# Patient Record
Sex: FEMALE | Race: WHITE | NOT HISPANIC OR LATINO
[De-identification: names, ages, dates, MRNs, and addresses within clinical notes are randomized per-mention and may not be internally consistent; named-entity substitution may affect disease eponyms.]

---

## 2018-07-19 ENCOUNTER — TRANSCRIPTION ENCOUNTER (OUTPATIENT)
Age: 58
End: 2018-07-19

## 2018-07-20 ENCOUNTER — OUTPATIENT (OUTPATIENT)
Dept: OUTPATIENT SERVICES | Facility: HOSPITAL | Age: 58
LOS: 1 days | Discharge: HOME | End: 2018-07-20
Payer: COMMERCIAL

## 2018-07-20 ENCOUNTER — RESULT REVIEW (OUTPATIENT)
Age: 58
End: 2018-07-20

## 2018-07-20 DIAGNOSIS — Z98.89 OTHER SPECIFIED POSTPROCEDURAL STATES: Chronic | ICD-10-CM

## 2018-07-20 DIAGNOSIS — Z80.0 FAMILY HISTORY OF MALIGNANT NEOPLASM OF DIGESTIVE ORGANS: ICD-10-CM

## 2018-07-20 DIAGNOSIS — E66.9 OBESITY, UNSPECIFIED: ICD-10-CM

## 2018-07-20 PROCEDURE — 88313 SPECIAL STAINS GROUP 2: CPT | Mod: 26

## 2018-07-20 PROCEDURE — 88312 SPECIAL STAINS GROUP 1: CPT

## 2018-07-20 PROCEDURE — 88313 SPECIAL STAINS GROUP 2: CPT

## 2018-07-20 PROCEDURE — 88305 TISSUE EXAM BY PATHOLOGIST: CPT | Mod: 26

## 2018-07-20 PROCEDURE — 88312 SPECIAL STAINS GROUP 1: CPT | Mod: 26

## 2018-07-20 PROCEDURE — 88305 TISSUE EXAM BY PATHOLOGIST: CPT

## 2018-07-20 PROCEDURE — 43239 EGD BIOPSY SINGLE/MULTIPLE: CPT

## 2018-07-20 PROCEDURE — 45380 COLONOSCOPY AND BIOPSY: CPT

## 2018-07-23 LAB — SURGICAL PATHOLOGY FINAL REPORT - CH: SIGNIFICANT CHANGE UP

## 2019-08-28 ENCOUNTER — TRANSCRIPTION ENCOUNTER (OUTPATIENT)
Age: 59
End: 2019-08-28

## 2020-01-22 ENCOUNTER — NON-APPOINTMENT (OUTPATIENT)
Age: 60
End: 2020-01-22

## 2020-01-22 ENCOUNTER — APPOINTMENT (OUTPATIENT)
Dept: CARDIOLOGY | Facility: CLINIC | Age: 60
End: 2020-01-22
Payer: COMMERCIAL

## 2020-01-22 VITALS
SYSTOLIC BLOOD PRESSURE: 127 MMHG | WEIGHT: 293 LBS | HEART RATE: 88 BPM | HEIGHT: 66 IN | OXYGEN SATURATION: 97 % | DIASTOLIC BLOOD PRESSURE: 81 MMHG | BODY MASS INDEX: 47.09 KG/M2

## 2020-01-22 DIAGNOSIS — R25.1 TREMOR, UNSPECIFIED: ICD-10-CM

## 2020-01-22 LAB
BASOPHILS # BLD AUTO: 0.04 K/UL
BASOPHILS NFR BLD AUTO: 0.5 %
EOSINOPHIL # BLD AUTO: 0.12 K/UL
EOSINOPHIL NFR BLD AUTO: 1.6 %
ESTIMATED AVERAGE GLUCOSE: 117 MG/DL
HBA1C MFR BLD HPLC: 5.7 %
HCT VFR BLD CALC: 39.4 %
HGB BLD-MCNC: 12.4 G/DL
IMM GRANULOCYTES NFR BLD AUTO: 0.3 %
LYMPHOCYTES # BLD AUTO: 1.65 K/UL
LYMPHOCYTES NFR BLD AUTO: 21.8 %
MAN DIFF?: NORMAL
MCHC RBC-ENTMCNC: 29.7 PG
MCHC RBC-ENTMCNC: 31.5 GM/DL
MCV RBC AUTO: 94.3 FL
MONOCYTES # BLD AUTO: 0.45 K/UL
MONOCYTES NFR BLD AUTO: 5.9 %
NEUTROPHILS # BLD AUTO: 5.29 K/UL
NEUTROPHILS NFR BLD AUTO: 69.9 %
NT-PROBNP SERPL-MCNC: 51 PG/ML
PLATELET # BLD AUTO: 288 K/UL
RBC # BLD: 4.18 M/UL
RBC # FLD: 13.3 %
WBC # FLD AUTO: 7.57 K/UL

## 2020-01-22 PROCEDURE — 93000 ELECTROCARDIOGRAM COMPLETE: CPT

## 2020-01-22 PROCEDURE — 99244 OFF/OP CNSLTJ NEW/EST MOD 40: CPT

## 2020-01-22 NOTE — DISCUSSION/SUMMARY
[With Me] : with me [FreeTextEntry1] : Ewelina is a 60 year old female here for evaluation.\par Her blood pressure is at goal, and she will continue her current regimen of verapamil and Avapro. I have given her a prescription for a low dose propranolol, to see if it helps with her tremor.\par \par She has a history of a 4.4 cm thoracic aortic aneurysm, and we'll repeat her echocardiogram in our office. I will also send a full set of blood work today, and we may need to proceed with a CT with contrast, to reevaluate her aneurysm this year. Her EKG does she a SR with evidence of LVH and LAD, with poor r wave progression.\par \par She will get me her old records, including stress test and cardiac catheterization. I stressed the importance of getting back to diet, and exercise, with an attempt to lose weight.\par I will call her with the results the above tests, and arrange followup.

## 2020-01-22 NOTE — HISTORY OF PRESENT ILLNESS
[FreeTextEntry1] : Ewelina is a 60 year old female here for evaluation.\par \par Cardiac-wise, she has a history of obesity, obstructive sleep apnea, and hypertension. She reports an abnormal stress test about 7 or 8 years ago, for which she had an angiogram, and it showed no significant plaque. She also has a history of aortic aneurysm. Her last CT was in 12/2018. This demonstrated an aneurysmal dilation of the ascending thoracic aorta, with a maximal transverse diameter of 4.4 cm.\par \par She has no chest pain, though does report occasional shortness of breath. She also denies palpitations, though reports a history of skipped beats. She has no lower extremity edema, orthopnea, PND, dizziness, lightheadedness, or near-syncope.\par \par She denies toxic habits. She has a family history of coronary artery disease. She also has an essential tremor, which seems to improve with Xanax. She also has a history of frequent kidney stones.\par She is planning to have liposuction later in the year.

## 2020-01-22 NOTE — REASON FOR VISIT
[Initial Evaluation] : an initial evaluation of [Hypertension] : hypertension [Abnormal ECG] : an abnormal ECG

## 2020-01-22 NOTE — PHYSICAL EXAM
[General Appearance - Well Developed] : well developed [Well Groomed] : well groomed [Normal Appearance] : normal appearance [No Deformities] : no deformities [General Appearance - Well Nourished] : well nourished [General Appearance - In No Acute Distress] : no acute distress [Normal Conjunctiva] : the conjunctiva exhibited no abnormalities [Eyelids - No Xanthelasma] : the eyelids demonstrated no xanthelasmas [Normal Oral Mucosa] : normal oral mucosa [No Oral Cyanosis] : no oral cyanosis [No Oral Pallor] : no oral pallor [Normal Jugular Venous V Waves Present] : normal jugular venous V waves present [Normal Jugular Venous A Waves Present] : normal jugular venous A waves present [No Jugular Venous Ames A Waves] : no jugular venous ames A waves [Normal Rate] : normal [Normal S1] : normal S1 [Normal S2] : normal S2 [No Murmur] : no murmurs heard [2+] : left 2+ [No Abnormalities] : the abdominal aorta was not enlarged and no bruit was heard [No Pitting Edema] : no pitting edema present [Respiration, Rhythm And Depth] : normal respiratory rhythm and effort [Exaggerated Use Of Accessory Muscles For Inspiration] : no accessory muscle use [Auscultation Breath Sounds / Voice Sounds] : lungs were clear to auscultation bilaterally [Abdomen Soft] : soft [Abdomen Tenderness] : non-tender [Abdomen Mass (___ Cm)] : no abdominal mass palpated [Abnormal Walk] : normal gait [Gait - Sufficient For Exercise Testing] : the gait was sufficient for exercise testing [Nail Clubbing] : no clubbing of the fingernails [Cyanosis, Localized] : no localized cyanosis [Petechial Hemorrhages (___cm)] : no petechial hemorrhages [Skin Color & Pigmentation] : normal skin color and pigmentation [No Venous Stasis] : no venous stasis [] : no rash [Skin Lesions] : no skin lesions [No Skin Ulcers] : no skin ulcer [No Xanthoma] : no  xanthoma was observed [Affect] : the affect was normal [Mood] : the mood was normal [Oriented To Time, Place, And Person] : oriented to person, place, and time [No Anxiety] : not feeling anxious [FreeTextEntry1] : obese [S3] : no S3 [S4] : no S4 [Right Carotid Bruit] : no bruit heard over the right carotid [Left Carotid Bruit] : no bruit heard over the left carotid [Right Femoral Bruit] : no bruit heard over the right femoral artery [Left Femoral Bruit] : no bruit heard over the left femoral artery

## 2020-01-23 LAB
25(OH)D3 SERPL-MCNC: 60.1 NG/ML
ALBUMIN SERPL ELPH-MCNC: 4.3 G/DL
ALP BLD-CCNC: 130 U/L
ALT SERPL-CCNC: 27 U/L
ANION GAP SERPL CALC-SCNC: 13 MMOL/L
AST SERPL-CCNC: 22 U/L
BILIRUB SERPL-MCNC: 0.2 MG/DL
BUN SERPL-MCNC: 20 MG/DL
CALCIUM SERPL-MCNC: 9.7 MG/DL
CHLORIDE SERPL-SCNC: 103 MMOL/L
CHOLEST SERPL-MCNC: 188 MG/DL
CHOLEST/HDLC SERPL: 3.3 RATIO
CO2 SERPL-SCNC: 28 MMOL/L
CREAT SERPL-MCNC: 0.9 MG/DL
GLUCOSE SERPL-MCNC: 125 MG/DL
HDLC SERPL-MCNC: 56 MG/DL
LDLC SERPL CALC-MCNC: 108 MG/DL
POTASSIUM SERPL-SCNC: 4.7 MMOL/L
PROT SERPL-MCNC: 6.7 G/DL
SODIUM SERPL-SCNC: 143 MMOL/L
TRIGL SERPL-MCNC: 116 MG/DL
TSH SERPL-ACNC: 2.66 UIU/ML

## 2020-01-28 ENCOUNTER — APPOINTMENT (OUTPATIENT)
Dept: SURGERY | Facility: CLINIC | Age: 60
End: 2020-01-28
Payer: COMMERCIAL

## 2020-01-28 VITALS
HEART RATE: 94 BPM | TEMPERATURE: 98.1 F | OXYGEN SATURATION: 100 % | WEIGHT: 293 LBS | BODY MASS INDEX: 47.09 KG/M2 | HEIGHT: 66 IN | SYSTOLIC BLOOD PRESSURE: 157 MMHG | DIASTOLIC BLOOD PRESSURE: 107 MMHG | RESPIRATION RATE: 14 BRPM

## 2020-01-28 DIAGNOSIS — E65 LOCALIZED ADIPOSITY: ICD-10-CM

## 2020-01-28 DIAGNOSIS — M79.3 PANNICULITIS, UNSPECIFIED: ICD-10-CM

## 2020-01-28 DIAGNOSIS — E66.01 MORBID (SEVERE) OBESITY DUE TO EXCESS CALORIES: ICD-10-CM

## 2020-01-28 DIAGNOSIS — L98.491 NON-PRESSURE CHRONIC ULCER OF SKIN OF OTHER SITES LIMITED TO BREAKDOWN OF SKIN: ICD-10-CM

## 2020-01-28 PROCEDURE — 99203 OFFICE O/P NEW LOW 30 MIN: CPT

## 2020-01-29 PROBLEM — M79.3 PANNICULITIS: Status: ACTIVE | Noted: 2020-01-29

## 2020-01-29 PROBLEM — E65 ABDOMINAL PANNUS: Status: ACTIVE | Noted: 2020-01-29

## 2020-01-29 PROBLEM — L98.491 SKIN ULCER, LIMITED TO BREAKDOWN OF SKIN: Status: ACTIVE | Noted: 2020-01-29

## 2020-01-29 NOTE — PHYSICAL EXAM
[Normal Breath Sounds] : Normal breath sounds [de-identified] : Morbidly obese elderly woman in no acute distress. appears older than stated age [Normal Heart Sounds] : normal heart sounds [de-identified] : Supple, no masses, lymphadenopathy [de-identified] : NONA PAIGE, CLAUDIA [de-identified] : Obese, soft, nontender, nondistended, positive bowel sounds in all four quadrants.  No hernia or masses.\par Pendulous abdominal pannus [de-identified] : FROM, strength equal bilaterally abdomen without difficulty. [de-identified] : Pendulous abdominal pannus to mid thigh.  Multiple ulcerated lesions.  No overt infection.

## 2020-01-29 NOTE — HISTORY OF PRESENT ILLNESS
[de-identified] : Known to me from previous Bariatric Surgery Practice.  Had Lap band removed secondary to Gastric band erosion.\rosette Continued to struggle with weight loss.\rosette Developed a very large pendulous abdominal pannus with ulcerations and has been in consultation with Plastic Surgeon for Panniculectomy, lipo, etc.\rosette Was asked to evaluate to both assist in her procedure as well as assess for any occult herniations.

## 2020-01-29 NOTE — ASSESSMENT
[FreeTextEntry1] : Agree with Panniculectomy.  Will coordinate with Dr De La Torre's office for Panniculectomy.\par I do not appreciate any hernia or masses but have never the less discussed with patient repair of hernia in the event occult herniation is identified upon mobilization of the pannus from the abdominal wall and exposure of the underlying musculature.\par \par Local wound care instructions provided for ulcerated lesions.\par \par Routine PST to be arranged in accordance with OR scheduling.\par \par F/U post operatively.

## 2020-01-31 ENCOUNTER — APPOINTMENT (OUTPATIENT)
Dept: CARDIOLOGY | Facility: CLINIC | Age: 60
End: 2020-01-31
Payer: COMMERCIAL

## 2020-01-31 PROCEDURE — 93306 TTE W/DOPPLER COMPLETE: CPT

## 2020-03-27 ENCOUNTER — APPOINTMENT (OUTPATIENT)
Dept: SURGERY | Facility: HOSPITAL | Age: 60
End: 2020-03-27

## 2020-06-16 ENCOUNTER — INPATIENT (INPATIENT)
Facility: HOSPITAL | Age: 60
LOS: 0 days | Discharge: HOME | DRG: 287 | End: 2020-06-16
Attending: INTERNAL MEDICINE | Admitting: INTERNAL MEDICINE
Payer: COMMERCIAL

## 2020-06-16 ENCOUNTER — TRANSCRIPTION ENCOUNTER (OUTPATIENT)
Age: 60
End: 2020-06-16

## 2020-06-16 ENCOUNTER — EMERGENCY (EMERGENCY)
Facility: HOSPITAL | Age: 60
LOS: 1 days | Discharge: ANOTHER HOSPITAL | End: 2020-06-16
Attending: EMERGENCY MEDICINE | Admitting: EMERGENCY MEDICINE
Payer: COMMERCIAL

## 2020-06-16 VITALS
WEIGHT: 293 LBS | TEMPERATURE: 99 F | DIASTOLIC BLOOD PRESSURE: 97 MMHG | RESPIRATION RATE: 16 BRPM | HEIGHT: 66 IN | OXYGEN SATURATION: 98 % | HEART RATE: 88 BPM | SYSTOLIC BLOOD PRESSURE: 150 MMHG

## 2020-06-16 VITALS — SYSTOLIC BLOOD PRESSURE: 154 MMHG | RESPIRATION RATE: 18 BRPM | HEART RATE: 76 BPM | DIASTOLIC BLOOD PRESSURE: 72 MMHG

## 2020-06-16 VITALS
HEART RATE: 90 BPM | WEIGHT: 293 LBS | DIASTOLIC BLOOD PRESSURE: 116 MMHG | HEIGHT: 66 IN | RESPIRATION RATE: 20 BRPM | TEMPERATURE: 99 F | SYSTOLIC BLOOD PRESSURE: 164 MMHG | OXYGEN SATURATION: 96 %

## 2020-06-16 DIAGNOSIS — I21.4 NON-ST ELEVATION (NSTEMI) MYOCARDIAL INFARCTION: ICD-10-CM

## 2020-06-16 DIAGNOSIS — Z98.89 OTHER SPECIFIED POSTPROCEDURAL STATES: Chronic | ICD-10-CM

## 2020-06-16 LAB
ALBUMIN SERPL ELPH-MCNC: 3.5 G/DL — SIGNIFICANT CHANGE UP (ref 3.3–5)
ALP SERPL-CCNC: 127 U/L — HIGH (ref 40–120)
ALT FLD-CCNC: 32 U/L — SIGNIFICANT CHANGE UP (ref 12–78)
ANION GAP SERPL CALC-SCNC: 8 MMOL/L — SIGNIFICANT CHANGE UP (ref 5–17)
APTT BLD: 30.7 SEC — SIGNIFICANT CHANGE UP (ref 28.5–37)
AST SERPL-CCNC: 19 U/L — SIGNIFICANT CHANGE UP (ref 15–37)
BASOPHILS # BLD AUTO: 0.05 K/UL — SIGNIFICANT CHANGE UP (ref 0–0.2)
BASOPHILS NFR BLD AUTO: 0.7 % — SIGNIFICANT CHANGE UP (ref 0–2)
BILIRUB SERPL-MCNC: 0.6 MG/DL — SIGNIFICANT CHANGE UP (ref 0.2–1.2)
BUN SERPL-MCNC: 19 MG/DL — SIGNIFICANT CHANGE UP (ref 7–23)
CALCIUM SERPL-MCNC: 9.1 MG/DL — SIGNIFICANT CHANGE UP (ref 8.5–10.1)
CHLORIDE SERPL-SCNC: 106 MMOL/L — SIGNIFICANT CHANGE UP (ref 96–108)
CK MB BLD-MCNC: <1 % — SIGNIFICANT CHANGE UP (ref 0–3.5)
CK MB CFR SERPL CALC: <1 NG/ML — SIGNIFICANT CHANGE UP (ref 0–3.6)
CK SERPL-CCNC: 96 U/L — SIGNIFICANT CHANGE UP (ref 26–192)
CO2 SERPL-SCNC: 26 MMOL/L — SIGNIFICANT CHANGE UP (ref 22–31)
CREAT SERPL-MCNC: 0.86 MG/DL — SIGNIFICANT CHANGE UP (ref 0.5–1.3)
D DIMER BLD IA.RAPID-MCNC: 208 NG/ML DDU — SIGNIFICANT CHANGE UP
EOSINOPHIL # BLD AUTO: 0.11 K/UL — SIGNIFICANT CHANGE UP (ref 0–0.5)
EOSINOPHIL NFR BLD AUTO: 1.5 % — SIGNIFICANT CHANGE UP (ref 0–6)
GLUCOSE SERPL-MCNC: 141 MG/DL — HIGH (ref 70–99)
HCT VFR BLD CALC: 36.8 % — SIGNIFICANT CHANGE UP (ref 34.5–45)
HGB BLD-MCNC: 12.1 G/DL — SIGNIFICANT CHANGE UP (ref 11.5–15.5)
IMM GRANULOCYTES NFR BLD AUTO: 0.3 % — SIGNIFICANT CHANGE UP (ref 0–1.5)
INR BLD: 1.09 RATIO — SIGNIFICANT CHANGE UP (ref 0.88–1.16)
LYMPHOCYTES # BLD AUTO: 1.8 K/UL — SIGNIFICANT CHANGE UP (ref 1–3.3)
LYMPHOCYTES # BLD AUTO: 24.8 % — SIGNIFICANT CHANGE UP (ref 13–44)
MCHC RBC-ENTMCNC: 30 PG — SIGNIFICANT CHANGE UP (ref 27–34)
MCHC RBC-ENTMCNC: 32.9 GM/DL — SIGNIFICANT CHANGE UP (ref 32–36)
MCV RBC AUTO: 91.3 FL — SIGNIFICANT CHANGE UP (ref 80–100)
MONOCYTES # BLD AUTO: 0.49 K/UL — SIGNIFICANT CHANGE UP (ref 0–0.9)
MONOCYTES NFR BLD AUTO: 6.7 % — SIGNIFICANT CHANGE UP (ref 2–14)
NEUTROPHILS # BLD AUTO: 4.8 K/UL — SIGNIFICANT CHANGE UP (ref 1.8–7.4)
NEUTROPHILS NFR BLD AUTO: 66 % — SIGNIFICANT CHANGE UP (ref 43–77)
NRBC # BLD: 0 /100 WBCS — SIGNIFICANT CHANGE UP (ref 0–0)
NT-PROBNP SERPL-SCNC: 89 PG/ML — SIGNIFICANT CHANGE UP (ref 0–125)
PLATELET # BLD AUTO: 277 K/UL — SIGNIFICANT CHANGE UP (ref 150–400)
POTASSIUM SERPL-MCNC: 3.9 MMOL/L — SIGNIFICANT CHANGE UP (ref 3.5–5.3)
POTASSIUM SERPL-SCNC: 3.9 MMOL/L — SIGNIFICANT CHANGE UP (ref 3.5–5.3)
PROT SERPL-MCNC: 7.1 G/DL — SIGNIFICANT CHANGE UP (ref 6–8.3)
PROTHROM AB SERPL-ACNC: 12.2 SEC — SIGNIFICANT CHANGE UP (ref 10–12.9)
RBC # BLD: 4.03 M/UL — SIGNIFICANT CHANGE UP (ref 3.8–5.2)
RBC # FLD: 13.2 % — SIGNIFICANT CHANGE UP (ref 10.3–14.5)
SARS-COV-2 RNA SPEC QL NAA+PROBE: SIGNIFICANT CHANGE UP
SODIUM SERPL-SCNC: 140 MMOL/L — SIGNIFICANT CHANGE UP (ref 135–145)
TROPONIN I SERPL-MCNC: <.015 NG/ML — SIGNIFICANT CHANGE UP (ref 0.01–0.04)
WBC # BLD: 7.27 K/UL — SIGNIFICANT CHANGE UP (ref 3.8–10.5)
WBC # FLD AUTO: 7.27 K/UL — SIGNIFICANT CHANGE UP (ref 3.8–10.5)

## 2020-06-16 PROCEDURE — 93005 ELECTROCARDIOGRAM TRACING: CPT

## 2020-06-16 PROCEDURE — 99285 EMERGENCY DEPT VISIT HI MDM: CPT

## 2020-06-16 PROCEDURE — 80053 COMPREHEN METABOLIC PANEL: CPT

## 2020-06-16 PROCEDURE — 93454 CORONARY ARTERY ANGIO S&I: CPT | Mod: 26

## 2020-06-16 PROCEDURE — C1769: CPT

## 2020-06-16 PROCEDURE — 93010 ELECTROCARDIOGRAM REPORT: CPT

## 2020-06-16 PROCEDURE — 99152 MOD SED SAME PHYS/QHP 5/>YRS: CPT

## 2020-06-16 PROCEDURE — 85379 FIBRIN DEGRADATION QUANT: CPT

## 2020-06-16 PROCEDURE — C1894: CPT

## 2020-06-16 PROCEDURE — 85730 THROMBOPLASTIN TIME PARTIAL: CPT

## 2020-06-16 PROCEDURE — 83880 ASSAY OF NATRIURETIC PEPTIDE: CPT

## 2020-06-16 PROCEDURE — 82553 CREATINE MB FRACTION: CPT

## 2020-06-16 PROCEDURE — 71275 CT ANGIOGRAPHY CHEST: CPT

## 2020-06-16 PROCEDURE — 82550 ASSAY OF CK (CPK): CPT

## 2020-06-16 PROCEDURE — 84484 ASSAY OF TROPONIN QUANT: CPT

## 2020-06-16 PROCEDURE — 87635 SARS-COV-2 COVID-19 AMP PRB: CPT

## 2020-06-16 PROCEDURE — 99285 EMERGENCY DEPT VISIT HI MDM: CPT | Mod: 25

## 2020-06-16 PROCEDURE — 85610 PROTHROMBIN TIME: CPT

## 2020-06-16 PROCEDURE — 36415 COLL VENOUS BLD VENIPUNCTURE: CPT

## 2020-06-16 PROCEDURE — 71045 X-RAY EXAM CHEST 1 VIEW: CPT

## 2020-06-16 PROCEDURE — 85027 COMPLETE CBC AUTOMATED: CPT

## 2020-06-16 PROCEDURE — 71045 X-RAY EXAM CHEST 1 VIEW: CPT | Mod: 26

## 2020-06-16 PROCEDURE — 71275 CT ANGIOGRAPHY CHEST: CPT | Mod: 26

## 2020-06-16 PROCEDURE — C1887: CPT

## 2020-06-16 PROCEDURE — 93454 CORONARY ARTERY ANGIO S&I: CPT

## 2020-06-16 NOTE — ED PROVIDER NOTE - OBJECTIVE STATEMENT
61 yo F PMHx HTN, bipolar disorder, stable aortic aneurysm presents to ED c/o shortness of breath x yesterday afternoon. Pt states symptoms began suddenly after walking up a flight of stairs- states she feels winded, can't catch her breath- constant. Reports intermittent pleuritic chest pain radiating to back. Denies fever/chills, cough, known sick/covid contacts, leg pain/swelling.  PCP- Kevin  Cardio- [place]

## 2020-06-16 NOTE — DISCHARGE NOTE PROVIDER - NSDCCPCAREPLAN_GEN_ALL_CORE_FT
PRINCIPAL DISCHARGE DIAGNOSIS  Diagnosis: Feeling of chest tightness  Assessment and Plan of Treatment: You had chest discomfort and dyspnea on exertion for past 2 days.  You underwent cardiac catheterization for ischemic evaluation and you have no blockages on your coronary arteries that require intervention.  Please follow up with your cardiologist in 1-2 weeks.

## 2020-06-16 NOTE — H&P CARDIOLOGY - PSH
History of tonsillectomy    History of laparoscopic adjustable gastric banding    S/P D&C    Other ureteric obstruction    Calculus of gallbladder without mention of cholecystitis or obstruction    Other postprocedural status    Calculus of kidney

## 2020-06-16 NOTE — ED ADULT TRIAGE NOTE - CHIEF COMPLAINT QUOTE
Patient presents with complaints of shortness of breath since yesterday. States she feels pain with deep breath. Denies fever. Denies exposure to covid.

## 2020-06-16 NOTE — ED ADULT NURSE NOTE - PMH
Anemia    Anxiety    Arthritis    Asthma    Benign Essential Tremor    Bipolar 1 disorder    Colitis  H/O  Degenerative disc disease, lumbar    Depression    ETOH Abuse  H/O  Hypertension    Kidney stone    Morbid Obesity    Neuropathy    Renal Calculi  chronic    Sleep Apnea    Sleep Apnea    Spinal stenosis

## 2020-06-16 NOTE — ED ADULT NURSE NOTE - OBJECTIVE STATEMENT
pt to er states she felt sob yesterday after coming up the stairs upon arrival is alert oriented speech clear resp even unlabored while on stretcher no edema skin intact denies fever iv started 20 angio left ac labs drawn pt for ct chest

## 2020-06-16 NOTE — ED PROVIDER NOTE - CLINICAL SUMMARY MEDICAL DECISION MAKING FREE TEXT BOX
61 yo F hx htn, stable aortic aneurysm p/w SOB, intermittent pleuritic cp x yesterday afternoon--> EKG unremarkable, will check labs, BNP, trop, CTA r/o dissection

## 2020-06-16 NOTE — CONSULT NOTE ADULT - SUBJECTIVE AND OBJECTIVE BOX
[facility]         Almas Faustin, Mayra, Cara, Saurabh Johansen Savella        [phone] (office)    CHIEF COMPLAINT: Patient is a 60y old  Female who presents with a chief complaint of     HPI: 59 yo F PMHx Bipolar Disorder, HTN, Thoracic AA 4.4 (stable), JESUS on CPAP, Hx of gastric lap band and removal, Essential Tremor, Obesity presenting for dyspnea x>>>    Patient of Dr. Giordano. EKG in Cardiology office showed NSR, LAD, LVH. Angiogram 8 years ago without significant plaques      PAST MEDICAL & SURGICAL HISTORY:  Arthritis  Degenerative disc disease, lumbar  Neuropathy  Spinal stenosis  Bipolar 1 disorder  Kidney stone  Sleep Apnea  Asthma  Hypertension  Morbid Obesity  ETOH Abuse: H/O  Benign Essential Tremor  Anxiety  Depression  Renal Calculi: chronic    Colitis: H/O  Anemia  Sleep Apnea  S/P cardiac catheterization  S/P dilation and curettage  History of tonsillectomy  History of laparoscopic adjustable gastric banding  S/P D&C  Ureteral stent  Biliary stent placement & ercp  ureteroscopy with stone removal: 1-      SOCIAL HISTORY: No active tobacco, alcohol or illicit drug use    FAMILY HISTORY:  Hypertension (Sibling)  Hyperlipidemia (Sibling)  Family history of renal failure: both parents were on dialysis  Family history of bacterial pneumonia  Family history of arthritis      Outpatient medications:  Allopurinol 300 mg daily  Avapro 300 mg daily  Verapamil 240 mg daily  Aspirin 81 mg daily   Enoxaparin 40 subcut twice daily  Potassium Cittrate?   Trazodone 100 daily   Xanax - dose?   propranolol      MEDICATIONS  (STANDING):    MEDICATIONS  (PRN):      Allergies    penicillins (Other)  trees dogs cats cockaroaches (Rhinitis; Rhinorrhea)    Intolerances        REVIEW OF SYSTEMS: Is negative for eye, ENT, GI, , allergic, dermatologic, musculoskeletal and neurologic, except as described above.    VITAL SIGNS:   Vital Signs Last 24 Hrs  T(C): 37.2 (16 Jun 2020 08:21), Max: 37.2 (16 Jun 2020 08:21)  T(F): 98.9 (16 Jun 2020 08:21), Max: 98.9 (16 Jun 2020 08:21)  HR: 90 (16 Jun 2020 08:21) (90 - 90)  BP: 164/116 (16 Jun 2020 08:21) (164/116 - 164/116)  BP(mean): --  RR: 20 (16 Jun 2020 08:21) (20 - 20)  SpO2: 96% (16 Jun 2020 08:21) (96% - 96%)    I&O's Summary      PHYSICAL EXAM:    Constitutional: NAD, awake and alert, well-developed  Eyes:  EOMI, no oral cyanosis, conjunctivae clear, anicteric.  Pulmonary: Non-labored, breath sounds are clear bilaterally, no wheezing, rales or rhonchi  Cardiovascular:  regular S1 and S2. No murmur.  No rubs, gallops or clicks  Gastrointestinal: Bowel Sounds present, soft, nontender.   Lymph: No peripheral edema.   Neurological: Alert, strength and sensitivity are grossly intact  Skin: No obvious lesions/rashes.   Psych:  Mood & affect appropriate .    LABS: All Labs Reviewed:                        12.1   7.27  )-----------( 277      ( 16 Jun 2020 08:53 )             36.8     16 Jun 2020 08:53    140    |  106    |  19     ----------------------------<  141    3.9     |  26     |  0.86     Ca    9.1        16 Jun 2020 08:53    TPro  7.1    /  Alb  3.5    /  TBili  0.6    /  DBili  x      /  AST  19     /  ALT  32     /  AlkPhos  127    16 Jun 2020 08:53    PT/INR - ( 16 Jun 2020 08:53 )   PT: 12.2 sec;   INR: 1.09 ratio         PTT - ( 16 Jun 2020 08:53 )  PTT:30.7 sec  CARDIAC MARKERS ( 16 Jun 2020 08:53 )  <.015 ng/mL / x     / x     / x     / x          Blood Culture:   06-16 @ 08:53  Pro Bnp 89        RADIOLOGY:    EKG:    Impression/Plan: [facility]         Almas Faustin, Cara Nunez, Saurabh Johansen Savella        [phone] (office)    CHIEF COMPLAINT: Patient is a 60y old  Female who presents with a chief complaint of     HPI: 61 yo F PMHx Bipolar Disorder, HTN, Thoracic AA 4.4 (stable), JESUS on CPAP, Hx of gastric lap band and removal, Essential Tremor, Obesity, hx cholecystectomy, hx nephrolithiasis,  now presenting for dyspnea  for 2 days. Patient states her shortness of breath is new to her. She feels short of breath climbing the stairs in her house and walking between rooms. The shortness of breath continued at rest and feels like a tightness in her chest.  She also reports a presyncopal episode yesterday where she felt dizzy and had to catch herself before falling. She sat down and her dizziness improved .. She also reports 2 weeks of lower right sided back pain when she takes a deep breath only. She reports she knows 4 people who have  that she learned about this past couple of days. She denies any recent changes to her medications. Denies chest pain, palpitations, dizziness currently, nausea/vomiting, changes in diet.     Patient of Dr. Giordano. EKG in Cardiology office showed NSR, LAD, LVH. Angiogram 8 years ago without significant plaques.       PAST MEDICAL & SURGICAL HISTORY:  Arthritis  Degenerative disc disease, lumbar  Neuropathy  Spinal stenosis  Bipolar 1 disorder  Kidney stone  Sleep Apnea  Asthma  Hypertension  Morbid Obesity  ETOH Abuse: H/O  Benign Essential Tremor  Anxiety  Depression  Renal Calculi: chronic    Colitis: H/O  Anemia  Sleep Apnea  S/P cardiac catheterization  S/P dilation and curettage  History of tonsillectomy  History of laparoscopic adjustable gastric banding  S/P D&C  Ureteral stent  Biliary stent placement & ercp  ureteroscopy with stone removal: 2016      SOCIAL HISTORY: No active tobacco, alcohol or illicit drug use    FAMILY HISTORY:  Hypertension (Sibling)  Hyperlipidemia (Sibling)  Family history of renal failure: both parents were on dialysis  Family history of bacterial pneumonia  Family history of arthritis      Outpatient medications:  Allopurinol 300 mg daily  Avapro 300 mg daily  Verapamil 240 mg daily  Aspirin 81 mg daily   Potassium Citrate   Trazodone 100 daily   Cymbalta   Lamictal 200  Xanax 1 mg   propranolol 10 mg       MEDICATIONS  (STANDING):    MEDICATIONS  (PRN):      Allergies    penicillins (Other)  trees dogs cats cockaroaches (Rhinitis; Rhinorrhea)    Intolerances        REVIEW OF SYSTEMS: Is negative for eye, ENT, GI, , allergic, dermatologic, musculoskeletal and neurologic, except as described above.    VITAL SIGNS:   Vital Signs Last 24 Hrs  T(C): 37.2 (2020 08:21), Max: 37.2 (2020 08:21)  T(F): 98.9 (2020 08:21), Max: 98.9 (2020 08:21)  HR: 90 (2020 08:21) (90 - 90)  BP: 164/116 (2020 08:21) (164/116 - 164/116)  BP(mean): --  RR: 20 (2020 08:21) (20 - 20)  SpO2: 96% (2020 08:21) (96% - 96%)    I&O's Summary      PHYSICAL EXAM:    Constitutional: NAD, awake and alert, well-developed, obese  Eyes:  EOMI, no oral cyanosis, conjunctivae clear, anicteric.  Pulmonary: Non-labored, breath sounds are clear bilaterally, no wheezing, rales or rhonchi  Cardiovascular:  regular S1 and S2. No murmur.  No rubs, gallops or clicks  Gastrointestinal: Bowel Sounds present, soft, nontender.   Lymph: No peripheral edema.   Neurological: Alert, oriented x 3   Skin: No obvious lesions/rashes.   Psych:  Mood & affect appropriate .    LABS: All Labs Reviewed:                        12.1   7.27  )-----------( 277      ( 2020 08:53 )             36.8     2020 08:53    140    |  106    |  19     ----------------------------<  141    3.9     |  26     |  0.86     Ca    9.1        2020 08:53    TPro  7.1    /  Alb  3.5    /  TBili  0.6    /  DBili  x      /  AST  19     /  ALT  32     /  AlkPhos  127    2020 08:53    PT/INR - ( 2020 08:53 )   PT: 12.2 sec;   INR: 1.09 ratio         PTT - ( 2020 08:53 )  PTT:30.7 sec  CARDIAC MARKERS ( :53 )  <.015 ng/mL / x     / x     / x     / x          Blood Culture:    @ 08:53  Pro Bnp 89        RADIOLOGY:   INTERPRETATION:  CLINICAL INFORMATION: Shortness of breath and chest pain.    COMPARISON: CT scan chest 2016.    PROCEDURE:   CT Angiography of the Chest.  90 ml of Omnipaque 350 was injected intravenously. 10 ml were discarded.  Sagittal and coronal reformats were performed as well as 3D (MIP) reconstructions.    FINDINGS:    LUNGS AND AIRWAYS: PLEURA:   4 mm calcified granuloma right lung apex.  The central airways remain patent.  No lobar lung consolidation, pleural effusion or pneumothorax is noted.    MEDIASTINUM AND MILADIS: No enlarged lymphadenopathy.  VESSELS: There is atherosclerotic calcification of the thoracic aorta, without evidence of aortic dissection.  There is aneurysmal dilatation of the aortic arch measuring up to 4.3 cm.  Coronary artery calcification is present.  HEART: Heart size is normal. No pericardial effusion.  CHEST WALL AND LOWER NECK: Small low-density nodule inferior right lobe of the thyroid gland.    VISUALIZED UPPER ABDOMEN:   Hepatic steatosis.  Prior cholecystectomy.  Partially imaged are intrarenal calcifications at the upper pole the right kidney.    BONES: Multilevel degenerative changes of the thoracic spine.    IMPRESSION:     Aneurysmal dilatation of the ascending aorta as discussed; no aortic dissection noted.    Other findings as discussed above.    ADELA PRADHAN M.D., ATTENDING RADIOLOGIST  This document has been electronically signed. 2020 10:18AM      EKG: NSR [facility]         Almas Faustin, Mayra, Cara, Eleno, Pasquale Wiley        [phone] (office)    CHIEF COMPLAINT: Patient is a 60y old  Female who presents with a chief complaint of     HPI: 59 yo F PMHx Bipolar Disorder, Anxiety disorder, HTN, Thoracic AA 4.4 (stable), JESUS on CPAP, Hx of gastric lap band and removal, Essential Tremor, Obesity, hx cholecystectomy, hx nephrolithiasis,  now presenting for dyspnea  for 2 days. Patient states her shortness of breath is new to her, she has never had anything like it before.  She feels short of breath climbing the stairs in her house and taking a few steps walking between rooms. The shortness of breath continued at rest and feels like a tightness in her chest. States the shortness of breath is associated with increased sweating as well. She states this dyspnea feels different that past panic/ anxiety attacks. She also reports a presyncopal episode yesterday where she felt dizzy and had to catch herself before falling. She sat down and her dizziness improved . She also reports 2 weeks of lower right sided back pain only when she takes a deep breath. She reports she knows 4 people who have  that she learned about this past couple of days. She denies any recent changes to her medications. Denies chest pain, palpitations, dizziness currently, nausea/vomiting, changes in diet.  Patient is anxious that her friend was recently diagnosed with a PE and thinks she may have one.     Patient of Dr. Giordano. EKG in Cardiology office showed NSR, LAD, LVH. Angiogram in  ago without significant plaques, performed after abnormal stress test.  Previous cardiology group Dr. Lazo.      PAST MEDICAL & SURGICAL HISTORY:  Arthritis  Degenerative disc disease, lumbar  Neuropathy  Spinal stenosis  Bipolar 1 disorder  Kidney stone  Sleep Apnea  Asthma  Hypertension  Morbid Obesity  ETOH Abuse: H/O  Benign Essential Tremor  Anxiety  Depression  Renal Calculi: chronic    Colitis: H/O  Anemia  Sleep Apnea  S/P cardiac catheterization  S/P dilation and curettage  History of tonsillectomy  History of laparoscopic adjustable gastric banding  S/P D&C  Ureteral stent  Biliary stent placement & ercp  ureteroscopy with stone removal: 2016      SOCIAL HISTORY: Former smoker, though No active tobacco, alcohol or illicit drug use in 34 years. History of distant cocaine use and heavy drinking.     FAMILY HISTORY:  Hypertension (Sibling)  Hyperlipidemia (Sibling)  Family history of renal failure: both parents were on dialysis  Family history of bacterial pneumonia  Family history of arthritis   Colon cancer in grandfather      Outpatient medications:  Allopurinol 300 mg daily  Avapro 300 mg daily  Verapamil 240 mg daily  Aspirin 81 mg daily   Potassium Citrate   Trazodone 100 daily   Cymbalta   Lamictal 200  Xanax 1 mg   propranolol 10 mg       MEDICATIONS  (STANDING):    MEDICATIONS  (PRN):      Allergies    penicillins (Other)  trees dogs cats cockaroaches (Rhinitis; Rhinorrhea)    Intolerances        REVIEW OF SYSTEMS: Is negative for eye, ENT, GI, , allergic, dermatologic, musculoskeletal and neurologic, except as described above.    VITAL SIGNS:   Vital Signs Last 24 Hrs  T(C): 37.2 (2020 08:21), Max: 37.2 (2020 08:21)  T(F): 98.9 (2020 08:21), Max: 98.9 (2020 08:21)  HR: 90 (2020 08:21) (90 - 90)  BP: 164/116 (2020 08:21) (164/116 - 164/116)  BP(mean): --  RR: 20 (2020 08:21) (20 - 20)  SpO2: 96% (2020 08:21) (96% - 96%)    I&O's Summary      PHYSICAL EXAM:    Constitutional: NAD, awake and alert, well-developed, obese  Eyes:  EOMI, no oral cyanosis, conjunctivae clear, anicteric.  Pulmonary: Non-labored, breath sounds are clear bilaterally, no wheezing, rales or rhonchi  Cardiovascular:  regular S1 and S2. No murmur.  No rubs, gallops or clicks  Gastrointestinal: Bowel Sounds present, soft, nontender.   Lymph: No peripheral edema.   Neurological: Alert, oriented x 3   Skin: No obvious lesions/rashes.   Psych:  Mood & affect appropriate .    LABS: All Labs Reviewed:                        12.1   7.27  )-----------( 277      ( 2020 08:53 )             36.8     2020 08:53    140    |  106    |  19     ----------------------------<  141    3.9     |  26     |  0.86     Ca    9.1        2020 08:53    TPro  7.1    /  Alb  3.5    /  TBili  0.6    /  DBili  x      /  AST  19     /  ALT  32     /  AlkPhos  127    2020 08:53    PT/INR - ( 2020 08:53 )   PT: 12.2 sec;   INR: 1.09 ratio         PTT - ( 2020 08:53 )  PTT:30.7 sec  CARDIAC MARKERS ( 2020 08:53 )  <.015 ng/mL / x     / x     / x     / x          Blood Culture:   -16 @ 08:53  Pro Bnp 89        RADIOLOGY:   INTERPRETATION:  CLINICAL INFORMATION: Shortness of breath and chest pain.    COMPARISON: CT scan chest 2016.    PROCEDURE:   CT Angiography of the Chest.  90 ml of Omnipaque 350 was injected intravenously. 10 ml were discarded.  Sagittal and coronal reformats were performed as well as 3D (MIP) reconstructions.    FINDINGS:    LUNGS AND AIRWAYS: PLEURA:   4 mm calcified granuloma right lung apex.  The central airways remain patent.  No lobar lung consolidation, pleural effusion or pneumothorax is noted.    MEDIASTINUM AND MILADIS: No enlarged lymphadenopathy.  VESSELS: There is atherosclerotic calcification of the thoracic aorta, without evidence of aortic dissection.  There is aneurysmal dilatation of the aortic arch measuring up to 4.3 cm.  Coronary artery calcification is present.  HEART: Heart size is normal. No pericardial effusion.  CHEST WALL AND LOWER NECK: Small low-density nodule inferior right lobe of the thyroid gland.    VISUALIZED UPPER ABDOMEN:   Hepatic steatosis.  Prior cholecystectomy.  Partially imaged are intrarenal calcifications at the upper pole the right kidney.    BONES: Multilevel degenerative changes of the thoracic spine.    IMPRESSION:     Aneurysmal dilatation of the ascending aorta as discussed; no aortic dissection noted.    Other findings as discussed above.    ADELA PRADHAN M.D., ATTENDING RADIOLOGIST  This document has been electronically signed. 2020 10:18AM      EKG: NSR

## 2020-06-16 NOTE — DISCHARGE NOTE PROVIDER - HOSPITAL COURSE
61 yo  female with PMHx Bipolar Disorder, Anxiety disorder, HTN, Thoracic AA 4.4 (stable), JESUS on CPAP, Hx of gastric lap band and removal, Essential Tremor, Obesity, hx cholecystectomy, hx nephrolithiasis, now presenting to [facility] for dyspnea with exertion for 2 days. Patient states her shortness of breath is new to her, she has never had anything like it before.  She feels short of breath climbing the stairs in her house and taking a few steps walking between rooms. The shortness of breath continued at rest and feels like a tightness in her chest. States the shortness of breath is associated with increased sweating as well. She states this dyspnea feels different that past panic/ anxiety attacks. She also reports a presyncopal episode yesterday where she felt dizzy and had to catch herself before falling. She sat down and her dizziness improved. She also reports 2 weeks of lower right sided back pain only when she takes a deep breath, left neck pain for past two days. She reports she knows 4 people who have  that she learned about this past couple of days. She denies any recent changes to her medications. Denies chest pain, palpitations, dizziness currently, nausea/vomiting, changes in diet.  Patient is anxious that her friend was recently diagnosed with a PE and thinks she may have one.  Trop negative x2, COVID PCR negative, PE ruled out, CXR negative, transferred for [facility] with Dr Bello, denies any discomfort at this time, denies any implantable cardiac monitoring device.           Patient of Dr. Giordano. EKG in Cardiology office showed NSR, LAD, LVH. Angiogram in  ago without significant plaques, performed after abnormal stress test.  Previous cardiology group Dr. Lazo.        S/p diagnostic cath via RRA.

## 2020-06-16 NOTE — DISCHARGE NOTE PROVIDER - NSDCMRMEDTOKEN_GEN_ALL_CORE_FT
allopurinol 300 mg oral tablet: 1 tab(s) orally once a day  Arthrotec 75 mg-200 mcg oral tablet: 1 tab(s) orally 2 times a day, As Needed  Cymbalta 30 mg oral delayed release capsule: 1 cap(s) orally 3 times a day  D3 1000 oral tablet: 1 tab(s) orally once a day  folic acid 1 mg oral tablet: 1 tab(s) orally once a day  irbesartan 150 mg oral tablet: 1 tab(s) orally once a day  Lamictal 200 mg oral tablet: 1 tab(s) orally once a day  Linzess 145 mcg oral capsule: 1 cap(s) orally once a day  LORazepam 1 mg oral tablet: 0.5 tab(s) orally every 6 hours, As Needed  Melatonin: 10  orally once a day (at bedtime)  Multi B:   once a day  potassium citrate:  orally   propranolol 10 mg oral tablet: 1 tab(s) orally once a day  trazodone 100 mg oral tablet: 2 tab(s) orally once a day (at bedtime)  tylenol /codiene tylenol #3: 1 tab(s)  , As Needed  verapamil 240 mg/24 hours oral capsule, extended release: 1 cap(s) orally once a day  Vitamin B12:  orally

## 2020-06-16 NOTE — H&P CARDIOLOGY - HISTORY OF PRESENT ILLNESS
53 year old female pt with PMHx of HTN, morbid obesity, depression, anxiety, kidney stone, DM who presents for cardia cath. Pt reports that she had left arm pain, went to [facility] ED on 1/14, had Chest CT scan(elevated to D-Dimer, negative for PE, found mild aortic aneurysm), discharged to home same day. Pt had abnormal stress test in PCP's office which is abnormal, now here for cath. Pt denies chest pain or SOB at this time. 59 yo  female with PMHx Bipolar Disorder, Anxiety disorder, HTN, Thoracic AA 4.4 (stable), JESUS on CPAP, Hx of gastric lap band and removal, Essential Tremor, Obesity, hx cholecystectomy, hx nephrolithiasis, now presenting to [facility] for dyspnea with exertion for 2 days. Patient states her shortness of breath is new to her, she has never had anything like it before.  She feels short of breath climbing the stairs in her house and taking a few steps walking between rooms. The shortness of breath continued at rest and feels like a tightness in her chest. States the shortness of breath is associated with increased sweating as well. She states this dyspnea feels different that past panic/ anxiety attacks. She also reports a presyncopal episode yesterday where she felt dizzy and had to catch herself before falling. She sat down and her dizziness improved. She also reports 2 weeks of lower right sided back pain only when she takes a deep breath, left neck pain for past two days. She reports she knows 4 people who have  that she learned about this past couple of days. She denies any recent changes to her medications. Denies chest pain, palpitations, dizziness currently, nausea/vomiting, changes in diet.  Patient is anxious that her friend was recently diagnosed with a PE and thinks she may have one.  PE negative, transferred for [facility] with Dr Bello, denies any discomfort at this brittanie, denies implantable cardiac device.       Patient of Dr. Giordano. EKG in Cardiology office showed NSR, LAD, LVH. Angiogram in  ago without significant plaques, performed after abnormal stress test.  Previous cardiology group Dr. Lazo. 59 yo  female with PMHx Bipolar Disorder, Anxiety disorder, HTN, Thoracic AA 4.4 (stable), JESUS on CPAP, Hx of gastric lap band and removal, Essential Tremor, Obesity, hx cholecystectomy, hx nephrolithiasis, now presenting to [facility] for dyspnea with exertion for 2 days. Patient states her shortness of breath is new to her, she has never had anything like it before.  She feels short of breath climbing the stairs in her house and taking a few steps walking between rooms. The shortness of breath continued at rest and feels like a tightness in her chest. States the shortness of breath is associated with increased sweating as well. She states this dyspnea feels different that past panic/ anxiety attacks. She also reports a presyncopal episode yesterday where she felt dizzy and had to catch herself before falling. She sat down and her dizziness improved. She also reports 2 weeks of lower right sided back pain only when she takes a deep breath, left neck pain for past two days. She reports she knows 4 people who have  that she learned about this past couple of days. She denies any recent changes to her medications. Denies chest pain, palpitations, dizziness currently, nausea/vomiting, changes in diet.  Patient is anxious that her friend was recently diagnosed with a PE and thinks she may have one.  Trop negative x2, COVID PCR negative, PE ruled out, CXR negative, transferred for [facility] with Dr Bello, denies any discomfort at this time, denies any implantable cardiac monitoring device.       Patient of Dr. Giordano. EKG in Cardiology office showed NSR, LAD, LVH. Angiogram in  ago without significant plaques, performed after abnormal stress test.  Previous cardiology group Dr. aLzo.

## 2020-06-16 NOTE — CONSULT NOTE ADULT - ATTENDING COMMENTS
The patient was personally seen and examined, in addition to being examined and evaluated by housestaff.  All elements of the note were edited where appropriate.    obese, thoracic aneurysm, coronary calcium but without obstructive cad by cath 2012  dyspnea with minimal exertion  not in hf  not hypoxic  low ddimer  could have cad. though not convinced, there is no noninvasive test accurate enough to exclude it, given dyspnea with minimal activity  will transfer for cath

## 2020-06-16 NOTE — DISCHARGE NOTE NURSING/CASE MANAGEMENT/SOCIAL WORK - PATIENT PORTAL LINK FT
You can access the FollowMyHealth Patient Portal offered by [facility] by registering at the following website: [email or web address]. By joining [facility]’s FollowMyHealth portal, you will also be able to view your health information using other applications (apps) compatible with our system.

## 2020-06-16 NOTE — ED PROVIDER NOTE - ATTENDING CONTRIBUTION TO CARE
59yo F with HTN, sleep apnea, lap band placed and removed, thoracic aortic anuerysm, c/o PARDO with climbing stairs x 1 day, assoc with mild mid right back pain. no fever, chills, n/v/d, abd pain, cp, dizziness, chills, bladder/bowel changes, headache, numbness, tingling, weakness.  pmd= james alvarez  card=dr mccormick  pt st had CT chest about 6 months ago, aneurysm remained 4cm which she stated is "good".  Gen: Awake, Alert, WD, WN, NAD, obese  Head:  NC/AT  Eyes:  PERRL, EOMI, Conjunctiva pink, lids normal, no scleral icterus  ENT: OP clear, moist mucus membranes  Neck: supple, nontender, no meningismus, no JVD, trachea midline  Cardiac/CV:  S1 S2, RRR, no M/G/R  Respiratory/Pulm:  CTAB, good air movement, normal resp effort, no wheezes/stridor/retractions/rales/rhonchi  Chest: non tender  Gastrointestinal/Abdomen:  Soft, nontender, nondistended, +BS, no rebound/guarding, +post surgical scars  Back:  no CVAT, no MLT  Ext:  warm, well perfused, moving all extremities spontaneously, no peripheral edema, dp pulses intact  Skin: intact, no rash  Neuro:  AAOx3, sensation intact, motor 5/5 x 4 extremities,  speech clear   labs, cxr, ekg, d-dimer, Eb, troponin, cardiology consult, CTA Chest. r/o ACS, PE, aneursym.

## 2020-06-16 NOTE — CONSULT NOTE ADULT - ASSESSMENT
61 yo F PMHx Bipolar Disorder, HTN, Thoracic AA 4.4 (stable), JESUS on CPAP, Hx of gastric lap band and removal, Essential Tremor, Obesity, hx cholecystectomy, hx nephrolithiasis,  now presenting for dyspnea  for 2 days. 59 yo F PMHx Bipolar Disorder, HTN, Thoracic AA 4.4 (stable), JESUS on CPAP, Hx of gastric lap band and removal, Essential Tremor, Obesity, hx cholecystectomy, hx nephrolithiasis,  now presenting for dyspnea  for 2 days.     Dyspnea, unclear  - elevated blood pressure currently, history of HTN controlled with  Irbesatran and Verapamil and hx anxiety  - no evidence of acute ischemia, Cardiac enzymes wnl x 1 set, EKG nsr LVH  - no evidence of volume overload on clinical exam, CT negative for pleural fluid  - no evidence of pulmonary embolism on CT angio chest  - Concern for CAD, will transfer to [facility] for Angiogram today

## 2020-06-16 NOTE — ED ADULT NURSE NOTE - PSH
Biliary stent placement & ercp    History of laparoscopic adjustable gastric banding    History of tonsillectomy    S/P cardiac catheterization    S/P D&C    S/P dilation and curettage    Ureteral stent    ureteroscopy with stone removal  1-

## 2020-06-16 NOTE — DISCHARGE NOTE PROVIDER - NSDCCPTREATMENT_GEN_ALL_CORE_FT
PRINCIPAL PROCEDURE  Procedure: Left heart cardiac cath  Findings and Treatment: s/p diagnostic left heart cath via right radial artery.  Continue all your current medications.  No heavy lifting for 2 weeks, no strenuous activity (pushing/ pulling) no driving for x 2 days, you may shower 24 hours following procedure but no bathing or swimming for x1 week, no strenuous activities for x 1 week & follow up with your cardiologist Dr Giordano in 1-2 week.

## 2020-06-16 NOTE — ED PROVIDER NOTE - PROGRESS NOTE DETAILS
pt NAD, on phone, provided copy of available results dr prashanth lau will transfer pt for cardiac cath.

## 2020-06-16 NOTE — H&P CARDIOLOGY - FAMILY HISTORY
Family history of arthritis     Family history of bacterial pneumonia     Family history of renal failure, both parents were on dialysis     Sibling  Still living? No  Hyperlipidemia, Age at diagnosis: Age Unknown  Hypertension, Age at diagnosis: Age Unknown

## 2020-06-16 NOTE — ED PROVIDER NOTE - CARE PLAN
Principal Discharge DX:	Shortness of breath Principal Discharge DX:	Shortness of breath  Secondary Diagnosis:	PARDO (dyspnea on exertion)

## 2020-06-16 NOTE — DISCHARGE NOTE PROVIDER - CARE PROVIDER_API CALL
Stephon Giordano  CARDIOVASCULAR DISEASE  [address] [place] [address]  Phone: [phone]  Fax: [phone]  Follow Up Time: 2 weeks

## 2020-06-18 ENCOUNTER — APPOINTMENT (OUTPATIENT)
Dept: CARDIOLOGY | Facility: CLINIC | Age: 60
End: 2020-06-18
Payer: COMMERCIAL

## 2020-06-18 VITALS
HEIGHT: 66 IN | SYSTOLIC BLOOD PRESSURE: 121 MMHG | HEART RATE: 96 BPM | DIASTOLIC BLOOD PRESSURE: 80 MMHG | WEIGHT: 293 LBS | BODY MASS INDEX: 47.09 KG/M2 | OXYGEN SATURATION: 96 %

## 2020-06-18 PROCEDURE — 93000 ELECTROCARDIOGRAM COMPLETE: CPT

## 2020-06-18 PROCEDURE — 99214 OFFICE O/P EST MOD 30 MIN: CPT

## 2020-06-18 NOTE — PHYSICAL EXAM
[General Appearance - Well Developed] : well developed [Normal Appearance] : normal appearance [Well Groomed] : well groomed [General Appearance - Well Nourished] : well nourished [FreeTextEntry1] : obese [General Appearance - In No Acute Distress] : no acute distress [No Deformities] : no deformities [Normal Conjunctiva] : the conjunctiva exhibited no abnormalities [Eyelids - No Xanthelasma] : the eyelids demonstrated no xanthelasmas [Normal Oral Mucosa] : normal oral mucosa [No Oral Pallor] : no oral pallor [No Oral Cyanosis] : no oral cyanosis [Normal Jugular Venous V Waves Present] : normal jugular venous V waves present [Normal Jugular Venous A Waves Present] : normal jugular venous A waves present [No Jugular Venous Ames A Waves] : no jugular venous ames A waves [Auscultation Breath Sounds / Voice Sounds] : lungs were clear to auscultation bilaterally [Respiration, Rhythm And Depth] : normal respiratory rhythm and effort [Exaggerated Use Of Accessory Muscles For Inspiration] : no accessory muscle use [Abdomen Soft] : soft [Abdomen Tenderness] : non-tender [Abnormal Walk] : normal gait [Gait - Sufficient For Exercise Testing] : the gait was sufficient for exercise testing [Abdomen Mass (___ Cm)] : no abdominal mass palpated [Nail Clubbing] : no clubbing of the fingernails [Cyanosis, Localized] : no localized cyanosis [Petechial Hemorrhages (___cm)] : no petechial hemorrhages [Skin Color & Pigmentation] : normal skin color and pigmentation [No Venous Stasis] : no venous stasis [] : no rash [No Skin Ulcers] : no skin ulcer [Skin Lesions] : no skin lesions [No Xanthoma] : no  xanthoma was observed [Oriented To Time, Place, And Person] : oriented to person, place, and time [Mood] : the mood was normal [Affect] : the affect was normal [No Anxiety] : not feeling anxious [Normal Rate] : normal [Normal S2] : normal S2 [Normal S1] : normal S1 [S4] : no S4 [S3] : no S3 [No Murmur] : no murmurs heard [Right Carotid Bruit] : no bruit heard over the right carotid [Left Carotid Bruit] : no bruit heard over the left carotid [Right Femoral Bruit] : no bruit heard over the right femoral artery [Left Femoral Bruit] : no bruit heard over the left femoral artery [2+] : left 2+ [No Abnormalities] : the abdominal aorta was not enlarged and no bruit was heard [No Pitting Edema] : no pitting edema present

## 2020-06-18 NOTE — REASON FOR VISIT
[Follow-Up - Clinic] : a clinic follow-up of [Hypertension] : hypertension [Abnormal ECG] : an abnormal ECG

## 2020-06-18 NOTE — DISCUSSION/SUMMARY
[With Me] : with me [FreeTextEntry1] : Ewelina is a 60 year old female here for evaluation.\par Her blood pressure is at goal, and she will continue her current regimen of verapamil and Avapro, along with propranolol for her tremor.\par \par Her shortness of breath is presumably multifactorial, in the setting of obesity. Her cardiac catheterization demonstrated no significant obstructive CAD. There has been no evidence of congestive heart failure, and a CT was negative for a pulmonary embolism. We will repeat her echocardiogram to confirm any changes in cardiac structure. If all is normal, she will go back and see her pulmonologist.\par \par I stressed the importance of getting back to diet, and exercise, with an attempt to lose weight.\par I will call her with the results the above tests, and arrange followup.

## 2020-06-18 NOTE — HISTORY OF PRESENT ILLNESS
[FreeTextEntry1] : Ewelina is a 60 year old female here for evaluation.\par \par Cardiac-wise, she has a history of obesity, obstructive sleep apnea, and hypertension. She reports an abnormal stress test about 7 or 8 years ago, for which she had an angiogram, and it showed no significant plaque. She also has a history of aortic aneurysm. Her last CT was in 12/2018. This demonstrated an aneurysmal dilation of the ascending thoracic aorta, with a maximal transverse diameter of 4.4 cm.\par \par She denies toxic habits. She has a family history of coronary artery disease. She also has an essential tremor, which seems to improve with Xanax. She also has a history of frequent kidney stones.\par She is planning to have liposuction later in the year.\par \par She had been doing well since last visit. This week, she presented to the ER with acute shortness of breath. A CT was negative for PE, and confirmed an aneurysm of 4.3 cm. Her BNP was minimal. Her troponins were negative, though given her risk, she was transferred for cardiac catheterization, which showed nonobstructive CAD.\par \par Since discharge, she has not had any significant shortness of breath and has been feeling better.\par

## 2020-07-02 ENCOUNTER — RX RENEWAL (OUTPATIENT)
Age: 60
End: 2020-07-02

## 2020-07-02 ENCOUNTER — APPOINTMENT (OUTPATIENT)
Dept: CARDIOLOGY | Facility: CLINIC | Age: 60
End: 2020-07-02
Payer: COMMERCIAL

## 2020-07-02 PROCEDURE — 93306 TTE W/DOPPLER COMPLETE: CPT

## 2020-07-08 ENCOUNTER — TRANSCRIPTION ENCOUNTER (OUTPATIENT)
Age: 60
End: 2020-07-08

## 2020-09-09 ENCOUNTER — NON-APPOINTMENT (OUTPATIENT)
Age: 60
End: 2020-09-09

## 2020-09-09 ENCOUNTER — APPOINTMENT (OUTPATIENT)
Dept: CARDIOLOGY | Facility: CLINIC | Age: 60
End: 2020-09-09
Payer: COMMERCIAL

## 2020-09-09 VITALS
DIASTOLIC BLOOD PRESSURE: 83 MMHG | BODY MASS INDEX: 47.09 KG/M2 | HEART RATE: 97 BPM | SYSTOLIC BLOOD PRESSURE: 118 MMHG | OXYGEN SATURATION: 95 % | WEIGHT: 293 LBS | HEIGHT: 66 IN

## 2020-09-09 DIAGNOSIS — R94.31 ABNORMAL ELECTROCARDIOGRAM [ECG] [EKG]: ICD-10-CM

## 2020-09-09 PROCEDURE — 93000 ELECTROCARDIOGRAM COMPLETE: CPT

## 2020-09-09 PROCEDURE — 99214 OFFICE O/P EST MOD 30 MIN: CPT

## 2020-09-09 RX ORDER — DULOXETINE HYDROCHLORIDE 30 MG/1
CAPSULE, DELAYED RELEASE ORAL
Refills: 0 | Status: ACTIVE | COMMUNITY

## 2020-09-09 NOTE — DISCUSSION/SUMMARY
[With Me] : with me [FreeTextEntry1] : Ewelina is a 60 year old female here for evaluation.\par Her blood pressure is at goal, and she will continue her current regimen of verapamil and Avapro, along with propranolol for her tremor.\par \par Her shortness of breath is presumably multifactorial, in the setting of obesity, and has improved with diuretics. Her cardiac catheterization demonstrated no significant obstructive CAD. There has been no evidence of congestive heart failure, and a CT was negative for a pulmonary embolism. Her echocardiogram demonstrated low normal LV function.\par \par I stressed the importance of getting back to diet, and exercise, with an attempt to lose weight.\par \par At current time, there is no evidence of acute ischemia, volume overload, critical valvular disease or significant arrhythmias. There is no cardiac contraindication to proceeding with her scheduled abdominal surgery. Routine cardiac monitoring is recommended.

## 2020-09-09 NOTE — PHYSICAL EXAM
[General Appearance - Well Developed] : well developed [Normal Appearance] : normal appearance [No Deformities] : no deformities [General Appearance - Well Nourished] : well nourished [Well Groomed] : well groomed [FreeTextEntry1] : obese [Normal Conjunctiva] : the conjunctiva exhibited no abnormalities [General Appearance - In No Acute Distress] : no acute distress [Normal Oral Mucosa] : normal oral mucosa [Eyelids - No Xanthelasma] : the eyelids demonstrated no xanthelasmas [No Oral Cyanosis] : no oral cyanosis [No Oral Pallor] : no oral pallor [Normal Jugular Venous A Waves Present] : normal jugular venous A waves present [No Jugular Venous Ames A Waves] : no jugular venous ames A waves [Normal Jugular Venous V Waves Present] : normal jugular venous V waves present [Auscultation Breath Sounds / Voice Sounds] : lungs were clear to auscultation bilaterally [Respiration, Rhythm And Depth] : normal respiratory rhythm and effort [Exaggerated Use Of Accessory Muscles For Inspiration] : no accessory muscle use [Abdomen Tenderness] : non-tender [Abdomen Soft] : soft [Abdomen Mass (___ Cm)] : no abdominal mass palpated [Abnormal Walk] : normal gait [Gait - Sufficient For Exercise Testing] : the gait was sufficient for exercise testing [Cyanosis, Localized] : no localized cyanosis [Nail Clubbing] : no clubbing of the fingernails [Petechial Hemorrhages (___cm)] : no petechial hemorrhages [Skin Color & Pigmentation] : normal skin color and pigmentation [No Skin Ulcers] : no skin ulcer [] : no rash [Skin Lesions] : no skin lesions [No Venous Stasis] : no venous stasis [Affect] : the affect was normal [No Xanthoma] : no  xanthoma was observed [Oriented To Time, Place, And Person] : oriented to person, place, and time [Mood] : the mood was normal [No Anxiety] : not feeling anxious [Normal Rate] : normal [Normal S1] : normal S1 [Normal S2] : normal S2 [S3] : no S3 [S4] : no S4 [No Murmur] : no murmurs heard [Right Carotid Bruit] : no bruit heard over the right carotid [Left Carotid Bruit] : no bruit heard over the left carotid [Right Femoral Bruit] : no bruit heard over the right femoral artery [Left Femoral Bruit] : no bruit heard over the left femoral artery [2+] : left 2+ [No Abnormalities] : the abdominal aorta was not enlarged and no bruit was heard [No Pitting Edema] : no pitting edema present

## 2020-09-09 NOTE — HISTORY OF PRESENT ILLNESS
[FreeTextEntry1] : Ewelina is a 60 year old female here for evaluation.\par \par Cardiac-wise, she has a history of obesity, obstructive sleep apnea, and hypertension. She reports an abnormal stress test about 7 or 8 years ago, for which she had an angiogram, and it showed no significant plaque. She also has a history of aortic aneurysm. Her last CT was in 12/2018. This demonstrated an aneurysmal dilation of the ascending thoracic aorta, with a maximal transverse diameter of 4.4 cm.\par \par She denies toxic habits. She has a family history of coronary artery disease. She also has an essential tremor, which seems to improve with Xanax. She also has a history of frequent kidney stones.\par She is planning to have liposuction later in the year.\par \par She had been doing well since last visit.  Her breathing has improved with diuretics. Prior to last visit, she presented to the ER with acute shortness of breath. A CT was negative for PE, and confirmed an aneurysm of 4.3 cm. Her BNP was minimal. Her troponins were negative, though given her risk, she was transferred for cardiac catheterization, which showed nonobstructive CAD.\par \par Echocardiogram demonstrated mild aortic root dilatation, grossly low normal LV function, mild diastolic dysfunction and mild RVH.\par She is requesting cardiac clearance prior to abdominal surgery.\par

## 2020-09-14 ENCOUNTER — OUTPATIENT (OUTPATIENT)
Dept: OUTPATIENT SERVICES | Facility: HOSPITAL | Age: 60
LOS: 1 days | End: 2020-09-14
Payer: COMMERCIAL

## 2020-09-14 VITALS
HEART RATE: 86 BPM | DIASTOLIC BLOOD PRESSURE: 76 MMHG | OXYGEN SATURATION: 98 % | TEMPERATURE: 98 F | HEIGHT: 66 IN | SYSTOLIC BLOOD PRESSURE: 134 MMHG | RESPIRATION RATE: 16 BRPM | WEIGHT: 293 LBS

## 2020-09-14 DIAGNOSIS — Z01.818 ENCOUNTER FOR OTHER PREPROCEDURAL EXAMINATION: ICD-10-CM

## 2020-09-14 DIAGNOSIS — L98.8 OTHER SPECIFIED DISORDERS OF THE SKIN AND SUBCUTANEOUS TISSUE: ICD-10-CM

## 2020-09-14 DIAGNOSIS — Z98.89 OTHER SPECIFIED POSTPROCEDURAL STATES: Chronic | ICD-10-CM

## 2020-09-14 LAB
ANION GAP SERPL CALC-SCNC: 7 MMOL/L — SIGNIFICANT CHANGE UP (ref 5–17)
BUN SERPL-MCNC: 20 MG/DL — SIGNIFICANT CHANGE UP (ref 7–23)
CALCIUM SERPL-MCNC: 9.3 MG/DL — SIGNIFICANT CHANGE UP (ref 8.5–10.1)
CHLORIDE SERPL-SCNC: 104 MMOL/L — SIGNIFICANT CHANGE UP (ref 96–108)
CO2 SERPL-SCNC: 29 MMOL/L — SIGNIFICANT CHANGE UP (ref 22–31)
CREAT SERPL-MCNC: 0.94 MG/DL — SIGNIFICANT CHANGE UP (ref 0.5–1.3)
GLUCOSE SERPL-MCNC: 177 MG/DL — HIGH (ref 70–99)
HCT VFR BLD CALC: 39.9 % — SIGNIFICANT CHANGE UP (ref 34.5–45)
HGB BLD-MCNC: 13.2 G/DL — SIGNIFICANT CHANGE UP (ref 11.5–15.5)
MCHC RBC-ENTMCNC: 30.1 PG — SIGNIFICANT CHANGE UP (ref 27–34)
MCHC RBC-ENTMCNC: 33.1 GM/DL — SIGNIFICANT CHANGE UP (ref 32–36)
MCV RBC AUTO: 90.9 FL — SIGNIFICANT CHANGE UP (ref 80–100)
NRBC # BLD: 0 /100 WBCS — SIGNIFICANT CHANGE UP (ref 0–0)
PLATELET # BLD AUTO: 324 K/UL — SIGNIFICANT CHANGE UP (ref 150–400)
POTASSIUM SERPL-MCNC: 3.9 MMOL/L — SIGNIFICANT CHANGE UP (ref 3.5–5.3)
POTASSIUM SERPL-SCNC: 3.9 MMOL/L — SIGNIFICANT CHANGE UP (ref 3.5–5.3)
RBC # BLD: 4.39 M/UL — SIGNIFICANT CHANGE UP (ref 3.8–5.2)
RBC # FLD: 13.2 % — SIGNIFICANT CHANGE UP (ref 10.3–14.5)
SODIUM SERPL-SCNC: 140 MMOL/L — SIGNIFICANT CHANGE UP (ref 135–145)
WBC # BLD: 7.91 K/UL — SIGNIFICANT CHANGE UP (ref 3.8–10.5)
WBC # FLD AUTO: 7.91 K/UL — SIGNIFICANT CHANGE UP (ref 3.8–10.5)

## 2020-09-14 PROCEDURE — 85027 COMPLETE CBC AUTOMATED: CPT

## 2020-09-14 PROCEDURE — G0463: CPT

## 2020-09-14 PROCEDURE — 86901 BLOOD TYPING SEROLOGIC RH(D): CPT

## 2020-09-14 PROCEDURE — 80048 BASIC METABOLIC PNL TOTAL CA: CPT

## 2020-09-14 PROCEDURE — 86900 BLOOD TYPING SEROLOGIC ABO: CPT

## 2020-09-14 PROCEDURE — 86850 RBC ANTIBODY SCREEN: CPT

## 2020-09-14 PROCEDURE — 36415 COLL VENOUS BLD VENIPUNCTURE: CPT

## 2020-09-14 NOTE — H&P PST ADULT - HISTORY OF PRESENT ILLNESS
61 y/o obese female , PMH of chronic anemia, essential tremors, HTN, arthritis, anxiety and depression, with lap band few yrs ago was removed, Now scheduled for panniculectomy. Pre op testing today. 61 y/o obese female , PMH of chronic anemia, essential tremors, HTN, (AAA old h/o, (see CT) arthritis, anxiety and depression, with lap band few yrs ago was removed, Now scheduled for panniculectomy. Pre op testing today.

## 2020-09-14 NOTE — H&P PST ADULT - CARDIOVASCULAR COMMENTS
June with SOB pulmonologist sent her to have cardiac cath, had in [facility] see report attached, also cat scan chest  showed AAA  Pt says old h/o

## 2020-09-14 NOTE — H&P PST ADULT - NSCAGESTDRUGANNOY_GEN_A_CORE_SD
[facility] - INPATIENT  Face to Face Encounter    Patients Name: Valerie Hernandez    YOB: 1946    Ordering Physician: Radha Jaime MD    Primary Diagnosis: Hypoxia / Fall    Date of Face to Face:   10/2/2017                                  Face to Face Encounter findings are related to primary reason for home care:   yes. 1. I certify that the patient needs intermittent care as follows: skilled nursing care:  skilled observation/assessment, patient education  physical therapy: strengthening, stretching/ROM, transfer training and gait/stair training  occupational therapy:  ADL safety (ie. cooking, bathing, dressing), ROM and pt/caregiver education    2. I certify that this patient is homebound, that is: 1) patient requires the use of a walker and wheelchair device, special transportation, or assistance of another to leave the home; or 2) patient's condition makes leaving the home medically contraindicated; and 3) patient has a normal inability to leave the home and leaving the home requires considerable and taxing effort. Patient may leave the home for infrequent and short duration for medical reasons, and occasional absences for non-medical reasons. Homebound status is due to the following functional limitations: Patient with strength deficits limiting the performance of all ADL's without caregiver assistance or the use of an assistive device. Patient with poor safety awareness and is at risk for falls without assistance of another person and the use of an assistive device. Patient with poor ambulation endurance limiting their safe ability to ascend/descend the required number of steps to leave the home. 3. I certify that this patient is under my care and that I, or a nurse practitioner or [facility], or clinical nurse specialist, or certified nurse midwife, working with me, had a Face-to-Face Encounter that meets the physician Face-to-Face Encounter requirements.   The following are the clinical findings from the [address] [place] [address] encounter that support the need for skilled services and is a summary of the encounter:     See attached progess note      Dashawn Harris RN  10/2/2017      THE FOLLOWING TO BE COMPLETED BY THE COMMUNITY PHYSICIAN:    I concur with the findings described above from the F2F encounter that this patient is homebound and in need of a skilled service.     Certifying Physician: _____________________________________      Printed Certifying Physician Name: _____________________________________    Date: _________________ no

## 2020-09-14 NOTE — H&P PST ADULT - NSICDXFAMILYHX_GEN_ALL_CORE_FT
FAMILY HISTORY:  Family history of arthritis  Family history of bacterial pneumonia  Family history of renal failure, both parents were on dialysis    Sibling  Still living? No  Hyperlipidemia, Age at diagnosis: Age Unknown  Hypertension, Age at diagnosis: Age Unknown

## 2020-09-14 NOTE — H&P PST ADULT - NSICDXPASTMEDICALHX_GEN_ALL_CORE_FT
PAST MEDICAL HISTORY:  Anemia     Anxiety     Arthritis     Asthma     Benign Essential Tremor     Bipolar 1 disorder     Colitis H/O    Degenerative disc disease, lumbar     Depression     ETOH Abuse H/O    History of aortic aneurysm descending aorta see CT    Hypertension     Kidney stone     Morbid Obesity     Neuropathy     Renal Calculi chronic      Sleep Apnea CPAP with oxygen since June 2020    Spinal stenosis

## 2020-09-14 NOTE — H&P PST ADULT - NSICDXPASTSURGICALHX_GEN_ALL_CORE_FT
PAST SURGICAL HISTORY:  Biliary stent placement & ercp     History of laparoscopic adjustable gastric banding band removed few yrs ago    History of tonsillectomy     S/P cardiac catheterization     S/P D&C     S/P dilation and curettage     ureteroscopy with stone removal 1-

## 2020-09-14 NOTE — H&P PST ADULT - TEACHING/LEARNING LEARNING PREFERENCES
I performed a history and physical exam of the patient and discussed their management with the resident. I reviewed the resident's note and agree with the documented findings and plan of care.     Patient is a 36 yo F w/hx of HTN, anxiety, chronic urticaria, ? undiagnosed rheumatologic d/o here for palpitations, sob and urticaria x 1 week. Patient reports recent change in her medications by her psychiatrist who changed her from xanax to valium and told her to discontinue benadryl and take hydroxyzine. Since the change she reports an increase in her symptoms. Patient is also on clonidine for her high blood pressure as well as zoloft. No travel. + chest tightness.  No leg swelling or calf tenderness.   VS noted  Gen. no acute distress, Non toxic   HEENT: EOMI, mmm, pharynx w/o erythema or exudate  Lungs: CTAB/L no C/ W /R   CVS: tachycardic  Abd; Soft non tender, non distended   Ext: no edema, no calf tenderness  Skin: uritcarial rash on upper chest  Neuro AAOx3 non focal clear speech  a/p: urticarial rash - tachycardia - will try benadryl, steroids, ivf, valium as pt appears anxious and reassess    update: pt continues to be tachycardic despite treatment- will get CTA to r/o PE  - Ronnie FERNANDEZ
written material/individual instruction

## 2020-09-14 NOTE — H&P PST ADULT - NSICDXPROBLEM_GEN_ALL_CORE_FT
PROBLEM DIAGNOSES  Problem: Other specified disorders of the skin and subcutaneous tissue  Assessment and Plan: panniculectomy, Medical and cardiac  eval advised.  Pre op instructions:  Hold OTC supplements. Medications reviewed for the week and morning of surgery.  NPO after 11pm to the morning of surgery.  Shower  the morning of surgery with antibacterial soap as instructed.  Covid testing within 3 days prior to surgery. Information given.  Pt verbalized understanding.

## 2020-09-14 NOTE — H&P PST ADULT - ASSESSMENT
59 y/o female, for panniculectomy 61 y/o female,  for panniculectomy  Medical and cardiac eval advised.  Pre op instructions:  Hold OTC supplements. Medications reviewed for the week and morning of surgery.  NPO after 11pm to the morning of surgery.  Shower  the morning of surgery with antibacterial soap as instructed.  Covid testing advised  Patient verbalized understanding.   59 y/o female,  for panniculectomy  Medical and cardiac eval advised.  Pre op instructions:  Hold OTC supplements. Medications reviewed for the week and morning of surgery.  NPO after 11pm to the morning of surgery.  Shower 2 days before and   the morning of surgery with antibacterial soap as instructed.  Covid testing advised  Patient verbalized understanding.

## 2020-09-21 PROBLEM — Z86.79 PERSONAL HISTORY OF OTHER DISEASES OF THE CIRCULATORY SYSTEM: Chronic | Status: ACTIVE | Noted: 2020-09-14

## 2020-09-23 ENCOUNTER — OUTPATIENT (OUTPATIENT)
Dept: OUTPATIENT SERVICES | Facility: HOSPITAL | Age: 60
LOS: 1 days | End: 2020-09-23
Payer: COMMERCIAL

## 2020-09-23 DIAGNOSIS — Z98.89 OTHER SPECIFIED POSTPROCEDURAL STATES: Chronic | ICD-10-CM

## 2020-09-23 DIAGNOSIS — Z11.59 ENCOUNTER FOR SCREENING FOR OTHER VIRAL DISEASES: ICD-10-CM

## 2020-09-23 LAB — SARS-COV-2 RNA SPEC QL NAA+PROBE: SIGNIFICANT CHANGE UP

## 2020-09-23 PROCEDURE — U0003: CPT

## 2020-09-24 ENCOUNTER — TRANSCRIPTION ENCOUNTER (OUTPATIENT)
Age: 60
End: 2020-09-24

## 2020-09-24 NOTE — ASU PATIENT PROFILE, ADULT - PSH
Biliary stent placement & ercp    History of laparoscopic adjustable gastric banding  band removed few yrs ago  History of tonsillectomy    S/P cardiac catheterization    S/P D&C    S/P dilation and curettage    ureteroscopy with stone removal  1-

## 2020-09-24 NOTE — ASU PATIENT PROFILE, ADULT - MEDICATION ADMINISTRATION INFO, PROFILE
no concerns
Normal shape and contour; nares, nostrils and choana patent; no nasal flaring; mucosa pink and moist.

## 2020-09-24 NOTE — ASU PATIENT PROFILE, ADULT - PMH
Anemia    Anxiety    Arthritis    Asthma    Benign Essential Tremor    Bipolar 1 disorder    Colitis  H/O  Degenerative disc disease, lumbar    Depression    ETOH Abuse  H/O  History of aortic aneurysm  descending aorta see CT  Hypertension    Kidney stone    Morbid Obesity    Neuropathy    Renal Calculi  chronic    Sleep Apnea  CPAP with oxygen since June 2020  Spinal stenosis

## 2020-09-25 ENCOUNTER — INPATIENT (INPATIENT)
Facility: HOSPITAL | Age: 60
LOS: 14 days | Discharge: HOME | DRG: 622 | End: 2020-10-10
Attending: HOSPITALIST | Admitting: INTERNAL MEDICINE
Payer: COMMERCIAL

## 2020-09-25 ENCOUNTER — RESULT REVIEW (OUTPATIENT)
Age: 60
End: 2020-09-25

## 2020-09-25 VITALS
OXYGEN SATURATION: 98 % | TEMPERATURE: 98 F | WEIGHT: 293 LBS | HEART RATE: 88 BPM | SYSTOLIC BLOOD PRESSURE: 150 MMHG | DIASTOLIC BLOOD PRESSURE: 97 MMHG | RESPIRATION RATE: 16 BRPM | HEIGHT: 66 IN

## 2020-09-25 DIAGNOSIS — L98.8 OTHER SPECIFIED DISORDERS OF THE SKIN AND SUBCUTANEOUS TISSUE: ICD-10-CM

## 2020-09-25 DIAGNOSIS — Z98.89 OTHER SPECIFIED POSTPROCEDURAL STATES: Chronic | ICD-10-CM

## 2020-09-25 LAB
ALBUMIN SERPL ELPH-MCNC: 3.1 G/DL — LOW (ref 3.3–5)
ALP SERPL-CCNC: 122 U/L — HIGH (ref 40–120)
ALT FLD-CCNC: 42 U/L — SIGNIFICANT CHANGE UP (ref 12–78)
ANION GAP SERPL CALC-SCNC: 22 MMOL/L — HIGH (ref 5–17)
ANION GAP SERPL CALC-SCNC: 7 MMOL/L — SIGNIFICANT CHANGE UP (ref 5–17)
AST SERPL-CCNC: 29 U/L — SIGNIFICANT CHANGE UP (ref 15–37)
BILIRUB SERPL-MCNC: 0.5 MG/DL — SIGNIFICANT CHANGE UP (ref 0.2–1.2)
BUN SERPL-MCNC: 17 MG/DL — SIGNIFICANT CHANGE UP (ref 7–23)
BUN SERPL-MCNC: 20 MG/DL — SIGNIFICANT CHANGE UP (ref 7–23)
CALCIUM SERPL-MCNC: 7.8 MG/DL — LOW (ref 8.5–10.1)
CALCIUM SERPL-MCNC: 9.3 MG/DL — SIGNIFICANT CHANGE UP (ref 8.5–10.1)
CHLORIDE SERPL-SCNC: 106 MMOL/L — SIGNIFICANT CHANGE UP (ref 96–108)
CHLORIDE SERPL-SCNC: 108 MMOL/L — SIGNIFICANT CHANGE UP (ref 96–108)
CK MB BLD-MCNC: <1.5 % — SIGNIFICANT CHANGE UP (ref 0–3.5)
CK MB CFR SERPL CALC: <1 NG/ML — SIGNIFICANT CHANGE UP (ref 0–3.6)
CK SERPL-CCNC: 65 U/L — SIGNIFICANT CHANGE UP (ref 26–192)
CO2 SERPL-SCNC: 10 MMOL/L — CRITICAL LOW (ref 22–31)
CO2 SERPL-SCNC: 28 MMOL/L — SIGNIFICANT CHANGE UP (ref 22–31)
CREAT SERPL-MCNC: 1 MG/DL — SIGNIFICANT CHANGE UP (ref 0.5–1.3)
CREAT SERPL-MCNC: 1.6 MG/DL — HIGH (ref 0.5–1.3)
D DIMER BLD IA.RAPID-MCNC: 321 NG/ML DDU — HIGH
GLUCOSE SERPL-MCNC: 180 MG/DL — HIGH (ref 70–99)
GLUCOSE SERPL-MCNC: 481 MG/DL — CRITICAL HIGH (ref 70–99)
HCT VFR BLD CALC: 24.8 % — LOW (ref 34.5–45)
HCT VFR BLD CALC: 34.8 % — SIGNIFICANT CHANGE UP (ref 34.5–45)
HGB BLD-MCNC: 11.9 G/DL — SIGNIFICANT CHANGE UP (ref 11.5–15.5)
HGB BLD-MCNC: 8.4 G/DL — LOW (ref 11.5–15.5)
LACTATE SERPL-SCNC: 14.3 MMOL/L — CRITICAL HIGH (ref 0.7–2)
MCHC RBC-ENTMCNC: 31.3 PG — SIGNIFICANT CHANGE UP (ref 27–34)
MCHC RBC-ENTMCNC: 31.5 PG — SIGNIFICANT CHANGE UP (ref 27–34)
MCHC RBC-ENTMCNC: 33.9 GM/DL — SIGNIFICANT CHANGE UP (ref 32–36)
MCHC RBC-ENTMCNC: 34.2 GM/DL — SIGNIFICANT CHANGE UP (ref 32–36)
MCV RBC AUTO: 92.1 FL — SIGNIFICANT CHANGE UP (ref 80–100)
MCV RBC AUTO: 92.5 FL — SIGNIFICANT CHANGE UP (ref 80–100)
NRBC # BLD: 0 /100 WBCS — SIGNIFICANT CHANGE UP (ref 0–0)
NRBC # BLD: 0 /100 WBCS — SIGNIFICANT CHANGE UP (ref 0–0)
PLATELET # BLD AUTO: 241 K/UL — SIGNIFICANT CHANGE UP (ref 150–400)
PLATELET # BLD AUTO: 347 K/UL — SIGNIFICANT CHANGE UP (ref 150–400)
POTASSIUM SERPL-MCNC: 4.5 MMOL/L — SIGNIFICANT CHANGE UP (ref 3.5–5.3)
POTASSIUM SERPL-MCNC: 5.1 MMOL/L — SIGNIFICANT CHANGE UP (ref 3.5–5.3)
POTASSIUM SERPL-SCNC: 4.5 MMOL/L — SIGNIFICANT CHANGE UP (ref 3.5–5.3)
POTASSIUM SERPL-SCNC: 5.1 MMOL/L — SIGNIFICANT CHANGE UP (ref 3.5–5.3)
PROT SERPL-MCNC: 6.7 G/DL — SIGNIFICANT CHANGE UP (ref 6–8.3)
RBC # BLD: 2.68 M/UL — LOW (ref 3.8–5.2)
RBC # BLD: 3.78 M/UL — LOW (ref 3.8–5.2)
RBC # FLD: 13.4 % — SIGNIFICANT CHANGE UP (ref 10.3–14.5)
RBC # FLD: 13.5 % — SIGNIFICANT CHANGE UP (ref 10.3–14.5)
SODIUM SERPL-SCNC: 138 MMOL/L — SIGNIFICANT CHANGE UP (ref 135–145)
SODIUM SERPL-SCNC: 143 MMOL/L — SIGNIFICANT CHANGE UP (ref 135–145)
TROPONIN I SERPL-MCNC: 0.03 NG/ML — SIGNIFICANT CHANGE UP (ref 0.01–0.04)
WBC # BLD: 13.3 K/UL — HIGH (ref 3.8–10.5)
WBC # BLD: 15.53 K/UL — HIGH (ref 3.8–10.5)
WBC # FLD AUTO: 13.3 K/UL — HIGH (ref 3.8–10.5)
WBC # FLD AUTO: 15.53 K/UL — HIGH (ref 3.8–10.5)

## 2020-09-25 PROCEDURE — 99223 1ST HOSP IP/OBS HIGH 75: CPT

## 2020-09-25 PROCEDURE — 88302 TISSUE EXAM BY PATHOLOGIST: CPT | Mod: 26,59

## 2020-09-25 PROCEDURE — 88300 SURGICAL PATH GROSS: CPT | Mod: 26,59

## 2020-09-25 RX ORDER — SODIUM CHLORIDE 9 MG/ML
1000 INJECTION, SOLUTION INTRAVENOUS
Refills: 0 | Status: DISCONTINUED | OUTPATIENT
Start: 2020-09-25 | End: 2020-09-25

## 2020-09-25 RX ORDER — ENOXAPARIN SODIUM 100 MG/ML
40 INJECTION SUBCUTANEOUS DAILY
Refills: 0 | Status: DISCONTINUED | OUTPATIENT
Start: 2020-09-25 | End: 2020-09-25

## 2020-09-25 RX ORDER — DEXTROSE 50 % IN WATER 50 %
25 SYRINGE (ML) INTRAVENOUS ONCE
Refills: 0 | Status: DISCONTINUED | OUTPATIENT
Start: 2020-09-25 | End: 2020-09-27

## 2020-09-25 RX ORDER — OXYCODONE AND ACETAMINOPHEN 5; 325 MG/1; MG/1
2 TABLET ORAL EVERY 6 HOURS
Refills: 0 | Status: DISCONTINUED | OUTPATIENT
Start: 2020-09-25 | End: 2020-09-25

## 2020-09-25 RX ORDER — DEXTROSE 50 % IN WATER 50 %
12.5 SYRINGE (ML) INTRAVENOUS ONCE
Refills: 0 | Status: DISCONTINUED | OUTPATIENT
Start: 2020-09-25 | End: 2020-09-27

## 2020-09-25 RX ORDER — HYDROMORPHONE HYDROCHLORIDE 2 MG/ML
0.5 INJECTION INTRAMUSCULAR; INTRAVENOUS; SUBCUTANEOUS
Refills: 0 | Status: DISCONTINUED | OUTPATIENT
Start: 2020-09-25 | End: 2020-09-25

## 2020-09-25 RX ORDER — HYDROMORPHONE HYDROCHLORIDE 2 MG/ML
1 INJECTION INTRAMUSCULAR; INTRAVENOUS; SUBCUTANEOUS
Refills: 0 | Status: DISCONTINUED | OUTPATIENT
Start: 2020-09-25 | End: 2020-09-25

## 2020-09-25 RX ORDER — INSULIN LISPRO 100/ML
VIAL (ML) SUBCUTANEOUS
Refills: 0 | Status: DISCONTINUED | OUTPATIENT
Start: 2020-09-25 | End: 2020-09-27

## 2020-09-25 RX ORDER — ALPRAZOLAM 0.25 MG
1 TABLET ORAL ONCE
Refills: 0 | Status: DISCONTINUED | OUTPATIENT
Start: 2020-09-25 | End: 2020-09-25

## 2020-09-25 RX ORDER — CHLORHEXIDINE GLUCONATE 213 G/1000ML
1 SOLUTION TOPICAL
Refills: 0 | Status: DISCONTINUED | OUTPATIENT
Start: 2020-09-25 | End: 2020-09-29

## 2020-09-25 RX ORDER — TRAZODONE HCL 50 MG
200 TABLET ORAL AT BEDTIME
Refills: 0 | Status: DISCONTINUED | OUTPATIENT
Start: 2020-09-25 | End: 2020-10-10

## 2020-09-25 RX ORDER — SODIUM CHLORIDE 9 MG/ML
1000 INJECTION, SOLUTION INTRAVENOUS
Refills: 0 | Status: DISCONTINUED | OUTPATIENT
Start: 2020-09-25 | End: 2020-09-27

## 2020-09-25 RX ORDER — LOSARTAN POTASSIUM 100 MG/1
50 TABLET, FILM COATED ORAL DAILY
Refills: 0 | Status: DISCONTINUED | OUTPATIENT
Start: 2020-09-25 | End: 2020-09-25

## 2020-09-25 RX ORDER — HYDROMORPHONE HYDROCHLORIDE 2 MG/ML
0.5 INJECTION INTRAMUSCULAR; INTRAVENOUS; SUBCUTANEOUS EVERY 4 HOURS
Refills: 0 | Status: DISCONTINUED | OUTPATIENT
Start: 2020-09-25 | End: 2020-09-28

## 2020-09-25 RX ORDER — GLUCAGON INJECTION, SOLUTION 0.5 MG/.1ML
1 INJECTION, SOLUTION SUBCUTANEOUS ONCE
Refills: 0 | Status: DISCONTINUED | OUTPATIENT
Start: 2020-09-25 | End: 2020-10-10

## 2020-09-25 RX ORDER — DULOXETINE HYDROCHLORIDE 30 MG/1
30 CAPSULE, DELAYED RELEASE ORAL
Refills: 0 | Status: DISCONTINUED | OUTPATIENT
Start: 2020-09-25 | End: 2020-10-10

## 2020-09-25 RX ORDER — DEXTROSE 50 % IN WATER 50 %
15 SYRINGE (ML) INTRAVENOUS ONCE
Refills: 0 | Status: DISCONTINUED | OUTPATIENT
Start: 2020-09-25 | End: 2020-09-27

## 2020-09-25 RX ORDER — DOCUSATE SODIUM 100 MG
100 CAPSULE ORAL THREE TIMES A DAY
Refills: 0 | Status: DISCONTINUED | OUTPATIENT
Start: 2020-09-25 | End: 2020-10-10

## 2020-09-25 RX ORDER — OXYCODONE AND ACETAMINOPHEN 5; 325 MG/1; MG/1
1 TABLET ORAL EVERY 4 HOURS
Refills: 0 | Status: DISCONTINUED | OUTPATIENT
Start: 2020-09-25 | End: 2020-10-02

## 2020-09-25 RX ORDER — LAMOTRIGINE 25 MG/1
200 TABLET, ORALLY DISINTEGRATING ORAL DAILY
Refills: 0 | Status: DISCONTINUED | OUTPATIENT
Start: 2020-09-25 | End: 2020-10-10

## 2020-09-25 RX ORDER — ALPRAZOLAM 0.25 MG
0.5 TABLET ORAL ONCE
Refills: 0 | Status: DISCONTINUED | OUTPATIENT
Start: 2020-09-25 | End: 2020-09-25

## 2020-09-25 RX ORDER — ALLOPURINOL 300 MG
300 TABLET ORAL DAILY
Refills: 0 | Status: DISCONTINUED | OUTPATIENT
Start: 2020-09-25 | End: 2020-10-10

## 2020-09-25 RX ORDER — ONDANSETRON 8 MG/1
4 TABLET, FILM COATED ORAL ONCE
Refills: 0 | Status: DISCONTINUED | OUTPATIENT
Start: 2020-09-25 | End: 2020-09-25

## 2020-09-25 RX ORDER — INSULIN LISPRO 100/ML
12 VIAL (ML) SUBCUTANEOUS ONCE
Refills: 0 | Status: COMPLETED | OUTPATIENT
Start: 2020-09-25 | End: 2020-09-25

## 2020-09-25 RX ORDER — VERAPAMIL HCL 240 MG
240 CAPSULE, EXTENDED RELEASE PELLETS 24 HR ORAL DAILY
Refills: 0 | Status: DISCONTINUED | OUTPATIENT
Start: 2020-09-25 | End: 2020-09-26

## 2020-09-25 RX ORDER — ACETAMINOPHEN 500 MG
650 TABLET ORAL EVERY 6 HOURS
Refills: 0 | Status: DISCONTINUED | OUTPATIENT
Start: 2020-09-25 | End: 2020-10-10

## 2020-09-25 RX ORDER — SODIUM CHLORIDE 9 MG/ML
1000 INJECTION, SOLUTION INTRAVENOUS ONCE
Refills: 0 | Status: COMPLETED | OUTPATIENT
Start: 2020-09-25 | End: 2020-09-25

## 2020-09-25 RX ORDER — BUPIVACAINE 13.3 MG/ML
20 INJECTION, SUSPENSION, LIPOSOMAL INFILTRATION ONCE
Refills: 0 | Status: COMPLETED | OUTPATIENT
Start: 2020-09-25 | End: 2020-09-25

## 2020-09-25 RX ADMIN — SODIUM CHLORIDE 1000 MILLILITER(S): 9 INJECTION, SOLUTION INTRAVENOUS at 22:38

## 2020-09-25 RX ADMIN — Medication 100 MILLIGRAM(S): at 15:58

## 2020-09-25 RX ADMIN — SODIUM CHLORIDE 1000 MILLILITER(S): 9 INJECTION, SOLUTION INTRAVENOUS at 21:15

## 2020-09-25 RX ADMIN — SODIUM CHLORIDE 60 MILLILITER(S): 9 INJECTION, SOLUTION INTRAVENOUS at 06:51

## 2020-09-25 NOTE — CONSULT NOTE ADULT - ASSESSMENT
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 Hemorrhagic shock- s/p elective abdominal panniculectomy, HgB drop from 12 --> 8.4  2. Acute blood loss anemia  3. Lactic acidosis- in setting of hemorrhagic shock  4. ELAINE- ischemic ATN in setting of shock  5. Hyperglycemia- no hx of DM, acetone neg  6. New onset Afib- appears to have broke 5 mins post ICU arrival to NSR     Plan:  - Admit to MICU  - Ordered for 2u PRBC stat  - Serial CBC q6h, transfuse for HgB <7.0  - Trend lactate until cleared, repeat CBC/lactate/chemistries post PRBC  - Surgery Attending Dr Toussaint at bedside during RRT, agrees w/ PRBC transfusion, plastic surgeon made aware  - Monitor EDWIN drain output  - MAP 68 currently, SBP in 90s, receiving 1L LR wide open currently, PRN pressors to maintain SBP 65-70, low threshold to initiate while awaiting PRBC  - Hold anti-nodals/HTN meds  - EKG afib during RRT, currently in NSR in 90s, cardiac enzymes negative  - Nielsen in place, monitor UOP w/ strict I/Os. Trend Cr, avoid nephrotoxins, adhere to renal dose adjustments  - PRN pain control, cautious with borderline BP  - Stat 12u humalog, start on mod sliding scale, f/s ACHS, check a1c in AM, acetone negative   - d/c lovenox, SCDs only, high risk for PE but given breathing has improved at present (pt also with high anxiety/frequent anxiety attacks that manifest similarly and is asking for her PRN benzos), saturating well, HR NSR 90s as well as Cr bump, will hold off on CTA PE especially in setting of ABLA/hemorrhagic shock.   - CPAP HS, set @ 10 per patient at home    Dispo: Surgery Attending Dr Toussaint at bedside during RRT, agrees w/ PRBC transfusion, plastic surgeon made aware. Full code. Critically ill. 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 Hemorrhagic shock- s/p elective abdominal panniculectomy, HgB drop from 12 --> 8.4  2. Acute blood loss anemia  3. Lactic acidosis- in setting of hemorrhagic shock  4. ELAINE- ischemic ATN in setting of shock  5. Hyperglycemia- no hx of DM, acetone neg  6. New onset Afib- appears to have broke 5 mins post ICU arrival to NSR     Plan:  - Admit to MICU  - Ordered for 2u PRBC stat  - Serial CBC q6h, transfuse for HgB <7.0  - Trend lactate until cleared, repeat CBC/lactate/chemistries post PRBC  - Surgery Attending Dr Toussaint at bedside during RRT, agrees w/ PRBC transfusion, plastic surgeon made aware  - Monitor EDWIN drain output  - MAP 68 currently, SBP in 90s, receiving 1L LR wide open currently, PRN pressors to maintain SBP 65-70, low threshold to initiate while awaiting PRBC  - Hold anti-nodals/HTN meds  - EKG afib during RRT, currently in NSR in 90s, cardiac enzymes negative  - Nielsen in place, monitor UOP w/ strict I/Os. Trend Cr, avoid nephrotoxins, adhere to renal dose adjustments  - PRN pain control, cautious with borderline BP  - Stat 12u humalog, start on mod sliding scale, f/s ACHS, check a1c in AM, acetone negative   - d/c lovenox, SCDs only, high risk for PE but given breathing has improved at present (pt also with high anxiety/frequent anxiety attacks that manifest similarly and is asking for her PRN benzos), saturating well, HR NSR 90s as well as Cr bump, will hold off on CTA PE especially in setting of ABLA/hemorrhagic shock.   - CPAP HS, set @ 10 per patient at home, incentive spirometry    Dispo: Surgery Attending Dr Toussaint at bedside during RRT, agrees w/ PRBC transfusion, plastic surgeon made aware. Full code. Critically ill.

## 2020-09-25 NOTE — CHART NOTE - NSCHARTNOTEFT_GEN_A_CORE
consult dictated    Impression:    Obstructive sleep apnea syndrome  S/P Abdominal Panniculectomy  Morbid Obesity  Hx Hypothyroidism    Plan:    Nocturnal CPAP with supplemental oxygen  DVT prophylaxis with Lovenox  Pain control

## 2020-09-25 NOTE — CHART NOTE - NSCHARTNOTEFT_GEN_A_CORE
Called by RN, pt c/o tremors, sweats and SOB. Pt seen and examined at bedside. Pt states that she feels warm, having sweats and not sure if she has a fever. She also feels SOB Pt denies fever, chills, body aches, headache, dizziness, lightheadednness, CP, SOB, palpitations, abdominal pain, n/v/d. Pt otherwise feeling well with no other complaints.    T(F): 98.3 (09-25-20 @ 17:00), Max: 98.6 (09-25-20 @ 12:54)  HR: 72 (09-25-20 @ 17:00) (64 - 72)  BP: 117/76 (09-25-20 @ 17:00) (101/57 - 130/73)  RR: 17 (09-25-20 @ 17:00) (14 - 17)  SpO2: 98% (09-25-20 @ 17:00) (96% - 100%)    Physical Exam:  Gen: NAD  HEENT: PERRLA, moist mucous membranes  Cardio: +S1, +S2, RRR  Lungs: CTA B/L, No w/r/r, no increased WOB   Abd: soft, NT/ND, +BS x 4 quadrants, no rebound/guarding   Ext: No pedal edema, no calf tenderness, pulses intact  Neuro: AAOx3, answers questions appropriately    A/P:     1.)   -  -  -Will continue to monitor, RN to call if any changes Called by RN, pt c/o tremors, sweats and SOB. Pt seen and examined at bedside. Pt states that she feels warm, having sweats and not sure if she has a fever. She also feels SOB, feeling anxious and is having tremors. Pt does have a history of essential tremors and states that she takes Xanax 0.5 mg which helps. Pt was nauseous earlier but that has resolved. Pt denies headache, dizziness, lightheadedness, blurry vision, CP. Pt otherwise feeling well with no other complaints.    T(F): 98.3 (09-25-20 @ 17:00), Max: 98.6 (09-25-20 @ 12:54)  HR: 72 (09-25-20 @ 17:00) (64 - 72)  BP: 117/76 (09-25-20 @ 17:00) (101/57 - 130/73)  RR: 17 (09-25-20 @ 17:00) (14 - 17)  SpO2: 98% (09-25-20 @ 17:00) (96% - 100%)    Physical Exam:  Gen: laying in bed in mild distress, sweating   HEENT: PERRLA  Cardio: +S1, +S2, tachycardic  Lungs: CTA B/L, No w/r/r, no increased WOB   Abd: obese, soft, +BS x 4 quadrants, surgical dressing over incision site   Ext: No pedal edema, no calf tenderness, pulses intact, SCDs in place   Neuro: AAOx3, answers questions appropriately    A/P: 61 yo F with PMH JESUS on CPAP, HTN, Obesity, Anemia, AAA, Arthritis, Depression, Anxiety admitted for elective abdominal panniculectomy.    Of note, pt sees Cardio Dr. Giordano outpatient who prescribes her Lasix for her SOB, thinks that SOB is multifactorial and related to her obesity and not cardiac related, last echo was wnl        1.) Tremors, sweats, SOB, and tremors s/p abdominal panniculectomy   - STAT Xanax 0.5 mg PO x1   - Pt is not currently fluid overloaded on exam, will hold of on Lasix   - Vitals retaken--  - Will continue to monitor, RN to call if any changes Called by RN, pt c/o tremors, sweats and SOB. Pt seen and examined at bedside. Pt states that she feels warm, having sweats and not sure if she has a fever. She also feels SOB, feeling anxious and is having tremors. Pt does have a history of essential tremors and states that she takes Xanax 0.5 mg which helps. Pt was nauseous earlier but that has resolved. Pt denies headache, dizziness, lightheadedness, blurry vision, CP. Pt otherwise feeling well with no other complaints.    Vital Signs:  T(C): 36.9 (25 Sep 2020 20:31), Max: 37 (25 Sep 2020 12:54)  T(F): 98.4 (25 Sep 2020 20:31), Max: 98.6 (25 Sep 2020 12:54)  HR: 135 (25 Sep 2020 20:31) (64 - 135)  BP: 76/53 (25 Sep 2020 20:31) (76/53 - 150/97)  BP(mean): --  RR: 20 (25 Sep 2020 20:31) (14 - 20)  SpO2: 98% (25 Sep 2020 20:31) (96% - 100%)    Physical Exam:  Gen: laying in bed in mild distress, sweating   HEENT: PERRLA  Cardio: +S1, +S2, tachycardic  Lungs: CTA B/L, No w/r/r, no increased WOB   Abd: obese, soft, +BS x 4 quadrants, surgical dressing over incision site   Ext: No pedal edema, no calf tenderness, pulses intact, SCDs in place   Neuro: AAOx3, answers questions appropriately    A/P: 61 yo F with PMH JESUS on CPAP, HTN, Obesity, Anemia, AAA, Arthritis, Depression, Anxiety admitted for elective abdominal panniculectomy.    Of note, pt sees Cardio Dr. Giordano outpatient who prescribes her Lasix for her SOB, thinks that SOB is multifactorial and related to her obesity and not cardiac related, last echo was wnl        1.) Tremors, sweats, SOB, and tremors s/p abdominal panniculectomy   - STAT Xanax 0.5 mg PO x1   - Pt is not currently fluid overloaded on exam, will hold of on Lasix   - Vitals retaken--  - Will continue to monitor, RN to call if any changes Called by RN, pt c/o tremors, sweats and SOB. Pt seen and examined at bedside. Pt states that she feels warm, having sweats and not sure if she has a fever. She also feels SOB, feeling anxious and is having tremors. Pt does have a history of essential tremors and states that she takes Xanax 0.5 mg which helps. Pt was nauseous earlier but that has resolved. Pt denies headache, dizziness, lightheadedness, blurry vision, CP. Pt otherwise feeling well with no other complaints.    Vital Signs:  T(C): 36.9 (25 Sep 2020 20:31), Max: 37 (25 Sep 2020 12:54)  T(F): 98.4 (25 Sep 2020 20:31), Max: 98.6 (25 Sep 2020 12:54)  HR: 135 (25 Sep 2020 20:31) (64 - 135)  BP: 76/53 (25 Sep 2020 20:31) (76/53 - 150/97)  BP(mean): --  RR: 20 (25 Sep 2020 20:31) (14 - 20)  SpO2: 98% (25 Sep 2020 20:31) (96% - 100%)    Physical Exam:  Gen: laying in bed in mild distress, sweating   HEENT: PERRLA  Cardio: +S1, +S2, tachycardic  Lungs: CTA B/L, No w/r/r, no increased WOB   Abd: obese, soft, +BS x 4 quadrants, surgical dressing over incision site   Ext: No pedal edema, no calf tenderness, pulses intact, SCDs in place   Neuro: AAOx3, answers questions appropriately    A/P: 59 yo F with PMH JESUS on CPAP, HTN, Obesity, Anemia, AAA, Arthritis, Depression, Anxiety admitted for elective abdominal panniculectomy.    Of note, pt sees Cardio Dr. Giordano outpatient who prescribes her Lasix for her SOB, thinks that SOB is multifactorial and related to her obesity and not cardiac related, last echo was wnl, no h/o CHF   CTA Chest on 6/2020 showed no PE   Angiogram from 2013 did not show any significant plaques  Cardiac catheterization on 6/2020 demonstrated no significant obstructive CAD'      1.) Tremors, sweats, SOB, and tremors s/p abdominal panniculectomy   - STAT Xanax 0.5 mg PO x1   - Pt is not currently fluid overloaded on exam, will hold of on Lasix   - Vitals retaken--  - Will continue to monitor, RN to call if any changes Called by RN, pt c/o tremors, sweats and SOB. Pt seen and examined at bedside. Pt states that she feels warm, having sweats and not sure if she has a fever. She also feels SOB, feeling anxious and is having tremors. Pt does have a history of essential tremors and states that she takes Xanax 0.5 mg which helps. Pt was nauseous earlier but that has resolved. Pt denies headache, dizziness, lightheadedness, blurry vision, CP. Pt otherwise feeling well with no other complaints.    Vital Signs:  T(C): 36.9 (25 Sep 2020 20:31), Max: 37 (25 Sep 2020 12:54)  T(F): 98.4 (25 Sep 2020 20:31), Max: 98.6 (25 Sep 2020 12:54)  HR: 135 (25 Sep 2020 20:31) (64 - 135)  BP: 76/53 (25 Sep 2020 20:31) (76/53 - 150/97)  BP(mean): --  RR: 20 (25 Sep 2020 20:31) (14 - 20)  SpO2: 98% (25 Sep 2020 20:31) (96% - 100%)    Physical Exam:  Gen: laying in bed in mild distress  HEENT: PERRLA  Cardio: +S1, +S2, tachycardic  Lungs: CTA B/L, No w/r/r, no increased WOB   Abd: obese, soft, +BS x 4 quadrants, surgical dressing over incision site   Ext: No pedal edema, no calf tenderness, pulses intact, SCDs in place   Neuro: AAOx3, answers questions appropriately, BL hand tremors     A/P: 59 yo F with PMH JESUS on CPAP, HTN, Obesity, Anemia, AAA, Arthritis, Depression, Anxiety admitted for elective abdominal panniculectomy.    Of note, pt sees Cardio Dr. Giordano outpatient who prescribes her Lasix for her SOB, thinks that SOB is multifactorial and related to her obesity and not cardiac related, last echo was wnl, no h/o CHF   CTA Chest on 6/2020 showed no PE   Angiogram from 2013 did not show any significant plaques  Cardiac catheterization on 6/2020 demonstrated no significant obstructive CAD      1.) Tremors, sweats, SOB, and tremors s/p abdominal panniculectomy   - STAT Xanax 0.5 mg PO x1   - Pt is not currently fluid overloaded on exam  - Will continue to monitor, RN to call if any changes Called by RN, pt c/o tremors, sweats and SOB. Pt seen and examined at bedside. Pt states that she feels warm, having sweats and not sure if she has a fever. She also feels SOB, feeling anxious and is having tremors. Pt does have a history of essential tremors and states that she takes Xanax 0.5 mg which helps. Pt was nauseous earlier but that has resolved. Pt denies headache, dizziness, lightheadedness, blurry vision, CP. Pt otherwise feeling well with no other complaints.    Vital Signs:  T(C): 36.9 (25 Sep 2020 20:31), Max: 37 (25 Sep 2020 12:54)  T(F): 98.4 (25 Sep 2020 20:31), Max: 98.6 (25 Sep 2020 12:54)  HR: 135 (25 Sep 2020 20:31) (64 - 135)  BP: 76/53 (25 Sep 2020 20:31) (76/53 - 150/97)  RR: 20 (25 Sep 2020 20:31) (14 - 20)  SpO2: 98% (25 Sep 2020 20:31) (96% - 100%)    Physical Exam:  Gen: laying in bed in mild distress  HEENT: PERRLA  Cardio: +S1, +S2, tachycardic  Lungs: CTA B/L, No w/r/r, no increased WOB   Abd: obese, soft, +BS x 4 quadrants, surgical dressing over incision site   Ext: No pedal edema, no calf tenderness, pulses intact, SCDs in place   Neuro: AAOx3, answers questions appropriately, BL hand tremors     A/P: 61 yo F with PMH JESUS on CPAP, HTN, Obesity, Anemia, AAA, Arthritis, Depression, Anxiety admitted for elective abdominal panniculectomy.    Of note, pt sees Cardio Dr. Giordano outpatient who prescribes her Lasix for her SOB, thinks that SOB is multifactorial and related to her obesity and not cardiac related, last echo was wnl, no h/o CHF   CTA Chest on 6/2020 showed no PE   Angiogram from 2013 did not show any significant plaques  Cardiac catheterization on 6/2020 demonstrated no significant obstructive CAD    1.) Tremors, sweats, SOB, and tremors s/p abdominal panniculectomy   - STAT Xanax 0.5 mg PO x1  - Pt is not currently fluid overloaded on exam  - Will continue to monitor, RN to call if any changes    ---------------------------------  Addendum  9/25/2020 21:41    Patient seen and examined as patient was hypotensive. Patient seen and examined at bedside. Patient very anxious, diaphoretic and complaining of nausea. Patient states that she had cardiac cath done in June and looked at EMR, cardiac cath done and was negative for blockages. Echo done outpatient showed mild diastolic dysfunction otherwise normal.    - symptoms likely in setting of dehydration post-op but will r/o cardiac cause and anemia  - STAT EKG showed, limited as patient with tremors however no acute ST changes seen  - will order for cardiac enzymes  - patient currently diaphoretic and anxious, asking for xanax for tremors however patient with hypotension currently, likely dehydrated post-op also noted to have low urine output  - BP 70s/60s, will give 1L LR bolus and reassess. Can give more fluid if needed after  - tachycardia likely in setting of post op adrenaline and anxiety  - will order for d-dimer to r/o PE  - will order for CBC to evaluate for anemia   - will order for lactate  - surgical PA made aware of events  - follow up all results, RN to call for any acute changes Called by RN, pt c/o tremors, sweats and SOB. Pt seen and examined at bedside. Pt states that she feels warm, having sweats and not sure if she has a fever. She also feels SOB, feeling anxious and is having tremors. Pt does have a history of essential tremors and states that she takes Xanax 0.5 mg which helps. Pt was nauseous earlier but that has resolved. Pt denies headache, dizziness, lightheadedness, blurry vision, CP. Pt otherwise feeling well with no other complaints.    Vital Signs:  T(C): 36.9 (25 Sep 2020 20:31), Max: 37 (25 Sep 2020 12:54)  T(F): 98.4 (25 Sep 2020 20:31), Max: 98.6 (25 Sep 2020 12:54)  HR: 135 (25 Sep 2020 20:31) (64 - 135)  BP: 76/53 (25 Sep 2020 20:31) (76/53 - 150/97)  RR: 20 (25 Sep 2020 20:31) (14 - 20)  SpO2: 98% (25 Sep 2020 20:31) (96% - 100%)    Physical Exam:  Gen: laying in bed in mild distress  HEENT: PERRLA  Cardio: +S1, +S2, tachycardic  Lungs: CTA B/L, No w/r/r, no increased WOB   Abd: obese, soft, +BS x 4 quadrants, surgical dressing over incision site   Ext: No pedal edema, no calf tenderness, pulses intact, SCDs in place   Neuro: AAOx3, answers questions appropriately, BL hand tremors     A/P: 59 yo F with PMH JESUS on CPAP, HTN, Obesity, Anemia, AAA, Arthritis, Depression, Anxiety admitted for elective abdominal panniculectomy.    Of note, pt sees Cardio Dr. Giordano outpatient who prescribes her Lasix for her SOB, thinks that SOB is multifactorial and related to her obesity and not cardiac related, last echo was wnl, no h/o CHF   CTA Chest on 6/2020 showed no PE   Angiogram from 2013 did not show any significant plaques  Cardiac catheterization on 6/2020 demonstrated no significant obstructive CAD    1.) Tremors, sweats, SOB, and tremors s/p abdominal panniculectomy   - STAT Xanax 0.5 mg PO x1-vitals were retaken and pt was hypotensive--order was cancelled and pt was reevaluated   - Pt is not currently fluid overloaded on exam, no need for Lasix at this time   - Will continue to monitor, RN to call if any changes    ---------------------------------  Addendum  9/25/2020 21:41    Patient seen and examined as patient was hypotensive. Patient seen and examined at bedside. Patient very anxious, diaphoretic and complaining of nausea. Patient states that she had cardiac cath done in June and looked at EMR, cardiac cath done and was negative for blockages. Echo done outpatient showed mild diastolic dysfunction otherwise normal.    - symptoms likely in setting of dehydration post-op but will r/o cardiac cause and anemia  - STAT EKG showed, limited as patient with tremors however no acute ST changes seen  - will order for cardiac enzymes  - patient currently diaphoretic and anxious, asking for xanax for tremors however patient with hypotension currently, likely dehydrated post-op also noted to have low urine output  - BP 70s/60s, will give 1L LR bolus and reassess. Can give more fluid if needed after  - tachycardia likely in setting of post op adrenaline and anxiety  - will order for d-dimer to r/o PE  - will order for CBC to evaluate for anemia   - will order for lactate  - surgical PA made aware of events  - follow up all results, RN to call for any acute changes

## 2020-09-25 NOTE — CHART NOTE - NSCHARTNOTEFT_GEN_A_CORE
Resident Rapid Response Note    Patient is a 60y old  Female who presents with a chief complaint of Panniculectomy (25 Sep 2020 17:16)      Rapid response was called at on this 60y Female patient for hypotension.     Patient was seen and examined at the bedside by the rapid response team and primary team. Dr. Toussaint, Dr. Wisdom, and ICU PA at bedside.    Rapid Response Vital Signs:  BP:  HR:  RR:  SpO2: % on   Temp:  FS:    Vital Signs Last 24 Hrs  T(C): 36.8 (25 Sep 2020 21:03), Max: 37 (25 Sep 2020 12:54)  T(F): 98.3 (25 Sep 2020 21:03), Max: 98.6 (25 Sep 2020 12:54)  HR: 130 (25 Sep 2020 21:03) (64 - 135)  BP: 68/- (25 Sep 2020 21:03) (68/- - 150/97)  BP(mean): --  RR: 20 (25 Sep 2020 21:03) (14 - 20)  SpO2: 100% (25 Sep 2020 21:03) (96% - 100%)    Physical Exam:  General: Well developed, well nourished, in moderate  HEENT: NCAT, PERRLA, EOMI bl, moist mucous membranes   Neck: Supple, nontender, no mass  Neurology: A&Ox3, nonfocal, CN II-XII grossly intact, sensation intact, no gait abnormalities   Respiratory: CTA B/L, No wheezing, rales, or rhonchi  CV: RRR, S1/S2 present, no murmurs, rubs, or gallops  Abdominal: Soft, nontender, non-distended, normoactive bowel sounds  Extremities: No C/C/E, peripheral pulses present  MSK: Normal ROM, no joint erythema or warmth, no joint swelling   Skin: warm, dry, normal color, no obvious rash or abnormal lesions    LABS:                        8.4    15.53 )-----------( 347      ( 25 Sep 2020 21:25 )             24.8     25 Sep 2020 21:25    138    |  106    |  20     ----------------------------<  481    5.1     |  10     |  1.60     Ca    7.8        25 Sep 2020 21:25    TPro  6.7    /  Alb  3.1    /  TBili  0.5    /  DBili  x      /  AST  29     /  ALT  42     /  AlkPhos  122    25 Sep 2020 17:38        CAPILLARY BLOOD GLUCOSE      POCT Blood Glucose.: 452 mg/dL (25 Sep 2020 22:15)  POCT Blood Glucose.: 130 mg/dL (25 Sep 2020 13:03)        RADIOLOGY & ADDITIONAL TESTS:      Assessment/Plan: 61 yo F PMHx JESUS on CPAP, HTN, Obesity, Anemia, AAA, Arthritis, Depression, Anxiety.   who presented to [facility] for elective abdominal panniculectomy.     -Will continue to follow, RN to call if any changes. Resident Rapid Response Note    Patient is a 60y old  Female who presents with a chief complaint of Panniculectomy (25 Sep 2020 17:16)    Rapid response was called at on this 60y Female patient for tachycardia 130s    Patient was seen and examined at the bedside by the rapid response team and primary team. Patient evaluated prior to rapid response and CBC, BMP, lactate, d-dimer, STAT EKG ordered. Rapid response called as patient developed new onset abdominal pain and persistently tachycardic to 130s. Patient endorses shortness of breath, chest tightness, abdominal pain radiating to back. Denies chills, nausea, vomiting, dizziness, lightheadedness. Dr. Toussaint, Dr. Wisdom, and ICU PA at bedside.    Rapid Response Vital Signs:  BP: 132/105  HR: 135  RR: 22  SpO2: 98% on 3L  Temp: 97.7  FS: 452    Vital Signs Last 24 Hrs  T(C): 36.8 (25 Sep 2020 21:03), Max: 37 (25 Sep 2020 12:54)  T(F): 98.3 (25 Sep 2020 21:03), Max: 98.6 (25 Sep 2020 12:54)  HR: 130 (25 Sep 2020 21:03) (64 - 135)  BP: 68/- (25 Sep 2020 21:03) (68/- - 150/97)  BP(mean): --  RR: 20 (25 Sep 2020 21:03) (14 - 20)  SpO2: 100% (25 Sep 2020 21:03) (96% - 100%)    Physical Exam:  Gen: laying in bed in mild distress, pale, on 3 L NC  HEENT: PERRLA  Cardio: +S1, +S2, tachycardic  Lungs: CTA B/L, No w/r/r, no increased WOB   Abd: obese, soft, +BS x 4 quadrants, surgical dressing over incision site   Ext: Cool, clammy extremities, no pedal edema, no calf tenderness, pulses intact, SCDs in place   Neuro: AAOx3, answers questions appropriately, BL hand tremors     LABS:                        8.4    15.53 )-----------( 347      ( 25 Sep 2020 21:25 )             24.8     25 Sep 2020 21:25    138    |  106    |  20     ----------------------------<  481    5.1     |  10     |  1.60     Ca    7.8        25 Sep 2020 21:25    TPro  6.7    /  Alb  3.1    /  TBili  0.5    /  DBili  x      /  AST  29     /  ALT  42     /  AlkPhos  122    25 Sep 2020 17:38        CAPILLARY BLOOD GLUCOSE      POCT Blood Glucose.: 452 mg/dL (25 Sep 2020 22:15)  POCT Blood Glucose.: 130 mg/dL (25 Sep 2020 13:03)        RADIOLOGY & ADDITIONAL TESTS:    Assessment/Plan:  61 yo F with PMH JESUS on CPAP, HTN, Obesity, Anemia, AAA, Arthritis, Depression, Anxiety admitted for elective abdominal panniculectomy POD#0    Hemorrhagic shock likely 2/2 acute blood loss anemia complicated by hyperglycemia likely 2/2 DKA   - Patient currently pale, diaphoretic and anxious  - BP 70s/60s likely 2/2 hemorrhagic shock. Given IV LR 1 L bolus x1  - H/H 11.9/34.8 --> 8.4/24.8 likely 2/2 acute blood loss s/p abdominal panniculectomy POD#0. Ordered 2 units pRBCs, STAT type and screen and blood bank notified   - Lactate 14.3 likely elevated in the setting of hypovolemia vs. DKA  - Cr 1.6 likely 2/2 hypovolemia resulting from DKA   - POCT 452, Glucose 481 likely elevated in the setting of DKA  - Anion Gap 22, CO2 10  - Troponin, CKMB, CK negative x1. No evidence of acute ischemia   - Dr. Toussaint, notified and discussed case with Dr. De La Torre  - Patient transferred to ICU  - Will continue to follow, RN to call if any changes. Resident Rapid Response Note    Patient is a 60y old  Female who presents with a chief complaint of Panniculectomy (25 Sep 2020 17:16)    Rapid response was called at on this 60y Female patient for tachycardia 130s    Patient was seen and examined at the bedside by the rapid response team and primary team. Patient evaluated prior to rapid response and CBC, BMP, lactate, d-dimer, STAT EKG ordered. Rapid response called as patient developed new onset abdominal pain and persistently tachycardic to 130s. Patient endorses shortness of breath, chest tightness, abdominal pain radiating to back. Denies chills, nausea, vomiting, dizziness, lightheadedness. Dr. Toussaint, Dr. Wisdom, and ICU PA at bedside.    Rapid Response Vital Signs:  BP: 132/105  HR: 135  RR: 22  SpO2: 98% on 3L  Temp: 97.7  FS: 452    Physical Exam:  Gen: laying in bed in mild distress, pale, on 3 L NC, pale, diaphoretic  HEENT: PERRLA  Cardio: +S1, +S2, tachycardic  Lungs: CTA B/L, No w/r/r, no increased WOB   Abd: obese, soft, +BS x 4 quadrants, surgical dressing over incision site   Ext: Cool, clammy extremities, no pedal edema, no calf tenderness, pulses intact, SCDs in place   Neuro: AAOx3, answers questions appropriately, BL hand tremors     LABS:                        8.4    15.53 )-----------( 347      ( 25 Sep 2020 21:25 )             24.8     25 Sep 2020 21:25    138    |  106    |  20     ----------------------------<  481    5.1     |  10     |  1.60     Ca    7.8        25 Sep 2020 21:25    TPro  6.7    /  Alb  3.1    /  TBili  0.5    /  DBili  x      /  AST  29     /  ALT  42     /  AlkPhos  122    25 Sep 2020 17:38        CAPILLARY BLOOD GLUCOSE      POCT Blood Glucose.: 452 mg/dL (25 Sep 2020 22:15)  POCT Blood Glucose.: 130 mg/dL (25 Sep 2020 13:03)      Assessment/Plan:  61 yo F with PMH JESUS on CPAP, HTN, Obesity, Anemia, AAA, Arthritis, Depression, Anxiety admitted for elective abdominal panniculectomy POD#0    Hemorrhagic shock likely 2/2 acute blood loss anemia complicated by hyperglycemia likely 2/2 DKA   - Patient currently pale, diaphoretic and anxious  - BP 70s/60s likely 2/2 hemorrhagic shock. Given IV LR 1 L bolus x1, will order for 2nd bolus  - H/H 11.9/34.8 --> 8.4/24.8 likely 2/2 acute blood loss s/p abdominal panniculectomy POD#0. Ordered 2 units pRBCs, blood bank notified   - POCT 452, Glucose 481 likely elevated in the setting of ?DKA, patient with no history of DM  - Anion Gap 22, CO2 10  - Lactate 14.3 likely elevated in the setting of hypovolemia vs. DKA  - f/u acetone level  - Cr 1.6 likely prerenal azotemia 2/2 ABLA  - Troponin, CKMB, CK negative x1. No evidence of acute ischemia     - Dr. Toussaint, notified and discussed case with Dr. De La Torre  - Patient transferred to ICU  - Will continue to follow, RN to call if any changes.

## 2020-09-25 NOTE — PROGRESS NOTE ADULT - SUBJECTIVE AND OBJECTIVE BOX
Post Operative Note  Patient: EMANUEL MONTEZ 60y (1960) Female   MRN: [id number]  Location: [address]  Visit: 09-25-20 Inpatient  Date: 09-25-20 @ 17:17    Procedure: S/P panniculectomy with liposuction, ventral hernia repair.    Subjective:   Patient seen and examined at bedside.  Pt feels well.  Currently has saucedo in place.  Just received on the floor.  Pt is tolerating clears, and will be ambulating.  Blood drawn at 5pm.  Awaiting results.   Patient denies dizziness, chest pain, sob, nausea, vomiting, abdominal pain, or urinary complaints.    Objective:  Vitals: T(F): 98.3 (09-25-20 @ 17:00), Max: 98.6 (09-25-20 @ 12:54)  HR: 72 (09-25-20 @ 17:00)  BP: 117/76 (09-25-20 @ 17:00) (101/57 - 150/97)  RR: 17 (09-25-20 @ 17:00)  SpO2: 98% (09-25-20 @ 17:00)  Vent Settings:     In:   09-25-20 @ 07:01  -  09-25-20 @ 17:17  --------------------------------------------------------  IN: 200 mL      IV Fluids: lactated ringers. 1000 milliLiter(s) (100 mL/Hr) IV Continuous <Continuous>      Out:   09-25-20 @ 07:01  -  09-25-20 @ 17:17  --------------------------------------------------------  OUT: 150 mL      EBL:     Voided Urine:   09-25-20 @ 07:01  -  09-25-20 @ 17:17  --------------------------------------------------------  OUT: 150 mL      Saucedo Catheter: yes no   Drains:   EDWIN:    ,   Chest Tube:      NG Tube:       PHYSICAL EXAM  GENERAL:  Well-nourished, well-developed Female lying comfortably in bed in NAD  CARDIO:  Regular rate and rhythm.  No murmur, gallop or rub appreciated.  RESPIRATORY:  Clear to auscultation bilaterally.  No wheezing, rales or rhonchi appreciated.  ABDOMEN:  ABD binder in place.  Soft, NT.  EDWIN drain x3 in place.    EXTREMITIES: No calf tenderness  NEURO:  A&O x 3  Surgical dressings clean, dry, intact.    Medications: [Standing]  acetaminophen   Tablet .. 650 milliGRAM(s) Oral every 6 hours PRN  allopurinol 300 milliGRAM(s) Oral daily  DULoxetine 30 milliGRAM(s) Oral <User Schedule>  enoxaparin Injectable 40 milliGRAM(s) SubCutaneous daily  HYDROmorphone  Injectable 0.5 milliGRAM(s) IV Push every 4 hours PRN  lactated ringers. 1000 milliLiter(s) IV Continuous <Continuous>  lamoTRIgine 200 milliGRAM(s) Oral daily  losartan 50 milliGRAM(s) Oral daily  oxycodone    5 mG/acetaminophen 325 mG 1 Tablet(s) Oral every 4 hours PRN  oxycodone    5 mG/acetaminophen 325 mG 2 Tablet(s) Oral every 6 hours PRN  propranolol 10 milliGRAM(s) Oral daily  traZODone 200 milliGRAM(s) Oral at bedtime  verapamil  milliGRAM(s) Oral daily    Medications: [PRN]  acetaminophen   Tablet .. 650 milliGRAM(s) Oral every 6 hours PRN  allopurinol 300 milliGRAM(s) Oral daily  DULoxetine 30 milliGRAM(s) Oral <User Schedule>  enoxaparin Injectable 40 milliGRAM(s) SubCutaneous daily  HYDROmorphone  Injectable 0.5 milliGRAM(s) IV Push every 4 hours PRN  lactated ringers. 1000 milliLiter(s) IV Continuous <Continuous>  lamoTRIgine 200 milliGRAM(s) Oral daily  losartan 50 milliGRAM(s) Oral daily  oxycodone    5 mG/acetaminophen 325 mG 1 Tablet(s) Oral every 4 hours PRN  oxycodone    5 mG/acetaminophen 325 mG 2 Tablet(s) Oral every 6 hours PRN  propranolol 10 milliGRAM(s) Oral daily  traZODone 200 milliGRAM(s) Oral at bedtime  verapamil  milliGRAM(s) Oral daily    Labs:  Pending              Imaging:  No post-op imaging studies    Assessment:  60yFemale patient S/P *** for ***    Plan:  - Continue diet  - Continue abx- last dose given of Clindamycin  - Pain control  - Incentive spirometry  - Encourage ambulation  - SCDs  - DVT prophylaxis with lovenox to be started tonight  - Follow up AM labs  - Will continue to monitor

## 2020-09-25 NOTE — BRIEF OPERATIVE NOTE - NSICDXBRIEFPROCEDURE_GEN_ALL_CORE_FT
PROCEDURES:  Ventral hernia repair 25-Sep-2020 13:54:10  Salma Guajardo  Abdominal panniculectomy with liposuction 25-Sep-2020 13:51:08  Salma Guajardo

## 2020-09-25 NOTE — CONSULT NOTE ADULT - SUBJECTIVE AND OBJECTIVE BOX
Patient is a 60y old  Female who presents with a chief complaint of Panniculectomy (25 Sep 2020 17:16)      BRIEF HOSPITAL COURSE: ***    S/p RRT this evening    PAST MEDICAL & SURGICAL HISTORY:  History of aortic aneurysm  descending aorta see CT    Arthritis    Degenerative disc disease, lumbar    Neuropathy    Spinal stenosis    Bipolar 1 disorder    Kidney stone    Sleep Apnea  CPAP with oxygen since June 2020    Asthma    Hypertension    Morbid Obesity    ETOH Abuse  H/O    Benign Essential Tremor    Anxiety    Depression    Renal Calculi  chronic      Colitis  H/O    Anemia    S/P cardiac catheterization    S/P dilation and curettage    History of tonsillectomy    History of laparoscopic adjustable gastric banding  band removed few yrs ago    S/P D&amp;C    Biliary stent placement &amp; ercp    ureteroscopy with stone removal  1-      Allergies    penicillins (Other)  trees dogs cats cockaroaches (Rhinitis; Rhinorrhea)    Intolerances      FAMILY HISTORY:  Hypertension (Sibling)    Hyperlipidemia (Sibling)    Family history of renal failure  both parents were on dialysis    Family history of bacterial pneumonia    Family history of arthritis      SOCIAL HISTORY: Denies tobacco, alcohol or illicit drug use    Review of Systems:  CONSTITUTIONAL: No fever, chills, or fatigue  EYES: No eye pain, visual disturbances, or discharge  ENMT:  No difficulty hearing, tinnitus, vertigo; No sinus or throat pain  NECK: No pain or stiffness  RESPIRATORY: No cough, wheezing, chills or hemoptysis; mild SOB  CARDIOVASCULAR: No chest pain, palpitations, dizziness, or leg swelling  GASTROINTESTINAL: 4/10 abd pain. No nausea, vomiting, or hematemesis; No diarrhea or constipation. No melena or hematochezia.  GENITOURINARY: No dysuria, frequency, hematuria, or incontinence  NEUROLOGICAL: No headaches, memory loss, loss of strength, numbness, or tremors  SKIN: No itching, burning, rashes, or lesions   MUSCULOSKELETAL: No joint pain or swelling; No muscle, back, or extremity pain  PSYCHIATRIC: No depression, anxiety, mood swings, or difficulty sleeping    Medications:    propranolol 10 milliGRAM(s) Oral daily  verapamil  milliGRAM(s) Oral daily      acetaminophen   Tablet .. 650 milliGRAM(s) Oral every 6 hours PRN  DULoxetine 30 milliGRAM(s) Oral <User Schedule>  HYDROmorphone  Injectable 0.5 milliGRAM(s) IV Push every 4 hours PRN  lamoTRIgine 200 milliGRAM(s) Oral daily  oxycodone    5 mG/acetaminophen 325 mG 1 Tablet(s) Oral every 4 hours PRN  oxycodone    5 mG/acetaminophen 325 mG 2 Tablet(s) Oral every 6 hours PRN  traZODone 200 milliGRAM(s) Oral at bedtime        docusate sodium 100 milliGRAM(s) Oral three times a day      allopurinol 300 milliGRAM(s) Oral daily  dextrose 40% Gel 15 Gram(s) Oral once PRN  dextrose 50% Injectable 12.5 Gram(s) IV Push once  dextrose 50% Injectable 25 Gram(s) IV Push once  dextrose 50% Injectable 25 Gram(s) IV Push once  glucagon  Injectable 1 milliGRAM(s) IntraMuscular once PRN  insulin lispro (HumaLOG) corrective regimen sliding scale   SubCutaneous three times a day before meals  insulin lispro Injectable (HumaLOG) 12 Unit(s) SubCutaneous once    dextrose 5%. 1000 milliLiter(s) IV Continuous <Continuous>      chlorhexidine 2% Cloths 1 Application(s) Topical <User Schedule>            ICU Vital Signs Last 24 Hrs  T(C): 36.7 (25 Sep 2020 22:54), Max: 37 (25 Sep 2020 12:54)  T(F): 98.1 (25 Sep 2020 22:54), Max: 98.6 (25 Sep 2020 12:54)  HR: 130 (25 Sep 2020 21:03) (64 - 135)  BP: 68/- (25 Sep 2020 21:03) (68/- - 150/97)  BP(mean): --  ABP: --  ABP(mean): --  RR: 20 (25 Sep 2020 21:03) (14 - 20)  SpO2: 100% (25 Sep 2020 21:03) (96% - 100%)    Vital Signs Last 24 Hrs  T(C): 36.7 (25 Sep 2020 22:54), Max: 37 (25 Sep 2020 12:54)  T(F): 98.1 (25 Sep 2020 22:54), Max: 98.6 (25 Sep 2020 12:54)  HR: 130 (25 Sep 2020 21:03) (64 - 135)  BP: 68/- (25 Sep 2020 21:03) (68/- - 150/97)  BP(mean): --  RR: 20 (25 Sep 2020 21:03) (14 - 20)  SpO2: 100% (25 Sep 2020 21:03) (96% - 100%)        I&O's Detail    25 Sep 2020 07:01  -  25 Sep 2020 23:05  --------------------------------------------------------  IN:    Lactated Ringers: 600 mL    Oral Fluid: 240 mL  Total IN: 840 mL    OUT:    Bulb (mL): 40 mL    Bulb (mL): 60 mL    Bulb (mL): 80 mL    Indwelling Catheter - Urethral (mL): 150 mL  Total OUT: 330 mL    Total NET: 510 mL            LABS:                        8.4    15.53 )-----------( 347      ( 25 Sep 2020 21:25 )             24.8     09-25    138  |  106  |  20  ----------------------------<  481<HH>  5.1   |  10<LL>  |  1.60<H>    Ca    7.8<L>      25 Sep 2020 21:25    TPro  6.7  /  Alb  3.1<L>  /  TBili  0.5  /  DBili  x   /  AST  29  /  ALT  42  /  AlkPhos  122<H>  09-25      CARDIAC MARKERS ( 25 Sep 2020 21:25 )  .033 ng/mL / x     / 65 U/L / x     / <1.0 ng/mL      CAPILLARY BLOOD GLUCOSE      POCT Blood Glucose.: 452 mg/dL (25 Sep 2020 22:15)        CULTURES: N      Physical Examination:    VITALS AT TIME OF EXAM:  HR: 99 NSR  BP: 99/68  RR: 18  SPO2: 98% 3L NC    General: Obese, pale, diaphoretic, in NAD      HEENT: Pupils equal, reactive to light.  Symmetric. No scleral icterus or injection, pale conjunctiva    PULM: Clear to auscultation bilaterally, no wheezes, rales or rhonchi, no significant sputum production, mild dyspnea    NECK: obese, no lymphadenopathy, trachea midline    CVS: NSR 90s, no murmurs, +s1/s2    ABD: abd compression wrap in place, 3x EDWIN drains ~50ccs of dark red blood in each drain    EXT: No edema, nontender    SKIN: Warm, diaphoretic, pale     NEURO: Alert, oriented, interactive, nonfocal    DEVICES: N  LINES: N  SHAVER: Y    POCUS: N    RADIOLOGY: No recent radiology     CRITICAL CARE TIME SPENT: 42 mins assessing presenting problems of acute illness that poses high probability of life threatening deterioration or end organ damage/dysfunction.  Medical decision making including initiating plan of care, reviewing data, reviewing radiology, direct patient bedside evaluation and interpretation of vital signs, any necessary ventilator management, discussion with multidisciplinary team, discussing goals of care with patient/family, all non inclusive of procedures    Patient is a 60y old  Female who presents with a chief complaint of Panniculectomy (25 Sep 2020 17:16)      BRIEF HOSPITAL COURSE:   Pt is a 59 y/o morbidly obese female with PMHx of HTN, BPD, Anxiety disorder, Thoracic AAA 4.4 (stable), JESUS on CPAP, prior gastric lap band and removal and essential tremor presents to [facility] for elective abdominal panniculectomy. POD #0. Per op note reportedly 100ccs EBL, ~22lbs of tissue/pannus removed.     S/p RRT this evening for hypotension. On arrival to bedside pt pale, diaphoretic, midly anxious but A&Ox3, pleasant, in NAD. Mild dyspnea on phonation, saturating low to mid 90s on 3L NC, HR afib 130s, BP unreadable on multiple BP attempts. 1x SBP in 130s otherwise unreadable. EDWIN drains x3 with ~50ccs of dark red blood, pt c/o 4/10 abd pain. Pt hypotensive prior to RRT with SBP in 70s. Ordered for 1L LR and stat labs. Labs reviewed at time of RRT, HgB drop from 11.9 to 8, new AG acidosis w/ serum co2 10, AG 22, ELAINE with Cr 1.00--> 1.60, Lactate 14.3, --> 481. Written for 2u PRBC, current LR bolus opened wide open. Will be moved to ICU.    PAST MEDICAL & SURGICAL HISTORY:  History of aortic aneurysm  descending aorta see CT    Arthritis    Degenerative disc disease, lumbar    Neuropathy    Spinal stenosis    Bipolar 1 disorder    Kidney stone    Sleep Apnea  CPAP with oxygen since June 2020    Asthma    Hypertension    Morbid Obesity    ETOH Abuse  H/O    Benign Essential Tremor    Anxiety    Depression    Renal Calculi  chronic      Colitis  H/O    Anemia    S/P cardiac catheterization    S/P dilation and curettage    History of tonsillectomy    History of laparoscopic adjustable gastric banding  band removed few yrs ago    S/P D&amp;C    Biliary stent placement &amp; ercp    ureteroscopy with stone removal  1-      Allergies    penicillins (Other)  trees dogs cats cockaroaches (Rhinitis; Rhinorrhea)    Intolerances      FAMILY HISTORY:  Hypertension (Sibling)    Hyperlipidemia (Sibling)    Family history of renal failure  both parents were on dialysis    Family history of bacterial pneumonia    Family history of arthritis      SOCIAL HISTORY: Denies tobacco, alcohol or illicit drug use    Review of Systems:  CONSTITUTIONAL: No fever, chills, or fatigue  EYES: No eye pain, visual disturbances, or discharge  ENMT:  No difficulty hearing, tinnitus, vertigo; No sinus or throat pain  NECK: No pain or stiffness  RESPIRATORY: No cough, wheezing, chills or hemoptysis; mild SOB  CARDIOVASCULAR: No chest pain, palpitations, dizziness, or leg swelling  GASTROINTESTINAL: 4/10 abd pain. No nausea, vomiting, or hematemesis; No diarrhea or constipation. No melena or hematochezia.  GENITOURINARY: No dysuria, frequency, hematuria, or incontinence  NEUROLOGICAL: No headaches, memory loss, loss of strength, numbness, or tremors  SKIN: No itching, burning, rashes, or lesions   MUSCULOSKELETAL: No joint pain or swelling; No muscle, back, or extremity pain  PSYCHIATRIC: No depression, anxiety, mood swings, or difficulty sleeping    Medications:    propranolol 10 milliGRAM(s) Oral daily  verapamil  milliGRAM(s) Oral daily      acetaminophen   Tablet .. 650 milliGRAM(s) Oral every 6 hours PRN  DULoxetine 30 milliGRAM(s) Oral <User Schedule>  HYDROmorphone  Injectable 0.5 milliGRAM(s) IV Push every 4 hours PRN  lamoTRIgine 200 milliGRAM(s) Oral daily  oxycodone    5 mG/acetaminophen 325 mG 1 Tablet(s) Oral every 4 hours PRN  oxycodone    5 mG/acetaminophen 325 mG 2 Tablet(s) Oral every 6 hours PRN  traZODone 200 milliGRAM(s) Oral at bedtime        docusate sodium 100 milliGRAM(s) Oral three times a day      allopurinol 300 milliGRAM(s) Oral daily  dextrose 40% Gel 15 Gram(s) Oral once PRN  dextrose 50% Injectable 12.5 Gram(s) IV Push once  dextrose 50% Injectable 25 Gram(s) IV Push once  dextrose 50% Injectable 25 Gram(s) IV Push once  glucagon  Injectable 1 milliGRAM(s) IntraMuscular once PRN  insulin lispro (HumaLOG) corrective regimen sliding scale   SubCutaneous three times a day before meals  insulin lispro Injectable (HumaLOG) 12 Unit(s) SubCutaneous once    dextrose 5%. 1000 milliLiter(s) IV Continuous <Continuous>      chlorhexidine 2% Cloths 1 Application(s) Topical <User Schedule>            ICU Vital Signs Last 24 Hrs  T(C): 36.7 (25 Sep 2020 22:54), Max: 37 (25 Sep 2020 12:54)  T(F): 98.1 (25 Sep 2020 22:54), Max: 98.6 (25 Sep 2020 12:54)  HR: 130 (25 Sep 2020 21:03) (64 - 135)  BP: 68/- (25 Sep 2020 21:03) (68/- - 150/97)  BP(mean): --  ABP: --  ABP(mean): --  RR: 20 (25 Sep 2020 21:03) (14 - 20)  SpO2: 100% (25 Sep 2020 21:03) (96% - 100%)    Vital Signs Last 24 Hrs  T(C): 36.7 (25 Sep 2020 22:54), Max: 37 (25 Sep 2020 12:54)  T(F): 98.1 (25 Sep 2020 22:54), Max: 98.6 (25 Sep 2020 12:54)  HR: 130 (25 Sep 2020 21:03) (64 - 135)  BP: 68/- (25 Sep 2020 21:03) (68/- - 150/97)  BP(mean): --  RR: 20 (25 Sep 2020 21:03) (14 - 20)  SpO2: 100% (25 Sep 2020 21:03) (96% - 100%)        I&O's Detail    25 Sep 2020 07:01  -  25 Sep 2020 23:05  --------------------------------------------------------  IN:    Lactated Ringers: 600 mL    Oral Fluid: 240 mL  Total IN: 840 mL    OUT:    Bulb (mL): 40 mL    Bulb (mL): 60 mL    Bulb (mL): 80 mL    Indwelling Catheter - Urethral (mL): 150 mL  Total OUT: 330 mL    Total NET: 510 mL            LABS:                        8.4    15.53 )-----------( 347      ( 25 Sep 2020 21:25 )             24.8     09-25    138  |  106  |  20  ----------------------------<  481<HH>  5.1   |  10<LL>  |  1.60<H>    Ca    7.8<L>      25 Sep 2020 21:25    TPro  6.7  /  Alb  3.1<L>  /  TBili  0.5  /  DBili  x   /  AST  29  /  ALT  42  /  AlkPhos  122<H>  09-25      CARDIAC MARKERS ( 25 Sep 2020 21:25 )  .033 ng/mL / x     / 65 U/L / x     / <1.0 ng/mL      CAPILLARY BLOOD GLUCOSE      POCT Blood Glucose.: 452 mg/dL (25 Sep 2020 22:15)        CULTURES: N      Physical Examination:    VITALS AT TIME OF EXAM:  HR: 99 NSR  BP: 99/68  RR: 18  SPO2: 98% 3L NC    General: Obese, pale, diaphoretic, in NAD      HEENT: Pupils equal, reactive to light.  Symmetric. No scleral icterus or injection, pale conjunctiva    PULM: Clear to auscultation bilaterally, no wheezes, rales or rhonchi, no significant sputum production, mild dyspnea    NECK: obese, no lymphadenopathy, trachea midline    CVS: NSR 90s, no murmurs, +s1/s2    ABD: abd compression wrap in place, 3x EDWIN drains ~50ccs of dark red blood in each drain    EXT: No edema, nontender    SKIN: Warm, diaphoretic, pale     NEURO: Alert, oriented, interactive, nonfocal    DEVICES: N  LINES: N  SHAVER: Y    POCUS: N    RADIOLOGY: No recent radiology     CRITICAL CARE TIME SPENT: 42 mins assessing presenting problems of acute illness that poses high probability of life threatening deterioration or end organ damage/dysfunction.  Medical decision making including initiating plan of care, reviewing data, reviewing radiology, direct patient bedside evaluation and interpretation of vital signs, any necessary ventilator management, discussion with multidisciplinary team, discussing goals of care with patient/family, all non inclusive of procedures

## 2020-09-25 NOTE — PROVIDER CONTACT NOTE (CRITICAL VALUE NOTIFICATION) - ASSESSMENT
hypotensive, tachycardic and diaphoretic. Has been evaluated by Dr. Ramón Sandy, Dr. Powell and the ICU [facility].
Pt stated that she was feeling worse with an increase in pain. Rapid response called at 2213.

## 2020-09-25 NOTE — BRIEF OPERATIVE NOTE - NSICDXBRIEFPOSTOP_GEN_ALL_CORE_FT
POST-OP DIAGNOSIS:  Ventral hernia 25-Sep-2020 13:54:26  Salma Guajardo  Symptomatic abdominal panniculus 25-Sep-2020 13:53:03  Salma Guajardo

## 2020-09-25 NOTE — BRIEF OPERATIVE NOTE - SPECIMENS
1. Pannus from abdomen in 2 section, skin, subcutaneous tissue, and adipose tissue 2. Liposuction contents, 3. umbilical skin.

## 2020-09-25 NOTE — H&P ADULT - HISTORY OF PRESENT ILLNESS
Ms Armas is a 61 yo F who presented to [facility] for elective abdominal panniculectomy. PMH JESUS on CPAP, HTN, Obesity, Anemia, AAA, Arthritis, Depression, Anxiety.   Pt seen and examined post-operative. She states she is doing well. She has minimal abdominal pain 2/10 in severity. Its in the area of her panniluculus, no radiation elsewhere, pain is dull. She has no other complaints. Denies headaches, nausea, vomiting, chest pain, SOB, palpitations, constipation, diarrhea, melena, hematochezia, dysuria.

## 2020-09-25 NOTE — H&P ADULT - NSHPPHYSICALEXAM_GEN_ALL_CORE
T(C): 36.8 (09-25-20 @ 17:00), Max: 37 (09-25-20 @ 12:54)  HR: 72 (09-25-20 @ 17:00) (64 - 88)  BP: 117/76 (09-25-20 @ 17:00) (101/57 - 150/97)  RR: 17 (09-25-20 @ 17:00) (14 - 17)  SpO2: 98% (09-25-20 @ 17:00) (96% - 100%)  Wt(kg): --    Physical Exam:   GENERAL: well-groomed, well-developed, NAD  HEENT: head NC/AT; EOM intact, conjunctiva & sclera clear; hearing grossly intact, moist mucous membranes  NECK: supple, no JVD  RESPIRATORY: CTA B/L, no wheezing, rales, rhonchi or rubs  CARDIOVASCULAR: S1&S2, RRR, no murmurs or gallops  ABDOMEN: soft, non-tender, non-distended, + Bowel sounds x4 quadrants, no guarding, rebound or rigidity  MUSCULOSKELETAL:  no clubbing, cyanosis or edema of all 4 extremities  LYMPH: no cervical lymphadenopathy  VASCULAR: Radial pulses 2+ bilaterally, no varicose veins   SKIN: warm and dry, color normal  NEUROLOGIC: AA&O X3, CN2-12 intact w/ no focal deficits, no sensory loss, motor Strength 5/5 in UE & LE B/L  Psych: Normal mood and affect, normal behavior

## 2020-09-25 NOTE — H&P ADULT - ASSESSMENT
Ms Armas is a 61 yo F who presented to [facility] for elective abdominal panniculectomy. PMH JESUS on CPAP, HTN, Obesity, Anemia, AAA, Arthritis, Depression, Anxiety.     Abdominal panniculectomy: POD #0.   -mgmt per surgical team.   -pain control as ordered.     JESUS on CPAP: Dr Hanson consulted.   -continuous pulse ox.   -CPAP ordered.     HTN: BP stable and at goal.   -continue propanolol and verapamil.     Depression: continue Cymbalta and lamictal.     DVT ppx: lovenox    Pre-op labs reviewed. Repeat labs ordered for today

## 2020-09-26 ENCOUNTER — TRANSCRIPTION ENCOUNTER (OUTPATIENT)
Age: 60
End: 2020-09-26

## 2020-09-26 LAB
A1C WITH ESTIMATED AVERAGE GLUCOSE RESULT: 5.6 % — SIGNIFICANT CHANGE UP (ref 4–5.6)
ALBUMIN SERPL ELPH-MCNC: 2.7 G/DL — LOW (ref 3.3–5)
ALP SERPL-CCNC: 99 U/L — SIGNIFICANT CHANGE UP (ref 40–120)
ALT FLD-CCNC: 66 U/L — SIGNIFICANT CHANGE UP (ref 12–78)
ANION GAP SERPL CALC-SCNC: 11 MMOL/L — SIGNIFICANT CHANGE UP (ref 5–17)
AST SERPL-CCNC: 64 U/L — HIGH (ref 15–37)
BASOPHILS # BLD AUTO: 0.04 K/UL — SIGNIFICANT CHANGE UP (ref 0–0.2)
BASOPHILS NFR BLD AUTO: 0.2 % — SIGNIFICANT CHANGE UP (ref 0–2)
BILIRUB SERPL-MCNC: 0.9 MG/DL — SIGNIFICANT CHANGE UP (ref 0.2–1.2)
BUN SERPL-MCNC: 27 MG/DL — HIGH (ref 7–23)
CALCIUM SERPL-MCNC: 8.3 MG/DL — LOW (ref 8.5–10.1)
CHLORIDE SERPL-SCNC: 105 MMOL/L — SIGNIFICANT CHANGE UP (ref 96–108)
CO2 SERPL-SCNC: 23 MMOL/L — SIGNIFICANT CHANGE UP (ref 22–31)
CREAT SERPL-MCNC: 1.3 MG/DL — SIGNIFICANT CHANGE UP (ref 0.5–1.3)
EOSINOPHIL # BLD AUTO: 0 K/UL — SIGNIFICANT CHANGE UP (ref 0–0.5)
EOSINOPHIL NFR BLD AUTO: 0 % — SIGNIFICANT CHANGE UP (ref 0–6)
ESTIMATED AVERAGE GLUCOSE: 114 MG/DL — SIGNIFICANT CHANGE UP (ref 68–114)
GLUCOSE SERPL-MCNC: 202 MG/DL — HIGH (ref 70–99)
HCT VFR BLD CALC: 26.3 % — LOW (ref 34.5–45)
HCT VFR BLD CALC: 27.9 % — LOW (ref 34.5–45)
HCT VFR BLD CALC: 32.4 % — LOW (ref 34.5–45)
HCV AB S/CO SERPL IA: 0.08 S/CO — SIGNIFICANT CHANGE UP (ref 0–0.99)
HCV AB SERPL-IMP: SIGNIFICANT CHANGE UP
HGB BLD-MCNC: 11.1 G/DL — LOW (ref 11.5–15.5)
HGB BLD-MCNC: 9.1 G/DL — LOW (ref 11.5–15.5)
HGB BLD-MCNC: 9.5 G/DL — LOW (ref 11.5–15.5)
IMM GRANULOCYTES NFR BLD AUTO: 0.6 % — SIGNIFICANT CHANGE UP (ref 0–1.5)
LACTATE SERPL-SCNC: 2.9 MMOL/L — HIGH (ref 0.7–2)
LACTATE SERPL-SCNC: 4.4 MMOL/L — CRITICAL HIGH (ref 0.7–2)
LYMPHOCYTES # BLD AUTO: 1.62 K/UL — SIGNIFICANT CHANGE UP (ref 1–3.3)
LYMPHOCYTES # BLD AUTO: 7.5 % — LOW (ref 13–44)
MAGNESIUM SERPL-MCNC: 1.8 MG/DL — SIGNIFICANT CHANGE UP (ref 1.6–2.6)
MCHC RBC-ENTMCNC: 30.1 PG — SIGNIFICANT CHANGE UP (ref 27–34)
MCHC RBC-ENTMCNC: 30.4 PG — SIGNIFICANT CHANGE UP (ref 27–34)
MCHC RBC-ENTMCNC: 30.5 PG — SIGNIFICANT CHANGE UP (ref 27–34)
MCHC RBC-ENTMCNC: 34.1 GM/DL — SIGNIFICANT CHANGE UP (ref 32–36)
MCHC RBC-ENTMCNC: 34.3 GM/DL — SIGNIFICANT CHANGE UP (ref 32–36)
MCHC RBC-ENTMCNC: 34.6 GM/DL — SIGNIFICANT CHANGE UP (ref 32–36)
MCV RBC AUTO: 87.1 FL — SIGNIFICANT CHANGE UP (ref 80–100)
MCV RBC AUTO: 89 FL — SIGNIFICANT CHANGE UP (ref 80–100)
MCV RBC AUTO: 89.4 FL — SIGNIFICANT CHANGE UP (ref 80–100)
MONOCYTES # BLD AUTO: 1.39 K/UL — HIGH (ref 0–0.9)
MONOCYTES NFR BLD AUTO: 6.4 % — SIGNIFICANT CHANGE UP (ref 2–14)
NEUTROPHILS # BLD AUTO: 18.4 K/UL — HIGH (ref 1.8–7.4)
NEUTROPHILS NFR BLD AUTO: 85.3 % — HIGH (ref 43–77)
NRBC # BLD: 0 /100 WBCS — SIGNIFICANT CHANGE UP (ref 0–0)
PHOSPHATE SERPL-MCNC: 3.9 MG/DL — SIGNIFICANT CHANGE UP (ref 2.5–4.5)
PLATELET # BLD AUTO: 203 K/UL — SIGNIFICANT CHANGE UP (ref 150–400)
PLATELET # BLD AUTO: 260 K/UL — SIGNIFICANT CHANGE UP (ref 150–400)
PLATELET # BLD AUTO: 295 K/UL — SIGNIFICANT CHANGE UP (ref 150–400)
POTASSIUM SERPL-MCNC: 3.9 MMOL/L — SIGNIFICANT CHANGE UP (ref 3.5–5.3)
POTASSIUM SERPL-SCNC: 3.9 MMOL/L — SIGNIFICANT CHANGE UP (ref 3.5–5.3)
PROT SERPL-MCNC: 5.6 G/DL — LOW (ref 6–8.3)
RBC # BLD: 3.02 M/UL — LOW (ref 3.8–5.2)
RBC # BLD: 3.12 M/UL — LOW (ref 3.8–5.2)
RBC # BLD: 3.64 M/UL — LOW (ref 3.8–5.2)
RBC # FLD: 14.2 % — SIGNIFICANT CHANGE UP (ref 10.3–14.5)
RBC # FLD: 14.6 % — HIGH (ref 10.3–14.5)
RBC # FLD: 14.6 % — HIGH (ref 10.3–14.5)
SARS-COV-2 IGG SERPL QL IA: NEGATIVE — SIGNIFICANT CHANGE UP
SARS-COV-2 IGM SERPL IA-ACNC: <0.1 INDEX — SIGNIFICANT CHANGE UP
SODIUM SERPL-SCNC: 139 MMOL/L — SIGNIFICANT CHANGE UP (ref 135–145)
WBC # BLD: 17.05 K/UL — HIGH (ref 3.8–10.5)
WBC # BLD: 17.45 K/UL — HIGH (ref 3.8–10.5)
WBC # BLD: 21.58 K/UL — HIGH (ref 3.8–10.5)
WBC # FLD AUTO: 17.05 K/UL — HIGH (ref 3.8–10.5)
WBC # FLD AUTO: 17.45 K/UL — HIGH (ref 3.8–10.5)
WBC # FLD AUTO: 21.58 K/UL — HIGH (ref 3.8–10.5)

## 2020-09-26 PROCEDURE — 99291 CRITICAL CARE FIRST HOUR: CPT

## 2020-09-26 PROCEDURE — 99233 SBSQ HOSP IP/OBS HIGH 50: CPT

## 2020-09-26 RX ORDER — SODIUM CHLORIDE 9 MG/ML
1000 INJECTION, SOLUTION INTRAVENOUS ONCE
Refills: 0 | Status: COMPLETED | OUTPATIENT
Start: 2020-09-26 | End: 2020-09-26

## 2020-09-26 RX ORDER — NOREPINEPHRINE BITARTRATE/D5W 8 MG/250ML
0.05 PLASTIC BAG, INJECTION (ML) INTRAVENOUS
Qty: 8 | Refills: 0 | Status: DISCONTINUED | OUTPATIENT
Start: 2020-09-26 | End: 2020-09-27

## 2020-09-26 RX ORDER — LAMOTRIGINE 25 MG/1
200 TABLET, ORALLY DISINTEGRATING ORAL ONCE
Refills: 0 | Status: COMPLETED | OUTPATIENT
Start: 2020-09-26 | End: 2020-09-26

## 2020-09-26 RX ORDER — NOREPINEPHRINE BITARTRATE/D5W 8 MG/250ML
0.05 PLASTIC BAG, INJECTION (ML) INTRAVENOUS
Qty: 8 | Refills: 0 | Status: DISCONTINUED | OUTPATIENT
Start: 2020-09-26 | End: 2020-09-26

## 2020-09-26 RX ORDER — INSULIN HUMAN 100 [IU]/ML
10 INJECTION, SOLUTION SUBCUTANEOUS ONCE
Refills: 0 | Status: COMPLETED | OUTPATIENT
Start: 2020-09-26 | End: 2020-09-26

## 2020-09-26 RX ADMIN — OXYCODONE AND ACETAMINOPHEN 1 TABLET(S): 5; 325 TABLET ORAL at 09:40

## 2020-09-26 RX ADMIN — OXYCODONE AND ACETAMINOPHEN 1 TABLET(S): 5; 325 TABLET ORAL at 05:30

## 2020-09-26 RX ADMIN — Medication 2: at 12:16

## 2020-09-26 RX ADMIN — SODIUM CHLORIDE 1000 MILLILITER(S): 9 INJECTION, SOLUTION INTRAVENOUS at 13:39

## 2020-09-26 RX ADMIN — Medication 2: at 17:47

## 2020-09-26 RX ADMIN — CHLORHEXIDINE GLUCONATE 1 APPLICATION(S): 213 SOLUTION TOPICAL at 05:27

## 2020-09-26 RX ADMIN — DULOXETINE HYDROCHLORIDE 30 MILLIGRAM(S): 30 CAPSULE, DELAYED RELEASE ORAL at 05:27

## 2020-09-26 RX ADMIN — LAMOTRIGINE 200 MILLIGRAM(S): 25 TABLET, ORALLY DISINTEGRATING ORAL at 01:13

## 2020-09-26 RX ADMIN — HYDROMORPHONE HYDROCHLORIDE 0.5 MILLIGRAM(S): 2 INJECTION INTRAMUSCULAR; INTRAVENOUS; SUBCUTANEOUS at 00:59

## 2020-09-26 RX ADMIN — HYDROMORPHONE HYDROCHLORIDE 0.5 MILLIGRAM(S): 2 INJECTION INTRAMUSCULAR; INTRAVENOUS; SUBCUTANEOUS at 02:20

## 2020-09-26 RX ADMIN — Medication 12.6 MICROGRAM(S)/KG/MIN: at 00:21

## 2020-09-26 RX ADMIN — LAMOTRIGINE 200 MILLIGRAM(S): 25 TABLET, ORALLY DISINTEGRATING ORAL at 12:16

## 2020-09-26 RX ADMIN — Medication 240 MILLIGRAM(S): at 05:27

## 2020-09-26 RX ADMIN — Medication 300 MILLIGRAM(S): at 12:16

## 2020-09-26 RX ADMIN — DULOXETINE HYDROCHLORIDE 30 MILLIGRAM(S): 30 CAPSULE, DELAYED RELEASE ORAL at 14:36

## 2020-09-26 RX ADMIN — Medication 100 MILLIGRAM(S): at 05:27

## 2020-09-26 RX ADMIN — Medication 12 UNIT(S): at 00:22

## 2020-09-26 RX ADMIN — OXYCODONE AND ACETAMINOPHEN 1 TABLET(S): 5; 325 TABLET ORAL at 04:26

## 2020-09-26 RX ADMIN — Medication 200 MILLIGRAM(S): at 21:06

## 2020-09-26 RX ADMIN — DULOXETINE HYDROCHLORIDE 30 MILLIGRAM(S): 30 CAPSULE, DELAYED RELEASE ORAL at 21:06

## 2020-09-26 RX ADMIN — Medication 12.6 MICROGRAM(S)/KG/MIN: at 13:39

## 2020-09-26 RX ADMIN — Medication 150 MILLIGRAM(S): at 00:28

## 2020-09-26 RX ADMIN — Medication 4: at 08:36

## 2020-09-26 RX ADMIN — OXYCODONE AND ACETAMINOPHEN 1 TABLET(S): 5; 325 TABLET ORAL at 08:42

## 2020-09-26 RX ADMIN — DULOXETINE HYDROCHLORIDE 30 MILLIGRAM(S): 30 CAPSULE, DELAYED RELEASE ORAL at 00:25

## 2020-09-26 RX ADMIN — INSULIN HUMAN 10 UNIT(S): 100 INJECTION, SOLUTION SUBCUTANEOUS at 01:13

## 2020-09-26 NOTE — CONSULT NOTE ADULT - ASSESSMENT
Ewelina is a 60 year old female with JESUS, obesity and HTN, with hypotension after an abdominal panniculectomy, in the setting of presumed blood loss anemia. She remains hypotensive with pressor requirement.    - She had brief atrial fibrillation in the setting of significant hypotension, though is now back in SR.  - no history of AF though has reported palpitations  - cont to watch on telemetry  - low normal LV function with mild diastolic dysfunction on recent echocardiogram  - hold verapamil and propranolol in setting of hypotension    - no sign of acute ischemia  - recent cath with non-obs cad    - no sign of volume overload  - hold diuretics in setting of hypotension  - transfuse to keep hb >8  - titrate down levophed, to maintain map>65  - cpap ovenight    - watch creatinine and electrolytes. Keep K>4, Mg>2  - high risk of decompensation on levophed.   - will follow with you

## 2020-09-26 NOTE — DISCHARGE NOTE PROVIDER - PROVIDER TOKENS
PROVIDER:[TOKEN:[[id number]]] PROVIDER:[TOKEN:[[id number]]],PROVIDER:[TOKEN:[[id number]]] PROVIDER:[TOKEN:[[id number]]],PROVIDER:[TOKEN:[[id number]]],PROVIDER:[TOKEN:[[id number]]]

## 2020-09-26 NOTE — DISCHARGE NOTE PROVIDER - CARE PROVIDER_API CALL
Stephon Giordano)  Cardiovascular Disease; Internal Medicine  [address] [place], [place] [address]  Phone: [phone]  Fax: [phone]  Follow Up Time:    Stephon Giordano)  Cardiovascular Disease; Internal Medicine  [address] [place], [place] [address]  Phone: [phone]  Fax: [phone]  Follow Up Time:     Vashti De La Torre  PLASTIC SURGERY  [address] [place] [address], [address]  [place] [address]  Phone: [phone]  Fax: [phone]  Follow Up Time:    Stephon Giordano)  Cardiovascular Disease; Internal Medicine  [address] [place], [place] [id number]  Phone: [phone]  Fax: [phone]  Follow Up Time:     Vashti De La Torre  PLASTIC SURGERY  [address] [place] [address], [address] [address]  Phone: [phone]  Fax: [phone]  Follow Up Time:     Cinthia Pelayo  MEDICAL ONCOLOGY  [address] [facility], [address] [address]  Phone: [phone]  Fax: [phone]  Follow Up Time:

## 2020-09-26 NOTE — PHYSICAL THERAPY INITIAL EVALUATION ADULT - PERTINENT HX OF CURRENT PROBLEM, REHAB EVAL
Ms Armas is a 59 yo F who presented to [facility] for elective abdominal panniculectomy. PMH JESUS on CPAP, HTN, Obesity, Anemia, AAA, Arthritis, Depression, Anxiety.

## 2020-09-26 NOTE — PROGRESS NOTE ADULT - SUBJECTIVE AND OBJECTIVE BOX
DEPARTMENT OF ANESTHESIA  POST ANESTHETIC EVALUATION    The Patient was interviewed and evaluated    Vital Signs Last 24 Hrs  T(C): 36.9 (26 Sep 2020 15:39), Max: 36.9 (25 Sep 2020 20:31)  T(F): 98.5 (26 Sep 2020 15:39), Max: 98.5 (26 Sep 2020 15:39)  HR: 83 (26 Sep 2020 19:00) (75 - 144)  BP: 103/55 (26 Sep 2020 19:00) (68/- - 180/101)  BP(mean): 73 (26 Sep 2020 19:00) (52 - 129)  RR: 24 (26 Sep 2020 19:00) (15 - 44)  SpO2: 97% (26 Sep 2020 19:00) (85% - 100%)    Evaluation:  Events of the past 24 hours noted.    Hypotensive shock - on levophed drip.   Required 3 units PRBC's.  Pt alert, comfortable.    (x ) No apparent complications or complaints regarding anesthesia care at this time  (x ) All questions were answered    Condition:  ( ) Stable      (x ) Guarded      ( ) Critical    Recommendations:  (x ) None     ( ) Other:

## 2020-09-26 NOTE — CONSULT NOTE ADULT - SUBJECTIVE AND OBJECTIVE BOX
[facility] - Almas Faustin, Cara Nunez, Eleno, Pasquale Wiley  Office Number: [phone]    Initial Consult Note    CHIEF COMPLAINT: Patient is a 60y old  Female who presents with a chief complaint of Panniculectomy (26 Sep 2020 07:09)      HPI:  Ms Armas is a 61 yo F who presented to [facility] for elective abdominal panniculectomy. PMH JESUS on CPAP, HTN, Obesity, Anemia, AAA, Arthritis, Depression, Anxiety.   Pt seen and examined post-operative. She states she is doing well. She has minimal abdominal pain 2/10 in severity. Its in the area of her panniluculus, no radiation elsewhere, pain is dull. She has no other complaints. Denies headaches, nausea, vomiting, chest pain, SOB, palpitations, constipation, diarrhea, melena, hematochezia, dysuria.     S/p RRT last night hypotension. She was diaphoretic, short of breath and hypotensive. She is now s/p 2 units prbcs, and feeling a little better  She had brief af with rvr during her hypotensive episodes, though now back in SR      PAST MEDICAL & SURGICAL HISTORY:  History of aortic aneurysm  descending aorta see CT    Arthritis    Degenerative disc disease, lumbar    Neuropathy    Spinal stenosis    Bipolar 1 disorder    Kidney stone    Sleep Apnea  CPAP with oxygen since June 2020    Asthma    Hypertension    Morbid Obesity    ETOH Abuse  H/O    Benign Essential Tremor    Anxiety    Depression    Renal Calculi  chronic      Colitis  H/O    Anemia    S/P cardiac catheterization    S/P dilation and curettage    History of tonsillectomy    History of laparoscopic adjustable gastric banding  band removed few yrs ago    S/P D&amp;C    Biliary stent placement &amp; ercp    ureteroscopy with stone removal  1-        SOCIAL HISTORY:  No tobacco, ethanol, or drug abuse.    FAMILY HISTORY:  Hypertension (Sibling)    Hyperlipidemia (Sibling)    Family history of renal failure  both parents were on dialysis    Family history of bacterial pneumonia    Family history of arthritis    MEDICATIONS  (STANDING):  allopurinol 300 milliGRAM(s) Oral daily  chlorhexidine 2% Cloths 1 Application(s) Topical <User Schedule>  dextrose 5%. 1000 milliLiter(s) (50 mL/Hr) IV Continuous <Continuous>  dextrose 50% Injectable 12.5 Gram(s) IV Push once  dextrose 50% Injectable 25 Gram(s) IV Push once  dextrose 50% Injectable 25 Gram(s) IV Push once  docusate sodium 100 milliGRAM(s) Oral three times a day  DULoxetine 30 milliGRAM(s) Oral <User Schedule>  insulin lispro (HumaLOG) corrective regimen sliding scale   SubCutaneous three times a day before meals  lamoTRIgine 200 milliGRAM(s) Oral daily  norepinephrine Infusion 0.05 MICROgram(s)/kG/Min (12.6 mL/Hr) IV Continuous <Continuous>  propranolol 10 milliGRAM(s) Oral daily  traZODone 200 milliGRAM(s) Oral at bedtime  verapamil  milliGRAM(s) Oral daily    MEDICATIONS  (PRN):  acetaminophen   Tablet .. 650 milliGRAM(s) Oral every 6 hours PRN Mild Pain (1 - 3)  dextrose 40% Gel 15 Gram(s) Oral once PRN Blood Glucose LESS THAN 70 milliGRAM(s)/deciliter  glucagon  Injectable 1 milliGRAM(s) IntraMuscular once PRN Glucose LESS THAN 70 milligrams/deciliter  HYDROmorphone  Injectable 0.5 milliGRAM(s) IV Push every 4 hours PRN Breakthrough pain not relieved by severe pain meds  oxycodone    5 mG/acetaminophen 325 mG 1 Tablet(s) Oral every 4 hours PRN Moderate Pain (4 - 6)  oxycodone    5 mG/acetaminophen 325 mG 2 Tablet(s) Oral every 6 hours PRN Severe Pain (7 - 10)      Allergies    penicillins (Other)  trees dogs cats cockaroaches (Rhinitis; Rhinorrhea)    Intolerances        REVIEW OF SYSTEMS:    CONSTITUTIONAL: reports weakness, no fevers or chills  EYES/ENT: No visual changes;  No vertigo or throat pain   NECK: No pain or stiffness  RESPIRATORY: No cough, wheezing, hemoptysis; No shortness of breath  CARDIOVASCULAR: No chest pain or palpitations  GASTROINTESTINAL: No abdominal pain. No nausea, vomiting, or hematemesis; No diarrhea or constipation. No melena or hematochezia.  GENITOURINARY: No dysuria, frequency or hematuria  NEUROLOGICAL: No numbness or weakness  SKIN: No itching or rash  All other review of systems is negative unless indicated above    VITAL SIGNS:   Vital Signs Last 24 Hrs  T(C): 36.7 (25 Sep 2020 22:54), Max: 37 (25 Sep 2020 12:54)  T(F): 98.1 (25 Sep 2020 22:54), Max: 98.6 (25 Sep 2020 12:54)  HR: 96 (26 Sep 2020 06:00) (64 - 144)  BP: 116/68 (26 Sep 2020 06:00) (68/- - 130/73)  BP(mean): 87 (26 Sep 2020 06:00) (52 - 96)  RR: 18 (26 Sep 2020 06:00) (14 - 44)  SpO2: 99% (26 Sep 2020 06:00) (96% - 100%)    I&O's Summary    25 Sep 2020 07:01  -  26 Sep 2020 07:00  --------------------------------------------------------  IN: 2142.5 mL / OUT: 1270 mL / NET: 872.5 mL        On Exam:    Constitutional: NAD, alert and oriented x 3, obese, on nasal cpap  Lungs:  Non-labored, breath sounds are clear bilaterally, No wheezing, rales or rhonchi  Cardiovascular: RRR.  S1 and S2 positive.  No murmurs, rubs, gallops or clicks  Gastrointestinal: Bowel Sounds present, soft, nontender.   Lymph: No peripheral edema. No cervical lymphadenopathy.  Neurological: Alert, no focal deficits  Skin: No rashes or ulcers   Psych:  Mood & affect appropriate.    LABS: All Labs Reviewed:                        8.4    15.53 )-----------( 347      ( 25 Sep 2020 21:25 )             24.8                         11.9   13.30 )-----------( 241      ( 25 Sep 2020 17:38 )             34.8     25 Sep 2020 21:25    138    |  106    |  20     ----------------------------<  481    5.1     |  10     |  1.60   25 Sep 2020 17:38    143    |  108    |  17     ----------------------------<  180    4.5     |  28     |  1.00     Ca    7.8        25 Sep 2020 21:25  Ca    9.3        25 Sep 2020 17:38    TPro  6.7    /  Alb  3.1    /  TBili  0.5    /  DBili  x      /  AST  29     /  ALT  42     /  AlkPhos  122    25 Sep 2020 17:38      CARDIAC MARKERS ( 25 Sep 2020 21:25 )  .033 ng/mL / x     / 65 U/L / x     / <1.0 ng/mL      Blood Culture:         RADIOLOGY:    EKG: st

## 2020-09-26 NOTE — PROGRESS NOTE ADULT - SUBJECTIVE AND OBJECTIVE BOX
Patient is a 60y old  Female who presents with a chief complaint of Panniculectomy (25 Sep 2020 23:05)    24 hour events: ***    REVIEW OF SYSTEMS  Constitutional: No fever, chills, fatigue  Neuro: No headache, numbness, weakness  Resp: No cough, wheezing, shortness of breath  CVS: No chest pain, palpitations, leg swelling  GI: No abdominal pain, nausea, vomiting, diarrhea   : No dysuria, frequency, incontinence  Skin: No itching, burning, rashes, or lesions   Msk: No joint pain or swelling  Psych: No depression, anxiety, mood swings  Heme: No bleeding    T(F): 98.1 (09-25-20 @ 22:54), Max: 98.6 (09-25-20 @ 12:54)  HR: 96 (09-26-20 @ 06:00) (64 - 144)  BP: 116/68 (09-26-20 @ 06:00) (68/- - 130/73)  RR: 18 (09-26-20 @ 06:00) (14 - 44)  SpO2: 99% (09-26-20 @ 06:00) (96% - 100%)  Wt(kg): --            I&O's Summary    09-25 @ 07:01  -  09-26 @ 07:00  --------------------------------------------------------  IN: 2142.5 mL / OUT: 1270 mL / NET: 872.5 mL      PHYSICAL EXAM  General:   CNS:   HEENT:   Resp:   CVS:   Abd:   Ext:   Skin:     MEDICATIONS    norepinephrine Infusion IV Continuous  propranolol Oral  verapamil SR Oral    allopurinol Oral  dextrose 40% Gel Oral PRN  dextrose 50% Injectable IV Push  dextrose 50% Injectable IV Push  dextrose 50% Injectable IV Push  glucagon  Injectable IntraMuscular PRN  insulin lispro (HumaLOG) corrective regimen sliding scale SubCutaneous      acetaminophen   Tablet .. Oral PRN  DULoxetine Oral  HYDROmorphone  Injectable IV Push PRN  lamoTRIgine Oral  oxycodone    5 mG/acetaminophen 325 mG Oral PRN  oxycodone    5 mG/acetaminophen 325 mG Oral PRN  traZODone Oral        docusate sodium Oral      dextrose 5%. IV Continuous      chlorhexidine 2% Cloths Topical                            8.4    15.53 )-----------( 347      ( 25 Sep 2020 21:25 )             24.8       09-25    138  |  106  |  20  ----------------------------<  481<HH>  5.1   |  10<LL>  |  1.60<H>    Ca    7.8<L>      25 Sep 2020 21:25    TPro  6.7  /  Alb  3.1<L>  /  TBili  0.5  /  DBili  x   /  AST  29  /  ALT  42  /  AlkPhos  122<H>  09-25    Lactate 14.3           09-25 @ 21:25      CARDIAC MARKERS ( 25 Sep 2020 21:25 )  .033 ng/mL / x     / 65 U/L / x     / <1.0 ng/mL                Radiology: ***  Bedside lung ultrasound: ***  Bedside ECHO: ***    CENTRAL LINE: Y/N          DATE INSERTED:              REMOVE: Y/N  SHAVER: Y/N                        DATE INSERTED:              REMOVE: Y/N  A-LINE: Y/N                       DATE INSERTED:              REMOVE: Y/N    GLOBAL ISSUE/BEST PRACTICE  Analgesia:   Sedation:   CAM-ICU:   HOB elevation: yes  Stress ulcer prophylaxis:   VTE prophylaxis:   Glycemic control:   Nutrition:     CODE STATUS: ***  [facility] discussion: Y       60F h/o morbid obesity s/p lap band, since removed, HTN, BPD, anxiety disorder, thoracic aortic aneurysm, JESUS on CPAP, essential tremor POD1 paniculectomy and liposuction c/b hemorrhagic shock, lactic acidosis, ELAINE and hyperglycemia, now improved s/p 2unit pRBC resuscitation with improvement of shock state, normalization of renal function, improving lactate, better controlled blood sugar.    Procedure: S/P panniculectomy with liposuction, ventral hernia repair.     POST OP DAY # 1.     24 hour events: Pt seen and examined at bedside. Over night pt was transferred to ICU 2/2 to haemorrhagica shock. Rapid was called. Pt is S/P panniculectomy with liposuction, ventral hernia repair who developed SOB, tachycardia and diaphoresis with drop in her BP to 76/53 also drop in her Hb from 12--->8.4. Pt was transfused 2 units of PRBC's.     REVIEW OF SYSTEMS  CONSTITUTIONAL: No fever, chills, or fatigue  EYES: No eye pain, visual disturbances, or discharge  ENMT: No difficulty hearing, tinnitus, vertigo; No sinus or throat pain  NECK: No pain or stiffness  RESPIRATORY: No cough, wheezing, chills or hemoptysis; mild SOB  CARDIOVASCULAR: No chest pain, palpitations, dizziness, or leg swelling  GASTROINTESTINAL: 4/10 abd pain. No nausea, vomiting, or hematemesis; No diarrhea or constipation. No melena or hematochezia.  NEUROLOGICAL: No headaches, memory loss, loss of strength, numbness, or tremors  SKIN: No itching, burning, rashes, or lesions   MUSCULOSKELETAL: No joint pain or swelling; No muscle, back, or extremity pain      T(F): 98.1 (09-25-20 @ 22:54), Max: 98.6 (09-25-20 @ 12:54)  HR: 96 (09-26-20 @ 06:00) (64 - 144)  BP: 116/68 (09-26-20 @ 06:00) (68/- - 130/73)  RR: 18 (09-26-20 @ 06:00) (14 - 44)  SpO2: 99% (09-26-20 @ 06:00) (96% - 100%)  Wt(kg): --            I&O's Summary    09-25 @ 07:01  -  09-26 @ 07:00  --------------------------------------------------------  IN: 2142.5 mL / OUT: 1270 mL / NET: 872.5 mL      PHYSICAL EXAM:  General: Obese female in NAD    HEENT: Pupils equal, reactive to light.  Symmetric.   PULM: Clear to auscultation bilaterally, no wheezes, rales or rhonchi,   NECK: obese, trachea midline  CVS: NSR  no murmurs, +s1/s2  ABD: abd compression wrap in place, 3x EDWIN drains   EXT: No edema, nontender  SKIN: Warm, diaphoretic, pale   NEURO: AAO X3, interactive, nonfocal    MEDICATIONS:  norepinephrine Infusion IV Continuous  propranolol Oral  verapamil SR Oral  allopurinol Oral  dextrose 40% Gel Oral PRN  dextrose 50% Injectable IV Push  dextrose 50% Injectable IV Push  dextrose 50% Injectable IV Push  glucagon  Injectable IntraMuscular PRN  insulin lispro (HumaLOG) corrective regimen sliding scale SubCutaneous  acetaminophen   Tablet .. Oral PRN  DULoxetine Oral  HYDROmorphone  Injectable IV Push PRN  lamoTRIgine Oral  oxycodone    5 mG/acetaminophen 325 mG Oral PRN  oxycodone    5 mG/acetaminophen 325 mG Oral PRN  traZODone Oral  docusate sodium Oral  dextrose 5%. IV Continuous  chlorhexidine 2% Cloths Topical                              8.4    15.53 )-----------( 347      ( 25 Sep 2020 21:25 )             24.8       09-25    138  |  106  |  20  ----------------------------<  481<HH>  5.1   |  10<LL>  |  1.60<H>    Ca    7.8<L>      25 Sep 2020 21:25    TPro  6.7  /  Alb  3.1<L>  /  TBili  0.5  /  DBili  x   /  AST  29  /  ALT  42  /  AlkPhos  122<H>  09-25    Lactate 14.3           09-25 @ 21:25      CARDIAC MARKERS ( 25 Sep 2020 21:25 )  .033 ng/mL / x     / 65 U/L / x     / <1.0 ng/mL                Radiology:   Bedside lung ultrasound: ***  Bedside ECHO: ***    CENTRAL LINE: N                     SHAVER: Y                           DATE INSERTED: 09/25/20                  GLOBAL ISSUE/BEST PRACTICE  Analgesia:   Sedation:   CAM-ICU:   HOB elevation: yes  Stress ulcer prophylaxis:   VTE prophylaxis:   Glycemic control:   Nutrition:     CODE STATUS: Full  GOC discussion: Y

## 2020-09-26 NOTE — PROGRESS NOTE ADULT - SUBJECTIVE AND OBJECTIVE BOX
patient seen this AM, in bed in [facility], patient decompensated after the panniculectomy yesterday requiring Blood transfusion and involvement of ICU team overnight, feels better this AM , no complaints at this time       T(F): 98.1 (09-25-20 @ 22:54), Max: 98.6 (09-25-20 @ 12:54)  HR: 122 (09-26-20 @ 07:41) (64 - 144)  BP: 116/68 (09-26-20 @ 06:00) (68/- - 130/73)  RR: 18 (09-26-20 @ 06:00) (14 - 44)  SpO2: 97% (09-26-20 @ 07:41) (96% - 100%)  Wt(kg): --  CAPILLARY BLOOD GLUCOSE      POCT Blood Glucose.: 389 mg/dL (26 Sep 2020 00:48)  POCT Blood Glucose.: 452 mg/dL (25 Sep 2020 22:15)  POCT Blood Glucose.: 130 mg/dL (25 Sep 2020 13:03)      PHYSICAL EXAM:  General: NAD, WDWN.   Neuro:  Alert & oriented x 3  CV: +S1+S2 regular rate and rhythm  Lung: clear to ausculation bilaterally, respirations nonlabored, good inspiratory effort  Abdomen: soft, dressing with mild sanguineus staining mostly laterally , drains intact with sanguinous drainage, no  palpable flap hematoma noted, incision intact  ,   Extremities: no pedal edema or calf tenderness noted       LABS:                        11.1   21.58 )-----------( 295      ( 26 Sep 2020 07:36 )             32.4     09-26    139  |  105  |  27<H>  ----------------------------<  202<H>  3.9   |  23  |  1.30    Ca    8.3<L>      26 Sep 2020 07:36    TPro  5.6<L>  /  Alb  2.7<L>  /  TBili  0.9  /  DBili  x   /  AST  64<H>  /  ALT  66  /  AlkPhos  99  09-26      I&O's Detail    25 Sep 2020 07:01  -  26 Sep 2020 07:00  --------------------------------------------------------  IN:    Lactated Ringers: 600 mL    Norepinephrine: 104.5 mL    Oral Fluid: 810 mL    PRBCs (Packed Red Blood Cells): 628 mL  Total IN: 2142.5 mL    OUT:    Bulb (mL): 200 mL    Bulb (mL): 270 mL    Bulb (mL): 310 mL    Indwelling Catheter - Urethral (mL): 490 mL  Total OUT: 1270 mL    Total NET: 872.5 mL          Impression: 60y Female admitted with Other disorders of skin and subcutaneous tissue      PMH History of aortic aneurysm    H/O aortic aneurysm repair    Arthritis    Degenerative disc disease, lumbar    Neuropathy    Spinal stenosis    Bipolar 1 disorder    Kidney stone    Diabetes Mellitus Type II    Sleep Apnea    Asthma    Hypertension    Obesity    Morbid Obesity    Drug Abuse    ETOH Abuse    Arrhythmia    Benign Essential Tremor    Anxiety    Depression    Renal Calculi    Colitis    Anemia    Diabetes    Sleep Apnea    Asthma        Plan:  -as per ICU,   monitor drain output and labs   case to be discussed with attending

## 2020-09-26 NOTE — DIETITIAN INITIAL EVALUATION ADULT. - OTHER INFO
Pt admit for panniculectomy with 22# tissue removed, transferred to ICU with hemorrhagic  shock requiring transfusion(of note Hba1c may not be reliable due to blood loss/transfusion yesterday). BMI was 48 prior to OR. Hx gastric band, removal, hx DM, d/c metformin PTA due to neuropathy, has seen RD/CDCES PTA for wt loss/DM; education not needed, compliance encouraged.Rec add consistent CHO to diet. States last Hba1c in Jan was 6.7. Pt will consider accuchecks at home to help improve glu control.

## 2020-09-26 NOTE — PROGRESS NOTE ADULT - ASSESSMENT
60F h/o morbid obesity s/p lap band, since removed, HTN, BPD, anxiety disorder, thoracic aortic aneurysm, JESUS on CPAP, essential tremor POD1 paniculectomy and liposuction c/b hemorrhagic shock, lactic acidosis, ELAINE and hyperglycemia, now improved s/p 2unit pRBC resuscitation with improvement of shock state, normalization of renal function, improving lactate, better controlled blood sugar.    Assessment/Plan:    Neuro:  -No active issues.  -Hx of B. essential trammers on propranolol   -Hx of anxiety and depression on Lamictal, Trazadone and Cymbalta - will continue same tretment  -On deluded and oxycodone prn for pain.     CVS:  -Hypovolumic shock 2/2 to post op hematophage.   -Off levo. Will hold home antihypertensive meds as blood pressure remains soft.   -Pt had a brief run of A.fib-since resolved pt back in NSR most likely 2/2 to hypotension and tachy. will continue to monitor.     Pulm:  -no acyive issue.  -Pt is on nocturnal home CPAP for JESUS. Will continue as needed.     GI:   -No active issue  -Tolerating oral diet. on DASH/TLC    Renal:  -ELAINE 2/2 to prerenal which is due to hypovolemia 2/2 to acute blood loss.   -Lactate id down trending.  -ELAINE resolving- will monitor I&O and electrolytes.     ID:  -Leukocytosis most likely reactive to stree of shock and surgery.  -No Abx. Will continue to monitor.    Endo:  -Hyperglycemia likely 2/2 to stress and liposuction surgery. No prior Hx of DM  -On ISS with premeal and qhs coverage as needed. A1C at 6.7, will repeat in am.     Heam:  -S/P 2 units of PRBC's transfusion 2/2 to acute blood loss.  -Hb 11. will monitor H&H q6H    Dispo:  -Pt remains critically ill. Will monitor pt in ICU

## 2020-09-26 NOTE — PROVIDER CONTACT NOTE (CRITICAL VALUE NOTIFICATION) - NAME OF MD/NP/PA/DO NOTIFIED:
Darren Osborn, CCPA
Dr. Wisdom and Dr. Toussaint who are both at pt's bedside
Dr. Wisdom and Dr. GAYATRI Sandy

## 2020-09-26 NOTE — DISCHARGE NOTE PROVIDER - NSDCCPCAREPLAN_GEN_ALL_CORE_FT
PRINCIPAL DISCHARGE DIAGNOSIS  Diagnosis: S/P panniculectomy  Assessment and Plan of Treatment: Please f/u with Dr. De La Torre in 3 to 5 days, call for appointment  Continue all meds per medication list  f/u with CArdiology Dr. Giordano for f/u, next week for Afib  F/u with PCP and all your doctors.          PRINCIPAL DISCHARGE DIAGNOSIS  Diagnosis: S/P panniculectomy  Assessment and Plan of Treatment: Please f/u with Dr. De La Torre in 3 to 5 days, call for appointment  Continue all meds per medication list  f/u with Cardiology Dr. Giordano for f/u, next week for Afib  F/u with PCP and all your doctors.   F/u with your PCP to check blood work to monitor your blood count, kidney function and liver enzymes. CBC and CMP is recommended next week. Please call your PCP for appoitment.   Keep yourself hydrated, amulate as tolerated

## 2020-09-26 NOTE — PROGRESS NOTE ADULT - SUBJECTIVE AND OBJECTIVE BOX
Patient is a 60y old  Female who presents with a chief complaint of Panniculectomy (26 Sep 2020 12:53)      INTERVAL HPI/OVERNIGHT EVENTS:    was transferred to CCU earlier today due to hypotension and blood loss. Much improved in CCU    MEDICATIONS  (STANDING):  allopurinol 300 milliGRAM(s) Oral daily  chlorhexidine 2% Cloths 1 Application(s) Topical <User Schedule>  dextrose 5%. 1000 milliLiter(s) (50 mL/Hr) IV Continuous <Continuous>  dextrose 50% Injectable 12.5 Gram(s) IV Push once  dextrose 50% Injectable 25 Gram(s) IV Push once  dextrose 50% Injectable 25 Gram(s) IV Push once  docusate sodium 100 milliGRAM(s) Oral three times a day  DULoxetine 30 milliGRAM(s) Oral <User Schedule>  insulin lispro (HumaLOG) corrective regimen sliding scale   SubCutaneous three times a day before meals  lamoTRIgine 200 milliGRAM(s) Oral daily  norepinephrine Infusion 0.05 MICROgram(s)/kG/Min (12.6 mL/Hr) IV Continuous <Continuous>  propranolol 10 milliGRAM(s) Oral daily  traZODone 200 milliGRAM(s) Oral at bedtime      MEDICATIONS  (PRN):  acetaminophen   Tablet .. 650 milliGRAM(s) Oral every 6 hours PRN Mild Pain (1 - 3)  artificial tears (preservative free) Ophthalmic Solution 1 Drop(s) Both EYES two times a day PRN Dry Eyes  dextrose 40% Gel 15 Gram(s) Oral once PRN Blood Glucose LESS THAN 70 milliGRAM(s)/deciliter  glucagon  Injectable 1 milliGRAM(s) IntraMuscular once PRN Glucose LESS THAN 70 milligrams/deciliter  HYDROmorphone  Injectable 0.5 milliGRAM(s) IV Push every 4 hours PRN Breakthrough pain not relieved by severe pain meds  oxycodone    5 mG/acetaminophen 325 mG 1 Tablet(s) Oral every 4 hours PRN Moderate Pain (4 - 6)  oxycodone    5 mG/acetaminophen 325 mG 2 Tablet(s) Oral every 6 hours PRN Severe Pain (7 - 10)      Allergies    penicillins (Other)  trees dogs cats cockaroaches (Rhinitis; Rhinorrhea)    Intolerances        PAST MEDICAL & SURGICAL HISTORY:  History of aortic aneurysm  descending aorta see CT    Arthritis    Degenerative disc disease, lumbar    Neuropathy    Spinal stenosis    Bipolar 1 disorder    Kidney stone    Sleep Apnea  CPAP with oxygen since June 2020    Asthma    Hypertension    Morbid Obesity    ETOH Abuse  H/O    Benign Essential Tremor    Anxiety    Depression    Renal Calculi  chronic      Colitis  H/O    Anemia    S/P cardiac catheterization    S/P dilation and curettage    History of tonsillectomy    History of laparoscopic adjustable gastric banding  band removed few yrs ago    S/P D&amp;C    Biliary stent placement &amp; ercp    ureteroscopy with stone removal  1-        Vital Signs Last 24 Hrs  T(C): 36.9 (26 Sep 2020 15:39), Max: 36.9 (25 Sep 2020 20:31)  T(F): 98.5 (26 Sep 2020 15:39), Max: 98.5 (26 Sep 2020 15:39)  HR: 96 (26 Sep 2020 16:30) (75 - 144)  BP: 121/74 (26 Sep 2020 16:30) (68/- - 180/101)  BP(mean): 88 (26 Sep 2020 16:30) (52 - 129)  RR: 27 (26 Sep 2020 16:30) (15 - 44)  SpO2: 100% (26 Sep 2020 16:30) (85% - 100%)    PHYSICAL EXAMINATION:    GENERAL: The patient is awake and alert in no apparent distress.     HEENT: Head is normocephalic and atraumatic. Extraocular muscles are intact. Mucous membranes are moist.    NECK: Supple.    LUNGS: diminished breath sounds bilateral    HEART: Regular rate and rhythm without murmur.    ABDOMEN: obese     EXTREMITIES: Without any cyanosis, clubbing, rash, lesions or edema.    NEUROLOGIC: Grossly intact.    SKIN: No ulceration or induration present.      LABS:                        9.5    17.45 )-----------( 260      ( 26 Sep 2020 13:54 )             27.9     09-26    139  |  105  |  27<H>  ----------------------------<  202<H>  3.9   |  23  |  1.30    Ca    8.3<L>      26 Sep 2020 07:36  Phos  3.9     09-26  Mg     1.8     09-26    TPro  5.6<L>  /  Alb  2.7<L>  /  TBili  0.9  /  DBili  x   /  AST  64<H>  /  ALT  66  /  AlkPhos  99  09-26          CARDIAC MARKERS ( 25 Sep 2020 21:25 )  .033 ng/mL / x     / 65 U/L / x     / <1.0 ng/mL      D-Dimer Assay, Quantitative: 321 ng/mL DDU (09-25-20 @ 21:38)      Lactate, Blood: 4.4 mmol/L (09-26-20 @ 07:36)  Lactate, Blood: 14.3 mmol/L (09-25-20 @ 21:25)      Assessment:    Hypotension secondary to blood loss  S/P Abdominal Panniculectomy  Morbid Obesity    Plan:    Cardiac monitoring  Blood transfusions  Incentive Spirometry  Pain Control

## 2020-09-26 NOTE — PROGRESS NOTE ADULT - SUBJECTIVE AND OBJECTIVE BOX
Patient is a 60y old  Female who presents with a chief complaint of Panniculectomy (26 Sep 2020 17:46)      INTERVAL HPI/OVERNIGHT EVENTS: Patient seen and examined at bedside in ICU. No overnight events occurred. Patient has no complaints at this time.     MEDICATIONS  (STANDING):  allopurinol 300 milliGRAM(s) Oral daily  chlorhexidine 2% Cloths 1 Application(s) Topical <User Schedule>  dextrose 5%. 1000 milliLiter(s) (50 mL/Hr) IV Continuous <Continuous>  dextrose 50% Injectable 12.5 Gram(s) IV Push once  dextrose 50% Injectable 25 Gram(s) IV Push once  dextrose 50% Injectable 25 Gram(s) IV Push once  docusate sodium 100 milliGRAM(s) Oral three times a day  DULoxetine 30 milliGRAM(s) Oral <User Schedule>  insulin lispro (HumaLOG) corrective regimen sliding scale   SubCutaneous three times a day before meals  lamoTRIgine 200 milliGRAM(s) Oral daily  norepinephrine Infusion 0.05 MICROgram(s)/kG/Min (12.6 mL/Hr) IV Continuous <Continuous>  propranolol 10 milliGRAM(s) Oral daily  traZODone 200 milliGRAM(s) Oral at bedtime    MEDICATIONS  (PRN):  acetaminophen   Tablet .. 650 milliGRAM(s) Oral every 6 hours PRN Mild Pain (1 - 3)  artificial tears (preservative free) Ophthalmic Solution 1 Drop(s) Both EYES two times a day PRN Dry Eyes  dextrose 40% Gel 15 Gram(s) Oral once PRN Blood Glucose LESS THAN 70 milliGRAM(s)/deciliter  glucagon  Injectable 1 milliGRAM(s) IntraMuscular once PRN Glucose LESS THAN 70 milligrams/deciliter  HYDROmorphone  Injectable 0.5 milliGRAM(s) IV Push every 4 hours PRN Breakthrough pain not relieved by severe pain meds  oxycodone    5 mG/acetaminophen 325 mG 1 Tablet(s) Oral every 4 hours PRN Moderate Pain (4 - 6)  oxycodone    5 mG/acetaminophen 325 mG 2 Tablet(s) Oral every 6 hours PRN Severe Pain (7 - 10)      Allergies  penicillins (Other)  trees dogs cats cockaroaches (Rhinitis; Rhinorrhea)    Intolerances    REVIEW OF SYSTEMS:  CONSTITUTIONAL: No fever or chills  HEENT:  No headache, no sore throat  RESPIRATORY: No cough, wheezing, or shortness of breath  CARDIOVASCULAR: No chest pain, palpitations  GASTROINTESTINAL: No abd pain, nausea, vomiting, or diarrhea  GENITOURINARY: No dysuria, frequency, or hematuria  NEUROLOGICAL: no focal weakness or dizziness  MUSCULOSKELETAL: no myalgias     Vital Signs Last 24 Hrs  T(C): 36.9 (26 Sep 2020 15:39), Max: 36.9 (25 Sep 2020 20:31)  T(F): 98.5 (26 Sep 2020 15:39), Max: 98.5 (26 Sep 2020 15:39)  HR: 83 (26 Sep 2020 18:04) (75 - 144)  BP: 111/56 (26 Sep 2020 18:04) (68/- - 180/101)  BP(mean): 77 (26 Sep 2020 18:04) (52 - 129)  RR: 21 (26 Sep 2020 18:04) (15 - 44)  SpO2: 99% (26 Sep 2020 18:04) (85% - 100%)    PHYSICAL EXAM:  GENERAL: NAD  HEENT:  anicteric, moist mucous membranes  CHEST/LUNG:  CTA b/l, no rales, wheezes, or rhonchi  HEART:  RRR, S1, S2  ABDOMEN:  BS+, soft, nontender, nondistended  EXTREMITIES: no edema, cyanosis, or calf tenderness  NERVOUS SYSTEM: answers questions and follows commands appropriately    LABS:                        9.5    17.45 )-----------( 260      ( 26 Sep 2020 13:54 )             27.9     CBC Full  -  ( 26 Sep 2020 13:54 )  WBC Count : 17.45 K/uL  Hemoglobin : 9.5 g/dL  Hematocrit : 27.9 %  Platelet Count - Automated : 260 K/uL  Mean Cell Volume : 89.4 fl  Mean Cell Hemoglobin : 30.4 pg  Mean Cell Hemoglobin Concentration : 34.1 gm/dL  Auto Neutrophil # : x  Auto Lymphocyte # : x  Auto Monocyte # : x  Auto Eosinophil # : x  Auto Basophil # : x  Auto Neutrophil % : x  Auto Lymphocyte % : x  Auto Monocyte % : x  Auto Eosinophil % : x  Auto Basophil % : x    26 Sep 2020 07:36    139    |  105    |  27     ----------------------------<  202    3.9     |  23     |  1.30     Ca    8.3        26 Sep 2020 07:36  Phos  3.9       26 Sep 2020 07:36  Mg     1.8       26 Sep 2020 07:36    TPro  5.6    /  Alb  2.7    /  TBili  0.9    /  DBili  x      /  AST  64     /  ALT  66     /  AlkPhos  99     26 Sep 2020 07:36    CAPILLARY BLOOD GLUCOSE    POCT Blood Glucose.: 173 mg/dL (26 Sep 2020 17:22)  POCT Blood Glucose.: 172 mg/dL (26 Sep 2020 12:09)  POCT Blood Glucose.: 202 mg/dL (26 Sep 2020 08:27)  POCT Blood Glucose.: 389 mg/dL (26 Sep 2020 00:48)  POCT Blood Glucose.: 452 mg/dL (25 Sep 2020 22:15)

## 2020-09-26 NOTE — DISCHARGE NOTE PROVIDER - NSDCMRMEDTOKEN_GEN_ALL_CORE_FT
allopurinol 300 mg oral tablet: 1 tab(s) orally once a day  Arthrotec 75 mg-200 mcg oral tablet: 1 tab(s) orally 2 times a day, As Needed  Cymbalta 30 mg oral delayed release capsule: 1 cap(s) orally 3 times a day  folic acid 1 mg oral tablet: 1 tab(s) orally once a day  irbesartan 150 mg oral tablet: 1 tab(s) orally once a day  Lamictal 200 mg oral tablet: 1 tab(s) orally once a day  Melatonin: 10  orally once a day (at bedtime)  Multi B:   once a day  potassium citrate:  orally   propranolol 10 mg oral tablet: 1 tab(s) orally once a day  trazodone 100 mg oral tablet: 2 tab(s) orally once a day (at bedtime)  tylenol /codiene tylenol #3: 1 tab(s)  , As Needed  verapamil 240 mg/24 hours oral capsule, extended release: 1 cap(s) orally once a day  Vitamin B12:  orally   Vitamin D3 5000 intl units (125 mcg) oral tablet: once daily   acetaminophen 325 mg oral tablet: 2 tab(s) orally every 6 hours, As needed, Temp greater or equal to 38C (100.4F)  allopurinol 300 mg oral tablet: 1 tab(s) orally once a day  aluminum hydroxide-magnesium hydroxide 200 mg-200 mg/5 mL oral suspension: 30 milliliter(s) orally every 4 hours, As needed, Dyspepsia  clobetasol 0.05% topical ointment: 1 application topically   cyanocobalamin 1000 mcg oral tablet: 1 tab(s) orally once a day  Cymbalta 30 mg oral delayed release capsule: 1 cap(s) orally 3 times a day  folic acid 1 mg oral tablet: 1 tab(s) orally once a day  Lamictal 200 mg oral tablet: 1 tab(s) orally once a day  loratadine 10 mg oral tablet: 1 tab(s) orally once a day, As needed, Post nasal drip  Multiple Vitamins oral tablet: 1 tab(s) orally once a day  polyethylene glycol 3350 oral powder for reconstitution: 17 gram(s) orally once a day. Hold for loose bowel movements   propranolol 10 mg oral tablet: 1 tab(s) orally once a day  senna oral tablet: 2 tab(s) orally once a day (at bedtime). Hold for loose bowel movements   trazodone 100 mg oral tablet: 2 tab(s) orally once a day (at bedtime)  Vitamin D3 5000 intl units (125 mcg) oral tablet: once daily

## 2020-09-26 NOTE — DISCHARGE NOTE PROVIDER - NSDCFUADDAPPT_GEN_ALL_CORE_FT
F/u with Dr. Cueto in 3 to 5 days. Call for appointment  F/u with all your doctors  F/u with Cardio Dr. Giordano, next week. F/u with Dr. Cueto in 3 to 5 days. Call for appointment  F/u with all your doctors  F/u with Cardio Dr. Giordano, next week.  F/u with Dr. Cinthia Pelayo hematologist

## 2020-09-26 NOTE — PROGRESS NOTE ADULT - ATTENDING COMMENTS
60F h/o morbid obesity s/p lap band, since removed, HTN, BPD, anxiety disorder, thoracic aortic aneurysm, JESUS on CPAP, essential tremor POD1 paniculectomy and liposuction c/b hemorrhagic shock, lactic acidosis, ELAINE and hyperglycemia, now improved s/p 2unit pRBC resuscitation with improvement of shock state, normalization of renal function, improving lactate, better controlled blood sugar.    Neuro: no acute issues; continue propanolol for essential tremor, pain control as needed; continue home lamictal, trazadone and cymbalta for anxiety/depression  CV: shock state resolveingbut BP remains low, hold home anti-hypertensives; brief episode of AFib overnight likely 2/2 hypotension and levophed, now in sinus rhythm - monitor tele  Pulm: no acute issues, continue home nocturnal CPAP for JESUS  GI: DASH/TLC, CC diet, no acute issues  Renal: ELAINE pre-renal 2/2 acute blood loss and hypotension, now resolved s/p resuscitation, lactate downtrending; monitor UOP and electrolytes  Endo: hyperglycemia likely stress induced, baseline A1c 6.7 per patient, will continue ISS coverage at current with FS premeal and qHS, check HbA1c in AM  ID: leukocytosis likely reactive, monitor off abx at this time  Heme: acute blood loss anemia 2/2 recent surgery, s/p 2unit pRBC with appropriate response - repeat CBC q6hr today, monitor EDWIN x3 output; hold chemical DVT ppx in setting of recent bleed, venodynes in place  Skin/Post-Op care: POD1 lipsuction and paniculectomy (22lb panus removed), op note reporting 100cc blood loss intra-op, EDWIN with ~900cc output since OR - abd binder in place, monitor EDWIN output closed (currently blood in bulb x3)      Remains critically ill. Will continue to monitor in ICU

## 2020-09-26 NOTE — PROGRESS NOTE ADULT - ASSESSMENT
59 yo female who presented to [facility] for elective abdominal panniculectomy. PMH JESUS on CPAP, HTN, Obesity, Anemia, AAA, Arthritis, Depression, Anxiety.     Abdominal panniculectomy: POD #1.   -mgmt per surgical team.   -pain control as ordered.     Hemorrhagic shock  - on pressors  - care per ICU  - acute blood loss anemia 2/2 recent surgery,   - s/p 3unit pRBC with appropriate response   - monitor EDWIN x3 output; hold chemical DVT ppx in setting of recent bleed, venodynes in place    JESUS on CPAP: Dr Hanson consulted.   -continuous pulse ox.   -CPAP ordered.     HTN:   -continue propanolol and verapamil.     Depression: continue Cymbalta and lamictal.     DVT ppx: lovenox    Pre-op labs reviewed. Repeat labs ordered for today

## 2020-09-26 NOTE — DISCHARGE NOTE PROVIDER - HOSPITAL COURSE
HPI:  Ms Armas is a 59 yo F who presented to [facility] for elective abdominal panniculectomy. PMH JESUS on CPAP, HTN, Obesity, Anemia, AAA, Arthritis, Depression, Anxiety.   Pt seen and examined post-operative. She states she is doing well. She has minimal abdominal pain 2/10 in severity. Its in the area of her panniluculus, no radiation elsewhere, pain is dull. She has no other complaints. Denies headaches, nausea, vomiting, chest pain, SOB, palpitations, constipation, diarrhea, melena, hematochezia, dysuria.      (25 Sep 2020 16:59)      ---  HOSPITAL COURSE: Patient was admitted after elective abdominal panniculectomy she had no complications during the procedure and was initially sent to the general medical floors. She had an RRT called for tachycardia to the 130s and hypotension with new onset abdominal pain. Patient endorsed shortness of breath, chest tightness, abdominal pain radiating to back.She was found to be in hemmorhhagic shock as well as hyperglycemic. She was found to have a HgB drop from 11.9 to 8, new AG acidosis w/ serum co2 10, AG 22, ELAINE with Cr 1.00--> 1.60, Lactate 14.3, --> 481. She was transferred to the ICU where she was briefly placed on pressors. Her BP normalized after blood was given and she was taken off pressors. Her ELAINE resolved after blood transfusion and her lactate and blood glucose began to downtrend. DKA was ruled out.     ---  CONSULTANTS:     ---  TIME SPENT:  I, the attending physician, was physically present for the key portions of the evaluation and management (E/M) service provided. The total amount of time spent reviewing the hospital notes, laboratory values, imaging findings, assessing/counseling the patient, discussing with consultant physicians, social work, nursing staff was -- minutes    ---  Primary care provider was made aware of plan for discharge:      [  ] NO     [  ] YES   HPI as documented in H&P:  Ms Armas is a 61 yo F who presented to [facility] for elective abdominal panniculectomy. PMH JESUS on CPAP, HTN, Obesity, Anemia, AAA, Arthritis, Depression, Anxiety.   Pt seen and examined post-operative. She states she is doing well. She has minimal abdominal pain 2/10 in severity. Its in the area of her panniluculus, no radiation elsewhere, pain is dull. She has no other complaints. Denies headaches, nausea, vomiting, chest pain, SOB, palpitations, constipation, diarrhea, melena, hematochezia, dysuria.      (25 Sep 2020 16:59)      ---  HOSPITAL COURSE: Patient was admitted after elective abdominal panniculectomy and was initially sent to the general medical floors. Several hours post-op pt had an RRT called for tachycardia to the 130s and hypotension with new onset abdominal pain. Pt was found to be in hemorrhagic shock as well as hyperglycemic. She was found to have a Hgb drop from 11.9 to 8, new AG acidosis w/ serum co2 10, AG 22, ELAINE with Cr 1.00--> 1.60, Lactate 14.3, --> 481. She was transferred to the ICU where she was briefly placed on pressors. Her BP normalized after blood transfusions were given and she was taken off pressors. Her ELAINE was likely pre-renal from acute blood loss anemia and  resolved after blood transfusion and her lactate and blood glucose normalized. DKA was ruled out and AG acidosis was likely due to lactic acidosis driven by shock. Pt received 3un of PRBC in the ICU. As pt stabilized, she was transferred to the medical floor. On the floor, pt spiked a fever to 102F. ID (Wayne) consulted on the case. CXR was clear, UA/UCx/BCx negative. CT Abd/P w/ po and IV contrast performed and found a 4cm x11cm hematoma. Fever was likely due to the hematoma, there was a question of whether hematoma was infected and pt treated empirically with meropenem. Fevers resolved after several days. Pt monitored off meropenem and remained afebrile. Pt continued to have bloody drainage from the EDWIN drains, ~300cc/day and required a 4th unit of PRBC as Hgb trended down and pt became hypotensive. Heme ([facility]) consulted and recommended transfusion and to continue supplements she had.     ---  TIME SPENT:  I, the attending physician, was physically present for the key portions of the evaluation and management (E/M) service provided. The total amount of time spent reviewing the hospital notes, laboratory values, imaging findings, assessing/counseling the patient, discussing with consultant physicians, social work, nursing staff was -- minutes    ---  Primary care provider was made aware of plan for discharge:      [  ] NO     [  ] YES   HPI as documented in H&P:  Ms Armas is a 61 yo F who presented to [facility] for elective abdominal panniculectomy. PMH JESUS on CPAP, HTN, Obesity, Anemia, AAA, Arthritis, Depression, Anxiety.   Pt seen and examined post-operative. She states she is doing well. She has minimal abdominal pain 2/10 in severity. Its in the area of her panniluculus, no radiation elsewhere, pain is dull. She has no other complaints. Denies headaches, nausea, vomiting, chest pain, SOB, palpitations, constipation, diarrhea, melena, hematochezia, dysuria.      (25 Sep 2020 16:59)      ---  HOSPITAL COURSE: Patient was admitted after elective abdominal panniculectomy and was initially sent to the general medical floors. Several hours post-op pt had an RRT called for tachycardia to the 130s and hypotension with new onset abdominal pain. Pt was found to be in hemorrhagic shock as well as hyperglycemic. She was found to have a Hgb drop from 11.9 to 8, new AG acidosis w/ serum co2 10, AG 22, ELAINE with Cr 1.00--> 1.60, Lactate 14.3, --> 481. She was transferred to the ICU where she was briefly placed on pressors. Her BP normalized after blood transfusions were given and she was taken off pressors. Her ELAINE was likely pre-renal from acute blood loss anemia and  resolved after blood transfusion and her lactate and blood glucose normalized. DKA was ruled out and AG acidosis was likely due to lactic acidosis driven by shock. Pt received 3un of PRBC in the ICU. As pt stabilized, she was transferred to the medical floor. On the floor, pt spiked a fever to 102F. ID (Wayne) consulted on the case. CXR was clear, UA/UCx/BCx negative. CT Abd/P w/ po and IV contrast performed and found a 4cm x11cm hematoma. Fever was likely due to the hematoma, there was a question of whether hematoma was infected and pt treated empirically with meropenem. Fevers resolved after several days. Pt monitored off meropenem and remained afebrile. Pt continued to have bloody drainage from the EDWIN drains, ~300cc/day and required a 4th unit of PRBC as Hgb trended down and pt became hypotensive. Heme ([facility]) consulted and recommended transfusion and to continue supplements she had. Pt also had mild transaminitis (AST/ALT to low 100s) and GI ([facility]) consulted. Acute hep panel negative. RUQ US showed fatty liver but no acute changes. Transaminitis suspect due to mild DILI from meropenem which was discontinued and/or from mild ischemic component when pt was relatively hypotensive prior to 4th unit PRBC.     ---  TIME SPENT:  I, the attending physician, was physically present for the key portions of the evaluation and management (E/M) service provided. The total amount of time spent reviewing the hospital notes, laboratory values, imaging findings, assessing/counseling the patient, discussing with consultant physicians, social work, nursing staff was -- minutes    ---  Primary care provider was made aware of plan for discharge:      [  ] NO     [  ] YES   HPI as documented in H&P:  Ms Armas is a 59 yo F who presented to [facility] for elective abdominal panniculectomy. PMH JESUS on CPAP, HTN, Obesity, Anemia, AAA, Arthritis, Depression, Anxiety.   Pt seen and examined post-operative. She states she is doing well. She has minimal abdominal pain 2/10 in severity. Its in the area of her panniluculus, no radiation elsewhere, pain is dull. She has no other complaints. Denies headaches, nausea, vomiting, chest pain, SOB, palpitations, constipation, diarrhea, melena, hematochezia, dysuria.      (25 Sep 2020 16:59)      ---  HOSPITAL COURSE: Patient was admitted after elective abdominal panniculectomy and was initially sent to the general medical floors. Several hours post-op pt had an RRT called for tachycardia to the 130s and hypotension with new onset abdominal pain. Pt was found to be in hemorrhagic shock as well as hyperglycemic. She was found to have a Hgb drop from 11.9 to 8, new AG acidosis w/ serum co2 10, AG 22, ELAINE with Cr 1.00--> 1.60, Lactate 14.3, --> 481. She was transferred to the ICU where she was briefly placed on pressors. Her BP normalized after blood transfusions were given and she was taken off pressors. Her ELAINE was likely pre-renal from acute blood loss anemia and  resolved after blood transfusion and her lactate and blood glucose normalized. DKA was ruled out and AG acidosis was likely due to lactic acidosis driven by shock. Pt received 3un of PRBC in the ICU. As pt stabilized, she was transferred to the medical floor. On the floor, pt spiked a fever to 102F. ID (Wayne) consulted on the case. CXR was clear, UA/UCx/BCx negative. CT Abd/P w/ po and IV contrast performed and found a 4cm x11cm hematoma. Fever was likely due to the hematoma, there was a question of whether hematoma was infected and pt treated empirically with meropenem. Fevers resolved after several days. Pt monitored off meropenem and remained afebrile. Pt continued to have bloody drainage from the EDWIN drains, ~300cc/day and required a 4th unit of PRBC as Hgb trended down and pt became hypotensive. Heme ([facility]) consulted and recommended transfusion and to continue supplements she had. Pt also had mild transaminitis (AST/ALT to low 100s) and GI ([facility]) consulted. Acute hep panel negative. RUQ US showed fatty liver but no acute changes. Transaminitis suspect due to mild DILI from meropenem which was discontinued and/or from mild ischemic component when pt was relatively hypotensive prior to 4th unit PRBC. Liver enzymes improved, back to normal. Has fever, work up came back negative. Likely reactive due to hematoma and bleeding  and atelectasis post operatively,  but it resolved and white count improved as well. Patient cleared by Dr. Cueto for discharge. Will see her in the office next week.     ---  TIME SPENT:  I, the attending physician, was physically present for the key portions of the evaluation and management (E/M) service provided. The total amount of time spent reviewing the hospital notes, laboratory values, imaging findings, assessing/counseling the patient, discussing with consultant physicians, social work, nursing staff was 55  minutes    ---  Primary care provider was made aware of plan for discharge:      [  ] NO     [  ] YES

## 2020-09-26 NOTE — CHART NOTE - NSCHARTNOTEFT_GEN_A_CORE
Resident Rapid Response Note    Patient is a 60y old  Female who presents with a chief complaint of Panniculectomy (25 Sep 2020 23:05)    Rapid response follow up done. Patient now in ICU, resting comfortably.     Vital Signs Last 24 Hrs  T(C): 36.7 (25 Sep 2020 22:54), Max: 37 (25 Sep 2020 12:54)  T(F): 98.1 (25 Sep 2020 22:54), Max: 98.6 (25 Sep 2020 12:54)  HR: 93 (26 Sep 2020 02:00) (64 - 135)  BP: 77/36 (26 Sep 2020 02:00) (68/- - 150/97)  BP(mean): 52 (26 Sep 2020 02:00) (52 - 83)  RR: 29 (26 Sep 2020 02:00) (14 - 44)  SpO2: 100% (26 Sep 2020 02:00) (96% - 100%)    Physical Exam:  Gen: laying in bed in comfortable with CPAP on for sleep, no longer pale  Cardio: +S1, +S2, tachycardic  Lungs: CTA B/L, No w/r/r  Abd: obese, soft, +BS x 4 quadrants, surgical dressing over incision site   Ext: No pedal edema, no calf tenderness, pulses intact, SCDs in place     LABS:                        8.4    15.53 )-----------( 347      ( 25 Sep 2020 21:25 )             24.8     25 Sep 2020 21:25    138    |  106    |  20     ----------------------------<  481    5.1     |  10     |  1.60     Ca    7.8        25 Sep 2020 21:25    TPro  6.7    /  Alb  3.1    /  TBili  0.5    /  DBili  x      /  AST  29     /  ALT  42     /  AlkPhos  122    25 Sep 2020 17:38        CAPILLARY BLOOD GLUCOSE      POCT Blood Glucose.: 389 mg/dL (26 Sep 2020 00:48)  POCT Blood Glucose.: 452 mg/dL (25 Sep 2020 22:15)  POCT Blood Glucose.: 130 mg/dL (25 Sep 2020 13:03)    RADIOLOGY & ADDITIONAL TESTS:    Assessment/Plan:  61 yo F with PMH JESUS on CPAP, HTN, Obesity, Anemia, AAA, Arthritis, Depression, Anxiety admitted for elective abdominal panniculectomy POD#0, now admitted to ICU for hemorrhagic shock    Hemorrhagic shock likely 2/2 acute blood loss anemia complicated by hyperglycemia likely 2/2 DKA   - patient s/p 2L LR bolus  - currently s/p 1U PRBC, about to receive 2nd unit  - patient continues to be hypotensive, on low dose levophed  - lactate likely elevated in setting of ABLA, continue to trend  - continue to monitor saucedo output  - d/c lovenox, started on SCDs    Hyperglycemia, ?DKA  - glucose 452, received 12U and 10U insulin, on moderate dose ISS  - f/u A1C in the morning  - anion gap 22, acetone negative    ELAINE  - likely prerenal azotemia in setting of ABLA  - received total of 2 bolus LR and will receive 2U PRBC  - f/u AM labs    - care as per ICU  -Will continue to follow, RN to call if any changes Resident Rapid Response Note    Patient is a 60y old  Female who presents with a chief complaint of Panniculectomy (25 Sep 2020 23:05)    Rapid response follow up done. Patient now in ICU, resting comfortably.     Vital Signs Last 24 Hrs  T(C): 36.7 (25 Sep 2020 22:54), Max: 37 (25 Sep 2020 12:54)  T(F): 98.1 (25 Sep 2020 22:54), Max: 98.6 (25 Sep 2020 12:54)  HR: 93 (26 Sep 2020 02:00) (64 - 135)  BP: 77/36 (26 Sep 2020 02:00) (68/- - 150/97)  BP(mean): 52 (26 Sep 2020 02:00) (52 - 83)  RR: 29 (26 Sep 2020 02:00) (14 - 44)  SpO2: 100% (26 Sep 2020 02:00) (96% - 100%)    Physical Exam:  Gen: laying in bed in comfortable with CPAP on for sleep, no longer pale  Cardio: +S1, +S2, tachycardic  Lungs: CTA B/L, No w/r/r  Abd: obese, soft, +BS x 4 quadrants, surgical dressing over incision site   Ext: No pedal edema, no calf tenderness, pulses intact, SCDs in place     LABS:                        8.4    15.53 )-----------( 347      ( 25 Sep 2020 21:25 )             24.8     25 Sep 2020 21:25    138    |  106    |  20     ----------------------------<  481    5.1     |  10     |  1.60     Ca    7.8        25 Sep 2020 21:25    TPro  6.7    /  Alb  3.1    /  TBili  0.5    /  DBili  x      /  AST  29     /  ALT  42     /  AlkPhos  122    25 Sep 2020 17:38      CAPILLARY BLOOD GLUCOSE  POCT Blood Glucose.: 389 mg/dL (26 Sep 2020 00:48)  POCT Blood Glucose.: 452 mg/dL (25 Sep 2020 22:15)  POCT Blood Glucose.: 130 mg/dL (25 Sep 2020 13:03)      Assessment/Plan:  59 yo F with PMH JESUS on CPAP, HTN, Obesity, Anemia, AAA, Arthritis, Depression, Anxiety admitted for elective abdominal panniculectomy POD#0, now admitted to ICU for hemorrhagic shock    Hemorrhagic shock likely 2/2 acute blood loss anemia complicated by hyperglycemia likely 2/2 DKA   - patient s/p 2L LR bolus  - currently s/p 1U PRBC, about to receive 2nd unit  - patient continues to be hypotensive, on low dose levophed  - lactate likely elevated in setting of ABLA, continue to trend  - continue to monitor saucedo output  - d/c lovenox, started on SCDs    Hyperglycemia, ?DKA  - glucose 452, received 12U and 10U insulin, on moderate dose ISS  - f/u A1C in the morning  - anion gap 22, acetone negative    ELAINE  - likely prerenal azotemia in setting of ABLA  - received total of 2 bolus LR and will receive 2U PRBC  - f/u AM labs    - care as per ICU  -Will continue to follow, RN to call if any changes

## 2020-09-27 LAB
A1C WITH ESTIMATED AVERAGE GLUCOSE RESULT: 5.6 % — SIGNIFICANT CHANGE UP (ref 4–5.6)
ALBUMIN SERPL ELPH-MCNC: 2.4 G/DL — LOW (ref 3.3–5)
ALP SERPL-CCNC: 77 U/L — SIGNIFICANT CHANGE UP (ref 40–120)
ALT FLD-CCNC: 107 U/L — HIGH (ref 12–78)
ANION GAP SERPL CALC-SCNC: 6 MMOL/L — SIGNIFICANT CHANGE UP (ref 5–17)
AST SERPL-CCNC: 72 U/L — HIGH (ref 15–37)
BASOPHILS # BLD AUTO: 0.03 K/UL — SIGNIFICANT CHANGE UP (ref 0–0.2)
BASOPHILS NFR BLD AUTO: 0.2 % — SIGNIFICANT CHANGE UP (ref 0–2)
BILIRUB SERPL-MCNC: 0.4 MG/DL — SIGNIFICANT CHANGE UP (ref 0.2–1.2)
BUN SERPL-MCNC: 20 MG/DL — SIGNIFICANT CHANGE UP (ref 7–23)
CALCIUM SERPL-MCNC: 8.4 MG/DL — LOW (ref 8.5–10.1)
CHLORIDE SERPL-SCNC: 106 MMOL/L — SIGNIFICANT CHANGE UP (ref 96–108)
CO2 SERPL-SCNC: 28 MMOL/L — SIGNIFICANT CHANGE UP (ref 22–31)
CREAT SERPL-MCNC: 0.66 MG/DL — SIGNIFICANT CHANGE UP (ref 0.5–1.3)
EOSINOPHIL # BLD AUTO: 0.01 K/UL — SIGNIFICANT CHANGE UP (ref 0–0.5)
EOSINOPHIL NFR BLD AUTO: 0.1 % — SIGNIFICANT CHANGE UP (ref 0–6)
ESTIMATED AVERAGE GLUCOSE: 114 MG/DL — SIGNIFICANT CHANGE UP (ref 68–114)
GLUCOSE SERPL-MCNC: 151 MG/DL — HIGH (ref 70–99)
HCT VFR BLD CALC: 23.6 % — LOW (ref 34.5–45)
HCT VFR BLD CALC: 26 % — LOW (ref 34.5–45)
HGB BLD-MCNC: 8.1 G/DL — LOW (ref 11.5–15.5)
HGB BLD-MCNC: 9.2 G/DL — LOW (ref 11.5–15.5)
IMM GRANULOCYTES NFR BLD AUTO: 0.4 % — SIGNIFICANT CHANGE UP (ref 0–1.5)
LACTATE SERPL-SCNC: 2.1 MMOL/L — HIGH (ref 0.7–2)
LYMPHOCYTES # BLD AUTO: 16.9 % — SIGNIFICANT CHANGE UP (ref 13–44)
LYMPHOCYTES # BLD AUTO: 2.76 K/UL — SIGNIFICANT CHANGE UP (ref 1–3.3)
MAGNESIUM SERPL-MCNC: 2 MG/DL — SIGNIFICANT CHANGE UP (ref 1.6–2.6)
MCHC RBC-ENTMCNC: 30.3 PG — SIGNIFICANT CHANGE UP (ref 27–34)
MCHC RBC-ENTMCNC: 30.5 PG — SIGNIFICANT CHANGE UP (ref 27–34)
MCHC RBC-ENTMCNC: 34.3 GM/DL — SIGNIFICANT CHANGE UP (ref 32–36)
MCHC RBC-ENTMCNC: 35.4 GM/DL — SIGNIFICANT CHANGE UP (ref 32–36)
MCV RBC AUTO: 86.1 FL — SIGNIFICANT CHANGE UP (ref 80–100)
MCV RBC AUTO: 88.4 FL — SIGNIFICANT CHANGE UP (ref 80–100)
MONOCYTES # BLD AUTO: 1.46 K/UL — HIGH (ref 0–0.9)
MONOCYTES NFR BLD AUTO: 8.9 % — SIGNIFICANT CHANGE UP (ref 2–14)
NEUTROPHILS # BLD AUTO: 11.99 K/UL — HIGH (ref 1.8–7.4)
NEUTROPHILS NFR BLD AUTO: 73.5 % — SIGNIFICANT CHANGE UP (ref 43–77)
NRBC # BLD: 0 /100 WBCS — SIGNIFICANT CHANGE UP (ref 0–0)
NRBC # BLD: 0 /100 WBCS — SIGNIFICANT CHANGE UP (ref 0–0)
PHOSPHATE SERPL-MCNC: 2.2 MG/DL — LOW (ref 2.5–4.5)
PLATELET # BLD AUTO: 181 K/UL — SIGNIFICANT CHANGE UP (ref 150–400)
PLATELET # BLD AUTO: 213 K/UL — SIGNIFICANT CHANGE UP (ref 150–400)
POTASSIUM SERPL-MCNC: 4.3 MMOL/L — SIGNIFICANT CHANGE UP (ref 3.5–5.3)
POTASSIUM SERPL-SCNC: 4.3 MMOL/L — SIGNIFICANT CHANGE UP (ref 3.5–5.3)
PROT SERPL-MCNC: 5.3 G/DL — LOW (ref 6–8.3)
RBC # BLD: 2.67 M/UL — LOW (ref 3.8–5.2)
RBC # BLD: 3.02 M/UL — LOW (ref 3.8–5.2)
RBC # FLD: 14.9 % — HIGH (ref 10.3–14.5)
RBC # FLD: 15 % — HIGH (ref 10.3–14.5)
SODIUM SERPL-SCNC: 140 MMOL/L — SIGNIFICANT CHANGE UP (ref 135–145)
WBC # BLD: 13.67 K/UL — HIGH (ref 3.8–10.5)
WBC # BLD: 16.32 K/UL — HIGH (ref 3.8–10.5)
WBC # FLD AUTO: 13.67 K/UL — HIGH (ref 3.8–10.5)
WBC # FLD AUTO: 16.32 K/UL — HIGH (ref 3.8–10.5)

## 2020-09-27 PROCEDURE — 99291 CRITICAL CARE FIRST HOUR: CPT

## 2020-09-27 PROCEDURE — 99232 SBSQ HOSP IP/OBS MODERATE 35: CPT

## 2020-09-27 PROCEDURE — 99233 SBSQ HOSP IP/OBS HIGH 50: CPT

## 2020-09-27 RX ORDER — SENNA PLUS 8.6 MG/1
2 TABLET ORAL AT BEDTIME
Refills: 0 | Status: DISCONTINUED | OUTPATIENT
Start: 2020-09-27 | End: 2020-10-10

## 2020-09-27 RX ORDER — SODIUM,POTASSIUM PHOSPHATES 278-250MG
1 POWDER IN PACKET (EA) ORAL ONCE
Refills: 0 | Status: COMPLETED | OUTPATIENT
Start: 2020-09-27 | End: 2020-09-27

## 2020-09-27 RX ORDER — POLYETHYLENE GLYCOL 3350 17 G/17G
17 POWDER, FOR SOLUTION ORAL DAILY
Refills: 0 | Status: DISCONTINUED | OUTPATIENT
Start: 2020-09-27 | End: 2020-10-10

## 2020-09-27 RX ADMIN — LAMOTRIGINE 200 MILLIGRAM(S): 25 TABLET, ORALLY DISINTEGRATING ORAL at 15:11

## 2020-09-27 RX ADMIN — SENNA PLUS 2 TABLET(S): 8.6 TABLET ORAL at 22:47

## 2020-09-27 RX ADMIN — Medication 12.6 MICROGRAM(S)/KG/MIN: at 04:05

## 2020-09-27 RX ADMIN — Medication 100 MILLIGRAM(S): at 22:49

## 2020-09-27 RX ADMIN — Medication 300 MILLIGRAM(S): at 15:10

## 2020-09-27 RX ADMIN — CHLORHEXIDINE GLUCONATE 1 APPLICATION(S): 213 SOLUTION TOPICAL at 05:06

## 2020-09-27 RX ADMIN — Medication 100 MILLIGRAM(S): at 05:46

## 2020-09-27 RX ADMIN — DULOXETINE HYDROCHLORIDE 30 MILLIGRAM(S): 30 CAPSULE, DELAYED RELEASE ORAL at 05:06

## 2020-09-27 RX ADMIN — DULOXETINE HYDROCHLORIDE 30 MILLIGRAM(S): 30 CAPSULE, DELAYED RELEASE ORAL at 15:14

## 2020-09-27 RX ADMIN — Medication 200 MILLIGRAM(S): at 22:47

## 2020-09-27 RX ADMIN — Medication 100 MILLIGRAM(S): at 15:14

## 2020-09-27 RX ADMIN — Medication 1 PACKET(S): at 08:52

## 2020-09-27 RX ADMIN — DULOXETINE HYDROCHLORIDE 30 MILLIGRAM(S): 30 CAPSULE, DELAYED RELEASE ORAL at 22:48

## 2020-09-27 NOTE — PROGRESS NOTE ADULT - SUBJECTIVE AND OBJECTIVE BOX
Patient is a 60y old  Female who presents with a chief complaint of Panniculectomy (27 Sep 2020 18:47)    PAST MEDICAL & SURGICAL HISTORY:  History of aortic aneurysm  descending aorta see CT    Arthritis    Degenerative disc disease, lumbar    Neuropathy    Spinal stenosis    Bipolar 1 disorder    Kidney stone    Sleep Apnea  CPAP with oxygen since June 2020    Asthma    Hypertension    Morbid Obesity    ETOH Abuse  H/O    Benign Essential Tremor    Anxiety    Depression    Renal Calculi  chronic      Colitis  H/O    Anemia    S/P cardiac catheterization    S/P dilation and curettage    History of tonsillectomy    History of laparoscopic adjustable gastric banding  band removed few yrs ago    S/P D&amp;C    Biliary stent placement &amp; ercp    ureteroscopy with stone removal  1-      EMANUEL MONTEZ   60y    Female    Review of Systems:      NO CP/SOB                 All other ROS are negative.    Allergies    penicillins (Other)  trees dogs cats cockaroaches (Rhinitis; Rhinorrhea)    Intolerances          ICU Vital Signs Last 24 Hrs  T(C): 37.1 (27 Sep 2020 12:31), Max: 37.1 (27 Sep 2020 12:31)  T(F): 98.7 (27 Sep 2020 12:31), Max: 98.7 (27 Sep 2020 12:31)  HR: 86 (27 Sep 2020 20:00) (79 - 97)  BP: 109/68 (27 Sep 2020 17:00) (97/53 - 173/78)  BP(mean): 84 (27 Sep 2020 17:00) (70 - 112)  ABP: --  ABP(mean): --  RR: 28 (27 Sep 2020 20:00) (15 - 28)  SpO2: 100% (27 Sep 2020 20:00) (90% - 100%)    Physical Examination:    General:  NAD    HEENT: no JVD    PULM: bilateral BS    CVS: s1 s2 reg    ABD: EDWIN's noted     EXT: no edema     SKIN: warm    Neuro: Alert LAWSON          LABS:                        8.1    13.67 )-----------( 181      ( 27 Sep 2020 17:13 )             23.6     09-27    140  |  106  |  20  ----------------------------<  151<H>  4.3   |  28  |  0.66    Ca    8.4<L>      27 Sep 2020 06:11  Phos  2.2     09-27  Mg     2.0     09-27    TPro  5.3<L>  /  Alb  2.4<L>  /  TBili  0.4  /  DBili  x   /  AST  72<H>  /  ALT  107<H>  /  AlkPhos  77  09-27          CAPILLARY BLOOD GLUCOSE      POCT Blood Glucose.: 118 mg/dL (27 Sep 2020 12:38)  POCT Blood Glucose.: 140 mg/dL (27 Sep 2020 08:48)        CULTURES:      Medications:  MEDICATIONS  (STANDING):  allopurinol 300 milliGRAM(s) Oral daily  chlorhexidine 2% Cloths 1 Application(s) Topical <User Schedule>  docusate sodium 100 milliGRAM(s) Oral three times a day  DULoxetine 30 milliGRAM(s) Oral <User Schedule>  lamoTRIgine 200 milliGRAM(s) Oral daily  norepinephrine Infusion 0.05 MICROgram(s)/kG/Min (12.6 mL/Hr) IV Continuous <Continuous>  polyethylene glycol 3350 17 Gram(s) Oral daily  propranolol 10 milliGRAM(s) Oral daily  senna 2 Tablet(s) Oral at bedtime  traZODone 200 milliGRAM(s) Oral at bedtime    MEDICATIONS  (PRN):  acetaminophen   Tablet .. 650 milliGRAM(s) Oral every 6 hours PRN Mild Pain (1 - 3)  artificial tears (preservative free) Ophthalmic Solution 1 Drop(s) Both EYES two times a day PRN Dry Eyes  glucagon  Injectable 1 milliGRAM(s) IntraMuscular once PRN Glucose LESS THAN 70 milligrams/deciliter  HYDROmorphone  Injectable 0.5 milliGRAM(s) IV Push every 4 hours PRN Breakthrough pain not relieved by severe pain meds  oxycodone    5 mG/acetaminophen 325 mG 1 Tablet(s) Oral every 4 hours PRN Moderate Pain (4 - 6)  oxycodone    5 mG/acetaminophen 325 mG 2 Tablet(s) Oral every 6 hours PRN Severe Pain (7 - 10)        09-26 @ 07:01 - 09-27 @ 07:00  --------------------------------------------------------  IN: 2631 mL / OUT: 2035 mL / NET: 596 mL    09-27 @ 07:01 - 09-27 @ 22:04  --------------------------------------------------------  IN: 600 mL / OUT: 1225 mL / NET: -625 mL      Assessment/Plan:      60F h/o morbid obesity s/p lap band, since removed, HTN, BPD, TAA  JESUS on CPAP,  POD 2  paniculectomy and liposuction c/b hemorrhagic shock ABLA , lactic acidosis. all improved     Nor-epi tapered off at 10 am.      s/p 3 unit of packed cells.  Hgb OK   EDWIN's with serosanguinous drainage.     Seen  by Plastic sx.      JESUS Nocturnal BIPAP

## 2020-09-27 NOTE — PROGRESS NOTE ADULT - ASSESSMENT
61 yo female who presented to [facility] for elective abdominal panniculectomy. PMH JESUS on CPAP, HTN, Obesity, Anemia, AAA, Arthritis, Depression, Anxiety.     Hemorrhagic shock  - acute blood loss anemia 2/2 recent surgery,   - on pressors (levophed) for BP maintenance  - care per ICU  - s/p 3unit pRBC 9/26  - monitor EDWIN x3 output; hold chemical DVT ppx in setting of recent bleed.    S/P Abdominal panniculectomy: POD #2.   -mgmt per surgical team.   -pain control as ordered.   -bowel regimen on board; miralax, senna    Anemia, acute on chronic  - 2/2 blood loss, s/p pRBCs  - monitor h/h    JESUS on CPAP: Dr Hanson consulted.   -continuous pulse ox.   -CPAP ordered.     HTN:   - continue propanolol  - holding verapamil (home med)    Depression:   - continue Cymbalta, trazodone and lamictal.     Obesity, morbid   - bmi 48  - ccho diet, A1c WNL at 5.6%    DVT ppx: lovenox

## 2020-09-27 NOTE — PROGRESS NOTE ADULT - ATTENDING COMMENTS
60F h/o morbid obesity s/p lap band, since removed, HTN, BPD, anxiety disorder, thoracic aortic aneurysm, JESUS on CPAP, essential tremor POD1 paniculectomy and liposuction c/b hemorrhagic shock, lactic acidosis, ELAINE and hyperglycemia, now improved s/p 2unit pRBC resuscitation with improvement of shock state, normalization of renal function, improving lactate, better controlled blood sugar.    Neuro: no acute issues; continue propanolol for essential tremor, pain control as needed; continue home lamictal, trazadone and cymbalta for anxiety/depression  CV: back on levophed overnight for low BP, now weaned off, will monitor BP today, low suspicion for acute blood loss as cause of hypotension overnight  Pulm: continue nocturnal CPAP for JESUS  GI: DASH/TLC, CC diet; senna and miralax added to bowel regimen  Renal: sCr back to baseline, electrolytes stable; maintaining adequate UOP. Lactate cleared Remove saucedo for TOV today  Endo: HbA1c 5.6, glucose now wnl - hyperglycemia likely stress response 2/2 hemorrhagic shock  ID: leukocytosis improving, likely reactive, monitor off abx  Heme: s/p 3unit pRBC for acute blood loss anemia, Hb now stable - repeat CBC q12hr today, monitor EDWIN x3 output; if Hb stable on next check can start chemical DVT ppx, venodynes in place  Skin/Post-Op care: POD2 lipsuction and paniculectomy (22lb panus removed), total EDWIN output ~755cc/24hr, more serosanguinous today - abd binder in place, monitor EDWIN output closed    PT/OOBTC  BP remains tenuous, shock state nearly resolved. Monitor in ICU today, if remains stable likely transfer to floor tomorrow

## 2020-09-27 NOTE — PROGRESS NOTE ADULT - ASSESSMENT
Ewelina is a 60 year old female with JESUS, obesity and HTN, with hypotension after an abdominal panniculectomy, in the setting of presumed blood loss anemia. She remains hypotensive with pressor requirement.    - She had brief atrial fibrillation in the setting of significant hypotension, though is now back in SR.  - no history of AF though has reported palpitations  - cont to watch on telemetry  - low normal LV function with mild diastolic dysfunction on recent echocardiogram  - hold verapamil and propranolol in setting of hypotension    - no sign of acute ischemia  - recent cath with non-obs cad    - no sign of volume overload  - hold diuretics in setting of hypotension  - transfuse to keep hb >8  - titrate down levophed, to maintain map>65  - cpap at night    - watch creatinine and electrolytes. Keep K>4, Mg>2  - high risk of decompensation on levophed.   - will follow with you

## 2020-09-27 NOTE — PROGRESS NOTE ADULT - SUBJECTIVE AND OBJECTIVE BOX
[facility] - Almas Faustin, Mayra, Cara, Eleno, Pasquale Wiley  Office Number:  [phone]    Patient resting comfortably in bed in NAD.  Laying flat with no respiratory distress.  No complaints of chest pain, dyspnea, palpitations, PND, or orthopnea.  back on low dose levophed    ROS: negative unless otherwise mentioned.    Telemetry:  st    MEDICATIONS  (STANDING):  allopurinol 300 milliGRAM(s) Oral daily  chlorhexidine 2% Cloths 1 Application(s) Topical <User Schedule>  dextrose 5%. 1000 milliLiter(s) (50 mL/Hr) IV Continuous <Continuous>  dextrose 50% Injectable 12.5 Gram(s) IV Push once  dextrose 50% Injectable 25 Gram(s) IV Push once  dextrose 50% Injectable 25 Gram(s) IV Push once  docusate sodium 100 milliGRAM(s) Oral three times a day  DULoxetine 30 milliGRAM(s) Oral <User Schedule>  insulin lispro (HumaLOG) corrective regimen sliding scale   SubCutaneous three times a day before meals  lamoTRIgine 200 milliGRAM(s) Oral daily  norepinephrine Infusion 0.05 MICROgram(s)/kG/Min (12.6 mL/Hr) IV Continuous <Continuous>  potassium phosphate / sodium phosphate Powder (PHOS-NaK) 1 Packet(s) Oral once  propranolol 10 milliGRAM(s) Oral daily  traZODone 200 milliGRAM(s) Oral at bedtime    MEDICATIONS  (PRN):  acetaminophen   Tablet .. 650 milliGRAM(s) Oral every 6 hours PRN Mild Pain (1 - 3)  artificial tears (preservative free) Ophthalmic Solution 1 Drop(s) Both EYES two times a day PRN Dry Eyes  dextrose 40% Gel 15 Gram(s) Oral once PRN Blood Glucose LESS THAN 70 milliGRAM(s)/deciliter  glucagon  Injectable 1 milliGRAM(s) IntraMuscular once PRN Glucose LESS THAN 70 milligrams/deciliter  HYDROmorphone  Injectable 0.5 milliGRAM(s) IV Push every 4 hours PRN Breakthrough pain not relieved by severe pain meds  oxycodone    5 mG/acetaminophen 325 mG 1 Tablet(s) Oral every 4 hours PRN Moderate Pain (4 - 6)  oxycodone    5 mG/acetaminophen 325 mG 2 Tablet(s) Oral every 6 hours PRN Severe Pain (7 - 10)      Allergies    penicillins (Other)  trees dogs cats cockaroaches (Rhinitis; Rhinorrhea)    Intolerances        Vital Signs Last 24 Hrs  T(C): 36.7 (27 Sep 2020 03:37), Max: 37.1 (26 Sep 2020 20:42)  T(F): 98.1 (27 Sep 2020 03:37), Max: 98.7 (26 Sep 2020 20:42)  HR: 94 (27 Sep 2020 07:00) (75 - 128)  BP: 173/78 (27 Sep 2020 07:00) (73/43 - 173/78)  BP(mean): 112 (27 Sep 2020 07:00) (53 - 112)  RR: 28 (27 Sep 2020 07:00) (15 - 31)  SpO2: 99% (27 Sep 2020 07:00) (85% - 100%)    I&O's Summary    26 Sep 2020 07:01  -  27 Sep 2020 07:00  --------------------------------------------------------  IN: 2631 mL / OUT: 2035 mL / NET: 596 mL        ON EXAM:    Constitutional: NAD, alert and oriented x 3, obese  Lungs:  Non-labored, breath sounds are clear bilaterally, No wheezing, rales or rhonchi  Cardiovascular: RRR.  S1 and S2 positive.  No murmurs, rubs, gallops or clicks  Gastrointestinal: Bowel Sounds present, soft, nontender.   Lymph: No peripheral edema. No cervical lymphadenopathy.  Neurological: Alert, no focal deficits  Skin: No rashes or ulcers   Psych:  Mood & affect appropriate.    LABS: All Labs Reviewed:                        9.2    16.32 )-----------( 213      ( 27 Sep 2020 06:11 )             26.0                         9.1    17.05 )-----------( 203      ( 26 Sep 2020 19:56 )             26.3                         9.5    17.45 )-----------( 260      ( 26 Sep 2020 13:54 )             27.9     27 Sep 2020 06:11    140    |  106    |  20     ----------------------------<  151    4.3     |  28     |  0.66   26 Sep 2020 07:36    139    |  105    |  27     ----------------------------<  202    3.9     |  23     |  1.30   25 Sep 2020 21:25    138    |  106    |  20     ----------------------------<  481    5.1     |  10     |  1.60     Ca    8.4        27 Sep 2020 06:11  Ca    8.3        26 Sep 2020 07:36  Ca    7.8        25 Sep 2020 21:25  Phos  2.2       27 Sep 2020 06:11  Phos  3.9       26 Sep 2020 07:36  Mg     2.0       27 Sep 2020 06:11  Mg     1.8       26 Sep 2020 07:36    TPro  5.3    /  Alb  2.4    /  TBili  0.4    /  DBili  x      /  AST  72     /  ALT  107    /  AlkPhos  77     27 Sep 2020 06:11  TPro  5.6    /  Alb  2.7    /  TBili  0.9    /  DBili  x      /  AST  64     /  ALT  66     /  AlkPhos  99     26 Sep 2020 07:36  TPro  6.7    /  Alb  3.1    /  TBili  0.5    /  DBili  x      /  AST  29     /  ALT  42     /  AlkPhos  122    25 Sep 2020 17:38      CARDIAC MARKERS ( 25 Sep 2020 21:25 )  .033 ng/mL / x     / 65 U/L / x     / <1.0 ng/mL      Blood Culture:

## 2020-09-27 NOTE — PROGRESS NOTE ADULT - SUBJECTIVE AND OBJECTIVE BOX
POD # 2  s/p panniculectomy with liposuction     SUBJECTIVE:  61 y/o F seen and examined at bedside. Pt offers no complaints at this time in NAD. Pt tolerating reg diet without N/V. PT denies any fevers, chills, chest pain, shortness of breath, abdominal pain, nausea, vomiting or diarrhea.    Vital Signs Last 24 Hrs  T(C): 37.1 (27 Sep 2020 12:31), Max: 37.1 (26 Sep 2020 20:42)  T(F): 98.7 (27 Sep 2020 12:31), Max: 98.7 (26 Sep 2020 20:42)  HR: 91 (27 Sep 2020 18:00) (77 - 97)  BP: 109/68 (27 Sep 2020 17:00) (97/53 - 173/78)  BP(mean): 84 (27 Sep 2020 17:00) (70 - 112)  RR: 20 (27 Sep 2020 18:00) (15 - 31)  SpO2: 99% (27 Sep 2020 18:00) (90% - 100%)    PHYSICAL EXAM:  GENERAL: No acute distress, well-developed  ABDOMEN: Obese, soft, mild diffuse ttp, nondistended, +bs  NEUROLOGY: A&O x 3, no focal deficits    I&O's Summary    26 Sep 2020 07:01  -  27 Sep 2020 07:00  --------------------------------------------------------  IN: 2631 mL / OUT: 2035 mL / NET: 596 mL    27 Sep 2020 07:01  -  27 Sep 2020 18:48  --------------------------------------------------------  IN: 600 mL / OUT: 1225 mL / NET: -625 mL      I&O's Detail    26 Sep 2020 07:01  -  27 Sep 2020 07:00  --------------------------------------------------------  IN:    Lactated Ringers Bolus: 1000 mL    Norepinephrine: 187 mL    Oral Fluid: 1160 mL    PRBCs (Packed Red Blood Cells): 284 mL  Total IN: 2631 mL    OUT:    Bulb (mL): 250 mL    Bulb (mL): 280 mL    Bulb (mL): 225 mL    Indwelling Catheter - Urethral (mL): 1280 mL    Norepinephrine: 0 mL  Total OUT: 2035 mL    Total NET: 596 mL      27 Sep 2020 07:01  -  27 Sep 2020 18:48  --------------------------------------------------------  IN:    Oral Fluid: 600 mL  Total IN: 600 mL    OUT:    Bulb (mL): 100 mL    Bulb (mL): 100 mL    Bulb (mL): 25 mL    Indwelling Catheter - Urethral (mL): 1000 mL  Total OUT: 1225 mL    Total NET: -625 mL    MEDICATIONS  (STANDING):  allopurinol 300 milliGRAM(s) Oral daily  chlorhexidine 2% Cloths 1 Application(s) Topical <User Schedule>  docusate sodium 100 milliGRAM(s) Oral three times a day  DULoxetine 30 milliGRAM(s) Oral <User Schedule>  lamoTRIgine 200 milliGRAM(s) Oral daily  norepinephrine Infusion 0.05 MICROgram(s)/kG/Min (12.6 mL/Hr) IV Continuous <Continuous>  polyethylene glycol 3350 17 Gram(s) Oral daily  propranolol 10 milliGRAM(s) Oral daily  senna 2 Tablet(s) Oral at bedtime  traZODone 200 milliGRAM(s) Oral at bedtime    MEDICATIONS  (PRN):  acetaminophen   Tablet .. 650 milliGRAM(s) Oral every 6 hours PRN Mild Pain (1 - 3)  artificial tears (preservative free) Ophthalmic Solution 1 Drop(s) Both EYES two times a day PRN Dry Eyes  glucagon  Injectable 1 milliGRAM(s) IntraMuscular once PRN Glucose LESS THAN 70 milligrams/deciliter  HYDROmorphone  Injectable 0.5 milliGRAM(s) IV Push every 4 hours PRN Breakthrough pain not relieved by severe pain meds  oxycodone    5 mG/acetaminophen 325 mG 1 Tablet(s) Oral every 4 hours PRN Moderate Pain (4 - 6)  oxycodone    5 mG/acetaminophen 325 mG 2 Tablet(s) Oral every 6 hours PRN Severe Pain (7 - 10)    LABS:                        8.1    13.67 )-----------( 181      ( 27 Sep 2020 17:13 )             23.6     09-27    140  |  106  |  20  ----------------------------<  151<H>  4.3   |  28  |  0.66    Ca    8.4<L>      27 Sep 2020 06:11  Phos  2.2     09-27  Mg     2.0     09-27    TPro  5.3<L>  /  Alb  2.4<L>  /  TBili  0.4  /  DBili  x   /  AST  72<H>  /  ALT  107<H>  /  AlkPhos  77  09-27      ASSESSMENT  61 y/o F POD # 2 s/p panniculectomy with liposuction with EDWIN drains in place, tolerating reg diet,       PLAN  - wean off pressors per icu  - cont reg diet  - monitor drain outputs  - pain control, supportive care  - serial abdominal exams  - labs in am    Surgical Team Contact Information  Spectralink: Ext: [phone] or [phone]  Pager: [phone]

## 2020-09-27 NOTE — PROGRESS NOTE ADULT - SUBJECTIVE AND OBJECTIVE BOX
Patient is a 60y old  Female who presents with a chief complaint of Panniculectomy (26 Sep 2020 19:09)    24 hour events: ***    REVIEW OF SYSTEMS  Constitutional: No fever, chills, fatigue  Neuro: No headache, numbness, weakness  Resp: No cough, wheezing, shortness of breath  CVS: No chest pain, palpitations, leg swelling  GI: No abdominal pain, nausea, vomiting, diarrhea   : No dysuria, frequency, incontinence  Skin: No itching, burning, rashes, or lesions   Msk: No joint pain or swelling  Psych: No depression, anxiety, mood swings  Heme: No bleeding    T(F): 98.1 (09-27-20 @ 03:37), Max: 98.7 (09-26-20 @ 20:42)  HR: 92 (09-27-20 @ 06:00) (75 - 130)  BP: 135/76 (09-27-20 @ 06:00) (73/43 - 148/69)  RR: 19 (09-27-20 @ 06:00) (15 - 31)  SpO2: 100% (09-27-20 @ 06:00) (85% - 100%)  Wt(kg): --            I&O's Summary    09-26 @ 07:01  -  09-27 @ 07:00  --------------------------------------------------------  IN: 2626.5 mL / OUT: 2035 mL / NET: 591.5 mL      PHYSICAL EXAM  General:   CNS:   HEENT:   Resp:   CVS:   Abd:   Ext:   Skin:     MEDICATIONS    norepinephrine Infusion IV Continuous  propranolol Oral    allopurinol Oral  dextrose 40% Gel Oral PRN  dextrose 50% Injectable IV Push  dextrose 50% Injectable IV Push  dextrose 50% Injectable IV Push  glucagon  Injectable IntraMuscular PRN  insulin lispro (HumaLOG) corrective regimen sliding scale SubCutaneous      acetaminophen   Tablet .. Oral PRN  DULoxetine Oral  HYDROmorphone  Injectable IV Push PRN  lamoTRIgine Oral  oxycodone    5 mG/acetaminophen 325 mG Oral PRN  oxycodone    5 mG/acetaminophen 325 mG Oral PRN  traZODone Oral        docusate sodium Oral      dextrose 5%. IV Continuous  potassium phosphate / sodium phosphate Powder (PHOS-NaK) Oral      artificial tears (preservative free) Ophthalmic Solution Both EYES PRN  chlorhexidine 2% Cloths Topical                            9.2    16.32 )-----------( 213      ( 27 Sep 2020 06:11 )             26.0       09-27    140  |  106  |  20  ----------------------------<  151<H>  4.3   |  28  |  0.66    Ca    8.4<L>      27 Sep 2020 06:11  Phos  2.2     09-27  Mg     2.0     09-27    TPro  5.3<L>  /  Alb  2.4<L>  /  TBili  0.4  /  DBili  x   /  AST  72<H>  /  ALT  107<H>  /  AlkPhos  77  09-27    Lactate 2.9           09-26 @ 19:56    Lactate 4.4           09-26 @ 07:36      CARDIAC MARKERS ( 25 Sep 2020 21:25 )  .033 ng/mL / x     / 65 U/L / x     / <1.0 ng/mL                Radiology: ***  Bedside lung ultrasound: ***  Bedside ECHO: ***    CENTRAL LINE: Y/N          DATE INSERTED:              REMOVE: Y/N  SHAVER: Y/N                        DATE INSERTED:              REMOVE: Y/N  A-LINE: Y/N                       DATE INSERTED:              REMOVE: Y/N    GLOBAL ISSUE/BEST PRACTICE  Analgesia:   Sedation:   CAM-ICU:   HOB elevation: yes  Stress ulcer prophylaxis:   VTE prophylaxis:   Glycemic control:   Nutrition:     CODE STATUS: ***  [facility] discussion: Y       60F h/o morbid obesity s/p lap band, since removed, HTN, BPD, anxiety disorder, thoracic aortic aneurysm, JESUS on CPAP, essential tremor POD1 paniculectomy and liposuction c/b hemorrhagic shock, lactic acidosis, ELAINE and hyperglycemia, now improved s/p 2unit pRBC resuscitation with improvement of shock state, normalization of renal function, improving lactate, better controlled blood sugar.    Procedure: S/P panniculectomy with liposuction, ventral hernia repair.     POST OP DAY # 2     24 hour events: Pt seen and examined at bedside. Over night pt started to get drop in her BP was started back on Levophed. BP stabilized. Uses BOIPAP at night for JESUS. Denies any SOB< chest pain, palpitation. No N/V/D.   Pt's Hb has been stable around 9, no further need to transfuse at this time. Pt denies any new complain.     REVIEW OF SYSTEMS  CONSTITUTIONAL: No fever, chills, or fatigue  EYES: No eye pain, visual disturbances, or discharge  ENMT: No difficulty hearing, tinnitus, vertigo; No sinus or throat pain  NECK: No pain or stiffness  RESPIRATORY: No cough, wheezing, chills or hemoptysis; mild SOB  CARDIOVASCULAR: No chest pain, palpitations, dizziness, or leg swelling  GASTROINTESTINAL: 4/10 abd pain. No nausea, vomiting, or hematemesis; No diarrhea or constipation. No melena or hematochezia.  NEUROLOGICAL: No headaches, memory loss, loss of strength, numbness, or tremors  SKIN: No itching, burning, rashes, or lesions   MUSCULOSKELETAL: No joint pain or swelling; No muscle, back, or extremity pain    T(F): 98.1 (09-27-20 @ 03:37), Max: 98.7 (09-26-20 @ 20:42)  HR: 92 (09-27-20 @ 06:00) (75 - 130)  BP: 135/76 (09-27-20 @ 06:00) (73/43 - 148/69)  RR: 19 (09-27-20 @ 06:00) (15 - 31)  SpO2: 100% (09-27-20 @ 06:00) (85% - 100%)  Wt(kg): --            I&O's Summary    09-26 @ 07:01  -  09-27 @ 07:00  --------------------------------------------------------  IN: 2626.5 mL / OUT: 2035 mL / NET: 591.5 mL      PHYSICAL EXAM  General: Obese female in NAD    HEENT: Pupils equal, reactive to light.  Symmetric.   PULM: Clear to auscultation bilaterally, no wheezes, rales or rhonchi,   NECK: obese, trachea midline  CVS: NSR  no murmurs, +s1/s2  ABD: abd compression wrap in place, 3x EDWIN drains   EXT: No edema, nontender  SKIN: Warm, diaphoretic, pale   NEURO: AAO X3, interactive, nonfocal    MEDICATIONS  norepinephrine Infusion IV Continuous  propranolol Oral  allopurinol Oral  dextrose 40% Gel Oral PRN  dextrose 50% Injectable IV Push  dextrose 50% Injectable IV Push  dextrose 50% Injectable IV Push  glucagon  Injectable IntraMuscular PRN  insulin lispro (HumaLOG) corrective regimen sliding scale SubCutaneous  acetaminophen   Tablet .. Oral PRN  DULoxetine Oral  HYDROmorphone  Injectable IV Push PRN  lamoTRIgine Oral  oxycodone    5 mG/acetaminophen 325 mG Oral PRN  oxycodone    5 mG/acetaminophen 325 mG Oral PRN  traZODone Oral  docusate sodium Oral  dextrose 5%. IV Continuous  potassium phosphate / sodium phosphate Powder (PHOS-NaK) Oral  artificial tears (preservative free) Ophthalmic Solution Both EYES PRN  chlorhexidine 2% Cloths Topical                            9.2    16.32 )-----------( 213      ( 27 Sep 2020 06:11 )             26.0       09-27    140  |  106  |  20  ----------------------------<  151<H>  4.3   |  28  |  0.66    Ca    8.4<L>      27 Sep 2020 06:11  Phos  2.2     09-27  Mg     2.0     09-27    TPro  5.3<L>  /  Alb  2.4<L>  /  TBili  0.4  /  DBili  x   /  AST  72<H>  /  ALT  107<H>  /  AlkPhos  77  09-27    Lactate 2.9           09-26 @ 19:56    Lactate 4.4           09-26 @ 07:36      CARDIAC MARKERS ( 25 Sep 2020 21:25 )  .033 ng/mL / x     / 65 U/L / x     / <1.0 ng/mL                Radiology:   Bedside lung ultrasound: ***  Bedside ECHO: ***    CENTRAL LINE: N                     CHHAYA: Y                           DATE INSERTED: 09/25/20       Date Removed: 09/27/20           GLOBAL ISSUE/BEST PRACTICE  Analgesia:   Sedation:   CAM-ICU:   HOB elevation: yes  Stress ulcer prophylaxis:   VTE prophylaxis:   Glycemic control:   Nutrition:     CODE STATUS: Full  [facility] discussion: Y

## 2020-09-27 NOTE — PROGRESS NOTE ADULT - SUBJECTIVE AND OBJECTIVE BOX
Patient is a 60y old  Female who presents with a chief complaint of Panniculectomy (27 Sep 2020 07:34)      INTERVAL HPI/OVERNIGHT EVENTS: Patient seen and examined at bedside in ICU, no new concerns from pt. On pressors for BP maint per nurse.     MEDICATIONS  (STANDING):  allopurinol 300 milliGRAM(s) Oral daily  chlorhexidine 2% Cloths 1 Application(s) Topical <User Schedule>  docusate sodium 100 milliGRAM(s) Oral three times a day  DULoxetine 30 milliGRAM(s) Oral <User Schedule>  lamoTRIgine 200 milliGRAM(s) Oral daily  norepinephrine Infusion 0.05 MICROgram(s)/kG/Min (12.6 mL/Hr) IV Continuous <Continuous>  polyethylene glycol 3350 17 Gram(s) Oral daily  propranolol 10 milliGRAM(s) Oral daily  senna 2 Tablet(s) Oral at bedtime  traZODone 200 milliGRAM(s) Oral at bedtime    MEDICATIONS  (PRN):  acetaminophen   Tablet .. 650 milliGRAM(s) Oral every 6 hours PRN Mild Pain (1 - 3)  artificial tears (preservative free) Ophthalmic Solution 1 Drop(s) Both EYES two times a day PRN Dry Eyes  glucagon  Injectable 1 milliGRAM(s) IntraMuscular once PRN Glucose LESS THAN 70 milligrams/deciliter  HYDROmorphone  Injectable 0.5 milliGRAM(s) IV Push every 4 hours PRN Breakthrough pain not relieved by severe pain meds  oxycodone    5 mG/acetaminophen 325 mG 1 Tablet(s) Oral every 4 hours PRN Moderate Pain (4 - 6)  oxycodone    5 mG/acetaminophen 325 mG 2 Tablet(s) Oral every 6 hours PRN Severe Pain (7 - 10)      Allergies  penicillins (Other)  trees dogs cats cockaroaches (Rhinitis; Rhinorrhea)    Intolerances    REVIEW OF SYSTEMS:  CONSTITUTIONAL: No fever or chills  HEENT:  No headache, no sore throat  RESPIRATORY: No cough, wheezing, or shortness of breath  CARDIOVASCULAR: No chest pain, palpitations  GASTROINTESTINAL:+abd pain, No nausea, vomiting, or diarrhea  GENITOURINARY: No dysuria, frequency, or hematuria  NEUROLOGICAL: no focal weakness or dizziness  MUSCULOSKELETAL: no myalgias     Vital Signs Last 24 Hrs  T(C): 37.1 (27 Sep 2020 12:31), Max: 37.1 (26 Sep 2020 20:42)  T(F): 98.7 (27 Sep 2020 12:31), Max: 98.7 (26 Sep 2020 20:42)  HR: 88 (27 Sep 2020 15:00) (76 - 97)  BP: 111/61 (27 Sep 2020 15:00) (97/53 - 173/78)  BP(mean): 80 (27 Sep 2020 15:00) (70 - 112)  RR: 20 (27 Sep 2020 15:00) (15 - 31)  SpO2: 94% (27 Sep 2020 15:00) (94% - 100%)    PHYSICAL EXAM:  GENERAL: no acute distress  HEENT:  anicteric, moist mucous membranes  CHEST/LUNG: good air entry anteriorly  HEART:  RRR, soft S1, S2  ABDOMEN:  BS+, soft, mild TTP diffusely, wearing abdominal wrap, +drain  EXTREMITIES: no edema, cyanosis, or calf tenderness  NERVOUS SYSTEM: answers questions and follows commands appropriately    LABS:                        9.2    16.32 )-----------( 213      ( 27 Sep 2020 06:11 )             26.0     CBC Full  -  ( 27 Sep 2020 06:11 )  WBC Count : 16.32 K/uL  Hemoglobin : 9.2 g/dL  Hematocrit : 26.0 %  Platelet Count - Automated : 213 K/uL  Mean Cell Volume : 86.1 fl  Mean Cell Hemoglobin : 30.5 pg  Mean Cell Hemoglobin Concentration : 35.4 gm/dL  Auto Neutrophil # : 11.99 K/uL  Auto Lymphocyte # : 2.76 K/uL  Auto Monocyte # : 1.46 K/uL  Auto Eosinophil # : 0.01 K/uL  Auto Basophil # : 0.03 K/uL  Auto Neutrophil % : 73.5 %  Auto Lymphocyte % : 16.9 %  Auto Monocyte % : 8.9 %  Auto Eosinophil % : 0.1 %  Auto Basophil % : 0.2 %    27 Sep 2020 06:11    140    |  106    |  20     ----------------------------<  151    4.3     |  28     |  0.66     Ca    8.4        27 Sep 2020 06:11  Phos  2.2       27 Sep 2020 06:11  Mg     2.0       27 Sep 2020 06:11    TPro  5.3    /  Alb  2.4    /  TBili  0.4    /  DBili  x      /  AST  72     /  ALT  107    /  AlkPhos  77     27 Sep 2020 06:11        CAPILLARY BLOOD GLUCOSE      POCT Blood Glucose.: 118 mg/dL (27 Sep 2020 12:38)  POCT Blood Glucose.: 140 mg/dL (27 Sep 2020 08:48)  POCT Blood Glucose.: 173 mg/dL (26 Sep 2020 17:22)

## 2020-09-27 NOTE — PROGRESS NOTE ADULT - SUBJECTIVE AND OBJECTIVE BOX
Patient is a 60y old  Female who presents with a chief complaint of Panniculectomy (27 Sep 2020 16:29)      INTERVAL HPI/OVERNIGHT EVENTS:    much improved today. denies shortness of breath      MEDICATIONS  (STANDING):  allopurinol 300 milliGRAM(s) Oral daily  chlorhexidine 2% Cloths 1 Application(s) Topical <User Schedule>  docusate sodium 100 milliGRAM(s) Oral three times a day  DULoxetine 30 milliGRAM(s) Oral <User Schedule>  lamoTRIgine 200 milliGRAM(s) Oral daily  norepinephrine Infusion 0.05 MICROgram(s)/kG/Min (12.6 mL/Hr) IV Continuous <Continuous>  polyethylene glycol 3350 17 Gram(s) Oral daily  propranolol 10 milliGRAM(s) Oral daily  senna 2 Tablet(s) Oral at bedtime  traZODone 200 milliGRAM(s) Oral at bedtime      MEDICATIONS  (PRN):  acetaminophen   Tablet .. 650 milliGRAM(s) Oral every 6 hours PRN Mild Pain (1 - 3)  artificial tears (preservative free) Ophthalmic Solution 1 Drop(s) Both EYES two times a day PRN Dry Eyes  glucagon  Injectable 1 milliGRAM(s) IntraMuscular once PRN Glucose LESS THAN 70 milligrams/deciliter  HYDROmorphone  Injectable 0.5 milliGRAM(s) IV Push every 4 hours PRN Breakthrough pain not relieved by severe pain meds  oxycodone    5 mG/acetaminophen 325 mG 1 Tablet(s) Oral every 4 hours PRN Moderate Pain (4 - 6)  oxycodone    5 mG/acetaminophen 325 mG 2 Tablet(s) Oral every 6 hours PRN Severe Pain (7 - 10)      Allergies    penicillins (Other)  trees dogs cats cockaroaches (Rhinitis; Rhinorrhea)    Intolerances        PAST MEDICAL & SURGICAL HISTORY:  History of aortic aneurysm  descending aorta see CT    Arthritis    Degenerative disc disease, lumbar    Neuropathy    Spinal stenosis    Bipolar 1 disorder    Kidney stone    Sleep Apnea  CPAP with oxygen since June 2020    Asthma    Hypertension    Morbid Obesity    ETOH Abuse  H/O    Benign Essential Tremor    Anxiety    Depression    Renal Calculi  chronic      Colitis  H/O    Anemia    S/P cardiac catheterization    S/P dilation and curettage    History of tonsillectomy    History of laparoscopic adjustable gastric banding  band removed few yrs ago    S/P D&amp;C    Biliary stent placement &amp; ercp    ureteroscopy with stone removal  1-        Vital Signs Last 24 Hrs  T(C): 37.1 (27 Sep 2020 12:31), Max: 37.1 (26 Sep 2020 20:42)  T(F): 98.7 (27 Sep 2020 12:31), Max: 98.7 (26 Sep 2020 20:42)  HR: 88 (27 Sep 2020 15:00) (76 - 97)  BP: 111/61 (27 Sep 2020 15:00) (97/53 - 173/78)  BP(mean): 80 (27 Sep 2020 15:00) (70 - 112)  RR: 20 (27 Sep 2020 15:00) (15 - 31)  SpO2: 94% (27 Sep 2020 15:00) (94% - 100%)    PHYSICAL EXAMINATION:    GENERAL: The patient is awake and alert in no apparent distress.     HEENT: Head is normocephalic and atraumatic. Extraocular muscles are intact. Mucous membranes are moist.    NECK: Supple.    LUNGS: Clear to auscultation without wheezing, rales or rhonchi; respirations unlabored    HEART: Regular rate and rhythm without murmur.    ABDOMEN: obese, no tenderness, and nondistended.      EXTREMITIES: Without any cyanosis, clubbing, rash, lesions or edema.    NEUROLOGIC: Grossly intact.    SKIN: No ulceration or induration present.      LABS:                        9.2    16.32 )-----------( 213      ( 27 Sep 2020 06:11 )             26.0     09-27    140  |  106  |  20  ----------------------------<  151<H>  4.3   |  28  |  0.66    Ca    8.4<L>      27 Sep 2020 06:11  Phos  2.2     09-27  Mg     2.0     09-27    TPro  5.3<L>  /  Alb  2.4<L>  /  TBili  0.4  /  DBili  x   /  AST  72<H>  /  ALT  107<H>  /  AlkPhos  77  09-27          CARDIAC MARKERS ( 25 Sep 2020 21:25 )  .033 ng/mL / x     / 65 U/L / x     / <1.0 ng/mL          Lactate, Blood: 2.1 mmol/L (09-27-20 @ 06:12)  Lactate, Blood: 2.9 mmol/L (09-26-20 @ 19:56)          Assessment:    S/P Hypotension due to post-op bleeding  Morbid Obesity  Obstructive Sleep Apnea Syndrome    Plan:    Stable for transfer out of critical care unit   Nocturnal CPAP  Pain control

## 2020-09-27 NOTE — PROGRESS NOTE ADULT - ASSESSMENT
60F h/o morbid obesity s/p lap band, since removed, HTN, BPD, anxiety disorder, thoracic aortic aneurysm, JESUS on CPAP, essential tremor POD1 paniculectomy and liposuction c/b hemorrhagic shock, lactic acidosis, ELAINE and hyperglycemia, now improved s/p 2unit pRBC resuscitation with improvement of shock state, normalization of renal function, improving lactate, better controlled blood sugar.    Assessment/Plan:    Neuro:  -No active issues.  -Hx of B. essential trammers on propranolol   -Hx of anxiety and depression on Lamictal, Trazadone and Cymbalta - will continue same treatment  -On deluded and oxycodone prn for pain.     CVS:  -Hypovolumic shock 2/2 to post op hematophage.   -Was started back on levo last night as the BP was getting low. SBP stable. Levo off around 10:00 am. Will continue to monitor the SBP.    -Will continue to hold home antihypertensive meds as blood pressure remains soft.   -No further runs of A.fib.     Pulm:  -no active issue.  -Pt is on nocturnal BIPAP for JESUS. Will continue as needed.     GI:   -No active issue  -Tolerating oral diet. on DASH/TLC  -No BM X 2 days. Will add Miralax and cheli. Pt informs that its normal for her to not go for 3-4 days.     Renal:  -ELAINE 2/2 to prerenal which is due to hypovolemia 2/2 to acute blood loss.   -sCr back to baseline. Today is at .6  -ELAINE resolving- will monitor electrolytes.   -will d/c saucedo for trail of void today.     ID:  -Leukocytosis most likely reactive to stress of shock and surgery.  -No Abx. Will continue to monitor.    Endo:  -Hyperglycemia likely 2/2 to stress and liposuction surgery. No prior Hx of DM  -On ISS with premeal and qhs coverage as needed. A1C at 6.7, will repeat in am.     Heam:  -S/P 3 units of PRBC's transfusion 2/2 to acute blood loss.  -Hb 9. Will continue to monitor    Dispo:  -Pt doing better. Will continue to monitor SBP as pt is now off levo.  -Pt to stay in ICU today.    60F h/o morbid obesity s/p lap band, since removed, HTN, BPD, anxiety disorder, thoracic aortic aneurysm, JESUS on CPAP, essential tremor POD1 paniculectomy and liposuction c/b hemorrhagic shock, lactic acidosis, ELAINE and hyperglycemia, now improved s/p 2unit pRBC resuscitation with improvement of shock state, normalization of renal function, improving lactate, better controlled blood sugar.    Assessment/Plan:    Neuro:  -No active issues.  -Hx of B. essential trammers on propranolol   -Hx of anxiety and depression on Lamictal, Trazadone and Cymbalta - will continue same treatment  -On deluded and oxycodone prn for pain.     CVS:  -Hypovolumic shock 2/2 to post op hematophage.   -Was started back on levo last night as the BP was getting low. SBP stable. Levo off around 10:00 am. Will continue to monitor the SBP.    -Will continue to hold home antihypertensive meds as blood pressure remains soft.   -No further runs of A.fib.     Pulm:  -no active issue.  -Pt is on nocturnal BIPAP for JESUS. Will continue as needed.     GI:   -No active issue  -Tolerating oral diet. on DASH/TLC  -No BM X 2 days. Will add Miralax and cheli. Pt informs that its normal for her to not go for 3-4 days.     Renal:  -ELAINE 2/2 to prerenal which is due to hypovolemia 2/2 to acute blood loss.   -sCr back to baseline. Today is at .6  -ELAINE resolving- will monitor electrolytes.   -will d/c saucedo for trail of void today.     ID:  -Leukocytosis most likely reactive to stress of shock and surgery.  -No Abx. Will continue to monitor.    Endo:  -Hyperglycemia likely 2/2 to stress and liposuction surgery. No prior Hx of DM  -On ISS with premeal and qhs coverage as needed. A1C at 6.7, will repeat in am.     Heam:  -S/P 3 units of PRBC's transfusion 2/2 to acute blood loss.  -Hb 9. Will continue to monitor    MSK:  -OOB to chair    Dispo:  -Pt doing better. Will continue to monitor SBP as pt is now off levo.  -Pt to stay in ICU today.

## 2020-09-28 LAB
ALBUMIN SERPL ELPH-MCNC: 2.3 G/DL — LOW (ref 3.3–5)
ALP SERPL-CCNC: 76 U/L — SIGNIFICANT CHANGE UP (ref 40–120)
ALT FLD-CCNC: 110 U/L — HIGH (ref 12–78)
ANION GAP SERPL CALC-SCNC: 5 MMOL/L — SIGNIFICANT CHANGE UP (ref 5–17)
AST SERPL-CCNC: 52 U/L — HIGH (ref 15–37)
BASOPHILS # BLD AUTO: 0.02 K/UL — SIGNIFICANT CHANGE UP (ref 0–0.2)
BASOPHILS NFR BLD AUTO: 0.2 % — SIGNIFICANT CHANGE UP (ref 0–2)
BILIRUB SERPL-MCNC: 0.4 MG/DL — SIGNIFICANT CHANGE UP (ref 0.2–1.2)
BUN SERPL-MCNC: 14 MG/DL — SIGNIFICANT CHANGE UP (ref 7–23)
CALCIUM SERPL-MCNC: 8.5 MG/DL — SIGNIFICANT CHANGE UP (ref 8.5–10.1)
CHLORIDE SERPL-SCNC: 108 MMOL/L — SIGNIFICANT CHANGE UP (ref 96–108)
CO2 SERPL-SCNC: 31 MMOL/L — SIGNIFICANT CHANGE UP (ref 22–31)
CREAT SERPL-MCNC: 0.69 MG/DL — SIGNIFICANT CHANGE UP (ref 0.5–1.3)
EOSINOPHIL # BLD AUTO: 0.05 K/UL — SIGNIFICANT CHANGE UP (ref 0–0.5)
EOSINOPHIL NFR BLD AUTO: 0.5 % — SIGNIFICANT CHANGE UP (ref 0–6)
GLUCOSE SERPL-MCNC: 107 MG/DL — HIGH (ref 70–99)
HCT VFR BLD CALC: 23.2 % — LOW (ref 34.5–45)
HCT VFR BLD CALC: 23.6 % — LOW (ref 34.5–45)
HGB BLD-MCNC: 7.8 G/DL — LOW (ref 11.5–15.5)
HGB BLD-MCNC: 7.9 G/DL — LOW (ref 11.5–15.5)
IMM GRANULOCYTES NFR BLD AUTO: 0.5 % — SIGNIFICANT CHANGE UP (ref 0–1.5)
LYMPHOCYTES # BLD AUTO: 2.68 K/UL — SIGNIFICANT CHANGE UP (ref 1–3.3)
LYMPHOCYTES # BLD AUTO: 24.1 % — SIGNIFICANT CHANGE UP (ref 13–44)
MAGNESIUM SERPL-MCNC: 2.3 MG/DL — SIGNIFICANT CHANGE UP (ref 1.6–2.6)
MCHC RBC-ENTMCNC: 30 PG — SIGNIFICANT CHANGE UP (ref 27–34)
MCHC RBC-ENTMCNC: 30.5 PG — SIGNIFICANT CHANGE UP (ref 27–34)
MCHC RBC-ENTMCNC: 33.5 GM/DL — SIGNIFICANT CHANGE UP (ref 32–36)
MCHC RBC-ENTMCNC: 33.6 GM/DL — SIGNIFICANT CHANGE UP (ref 32–36)
MCV RBC AUTO: 89.2 FL — SIGNIFICANT CHANGE UP (ref 80–100)
MCV RBC AUTO: 91.1 FL — SIGNIFICANT CHANGE UP (ref 80–100)
MONOCYTES # BLD AUTO: 0.86 K/UL — SIGNIFICANT CHANGE UP (ref 0–0.9)
MONOCYTES NFR BLD AUTO: 7.7 % — SIGNIFICANT CHANGE UP (ref 2–14)
NEUTROPHILS # BLD AUTO: 7.44 K/UL — HIGH (ref 1.8–7.4)
NEUTROPHILS NFR BLD AUTO: 67 % — SIGNIFICANT CHANGE UP (ref 43–77)
NRBC # BLD: 0 /100 WBCS — SIGNIFICANT CHANGE UP (ref 0–0)
NRBC # BLD: 0 /100 WBCS — SIGNIFICANT CHANGE UP (ref 0–0)
PHOSPHATE SERPL-MCNC: 2.3 MG/DL — LOW (ref 2.5–4.5)
PLATELET # BLD AUTO: 166 K/UL — SIGNIFICANT CHANGE UP (ref 150–400)
PLATELET # BLD AUTO: 185 K/UL — SIGNIFICANT CHANGE UP (ref 150–400)
POTASSIUM SERPL-MCNC: 4.4 MMOL/L — SIGNIFICANT CHANGE UP (ref 3.5–5.3)
POTASSIUM SERPL-SCNC: 4.4 MMOL/L — SIGNIFICANT CHANGE UP (ref 3.5–5.3)
PROT SERPL-MCNC: 5.4 G/DL — LOW (ref 6–8.3)
RBC # BLD: 2.59 M/UL — LOW (ref 3.8–5.2)
RBC # BLD: 2.6 M/UL — LOW (ref 3.8–5.2)
RBC # FLD: 14.5 % — SIGNIFICANT CHANGE UP (ref 10.3–14.5)
RBC # FLD: 15 % — HIGH (ref 10.3–14.5)
SODIUM SERPL-SCNC: 144 MMOL/L — SIGNIFICANT CHANGE UP (ref 135–145)
SURGICAL PATHOLOGY STUDY: SIGNIFICANT CHANGE UP
WBC # BLD: 11.1 K/UL — HIGH (ref 3.8–10.5)
WBC # BLD: 11.63 K/UL — HIGH (ref 3.8–10.5)
WBC # FLD AUTO: 11.1 K/UL — HIGH (ref 3.8–10.5)
WBC # FLD AUTO: 11.63 K/UL — HIGH (ref 3.8–10.5)

## 2020-09-28 PROCEDURE — 99291 CRITICAL CARE FIRST HOUR: CPT

## 2020-09-28 PROCEDURE — 99233 SBSQ HOSP IP/OBS HIGH 50: CPT

## 2020-09-28 PROCEDURE — 99233 SBSQ HOSP IP/OBS HIGH 50: CPT | Mod: GC

## 2020-09-28 RX ORDER — SODIUM,POTASSIUM PHOSPHATES 278-250MG
1 POWDER IN PACKET (EA) ORAL
Refills: 0 | Status: COMPLETED | OUTPATIENT
Start: 2020-09-28 | End: 2020-09-29

## 2020-09-28 RX ORDER — FOLIC ACID 0.8 MG
1 TABLET ORAL DAILY
Refills: 0 | Status: DISCONTINUED | OUTPATIENT
Start: 2020-09-28 | End: 2020-10-10

## 2020-09-28 RX ORDER — PREGABALIN 225 MG/1
1000 CAPSULE ORAL DAILY
Refills: 0 | Status: DISCONTINUED | OUTPATIENT
Start: 2020-09-28 | End: 2020-10-10

## 2020-09-28 RX ADMIN — OXYCODONE AND ACETAMINOPHEN 1 TABLET(S): 5; 325 TABLET ORAL at 22:56

## 2020-09-28 RX ADMIN — Medication 100 MILLIGRAM(S): at 05:55

## 2020-09-28 RX ADMIN — Medication 300 MILLIGRAM(S): at 12:02

## 2020-09-28 RX ADMIN — Medication 1 TABLET(S): at 17:15

## 2020-09-28 RX ADMIN — Medication 100 MILLIGRAM(S): at 21:14

## 2020-09-28 RX ADMIN — DULOXETINE HYDROCHLORIDE 30 MILLIGRAM(S): 30 CAPSULE, DELAYED RELEASE ORAL at 21:15

## 2020-09-28 RX ADMIN — CHLORHEXIDINE GLUCONATE 1 APPLICATION(S): 213 SOLUTION TOPICAL at 05:54

## 2020-09-28 RX ADMIN — Medication 1 MILLIGRAM(S): at 17:15

## 2020-09-28 RX ADMIN — Medication 200 MILLIGRAM(S): at 21:15

## 2020-09-28 RX ADMIN — PREGABALIN 1000 MICROGRAM(S): 225 CAPSULE ORAL at 17:15

## 2020-09-28 RX ADMIN — LAMOTRIGINE 200 MILLIGRAM(S): 25 TABLET, ORALLY DISINTEGRATING ORAL at 12:02

## 2020-09-28 RX ADMIN — DULOXETINE HYDROCHLORIDE 30 MILLIGRAM(S): 30 CAPSULE, DELAYED RELEASE ORAL at 07:52

## 2020-09-28 RX ADMIN — SENNA PLUS 2 TABLET(S): 8.6 TABLET ORAL at 21:14

## 2020-09-28 RX ADMIN — Medication 1 DROP(S): at 12:03

## 2020-09-28 RX ADMIN — DULOXETINE HYDROCHLORIDE 30 MILLIGRAM(S): 30 CAPSULE, DELAYED RELEASE ORAL at 13:19

## 2020-09-28 RX ADMIN — POLYETHYLENE GLYCOL 3350 17 GRAM(S): 17 POWDER, FOR SOLUTION ORAL at 12:02

## 2020-09-28 RX ADMIN — Medication 1 PACKET(S): at 17:15

## 2020-09-28 RX ADMIN — OXYCODONE AND ACETAMINOPHEN 1 TABLET(S): 5; 325 TABLET ORAL at 21:15

## 2020-09-28 NOTE — PROGRESS NOTE ADULT - ASSESSMENT
Ewelina is a 60 year old female with JESUS, obesity and HTN, with hypotension after an abdominal panniculectomy, in the setting of presumed blood loss anemia. She remains hypotensive with pressor requirement.    - She had brief atrial fibrillation in the setting of significant hypotension, though is now back in SR.  - no history of AF though has reported palpitations  - cont to watch on telemetry  - Likely this was secondary to underlying hypotension  - now off of pressor support.   - Can resume Propanolol. Monitor BPs closely.   - low normal LV function with mild diastolic dysfunction on recent echocardiogram    - no sign of acute ischemia  - recent cath with non-obs cad    - no sign of volume overload  - hold diuretics in setting of hypotension  - transfuse to keep hb >8     - cpap at night    - watch creatinine and electrolytes. Keep K>4, Mg>2  - will follow with you

## 2020-09-28 NOTE — PROGRESS NOTE ADULT - SUBJECTIVE AND OBJECTIVE BOX
Postoperative Day #:3    60y Female admitted with Other disorders of skin and subcutaneous tissue  S/P Panniculectomy with liposuction    .Patient seen and examined bedside resting comfortably.  Offers no complaints at this time.     States pain is well controlled.  Working with physical therapy to get out of bed and ambulating.  Is tolerating diet.  Passing flatus.  No BM but states this is her normal. Denies N/V, fevers, chills, headaches, lightheadedness, dizziness, abdominal pain/discomfort, lower leg/calf tenderness.      T(F): 98.7 (09-28-20 @ 06:30), Max: 98.7 (09-27-20 @ 12:31)  HR: 78 (09-28-20 @ 10:00) (70 - 96)  BP: 114/55 (09-28-20 @ 10:00) (94/51 - 131/64)  RR: 23 (09-28-20 @ 10:00) (15 - 36)  SpO2: 96% (09-28-20 @ 10:00) (90% - 100%)  Wt(kg): --  CAPILLARY BLOOD GLUCOSE      POCT Blood Glucose.: 118 mg/dL (27 Sep 2020 12:38)      PHYSICAL EXAM:  General: NAD  Neuro:  Alert & oriented x 3  CV: +S1+S2 regular rate and rhythm  Lung: clear to ausculation bilaterally  Abdomen: Abdominal binder in place.  EDWIN drain x3 in place with serosanguinous fluid in bulbs.  Binder removed for evaluation.  Incision looks good with mild serous staining- dried, to 4x4.  Dressings reapplied and Abdominal binder put back in place.  BS+ Soft, NT, ND.  Extremities: no pedal edema or calf tenderness noted., SCD in place.       LABS:                        7.8    11.10 )-----------( 166      ( 28 Sep 2020 05:25 )             23.2     09-28    144  |  108  |  14  ----------------------------<  107<H>  4.4   |  31  |  0.69    Ca    8.5      28 Sep 2020 05:25  Phos  2.3     09-28  Mg     2.3     09-28    TPro  5.4<L>  /  Alb  2.3<L>  /  TBili  0.4  /  DBili  x   /  AST  52<H>  /  ALT  110<H>  /  AlkPhos  76  09-28      I&O's Detail    27 Sep 2020 07:01  -  28 Sep 2020 07:00  --------------------------------------------------------  IN:    Oral Fluid: 1080 mL  Total IN: 1080 mL    OUT:    Bulb (mL): 240 mL    Bulb (mL): 155 mL    Bulb (mL): 320 mL    Indwelling Catheter - Urethral (mL): 1000 mL    Voided (mL): 650 mL  Total OUT: 2365 mL    Total NET: -1285 mL      28 Sep 2020 07:01  -  28 Sep 2020 10:42  --------------------------------------------------------  IN:    Oral Fluid: 120 mL  Total IN: 120 mL    OUT:    Voided (mL): 300 mL  Total OUT: 300 mL    Total NET: -180 mL          Impression: 60y Female admitted with Other disorders of skin and subcutaneous tissue  S/P Panniculectomy with liposuction.    Plan:  -Continue mobility- OOB, scd while in bed.   -Increase activity with PT, OOB, Ambulate  -Patient instructed on and encouraged incentive spirometry use  -continue local wound care- continue to monitor Drain output  -Management per primary team.  -Vital signs appear stable, possible downgrade out of ICU.   -will discuss with Dr De La Torre.

## 2020-09-28 NOTE — PROGRESS NOTE ADULT - ATTENDING COMMENTS
60F h/o morbid obesity s/p lap band, since removed, HTN, BPD, anxiety disorder, thoracic aortic aneurysm, JESUS on CPAP, essential tremor POD3 paniculectomy and liposuction c/b hemorrhagic shock, lactic acidosis, ELAINE, now all resolved and doing well.    --pain controlled, continue tylenol and percocet  d/c dilaudid, has not used recently  continue home psych regimen for bipolar disorder   propranolol for essential tremor  --hemodyamically stable  Hx htn, hold all antihtn meds for now  --stable from respiratory standpoint  JESUS, nightly CPAP  --normal renal function  --tolerating diet  --no infectious concerns  --incision healing well, JPs draining serosanginous fluid  wound management per plastics  --anemia stable, has chronic anemia per pt on Vit B12, multiB, iron  will restart  Hb this afternoon stable  no indication for transfusion  SCDs ppx  --stable for surgical floor  --plan discussed with pt

## 2020-09-28 NOTE — PROGRESS NOTE ADULT - ASSESSMENT
60F h/o morbid obesity s/p lap band, since removed, HTN, BPD, anxiety disorder, thoracic aortic aneurysm, JESUS on CPAP, essential tremor POD1 paniculectomy and liposuction c/b hemorrhagic shock, lactic acidosis, ELAINE and hyperglycemia, now improved s/p 2unit pRBC resuscitation with improvement of shock state, normalization of renal function, improving lactate, better controlled blood sugar.    Assessment/Plan:    Neuro:  -No active issues.  -Hx of B. essential trammers on propranolol   -Hx of anxiety and depression on Lamictal, Trazadone and Cymbalta - will continue same treatment  -On deluded and oxycodone prn for pain.     CVS:  -Hypovolumic shock 2/2 to post op hematophage.   -Was started back on levo last night as the BP was getting low. SBP stable. Levo off around 10:00 am. Will continue to monitor the SBP.    -Will continue to hold home antihypertensive meds as blood pressure remains soft.   -No further runs of A.fib.     Pulm:  -no active issue.  -Pt is on nocturnal BIPAP for JESUS. Will continue as needed.     GI:   -No active issue  -Tolerating oral diet. on DASH/TLC  -No BM X 2 days. Will add Miralax and cheli. Pt informs that its normal for her to not go for 3-4 days.     Renal:  -ELAINE 2/2 to prerenal which is due to hypovolemia 2/2 to acute blood loss.   -sCr back to baseline. Today is at .6  -ELAINE resolving- will monitor electrolytes.   -will d/c saucedo for trail of void today.     ID:  -Leukocytosis most likely reactive to stress of shock and surgery.  -No Abx. Will continue to monitor.    Endo:  -Hyperglycemia likely 2/2 to stress and liposuction surgery. No prior Hx of DM  -On ISS with premeal and qhs coverage as needed. A1C at 6.7, will repeat in am.     Heam:  -S/P 3 units of PRBC's transfusion 2/2 to acute blood loss.  -Hb 9. Will continue to monitor    MSK:  -OOB to chair    Dispo:  -Pt doing better. Will continue to monitor SBP as pt is now off levo.  -Pt to stay in ICU today.    60F h/o morbid obesity s/p lap band, since removed, HTN, BPD, anxiety disorder, thoracic aortic aneurysm, JESUS on CPAP, essential tremor POD1 paniculectomy and liposuction c/b hemorrhagic shock, lactic acidosis, ELAINE and hyperglycemia, now improved s/p 2unit pRBC resuscitation with improvement of shock state, normalization of renal function, improving lactate, better controlled blood sugar.    Assessment/Plan:    Neuro:  -No active issues.  -On propranolol 10mg PO QD for essential tremors  -On Lamictal 200mg PO QD, Trazadone 200mg PO QD and Cymbalta 30mg PO TID - will continue same treatment  -Currently on Tylenol for mild, and Percocet 1 tab q4 for mod, 2 tabs q6 for severe; Dilauded PRN discontinued     CVS:  -SBPs 111-120s, MAP 88, HR in 80s  -Was on Levophed Sat overnight was weaned off yesterday, currently stable off pressors    -Will continue to hold home antihypertensive meds as blood pressure remains soft.   -No further runs of A.fib.     Pulm:  -no active issues  -Pt is on CPAP for JESUS. Will continue as needed.     GI:   -No active issues  -Tolerating diet, currently on carb consistent diet, to be changed to DASH as glucose levels are controlled currently  -Bowel regimen added yesterday: Miralax, Senna, and Docusate, still no BM at this time --> pt states she goes only 1-2x weekly    Renal:  -ELAINE 2/2 to prerenal which is due to hypovolemia 2/2 to acute blood loss.   -Cr back to baseline as 0.69  -Electrolytes in range, slightly low Phos at 2.3, will add phos packets to diets for today  -Nielsen DCed yesterday, pt voided late last night without complications.    ID:  -Leukocytosis most likely reactive to stress of shock and surgery --> down to 11 today  -No Abx currently    Endo:  -Hyperglycemia likely 2/2 to stress and liposuction surgery. No prior Hx of DM  -On ISS with premeal and qhs coverage as needed.  -Better controlled now with -140  -A1C at 6.7     Heme:  -S/P 3 units of PRBC's transfusion 2/2 to acute blood loss.  -Hb downtrending slightly 7.8 <-- 8.1 <-- 9.2, afternoon CBC ordered, will transfuse prn <7  -Pt endorses hx of anemia and takes Vit B12 and iron at home, will restart today  -JPs draining serosanguinous fluid at 715 cc total/24 hr    MSK:  -OOB to chair    Dispo:  -Pt doing better, will transfer to floors pending availability    60F h/o morbid obesity s/p lap band, since removed, HTN, BPD, anxiety disorder, thoracic aortic aneurysm, JESUS on CPAP, essential tremor POD1 paniculectomy and liposuction c/b hemorrhagic shock, lactic acidosis, ELAINE and hyperglycemia, now improved s/p 2unit pRBC resuscitation with improvement of shock state, normalization of renal function, improving lactate, better controlled blood sugar.    Assessment/Plan:    Neuro:  -No active issues.  -On propranolol 10mg PO QD for essential tremors  -On Lamictal 200mg PO QD, Trazadone 200mg PO QD and Cymbalta 30mg PO TID - will continue same treatment  -Currently on Tylenol for mild, and Percocet 1 tab q4 for mod, 2 tabs q6 for severe; Dilauded PRN discontinued     CVS:  -SBPs 111-120s, MAP 88, HR in 80s  -Was on Levophed Sat overnight was weaned off yesterday, currently stable off pressors    -Will continue to hold home antihypertensive meds as blood pressure remains soft.   -No further runs of A.fib.     Pulm:  -no active issues  -Pt is on CPAP for JESUS. Will continue as needed.     GI:   -No active issues  -Tolerating diet, currently on carb consistent diet, to be changed to DASH as glucose levels are controlled currently  -Bowel regimen added yesterday: Miralax, Senna, and Docusate, still no BM at this time --> pt states she goes only 1-2x weekly    Renal:  -ELAINE 2/2 to prerenal which is due to hypovolemia 2/2 to acute blood loss.   -Cr back to baseline as 0.69  -Electrolytes in range, slightly low Phos at 2.3, will add phos packets to diets for today  -Nielsen DCed yesterday, pt voided late last night without complications.    ID:  -Leukocytosis most likely reactive to stress of shock and surgery --> down to 11 today  -No Abx currently    Endo:  -Hyperglycemia likely 2/2 to stress and liposuction surgery. No prior Hx of DM  -On ISS with premeal and qhs coverage as needed.  -Better controlled now with -140  -A1C at 6.7     Heme:  -S/P 3 units of PRBC's transfusion 2/2 to acute blood loss.  -Hb downtrending slightly 7.8 <-- 8.1 <-- 9.2, afternoon CBC ordered, will transfuse prn <7  -Pt endorses hx of anemia and takes Vit B12 and iron at home, will restart today  -JPs draining serosanguinous fluid at 715 cc total/24 hr  -on SCDs for VTE ppx    MSK:  -OOB to chair    Dispo:  -Pt doing better, will transfer to floors pending availability    60F h/o morbid obesity s/p lap band, since removed, HTN, BPD, anxiety disorder, thoracic aortic aneurysm, JESUS on CPAP, essential tremor POD1 paniculectomy and liposuction c/b hemorrhagic shock, lactic acidosis, ELAINE and hyperglycemia, now improved s/p 2unit pRBC resuscitation with improvement of shock state, normalization of renal function, improving lactate, better controlled blood sugar.    Assessment/Plan:    Neuro:  -No active issues.  -On propranolol 10mg PO QD for essential tremors  -On Lamictal 200mg PO QD, Trazadone 200mg PO QD and Cymbalta 30mg PO TID - will continue same treatment  -Currently on Tylenol for mild, and Percocet 1 tab q4 for mod, 2 tabs q6 for severe; Dilauded PRN discontinued     CVS:  -SBPs 111-120s, MAP 88, HR in 80s  -Was on Levophed Sat overnight was weaned off yesterday, currently stable off pressors    -Will continue to hold home antihypertensive meds as blood pressure remains soft.   -No further runs of A.fib.     Pulm:  -no active issues  -Pt is on CPAP for JESUS. Will continue as needed.     GI:   -No active issues  -Tolerating diet, currently on carb consistent diet, to be changed to DASH as glucose levels are controlled currently  -Bowel regimen added yesterday: Miralax, Senna, and Docusate, still no BM at this time but states she is passing gas--> pt states she goes only 1-2x weekly  -Continue to monitor for BM    Renal:  -ELAINE 2/2 to prerenal which is due to hypovolemia 2/2 to acute blood loss.   -Cr back to baseline as 0.69  -Lactate improved 2.1 <-- 4.4  -Electrolytes in range, slightly low Phos at 2.3, will add phos packets to diets for today  -Nielsen DCed yesterday, pt voided late last night without complications.    ID:  -Leukocytosis most likely reactive to stress of shock and surgery --> WBC down to 11 today  -No Abx currently    Endo:  -Hyperglycemia likely 2/2 to stress and liposuction surgery. No prior Hx of DM  -On ISS with premeal and qhs coverage as needed.  -Better controlled now with -140  -A1C at 6.7   -On carb consistent diet but due to improved BG will switch to DASH/TLC    Heme:  -S/P 3 units of PRBC's transfusion 2/2 to acute blood loss.  -Hb downtrending slightly 7.8 <-- 8.1 <-- 9.2, afternoon CBC ordered, will transfuse prn <7  -Pt endorses hx of anemia and takes Vit B12 and iron at home, will restart today  -JPs draining serosanguinous fluid at 715 cc total/24 hr  -on SCDs for VTE ppx    MSK:  -OOB to chair    Dispo:  -Pt doing better, will transfer to floors pending availability

## 2020-09-28 NOTE — PROGRESS NOTE ADULT - SUBJECTIVE AND OBJECTIVE BOX
Patient is a 60y old  Female who presents with a chief complaint of Panniculectomy (28 Sep 2020 10:41)    INTERVAL HPI/OVERNIGHT EVENTS: Patient seen and examined at bedside in ICU. No overnight events occurred. Patient has no new complaints at this time. Denies fevers, chills, headache, lightheadedness, chest pain, dyspnea, abdominal pain, n/v/d/c.    MEDICATIONS  (STANDING):  allopurinol 300 milliGRAM(s) Oral daily  chlorhexidine 2% Cloths 1 Application(s) Topical <User Schedule>  docusate sodium 100 milliGRAM(s) Oral three times a day  DULoxetine 30 milliGRAM(s) Oral <User Schedule>  lamoTRIgine 200 milliGRAM(s) Oral daily  polyethylene glycol 3350 17 Gram(s) Oral daily  propranolol 10 milliGRAM(s) Oral daily  senna 2 Tablet(s) Oral at bedtime  traZODone 200 milliGRAM(s) Oral at bedtime    MEDICATIONS  (PRN):  acetaminophen   Tablet .. 650 milliGRAM(s) Oral every 6 hours PRN Mild Pain (1 - 3)  artificial tears (preservative free) Ophthalmic Solution 1 Drop(s) Both EYES two times a day PRN Dry Eyes  glucagon  Injectable 1 milliGRAM(s) IntraMuscular once PRN Glucose LESS THAN 70 milligrams/deciliter  HYDROmorphone  Injectable 0.5 milliGRAM(s) IV Push every 4 hours PRN Breakthrough pain not relieved by severe pain meds  oxycodone    5 mG/acetaminophen 325 mG 1 Tablet(s) Oral every 4 hours PRN Moderate Pain (4 - 6)  oxycodone    5 mG/acetaminophen 325 mG 2 Tablet(s) Oral every 6 hours PRN Severe Pain (7 - 10)      Allergies  penicillins (Other)  trees dogs cats cockaroaches (Rhinitis; Rhinorrhea)    Intolerances        REVIEW OF SYSTEMS:  CONSTITUTIONAL: No fever or chills  HEENT:  No headache, no sore throat  RESPIRATORY: No cough, wheezing, or shortness of breath  CARDIOVASCULAR: No chest pain, palpitations  GASTROINTESTINAL: No abd pain, nausea, vomiting, or diarrhea  GENITOURINARY: No dysuria, frequency, or hematuria  NEUROLOGICAL: no focal weakness or dizziness  MUSCULOSKELETAL: no myalgias     Vital Signs Last 24 Hrs  T(C): 36.7 (28 Sep 2020 12:00), Max: 37.1 (28 Sep 2020 00:00)  T(F): 98 (28 Sep 2020 12:00), Max: 98.7 (28 Sep 2020 00:00)  HR: 88 (28 Sep 2020 12:00) (70 - 91)  BP: 116/64 (28 Sep 2020 12:00) (94/51 - 134/80)  BP(mean): 81 (28 Sep 2020 12:00) (70 - 101)  RR: 24 (28 Sep 2020 12:00) (15 - 36)  SpO2: 96% (28 Sep 2020 12:00) (90% - 100%)    PHYSICAL EXAM:  GENERAL: no acute distress  HEENT:  anicteric, moist mucous membranes  CHEST/LUNG: good air entry anteriorly  HEART:  RRR, soft S1, S2  ABDOMEN:  BS+, soft, mild TTP diffusely, wearing abdominal wrap, +EDWIN drains  EXTREMITIES: no edema, cyanosis, or calf tenderness  NERVOUS SYSTEM: answers questions and follows commands   LABS:                        7.8    11.10 )-----------( 166      ( 28 Sep 2020 05:25 )             23.2     CBC Full  -  ( 28 Sep 2020 05:25 )  WBC Count : 11.10 K/uL  Hemoglobin : 7.8 g/dL  Hematocrit : 23.2 %  Platelet Count - Automated : 166 K/uL  Mean Cell Volume : 89.2 fl  Mean Cell Hemoglobin : 30.0 pg  Mean Cell Hemoglobin Concentration : 33.6 gm/dL  Auto Neutrophil # : 7.44 K/uL  Auto Lymphocyte # : 2.68 K/uL  Auto Monocyte # : 0.86 K/uL  Auto Eosinophil # : 0.05 K/uL  Auto Basophil # : 0.02 K/uL  Auto Neutrophil % : 67.0 %  Auto Lymphocyte % : 24.1 %  Auto Monocyte % : 7.7 %  Auto Eosinophil % : 0.5 %  Auto Basophil % : 0.2 %    28 Sep 2020 05:25    144    |  108    |  14     ----------------------------<  107    4.4     |  31     |  0.69     Ca    8.5        28 Sep 2020 05:25  Phos  2.3       28 Sep 2020 05:25  Mg     2.3       28 Sep 2020 05:25    TPro  5.4    /  Alb  2.3    /  TBili  0.4    /  DBili  x      /  AST  52     /  ALT  110    /  AlkPhos  76     28 Sep 2020 05:25

## 2020-09-28 NOTE — PROGRESS NOTE ADULT - SUBJECTIVE AND OBJECTIVE BOX
Patient is a 60y old  Female who presents with a chief complaint of Panniculectomy (28 Sep 2020 13:31)      INTERVAL HPI/OVERNIGHT EVENTS:    Feeling better. Denies shortness of breath or chest pain    MEDICATIONS  (STANDING):  allopurinol 300 milliGRAM(s) Oral daily  chlorhexidine 2% Cloths 1 Application(s) Topical <User Schedule>  cyanocobalamin 1000 MICROGram(s) Oral daily  docusate sodium 100 milliGRAM(s) Oral three times a day  DULoxetine 30 milliGRAM(s) Oral <User Schedule>  folic acid 1 milliGRAM(s) Oral daily  lamoTRIgine 200 milliGRAM(s) Oral daily  multivitamin 1 Tablet(s) Oral daily  polyethylene glycol 3350 17 Gram(s) Oral daily  potassium phosphate / sodium phosphate Powder (PHOS-NaK) 1 Packet(s) Oral four times a day  propranolol 10 milliGRAM(s) Oral daily  senna 2 Tablet(s) Oral at bedtime  traZODone 200 milliGRAM(s) Oral at bedtime      MEDICATIONS  (PRN):  acetaminophen   Tablet .. 650 milliGRAM(s) Oral every 6 hours PRN Mild Pain (1 - 3)  artificial tears (preservative free) Ophthalmic Solution 1 Drop(s) Both EYES two times a day PRN Dry Eyes  glucagon  Injectable 1 milliGRAM(s) IntraMuscular once PRN Glucose LESS THAN 70 milligrams/deciliter  oxycodone    5 mG/acetaminophen 325 mG 1 Tablet(s) Oral every 4 hours PRN Moderate Pain (4 - 6)  oxycodone    5 mG/acetaminophen 325 mG 2 Tablet(s) Oral every 6 hours PRN Severe Pain (7 - 10)      Allergies    penicillins (Other)  trees dogs cats cockaroaches (Rhinitis; Rhinorrhea)    Intolerances        PAST MEDICAL & SURGICAL HISTORY:  History of aortic aneurysm  descending aorta see CT    Arthritis    Degenerative disc disease, lumbar    Neuropathy    Spinal stenosis    Bipolar 1 disorder    Kidney stone    Sleep Apnea  CPAP with oxygen since June 2020    Asthma    Hypertension    Morbid Obesity    ETOH Abuse  H/O    Benign Essential Tremor    Anxiety    Depression    Renal Calculi  chronic      Colitis  H/O    Anemia    S/P cardiac catheterization    S/P dilation and curettage    History of tonsillectomy    History of laparoscopic adjustable gastric banding  band removed few yrs ago    S/P D&amp;C    Biliary stent placement &amp; ercp    ureteroscopy with stone removal  1-        Vital Signs Last 24 Hrs  T(C): 36.9 (28 Sep 2020 15:45), Max: 37.1 (28 Sep 2020 00:00)  T(F): 98.4 (28 Sep 2020 15:45), Max: 98.7 (28 Sep 2020 00:00)  HR: 80 (28 Sep 2020 16:00) (70 - 101)  BP: 124/64 (28 Sep 2020 16:00) (94/51 - 134/80)  BP(mean): 88 (28 Sep 2020 16:00) (70 - 101)  RR: 29 (28 Sep 2020 16:00) (15 - 36)  SpO2: 92% (28 Sep 2020 16:00) (90% - 100%)    PHYSICAL EXAMINATION:    GENERAL: The patient is awake and alert in no apparent distress.     HEENT: Head is normocephalic and atraumatic. Extraocular muscles are intact. Mucous membranes are moist.    NECK: Supple.    LUNGS: Clear to auscultation without wheezing, rales or rhonchi; respirations unlabored    HEART: Regular rate and rhythm without murmur.    ABDOMEN: Soft, obese, nontender, and nondistended.      EXTREMITIES: Without any cyanosis, clubbing, rash, lesions or edema.    NEUROLOGIC: Grossly intact.    SKIN: No ulceration or induration present.      LABS:                        7.8    11.10 )-----------( 166      ( 28 Sep 2020 05:25 )             23.2     09-28    144  |  108  |  14  ----------------------------<  107<H>  4.4   |  31  |  0.69    Ca    8.5      28 Sep 2020 05:25  Phos  2.3     09-28  Mg     2.3     09-28    TPro  5.4<L>  /  Alb  2.3<L>  /  TBili  0.4  /  DBili  x   /  AST  52<H>  /  ALT  110<H>  /  AlkPhos  76  09-28          Assessment:    S/P Abdominal Panniculectomy  Post-op bleeding - S/P transfusions  Obstructive Sleep Apnea Syndrome  Morbid Obesity      Plan:    Cardiac monitoring  Pain Control  Follow H/H  For transfer as per CCU team

## 2020-09-28 NOTE — PROGRESS NOTE ADULT - SUBJECTIVE AND OBJECTIVE BOX
[facility] -- Almas Faustin, Mayra, Cara, Saurabh Johansen Savella  Office # [phone]      Follow Up:  hypotension    Subjective/Observations:   Patient seen and examined. Events noted. Resting comfortably in bed. No complaints of chest pain, dyspnea, or palpitations reported. No signs of orthopnea or PND. Now off pressors with soft bps.     REVIEW OF SYSTEMS: All other review of systems is negative unless indicated above    PAST MEDICAL & SURGICAL HISTORY:  History of aortic aneurysm  descending aorta see CT    Arthritis    Degenerative disc disease, lumbar    Neuropathy    Spinal stenosis    Bipolar 1 disorder    Kidney stone    Sleep Apnea  CPAP with oxygen since June 2020    Asthma    Hypertension    Morbid Obesity    ETOH Abuse  H/O    Benign Essential Tremor    Anxiety    Depression    Renal Calculi  chronic      Colitis  H/O    Anemia    S/P cardiac catheterization    S/P dilation and curettage    History of tonsillectomy    History of laparoscopic adjustable gastric banding  band removed few yrs ago    S/P D&amp;C    Biliary stent placement &amp; ercp    ureteroscopy with stone removal  1-        MEDICATIONS  (STANDING):  allopurinol 300 milliGRAM(s) Oral daily  chlorhexidine 2% Cloths 1 Application(s) Topical <User Schedule>  docusate sodium 100 milliGRAM(s) Oral three times a day  DULoxetine 30 milliGRAM(s) Oral <User Schedule>  lamoTRIgine 200 milliGRAM(s) Oral daily  polyethylene glycol 3350 17 Gram(s) Oral daily  propranolol 10 milliGRAM(s) Oral daily  senna 2 Tablet(s) Oral at bedtime  traZODone 200 milliGRAM(s) Oral at bedtime    MEDICATIONS  (PRN):  acetaminophen   Tablet .. 650 milliGRAM(s) Oral every 6 hours PRN Mild Pain (1 - 3)  artificial tears (preservative free) Ophthalmic Solution 1 Drop(s) Both EYES two times a day PRN Dry Eyes  glucagon  Injectable 1 milliGRAM(s) IntraMuscular once PRN Glucose LESS THAN 70 milligrams/deciliter  HYDROmorphone  Injectable 0.5 milliGRAM(s) IV Push every 4 hours PRN Breakthrough pain not relieved by severe pain meds  oxycodone    5 mG/acetaminophen 325 mG 1 Tablet(s) Oral every 4 hours PRN Moderate Pain (4 - 6)  oxycodone    5 mG/acetaminophen 325 mG 2 Tablet(s) Oral every 6 hours PRN Severe Pain (7 - 10)      Allergies    penicillins (Other)  trees dogs cats cockaroaches (Rhinitis; Rhinorrhea)    Intolerances            Vital Signs Last 24 Hrs  T(C): 36.7 (28 Sep 2020 12:00), Max: 37.1 (28 Sep 2020 00:00)  T(F): 98 (28 Sep 2020 12:00), Max: 98.7 (28 Sep 2020 00:00)  HR: 88 (28 Sep 2020 12:00) (70 - 91)  BP: 116/64 (28 Sep 2020 12:00) (94/51 - 134/80)  BP(mean): 81 (28 Sep 2020 12:00) (70 - 101)  RR: 24 (28 Sep 2020 12:00) (15 - 36)  SpO2: 96% (28 Sep 2020 12:00) (90% - 100%)    I&O's Summary    27 Sep 2020 07:01  -  28 Sep 2020 07:00  --------------------------------------------------------  IN: 1080 mL / OUT: 2365 mL / NET: -1285 mL    28 Sep 2020 07:01  -  28 Sep 2020 13:04  --------------------------------------------------------  IN: 360 mL / OUT: 600 mL / NET: -240 mL          PHYSICAL EXAM:  [facility]:   Constitutional: NAD, awake    HEENT: Moist Mucous Membranes, Anicteric  Pulmonary: Decreased breath sounds b/l. No rales, crackles or wheeze appreciated.   Cardiovascular: Regular, S1 and S2, No murmurs, rubs, gallops or clicks  Gastrointestinal: Bowel Sounds present, soft, nontender.   Lymph: No peripheral edema. No lymphadenopathy.  Skin: No visible rashes or ulcers.  Psych:  Mood & affect appropriate for situation    LABS: All Labs Reviewed:                        7.8    11.10 )-----------( 166      ( 28 Sep 2020 05:25 )             23.2                         8.1    13.67 )-----------( 181      ( 27 Sep 2020 17:13 )             23.6                         9.2    16.32 )-----------( 213      ( 27 Sep 2020 06:11 )             26.0     28 Sep 2020 05:25    144    |  108    |  14     ----------------------------<  107    4.4     |  31     |  0.69   27 Sep 2020 06:11    140    |  106    |  20     ----------------------------<  151    4.3     |  28     |  0.66   26 Sep 2020 07:36    139    |  105    |  27     ----------------------------<  202    3.9     |  23     |  1.30     Ca    8.5        28 Sep 2020 05:25  Ca    8.4        27 Sep 2020 06:11  Ca    8.3        26 Sep 2020 07:36  Phos  2.3       28 Sep 2020 05:25  Phos  2.2       27 Sep 2020 06:11  Phos  3.9       26 Sep 2020 07:36  Mg     2.3       28 Sep 2020 05:25  Mg     2.0       27 Sep 2020 06:11  Mg     1.8       26 Sep 2020 07:36    TPro  5.4    /  Alb  2.3    /  TBili  0.4    /  DBili  x      /  AST  52     /  ALT  110    /  AlkPhos  76     28 Sep 2020 05:25  TPro  5.3    /  Alb  2.4    /  TBili  0.4    /  DBili  x      /  AST  72     /  ALT  107    /  AlkPhos  77     27 Sep 2020 06:11  TPro  5.6    /  Alb  2.7    /  TBili  0.9    /  DBili  x      /  AST  64     /  ALT  66     /  AlkPhos  99     26 Sep 2020 07:36

## 2020-09-28 NOTE — PROGRESS NOTE ADULT - ASSESSMENT
59 yo female who presented to [facility] for elective abdominal panniculectomy. PMH JESUS on CPAP, HTN, Obesity, Anemia, AAA, Arthritis, Depression, Anxiety.     Hemorrhagic shock  - acute blood loss anemia post op  - now off pressors  - care per ICU  - s/p 3unit pRBC 9/26  - monitor EDWIN x3 output; hold chemical DVT ppx in setting of recent bleed.    S/P Abdominal panniculectomy: POD #3.   -mgmt per surgical team.   -pain control as ordered.   -bowel regimen on board; miralax, senna  - PT  - SCDs    Anemia, acute on chronic  - 2/2 blood loss, s/p pRBCs  - monitor h/h    Afib, new  - Cardio following  - had brief Afib in setting of illness, resume propanolol per cards    JESUS on CPAP: Dr Hanson consulted.   -continuous pulse ox.   -CPAP ordered.     HTN:   - continue propanolol  - holding verapamil (home med)    Depression:   - continue Cymbalta, trazodone and lamictal.     Obesity, morbid   - bmi 48  - ccho diet, A1c WNL at 5.6%    DVT ppx: SCDs

## 2020-09-28 NOTE — PROGRESS NOTE ADULT - SUBJECTIVE AND OBJECTIVE BOX
Patient is a 60y old  Female who presents with a chief complaint of Panniculectomy (28 Sep 2020 10:41)    Procedure: S/P panniculectomy with liposuction, ventral hernia repair.     POST OP DAY # 3  CHARTING IN PROGRESS    24 hour events: Patient seen and examined bedside. Patient endorses no acute events overnight however states she is hungry and continues to get different food from what she asked for. Nielsen catheter was removed yesterday and she urinated late last night without dysuria. She denies BM at this time even after bowel regimen started yesterday but states that this is normal for her and she can go days even at home without BM. She otherwise feels well and denies CP, SOB, abd pain, N, V, or pain around her drain sites.    REVIEW OF SYSTEMS  Constitutional: No fever, chills, fatigue  Neuro: No headache, numbness, weakness  Resp: No cough, wheezing, shortness of breath  CVS: No chest pain, palpitations, leg swelling  GI: No abdominal pain, nausea, vomiting, diarrhea   : No dysuria, frequency, incontinence  Skin: No itching, burning, rashes, or lesions   Msk: No joint pain or swelling  Psych: No depression, anxiety, mood swings  Heme: No bleeding    T(F): 98 (09-28-20 @ 12:00), Max: 98.7 (09-28-20 @ 00:00)  HR: 88 (09-28-20 @ 12:00) (70 - 91)  BP: 116/64 (09-28-20 @ 12:00) (94/51 - 134/80)  RR: 24 (09-28-20 @ 12:00) (15 - 36)  SpO2: 96% (09-28-20 @ 12:00) (90% - 100%)  Wt(kg): --            I&O's Summary    09-27 @ 07:01  -  09-28 @ 07:00  --------------------------------------------------------  IN: 1080 mL / OUT: 2365 mL / NET: -1285 mL    09-28 @ 07:01  -  09-28 @ 13:30  --------------------------------------------------------  IN: 360 mL / OUT: 600 mL / NET: -240 mL      PHYSICAL EXAM  General: Obese female in NAD    HEENT: Pupils equal, reactive to light.  Symmetric.   PULM: Clear to auscultation bilaterally, no wheezes, rales or rhonchi,   NECK: obese, trachea midline  CVS: NSR  no murmurs, +s1/s2  ABD: abd compression wrap in place, 3x EDWIN drains with serosanguinous fluid  EXT: No edema, nontender  SKIN: Warm, dry  NEURO: AAO X3, interactive, nonfocal    MEDICATIONS    propranolol Oral    allopurinol Oral  glucagon  Injectable IntraMuscular PRN      acetaminophen   Tablet .. Oral PRN  DULoxetine Oral  HYDROmorphone  Injectable IV Push PRN  lamoTRIgine Oral  oxycodone    5 mG/acetaminophen 325 mG Oral PRN  oxycodone    5 mG/acetaminophen 325 mG Oral PRN  traZODone Oral        docusate sodium Oral  polyethylene glycol 3350 Oral  senna Oral          artificial tears (preservative free) Ophthalmic Solution Both EYES PRN  chlorhexidine 2% Cloths Topical                            7.8    11.10 )-----------( 166      ( 28 Sep 2020 05:25 )             23.2       09-28    144  |  108  |  14  ----------------------------<  107<H>  4.4   |  31  |  0.69    Ca    8.5      28 Sep 2020 05:25  Phos  2.3     09-28  Mg     2.3     09-28    TPro  5.4<L>  /  Alb  2.3<L>  /  TBili  0.4  /  DBili  x   /  AST  52<H>  /  ALT  110<H>  /  AlkPhos  76  09-28        CENTRAL LINE: N  CHHAYA: ENRIQUE                        REMOVE: 9/27/2020 (had since 9/25/20)  A-LINE: N    GLOBAL ISSUE/BEST PRACTICE  Analgesia: Y  Sedation: N  HOB elevation: yes  Stress ulcer prophylaxis: N  VTE prophylaxis: SCDs  Glycemic control:   Nutrition: N (carb consistent)      CODE STATUS: Full  GOC discussion: Y       Patient is a 60y old  Female who presents with a chief complaint of Panniculectomy (28 Sep 2020 10:41)    Procedure: S/P panniculectomy with liposuction, ventral hernia repair.     POST OP DAY # 3      24 hour events: Patient seen and examined bedside. Patient endorses no acute events overnight however states she is hungry and continues to get different food from what she asked for. Nielsen catheter was removed yesterday and she urinated late last night without dysuria. She denies BM at this time even after bowel regimen started yesterday but states that this is normal for her and she can go days even at home without BM. She otherwise feels well and denies CP, SOB, abd pain, N, V, or pain around her drain sites.    REVIEW OF SYSTEMS  Constitutional: No fever, chills, fatigue  Neuro: No headache, numbness, weakness  Resp: No cough, wheezing, shortness of breath  CVS: No chest pain, palpitations, leg swelling  GI: No abdominal pain, nausea, vomiting, diarrhea   : No dysuria, frequency, incontinence  Skin: No itching, burning, rashes, or lesions   Msk: No joint pain or swelling  Psych: No depression, anxiety, mood swings  Heme: No active bleeding or bruising    T(F): 98 (09-28-20 @ 12:00), Max: 98.7 (09-28-20 @ 00:00)  HR: 88 (09-28-20 @ 12:00) (70 - 91)  BP: 116/64 (09-28-20 @ 12:00) (94/51 - 134/80)  RR: 24 (09-28-20 @ 12:00) (15 - 36)  SpO2: 96% (09-28-20 @ 12:00) (90% - 100%)  Wt(kg): --      I&O's Summary    09-27 @ 07:01  -  09-28 @ 07:00  --------------------------------------------------------  IN: 1080 mL / OUT: 2365 mL / NET: -1285 mL    09-28 @ 07:01  -  09-28 @ 13:30  --------------------------------------------------------  IN: 360 mL / OUT: 600 mL / NET: -240 mL      PHYSICAL EXAM  General: Obese female in NAD    HEENT: Pupils equal, reactive to light.  Symmetric.   PULM: Clear to auscultation bilaterally, no wheezes, rales or rhonchi,   NECK: obese, trachea midline  CVS: NSR  no murmurs, +s1/s2  ABD: abd compression wrap in place, 3x EDWIN drains with serosanguinous fluid  EXT: No edema, nontender  SKIN: Warm, dry  NEURO: AAO X3, interactive, nonfocal    MEDICATIONS    propranolol Oral    allopurinol Oral  glucagon  Injectable IntraMuscular PRN      acetaminophen   Tablet .. Oral PRN  DULoxetine Oral  HYDROmorphone  Injectable IV Push PRN  lamoTRIgine Oral  oxycodone    5 mG/acetaminophen 325 mG Oral PRN  oxycodone    5 mG/acetaminophen 325 mG Oral PRN  traZODone Oral        docusate sodium Oral  polyethylene glycol 3350 Oral  senna Oral          artificial tears (preservative free) Ophthalmic Solution Both EYES PRN  chlorhexidine 2% Cloths Topical                            7.8    11.10 )-----------( 166      ( 28 Sep 2020 05:25 )             23.2       09-28    144  |  108  |  14  ----------------------------<  107<H>  4.4   |  31  |  0.69    Ca    8.5      28 Sep 2020 05:25  Phos  2.3     09-28  Mg     2.3     09-28    TPro  5.4<L>  /  Alb  2.3<L>  /  TBili  0.4  /  DBili  x   /  AST  52<H>  /  ALT  110<H>  /  AlkPhos  76  09-28        CENTRAL LINE: N  CHHAYA: ENRIQUE                        REMOVE: 9/27/2020 (had since 9/25/20)  A-LINE: N    GLOBAL ISSUE/BEST PRACTICE  Analgesia: Y  Sedation: N  HOB elevation: yes  Stress ulcer prophylaxis: N  VTE prophylaxis: SCDs  Glycemic control:   Nutrition: N (carb consistent)      CODE STATUS: Full  GOC discussion: Y       Patient is a 60y old  Female who presents with a chief complaint of Panniculectomy (28 Sep 2020 10:41)    Procedure: S/P panniculectomy with liposuction, ventral hernia repair.     POST OP DAY # 3      24 hour events: Patient seen and examined bedside. Patient endorses no acute events overnight however states she is hungry and continues to get different food from what she asked for. Shaver catheter was removed yesterday and she urinated late last night without dysuria. She denies BM at this time even after bowel regimen started yesterday but states that this is normal for her and she can go days even at home without BM. She states she has anemia at home and takes Vit B12 and iron supplements, requests she be started on these while inpatient. She otherwise feels well and denies CP, SOB, abd pain, N, V, or pain around her drain sites.    REVIEW OF SYSTEMS  Constitutional: No fever, chills, fatigue  Neuro: No headache, numbness, weakness  Resp: No cough, wheezing, shortness of breath  CVS: No chest pain, palpitations, leg swelling  GI: No abdominal pain, nausea, vomiting, diarrhea   : No dysuria, frequency, incontinence  Skin: No itching, burning, rashes, or lesions   Msk: No joint pain or swelling  Psych: No depression, anxiety, mood swings  Heme: No active bleeding or bruising    T(F): 98 (09-28-20 @ 12:00), Max: 98.7 (09-28-20 @ 00:00)  HR: 88 (09-28-20 @ 12:00) (70 - 91)  BP: 116/64 (09-28-20 @ 12:00) (94/51 - 134/80)  RR: 24 (09-28-20 @ 12:00) (15 - 36)  SpO2: 96% (09-28-20 @ 12:00) (90% - 100%)  Wt(kg): --      I&O's Summary    09-27 @ 07:01  -  09-28 @ 07:00  --------------------------------------------------------  IN: 1080 mL / OUT: 2365 mL / NET: -1285 mL    09-28 @ 07:01  -  09-28 @ 13:30  --------------------------------------------------------  IN: 360 mL / OUT: 600 mL / NET: -240 mL      PHYSICAL EXAM  General: Obese female in NAD    HEENT: Pupils equal, reactive to light.  Symmetric.   PULM: Clear to auscultation bilaterally, no wheezes, rales or rhonchi,   NECK: obese, trachea midline  CVS: NSR  no murmurs, +s1/s2  ABD: abd compression wrap in place, 3x EDWIN drains with serosanguinous fluid  EXT: No edema, nontender  SKIN: Warm, dry  NEURO: AAO X3, interactive, nonfocal    MEDICATIONS    propranolol Oral    allopurinol Oral  glucagon  Injectable IntraMuscular PRN      acetaminophen   Tablet .. Oral PRN  DULoxetine Oral  HYDROmorphone  Injectable IV Push PRN  lamoTRIgine Oral  oxycodone    5 mG/acetaminophen 325 mG Oral PRN  oxycodone    5 mG/acetaminophen 325 mG Oral PRN  traZODone Oral        docusate sodium Oral  polyethylene glycol 3350 Oral  senna Oral          artificial tears (preservative free) Ophthalmic Solution Both EYES PRN  chlorhexidine 2% Cloths Topical                            7.8    11.10 )-----------( 166      ( 28 Sep 2020 05:25 )             23.2       09-28    144  |  108  |  14  ----------------------------<  107<H>  4.4   |  31  |  0.69    Ca    8.5      28 Sep 2020 05:25  Phos  2.3     09-28  Mg     2.3     09-28    TPro  5.4<L>  /  Alb  2.3<L>  /  TBili  0.4  /  DBili  x   /  AST  52<H>  /  ALT  110<H>  /  AlkPhos  76  09-28        CENTRAL LINE: ENRIQUE SHAVER: ENRIQUE                        REMOVE: 9/27/2020 (had since 9/25/20)  A-LINE: N    GLOBAL ISSUE/BEST PRACTICE  Analgesia: Y  Sedation: N  HOB elevation: yes  Stress ulcer prophylaxis: N  VTE prophylaxis: SCDs  Glycemic control:   Nutrition: N (carb consistent)      CODE STATUS: Full  GOC discussion: Y       Patient is a 60y old  Female who presents with a chief complaint of Panniculectomy (28 Sep 2020 10:41)    Procedure: S/P panniculectomy with liposuction, ventral hernia repair.     POST OP DAY # 3      24 hour events: Patient seen and examined bedside. Patient endorses no acute events overnight however states she is hungry and continues to get different food from what she asked for. Shaver catheter was removed yesterday and she urinated late last night without dysuria. She denies BM at this time even after bowel regimen started yesterday but states that this is normal for her and she can go days even at home without BM. She states she has anemia at home and takes Vit B12 and iron supplements, requests she be started on these while inpatient. She otherwise feels well and denies CP, SOB, abd pain, N, V, or pain around her drain sites.        T(F): 98 (09-28-20 @ 12:00), Max: 98.7 (09-28-20 @ 00:00)  HR: 88 (09-28-20 @ 12:00) (70 - 91)  BP: 116/64 (09-28-20 @ 12:00) (94/51 - 134/80)  RR: 24 (09-28-20 @ 12:00) (15 - 36)  SpO2: 96% (09-28-20 @ 12:00) (90% - 100%)  Wt(kg): --      I&O's Summary    09-27 @ 07:01  -  09-28 @ 07:00  --------------------------------------------------------  IN: 1080 mL / OUT: 2365 mL / NET: -1285 mL    09-28 @ 07:01  -  09-28 @ 13:30  --------------------------------------------------------  IN: 360 mL / OUT: 600 mL / NET: -240 mL      PHYSICAL EXAM  General: Obese female in NAD    HEENT: Pupils equal, reactive to light.  Symmetric.   PULM: Clear to auscultation bilaterally, no wheezes, rales or rhonchi,   NECK: obese, trachea midline  CVS: NSR  no murmurs, +s1/s2  ABD: abd compression wrap in place, 3x EDWIN drains with serosanguinous fluid  EXT: No edema, nontender  SKIN: Warm, dry  NEURO: AAO X3, interactive, nonfocal    MEDICATIONS    propranolol Oral    allopurinol Oral  glucagon  Injectable IntraMuscular PRN      acetaminophen   Tablet .. Oral PRN  DULoxetine Oral  HYDROmorphone  Injectable IV Push PRN  lamoTRIgine Oral  oxycodone    5 mG/acetaminophen 325 mG Oral PRN  oxycodone    5 mG/acetaminophen 325 mG Oral PRN  traZODone Oral        docusate sodium Oral  polyethylene glycol 3350 Oral  senna Oral          artificial tears (preservative free) Ophthalmic Solution Both EYES PRN  chlorhexidine 2% Cloths Topical                            7.8    11.10 )-----------( 166      ( 28 Sep 2020 05:25 )             23.2       09-28    144  |  108  |  14  ----------------------------<  107<H>  4.4   |  31  |  0.69    Ca    8.5      28 Sep 2020 05:25  Phos  2.3     09-28  Mg     2.3     09-28    TPro  5.4<L>  /  Alb  2.3<L>  /  TBili  0.4  /  DBili  x   /  AST  52<H>  /  ALT  110<H>  /  AlkPhos  76  09-28        CENTRAL LINE: N  SHAVER: N                        REMOVE: 9/27/2020 (had since 9/25/20)  A-LINE: N    GLOBAL ISSUE/BEST PRACTICE  Analgesia: Y  Sedation: N  HOB elevation: yes  Stress ulcer prophylaxis: N  VTE prophylaxis: SCDs  Glycemic control:   Nutrition: N (carb consistent)      CODE STATUS: Full

## 2020-09-29 LAB
ALBUMIN SERPL ELPH-MCNC: 2.3 G/DL — LOW (ref 3.3–5)
ALP SERPL-CCNC: 73 U/L — SIGNIFICANT CHANGE UP (ref 40–120)
ALT FLD-CCNC: 83 U/L — HIGH (ref 12–78)
ANION GAP SERPL CALC-SCNC: 6 MMOL/L — SIGNIFICANT CHANGE UP (ref 5–17)
APTT BLD: 21.9 SEC — LOW (ref 27.5–35.5)
AST SERPL-CCNC: 34 U/L — SIGNIFICANT CHANGE UP (ref 15–37)
BASOPHILS # BLD AUTO: 0.04 K/UL — SIGNIFICANT CHANGE UP (ref 0–0.2)
BASOPHILS NFR BLD AUTO: 0.5 % — SIGNIFICANT CHANGE UP (ref 0–2)
BILIRUB SERPL-MCNC: 0.3 MG/DL — SIGNIFICANT CHANGE UP (ref 0.2–1.2)
BUN SERPL-MCNC: 12 MG/DL — SIGNIFICANT CHANGE UP (ref 7–23)
CALCIUM SERPL-MCNC: 8.2 MG/DL — LOW (ref 8.5–10.1)
CHLORIDE SERPL-SCNC: 107 MMOL/L — SIGNIFICANT CHANGE UP (ref 96–108)
CO2 SERPL-SCNC: 31 MMOL/L — SIGNIFICANT CHANGE UP (ref 22–31)
CREAT SERPL-MCNC: 0.73 MG/DL — SIGNIFICANT CHANGE UP (ref 0.5–1.3)
EOSINOPHIL # BLD AUTO: 0.17 K/UL — SIGNIFICANT CHANGE UP (ref 0–0.5)
EOSINOPHIL NFR BLD AUTO: 2 % — SIGNIFICANT CHANGE UP (ref 0–6)
GLUCOSE SERPL-MCNC: 93 MG/DL — SIGNIFICANT CHANGE UP (ref 70–99)
HCT VFR BLD CALC: 22 % — LOW (ref 34.5–45)
HCT VFR BLD CALC: 22.8 % — LOW (ref 34.5–45)
HGB BLD-MCNC: 7.4 G/DL — LOW (ref 11.5–15.5)
HGB BLD-MCNC: 8 G/DL — LOW (ref 11.5–15.5)
IMM GRANULOCYTES NFR BLD AUTO: 0.7 % — SIGNIFICANT CHANGE UP (ref 0–1.5)
INR BLD: 1.11 RATIO — SIGNIFICANT CHANGE UP (ref 0.88–1.16)
LYMPHOCYTES # BLD AUTO: 2.26 K/UL — SIGNIFICANT CHANGE UP (ref 1–3.3)
LYMPHOCYTES # BLD AUTO: 26 % — SIGNIFICANT CHANGE UP (ref 13–44)
MAGNESIUM SERPL-MCNC: 2.4 MG/DL — SIGNIFICANT CHANGE UP (ref 1.6–2.6)
MCHC RBC-ENTMCNC: 30.8 PG — SIGNIFICANT CHANGE UP (ref 27–34)
MCHC RBC-ENTMCNC: 31.6 PG — SIGNIFICANT CHANGE UP (ref 27–34)
MCHC RBC-ENTMCNC: 33.6 GM/DL — SIGNIFICANT CHANGE UP (ref 32–36)
MCHC RBC-ENTMCNC: 35.1 GM/DL — SIGNIFICANT CHANGE UP (ref 32–36)
MCV RBC AUTO: 90.1 FL — SIGNIFICANT CHANGE UP (ref 80–100)
MCV RBC AUTO: 91.7 FL — SIGNIFICANT CHANGE UP (ref 80–100)
MONOCYTES # BLD AUTO: 0.66 K/UL — SIGNIFICANT CHANGE UP (ref 0–0.9)
MONOCYTES NFR BLD AUTO: 7.6 % — SIGNIFICANT CHANGE UP (ref 2–14)
NEUTROPHILS # BLD AUTO: 5.5 K/UL — SIGNIFICANT CHANGE UP (ref 1.8–7.4)
NEUTROPHILS NFR BLD AUTO: 63.2 % — SIGNIFICANT CHANGE UP (ref 43–77)
NRBC # BLD: 0 /100 WBCS — SIGNIFICANT CHANGE UP (ref 0–0)
NRBC # BLD: 0 /100 WBCS — SIGNIFICANT CHANGE UP (ref 0–0)
PHOSPHATE SERPL-MCNC: 3.4 MG/DL — SIGNIFICANT CHANGE UP (ref 2.5–4.5)
PLATELET # BLD AUTO: 185 K/UL — SIGNIFICANT CHANGE UP (ref 150–400)
PLATELET # BLD AUTO: 201 K/UL — SIGNIFICANT CHANGE UP (ref 150–400)
POTASSIUM SERPL-MCNC: 3.9 MMOL/L — SIGNIFICANT CHANGE UP (ref 3.5–5.3)
POTASSIUM SERPL-SCNC: 3.9 MMOL/L — SIGNIFICANT CHANGE UP (ref 3.5–5.3)
PROT SERPL-MCNC: 5.5 G/DL — LOW (ref 6–8.3)
PROTHROM AB SERPL-ACNC: 12.9 SEC — SIGNIFICANT CHANGE UP (ref 10.6–13.6)
RBC # BLD: 2.4 M/UL — LOW (ref 3.8–5.2)
RBC # BLD: 2.53 M/UL — LOW (ref 3.8–5.2)
RBC # FLD: 14.5 % — SIGNIFICANT CHANGE UP (ref 10.3–14.5)
RBC # FLD: 14.5 % — SIGNIFICANT CHANGE UP (ref 10.3–14.5)
SODIUM SERPL-SCNC: 144 MMOL/L — SIGNIFICANT CHANGE UP (ref 135–145)
WBC # BLD: 8.69 K/UL — SIGNIFICANT CHANGE UP (ref 3.8–10.5)
WBC # BLD: 9.73 K/UL — SIGNIFICANT CHANGE UP (ref 3.8–10.5)
WBC # FLD AUTO: 8.69 K/UL — SIGNIFICANT CHANGE UP (ref 3.8–10.5)
WBC # FLD AUTO: 9.73 K/UL — SIGNIFICANT CHANGE UP (ref 3.8–10.5)

## 2020-09-29 PROCEDURE — 99233 SBSQ HOSP IP/OBS HIGH 50: CPT

## 2020-09-29 PROCEDURE — 99233 SBSQ HOSP IP/OBS HIGH 50: CPT | Mod: GC

## 2020-09-29 RX ADMIN — SENNA PLUS 2 TABLET(S): 8.6 TABLET ORAL at 21:46

## 2020-09-29 RX ADMIN — Medication 100 MILLIGRAM(S): at 21:46

## 2020-09-29 RX ADMIN — Medication 100 MILLIGRAM(S): at 13:09

## 2020-09-29 RX ADMIN — Medication 200 MILLIGRAM(S): at 21:46

## 2020-09-29 RX ADMIN — DULOXETINE HYDROCHLORIDE 30 MILLIGRAM(S): 30 CAPSULE, DELAYED RELEASE ORAL at 13:09

## 2020-09-29 RX ADMIN — Medication 1 TABLET(S): at 11:13

## 2020-09-29 RX ADMIN — LAMOTRIGINE 200 MILLIGRAM(S): 25 TABLET, ORALLY DISINTEGRATING ORAL at 11:14

## 2020-09-29 RX ADMIN — Medication 1 PACKET(S): at 06:33

## 2020-09-29 RX ADMIN — Medication 300 MILLIGRAM(S): at 11:13

## 2020-09-29 RX ADMIN — Medication 1 MILLIGRAM(S): at 11:13

## 2020-09-29 RX ADMIN — DULOXETINE HYDROCHLORIDE 30 MILLIGRAM(S): 30 CAPSULE, DELAYED RELEASE ORAL at 06:34

## 2020-09-29 RX ADMIN — Medication 100 MILLIGRAM(S): at 06:34

## 2020-09-29 RX ADMIN — POLYETHYLENE GLYCOL 3350 17 GRAM(S): 17 POWDER, FOR SOLUTION ORAL at 11:13

## 2020-09-29 RX ADMIN — PREGABALIN 1000 MICROGRAM(S): 225 CAPSULE ORAL at 11:13

## 2020-09-29 RX ADMIN — Medication 1 DROP(S): at 11:13

## 2020-09-29 RX ADMIN — Medication 1 PACKET(S): at 11:13

## 2020-09-29 RX ADMIN — OXYCODONE AND ACETAMINOPHEN 1 TABLET(S): 5; 325 TABLET ORAL at 14:00

## 2020-09-29 RX ADMIN — OXYCODONE AND ACETAMINOPHEN 1 TABLET(S): 5; 325 TABLET ORAL at 13:09

## 2020-09-29 RX ADMIN — DULOXETINE HYDROCHLORIDE 30 MILLIGRAM(S): 30 CAPSULE, DELAYED RELEASE ORAL at 21:46

## 2020-09-29 NOTE — PROGRESS NOTE ADULT - SUBJECTIVE AND OBJECTIVE BOX
[facility] -- Almas Faustin, Cara Nunez, Saurabh Johansen, Cindy Giordano: Office # [phone]    Follow Up:  Hypotension     Subjective/Observations: Patient seen and examined. Patient awake and alert, resting comfortably in bed. No complaints of chest pain, SOB, LE edema, cough. No signs of orthopnea or PND.    REVIEW OF SYSTEMS: All review of systems is negative for eye, ENT, GI, , allergic, dermatologic, musculoskeletal and neurologic except as described above    PAST MEDICAL & SURGICAL HISTORY:  History of aortic aneurysm  descending aorta see CT    Arthritis    Degenerative disc disease, lumbar    Neuropathy    Spinal stenosis    Bipolar 1 disorder    Kidney stone    Sleep Apnea  CPAP with oxygen since June 2020    Asthma    Hypertension    Morbid Obesity    ETOH Abuse  H/O    Benign Essential Tremor    Anxiety    Depression    Renal Calculi  chronic      Colitis  H/O    Anemia    S/P cardiac catheterization    S/P dilation and curettage    History of tonsillectomy    History of laparoscopic adjustable gastric banding  band removed few yrs ago    S/P D&amp;C    Biliary stent placement &amp; ercp    ureteroscopy with stone removal  1-        MEDICATIONS  (STANDING):  allopurinol 300 milliGRAM(s) Oral daily  cyanocobalamin 1000 MICROGram(s) Oral daily  docusate sodium 100 milliGRAM(s) Oral three times a day  DULoxetine 30 milliGRAM(s) Oral <User Schedule>  folic acid 1 milliGRAM(s) Oral daily  lamoTRIgine 200 milliGRAM(s) Oral daily  multivitamin 1 Tablet(s) Oral daily  polyethylene glycol 3350 17 Gram(s) Oral daily  propranolol 10 milliGRAM(s) Oral daily  senna 2 Tablet(s) Oral at bedtime  traZODone 200 milliGRAM(s) Oral at bedtime    MEDICATIONS  (PRN):  acetaminophen   Tablet .. 650 milliGRAM(s) Oral every 6 hours PRN Mild Pain (1 - 3)  artificial tears (preservative free) Ophthalmic Solution 1 Drop(s) Both EYES two times a day PRN Dry Eyes  glucagon  Injectable 1 milliGRAM(s) IntraMuscular once PRN Glucose LESS THAN 70 milligrams/deciliter  oxycodone    5 mG/acetaminophen 325 mG 1 Tablet(s) Oral every 4 hours PRN Moderate Pain (4 - 6)  oxycodone    5 mG/acetaminophen 325 mG 2 Tablet(s) Oral every 6 hours PRN Severe Pain (7 - 10)      Allergies  penicillins (Other)  trees dogs cats cockaroaches (Rhinitis; Rhinorrhea)    Vital Signs Last 24 Hrs  T(C): 37.6 (29 Sep 2020 13:28), Max: 37.6 (29 Sep 2020 13:28)  T(F): 99.7 (29 Sep 2020 13:28), Max: 99.7 (29 Sep 2020 13:28)  HR: 85 (29 Sep 2020 13:28) (80 - 90)  BP: 122/80 (29 Sep 2020 13:28) (111/57 - 125/56)  BP(mean): 80 (28 Sep 2020 18:00) (79 - 88)  RR: 18 (29 Sep 2020 13:28) (18 - 30)  SpO2: 94% (29 Sep 2020 13:28) (92% - 99%)    I&O's Summary    28 Sep 2020 07:01  -  29 Sep 2020 07:00  --------------------------------------------------------  IN: 720 mL / OUT: 2210 mL / NET: -1490 mL    29 Sep 2020 07:01  -  29 Sep 2020 14:39  --------------------------------------------------------  IN: 400 mL / OUT: 150 mL / NET: 250 mL    TELE: Sr 80-100s   PHYSICAL EXAM:  Appearance: NAD, no distress, alert, Obese   HEENT: Moist Mucous Membranes, Anicteric  Cardiovascular: Regular rate and rhythm, Normal S1 S2, No JVD, No murmurs, No rubs, gallops or clicks  Respiratory: Non-labored, Clear to auscultation, No rales, No rhonchi, No wheezing.   Gastrointestinal:  Soft, Non-tender, + BS  Neurologic: Non-focal  Skin: Warm and dry, No visible rashes or ulcers, No ecchymosis, No cyanosis  Musculoskeletal: No clubbing, No cyanosis, No joint swelling/tenderness  Psychiatry: Mood & affect appropriate  Lymph: No peripheral edema.     LABS: All Labs Reviewed:                        7.4    8.69  )-----------( 185      ( 29 Sep 2020 06:49 )             22.0                         7.9    11.63 )-----------( 185      ( 28 Sep 2020 17:20 )             23.6                         7.8    11.10 )-----------( 166      ( 28 Sep 2020 05:25 )             23.2     29 Sep 2020 06:49    144    |  107    |  12     ----------------------------<  93     3.9     |  31     |  0.73   28 Sep 2020 05:25    144    |  108    |  14     ----------------------------<  107    4.4     |  31     |  0.69   27 Sep 2020 06:11    140    |  106    |  20     ----------------------------<  151    4.3     |  28     |  0.66     Ca    8.2        29 Sep 2020 06:49  Ca    8.5        28 Sep 2020 05:25  Ca    8.4        27 Sep 2020 06:11  Phos  3.4       29 Sep 2020 06:49  Phos  2.3       28 Sep 2020 05:25  Phos  2.2       27 Sep 2020 06:11  Mg     2.4       29 Sep 2020 06:49  Mg     2.3       28 Sep 2020 05:25  Mg     2.0       27 Sep 2020 06:11    TPro  5.5    /  Alb  2.3    /  TBili  0.3    /  DBili  x      /  AST  34     /  ALT  83     /  AlkPhos  73     29 Sep 2020 06:49  TPro  5.4    /  Alb  2.3    /  TBili  0.4    /  DBili  x      /  AST  52     /  ALT  110    /  AlkPhos  76     28 Sep 2020 05:25  TPro  5.3    /  Alb  2.4    /  TBili  0.4    /  DBili  x      /  AST  72     /  ALT  107    /  AlkPhos  77     27 Sep 2020 06:11    PT/INR - ( 29 Sep 2020 06:49 )   PT: 12.9 sec;   INR: 1.11 ratio    PTT - ( 29 Sep 2020 06:49 )  PTT:21.9 sec  Creatine Kinase, Serum: 65 U/L (09-25-20 @ 21:25)  Troponin I, Serum: .033 ng/mL (09-25-20 @ 21:25)  D-Dimer Assay, Quantitative: 321 ng/mL DDU (09-25-20 @ 21:38)  Lactate, Blood: 2.1 mmol/L (09-27-20 @ 06:12)  Lactate, Blood: 2.9 mmol/L (09-26-20 @ 19:56)    < from: Cardiac Cath Lab - Adult (06.16.20 @ 16:26) >  CORONARY VESSELS: The coronary circulation is right dominant.  LM:   --  LM: Normal.  LAD:   --  LAD: Angiography showed minor luminal irregularities with no  flow limiting lesions.  CX:   --  Circumflex: Normal.  RCA:   --  RCA: Normal.  COMPLICATIONS: There were no complications.  DIAGNOSTIC RECOMMENDATIONS: The patient should continue with the present  medications.  < end of copied text >

## 2020-09-29 NOTE — PROGRESS NOTE ADULT - ASSESSMENT
60 year old female with JESUS, obesity and HTN, with hypotension after an abdominal panniculectomy, in the setting of presumed blood loss anemia, now off pressors     A fib  - She had brief atrial fibrillation in the setting of significant hypotension, though is now back in SR.  - no history of AF though has reported palpitations  - telemetry with SR 80-90s   - Likely this was secondary to underlying hypotension  - now off of pressor support.   - Can resume Propanolol.     HTN  - BP: 122/80 (09-29-20 @ 13:28) (111/57 - 125/56)  - Continue propranolol  - Hold verapamil   - low normal LV function with mild diastolic dysfunction on recent echocardiogram  - no sign of acute ischemia  - recent cath with non-obs cad  - no sign of volume overload  - hold diuretics in setting of hypotension    Anemia   - Hemoglobin <--7.4, <--7.9, <--7.8  - Hematocrit <--22.0, <--23.6, <--23.2  - Monitor CBC    JESUS  - CPAP at night    - Monitor and replete lytes, keep K>4, Mg>2.  - All other medical needs as per primary team.  - Other cardiovascular workup will depend on clinical course.  - Will continue to follow.    Kobe Corado, MS FNP, [facility]  Nurse Practitioner- Cardiology   Spectra #[phone]

## 2020-09-29 NOTE — PROGRESS NOTE ADULT - ASSESSMENT
61 yo female who presented to [facility] for elective abdominal panniculectomy. PMH JESUS on CPAP, HTN, Obesity, Anemia, AAA, Arthritis, Depression, Anxiety.     Hemorrhagic shock  - acute blood loss anemia post op  - now off pressors  - care per ICU  - s/p 3unit pRBC 9/26  - monitor EDWIN x3 output; hold chemical DVT ppx in setting of recent bleed.    S/P Abdominal panniculectomy: POD #4.   -mgmt per surgical team.   -pain control as ordered.   -bowel regimen on board; miralax, senna  - PT  - SCDs    Anemia, acute on chronic  - 2/2 blood loss, s/p pRBCs  - monitor h/h    Afib, new  - Cardio following  - had brief Afib in setting of illness, resume propanolol per cards    JESUS on CPAP: Dr Hanson consulted.   -continuous pulse ox.   -CPAP ordered.     HTN:   - continue propanolol  - holding verapamil (home med)    Depression:   - continue Cymbalta, trazodone and lamictal.     Obesity, morbid   - bmi 48  - ccho diet, A1c WNL at 5.6%    DVT ppx: SCDs   59 yo female who presented to [facility] for elective abdominal panniculectomy. PMH JESUS on CPAP, HTN, Obesity, Anemia, AAA, Arthritis, Depression, Anxiety.     Hemorrhagic shock, resolved   - acute blood loss anemia post op, s/p 3unit pRBC 9/26  - shock state resolved, now off pressors with stable vital signs   -H/H slowly trending down. Per pt, she has been chronically anemic with a baseline of approx 8-9 and has been on B12 and folic acid supplements for years. Continue Vit B12 and folic acid supplements   - Continue to monitor H/H closely, Transfuse if Hgb <7 or if hemodynamically unstable   - monitor EDWIN x3 output; hold chemical DVT ppx in setting of recent bleed.    S/P Abdominal panniculectomy: POD #4.   -mgmt per surgical team.   -pain control as ordered.   -bowel regimen on board; miralax, senna, patient currently refusing   - PT  - SCDs    Anemia, acute on chronic  - 2/2 blood loss, s/p 3 units pRBCs  -H/H slowly trending down. Per pt, she has been chronically anemic with a baseline of approx 8-9 and has been on B12 and folic acid supplements for years. Continue Vit B12 and folic acid supplements   - Continue to monitor H/H closely, Transfuse if Hgb <7 or if hemodynamically unstable   - monitor h/h    Afib, new  - Cardio following  - had brief Afib in setting of illness, resume propanolol per cards    JESUS on CPAP: Dr Hanson consulted.   -continuous pulse ox.   -CPAP ordered.     HTN:   - continue propanolol  - holding verapamil (home med)    Depression:   - continue Cymbalta, trazodone and lamictal.     Obesity, morbid   - bmi 48  - ccho diet, A1c WNL at 5.6%    DVT ppx: SCDs

## 2020-09-29 NOTE — PROGRESS NOTE ADULT - SUBJECTIVE AND OBJECTIVE BOX
STATUS POST:  Abdominal panniculectomy with liposuction, and ventral hernia repair    POST OPERATIVE DAY #:  4    SUBJECTIVE:  Patient seen and examined at bedside.  No overnight events.  Patient with no new complaints at this time, states pain is mostly improved, however still with some left-sided abdominal tenderness.  Tolerating DASH diet.  Patient denies any dizziness, lightheadedness, fevers, chills, chest pain, shortness of breath, nausea or vomiting.    Vital Signs Last 24 Hrs  T(C): 37 (28 Sep 2020 19:47), Max: 37.1 (28 Sep 2020 06:30)  T(F): 98.6 (28 Sep 2020 19:47), Max: 98.7 (28 Sep 2020 06:30)  HR: 83 (28 Sep 2020 21:22) (78 - 101)  BP: 115/74 (28 Sep 2020 19:47) (111/57 - 134/80)  BP(mean): 80 (28 Sep 2020 18:00) (77 - 101)  RR: 22 (28 Sep 2020 19:47) (20 - 30)  SpO2: 98% (28 Sep 2020 21:22) (92% - 100%)    PHYSICAL EXAM  GENERAL: Obese female lying comfortably in bed in NAD, using CPAP machine  CARDIO:  Regular rate and rhythm.  No murmur, gallop or rub appreciated.  RESPIRATORY:  Clear to auscultation bilaterally.  No wheezing, rales or rhonchi appreciated.  ABDOMEN:  Soft, nondistended, +mild incisional TTP, especially on the left; Incision remains well-approximated with dermabond.  EDWIN drain x3 (Left, middle, Right) with approximately 10cc serosanguinous fluid in each, moderate dry   bloody staining on dressing over Right EDWIN insertion site, changed.  Abdominal binder in place.  No rebound tenderness or guarding.  EXTREMITIES: No calf tenderness  SKIN:  No jaundice, pallor, or cyanosis  NEURO:  A&O x 3    I&O's Summary    27 Sep 2020 07:01  -  28 Sep 2020 07:00  --------------------------------------------------------  IN: 1080 mL / OUT: 2365 mL / NET: -1285 mL    28 Sep 2020 07:01  -  29 Sep 2020 05:11  --------------------------------------------------------  IN: 720 mL / OUT: 2120 mL / NET: -1400 mL    I&O's Detail    27 Sep 2020 07:01  -  28 Sep 2020 07:00  --------------------------------------------------------  IN:    Oral Fluid: 1080 mL  Total IN: 1080 mL    OUT:    Bulb (mL): 320 mL    Bulb (mL): 240 mL    Bulb (mL): 155 mL    Indwelling Catheter - Urethral (mL): 1000 mL    Voided (mL): 650 mL  Total OUT: 2365 mL    Total NET: -1285 mL    28 Sep 2020 07:01  -  29 Sep 2020 05:11  --------------------------------------------------------  IN:    Oral Fluid: 720 mL  Total IN: 720 mL    OUT:    Bulb (mL): 50 mL    Bulb (mL): 170 mL    Bulb (mL): 150 mL    Voided (mL): 1750 mL  Total OUT: 2120 mL    Total NET: -1400 mL    MEDICATIONS  (STANDING):  allopurinol 300 milliGRAM(s) Oral daily  chlorhexidine 2% Cloths 1 Application(s) Topical <User Schedule>  cyanocobalamin 1000 MICROGram(s) Oral daily  docusate sodium 100 milliGRAM(s) Oral three times a day  DULoxetine 30 milliGRAM(s) Oral <User Schedule>  folic acid 1 milliGRAM(s) Oral daily  lamoTRIgine 200 milliGRAM(s) Oral daily  multivitamin 1 Tablet(s) Oral daily  polyethylene glycol 3350 17 Gram(s) Oral daily  potassium phosphate / sodium phosphate Powder (PHOS-NaK) 1 Packet(s) Oral four times a day  propranolol 10 milliGRAM(s) Oral daily  senna 2 Tablet(s) Oral at bedtime  traZODone 200 milliGRAM(s) Oral at bedtime    MEDICATIONS  (PRN):  acetaminophen   Tablet .. 650 milliGRAM(s) Oral every 6 hours PRN Mild Pain (1 - 3)  artificial tears (preservative free) Ophthalmic Solution 1 Drop(s) Both EYES two times a day PRN Dry Eyes  glucagon  Injectable 1 milliGRAM(s) IntraMuscular once PRN Glucose LESS THAN 70 milligrams/deciliter  oxycodone    5 mG/acetaminophen 325 mG 1 Tablet(s) Oral every 4 hours PRN Moderate Pain (4 - 6)  oxycodone    5 mG/acetaminophen 325 mG 2 Tablet(s) Oral every 6 hours PRN Severe Pain (7 - 10)    LABS:                        7.9    11.63 )-----------( 185      ( 28 Sep 2020 17:20 )             23.6     09-28    144  |  108  |  14  ----------------------------<  107<H>  4.4   |  31  |  0.69    Ca    8.5      28 Sep 2020 05:25  Phos  2.3     09-28  Mg     2.3     09-28    TPro  5.4<L>  /  Alb  2.3<L>  /  TBili  0.4  /  DBili  x   /  AST  52<H>  /  ALT  110<H>  /  AlkPhos  76  09-28    ASSESSMENT & PLAN  60 year old female with underlying chronic anemia POD#4 s/p abdominal panniculectomy with liposuction and ventral hernia repair c/b hemorrhagic shock, lactic acidosis, ELAINE, downgraded from ICU yesterday.  Right EDWIN 170cc/24hr  Middle EDWIN 150cc/24hr  Left EDWIN 50cc/24hr    - Continue DASH diet  - Continue to monitor EDWIN drain output  - DVT prophylaxis with SCDS  - Trend H/H  - OOB, PT  - Pain control  - Continue abdominal binder  - Case to be discussed with Dr. De La Torre STATUS POST:  Abdominal panniculectomy with liposuction, and ventral hernia repair    POST OPERATIVE DAY #:  4    SUBJECTIVE:  Patient seen and examined at bedside.  No overnight events.  Patient with no new complaints at this time, states pain is mostly improved, however still with some left-sided abdominal tenderness.  Tolerating DASH diet.  Patient denies any dizziness, lightheadedness, fevers, chills, chest pain, shortness of breath, nausea or vomiting.    Vital Signs Last 24 Hrs  T(C): 37 (28 Sep 2020 19:47), Max: 37.1 (28 Sep 2020 06:30)  T(F): 98.6 (28 Sep 2020 19:47), Max: 98.7 (28 Sep 2020 06:30)  HR: 83 (28 Sep 2020 21:22) (78 - 101)  BP: 115/74 (28 Sep 2020 19:47) (111/57 - 134/80)  BP(mean): 80 (28 Sep 2020 18:00) (77 - 101)  RR: 22 (28 Sep 2020 19:47) (20 - 30)  SpO2: 98% (28 Sep 2020 21:22) (92% - 100%)    PHYSICAL EXAM  GENERAL: Obese female lying comfortably in bed in NAD, using CPAP machine  CARDIO:  Regular rate and rhythm.  No murmur, gallop or rub appreciated.  RESPIRATORY:  Clear to auscultation bilaterally.  No wheezing, rales or rhonchi appreciated.  ABDOMEN:  Soft, nondistended, +mild incisional TTP, especially on the left; Incision remains well-approximated with dermabond.  EDWIN drain x3 (Left, middle, Right) with approximately 10cc serosanguinous fluid in each, moderate dry   bloody staining on dressing over Right EDWIN insertion site, changed.  Abdominal binder in place.  No rebound tenderness or guarding.  EXTREMITIES: No calf tenderness  SKIN:  No jaundice, pallor, or cyanosis  NEURO:  A&O x 3    I&O's Summary    27 Sep 2020 07:01  -  28 Sep 2020 07:00  --------------------------------------------------------  IN: 1080 mL / OUT: 2365 mL / NET: -1285 mL    28 Sep 2020 07:01  -  29 Sep 2020 05:11  --------------------------------------------------------  IN: 720 mL / OUT: 2120 mL / NET: -1400 mL    I&O's Detail    27 Sep 2020 07:01  -  28 Sep 2020 07:00  --------------------------------------------------------  IN:    Oral Fluid: 1080 mL  Total IN: 1080 mL    OUT:    Bulb (mL): 320 mL    Bulb (mL): 240 mL    Bulb (mL): 155 mL    Indwelling Catheter - Urethral (mL): 1000 mL    Voided (mL): 650 mL  Total OUT: 2365 mL    Total NET: -1285 mL    28 Sep 2020 07:01  -  29 Sep 2020 05:11  --------------------------------------------------------  IN:    Oral Fluid: 720 mL  Total IN: 720 mL    OUT:    Bulb (mL): 50 mL    Bulb (mL): 170 mL    Bulb (mL): 150 mL    Voided (mL): 1750 mL  Total OUT: 2120 mL    Total NET: -1400 mL    MEDICATIONS  (STANDING):  allopurinol 300 milliGRAM(s) Oral daily  chlorhexidine 2% Cloths 1 Application(s) Topical <User Schedule>  cyanocobalamin 1000 MICROGram(s) Oral daily  docusate sodium 100 milliGRAM(s) Oral three times a day  DULoxetine 30 milliGRAM(s) Oral <User Schedule>  folic acid 1 milliGRAM(s) Oral daily  lamoTRIgine 200 milliGRAM(s) Oral daily  multivitamin 1 Tablet(s) Oral daily  polyethylene glycol 3350 17 Gram(s) Oral daily  potassium phosphate / sodium phosphate Powder (PHOS-NaK) 1 Packet(s) Oral four times a day  propranolol 10 milliGRAM(s) Oral daily  senna 2 Tablet(s) Oral at bedtime  traZODone 200 milliGRAM(s) Oral at bedtime    MEDICATIONS  (PRN):  acetaminophen   Tablet .. 650 milliGRAM(s) Oral every 6 hours PRN Mild Pain (1 - 3)  artificial tears (preservative free) Ophthalmic Solution 1 Drop(s) Both EYES two times a day PRN Dry Eyes  glucagon  Injectable 1 milliGRAM(s) IntraMuscular once PRN Glucose LESS THAN 70 milligrams/deciliter  oxycodone    5 mG/acetaminophen 325 mG 1 Tablet(s) Oral every 4 hours PRN Moderate Pain (4 - 6)  oxycodone    5 mG/acetaminophen 325 mG 2 Tablet(s) Oral every 6 hours PRN Severe Pain (7 - 10)    LABS:                        7.9    11.63 )-----------( 185      ( 28 Sep 2020 17:20 )             23.6     09-28    144  |  108  |  14  ----------------------------<  107<H>  4.4   |  31  |  0.69    Ca    8.5      28 Sep 2020 05:25  Phos  2.3     09-28  Mg     2.3     09-28    TPro  5.4<L>  /  Alb  2.3<L>  /  TBili  0.4  /  DBili  x   /  AST  52<H>  /  ALT  110<H>  /  AlkPhos  76  09-28    ASSESSMENT & PLAN  60 year old female with underlying chronic anemia POD#4 s/p abdominal panniculectomy with liposuction and ventral hernia repair, postop course c/b hemorrhagic shock, lactic acidosis, ELAINE, downgraded from ICU yesterday.  Right EDWIN 170cc/24hr  Middle EDWIN 150cc/24hr  Left EDWIN 50cc/24hr    - Continue DASH diet  - Continue to monitor EDWIN drain output  - DVT prophylaxis with SCDS  - Trend H/H  - OOB, PT  - Pain control  - Continue abdominal binder  - Case to be discussed with Dr. De La Torre

## 2020-09-29 NOTE — PROGRESS NOTE ADULT - SUBJECTIVE AND OBJECTIVE BOX
CHARTING IN PROGRESS    Patient is a 60y old  Female who presents with a chief complaint of Panniculectomy (29 Sep 2020 05:11)    ----  INTERVAL HPI/OVERNIGHT EVENTS: Pt seen and evaluated at the bedside. No acute overnight events occurred. Pt admits to last bowel movement was Thursday (9/25) where she had watery diarrhea. Normally uses various stool softeners and cycles between diarrhea and constipation.   ----  PAST MEDICAL & SURGICAL HISTORY:  History of aortic aneurysm  descending aorta see CT    Arthritis    Degenerative disc disease, lumbar    Neuropathy    Spinal stenosis    Bipolar 1 disorder    Kidney stone    Sleep Apnea  CPAP with oxygen since June 2020    Asthma    Hypertension    Morbid Obesity    ETOH Abuse  H/O    Benign Essential Tremor    Anxiety    Depression    Renal Calculi  chronic      Colitis  H/O    Anemia    S/P cardiac catheterization    S/P dilation and curettage    History of tonsillectomy    History of laparoscopic adjustable gastric banding  band removed few yrs ago    S/P D&amp;C    Biliary stent placement &amp; ercp    ureteroscopy with stone removal  1-        FAMILY HISTORY:  Hypertension (Sibling)    Hyperlipidemia (Sibling)    Family history of renal failure  both parents were on dialysis    Family history of bacterial pneumonia    Family history of arthritis        Allergies    penicillins (Other)  trees dogs cats cockaroaches (Rhinitis; Rhinorrhea)    Intolerances        ----  REVIEW OF SYSTEMS:  CONSTITUTIONAL: denies fever, chills, fatigue, weakness  SKIN: denies new lesions, rash  CARDIOVASCULAR: denies chest pain, chest pressure  RESPIRATORY: denies shortness of breath  GASTROINTESTINAL: admits to constipation since Thursday (9/25). Denies nausea, vomiting, diarrhea, abdominal pain  NEUROLOGICAL: denies numbness, headache, focal weakness  MUSCULOSKELETAL: denies new joint pain, muscle aches  ----  PHYSICAL EXAM:  GENERAL: patient appears well, no acute distress, appropriately interactive  EYES: sclera clear, no exudates  LUNGS: good air entry bilaterally, clear to auscultation, no wheezing or rhonchi appreciated  HEART: S1/S2, regular rate and rhythm, no murmurs noted  GASTROINTESTINAL: wearing abdominal wrap, +EDWIN x3 with red drainage  INTEGUMENT: appropriate for ethnicity, appears well perfused, no jaundice noted  MUSCULOSKELETAL: no clubbing or cyanosis, no obvious deformity  NEUROLOGIC: awake, alert, oriented x3  PSYCHIATRIC: mood is good, affect is congruent with mood, linear and logical thought process  HEME/LYMPH:  no obvious ecchymosis     T(C): 36.7 (09-29-20 @ 05:21), Max: 37 (09-28-20 @ 19:47)  HR: 81 (09-29-20 @ 05:21) (80 - 89)  BP: 123/79 (09-29-20 @ 05:21) (111/57 - 131/65)  RR: 18 (09-29-20 @ 05:21) (18 - 30)  SpO2: 99% (09-29-20 @ 05:21) (92% - 99%)  Wt(kg): --    ----  I&O's Summary    28 Sep 2020 07:01  -  29 Sep 2020 07:00  --------------------------------------------------------  IN: 720 mL / OUT: 2210 mL / NET: -1490 mL    29 Sep 2020 07:01  -  29 Sep 2020 11:26  --------------------------------------------------------  IN: 0 mL / OUT: 40 mL / NET: -40 mL        LABS:                        7.4    8.69  )-----------( 185      ( 29 Sep 2020 06:49 )             22.0     09-29    144  |  107  |  12  ----------------------------<  93  3.9   |  31  |  0.73    Ca    8.2<L>      29 Sep 2020 06:49  Phos  3.4     09-29  Mg     2.4     09-29    TPro  5.5<L>  /  Alb  2.3<L>  /  TBili  0.3  /  DBili  x   /  AST  34  /  ALT  83<H>  /  AlkPhos  73  09-29    PT/INR - ( 29 Sep 2020 06:49 )   PT: 12.9 sec;   INR: 1.11 ratio         PTT - ( 29 Sep 2020 06:49 )  PTT:21.9 sec    CAPILLARY BLOOD GLUCOSE    ----------------------  Personally reviewed:  Vital sign trends: [ X ] yes    [  ] no     [  ] n/a  Laboratory results: [ X ] yes    [  ] no     [  ] n/a  Radiology results: [  ] yes    [  ] no     [ X ] n/a  Culture results: [  ] yes    [  ] no     [ X ] n/a  Consultant recommendations: [  ] yes    [  ] no     [  ] n/a         Patient is a 60y old  Female who presents with a chief complaint of Panniculectomy (29 Sep 2020 05:11)    ----  INTERVAL HPI/OVERNIGHT EVENTS: Pt seen and evaluated at the bedside. No acute overnight events occurred. Patient is POD#4 from abdominal panniculectomy with liposuction and ventral hernia repair. Pt admits to last bowel movement was Thursday (9/25) where she had watery diarrhea. Normally uses various stool softeners and cycles between diarrhea and constipation. She is currently refusing bowel regimen. Denies fevers, chills, chest pain, palpitations, SOB, abdominal pain, N/V.     ----  PAST MEDICAL & SURGICAL HISTORY:  History of aortic aneurysm  descending aorta see CT    Arthritis    Degenerative disc disease, lumbar    Neuropathy    Spinal stenosis    Bipolar 1 disorder    Kidney stone    Sleep Apnea  CPAP with oxygen since June 2020    Asthma    Hypertension    Morbid Obesity    ETOH Abuse  H/O    Benign Essential Tremor    Anxiety    Depression    Renal Calculi  chronic      Colitis  H/O    Anemia    S/P cardiac catheterization    S/P dilation and curettage    History of tonsillectomy    History of laparoscopic adjustable gastric banding  band removed few yrs ago    S/P D&amp;C    Biliary stent placement &amp; ercp    ureteroscopy with stone removal  1-        FAMILY HISTORY:  Hypertension (Sibling)    Hyperlipidemia (Sibling)    Family history of renal failure  both parents were on dialysis    Family history of bacterial pneumonia    Family history of arthritis        Allergies    penicillins (Other)  trees dogs cats cockaroaches (Rhinitis; Rhinorrhea)    Intolerances        ----  REVIEW OF SYSTEMS:  CONSTITUTIONAL: denies fever, chills, fatigue, weakness  SKIN: denies new lesions, rash  CARDIOVASCULAR: denies chest pain, chest pressure  RESPIRATORY: denies shortness of breath  GASTROINTESTINAL: admits to constipation since Thursday (9/25). Denies nausea, vomiting, diarrhea, abdominal pain  NEUROLOGICAL: denies numbness, headache, focal weakness  MUSCULOSKELETAL: denies new joint pain, muscle aches  ----  PHYSICAL EXAM:  GENERAL: patient appears well, no acute distress, appropriately interactive  EYES: sclera clear, no exudates  LUNGS: good air entry bilaterally, clear to auscultation, no wheezing or rhonchi appreciated  HEART: S1/S2, regular rate and rhythm, no murmurs noted  GASTROINTESTINAL: wearing abdominal wrap, +EWDIN x3 with serosanguinous drainage  INTEGUMENT: appropriate for ethnicity, appears well perfused, no jaundice noted  MUSCULOSKELETAL: no clubbing or cyanosis, no obvious deformity  NEUROLOGIC: awake, alert, oriented x3  PSYCHIATRIC: mood is good, affect is congruent with mood, linear and logical thought process  HEME/LYMPH:  no obvious ecchymosis     T(C): 36.7 (09-29-20 @ 05:21), Max: 37 (09-28-20 @ 19:47)  HR: 81 (09-29-20 @ 05:21) (80 - 89)  BP: 123/79 (09-29-20 @ 05:21) (111/57 - 131/65)  RR: 18 (09-29-20 @ 05:21) (18 - 30)  SpO2: 99% (09-29-20 @ 05:21) (92% - 99%)    ----  I&O's Summary    28 Sep 2020 07:01  -  29 Sep 2020 07:00  --------------------------------------------------------  IN: 720 mL / OUT: 2210 mL / NET: -1490 mL    29 Sep 2020 07:01  -  29 Sep 2020 11:26  --------------------------------------------------------  IN: 0 mL / OUT: 40 mL / NET: -40 mL      LABS:                        7.4    8.69  )-----------( 185      ( 29 Sep 2020 06:49 )             22.0     09-29    144  |  107  |  12  ----------------------------<  93  3.9   |  31  |  0.73    Ca    8.2<L>      29 Sep 2020 06:49  Phos  3.4     09-29  Mg     2.4     09-29    TPro  5.5<L>  /  Alb  2.3<L>  /  TBili  0.3  /  DBili  x   /  AST  34  /  ALT  83<H>  /  AlkPhos  73  09-29    PT/INR - ( 29 Sep 2020 06:49 )   PT: 12.9 sec;   INR: 1.11 ratio         PTT - ( 29 Sep 2020 06:49 )  PTT:21.9 sec    CAPILLARY BLOOD GLUCOSE    ----------------------  Personally reviewed:  Vital sign trends: [ X ] yes    [  ] no     [  ] n/a  Laboratory results: [ X ] yes    [  ] no     [  ] n/a  Radiology results: [x  ] yes    [  ] no     [  ] n/a  Culture results: [x  ] yes    [  ] no     [ ] n/a  Consultant recommendations: [ x ] yes    [  ] no     [  ] n/a

## 2020-09-29 NOTE — PROGRESS NOTE ADULT - ATTENDING COMMENTS
59 yo female who presented to [facility] for elective abdominal panniculectomy w/ PMH JESUS on CPAP, HTN, Obesity, Anemia, AAA, Arthritis, Depression, Anxiety. Pt dev hemorrhagic shock  post op on 9/25 requiring 3units pRBCs, pt was managed in ICU required pressors for blood pressure management. Now downgraded to floor 9/29. H/H this am 7.4/22, downtrending. Pt has 3 EDWIN drains in place putting out bloody fluid. Surgery will like to continue to monitor inpatient for now, Repeat cbc in AM. Pt reports chronic anemia, follows hem outpatient, on PO Iron, will only take brand called "Jaro"; advised she have family bring it in but she states she will resume it when she gets home. Follows Dr Gemma Collier (hem) outpatient.

## 2020-09-29 NOTE — PROGRESS NOTE ADULT - SUBJECTIVE AND OBJECTIVE BOX
Patient is a 60y old  Female who presents with a chief complaint of Panniculectomy (29 Sep 2020 14:39)      INTERVAL HPI/OVERNIGHT EVENTS:    transferred out of CCU last evening    MEDICATIONS  (STANDING):  allopurinol 300 milliGRAM(s) Oral daily  cyanocobalamin 1000 MICROGram(s) Oral daily  docusate sodium 100 milliGRAM(s) Oral three times a day  DULoxetine 30 milliGRAM(s) Oral <User Schedule>  folic acid 1 milliGRAM(s) Oral daily  lamoTRIgine 200 milliGRAM(s) Oral daily  multivitamin 1 Tablet(s) Oral daily  polyethylene glycol 3350 17 Gram(s) Oral daily  propranolol 10 milliGRAM(s) Oral daily  senna 2 Tablet(s) Oral at bedtime  traZODone 200 milliGRAM(s) Oral at bedtime      MEDICATIONS  (PRN):  acetaminophen   Tablet .. 650 milliGRAM(s) Oral every 6 hours PRN Mild Pain (1 - 3)  artificial tears (preservative free) Ophthalmic Solution 1 Drop(s) Both EYES two times a day PRN Dry Eyes  glucagon  Injectable 1 milliGRAM(s) IntraMuscular once PRN Glucose LESS THAN 70 milligrams/deciliter  oxycodone    5 mG/acetaminophen 325 mG 1 Tablet(s) Oral every 4 hours PRN Moderate Pain (4 - 6)  oxycodone    5 mG/acetaminophen 325 mG 2 Tablet(s) Oral every 6 hours PRN Severe Pain (7 - 10)      Allergies    penicillins (Other)  trees dogs cats cockaroaches (Rhinitis; Rhinorrhea)    Intolerances        PAST MEDICAL & SURGICAL HISTORY:  History of aortic aneurysm  descending aorta see CT    Arthritis    Degenerative disc disease, lumbar    Neuropathy    Spinal stenosis    Bipolar 1 disorder    Kidney stone    Sleep Apnea  CPAP with oxygen since June 2020    Asthma    Hypertension    Morbid Obesity    ETOH Abuse  H/O    Benign Essential Tremor    Anxiety    Depression    Renal Calculi  chronic      Colitis  H/O    Anemia    S/P cardiac catheterization    S/P dilation and curettage    History of tonsillectomy    History of laparoscopic adjustable gastric banding  band removed few yrs ago    S/P D&amp;C    Biliary stent placement &amp; ercp    ureteroscopy with stone removal  1-        Vital Signs Last 24 Hrs  T(C): 37.6 (29 Sep 2020 13:28), Max: 37.6 (29 Sep 2020 13:28)  T(F): 99.7 (29 Sep 2020 13:28), Max: 99.7 (29 Sep 2020 13:28)  HR: 87 (29 Sep 2020 20:17) (81 - 90)  BP: 122/80 (29 Sep 2020 13:28) (122/80 - 123/79)  BP(mean): --  RR: 18 (29 Sep 2020 13:28) (18 - 18)  SpO2: 99% (29 Sep 2020 20:17) (94% - 99%)    PHYSICAL EXAMINATION:    GENERAL: The patient is awake and alert in no apparent distress.     HEENT: Head is normocephalic and atraumatic. Extraocular muscles are intact. Mucous membranes are moist.    NECK: Supple.    LUNGS: Clear to auscultation without wheezing, rales or rhonchi; respirations unlabored    HEART: Regular rate and rhythm without murmur.    ABDOMEN: obese, nontender, and nondistended.      EXTREMITIES: Without any cyanosis, clubbing, rash, lesions or edema.    NEUROLOGIC: Grossly intact.    SKIN: No ulceration or induration present.      LABS:                        8.0    9.73  )-----------( 201      ( 29 Sep 2020 18:57 )             22.8     09-29    144  |  107  |  12  ----------------------------<  93  3.9   |  31  |  0.73    Ca    8.2<L>      29 Sep 2020 06:49  Phos  3.4     09-29  Mg     2.4     09-29    TPro  5.5<L>  /  Alb  2.3<L>  /  TBili  0.3  /  DBili  x   /  AST  34  /  ALT  83<H>  /  AlkPhos  73  09-29    PT/INR - ( 29 Sep 2020 06:49 )   PT: 12.9 sec;   INR: 1.11 ratio         PTT - ( 29 Sep 2020 06:49 )  PTT:21.9 sec          Assessment:    S/P Abdominal Panniculectomy  Morbid Obesity  Obstructive Sleep Apnea Syndrome  Anemia secondary to post-op bleeding    Plan:    Pain Control  Follow Hgb/Hct  Incentive Spirometry  Nocturnal CPAP

## 2020-09-30 LAB
ALBUMIN SERPL ELPH-MCNC: 2.5 G/DL — LOW (ref 3.3–5)
ALP SERPL-CCNC: 84 U/L — SIGNIFICANT CHANGE UP (ref 40–120)
ALT FLD-CCNC: 71 U/L — SIGNIFICANT CHANGE UP (ref 12–78)
ANION GAP SERPL CALC-SCNC: 6 MMOL/L — SIGNIFICANT CHANGE UP (ref 5–17)
APPEARANCE UR: ABNORMAL
AST SERPL-CCNC: 27 U/L — SIGNIFICANT CHANGE UP (ref 15–37)
BACTERIA # UR AUTO: ABNORMAL
BASOPHILS # BLD AUTO: 0 K/UL — SIGNIFICANT CHANGE UP (ref 0–0.2)
BASOPHILS NFR BLD AUTO: 0 % — SIGNIFICANT CHANGE UP (ref 0–2)
BILIRUB SERPL-MCNC: 0.4 MG/DL — SIGNIFICANT CHANGE UP (ref 0.2–1.2)
BILIRUB UR-MCNC: NEGATIVE — SIGNIFICANT CHANGE UP
BUN SERPL-MCNC: 16 MG/DL — SIGNIFICANT CHANGE UP (ref 7–23)
CALCIUM SERPL-MCNC: 8.6 MG/DL — SIGNIFICANT CHANGE UP (ref 8.5–10.1)
CHLORIDE SERPL-SCNC: 102 MMOL/L — SIGNIFICANT CHANGE UP (ref 96–108)
CO2 SERPL-SCNC: 32 MMOL/L — HIGH (ref 22–31)
COLOR SPEC: YELLOW — SIGNIFICANT CHANGE UP
CREAT SERPL-MCNC: 0.77 MG/DL — SIGNIFICANT CHANGE UP (ref 0.5–1.3)
DIFF PNL FLD: ABNORMAL
EOSINOPHIL # BLD AUTO: 0.23 K/UL — SIGNIFICANT CHANGE UP (ref 0–0.5)
EOSINOPHIL NFR BLD AUTO: 2 % — SIGNIFICANT CHANGE UP (ref 0–6)
EPI CELLS # UR: SIGNIFICANT CHANGE UP
GLUCOSE SERPL-MCNC: 118 MG/DL — HIGH (ref 70–99)
GLUCOSE UR QL: NEGATIVE — SIGNIFICANT CHANGE UP
HCT VFR BLD CALC: 25.4 % — LOW (ref 34.5–45)
HGB BLD-MCNC: 8.4 G/DL — LOW (ref 11.5–15.5)
KETONES UR-MCNC: ABNORMAL
LEUKOCYTE ESTERASE UR-ACNC: ABNORMAL
LYMPHOCYTES # BLD AUTO: 1.04 K/UL — SIGNIFICANT CHANGE UP (ref 1–3.3)
LYMPHOCYTES # BLD AUTO: 9.1 % — LOW (ref 13–44)
MCHC RBC-ENTMCNC: 30.5 PG — SIGNIFICANT CHANGE UP (ref 27–34)
MCHC RBC-ENTMCNC: 33.1 GM/DL — SIGNIFICANT CHANGE UP (ref 32–36)
MCV RBC AUTO: 92.4 FL — SIGNIFICANT CHANGE UP (ref 80–100)
MONOCYTES # BLD AUTO: 1.04 K/UL — HIGH (ref 0–0.9)
MONOCYTES NFR BLD AUTO: 9.1 % — SIGNIFICANT CHANGE UP (ref 2–14)
NEUTROPHILS # BLD AUTO: 8.98 K/UL — HIGH (ref 1.8–7.4)
NEUTROPHILS NFR BLD AUTO: 78.8 % — HIGH (ref 43–77)
NITRITE UR-MCNC: NEGATIVE — SIGNIFICANT CHANGE UP
NRBC # BLD: SIGNIFICANT CHANGE UP /100 WBCS (ref 0–0)
PH UR: 7 — SIGNIFICANT CHANGE UP (ref 5–8)
PLATELET # BLD AUTO: SIGNIFICANT CHANGE UP (ref 150–400)
POTASSIUM SERPL-MCNC: 3.8 MMOL/L — SIGNIFICANT CHANGE UP (ref 3.5–5.3)
POTASSIUM SERPL-SCNC: 3.8 MMOL/L — SIGNIFICANT CHANGE UP (ref 3.5–5.3)
PROT SERPL-MCNC: 6.3 G/DL — SIGNIFICANT CHANGE UP (ref 6–8.3)
PROT UR-MCNC: NEGATIVE — SIGNIFICANT CHANGE UP
RBC # BLD: 2.75 M/UL — LOW (ref 3.8–5.2)
RBC # FLD: 14.6 % — HIGH (ref 10.3–14.5)
RBC CASTS # UR COMP ASSIST: ABNORMAL /HPF (ref 0–4)
SODIUM SERPL-SCNC: 140 MMOL/L — SIGNIFICANT CHANGE UP (ref 135–145)
SP GR SPEC: 1 — LOW (ref 1.01–1.02)
UROBILINOGEN FLD QL: NEGATIVE — SIGNIFICANT CHANGE UP
WBC # BLD: 11.39 K/UL — HIGH (ref 3.8–10.5)
WBC # FLD AUTO: 11.39 K/UL — HIGH (ref 3.8–10.5)
WBC UR QL: SIGNIFICANT CHANGE UP

## 2020-09-30 PROCEDURE — 99222 1ST HOSP IP/OBS MODERATE 55: CPT

## 2020-09-30 PROCEDURE — 99233 SBSQ HOSP IP/OBS HIGH 50: CPT

## 2020-09-30 PROCEDURE — 71045 X-RAY EXAM CHEST 1 VIEW: CPT | Mod: 26

## 2020-09-30 PROCEDURE — 99232 SBSQ HOSP IP/OBS MODERATE 35: CPT

## 2020-09-30 PROCEDURE — 74177 CT ABD & PELVIS W/CONTRAST: CPT | Mod: 26

## 2020-09-30 RX ORDER — ALPRAZOLAM 0.25 MG
0.25 TABLET ORAL ONCE
Refills: 0 | Status: DISCONTINUED | OUTPATIENT
Start: 2020-09-30 | End: 2020-09-30

## 2020-09-30 RX ORDER — IOHEXOL 300 MG/ML
500 INJECTION, SOLUTION INTRAVENOUS
Refills: 0 | Status: COMPLETED | OUTPATIENT
Start: 2020-09-30 | End: 2020-09-30

## 2020-09-30 RX ORDER — ACETAMINOPHEN 500 MG
650 TABLET ORAL EVERY 6 HOURS
Refills: 0 | Status: DISCONTINUED | OUTPATIENT
Start: 2020-09-30 | End: 2020-10-10

## 2020-09-30 RX ADMIN — Medication 100 MILLIGRAM(S): at 05:48

## 2020-09-30 RX ADMIN — Medication 650 MILLIGRAM(S): at 08:14

## 2020-09-30 RX ADMIN — DULOXETINE HYDROCHLORIDE 30 MILLIGRAM(S): 30 CAPSULE, DELAYED RELEASE ORAL at 15:46

## 2020-09-30 RX ADMIN — Medication 100 MILLIGRAM(S): at 21:02

## 2020-09-30 RX ADMIN — LAMOTRIGINE 200 MILLIGRAM(S): 25 TABLET, ORALLY DISINTEGRATING ORAL at 11:21

## 2020-09-30 RX ADMIN — Medication 1 TABLET(S): at 11:20

## 2020-09-30 RX ADMIN — Medication 650 MILLIGRAM(S): at 10:00

## 2020-09-30 RX ADMIN — DULOXETINE HYDROCHLORIDE 30 MILLIGRAM(S): 30 CAPSULE, DELAYED RELEASE ORAL at 21:02

## 2020-09-30 RX ADMIN — Medication 0.25 MILLIGRAM(S): at 08:13

## 2020-09-30 RX ADMIN — Medication 200 MILLIGRAM(S): at 21:02

## 2020-09-30 RX ADMIN — IOHEXOL 500 MILLILITER(S): 300 INJECTION, SOLUTION INTRAVENOUS at 13:30

## 2020-09-30 RX ADMIN — PREGABALIN 1000 MICROGRAM(S): 225 CAPSULE ORAL at 11:21

## 2020-09-30 RX ADMIN — Medication 1 MILLIGRAM(S): at 11:21

## 2020-09-30 RX ADMIN — Medication 300 MILLIGRAM(S): at 11:21

## 2020-09-30 RX ADMIN — Medication 0.25 MILLIGRAM(S): at 06:45

## 2020-09-30 RX ADMIN — Medication 100 MILLIGRAM(S): at 15:46

## 2020-09-30 RX ADMIN — POLYETHYLENE GLYCOL 3350 17 GRAM(S): 17 POWDER, FOR SOLUTION ORAL at 11:20

## 2020-09-30 RX ADMIN — DULOXETINE HYDROCHLORIDE 30 MILLIGRAM(S): 30 CAPSULE, DELAYED RELEASE ORAL at 05:48

## 2020-09-30 RX ADMIN — SENNA PLUS 2 TABLET(S): 8.6 TABLET ORAL at 21:02

## 2020-09-30 RX ADMIN — IOHEXOL 500 MILLILITER(S): 300 INJECTION, SOLUTION INTRAVENOUS at 12:00

## 2020-09-30 NOTE — PROGRESS NOTE ADULT - ATTENDING COMMENTS
Note written by attending, see above.  Time spent: 40min. More than 50% of the visit was spent counseling the patient on her condition - fever, atelectasis, hematoma, anemia.

## 2020-09-30 NOTE — PROGRESS NOTE ADULT - SUBJECTIVE AND OBJECTIVE BOX
STATUS POST:  Abdominal panniculectomy with liposuction, and ventral hernia repair    POST OPERATIVE DAY #:  5    SUBJECTIVE:  Patient seen and examined at bedside.  No overnight events.  Patient with continued complaints of postoperative incisional pain upon moving.  Tolerating DASH diet.  Last night's H/H improved.  Patient denies any dizziness, lightheadedness, fevers, chills, chest pain, shortness of breath, nausea or vomiting.    Vital Signs Last 24 Hrs  T(C): 37.1 (30 Sep 2020 05:04), Max: 37.6 (29 Sep 2020 13:28)  T(F): 98.8 (30 Sep 2020 05:04), Max: 99.7 (29 Sep 2020 13:28)  HR: 88 (30 Sep 2020 05:04) (81 - 90)  BP: 120/74 (30 Sep 2020 05:04) (106/66 - 123/79)  RR: 18 (30 Sep 2020 05:04) (18 - 18)  SpO2: 100% (30 Sep 2020 05:04) (94% - 100%)    PHYSICAL EXAM  GENERAL: Obese female lying comfortably in bed in NAD, using CPAP machine  CARDIO:  Regular rate and rhythm.  No murmur, gallop or rub appreciated.  RESPIRATORY:  Clear to auscultation bilaterally.  No wheezing, rales or rhonchi appreciated.  ABDOMEN:  Soft, nondistended, +mild incisional TTP, Incision remains well-approximated with dermabond.  EDWIN drain x3 (Left, middle, Right) with approximately 10cc serosanguinous fluid in each, 4x4 gauze over left-sided drain site mildly soiled with serosanguinous fluid, changed.  Abdominal binder in place.  No rebound tenderness or guarding.  SKIN:  No jaundice, pallor, or cyanosis  NEURO:  A&O x 3    I&O's Summary    28 Sep 2020 07:01  -  29 Sep 2020 07:00  --------------------------------------------------------  IN: 720 mL / OUT: 2210 mL / NET: -1490 mL    29 Sep 2020 07:01  -  30 Sep 2020 05:20  --------------------------------------------------------  IN: 400 mL / OUT: 298 mL / NET: 102 mL    I&O's Detail    28 Sep 2020 07:01  -  29 Sep 2020 07:00  --------------------------------------------------------  IN:    Oral Fluid: 720 mL  Total IN: 720 mL    OUT:    Bulb (mL): 75 mL    Bulb (mL): 185 mL    Bulb (mL): 200 mL    Voided (mL): 1750 mL  Total OUT: 2210 mL    Total NET: -1490 mL    29 Sep 2020 07:01  -  30 Sep 2020 05:20  --------------------------------------------------------  IN:    Oral Fluid: 400 mL  Total IN: 400 mL    OUT:    Bulb (mL): 110 mL    Bulb (mL): 108 mL    Bulb (mL): 80 mL  Total OUT: 298 mL    Total NET: 102 mL    MEDICATIONS  (STANDING):  allopurinol 300 milliGRAM(s) Oral daily  cyanocobalamin 1000 MICROGram(s) Oral daily  docusate sodium 100 milliGRAM(s) Oral three times a day  DULoxetine 30 milliGRAM(s) Oral <User Schedule>  folic acid 1 milliGRAM(s) Oral daily  lamoTRIgine 200 milliGRAM(s) Oral daily  multivitamin 1 Tablet(s) Oral daily  polyethylene glycol 3350 17 Gram(s) Oral daily  propranolol 10 milliGRAM(s) Oral daily  senna 2 Tablet(s) Oral at bedtime  traZODone 200 milliGRAM(s) Oral at bedtime    MEDICATIONS  (PRN):  acetaminophen   Tablet .. 650 milliGRAM(s) Oral every 6 hours PRN Mild Pain (1 - 3)  artificial tears (preservative free) Ophthalmic Solution 1 Drop(s) Both EYES two times a day PRN Dry Eyes  glucagon  Injectable 1 milliGRAM(s) IntraMuscular once PRN Glucose LESS THAN 70 milligrams/deciliter  oxycodone    5 mG/acetaminophen 325 mG 1 Tablet(s) Oral every 4 hours PRN Moderate Pain (4 - 6)  oxycodone    5 mG/acetaminophen 325 mG 2 Tablet(s) Oral every 6 hours PRN Severe Pain (7 - 10)    LABS:                        8.0    9.73  )-----------( 201      ( 29 Sep 2020 18:57 )             22.8     09-29    144  |  107  |  12  ----------------------------<  93  3.9   |  31  |  0.73    Ca    8.2<L>      29 Sep 2020 06:49  Phos  3.4     09-29  Mg     2.4     09-29    TPro  5.5<L>  /  Alb  2.3<L>  /  TBili  0.3  /  DBili  x   /  AST  34  /  ALT  83<H>  /  AlkPhos  73  09-29    PT/INR - ( 29 Sep 2020 06:49 )   PT: 12.9 sec;   INR: 1.11 ratio  PTT - ( 29 Sep 2020 06:49 )  PTT:21.9 sec    ASSESSMENT & PLAN  60 year old female with underlying chronic anemia POD#4 s/p abdominal panniculectomy with liposuction and ventral hernia repair, postop course c/b hemorrhagic shock, lactic acidosis, ELAINE (resolved), downgraded from ICU 2 days ago.  Right EDWIN 108cc/24hr  Middle EDWIN 110cc/24hr  Left EDWIN 80cc/24hr    - Continue DASH diet  - Continue to monitor EDWIN drain output  - DVT prophylaxis with SCDs  - Trend H/H  - OOB, PT  - Pain control  - Continue abdominal binder  - D/c planning  - Day team to discuss with Dr. De La Torre

## 2020-09-30 NOTE — PROGRESS NOTE ADULT - SUBJECTIVE AND OBJECTIVE BOX
Patient is a 60y old  Female who presents with a chief complaint of elective panniculectomy.       INTERVAL HPI/OVERNIGHT EVENTS: Early this morning, pt had a fever to 102F. Pt reports feeling the subjective fever with mild chills. Reports mild abdominal pain on left side of the abdomen. Denies nausea, vomiting, diarrhea, SOB, cough, dysuria, urinary frequency/urgency, headache.     MEDICATIONS  (STANDING):  allopurinol 300 milliGRAM(s) Oral daily  cyanocobalamin 1000 MICROGram(s) Oral daily  docusate sodium 100 milliGRAM(s) Oral three times a day  DULoxetine 30 milliGRAM(s) Oral <User Schedule>  folic acid 1 milliGRAM(s) Oral daily  lamoTRIgine 200 milliGRAM(s) Oral daily  multivitamin 1 Tablet(s) Oral daily  polyethylene glycol 3350 17 Gram(s) Oral daily  propranolol 10 milliGRAM(s) Oral daily  senna 2 Tablet(s) Oral at bedtime  traZODone 200 milliGRAM(s) Oral at bedtime    MEDICATIONS  (PRN):  acetaminophen   Tablet .. 650 milliGRAM(s) Oral every 6 hours PRN Mild Pain (1 - 3)  acetaminophen   Tablet .. 650 milliGRAM(s) Oral every 6 hours PRN Temp greater or equal to 38C (100.4F)  artificial tears (preservative free) Ophthalmic Solution 1 Drop(s) Both EYES two times a day PRN Dry Eyes  glucagon  Injectable 1 milliGRAM(s) IntraMuscular once PRN Glucose LESS THAN 70 milligrams/deciliter  oxycodone    5 mG/acetaminophen 325 mG 1 Tablet(s) Oral every 4 hours PRN Moderate Pain (4 - 6)  oxycodone    5 mG/acetaminophen 325 mG 2 Tablet(s) Oral every 6 hours PRN Severe Pain (7 - 10)      Allergies    penicillins (Other)  trees dogs cats cockaroaches (Rhinitis; Rhinorrhea)    Intolerances        REVIEW OF SYSTEMS:  CONSTITUTIONAL: ++fever and chills  HEENT:  No headache, no sore throat  RESPIRATORY: No cough, wheezing, or shortness of breath  CARDIOVASCULAR: No chest pain, palpitations  GASTROINTESTINAL: +mild abd pain, no nausea, vomiting, or diarrhea  GENITOURINARY: No dysuria, frequency, or hematuria  NEUROLOGICAL: no focal weakness or dizziness  MUSCULOSKELETAL: no myalgias     Vital Signs Last 24 Hrs  T(C): 36.7 (30 Sep 2020 13:51), Max: 38.9 (30 Sep 2020 07:16)  T(F): 98 (30 Sep 2020 13:51), Max: 102 (30 Sep 2020 07:16)  HR: 92 (30 Sep 2020 13:51) (83 - 108)  BP: 99/59 (30 Sep 2020 13:51) (99/59 - 162/88)  BP(mean): --  RR: 18 (30 Sep 2020 13:51) (18 - 19)  SpO2: 96% (30 Sep 2020 13:51) (95% - 100%)    PHYSICAL EXAM:  GENERAL: NAD at rest, morbidly obese  HEENT:  anicteric, moist mucous membranes  CHEST/LUNG:  CTA b/l, no rales, wheezes, or rhonchi  HEART:  RRR, S1, S2  ABDOMEN:  BS+, soft, nontender in upper abdomen, mild tenderness near incisions, nondistended; 3 EDWIN drains with some serosanguinous drainage; incision sealed with dermabond without significant warmth or erythema. No incisional drainage.  EXTREMITIES: no cyanosis, or calf tenderness  NERVOUS SYSTEM: answers questions and follows commands appropriately  PSYCH: mildly anxious affect    LABS:                        8.4    11.39 )-----------( Clumped    ( 30 Sep 2020 06:36 )             25.4     CBC Full  -  ( 30 Sep 2020 06:36 )  WBC Count : 11.39 K/uL  Hemoglobin : 8.4 g/dL  Hematocrit : 25.4 %  Platelet Count - Automated : Clumped  Mean Cell Volume : 92.4 fl  Mean Cell Hemoglobin : 30.5 pg  Mean Cell Hemoglobin Concentration : 33.1 gm/dL  Auto Neutrophil # : 8.98 K/uL  Auto Lymphocyte # : 1.04 K/uL  Auto Monocyte # : 1.04 K/uL  Auto Eosinophil # : 0.23 K/uL  Auto Basophil # : 0.00 K/uL  Auto Neutrophil % : 78.8 %  Auto Lymphocyte % : 9.1 %  Auto Monocyte % : 9.1 %  Auto Eosinophil % : 2.0 %  Auto Basophil % : 0.0 %    30 Sep 2020 06:36    140    |  102    |  16     ----------------------------<  118    3.8     |  32     |  0.77     Ca    8.6        30 Sep 2020 06:36    TPro  6.3    /  Alb  2.5    /  TBili  0.4    /  DBili  x      /  AST  27     /  ALT  71     /  AlkPhos  84     30 Sep 2020 06:36    PT/INR - ( 29 Sep 2020 06:49 )   PT: 12.9 sec;   INR: 1.11 ratio         PTT - ( 29 Sep 2020 06:49 )  PTT:21.9 sec  Urinalysis Basic - ( 30 Sep 2020 11:19 )    Color: Yellow / Appearance: Slightly Turbid / S.005 / pH: x  Gluc: x / Ketone: Small  / Bili: Negative / Urobili: Negative   Blood: x / Protein: Negative / Nitrite: Negative   Leuk Esterase: Trace / RBC: 3-5 /HPF / WBC 3-5   Sq Epi: x / Non Sq Epi: Few / Bacteria: Few      CAPILLARY BLOOD GLUCOSE              RADIOLOGY & ADDITIONAL TESTS:    Personally reviewed.     Consultant(s) Notes Reviewed:  [x] YES  [ ] NO     Patient is a 60y old  Female who presents with a chief complaint of elective panniculectomy.     INTERVAL HPI/OVERNIGHT EVENTS: Early this morning, pt had a fever to 102F. Pt reports feeling the subjective fever with mild chills. Reports mild abdominal pain on left side of the abdomen. Denies nausea, vomiting, diarrhea, SOB, cough, dysuria, urinary frequency/urgency, headache.     MEDICATIONS  (STANDING):  allopurinol 300 milliGRAM(s) Oral daily  cyanocobalamin 1000 MICROGram(s) Oral daily  docusate sodium 100 milliGRAM(s) Oral three times a day  DULoxetine 30 milliGRAM(s) Oral <User Schedule>  folic acid 1 milliGRAM(s) Oral daily  lamoTRIgine 200 milliGRAM(s) Oral daily  multivitamin 1 Tablet(s) Oral daily  polyethylene glycol 3350 17 Gram(s) Oral daily  propranolol 10 milliGRAM(s) Oral daily  senna 2 Tablet(s) Oral at bedtime  traZODone 200 milliGRAM(s) Oral at bedtime    MEDICATIONS  (PRN):  acetaminophen   Tablet .. 650 milliGRAM(s) Oral every 6 hours PRN Mild Pain (1 - 3)  acetaminophen   Tablet .. 650 milliGRAM(s) Oral every 6 hours PRN Temp greater or equal to 38C (100.4F)  artificial tears (preservative free) Ophthalmic Solution 1 Drop(s) Both EYES two times a day PRN Dry Eyes  glucagon  Injectable 1 milliGRAM(s) IntraMuscular once PRN Glucose LESS THAN 70 milligrams/deciliter  oxycodone    5 mG/acetaminophen 325 mG 1 Tablet(s) Oral every 4 hours PRN Moderate Pain (4 - 6)  oxycodone    5 mG/acetaminophen 325 mG 2 Tablet(s) Oral every 6 hours PRN Severe Pain (7 - 10)      Allergies    penicillins (Other)  trees dogs cats cockaroaches (Rhinitis; Rhinorrhea)    Intolerances        REVIEW OF SYSTEMS:  CONSTITUTIONAL: ++fever and chills  HEENT:  No headache, no sore throat  RESPIRATORY: No cough, wheezing, or shortness of breath  CARDIOVASCULAR: No chest pain, palpitations  GASTROINTESTINAL: +mild abd pain, no nausea, vomiting, or diarrhea  GENITOURINARY: No dysuria, frequency, or hematuria  NEUROLOGICAL: no focal weakness or dizziness  MUSCULOSKELETAL: no myalgias     Vital Signs Last 24 Hrs  T(C): 36.7 (30 Sep 2020 13:51), Max: 38.9 (30 Sep 2020 07:16)  T(F): 98 (30 Sep 2020 13:51), Max: 102 (30 Sep 2020 07:16)  HR: 92 (30 Sep 2020 13:51) (83 - 108)  BP: 99/59 (30 Sep 2020 13:51) (99/59 - 162/88)  BP(mean): --  RR: 18 (30 Sep 2020 13:51) (18 - 19)  SpO2: 96% (30 Sep 2020 13:51) (95% - 100%)    PHYSICAL EXAM:  GENERAL: NAD at rest, morbidly obese  HEENT:  anicteric, moist mucous membranes  CHEST/LUNG:  CTA b/l, no rales, wheezes, or rhonchi  HEART:  RRR, S1, S2  ABDOMEN:  BS+, soft, nontender in upper abdomen, mild tenderness near incisions, nondistended; 3 EDWIN drains with some serosanguinous drainage; incision sealed with dermabond without significant warmth or erythema. No incisional drainage.  EXTREMITIES: no cyanosis, or calf tenderness  NERVOUS SYSTEM: answers questions and follows commands appropriately  PSYCH: mildly anxious affect    LABS:                        8.4    11.39 )-----------( Clumped    ( 30 Sep 2020 06:36 )             25.4     CBC Full  -  ( 30 Sep 2020 06:36 )  WBC Count : 11.39 K/uL  Hemoglobin : 8.4 g/dL  Hematocrit : 25.4 %  Platelet Count - Automated : Clumped  Mean Cell Volume : 92.4 fl  Mean Cell Hemoglobin : 30.5 pg  Mean Cell Hemoglobin Concentration : 33.1 gm/dL  Auto Neutrophil # : 8.98 K/uL  Auto Lymphocyte # : 1.04 K/uL  Auto Monocyte # : 1.04 K/uL  Auto Eosinophil # : 0.23 K/uL  Auto Basophil # : 0.00 K/uL  Auto Neutrophil % : 78.8 %  Auto Lymphocyte % : 9.1 %  Auto Monocyte % : 9.1 %  Auto Eosinophil % : 2.0 %  Auto Basophil % : 0.0 %    30 Sep 2020 06:36    140    |  102    |  16     ----------------------------<  118    3.8     |  32     |  0.77     Ca    8.6        30 Sep 2020 06:36    TPro  6.3    /  Alb  2.5    /  TBili  0.4    /  DBili  x      /  AST  27     /  ALT  71     /  AlkPhos  84     30 Sep 2020 06:36    PT/INR - ( 29 Sep 2020 06:49 )   PT: 12.9 sec;   INR: 1.11 ratio         PTT - ( 29 Sep 2020 06:49 )  PTT:21.9 sec  Urinalysis Basic - ( 30 Sep 2020 11:19 )    Color: Yellow / Appearance: Slightly Turbid / S.005 / pH: x  Gluc: x / Ketone: Small  / Bili: Negative / Urobili: Negative   Blood: x / Protein: Negative / Nitrite: Negative   Leuk Esterase: Trace / RBC: 3-5 /HPF / WBC 3-5   Sq Epi: x / Non Sq Epi: Few / Bacteria: Few      CAPILLARY BLOOD GLUCOSE              RADIOLOGY & ADDITIONAL TESTS:    Personally reviewed.     Consultant(s) Notes Reviewed:  [x] YES  [ ] NO

## 2020-09-30 NOTE — PROGRESS NOTE ADULT - SUBJECTIVE AND OBJECTIVE BOX
Patient is a 60y old  Female who presents with a chief complaint of Panniculectomy (30 Sep 2020 10:03)      INTERVAL HPI/OVERNIGHT EVENTS:    Denies shortness of breath. Using nocturnal CPAP    MEDICATIONS  (STANDING):  allopurinol 300 milliGRAM(s) Oral daily  cyanocobalamin 1000 MICROGram(s) Oral daily  docusate sodium 100 milliGRAM(s) Oral three times a day  DULoxetine 30 milliGRAM(s) Oral <User Schedule>  folic acid 1 milliGRAM(s) Oral daily  lamoTRIgine 200 milliGRAM(s) Oral daily  multivitamin 1 Tablet(s) Oral daily  polyethylene glycol 3350 17 Gram(s) Oral daily  propranolol 10 milliGRAM(s) Oral daily  senna 2 Tablet(s) Oral at bedtime  traZODone 200 milliGRAM(s) Oral at bedtime      MEDICATIONS  (PRN):  acetaminophen   Tablet .. 650 milliGRAM(s) Oral every 6 hours PRN Mild Pain (1 - 3)  acetaminophen   Tablet .. 650 milliGRAM(s) Oral every 6 hours PRN Temp greater or equal to 38C (100.4F)  artificial tears (preservative free) Ophthalmic Solution 1 Drop(s) Both EYES two times a day PRN Dry Eyes  glucagon  Injectable 1 milliGRAM(s) IntraMuscular once PRN Glucose LESS THAN 70 milligrams/deciliter  oxycodone    5 mG/acetaminophen 325 mG 1 Tablet(s) Oral every 4 hours PRN Moderate Pain (4 - 6)  oxycodone    5 mG/acetaminophen 325 mG 2 Tablet(s) Oral every 6 hours PRN Severe Pain (7 - 10)      Allergies    penicillins (Other)  trees dogs cats cockaroaches (Rhinitis; Rhinorrhea)    Intolerances        PAST MEDICAL & SURGICAL HISTORY:  History of aortic aneurysm  descending aorta see CT    Arthritis    Degenerative disc disease, lumbar    Neuropathy    Spinal stenosis    Bipolar 1 disorder    Kidney stone    Sleep Apnea  CPAP with oxygen since 2020    Asthma    Hypertension    Morbid Obesity    ETOH Abuse  H/O    Benign Essential Tremor    Anxiety    Depression    Renal Calculi  chronic      Colitis  H/O    Anemia    S/P cardiac catheterization    S/P dilation and curettage    History of tonsillectomy    History of laparoscopic adjustable gastric banding  band removed few yrs ago    S/P D&amp;C    Biliary stent placement &amp; ercp    ureteroscopy with stone removal  2016        Vital Signs Last 24 Hrs  T(C): 36.7 (30 Sep 2020 13:51), Max: 38.9 (30 Sep 2020 07:16)  T(F): 98 (30 Sep 2020 13:51), Max: 102 (30 Sep 2020 07:16)  HR: 92 (30 Sep 2020 13:51) (83 - 108)  BP: 99/59 (30 Sep 2020 13:51) (99/59 - 162/88)  BP(mean): --  RR: 18 (30 Sep 2020 13:51) (18 - 19)  SpO2: 96% (30 Sep 2020 13:51) (95% - 100%)    PHYSICAL EXAMINATION:    GENERAL: The patient is awake and alert in no apparent distress.     HEENT: Head is normocephalic and atraumatic. Extraocular muscles are intact. Mucous membranes are moist.    NECK: Supple.    LUNGS: Clear to auscultation without wheezing, rales or rhonchi; respirations unlabored    HEART: Regular rate and rhythm without murmur.    ABDOMEN: no tenderness    EXTREMITIES: Without any cyanosis, clubbing, rash, lesions or edema.    NEUROLOGIC: Grossly intact.    SKIN: No ulceration or induration present.      LABS:                        8.4    11.39 )-----------( Clumped    ( 30 Sep 2020 06:36 )             25.4     09-30    140  |  102  |  16  ----------------------------<  118<H>  3.8   |  32<H>  |  0.77    Ca    8.6      30 Sep 2020 06:36  Phos  3.4     09-29  Mg     2.4     09-29    TPro  6.3  /  Alb  2.5<L>  /  TBili  0.4  /  DBili  x   /  AST  27  /  ALT  71  /  AlkPhos  84  09-30    PT/INR - ( 29 Sep 2020 06:49 )   PT: 12.9 sec;   INR: 1.11 ratio         PTT - ( 29 Sep 2020 06:49 )  PTT:21.9 sec  Urinalysis Basic - ( 30 Sep 2020 11:19 )    Color: Yellow / Appearance: Slightly Turbid / S.005 / pH: x  Gluc: x / Ketone: Small  / Bili: Negative / Urobili: Negative   Blood: x / Protein: Negative / Nitrite: Negative   Leuk Esterase: Trace / RBC: 3-5 /HPF / WBC 3-5   Sq Epi: x / Non Sq Epi: Few / Bacteria: Few      Chest x ray today reveals subsegmental atelectasis left mid zone    Assessment:    S/P Abdominal Panniculectomy  Obesity  Obstructive Sleep Apnea Syndrome  Hx Hypertension    plan:    Pain control  Nocturnal CPAP  Follow hemoglobin/Hematocrit  Physical therapy for ambulation          Plan:

## 2020-09-30 NOTE — CONSULT NOTE ADULT - SUBJECTIVE AND OBJECTIVE BOX
[facility]  INFECTIOUS DISEASES   =======================================================    [facility]-[id number]  EMANUEL MONTEZ     CC: Fever     HPI:  61 y/o woman with PMH of obesity, HTN, JESUS on CPAP, Anemia, AAA, Arthritis, Depression and Anxiety was admitted at [facility] for elective abdominal panniculectomy and liposuction on 9/25, now has fever so ID has been called for recommendations. The days of surgery she started decompensating due to bleeding so she was admitted to ICU and had blood transfusion. Now stable transferred to medical floor 2 days ago. This morning she had fever 102.   She has no cough, chest pain, SOB, diarrhea, dysuria or any other complaint except for the abdominal pain due to surgery.      PAST MEDICAL & SURGICAL HISTORY:  History of aortic aneurysm  descending aorta see CT  Arthritis  Degenerative disc disease, lumbar  Neuropathy  Spinal stenosis  Bipolar 1 disorder  Kidney stone  Sleep Apnea  CPAP with oxygen since June 2020  Asthma  Hypertension  Morbid Obesity  ETOH Abuse  H/O  Benign Essential Tremor  Anxiety  Depression  Renal Calculi  chronic    Colitis  H/O  Anemia  S/P cardiac catheterization  S/P dilation and curettage  History of tonsillectomy  History of laparoscopic adjustable gastric banding  band removed few yrs ago  S/P D&amp;C  Biliary stent placement &amp; ercp  ureteroscopy with stone removal  1-    Social Hx: no smoking, ETOH or drugs     FAMILY HISTORY:  Hypertension (Sibling)  Hyperlipidemia (Sibling)  Family history of renal failure  both parents were on dialysis  Family history of bacterial pneumonia  Family history of arthritis    Allergies  penicillins (Other)  trees dogs cats cockaroaches (Rhinitis; Rhinorrhea)    Antibiotics:  None    REVIEW OF SYSTEMS:  CONSTITUTIONAL:  + Fever   HEENT:  No diplopia or blurred vision.  No sore throat or runny nose.  CARDIOVASCULAR:  No chest pain or SOB.  RESPIRATORY:  No cough, shortness of breath, PND or orthopnea.  GASTROINTESTINAL:  No nausea, vomiting or diarrhea. surgical site pain +   GENITOURINARY:  No dysuria, frequency or urgency. No Blood in urine  MUSCULOSKELETAL:  no joint aches, no muscle pain  SKIN:  No change in skin, hair or nails.  NEUROLOGIC:  No paresthesias, fasciculations, seizures or weakness.  PSYCHIATRIC:  No disorder of thought or mood.  ENDOCRINE:  No heat or cold intolerance, polyuria or polydipsia.  HEMATOLOGICAL:  No easy bruising or bleeding.     Physical Exam:  Vital Signs Last 24 Hrs  T(C): 38.9 (30 Sep 2020 07:16), Max: 38.9 (30 Sep 2020 07:16)  T(F): 102 (30 Sep 2020 07:16), Max: 102 (30 Sep 2020 07:16)  HR: 83 (30 Sep 2020 07:16) (83 - 108)  BP: 144/57 (30 Sep 2020 07:16) (106/66 - 162/88)  RR: 19 (30 Sep 2020 07:16) (18 - 19)  SpO2: 95% (30 Sep 2020 07:16) (94% - 100%)  GEN: NAD  HEENT: normocephalic and atraumatic. EOMI. PERRL.    NECK: Supple.  No lymphadenopathy   LUNGS: Clear to auscultation.  HEART: Regular rate and rhythm without murmur.  ABDOMEN: Soft, nontender, and nondistended.  Positive bowel sounds.    : No CVA tenderness  EXTREMITIES: Without any cyanosis, clubbing, rash, lesions or edema.  NEUROLOGIC: grossly intact.  PSYCHIATRIC: Appropriate affect .  SKIN: No ulceration or induration present.    Labs:  09-30    140  |  102  |  16  ----------------------------<  118<H>  3.8   |  32<H>  |  0.77    Ca    8.6      30 Sep 2020 06:36  Phos  3.4     09-29  Mg     2.4     09-29    TPro  6.3  /  Alb  2.5<L>  /  TBili  0.4  /  DBili  x   /  AST  27  /  ALT  71  /  AlkPhos  84  09-30                        8.4    11.39 )-----------( x        ( 30 Sep 2020 06:36 )             25.4     PT/INR - ( 29 Sep 2020 06:49 )   PT: 12.9 sec;   INR: 1.11 ratio    PTT - ( 29 Sep 2020 06:49 )  PTT:21.9 sec    LIVER FUNCTIONS - ( 30 Sep 2020 06:36 )  Alb: 2.5 g/dL / Pro: 6.3 g/dL / ALK PHOS: 84 U/L / ALT: 71 U/L / AST: 27 U/L / GGT: x           COVID-19 PCR: NotDetec (09-23-20 @ 13:30)    All imaging and other data have been reviewed.  < from: Xray Chest 1 View- PORTABLE-Urgent (Xray Chest 1 View- PORTABLE-Urgent .) (09.30.20 @ 08:13) >  EXAM:  XR CHEST PORTABLE URGENT 1V                        PROCEDURE DATE:  09/30/2020    INTERPRETATION:  Portable chest x-ray  Indication: fever of unclear source  Comparison: 6/16/2020  2 portable chest x-rays are acquired for evaluation.  Impression: No evidence for acute pulmonary infiltrate, pleural effusion, or pneumothorax.  Stable small calcified granuloma in the right upper lung zone.  New small linear atelectasis in the left lower lung zone.  Stable cardiac silhouette.  No pulmonary vascular congestion.    Assessment and Plan: 61 y/o woman with PMH of obesity, HTN, JESUS on CPAP, Anemia, AAA, Arthritis, Depression and Anxiety was admitted at [facility] for elective abdominal panniculectomy and liposuction on 9/25, now has fever so ID has been called for recommendations. Currently no source of infection, wound looks fine with no signs of infection, but has 3 drains all with bleeding, possibly hematoma underneath causing fever.     Fever, panniculectomy  - Blood culture x 2  - UA and UC  - CXR negative   - CT abdomen/pelvis to rule out any large collection   - WBC 11k, will trend it.   - Will watch off antibiotics for now unless hemodynamically unstable/persistent fever/+ culture.     Thank you for courtesy of this consult.     Will follow.    Judi Black MD  Division of Infectious Diseases   Cell [phone] between 7am and 5pm   After 5pm and weekends please call ID service at [phone].

## 2020-09-30 NOTE — CHART NOTE - NSCHARTNOTEFT_GEN_A_CORE
Called by RN for anxiety with . Patient seen and examined at beside. Patient states that she woke up anxious and shaking. She usually takes 0.5mg Xanax during similar episodes that occur once/ 3 weeks. Other Vs stable, arms shaking, tachycardic, lungs clear. Ordered Xanax 0.25mg. Patient is agreeable at this time. RN to call if anything changes.

## 2020-09-30 NOTE — PROGRESS NOTE ADULT - ASSESSMENT
61yo F w/ PMH of JESUS on CPAP, HTN, morbid obesity, anemia, AAA, Arthritis, Depression, anxiety presented to [facility] for elective abdominal panniculectomy, admitted post-op with hemorrhagic shock s/p 3un PRBC with stabilization, now course c/b fever.    #Fever:  -unclear etiology  -pt without significant localizing symptoms or physical exam findings for infection   -BCx sent (f/up), UA quite benign, not indicative of infection (pt denies UTI symptoms)  -consulted ID (Wayne) who rec monitoring off Abx, f/up cultures, and checking CT Abd/P  -CT showing 4cm x 10cm hematoma and no other sign of infection   -discussed with ID, will monitor for now off abx  -other possible source of fever is atelectasis, though would be unusual to reach 102F  -monitor closely for signs of instability and low threshold for starting antibiotics if pt has signs of being unstable   -incentive spirometry     #Hemorrhagic shock: now resolved   - acute blood loss anemia post op, s/p 3unit pRBC   - shock state resolved, now off pressors with stable vital signs   - H/H stable. Per pt, she has been chronically anemic with a baseline of approx 8-9 and has been on B12 and folic acid supplements for years. Continue Vit B12 and folic acid supplements   - Continue to monitor H/H closely, Transfuse if Hgb <7 or if hemodynamically unstable   - monitor EDWIN x3 output; hold chemical DVT ppx in setting of recent severe bleed.    #S/P Abdominal panniculectomy: POD #4.   -mgmt per surgical team.   -pain control as ordered.   -bowel regimen on board; miralax, senna, patient currently refusing   - PT  - SCDs    #Anemia, acute on chronic:  - 2/2 acute blood loss anemia intra- and post-op, s/p 3 units pRBCs early in the admission  - H/H stabilized  - Per pt, she has been chronically anemic with a baseline of approx 8-9 and has been on B12 and folic acid supplements for years. Continue Vit B12 and folic acid supplements   - Continue to monitor H/H closely, Transfuse if Hgb <7 or if hemodynamically unstable   - monitor h/h    #Afib, new onset:  - Cardio following, ([facility]), recs appreciated   - had brief Afib in setting of severe hypotension / instability early in the admission.   - c/w propanolol per cardio  - no A/C especially given pt had hemorrhagic shock few days ago    #JESUS on CPAP:   -pulm (Dr Hanson) following - recs appreciated  -continuous pulse ox.   -CPAP QHS    #HTN:   - continue propanolol  - holding verapamil (home med)    #Anxiety/Depression:   - continue Cymbalta, trazodone and lamictal.  - received Xanax this morning     #Obesity, morbid   - now s/p panniculectomy ; counseled on importance of healthy diet    # DVT ppx:   - SCDs

## 2020-09-30 NOTE — PROVIDER CONTACT NOTE (OTHER) - SITUATION
Dr. Perkins was notified, and told if he wants a exact platelet count he has to put the order in again.

## 2020-09-30 NOTE — PROGRESS NOTE ADULT - ASSESSMENT
60 year old female with JESUS, obesity and HTN, with hypotension after an abdominal panniculectomy, in the setting of presumed blood loss anemia, now off pressors     A fib  - She had brief atrial fibrillation in the setting of significant hypotension, though is now back in SR.  - no history of AF though has reported palpitations  - telemetry with SR 70-80s   - Likely this was secondary to underlying hypotension  - now off of pressor support.   - Can resume Propanolol.     HTN  - BP: 144/57 (09-30-20 @ 07:16) (106/66 - 162/88)  - Continue propranolol  - Hold verapamil   - low normal LV function with mild diastolic dysfunction on recent echocardiogram  - no sign of acute ischemia  - recent cath with non-obs cad  - no sign of volume overload  - hold diuretics in setting of hypotension    Anemia   - Hemoglobin <--8.4, <--8.0, <--7.4  - Hematocrit <--25.4, <--22.8, <--22.0  - Monitor CBC    JESUS  - CPAP at night    Fever  - Pending UA, c/s and blood c/s  - Management per primary team     - Monitor and replete lytes, keep K>4, Mg>2.  - All other medical needs as per primary team.  - Other cardiovascular workup will depend on clinical course.  - Will continue to follow.    Kobe Corado, MS FNP, [facility]  Nurse Practitioner- Cardiology   Spectra #[phone]

## 2020-09-30 NOTE — PROGRESS NOTE ADULT - SUBJECTIVE AND OBJECTIVE BOX
[facility] -- Almas Faustin Grossman, [facility], Saurabh Johansen Savella, [place]: Office # [phone]    Follow Up:  Hypotension     Subjective/Observations:  Patient seen and examined. Patient awake and alert, resting comfortably in bed. Patient febrile this morning T 102F. Plan for pan c/s. Improved anemia. No complaints of chest pain, SOB, LE edema, cough. No signs of orthopnea or PND.    REVIEW OF SYSTEMS: All review of systems is negative for eye, ENT, GI, , allergic, dermatologic, musculoskeletal and neurologic except as described above    PAST MEDICAL & SURGICAL HISTORY:  History of aortic aneurysm descending aorta see CT  Arthritis  Degenerative disc disease, lumbar  Neuropathy  Spinal stenosis  Bipolar 1 disorder  Kidney stone  Sleep Apnea CPAP with oxygen since June 2020  Asthma  Hypertension  Morbid Obesity  ETOH Abuse H/O  Benign Essential Tremor  Anxiety  Depression  Renal Calculi chronic  Colitis H/O  Anemia  S/P cardiac catheterization  S/P dilation and curettage  History of tonsillectomy  History of laparoscopic adjustable gastric banding band removed few yrs ago  S/P D&amp;C  Biliary stent placement &amp; ercp  ureteroscopy with stone removal 1-    MEDICATIONS  (STANDING):  allopurinol 300 milliGRAM(s) Oral daily  cyanocobalamin 1000 MICROGram(s) Oral daily  docusate sodium 100 milliGRAM(s) Oral three times a day  DULoxetine 30 milliGRAM(s) Oral <User Schedule>  folic acid 1 milliGRAM(s) Oral daily  lamoTRIgine 200 milliGRAM(s) Oral daily  multivitamin 1 Tablet(s) Oral daily  polyethylene glycol 3350 17 Gram(s) Oral daily  propranolol 10 milliGRAM(s) Oral daily  senna 2 Tablet(s) Oral at bedtime  traZODone 200 milliGRAM(s) Oral at bedtime    MEDICATIONS  (PRN):  acetaminophen   Tablet .. 650 milliGRAM(s) Oral every 6 hours PRN Mild Pain (1 - 3)  acetaminophen   Tablet .. 650 milliGRAM(s) Oral every 6 hours PRN Temp greater or equal to 38C (100.4F)  artificial tears (preservative free) Ophthalmic Solution 1 Drop(s) Both EYES two times a day PRN Dry Eyes  glucagon  Injectable 1 milliGRAM(s) IntraMuscular once PRN Glucose LESS THAN 70 milligrams/deciliter  oxycodone    5 mG/acetaminophen 325 mG 1 Tablet(s) Oral every 4 hours PRN Moderate Pain (4 - 6)  oxycodone    5 mG/acetaminophen 325 mG 2 Tablet(s) Oral every 6 hours PRN Severe Pain (7 - 10)    Allergies  penicillins (Other)  trees dogs cats cockaroaches (Rhinitis; Rhinorrhea)    Vital Signs Last 24 Hrs  T(C): 38.9 (30 Sep 2020 07:16), Max: 38.9 (30 Sep 2020 07:16)  T(F): 102 (30 Sep 2020 07:16), Max: 102 (30 Sep 2020 07:16)  HR: 83 (30 Sep 2020 07:16) (83 - 108)  BP: 144/57 (30 Sep 2020 07:16) (106/66 - 162/88)  BP(mean): --  RR: 19 (30 Sep 2020 07:16) (18 - 19)  SpO2: 95% (30 Sep 2020 07:16) (94% - 100%)    I&O's Summary    29 Sep 2020 07:01  -  30 Sep 2020 07:00  --------------------------------------------------------  IN: 400 mL / OUT: 378 mL / NET: 22 mL    TELE:  70-80s   PHYSICAL EXAM:  Appearance: NAD, no distress, alert, Obese   HEENT: Moist Mucous Membranes, Anicteric  Cardiovascular: Regular rate and rhythm, Normal S1 S2, No JVD, No murmurs, No rubs, gallops or clicks  Respiratory: Non-labored, Clear to auscultation, No rales, No rhonchi, No wheezing.   Gastrointestinal:  + abdominal binder. EDWIN drain, Soft, Non-tender, + BS  Neurologic: Non-focal  Skin: Warm and dry, No visible rashes or ulcers, No ecchymosis, No cyanosis  Musculoskeletal: No clubbing, No cyanosis, No joint swelling/tenderness  Psychiatry: Mood & affect appropriate  Lymph: No peripheral edema.     LABS: All Labs Reviewed:                        8.4    11.39 )-----------( x        ( 30 Sep 2020 06:36 )             25.4                         8.0    9.73  )-----------( 201      ( 29 Sep 2020 18:57 )             22.8                         7.4    8.69  )-----------( 185      ( 29 Sep 2020 06:49 )             22.0     30 Sep 2020 06:36    140    |  102    |  16     ----------------------------<  118    3.8     |  32     |  0.77   29 Sep 2020 06:49    144    |  107    |  12     ----------------------------<  93     3.9     |  31     |  0.73   28 Sep 2020 05:25    144    |  108    |  14     ----------------------------<  107    4.4     |  31     |  0.69     Ca    8.6        30 Sep 2020 06:36  Ca    8.2        29 Sep 2020 06:49  Ca    8.5        28 Sep 2020 05:25  Phos  3.4       29 Sep 2020 06:49  Phos  2.3       28 Sep 2020 05:25  Mg     2.4       29 Sep 2020 06:49  Mg     2.3       28 Sep 2020 05:25    TPro  6.3    /  Alb  2.5    /  TBili  0.4    /  DBili  x      /  AST  27     /  ALT  71     /  AlkPhos  84     30 Sep 2020 06:36  TPro  5.5    /  Alb  2.3    /  TBili  0.3    /  DBili  x      /  AST  34     /  ALT  83     /  AlkPhos  73     29 Sep 2020 06:49  TPro  5.4    /  Alb  2.3    /  TBili  0.4    /  DBili  x      /  AST  52     /  ALT  110    /  AlkPhos  76     28 Sep 2020 05:25    PT/INR - ( 29 Sep 2020 06:49 )   PT: 12.9 sec;   INR: 1.11 ratio    PTT - ( 29 Sep 2020 06:49 )  PTT:21.9 sec  Creatine Kinase, Serum: 65 U/L (09-25-20 @ 21:25)  Troponin I, Serum: .033 ng/mL (09-25-20 @ 21:25)  D-Dimer Assay, Quantitative: 321 ng/mL DDU (09-25-20 @ 21:38)      < from: Cardiac Cath Lab - Adult (06.16.20 @ 16:26) >  CORONARY VESSELS: The coronary circulation is right dominant.  LM:   --  LM: Normal.  LAD:   --  LAD: Angiography showed minor luminal irregularities with no  flow limiting lesions.  CX:   --  Circumflex: Normal.  RCA:   --  RCA: Normal.  COMPLICATIONS: There were no complications.  DIAGNOSTIC RECOMMENDATIONS: The patient should continue with the present  medications.  < end of copied text >

## 2020-10-01 LAB
ALBUMIN SERPL ELPH-MCNC: 2.2 G/DL — LOW (ref 3.3–5)
ALP SERPL-CCNC: 83 U/L — SIGNIFICANT CHANGE UP (ref 40–120)
ALT FLD-CCNC: 48 U/L — SIGNIFICANT CHANGE UP (ref 12–78)
ANION GAP SERPL CALC-SCNC: 7 MMOL/L — SIGNIFICANT CHANGE UP (ref 5–17)
AST SERPL-CCNC: 14 U/L — LOW (ref 15–37)
BASOPHILS # BLD AUTO: 0.04 K/UL — SIGNIFICANT CHANGE UP (ref 0–0.2)
BASOPHILS NFR BLD AUTO: 0.2 % — SIGNIFICANT CHANGE UP (ref 0–2)
BILIRUB SERPL-MCNC: 0.6 MG/DL — SIGNIFICANT CHANGE UP (ref 0.2–1.2)
BUN SERPL-MCNC: 12 MG/DL — SIGNIFICANT CHANGE UP (ref 7–23)
CALCIUM SERPL-MCNC: 8.5 MG/DL — SIGNIFICANT CHANGE UP (ref 8.5–10.1)
CHLORIDE SERPL-SCNC: 102 MMOL/L — SIGNIFICANT CHANGE UP (ref 96–108)
CO2 SERPL-SCNC: 30 MMOL/L — SIGNIFICANT CHANGE UP (ref 22–31)
CREAT SERPL-MCNC: 0.76 MG/DL — SIGNIFICANT CHANGE UP (ref 0.5–1.3)
CULTURE RESULTS: SIGNIFICANT CHANGE UP
EOSINOPHIL # BLD AUTO: 0.04 K/UL — SIGNIFICANT CHANGE UP (ref 0–0.5)
EOSINOPHIL NFR BLD AUTO: 0.2 % — SIGNIFICANT CHANGE UP (ref 0–6)
FERRITIN SERPL-MCNC: 190 NG/ML — HIGH (ref 15–150)
FOLATE SERPL-MCNC: >20 NG/ML — SIGNIFICANT CHANGE UP
GLUCOSE SERPL-MCNC: 137 MG/DL — HIGH (ref 70–99)
HCT VFR BLD CALC: 25.5 % — LOW (ref 34.5–45)
HGB BLD-MCNC: 8.8 G/DL — LOW (ref 11.5–15.5)
IMM GRANULOCYTES NFR BLD AUTO: 0.8 % — SIGNIFICANT CHANGE UP (ref 0–1.5)
IRON SATN MFR SERPL: 10 UG/DL — LOW (ref 30–160)
IRON SATN MFR SERPL: 5 % — LOW (ref 14–50)
LYMPHOCYTES # BLD AUTO: 1.73 K/UL — SIGNIFICANT CHANGE UP (ref 1–3.3)
LYMPHOCYTES # BLD AUTO: 10.2 % — LOW (ref 13–44)
MCHC RBC-ENTMCNC: 31.3 PG — SIGNIFICANT CHANGE UP (ref 27–34)
MCHC RBC-ENTMCNC: 34.5 GM/DL — SIGNIFICANT CHANGE UP (ref 32–36)
MCV RBC AUTO: 90.7 FL — SIGNIFICANT CHANGE UP (ref 80–100)
MONOCYTES # BLD AUTO: 1.63 K/UL — HIGH (ref 0–0.9)
MONOCYTES NFR BLD AUTO: 9.6 % — SIGNIFICANT CHANGE UP (ref 2–14)
NEUTROPHILS # BLD AUTO: 13.34 K/UL — HIGH (ref 1.8–7.4)
NEUTROPHILS NFR BLD AUTO: 79 % — HIGH (ref 43–77)
NRBC # BLD: 0 /100 WBCS — SIGNIFICANT CHANGE UP (ref 0–0)
PLATELET # BLD AUTO: 294 K/UL — SIGNIFICANT CHANGE UP (ref 150–400)
POTASSIUM SERPL-MCNC: 3.8 MMOL/L — SIGNIFICANT CHANGE UP (ref 3.5–5.3)
POTASSIUM SERPL-SCNC: 3.8 MMOL/L — SIGNIFICANT CHANGE UP (ref 3.5–5.3)
PROT SERPL-MCNC: 6.1 G/DL — SIGNIFICANT CHANGE UP (ref 6–8.3)
RBC # BLD: 2.81 M/UL — LOW (ref 3.8–5.2)
RBC # FLD: 14.6 % — HIGH (ref 10.3–14.5)
SODIUM SERPL-SCNC: 139 MMOL/L — SIGNIFICANT CHANGE UP (ref 135–145)
SPECIMEN SOURCE: SIGNIFICANT CHANGE UP
TIBC SERPL-MCNC: 203 UG/DL — LOW (ref 220–430)
UIBC SERPL-MCNC: 193 UG/DL — SIGNIFICANT CHANGE UP (ref 110–370)
VIT B12 SERPL-MCNC: 1677 PG/ML — HIGH (ref 232–1245)
WBC # BLD: 16.92 K/UL — HIGH (ref 3.8–10.5)
WBC # FLD AUTO: 16.92 K/UL — HIGH (ref 3.8–10.5)

## 2020-10-01 PROCEDURE — 99232 SBSQ HOSP IP/OBS MODERATE 35: CPT

## 2020-10-01 PROCEDURE — 99233 SBSQ HOSP IP/OBS HIGH 50: CPT

## 2020-10-01 RX ORDER — MEROPENEM 1 G/30ML
1000 INJECTION INTRAVENOUS ONCE
Refills: 0 | Status: COMPLETED | OUTPATIENT
Start: 2020-10-01 | End: 2020-10-01

## 2020-10-01 RX ORDER — MEROPENEM 1 G/30ML
1000 INJECTION INTRAVENOUS EVERY 8 HOURS
Refills: 0 | Status: DISCONTINUED | OUTPATIENT
Start: 2020-10-01 | End: 2020-10-05

## 2020-10-01 RX ORDER — SODIUM CHLORIDE 9 MG/ML
500 INJECTION INTRAMUSCULAR; INTRAVENOUS; SUBCUTANEOUS ONCE
Refills: 0 | Status: COMPLETED | OUTPATIENT
Start: 2020-10-01 | End: 2020-10-01

## 2020-10-01 RX ORDER — MEROPENEM 1 G/30ML
INJECTION INTRAVENOUS
Refills: 0 | Status: DISCONTINUED | OUTPATIENT
Start: 2020-10-01 | End: 2020-10-05

## 2020-10-01 RX ADMIN — Medication 100 MILLIGRAM(S): at 13:20

## 2020-10-01 RX ADMIN — Medication 650 MILLIGRAM(S): at 05:53

## 2020-10-01 RX ADMIN — DULOXETINE HYDROCHLORIDE 30 MILLIGRAM(S): 30 CAPSULE, DELAYED RELEASE ORAL at 13:20

## 2020-10-01 RX ADMIN — Medication 650 MILLIGRAM(S): at 06:26

## 2020-10-01 RX ADMIN — Medication 650 MILLIGRAM(S): at 12:57

## 2020-10-01 RX ADMIN — SENNA PLUS 2 TABLET(S): 8.6 TABLET ORAL at 21:16

## 2020-10-01 RX ADMIN — DULOXETINE HYDROCHLORIDE 30 MILLIGRAM(S): 30 CAPSULE, DELAYED RELEASE ORAL at 05:53

## 2020-10-01 RX ADMIN — DULOXETINE HYDROCHLORIDE 30 MILLIGRAM(S): 30 CAPSULE, DELAYED RELEASE ORAL at 21:16

## 2020-10-01 RX ADMIN — Medication 100 MILLIGRAM(S): at 21:15

## 2020-10-01 RX ADMIN — Medication 100 MILLIGRAM(S): at 05:53

## 2020-10-01 RX ADMIN — LAMOTRIGINE 200 MILLIGRAM(S): 25 TABLET, ORALLY DISINTEGRATING ORAL at 11:00

## 2020-10-01 RX ADMIN — MEROPENEM 100 MILLIGRAM(S): 1 INJECTION INTRAVENOUS at 21:16

## 2020-10-01 RX ADMIN — SODIUM CHLORIDE 1000 MILLILITER(S): 9 INJECTION INTRAMUSCULAR; INTRAVENOUS; SUBCUTANEOUS at 18:25

## 2020-10-01 RX ADMIN — Medication 300 MILLIGRAM(S): at 11:01

## 2020-10-01 RX ADMIN — POLYETHYLENE GLYCOL 3350 17 GRAM(S): 17 POWDER, FOR SOLUTION ORAL at 11:01

## 2020-10-01 RX ADMIN — Medication 200 MILLIGRAM(S): at 21:16

## 2020-10-01 RX ADMIN — Medication 1 MILLIGRAM(S): at 11:01

## 2020-10-01 RX ADMIN — Medication 650 MILLIGRAM(S): at 18:25

## 2020-10-01 RX ADMIN — Medication 1 TABLET(S): at 11:00

## 2020-10-01 RX ADMIN — Medication 650 MILLIGRAM(S): at 18:56

## 2020-10-01 RX ADMIN — SODIUM CHLORIDE 1000 MILLILITER(S): 9 INJECTION INTRAMUSCULAR; INTRAVENOUS; SUBCUTANEOUS at 13:21

## 2020-10-01 RX ADMIN — PREGABALIN 1000 MICROGRAM(S): 225 CAPSULE ORAL at 11:00

## 2020-10-01 RX ADMIN — Medication 650 MILLIGRAM(S): at 13:30

## 2020-10-01 RX ADMIN — MEROPENEM 100 MILLIGRAM(S): 1 INJECTION INTRAVENOUS at 14:09

## 2020-10-01 NOTE — PROGRESS NOTE ADULT - SUBJECTIVE AND OBJECTIVE BOX
Patient is a 60y old  Female who presents with a chief complaint of hemorrhagic shock s/p panniculectomy (01 Oct 2020 21:23)      INTERVAL HPI/OVERNIGHT EVENTS:    Had fever earlier today. Denies cough or shortness of breath. Has been using nocturnal CPAP    MEDICATIONS  (STANDING):  allopurinol 300 milliGRAM(s) Oral daily  cyanocobalamin 1000 MICROGram(s) Oral daily  docusate sodium 100 milliGRAM(s) Oral three times a day  DULoxetine 30 milliGRAM(s) Oral <User Schedule>  folic acid 1 milliGRAM(s) Oral daily  lamoTRIgine 200 milliGRAM(s) Oral daily  meropenem  IVPB      meropenem  IVPB 1000 milliGRAM(s) IV Intermittent every 8 hours  multivitamin 1 Tablet(s) Oral daily  polyethylene glycol 3350 17 Gram(s) Oral daily  propranolol 10 milliGRAM(s) Oral daily  senna 2 Tablet(s) Oral at bedtime  traZODone 200 milliGRAM(s) Oral at bedtime      MEDICATIONS  (PRN):  acetaminophen   Tablet .. 650 milliGRAM(s) Oral every 6 hours PRN Mild Pain (1 - 3)  acetaminophen   Tablet .. 650 milliGRAM(s) Oral every 6 hours PRN Temp greater or equal to 38C (100.4F)  artificial tears (preservative free) Ophthalmic Solution 1 Drop(s) Both EYES two times a day PRN Dry Eyes  glucagon  Injectable 1 milliGRAM(s) IntraMuscular once PRN Glucose LESS THAN 70 milligrams/deciliter  oxycodone    5 mG/acetaminophen 325 mG 1 Tablet(s) Oral every 4 hours PRN Moderate Pain (4 - 6)  oxycodone    5 mG/acetaminophen 325 mG 2 Tablet(s) Oral every 6 hours PRN Severe Pain (7 - 10)      Allergies    penicillins (Other)  trees dogs cats cockaroaches (Rhinitis; Rhinorrhea)    Intolerances        PAST MEDICAL & SURGICAL HISTORY:  History of aortic aneurysm  descending aorta see CT    Arthritis    Degenerative disc disease, lumbar    Neuropathy    Spinal stenosis    Bipolar 1 disorder    Kidney stone    Sleep Apnea  CPAP with oxygen since 2020    Asthma    Hypertension    Morbid Obesity    ETOH Abuse  H/O    Benign Essential Tremor    Anxiety    Depression    Renal Calculi  chronic      Colitis  H/O    Anemia    S/P cardiac catheterization    S/P dilation and curettage    History of tonsillectomy    History of laparoscopic adjustable gastric banding  band removed few yrs ago    S/P D&amp;C    Biliary stent placement &amp; ercp    ureteroscopy with stone removal  2016        Vital Signs Last 24 Hrs  T(C): 38 (01 Oct 2020 20:31), Max: 38.3 (01 Oct 2020 18:17)  T(F): 100.4 (01 Oct 2020 20:31), Max: 100.9 (01 Oct 2020 18:17)  HR: 92 (01 Oct 2020 21:29) (65 - 121)  BP: 110/64 (01 Oct 2020 20:31) (78/43 - 136/85)  BP(mean): --  RR: 20 (01 Oct 2020 20:31) (18 - 20)  SpO2: 99% (01 Oct 2020 21:29) (93% - 100%)    PHYSICAL EXAMINATION:    GENERAL: The patient is awake and alert in no apparent distress.     HEENT: Head is normocephalic and atraumatic. Extraocular muscles are intact. Mucous membranes are moist.    NECK: Supple.    LUNGS: Clear to auscultation without wheezing, rales or rhonchi; respirations unlabored    HEART: Regular rate and rhythm without murmur.    ABDOMEN: obese with positive bowel sounds     EXTREMITIES: positive pedal edema    NEUROLOGIC: Grossly intact.    SKIN: No ulceration or induration present.      LABS:                        8.8    16.92 )-----------( 294      ( 01 Oct 2020 07:21 )             25.5     10-    139  |  102  |  12  ----------------------------<  137<H>  3.8   |  30  |  0.76    Ca    8.5      01 Oct 2020 07:21    TPro  6.1  /  Alb  2.2<L>  /  TBili  0.6  /  DBili  x   /  AST  14<L>  /  ALT  48  /  AlkPhos  83  10-      Urinalysis Basic - ( 30 Sep 2020 11:19 )    Color: Yellow / Appearance: Slightly Turbid / S.005 / pH: x  Gluc: x / Ketone: Small  / Bili: Negative / Urobili: Negative   Blood: x / Protein: Negative / Nitrite: Negative   Leuk Esterase: Trace / RBC: 3-5 /HPF / WBC 3-5   Sq Epi: x / Non Sq Epi: Few / Bacteria: Few                      MICROBIOLOGY:  Culture Results:   <10,000 CFU/mL Normal Urogenital Chanel ( @ 15:18)  Culture Results:   No growth to date. ( @ 12:07)  Culture Results:   No growth to date. ( @ 12:07)          Assessment:    S/P Abdominal Panniculectomy  Obstructive Sleep Apnea Syndrome  Anemia secondary to post-op bleed  Fever - source uncertain    Plan:    Pain control  Incentive Spirometry  Antibiotics per ID (Meropenem)  Repeat Chest x ray  Nocturnal CPAP

## 2020-10-01 NOTE — PROGRESS NOTE ADULT - ASSESSMENT
60 year old female with JESUS, obesity and HTN, with hypotension after an abdominal panniculectomy, in the setting of presumed blood loss anemia, now off pressors     A fib  - She had brief atrial fibrillation in the setting of significant hypotension, though is now back in SR.  - no history of AF though has reported palpitations  - telemetry with SR 70-80s   - Likely this was secondary to underlying hypotension  - now off of pressor support.   - C/W Propanolol.     HTN  - BP: 136/85 (10-01-20 @ 05:47) (99/59 - 136/85)  - Continue propranolol  - Hold verapamil   - low normal LV function with mild diastolic dysfunction on recent echocardiogram  - no sign of acute ischemia  - recent cath with non-obs cad  - no sign of volume overload  - hold diuretics in setting of hypotension    Anemia   -Stable   -Per primary  - Monitor CBC    JESUS  - CPAP at night    Fever  -Tmax 24 hour 102  - Management per primary team     - Monitor and replete lytes, keep K>4, Mg>2.  - All other medical needs as per primary team.  - Other cardiovascular workup will depend on clinical course.  - Will continue to follow.  Duarte Rojas DNP, ANP-c  Cardiology   Spectra #[phone]  [phone]    60 year old female with JESUS, obesity and HTN, with hypotension after an abdominal panniculectomy, in the setting of presumed blood loss anemia, now off pressors     A fib  - She had brief atrial fibrillation in the setting of significant hypotension, though is now back in SR.  - no history of AF though has reported palpitations  - telemetry with SR 70-80s   - Likely this was secondary to underlying hypotension  - now off of pressor support.   - C/W Propanolol.     HTN  - BP: 136/85 (10-01-20 @ 05:47) (99/59 - 136/85)  - Continue propranolol  - Hold verapamil   - low normal LV function with mild diastolic dysfunction on recent echocardiogram  - no sign of acute ischemia  - recent cath with non-obs cad  - no sign of volume overload  - hold diuretics in setting of hypotension    Anemia   - Stable   - Per primary  - Monitor CBC    JESUS  - CPAP at night    Fever  - Tmax 24 hour 102  - Management per primary team and ID    - Monitor and replete lytes, keep K>4, Mg>2.  - All other medical needs as per primary team.  - Other cardiovascular workup will depend on clinical course.  - Will continue to follow.  Duarte Rojas DNP, ANP-c  Cardiology   Spectra #[phone]  [phone]

## 2020-10-01 NOTE — PROGRESS NOTE ADULT - SUBJECTIVE AND OBJECTIVE BOX
Patient is a 60y old  Female who presents with a chief complaint of elective panniculectomy.     INTERVAL HPI/OVERNIGHT EVENTS: Pt with low grade fever between 100.4 and 100.9F today.  Pt denies feeling a subjective fever and states she feels overall well aside from mild sensation of generalized weakness. Reports mild abdominal pain on left side of the abdomen. Denies nausea, vomiting, diarrhea, SOB, cough, dysuria, urinary frequency/urgency, headache.     MEDICATIONS  (STANDING):  allopurinol 300 milliGRAM(s) Oral daily  cyanocobalamin 1000 MICROGram(s) Oral daily  docusate sodium 100 milliGRAM(s) Oral three times a day  DULoxetine 30 milliGRAM(s) Oral <User Schedule>  folic acid 1 milliGRAM(s) Oral daily  lamoTRIgine 200 milliGRAM(s) Oral daily  meropenem  IVPB      meropenem  IVPB 1000 milliGRAM(s) IV Intermittent every 8 hours  multivitamin 1 Tablet(s) Oral daily  polyethylene glycol 3350 17 Gram(s) Oral daily  propranolol 10 milliGRAM(s) Oral daily  senna 2 Tablet(s) Oral at bedtime  traZODone 200 milliGRAM(s) Oral at bedtime    MEDICATIONS  (PRN):  acetaminophen   Tablet .. 650 milliGRAM(s) Oral every 6 hours PRN Mild Pain (1 - 3)  acetaminophen   Tablet .. 650 milliGRAM(s) Oral every 6 hours PRN Temp greater or equal to 38C (100.4F)  artificial tears (preservative free) Ophthalmic Solution 1 Drop(s) Both EYES two times a day PRN Dry Eyes  glucagon  Injectable 1 milliGRAM(s) IntraMuscular once PRN Glucose LESS THAN 70 milligrams/deciliter  oxycodone    5 mG/acetaminophen 325 mG 1 Tablet(s) Oral every 4 hours PRN Moderate Pain (4 - 6)  oxycodone    5 mG/acetaminophen 325 mG 2 Tablet(s) Oral every 6 hours PRN Severe Pain (7 - 10)      Allergies    penicillins (Other)  trees dogs cats cockaroaches (Rhinitis; Rhinorrhea)    Intolerances        REVIEW OF SYSTEMS:  CONSTITUTIONAL: did not have subjective fever (had a recorded fever); no chills  HEENT:  No headache, no sore throat  RESPIRATORY: No cough, wheezing, or shortness of breath  CARDIOVASCULAR: No chest pain, palpitations  GASTROINTESTINAL: +mild abd pain, +nausea, +vomiting, no diarrhea  GENITOURINARY: No dysuria, frequency, or hematuria  NEUROLOGICAL: no focal weakness or dizziness  MUSCULOSKELETAL: no myalgias     Vital Signs Last 24 Hrs  T(C): 38 (01 Oct 2020 20:31), Max: 38.3 (01 Oct 2020 18:17)  T(F): 100.4 (01 Oct 2020 20:31), Max: 100.9 (01 Oct 2020 18:17)  HR: 90 (01 Oct 2020 20:31) (65 - 128)  BP: 110/64 (01 Oct 2020 20:31) (78/43 - 136/85)  BP(mean): --  RR: 20 (01 Oct 2020 20:31) (18 - 20)  SpO2: 93% (01 Oct 2020 20:) (91% - 100%)    PHYSICAL EXAM:  GENERAL: NAD at rest, morbidly obese  HEENT:  anicteric, moist mucous membranes  CHEST/LUNG:  CTA b/l, no rales, wheezes, or rhonchi  HEART:  RRR, S1, S2  ABDOMEN:  BS+, soft, nontender in upper abdomen, mild tenderness near incisions, nondistended; 3 EDWIN drains with some bloody drainage; incision sealed with dermabond without significant warmth or erythema. No incisional drainage.  EXTREMITIES: no cyanosis, or calf tenderness  NERVOUS SYSTEM: answers questions and follows commands appropriately    LABS:                        8.8    16.92 )-----------( 294      ( 01 Oct 2020 07:21 )             25.5     CBC Full  -  ( 01 Oct 2020 07:21 )  WBC Count : 16.92 K/uL  Hemoglobin : 8.8 g/dL  Hematocrit : 25.5 %  Platelet Count - Automated : 294 K/uL  Mean Cell Volume : 90.7 fl  Mean Cell Hemoglobin : 31.3 pg  Mean Cell Hemoglobin Concentration : 34.5 gm/dL  Auto Neutrophil # : 13.34 K/uL  Auto Lymphocyte # : 1.73 K/uL  Auto Monocyte # : 1.63 K/uL  Auto Eosinophil # : 0.04 K/uL  Auto Basophil # : 0.04 K/uL  Auto Neutrophil % : 79.0 %  Auto Lymphocyte % : 10.2 %  Auto Monocyte % : 9.6 %  Auto Eosinophil % : 0.2 %  Auto Basophil % : 0.2 %    01 Oct 2020 07:21    139    |  102    |  12     ----------------------------<  137    3.8     |  30     |  0.76     Ca    8.5        01 Oct 2020 07:21    TPro  6.1    /  Alb  2.2    /  TBili  0.6    /  DBili  x      /  AST  14     /  ALT  48     /  AlkPhos  83     01 Oct 2020 07:21      Urinalysis Basic - ( 30 Sep 2020 11:19 )    Color: Yellow / Appearance: Slightly Turbid / S.005 / pH: x  Gluc: x / Ketone: Small  / Bili: Negative / Urobili: Negative   Blood: x / Protein: Negative / Nitrite: Negative   Leuk Esterase: Trace / RBC: 3-5 /HPF / WBC 3-5   Sq Epi: x / Non Sq Epi: Few / Bacteria: Few      CAPILLARY BLOOD GLUCOSE            Culture - Urine (collected 20 @ 15:18)  Source: .Urine Clean Catch (Midstream)  Final Report (10-01-20 @ 11:52):    <10,000 CFU/mL Normal Urogenital Chanel    Culture - Blood (collected 20 @ 12:07)  Source: .Blood Blood-Peripheral  Preliminary Report (10-01-20 @ 13:01):    No growth to date.    Culture - Blood (collected 20 @ 12:07)  Source: .Blood Blood  Preliminary Report (10-01-20 @ 13:01):    No growth to date.        RADIOLOGY & ADDITIONAL TESTS:    Personally reviewed.     Consultant(s) Notes Reviewed:  [x] YES  [ ] NO

## 2020-10-01 NOTE — PROGRESS NOTE ADULT - SUBJECTIVE AND OBJECTIVE BOX
[facility]  INFECTIOUS DISEASES   =======================================================    [facility]-[id number]  EMANUEL MONTEZ     Follow up: Fever    Has low grade fever, no new changes, less bleeding from drains.   Leukocytosis worse.     PAST MEDICAL & SURGICAL HISTORY:  History of aortic aneurysm  descending aorta see CT  Arthritis  Degenerative disc disease, lumbar  Neuropathy  Spinal stenosis  Bipolar 1 disorder  Kidney stone  Sleep Apnea  CPAP with oxygen since June 2020  Asthma  Hypertension  Morbid Obesity  ETOH Abuse  H/O  Benign Essential Tremor  Anxiety  Depression  Renal Calculi  chronic    Colitis  H/O  Anemia  S/P cardiac catheterization  S/P dilation and curettage  History of tonsillectomy  History of laparoscopic adjustable gastric banding  band removed few yrs ago  S/P D&amp;C  Biliary stent placement &amp; ercp  ureteroscopy with stone removal  1-    Social Hx: no smoking, ETOH or drugs     FAMILY HISTORY:  Hypertension (Sibling)  Hyperlipidemia (Sibling)  Family history of renal failure  both parents were on dialysis  Family history of bacterial pneumonia  Family history of arthritis    Allergies  penicillins (Other)  trees dogs cats cockaroaches (Rhinitis; Rhinorrhea)    Antibiotics:  Meropenum     REVIEW OF SYSTEMS:  CONSTITUTIONAL:  + Fever   HEENT:  No diplopia or blurred vision.  No sore throat or runny nose.  CARDIOVASCULAR:  No chest pain or SOB.  RESPIRATORY:  No cough, shortness of breath, PND or orthopnea.  GASTROINTESTINAL:  No nausea, vomiting or diarrhea. surgical site pain +   GENITOURINARY:  No dysuria, frequency or urgency. No Blood in urine  MUSCULOSKELETAL:  no joint aches, no muscle pain  SKIN:  No change in skin, hair or nails.  NEUROLOGIC:  No paresthesias, fasciculations, seizures or weakness.  PSYCHIATRIC:  No disorder of thought or mood.  ENDOCRINE:  No heat or cold intolerance, polyuria or polydipsia.  HEMATOLOGICAL:  No easy bruising or bleeding.     Physical Exam:  Vital Signs Last 24 Hrs  T(C): 38.1 (01 Oct 2020 16:19), Max: 38.1 (01 Oct 2020 15:42)  T(F): 100.5 (01 Oct 2020 16:19), Max: 100.5 (01 Oct 2020 15:42)  HR: 65 (01 Oct 2020 16:19) (65 - 128)  BP: 100/62 (01 Oct 2020 16:33) (78/43 - 136/85)  BP(mean): --  RR: 18 (01 Oct 2020 16:19) (18 - 19)  SpO2: 96% (01 Oct 2020 16:19) (91% - 100%)  GEN: NAD  HEENT: normocephalic and atraumatic. EOMI. PERRL.    NECK: Supple.  No lymphadenopathy   LUNGS: Clear to auscultation.  HEART: Regular rate and rhythm without murmur.  ABDOMEN: Soft, nontender, and nondistended.  Positive bowel sounds.    : No CVA tenderness  EXTREMITIES: Without any cyanosis, clubbing, rash, lesions or edema.  NEUROLOGIC: grossly intact.  PSYCHIATRIC: Appropriate affect .  SKIN: No ulceration or induration present.    Labs:                        8.8    16.92 )-----------( 294      ( 01 Oct 2020 07:21 )             25.5      10-01    139  |  102  |  12  ----------------------------<  137<H>  3.8   |  30  |  0.76    Ca    8.5      01 Oct 2020 07:21    TPro  6.1  /  Alb  2.2<L>  /  TBili  0.6  /  DBili  x   /  AST  14<L>  /  ALT  48  /  AlkPhos  83  10-01    Culture - Urine (collected 09-30-20 @ 15:18)  Source: .Urine Clean Catch (Midstream)  Final Report (10-01-20 @ 11:52):    <10,000 CFU/mL Normal Urogenital Chanel    Culture - Blood (collected 09-30-20 @ 12:07)  Source: .Blood Blood-Peripheral    Culture - Blood (collected 09-30-20 @ 12:07)  Source: .Blood Blood    WBC Count: 16.92 K/uL (10-01-20 @ 07:21)  WBC Count: 11.39 K/uL (09-30-20 @ 06:36)  WBC Count: 9.73 K/uL (09-29-20 @ 18:57)  WBC Count: 8.69 K/uL (09-29-20 @ 06:49)  WBC Count: 11.63 K/uL (09-28-20 @ 17:20)  WBC Count: 11.10 K/uL (09-28-20 @ 05:25)  WBC Count: 13.67 K/uL (09-27-20 @ 17:13)  WBC Count: 16.32 K/uL (09-27-20 @ 06:11)  WBC Count: 17.05 K/uL (09-26-20 @ 19:56)    Creatinine, Serum: 0.76 mg/dL (10-01-20 @ 07:21)  Creatinine, Serum: 0.77 mg/dL (09-30-20 @ 06:36)  Creatinine, Serum: 0.73 mg/dL (09-29-20 @ 06:49)  Creatinine, Serum: 0.69 mg/dL (09-28-20 @ 05:25)  Creatinine, Serum: 0.66 mg/dL (09-27-20 @ 06:11)    Ferritin, Serum: 190 ng/mL (10-01-20 @ 10:04)    COVID-19 PCR: NotDetec (09-23-20 @ 13:30)    All imaging and other data have been reviewed.  < from: Xray Chest 1 View- PORTABLE-Urgent (Xray Chest 1 View- PORTABLE-Urgent .) (09.30.20 @ 08:13) >  EXAM:  XR CHEST PORTABLE URGENT 1V                        PROCEDURE DATE:  09/30/2020    INTERPRETATION:  Portable chest x-ray  Indication: fever of unclear source  Comparison: 6/16/2020  2 portable chest x-rays are acquired for evaluation.  Impression: No evidence for acute pulmonary infiltrate, pleural effusion, or pneumothorax.  Stable small calcified granuloma in the right upper lung zone.  New small linear atelectasis in the left lower lung zone.  Stable cardiac silhouette.  No pulmonary vascular congestion.    Assessment and Plan: 61 y/o woman with PMH of obesity, HTN, JESUS on CPAP, Anemia, AAA, Arthritis, Depression and Anxiety was admitted at [facility] for elective abdominal panniculectomy and liposuction on 9/25, now has fever so ID has been called for recommendations. Currently no source of infection, wound looks fine with no signs of infection, but has 3 drains all with bleeding, possibly hematoma underneath causing fever.   10/1 with low grade fever and worsening of leukocytosis, CT of abdomen on 9/30 showed pelvic wall large hematoma that still could be reason for fever and worsening of leukocytosis but since infected hematoma is a possibility and she had a slight drop in BP, will cover empirically with Meropenum for now.   Blood and urine cultures negative , CXR negative     Fever, panniculectomy  - WBC 11k-->16k , will trend it  - Will start Meropenem 1gm q8h   - Surgery is on board still has 3 drains.     Will follow.    Judi Black MD  Division of Infectious Diseases   Cell [phone] between 7am and 5pm   After 5pm and weekends please call ID service at [phone].

## 2020-10-01 NOTE — PROGRESS NOTE ADULT - SUBJECTIVE AND OBJECTIVE BOX
SURGERY  Spectralink [id number]    Pt seen and examined. Pt denies any overnight events. Pt reports pain is well controlled. Pt reports passing flatus. Pt denies any nausea/vomiting. Pt reports ambulating without assistance. Tolerating diet. Denies any urinary or pulmonary complaints. Underwent CT abdomen/Pelvis which revealed collection likely representing hematoma in anterior abdominal wall. EDWIN's in place.    T(C): 38 (10-01-20 @ 12:38), Max: 38 (10-01-20 @ 12:38)  HR: 91 (10-01-20 @ 12:38) (91 - 128)  BP: 108/66 (10-01-20 @ 12:54) (85/57 - 136/85)  RR: 18 (10-01-20 @ 12:38) (18 - 19)  SpO2: 94% (10-01-20 @ 12:38) (91% - 100%)  Wt(kg): --  ICU Vital Signs Last 24 Hrs  T(C): 38 (01 Oct 2020 12:38), Max: 38 (01 Oct 2020 12:38)  T(F): 100.4 (01 Oct 2020 12:38), Max: 100.4 (01 Oct 2020 12:38)  HR: 91 (01 Oct 2020 12:38) (91 - 128)  BP: 108/66 (01 Oct 2020 12:54) (85/57 - 136/85)  BP(mean): --  ABP: --  ABP(mean): --  RR: 18 (01 Oct 2020 12:38) (18 - 19)  SpO2: 94% (01 Oct 2020 12:38) (91% - 100%)    CAPILLARY BLOOD GLUCOSE        I&O's Detail    30 Sep 2020 07:01  -  01 Oct 2020 07:00  --------------------------------------------------------  IN:    Oral Fluid: 400 mL  Total IN: 400 mL    OUT:    Bulb (mL): 155 mL    Bulb (mL): 115 mL    Bulb (mL): 95 mL    Voided (mL): 800 mL  Total OUT: 1165 mL    Total NET: -765 mL      01 Oct 2020 07:01  -  01 Oct 2020 13:09  --------------------------------------------------------  IN:    Oral Fluid: 300 mL  Total IN: 300 mL    OUT:    Bulb (mL): 25 mL    Bulb (mL): 30 mL    Bulb (mL): 25 mL  Total OUT: 80 mL    Total NET: 220 mL    Physical exam: Pt sitting comfortably in bed in NAD  Chest- CTA bilaterally   CV- S1 & S2, RRR  Abdomen- Soft, non-tender, non-distended. (+)BS  Abdominoplasty Incision clean, dry and intact, EDWIN's in place with dark red sero-sanguinous drainage from around EDWIN sites to Right and left lateral drains. Right posterior drain site dry. EDWIN's stripped.  Dry sanguinous drainage on dressings. 4x4, ABD dressings applied. Binder applied  Incision-    LABS:                        8.8    16.92 )-----------( 294      ( 01 Oct 2020 07:21 )             25.5     10-01    139  |  102  |  12  ----------------------------<  137<H>  3.8   |  30  |  0.76    Ca    8.5      01 Oct 2020 07:21    TPro  6.1  /  Alb  2.2<L>  /  TBili  0.6  /  DBili  x   /  AST  14<L>  /  ALT  48  /  AlkPhos  83  10-01      Urinalysis Basic - ( 30 Sep 2020 11:19 )    Color: Yellow / Appearance: Slightly Turbid / S.005 / pH: x  Gluc: x / Ketone: Small  / Bili: Negative / Urobili: Negative   Blood: x / Protein: Negative / Nitrite: Negative   Leuk Esterase: Trace / RBC: 3-5 /HPF / WBC 3-5   Sq Epi: x / Non Sq Epi: Few / Bacteria: Few        Culture - Urine (collected 30 Sep 2020 15:18)  Source: .Urine Clean Catch (Midstream)  Final Report (01 Oct 2020 11:52):    <10,000 CFU/mL Normal Urogenital Chanel    Culture - Blood (collected 30 Sep 2020 12:07)  Source: .Blood Blood-Peripheral  Preliminary Report (01 Oct 2020 13:01):    No growth to date.    Culture - Blood (collected 30 Sep 2020 12:07)  Source: .Blood Blood  Preliminary Report (01 Oct 2020 13:01):    No growth to date.    RADIOLOGY & ADDITIONAL STUDIES:    < from: CT Abdomen and Pelvis w/ Oral Cont and w/ IV Cont (20 @ 15:12) >  IMPRESSION:    Postoperative changes anterior abdominal wall.  Approximately 4 x 11 cm hematoma deep subcutaneous soft tissues lower anterior pelvic wall at the level of the surgical drainage catheters.    No localized intra-abdominal fluid collection noted.    Other findings as discussed above.    Ms Armas is a 61 yo F who presented to [facility] for elective abdominal panniculectomy. PMH JESUS on CPAP, HTN, Obesity, Anemia, AAA, Arthritis, Depression, Anxiety-     -Continue DVT Prophylaxis with SCD's  -Continue IV Antibiotics  -Continue Incentive Spiromtery  -Continue analgesia  -Encourage OOB/ambulation with assistance  -Monitor bowel function  -Follow up  -Above discussed with

## 2020-10-01 NOTE — PROGRESS NOTE ADULT - ATTENDING COMMENTS
Note written by attending, see above.  Time spent: 45min. More than 50% of the visit was spent counseling the patient on her condition - fever, atelectasis, hematoma, anemia, meropenem.

## 2020-10-01 NOTE — CHART NOTE - NSCHARTNOTEFT_GEN_A_CORE
Assessment: Pt transferred to floor care s/p panniculectomy. Faustino added to promote wound healing, but pt reports poor po intake of energy/protein; states barely ate yesterday due to fever, ate small amount of lunch today. Drinking Faustino(amino acid supplement) but does not want Glucerna supplements. PO intake encouraged(small frequent meals/snacks). Pt on MVI daily. No BM since 9/24, but on bowel regimen and feels like she may move bowels today. Hba1c was 5.5, but obtained after transfusion.    Factors impacting intake: [ ] none [ ] nausea  [ ] vomiting [ ] diarrhea [x ] constipation  [ ]chewing problems [ ] swallowing issues  [ ] other:     Diet Presciption: Diet, DASH/TLC:   Sodium & Cholesterol Restricted  Faustino(7 Gm Arginine/7 Gm Glut/1.2 Gm HMB     Qty per Day:  1     Qty per Day:  once a day (09-29-20 @ 13:39)    Intake:     Current Weight: Weight (kg): 134.7 (09-25 @ 06:41)  % Weight Change    Pertinent Medications: MEDICATIONS  (STANDING):  allopurinol 300 milliGRAM(s) Oral daily  cyanocobalamin 1000 MICROGram(s) Oral daily  docusate sodium 100 milliGRAM(s) Oral three times a day  DULoxetine 30 milliGRAM(s) Oral <User Schedule>  folic acid 1 milliGRAM(s) Oral daily  lamoTRIgine 200 milliGRAM(s) Oral daily  meropenem  IVPB      meropenem  IVPB 1000 milliGRAM(s) IV Intermittent once  meropenem  IVPB 1000 milliGRAM(s) IV Intermittent every 8 hours  multivitamin 1 Tablet(s) Oral daily  polyethylene glycol 3350 17 Gram(s) Oral daily  propranolol 10 milliGRAM(s) Oral daily  senna 2 Tablet(s) Oral at bedtime  traZODone 200 milliGRAM(s) Oral at bedtime    MEDICATIONS  (PRN):  acetaminophen   Tablet .. 650 milliGRAM(s) Oral every 6 hours PRN Mild Pain (1 - 3)  acetaminophen   Tablet .. 650 milliGRAM(s) Oral every 6 hours PRN Temp greater or equal to 38C (100.4F)  artificial tears (preservative free) Ophthalmic Solution 1 Drop(s) Both EYES two times a day PRN Dry Eyes  glucagon  Injectable 1 milliGRAM(s) IntraMuscular once PRN Glucose LESS THAN 70 milligrams/deciliter  oxycodone    5 mG/acetaminophen 325 mG 1 Tablet(s) Oral every 4 hours PRN Moderate Pain (4 - 6)  oxycodone    5 mG/acetaminophen 325 mG 2 Tablet(s) Oral every 6 hours PRN Severe Pain (7 - 10)    Pertinent Labs: 10-01 Na139 mmol/L Glu 137 mg/dL<H> K+ 3.8 mmol/L Cr  0.76 mg/dL BUN 12 mg/dL 09-29 Phos 3.4 mg/dL 10-01 Alb 2.2 g/dL<L>     CAPILLARY BLOOD GLUCOSE        Skin:     Estimated Needs:   [x ] no change since previous assessment  [ ] recalculated:     Previous Nutrition Diagnosis:   [ ] Inadequate Energy Intake [ ]Inadequate Oral Intake [ ] Excessive Energy Intake   [ ] Underweight [x ] Increased Nutrient Needs [ ] Overweight/Obesity   [ ] Altered GI Function [ ] Unintended Weight Loss [ ] Food & Nutrition Related Knowledge Deficit [ ] Malnutrition     Nutrition Diagnosis is [x ] ongoing  [ ] resolved [ ] not applicable     New Nutrition Diagnosis: [ ] not applicable       Interventions: Continue Faustino with po intake of meals/snacks encouraged  Recommend  [ ] Change Diet To:  [ ] Nutrition Supplement  [ ] Nutrition Support  [x ] Other: monitor for BM; add consistent CHO if glu >150.    Monitoring and Evaluation:   [ ] PO intake [ x ] Tolerance to diet prescription [ x ] weights [ x ] labs[ x ] follow up per protocol  [ ] other:

## 2020-10-01 NOTE — PROGRESS NOTE ADULT - SUBJECTIVE AND OBJECTIVE BOX
[facility] -- Almas Faustin, Mayra, Cara, Saurabh Johansen Savella  Office # [phone]      Follow Up:    Hypotension  Subjective/Observations:   No events overnight resting comfortably in bed.  No complaints of chest pain, dyspnea, or palpitations reported. No signs of orthopnea or PND. Denies dizziness/lightheadness     REVIEW OF SYSTEMS: All other review of systems is negative unless indicated above    PAST MEDICAL & SURGICAL HISTORY:  History of aortic aneurysm  descending aorta see CT    Arthritis    Degenerative disc disease, lumbar    Neuropathy    Spinal stenosis    Bipolar 1 disorder    Kidney stone    Sleep Apnea  CPAP with oxygen since June 2020    Asthma    Hypertension    Morbid Obesity    ETOH Abuse  H/O    Benign Essential Tremor    Anxiety    Depression    Renal Calculi  chronic      Colitis  H/O    Anemia    S/P cardiac catheterization    S/P dilation and curettage    History of tonsillectomy    History of laparoscopic adjustable gastric banding  band removed few yrs ago    S/P D&amp;C    Biliary stent placement &amp; ercp    ureteroscopy with stone removal  1-        MEDICATIONS  (STANDING):  allopurinol 300 milliGRAM(s) Oral daily  cyanocobalamin 1000 MICROGram(s) Oral daily  docusate sodium 100 milliGRAM(s) Oral three times a day  DULoxetine 30 milliGRAM(s) Oral <User Schedule>  folic acid 1 milliGRAM(s) Oral daily  lamoTRIgine 200 milliGRAM(s) Oral daily  multivitamin 1 Tablet(s) Oral daily  polyethylene glycol 3350 17 Gram(s) Oral daily  propranolol 10 milliGRAM(s) Oral daily  senna 2 Tablet(s) Oral at bedtime  traZODone 200 milliGRAM(s) Oral at bedtime    MEDICATIONS  (PRN):  acetaminophen   Tablet .. 650 milliGRAM(s) Oral every 6 hours PRN Mild Pain (1 - 3)  acetaminophen   Tablet .. 650 milliGRAM(s) Oral every 6 hours PRN Temp greater or equal to 38C (100.4F)  artificial tears (preservative free) Ophthalmic Solution 1 Drop(s) Both EYES two times a day PRN Dry Eyes  glucagon  Injectable 1 milliGRAM(s) IntraMuscular once PRN Glucose LESS THAN 70 milligrams/deciliter  oxycodone    5 mG/acetaminophen 325 mG 1 Tablet(s) Oral every 4 hours PRN Moderate Pain (4 - 6)  oxycodone    5 mG/acetaminophen 325 mG 2 Tablet(s) Oral every 6 hours PRN Severe Pain (7 - 10)      Allergies    penicillins (Other)  trees dogs cats cockaroaches (Rhinitis; Rhinorrhea)    Intolerances        Vital Signs Last 24 Hrs  T(C): 36.9 (01 Oct 2020 05:47), Max: 37.1 (30 Sep 2020 21:45)  T(F): 98.4 (01 Oct 2020 05:47), Max: 98.7 (30 Sep 2020 21:45)  HR: 94 (01 Oct 2020 05:47) (92 - 128)  BP: 136/85 (01 Oct 2020 05:47) (99/59 - 136/85)  BP(mean): --  RR: 18 (01 Oct 2020 05:47) (18 - 19)  SpO2: 100% (01 Oct 2020 05:47) (91% - 100%)    I&O's Summary    30 Sep 2020 07:01  -  01 Oct 2020 07:00  --------------------------------------------------------  IN: 400 mL / OUT: 1165 mL / NET: -765 mL    01 Oct 2020 07:01  -  01 Oct 2020 10:12  --------------------------------------------------------  IN: 0 mL / OUT: 80 mL / NET: -80 mL          PHYSICAL EXAM:  TELE: SR  Constitutional: NAD, awake and alert, Obese   HEENT: Moist Mucous Membranes, Anicteric  Pulmonary: Non-labored, breath sounds are clear bilaterally, No wheezing, crackles or rhonchi  Cardiovascular: Regular, S1 and S2 nl, No murmurs, rubs, gallops or clicks  Gastrointestinal: Bowel Sounds present, soft, nontender.   Lymph: No lymphadenopathy. No peripheral edema.  Skin: No visible rashes or ulcers.  Psych:  Mood & affect appropriate    LABS: All Labs Reviewed:                        8.8    16.92 )-----------( 294      ( 01 Oct 2020 07:21 )             25.5                         8.4    11.39 )-----------( Clumped    ( 30 Sep 2020 06:36 )             25.4                         8.0    9.73  )-----------( 201      ( 29 Sep 2020 18:57 )             22.8     01 Oct 2020 07:21    139    |  102    |  12     ----------------------------<  137    3.8     |  30     |  0.76   30 Sep 2020 06:36    140    |  102    |  16     ----------------------------<  118    3.8     |  32     |  0.77   29 Sep 2020 06:49    144    |  107    |  12     ----------------------------<  93     3.9     |  31     |  0.73     Ca    8.5        01 Oct 2020 07:21  Ca    8.6        30 Sep 2020 06:36  Ca    8.2        29 Sep 2020 06:49  Phos  3.4       29 Sep 2020 06:49  Mg     2.4       29 Sep 2020 06:49    TPro  6.1    /  Alb  2.2    /  TBili  0.6    /  DBili  x      /  AST  14     /  ALT  48     /  AlkPhos  83     01 Oct 2020 07:21  TPro  6.3    /  Alb  2.5    /  TBili  0.4    /  DBili  x      /  AST  27     /  ALT  71     /  AlkPhos  84     30 Sep 2020 06:36  TPro  5.5    /  Alb  2.3    /  TBili  0.3    /  DBili  x      /  AST  34     /  ALT  83     /  AlkPhos  73     29 Sep 2020 06:49      < from: 12 Lead ECG (06.16.20 @ 15:52) >  Ventricular Rate 85 BPM    Atrial Rate 85 BPM    P-R Interval 154 ms    QRS Duration 88 ms    Q-T Interval 372 ms    QTC Calculation(Bezet) 442 ms    P Axis 18 degrees    R Axis -43 degrees    T Axis 27 degrees    Diagnosis Line NORMAL SINUS RHYTHM  LEFT AXIS DEVIATION  MINIMAL VOLTAGE CRITERIA FOR LVH, MAY BE NORMAL VARIANT  ABNORMAL ECG    Confirmed by MD MARINA, ROGERS ([id number]) on 6/17/2020 8:14:32 PM    < end of copied text >  < from: Xray Chest 1 View- PORTABLE-Urgent (Xray Chest 1 View- PORTABLE-Urgent .) (09.30.20 @ 08:13) >  EXAM:  XR CHEST PORTABLE URGENT 1V                            PROCEDURE DATE:  09/30/2020          INTERPRETATION:  Portable chest x-ray    Indication: fever of unclear source    Comparison: 6/16/2020    2 portable chest x-rays are acquired for evaluation.    Impression: No evidence for acute pulmonary infiltrate, pleural effusion, or pneumothorax.    Stable small calcified granuloma in the right upper lung zone.    New small linear atelectasis in the left lower lung zone.    Stable cardiac silhouette.    No pulmonary vascular congestion.              JAYDE MARIE M.D., ATTENDING RADIOLOGIST  This document has been electronically signed. Sep 30 2020  9:47AM    < end of copied text >

## 2020-10-01 NOTE — PROGRESS NOTE ADULT - ASSESSMENT
59yo F w/ PMH of JESUS on CPAP, HTN, morbid obesity, anemia, AAA, Arthritis, Depression, anxiety presented to [facility] for elective abdominal panniculectomy, admitted post-op with hemorrhagic shock s/p 3un PRBC with stabilization, now course c/b fever.    #Fever:  -unclear etiology  -pt without significant localizing symptoms or physical exam findings for infection   -BCx NGTD (monitor), UA quite benign, not indicative of infection (pt denies UTI symptoms)  -today had recurrence of fever albeit lower now at 100.4-100.9F. Also, with increase in leukocytosis to ~17 and had some low BP readings on electronic monitor ; low-normal when BP obtained manually -- gave 1L NS  -consulted ID (Wayne) who now recommends starting empiric meropenem for possibility of an infected hematoma   -CT showing 4cm x 10cm hematoma and no other sign of infection   -other possible source of fever is atelectasis, though would be unusual to reach 102F and have a leukocytosis  -monitor closely for signs of instability  -incentive spirometry     #Hemorrhagic shock: now resolved   - acute blood loss anemia post op, s/p 3unit pRBC   - shock state resolved, now off pressors with stable vital signs   - H/H stable. Per pt, she has been chronically anemic with a baseline of approx 8-9 and has been on B12 and folic acid supplements for years. Continue Vit B12 and folic acid supplements   - Continue to monitor H/H closely, Transfuse if Hgb <7 or if hemodynamically unstable   - monitor EDWIN x3 output; hold chemical DVT ppx in setting of recent severe bleed.    #S/P Abdominal panniculectomy: on 09/25/20.   -mgmt per surgical team.   -pain control as ordered.   -bowel regimen on board; miralax, senna, patient currently refusing   - Phys therapy  - SCDs    #Anemia, acute on chronic:  - 2/2 acute blood loss anemia intra- and post-op, s/p 3 units pRBCs early in the admission  - H/H stabilized  - Per pt, she has been chronically anemic with a baseline of approx 8-9 and has been on B12 and folic acid supplements for years. Continue Vit B12 and folic acid supplements   - Continue to monitor H/H closely, Transfuse if Hgb <7 or if hemodynamically unstable   - monitor h/h    #Afib, new onset:  - [facility] recs appreciated   - had brief Afib in setting of severe hypotension / instability early in the admission.   - c/w propanolol per cardio  - no A/C especially given pt had hemorrhagic shock few days ago    #JESUS on CPAP:   -pulm (Dr Hanson) following - recs appreciated  -continuous pulse ox.   -CPAP QHS    #HTN:   - continue propanolol  - holding verapamil (home med)    #Anxiety/Depression:   - continue Cymbalta, trazodone and lamictal.  - received Xanax this morning     #Obesity, morbid   - now s/p panniculectomy ; counseled on importance of healthy diet    # DVT ppx:   - SCDs

## 2020-10-02 DIAGNOSIS — T14.8XXA OTHER INJURY OF UNSPECIFIED BODY REGION, INITIAL ENCOUNTER: ICD-10-CM

## 2020-10-02 LAB
ALBUMIN SERPL ELPH-MCNC: 1.9 G/DL — LOW (ref 3.3–5)
ALP SERPL-CCNC: 87 U/L — SIGNIFICANT CHANGE UP (ref 40–120)
ALT FLD-CCNC: 44 U/L — SIGNIFICANT CHANGE UP (ref 12–78)
ANION GAP SERPL CALC-SCNC: 7 MMOL/L — SIGNIFICANT CHANGE UP (ref 5–17)
AST SERPL-CCNC: 27 U/L — SIGNIFICANT CHANGE UP (ref 15–37)
BASOPHILS # BLD AUTO: 0.04 K/UL — SIGNIFICANT CHANGE UP (ref 0–0.2)
BASOPHILS NFR BLD AUTO: 0.3 % — SIGNIFICANT CHANGE UP (ref 0–2)
BILIRUB SERPL-MCNC: 0.6 MG/DL — SIGNIFICANT CHANGE UP (ref 0.2–1.2)
BUN SERPL-MCNC: 14 MG/DL — SIGNIFICANT CHANGE UP (ref 7–23)
CALCIUM SERPL-MCNC: 8.1 MG/DL — LOW (ref 8.5–10.1)
CHLORIDE SERPL-SCNC: 103 MMOL/L — SIGNIFICANT CHANGE UP (ref 96–108)
CO2 SERPL-SCNC: 29 MMOL/L — SIGNIFICANT CHANGE UP (ref 22–31)
CREAT SERPL-MCNC: 0.72 MG/DL — SIGNIFICANT CHANGE UP (ref 0.5–1.3)
EOSINOPHIL # BLD AUTO: 0.08 K/UL — SIGNIFICANT CHANGE UP (ref 0–0.5)
EOSINOPHIL NFR BLD AUTO: 0.6 % — SIGNIFICANT CHANGE UP (ref 0–6)
GLUCOSE SERPL-MCNC: 125 MG/DL — HIGH (ref 70–99)
HCT VFR BLD CALC: 22.5 % — LOW (ref 34.5–45)
HGB BLD-MCNC: 7.8 G/DL — LOW (ref 11.5–15.5)
IMM GRANULOCYTES NFR BLD AUTO: 0.8 % — SIGNIFICANT CHANGE UP (ref 0–1.5)
LYMPHOCYTES # BLD AUTO: 1.07 K/UL — SIGNIFICANT CHANGE UP (ref 1–3.3)
LYMPHOCYTES # BLD AUTO: 8.2 % — LOW (ref 13–44)
MCHC RBC-ENTMCNC: 30.6 PG — SIGNIFICANT CHANGE UP (ref 27–34)
MCHC RBC-ENTMCNC: 34.7 GM/DL — SIGNIFICANT CHANGE UP (ref 32–36)
MCV RBC AUTO: 88.2 FL — SIGNIFICANT CHANGE UP (ref 80–100)
MONOCYTES # BLD AUTO: 1.27 K/UL — HIGH (ref 0–0.9)
MONOCYTES NFR BLD AUTO: 9.8 % — SIGNIFICANT CHANGE UP (ref 2–14)
NEUTROPHILS # BLD AUTO: 10.4 K/UL — HIGH (ref 1.8–7.4)
NEUTROPHILS NFR BLD AUTO: 80.3 % — HIGH (ref 43–77)
NRBC # BLD: 0 /100 WBCS — SIGNIFICANT CHANGE UP (ref 0–0)
PLATELET # BLD AUTO: 291 K/UL — SIGNIFICANT CHANGE UP (ref 150–400)
POTASSIUM SERPL-MCNC: 3.7 MMOL/L — SIGNIFICANT CHANGE UP (ref 3.5–5.3)
POTASSIUM SERPL-SCNC: 3.7 MMOL/L — SIGNIFICANT CHANGE UP (ref 3.5–5.3)
PROCALCITONIN SERPL-MCNC: 0.31 NG/ML — HIGH (ref 0–0.04)
PROT SERPL-MCNC: 5.9 G/DL — LOW (ref 6–8.3)
RBC # BLD: 2.55 M/UL — LOW (ref 3.8–5.2)
RBC # FLD: 14.3 % — SIGNIFICANT CHANGE UP (ref 10.3–14.5)
SODIUM SERPL-SCNC: 139 MMOL/L — SIGNIFICANT CHANGE UP (ref 135–145)
WBC # BLD: 12.97 K/UL — HIGH (ref 3.8–10.5)
WBC # FLD AUTO: 12.97 K/UL — HIGH (ref 3.8–10.5)

## 2020-10-02 PROCEDURE — 99232 SBSQ HOSP IP/OBS MODERATE 35: CPT

## 2020-10-02 PROCEDURE — 99233 SBSQ HOSP IP/OBS HIGH 50: CPT

## 2020-10-02 PROCEDURE — 71045 X-RAY EXAM CHEST 1 VIEW: CPT | Mod: 26

## 2020-10-02 RX ORDER — SODIUM CHLORIDE 9 MG/ML
500 INJECTION INTRAMUSCULAR; INTRAVENOUS; SUBCUTANEOUS ONCE
Refills: 0 | Status: COMPLETED | OUTPATIENT
Start: 2020-10-02 | End: 2020-10-02

## 2020-10-02 RX ADMIN — DULOXETINE HYDROCHLORIDE 30 MILLIGRAM(S): 30 CAPSULE, DELAYED RELEASE ORAL at 13:56

## 2020-10-02 RX ADMIN — DULOXETINE HYDROCHLORIDE 30 MILLIGRAM(S): 30 CAPSULE, DELAYED RELEASE ORAL at 06:19

## 2020-10-02 RX ADMIN — Medication 650 MILLIGRAM(S): at 15:30

## 2020-10-02 RX ADMIN — Medication 1 MILLIGRAM(S): at 11:29

## 2020-10-02 RX ADMIN — Medication 650 MILLIGRAM(S): at 10:05

## 2020-10-02 RX ADMIN — MEROPENEM 100 MILLIGRAM(S): 1 INJECTION INTRAVENOUS at 06:20

## 2020-10-02 RX ADMIN — Medication 100 MILLIGRAM(S): at 21:04

## 2020-10-02 RX ADMIN — POLYETHYLENE GLYCOL 3350 17 GRAM(S): 17 POWDER, FOR SOLUTION ORAL at 11:29

## 2020-10-02 RX ADMIN — Medication 100 MILLIGRAM(S): at 13:55

## 2020-10-02 RX ADMIN — Medication 650 MILLIGRAM(S): at 14:52

## 2020-10-02 RX ADMIN — Medication 300 MILLIGRAM(S): at 11:29

## 2020-10-02 RX ADMIN — SODIUM CHLORIDE 500 MILLILITER(S): 9 INJECTION INTRAMUSCULAR; INTRAVENOUS; SUBCUTANEOUS at 16:54

## 2020-10-02 RX ADMIN — LAMOTRIGINE 200 MILLIGRAM(S): 25 TABLET, ORALLY DISINTEGRATING ORAL at 11:30

## 2020-10-02 RX ADMIN — DULOXETINE HYDROCHLORIDE 30 MILLIGRAM(S): 30 CAPSULE, DELAYED RELEASE ORAL at 21:04

## 2020-10-02 RX ADMIN — Medication 650 MILLIGRAM(S): at 11:05

## 2020-10-02 RX ADMIN — Medication 1 TABLET(S): at 11:29

## 2020-10-02 RX ADMIN — MEROPENEM 100 MILLIGRAM(S): 1 INJECTION INTRAVENOUS at 16:04

## 2020-10-02 RX ADMIN — PREGABALIN 1000 MICROGRAM(S): 225 CAPSULE ORAL at 11:29

## 2020-10-02 RX ADMIN — Medication 200 MILLIGRAM(S): at 21:04

## 2020-10-02 RX ADMIN — MEROPENEM 100 MILLIGRAM(S): 1 INJECTION INTRAVENOUS at 21:03

## 2020-10-02 RX ADMIN — SENNA PLUS 2 TABLET(S): 8.6 TABLET ORAL at 21:03

## 2020-10-02 RX ADMIN — Medication 100 MILLIGRAM(S): at 06:19

## 2020-10-02 NOTE — PROGRESS NOTE ADULT - ASSESSMENT
61yo F w/ PMH of JESUS on CPAP, HTN, morbid obesity, anemia, AAA, Arthritis, Depression, anxiety presented to [facility] for elective abdominal panniculectomy, admitted post-op with hemorrhagic shock s/p 3un PRBC with stabilization, now course c/b fever.    #Fever:  -unclear etiology  -pt without significant localizing symptoms or physical exam findings for infection   -BCx NGTD (monitor), UA quite benign, not indicative of infection (pt denies UTI symptoms)  -today had recurrence of fever at 101.4F. Also, leukocytosis improving from ~17 to ~13; had some low BP readings on electronic monitor ; low-normal when BP obtained manually -- gave additional 500cc of NS  -UCx and BCx from 9/30 were negative; resent BCx this afternoon with this recurrent fever of 101.4F  -consulted ID ([facility]), recs appreciated  -CT showing 4cm x 10cm hematoma and no other sign of infection   -c/w meropenem for possibility of an infected hematoma   -discussed with surgical team and Dr. De La Torre  -will continue with drain via EDWIN drains which still have some bloody output (one of the drains is at the site of the hematoma as seen on CT)  -other possible source of fever is atelectasis, though would be unusual to reach such high fever / persistent fevers and have a leukocytosis  -monitor closely for signs of instability  -incentive spirometry     #Hemorrhagic shock: now resolved   - acute blood loss anemia post op, s/p 3unit pRBC   - shock state resolved, now off pressors with stable vital signs   - H/H had been stable (some decrease in Hgb today to 7.8, may be in part from extra IVF received yesterday). Per pt, she has been chronically anemic with a baseline of approx 8-9 and has been on B12 and folic acid supplements for years. Continue Vit B12 and folic acid supplements   - Heme ([facility]) consulted; pt may be candidate for venofer; though ferritin is normal it is also an acute phase reactant that could be elevated in the setting of this complicated surgery and hospitalization.  - Continue to monitor H/H closely, Transfuse if Hgb <7 or if hemodynamically unstable   - monitor EDWIN x3 output; hold chemical DVT ppx in setting of recent severe bleed.    #S/P Abdominal panniculectomy: on 09/25/20.   -mgmt per surgical team.   -pain control as ordered.   -bowel regimen on board; miralax, senna, added dulcolax   - Phys therapy  - SCDs    #Anemia, acute on chronic:  - 2/2 acute blood loss anemia intra- and post-op, s/p 3 units pRBCs early in the admission  - H/H had been stable (some decrease in Hgb today to 7.8, may be in part from extra IVF received yesterday). Per pt, she has been chronically anemic with a baseline of approx 8-9 and has been on B12 and folic acid supplements for years. Continue Vit B12 and folic acid supplements   - Heme ([facility]) consulted; pt may be candidate for venofer; though ferritin is normal it is also an acute phase reactant that could be elevated in the setting of this complicated surgery and hospitalization.  - Continue to monitor H/H closely, Transfuse if Hgb <7 or if hemodynamically unstable   - monitor h/h    #Afib, new onset:  - Cardio following, ([facility]), recs appreciated   - had brief Afib in setting of severe hypotension / instability early in the admission.   - c/w propanolol per cardio  - no A/C especially given pt had hemorrhagic shock few days ago    #JESUS on CPAP:   -pulm (Dr Hanson) following - recs appreciated  -continuous pulse ox.   -CPAP QHS    #HTN:   - continue propanolol  - holding verapamil (home med)    #Anxiety/Depression:   - continue Cymbalta, trazodone and lamictal.    #Morbid Obesity:  - now s/p panniculectomy ; counseled on importance of healthy diet    #DVT ppx:   - SCDs

## 2020-10-02 NOTE — PROGRESS NOTE ADULT - SUBJECTIVE AND OBJECTIVE BOX
SURGERY        SUBJECTIVE:   no abd pain, no bleeding around JPs, no lightheadedness no dizziness , no fever/chills     OBJECTIVE:   T(C): 36.6 (10-02-20 @ 05:17), Max: 38.3 (10-01-20 @ 18:17)  HR: 94 (10-02-20 @ 07:59) (65 - 121)  BP: 122/68 (10-02-20 @ 06:00) (78/43 - 122/68)  RR: 18 (10-02-20 @ 05:17) (18 - 98)  SpO2: 97% (10-02-20 @ 07:59) (93% - 100%)  Wt(kg): --  CAPILLARY BLOOD GLUCOSE        I&O's Detail    01 Oct 2020 07:01  -  02 Oct 2020 07:00  --------------------------------------------------------  IN:    Oral Fluid: 300 mL    Sodium Chloride 0.9% Bolus: 500 mL  Total IN: 800 mL    OUT:    Bulb (mL): 36.5 mL    Bulb (mL): 95 mL    Bulb (mL): 130 mL  Total OUT: 261.5 mL    Total NET: 538.5 mL          Physical exam:  NAD AAOx3 sitting comfortably in chair   abd soft obese,incision c/d/i,  no bleeding around JPS, drains dark old blood abdominal binder in place     MEDICATIONS  (STANDING):  allopurinol 300 milliGRAM(s) Oral daily  cyanocobalamin 1000 MICROGram(s) Oral daily  docusate sodium 100 milliGRAM(s) Oral three times a day  DULoxetine 30 milliGRAM(s) Oral <User Schedule>  folic acid 1 milliGRAM(s) Oral daily  lamoTRIgine 200 milliGRAM(s) Oral daily  meropenem  IVPB      meropenem  IVPB 1000 milliGRAM(s) IV Intermittent every 8 hours  multivitamin 1 Tablet(s) Oral daily  polyethylene glycol 3350 17 Gram(s) Oral daily  propranolol 10 milliGRAM(s) Oral daily  senna 2 Tablet(s) Oral at bedtime  traZODone 200 milliGRAM(s) Oral at bedtime    MEDICATIONS  (PRN):  acetaminophen   Tablet .. 650 milliGRAM(s) Oral every 6 hours PRN Mild Pain (1 - 3)  acetaminophen   Tablet .. 650 milliGRAM(s) Oral every 6 hours PRN Temp greater or equal to 38C (100.4F)  artificial tears (preservative free) Ophthalmic Solution 1 Drop(s) Both EYES two times a day PRN Dry Eyes  glucagon  Injectable 1 milliGRAM(s) IntraMuscular once PRN Glucose LESS THAN 70 milligrams/deciliter  oxycodone    5 mG/acetaminophen 325 mG 1 Tablet(s) Oral every 4 hours PRN Moderate Pain (4 - 6)  oxycodone    5 mG/acetaminophen 325 mG 2 Tablet(s) Oral every 6 hours PRN Severe Pain (7 - 10)      LABS:                        7.8    12.97 )-----------( 291      ( 02 Oct 2020 07:08 )             22.5     10-02    139  |  103  |  14  ----------------------------<  125<H>  3.7   |  29  |  0.72    Ca    8.1<L>      02 Oct 2020 07:08  Phos  2.6     10-02  Mg     2.2     10-02    TPro  5.9<L>  /  Alb  1.9<L>  /  TBili  0.6  /  DBili  x   /  AST  27  /  ALT  44  /  AlkPhos  87  10-02      Urinalysis Basic - ( 30 Sep 2020 11:19 )    Color: Yellow / Appearance: Slightly Turbid / S.005 / pH: x  Gluc: x / Ketone: Small  / Bili: Negative / Urobili: Negative   Blood: x / Protein: Negative / Nitrite: Negative   Leuk Esterase: Trace / RBC: 3-5 /HPF / WBC 3-5   Sq Epi: x / Non Sq Epi: Few / Bacteria: Few           SURGERY        SUBJECTIVE:   no abd pain, no bleeding around JPs since yesterday, no lightheadedness no dizziness , no fever/chills     OBJECTIVE:   T(C): 36.6 (10-02-20 @ 05:17), Max: 38.3 (10-01-20 @ 18:17)  HR: 94 (10-02-20 @ 07:59) (65 - 121)  BP: 122/68 (10-02-20 @ 06:00) (78/43 - 122/68)  RR: 18 (10-02-20 @ 05:17) (18 - 98)  SpO2: 97% (10-02-20 @ 07:59) (93% - 100%)  Wt(kg): --  CAPILLARY BLOOD GLUCOSE        I&O's Detail    01 Oct 2020 07:01  -  02 Oct 2020 07:00  --------------------------------------------------------  IN:    Oral Fluid: 300 mL    Sodium Chloride 0.9% Bolus: 500 mL  Total IN: 800 mL    OUT:    Bulb (mL): 36.5 mL    Bulb (mL): 95 mL    Bulb (mL): 130 mL  Total OUT: 261.5 mL    Total NET: 538.5 mL          Physical exam:  NAD AAOx3 sitting comfortably in chair   abd soft obese, incision c/d/i,  no bleeding around JPS, drains output dark old blood, abdominal binder in place     MEDICATIONS  (STANDING):  allopurinol 300 milliGRAM(s) Oral daily  cyanocobalamin 1000 MICROGram(s) Oral daily  docusate sodium 100 milliGRAM(s) Oral three times a day  DULoxetine 30 milliGRAM(s) Oral <User Schedule>  folic acid 1 milliGRAM(s) Oral daily  lamoTRIgine 200 milliGRAM(s) Oral daily  meropenem  IVPB      meropenem  IVPB 1000 milliGRAM(s) IV Intermittent every 8 hours  multivitamin 1 Tablet(s) Oral daily  polyethylene glycol 3350 17 Gram(s) Oral daily  propranolol 10 milliGRAM(s) Oral daily  senna 2 Tablet(s) Oral at bedtime  traZODone 200 milliGRAM(s) Oral at bedtime    MEDICATIONS  (PRN):  acetaminophen   Tablet .. 650 milliGRAM(s) Oral every 6 hours PRN Mild Pain (1 - 3)  acetaminophen   Tablet .. 650 milliGRAM(s) Oral every 6 hours PRN Temp greater or equal to 38C (100.4F)  artificial tears (preservative free) Ophthalmic Solution 1 Drop(s) Both EYES two times a day PRN Dry Eyes  glucagon  Injectable 1 milliGRAM(s) IntraMuscular once PRN Glucose LESS THAN 70 milligrams/deciliter  oxycodone    5 mG/acetaminophen 325 mG 1 Tablet(s) Oral every 4 hours PRN Moderate Pain (4 - 6)  oxycodone    5 mG/acetaminophen 325 mG 2 Tablet(s) Oral every 6 hours PRN Severe Pain (7 - 10)      LABS:                        7.8    12.97 )-----------( 291      ( 02 Oct 2020 07:08 )             22.5     10-02    139  |  103  |  14  ----------------------------<  125<H>  3.7   |  29  |  0.72    Ca    8.1<L>      02 Oct 2020 07:08  Phos  2.6     10-02  Mg     2.2     10-02    TPro  5.9<L>  /  Alb  1.9<L>  /  TBili  0.6  /  DBili  x   /  AST  27  /  ALT  44  /  AlkPhos  87  10-02      Urinalysis Basic - ( 30 Sep 2020 11:19 )    Color: Yellow / Appearance: Slightly Turbid / S.005 / pH: x  Gluc: x / Ketone: Small  / Bili: Negative / Urobili: Negative   Blood: x / Protein: Negative / Nitrite: Negative   Leuk Esterase: Trace / RBC: 3-5 /HPF / WBC 3-5   Sq Epi: x / Non Sq Epi: Few / Bacteria: Few           SURGERY        SUBJECTIVE:   no abd pain, no bleeding around JPs since yesterday, feeling better today, no lightheadedness no dizziness , no fever/chills     OBJECTIVE:   T(C): 36.6 (10-02-20 @ 05:17), Max: 38.3 (10-01-20 @ 18:17)  HR: 94 (10-02-20 @ 07:59) (65 - 121)  BP: 122/68 (10-02-20 @ 06:00) (78/43 - 122/68)  RR: 18 (10-02-20 @ 05:17) (18 - 98)  SpO2: 97% (10-02-20 @ 07:59) (93% - 100%)  Wt(kg): --  CAPILLARY BLOOD GLUCOSE        I&O's Detail    01 Oct 2020 07:01  -  02 Oct 2020 07:00  --------------------------------------------------------  IN:    Oral Fluid: 300 mL    Sodium Chloride 0.9% Bolus: 500 mL  Total IN: 800 mL    OUT:    Bulb (mL): 36.5 mL    Bulb (mL): 95 mL    Bulb (mL): 130 mL  Total OUT: 261.5 mL    Total NET: 538.5 mL          Physical exam:  NAD AAOx3 sitting comfortably in chair   abd soft obese, incision c/d/i,  no bleeding around JPS, drains output dark old blood, abdominal binder in place     MEDICATIONS  (STANDING):  allopurinol 300 milliGRAM(s) Oral daily  cyanocobalamin 1000 MICROGram(s) Oral daily  docusate sodium 100 milliGRAM(s) Oral three times a day  DULoxetine 30 milliGRAM(s) Oral <User Schedule>  folic acid 1 milliGRAM(s) Oral daily  lamoTRIgine 200 milliGRAM(s) Oral daily  meropenem  IVPB      meropenem  IVPB 1000 milliGRAM(s) IV Intermittent every 8 hours  multivitamin 1 Tablet(s) Oral daily  polyethylene glycol 3350 17 Gram(s) Oral daily  propranolol 10 milliGRAM(s) Oral daily  senna 2 Tablet(s) Oral at bedtime  traZODone 200 milliGRAM(s) Oral at bedtime    MEDICATIONS  (PRN):  acetaminophen   Tablet .. 650 milliGRAM(s) Oral every 6 hours PRN Mild Pain (1 - 3)  acetaminophen   Tablet .. 650 milliGRAM(s) Oral every 6 hours PRN Temp greater or equal to 38C (100.4F)  artificial tears (preservative free) Ophthalmic Solution 1 Drop(s) Both EYES two times a day PRN Dry Eyes  glucagon  Injectable 1 milliGRAM(s) IntraMuscular once PRN Glucose LESS THAN 70 milligrams/deciliter  oxycodone    5 mG/acetaminophen 325 mG 1 Tablet(s) Oral every 4 hours PRN Moderate Pain (4 - 6)  oxycodone    5 mG/acetaminophen 325 mG 2 Tablet(s) Oral every 6 hours PRN Severe Pain (7 - 10)      LABS:                        7.8    12.97 )-----------( 291      ( 02 Oct 2020 07:08 )             22.5     10-02    139  |  103  |  14  ----------------------------<  125<H>  3.7   |  29  |  0.72    Ca    8.1<L>      02 Oct 2020 07:08  Phos  2.6     10-02  Mg     2.2     10-02    TPro  5.9<L>  /  Alb  1.9<L>  /  TBili  0.6  /  DBili  x   /  AST  27  /  ALT  44  /  AlkPhos  87  10-02      Urinalysis Basic - ( 30 Sep 2020 11:19 )    Color: Yellow / Appearance: Slightly Turbid / S.005 / pH: x  Gluc: x / Ketone: Small  / Bili: Negative / Urobili: Negative   Blood: x / Protein: Negative / Nitrite: Negative   Leuk Esterase: Trace / RBC: 3-5 /HPF / WBC 3-5   Sq Epi: x / Non Sq Epi: Few / Bacteria: Few

## 2020-10-02 NOTE — PROGRESS NOTE ADULT - ASSESSMENT
61 yo female POD 7 panniculectomy, ventral hernia repair     trend CBC   monitor drain outputs  continue abdominal binder  OOB ambulate     Sx [id number] 59 yo female POD 7 panniculectomy, ventral hernia repair     trend CBC   monitor drain outputs  continue abdominal binder  OOB ambulate   ABx as per ID - now afebrile, WBC downtrending   discussed with Dr. De La Torre     Sx [id number]

## 2020-10-02 NOTE — PROGRESS NOTE ADULT - SUBJECTIVE AND OBJECTIVE BOX
Patient is a 60y old  Female who presents with a chief complaint of elective panniculectomy.     INTERVAL HPI/OVERNIGHT EVENTS: Pt afebrile in the morning but spiked fever to 101.4F in the afternoon. Pt denies feeling a subjective fever with this measurement and states she feels alright. Pt states she feels somewhat stronger today compared to yesterday. Denies abd pain. Denies nausea, vomiting, diarrhea, SOB, cough, dysuria, urinary frequency/urgency, headache.    MEDICATIONS  (STANDING):  allopurinol 300 milliGRAM(s) Oral daily  cyanocobalamin 1000 MICROGram(s) Oral daily  docusate sodium 100 milliGRAM(s) Oral three times a day  DULoxetine 30 milliGRAM(s) Oral <User Schedule>  folic acid 1 milliGRAM(s) Oral daily  lamoTRIgine 200 milliGRAM(s) Oral daily  meropenem  IVPB      meropenem  IVPB 1000 milliGRAM(s) IV Intermittent every 8 hours  multivitamin 1 Tablet(s) Oral daily  polyethylene glycol 3350 17 Gram(s) Oral daily  propranolol 10 milliGRAM(s) Oral daily  senna 2 Tablet(s) Oral at bedtime  traZODone 200 milliGRAM(s) Oral at bedtime    MEDICATIONS  (PRN):  acetaminophen   Tablet .. 650 milliGRAM(s) Oral every 6 hours PRN Mild Pain (1 - 3)  acetaminophen   Tablet .. 650 milliGRAM(s) Oral every 6 hours PRN Temp greater or equal to 38C (100.4F)  artificial tears (preservative free) Ophthalmic Solution 1 Drop(s) Both EYES two times a day PRN Dry Eyes  glucagon  Injectable 1 milliGRAM(s) IntraMuscular once PRN Glucose LESS THAN 70 milligrams/deciliter  oxycodone    5 mG/acetaminophen 325 mG 1 Tablet(s) Oral every 4 hours PRN Moderate Pain (4 - 6)  oxycodone    5 mG/acetaminophen 325 mG 2 Tablet(s) Oral every 6 hours PRN Severe Pain (7 - 10)      Allergies    penicillins (Other)  trees dogs cats cockaroaches (Rhinitis; Rhinorrhea)    Intolerances        REVIEW OF SYSTEMS:  CONSTITUTIONAL: did not have subjective fever (had a recorded fever); no chills ; generalized weakness improving  HEENT:  No headache, no sore throat  RESPIRATORY: No cough, wheezing, or shortness of breath  CARDIOVASCULAR: No chest pain, palpitations  GASTROINTESTINAL: +constipation; denies abd pain, nausea, vomiting, or diarrhea  GENITOURINARY: No dysuria, frequency, or hematuria  NEUROLOGICAL: no focal weakness or dizziness  MUSCULOSKELETAL: no myalgias    Vital Signs Last 24 Hrs  T(C): 38 (02 Oct 2020 16:24), Max: 38.6 (02 Oct 2020 14:31)  T(F): 100.4 (02 Oct 2020 16:24), Max: 101.4 (02 Oct 2020 14:31)  HR: 85 (02 Oct 2020 14:31) (85 - 94)  BP: 89/56 (02 Oct 2020 14:31) (89/56 - 122/68)  BP(mean): --  RR: 18 (02 Oct 2020 14:31) (18 - 98)  SpO2: 93% (02 Oct 2020 14:31) (93% - 99%)    PHYSICAL EXAM:  GENERAL: NAD at rest, morbidly obese  HEENT:  anicteric, moist mucous membranes  CHEST/LUNG:  CTA b/l, no rales, wheezes, or rhonchi  HEART:  RRR, S1, S2  ABDOMEN:  BS+, soft, nontender in upper abdomen, mild tenderness near incisions, nondistended; 3 EDWIN drains with some dark bloody drainage; incision sealed with dermabond without significant warmth or erythema. No incisional drainage.  EXTREMITIES: no cyanosis, or calf tenderness  NERVOUS SYSTEM: answers questions and follows commands appropriately    LABS:                        7.8    12.97 )-----------( 291      ( 02 Oct 2020 07:08 )             22.5     CBC Full  -  ( 02 Oct 2020 07:08 )  WBC Count : 12.97 K/uL  Hemoglobin : 7.8 g/dL  Hematocrit : 22.5 %  Platelet Count - Automated : 291 K/uL  Mean Cell Volume : 88.2 fl  Mean Cell Hemoglobin : 30.6 pg  Mean Cell Hemoglobin Concentration : 34.7 gm/dL  Auto Neutrophil # : 10.40 K/uL  Auto Lymphocyte # : 1.07 K/uL  Auto Monocyte # : 1.27 K/uL  Auto Eosinophil # : 0.08 K/uL  Auto Basophil # : 0.04 K/uL  Auto Neutrophil % : 80.3 %  Auto Lymphocyte % : 8.2 %  Auto Monocyte % : 9.8 %  Auto Eosinophil % : 0.6 %  Auto Basophil % : 0.3 %    02 Oct 2020 07:08    139    |  103    |  14     ----------------------------<  125    3.7     |  29     |  0.72     Ca    8.1        02 Oct 2020 07:08  Phos  2.6       02 Oct 2020 07:08  Mg     2.2       02 Oct 2020 07:08    TPro  5.9    /  Alb  1.9    /  TBili  0.6    /  DBili  x      /  AST  27     /  ALT  44     /  AlkPhos  87     02 Oct 2020 07:08        CAPILLARY BLOOD GLUCOSE            Culture - Urine (collected 09-30-20 @ 15:18)  Source: .Urine Clean Catch (Midstream)  Final Report (10-01-20 @ 11:52):    <10,000 CFU/mL Normal Urogenital Chanel    Culture - Blood (collected 09-30-20 @ 12:07)  Source: .Blood Blood-Peripheral  Preliminary Report (10-01-20 @ 13:01):    No growth to date.    Culture - Blood (collected 09-30-20 @ 12:07)  Source: .Blood Blood  Preliminary Report (10-01-20 @ 13:01):    No growth to date.        RADIOLOGY & ADDITIONAL TESTS:    Personally reviewed.     Consultant(s) Notes Reviewed:  [x] YES  [ ] NO

## 2020-10-02 NOTE — PROGRESS NOTE ADULT - SUBJECTIVE AND OBJECTIVE BOX
[facility] -- Almas Faustin, Mayra, Cara, Eleno, Pasquale Wiley Goodger  Office # [phone]    Follow Up:    s/p abdominal panniculectomy, acute blood loss, anemia  OVernight events:  drop in HGB  to 7.7, elevated white count and fever overnight, meropenem initiated for possible infected hematoma  Subjective/Observations:   Patient seen and examined. She reports feeling palpitations this am at 7:45-8, no abnormalities on telemetry. She denies chest pain or shortness of breath.      REVIEW OF SYSTEMS: All other review of systems is negative unless indicated above  PAST MEDICAL & SURGICAL HISTORY:  History of aortic aneurysm  descending aorta see CT  Arthritis  Degenerative disc disease, lumbar  Neuropathy  Spinal stenosis  Bipolar 1 dsorder  Kidney stone  Sleep Apnea  CPAP with oxygen since June 2020  Asthma  Hypertension  Morbid Obesity  ETOH Abuse  H/O  Benign Essential Tremor  Anxiety  Depression  Renal Calculi  chronic  Colitis  H/O  Anemia  S/P cardiac catheterization  S/P dilation and curettage  History of tonsillectomy  History of laparoscopic adjustable gastric banding  band removed few yrs ago  S/P D&amp;C  Biliary stent placement &amp; ercp  ureteroscopy with stone removal  1-      MEDICATIONS  (STANDING):  allopurinol 300 milliGRAM(s) Oral daily  cyanocobalamin 1000 MICROGram(s) Oral daily  docusate sodium 100 milliGRAM(s) Oral three times a day  DULoxetine 30 milliGRAM(s) Oral <User Schedule>  folic acid 1 milliGRAM(s) Oral daily  lamoTRIgine 200 milliGRAM(s) Oral daily  meropenem  IVPB      meropenem  IVPB 1000 milliGRAM(s) IV Intermittent every 8 hours  multivitamin 1 Tablet(s) Oral daily  polyethylene glycol 3350 17 Gram(s) Oral daily  propranolol 10 milliGRAM(s) Oral daily  senna 2 Tablet(s) Oral at bedtime  traZODone 200 milliGRAM(s) Oral at bedtime    MEDICATIONS  (PRN):  acetaminophen   Tablet .. 650 milliGRAM(s) Oral every 6 hours PRN Mild Pain (1 - 3)  acetaminophen   Tablet .. 650 milliGRAM(s) Oral every 6 hours PRN Temp greater or equal to 38C (100.4F)  artificial tears (preservative free) Ophthalmic Solution 1 Drop(s) Both EYES two times a day PRN Dry Eyes  glucagon  Injectable 1 milliGRAM(s) IntraMuscular once PRN Glucose LESS THAN 70 milligrams/deciliter  oxycodone    5 mG/acetaminophen 325 mG 1 Tablet(s) Oral every 4 hours PRN Moderate Pain (4 - 6)  oxycodone    5 mG/acetaminophen 325 mG 2 Tablet(s) Oral every 6 hours PRN Severe Pain (7 - 10)    Allergies    penicillins (Other)  trees dogs cats cockaroaches (Rhinitis; Rhinorrhea)    Intolerances      Vital Signs Last 24 Hrs  T(C): 36.6 (02 Oct 2020 05:17), Max: 38.3 (01 Oct 2020 18:17)  T(F): 97.9 (02 Oct 2020 05:17), Max: 100.9 (01 Oct 2020 18:17)  HR: 94 (02 Oct 2020 07:59) (65 - 121)  BP: 122/68 (02 Oct 2020 06:00) (78/43 - 122/68)  RR: 18 (02 Oct 2020 05:17) (18 - 98)  SpO2: 97% (02 Oct 2020 07:59) (93% - 100%)  I&O's Summary    01 Oct 2020 07:01  -  02 Oct 2020 07:00  --------------------------------------------------------  IN: 800 mL / OUT: 261.5 mL / NET: 538.5 mL        PHYSICAL EXAM:  TELE: normal sinus  Constitutional: obese, NAD, awake and alert, well-developed  HEENT: Moist Mucous Membranes, Anicteric  Pulmonary: Non-labored, breath sounds are clear bilaterally, No wheezing, rales or rhonchi  Cardiovascular: Regular, S1 and S2, No murmurs, rubs, gallops or clicks  Gastrointestinal: Bowel Sounds present, soft, nontender.   Lymph: No peripheral edema. No lymphadenopathy.  Skin: No visible rashes or ulcers.  Psych:  Mood & affect appropriate  LABS: All Labs Reviewed:                        7.8    12.97 )-----------( 291      ( 02 Oct 2020 07:08 )             22.5                         8.8    16.92 )-----------( 294      ( 01 Oct 2020 07:21 )             25.5                         8.4    11.39 )-----------( Clumped    ( 30 Sep 2020 06:36 )             25.4     02 Oct 2020 07:08    139    |  103    |  14     ----------------------------<  125    3.7     |  29     |  0.72   01 Oct 2020 07:21    139    |  102    |  12     ----------------------------<  137    3.8     |  30     |  0.76   30 Sep 2020 06:36    140    |  102    |  16     ----------------------------<  118    3.8     |  32     |  0.77     Ca    8.1        02 Oct 2020 07:08  Ca    8.5        01 Oct 2020 07:21  Ca    8.6        30 Sep 2020 06:36  Phos  2.6       02 Oct 2020 07:08  Mg     2.2       02 Oct 2020 07:08    TPro  5.9    /  Alb  1.9    /  TBili  0.6    /  DBili  x      /  AST  27     /  ALT  44     /  AlkPhos  87     02 Oct 2020 07:08  TPro  6.1    /  Alb  2.2    /  TBili  0.6    /  DBili  x      /  AST  14     /  ALT  48     /  AlkPhos  83     01 Oct 2020 07:21  TPro  6.3    /  Alb  2.5    /  TBili  0.4    /  DBili  x      /  AST  27     /  ALT  71     /  AlkPhos  84     30 Sep 2020 06:36        < from: 12 Lead ECG (06.16.20 @ 15:52) >  Ventricular Rate 85 BPM    Atrial Rate 85 BPM    P-R Interval 154 ms    QRS Duration 88 ms    Q-T Interval 372 ms    QTC Calculation(Bezet) 442 ms    P Axis 18 degrees    R Axis -43 degrees    T Axis 27 degrees    Diagnosis Line NORMAL SINUS RHYTHM  LEFT AXIS DEVIATION  MINIMAL VOLTAGE CRITERIA FOR LVH, MAY BE NORMAL VARIANT  ABNORMAL ECG    Confirmed by MD MARINA, ROGERS ([id number]) on 6/17/2020 8:14:32 PM    < end of copied text >  < from: Xray Chest 1 View- PORTABLE-Urgent (Xray Chest 1 View- PORTABLE-Urgent .) (09.30.20 @ 08:13) >  EXAM:  XR CHEST PORTABLE URGENT 1V                            PROCEDURE DATE:  09/30/2020          INTERPRETATION:  Portable chest x-ray    Indication: fever of unclear source    Comparison: 6/16/2020    2 portable chest x-rays are acquired for evaluation.    Impression: No evidence for acute pulmonary infiltrate, pleural effusion, or pneumothorax.    Stable small calcified granuloma in the right upper lung zone.    New small linear atelectasis in the left lower lung zone.    Stable cardiac silhouette.    No pulmonary vascular congestion.              JAYDE MARIE M.D., ATTENDING RADIOLOGIST  This document has been electronically signed. Sep 30 2020  9:47AM    < end of copied text >

## 2020-10-02 NOTE — CONSULT NOTE ADULT - SUBJECTIVE AND OBJECTIVE BOX
Patient is a 60y old  Female who presents with a chief complaint of Panniculectomy (02 Oct 2020 19:45)      HPI:  Ms Armas is a 61 yo F who presented to [facility] for elective abdominal panniculectomy. PMH JESUS on CPAP, HTN, Obesity, Anemia, AAA, Arthritis, Depression, Anxiety.   Pt seen and examined post-operative. She states she is doing well. She has minimal abdominal pain 2/10 in severity. Its in the area of her panniluculus, no radiation elsewhere, pain is dull. She has no other complaints. Denies headaches, nausea, vomiting, chest pain, SOB, palpitations, constipation, diarrhea, melena, hematochezia, dysuria.      (25 Sep 2020 16:59)    heme asked to see sorin pandey.   s/p surgery with anemia.         PAST MEDICAL & SURGICAL HISTORY:  History of aortic aneurysm  descending aorta see CT  Arthritis  Degenerative disc disease, lumbar  Neuropathy  Spinal stenosis  Bipolar 1 disorder  Kidney stone  Sleep Apnea  CPAP with oxygen since June 2020  Asthma  Hypertension  Morbid Obesity  ETOH Abuse  H/O  Benign Essential Tremor  Anxiety Depression  Renal Calculi chronic  Colitis H/O  Anemia  S/P cardiac catheterization  S/P dilation and curettage  History of tonsillectomy  History of laparoscopic adjustable gastric banding; band removed few yrs ago  S/P D&C  Biliary stent placement &amp; ercp  ureteroscopy with stone removal 1-       HEALTH ISSUES - PROBLEM Dx:  Hematoma  Other disorders of skin and subcutaneous tissue [L98.8]  History of aortic aneurysm [Z86.79]  H/O aortic aneurysm repair [Z98.890]  Arthritis [M19.90]  Degenerative disc disease, lumbar [M51.36]  Neuropathy [G62.9]  Spinal stenosis [M48.00]  Bipolar 1 disorder [F31.9]  Kidney stone [592.0]  Diabetes Mellitus Type II [250.00]  Sleep Apnea [780.57]  Asthma [493.90]  Hypertension [401.9]  Obesity [278.00]  Morbid Obesity [278.01]  Drug Abuse [305.90]  ETOH Abuse [305.00]  Arrhythmia [427.9]  Benign Essential Tremor [333.1]  Anxiety [300.00]  Depression [311]  Renal Calculi [592.0]  Colitis [558.9]  Anemia [285.9]  Diabetes [250.00]  Sleep Apnea [780.57]  Asthma [493.90]  S/P cardiac catheterization [V45.89]  S/P dilation and curettage [V45.89]  History of tonsillectomy [V45.89]  History of laparoscopic adjustable gastric banding [V45.86]  S/P D&C [V45.89]  Ureteral stent [593.4]  ERCP &amp; removal of biliary stent [574.20]  Biliary stent placement &amp; ercp [574.20]  Cholelithiasis [574.20]  History of Tonsillectomy [V45.89]  ureteroscopy with stone removal [592.0]      FAMILY HISTORY:  Hypertension (Sibling)  Hyperlipidemia (Sibling)  Family history of renal failure  both parents were on dialysis  Family history of bacterial pneumonia  Family history of arthritis      [SOCIAL HISTORY: ]     smoking:  ex-smoker     EtOH:  denies     illicit drugs:  denies     occupation:   for [employer].      marital status:  single. Lives with brother and his wife.      Other:       [ALLERGIES/INTOLERANCES:]  Allergies     penicillins (Other)     trees dogs cats cockaroaches (Rhinitis; Rhinorrhea)  Intolerances      [MEDICATIONS]  MEDICATIONS  (STANDING):  allopurinol 300 milliGRAM(s) Oral daily  cyanocobalamin 1000 MICROGram(s) Oral daily  docusate sodium 100 milliGRAM(s) Oral three times a day  DULoxetine 30 milliGRAM(s) Oral <User Schedule>  folic acid 1 milliGRAM(s) Oral daily  lamoTRIgine 200 milliGRAM(s) Oral daily  meropenem  IVPB      meropenem  IVPB 1000 milliGRAM(s) IV Intermittent every 8 hours  multivitamin 1 Tablet(s) Oral daily  polyethylene glycol 3350 17 Gram(s) Oral daily  propranolol 10 milliGRAM(s) Oral daily  senna 2 Tablet(s) Oral at bedtime  traZODone 200 milliGRAM(s) Oral at bedtime    MEDICATIONS  (PRN):  acetaminophen   Tablet .. 650 milliGRAM(s) Oral every 6 hours PRN Mild Pain (1 - 3)  acetaminophen   Tablet .. 650 milliGRAM(s) Oral every 6 hours PRN Temp greater or equal to 38C (100.4F)  artificial tears (preservative free) Ophthalmic Solution 1 Drop(s) Both EYES two times a day PRN Dry Eyes  glucagon  Injectable 1 milliGRAM(s) IntraMuscular once PRN Glucose LESS THAN 70 milligrams/deciliter  oxycodone    5 mG/acetaminophen 325 mG 1 Tablet(s) Oral every 4 hours PRN Moderate Pain (4 - 6)  oxycodone    5 mG/acetaminophen 325 mG 2 Tablet(s) Oral every 6 hours PRN Severe Pain (7 - 10)        [REVIEW OF SYSTEMS: ]  CONSTITUTIONAL: normal, no fever, no shakes, no chills   EYES: No eye pain, no visual disturbances, no discharge  ENMT:  no discharge  NECK: No pain, no stiffness  BREASTS: No pain, no masses, no nipple discharge  RESPIRATORY: No cough, no wheezing, no chills, no hemoptysis; No shortness of breath  CARDIOVASCULAR: No chest pain, no palpitations, no dizziness, no leg swelling  GASTROINTESTINAL: No abdominal, no epigastric pain. No nausea, no vomiting, no hematemesis; No diarrhea , no constipation. No melena, no hematochezia.  GENITOURINARY: No dysuria, no frequency, no hematuria, no incontinence  NEUROLOGICAL: No headaches, no memory loss, no loss of strength, no numbness, no tremors  SKIN: No itching, no burning, no rashes, no lesions   LYMPH NODES: No enlarged glands  ENDOCRINE: No heat or cold intolerance; No hair loss  MUSCULOSKELETAL: No joint pain or swelling; No muscle, no back, no extremity pain  PSYCHIATRIC: No depression, no anxiety, no mood swings, no difficulty sleeping  HEME/LYMPH: No easy bruising, no bleeding gums      [VITALS SIGNS 24hrs]  Vital Signs Last 24 Hrs  T(C): 37.9 (02 Oct 2020 20:34), Max: 38.6 (02 Oct 2020 14:31)  T(F): 100.2 (02 Oct 2020 20:34), Max: 101.4 (02 Oct 2020 14:31)  HR: 90 (02 Oct 2020 20:34) (85 - 94)  BP: 110/73 (02 Oct 2020 20:34) (89/56 - 122/68)  BP(mean): --  RR: 18 (02 Oct 2020 20:34) (18 - 18)  SpO2: 93% (02 Oct 2020 20:34) (93% - 99%)    [PHYSICAL EXAM]  General: adult in NAD,  WN,  WD.  HEENT: clear oropharynx, anicteric sclera, pink conjunctivae.  Neck: supple, no masses.  CV: normal S1S2, no murmur, no rubs, no gallops.  Lungs: clear to auscultation, no wheezes, no rales, no rhonchi.  Abdomen: soft, non-tender, mod-distended, no hepatosplenomegaly, normal BS, no guarding. abd binder in place. 3JP drains with bloody discharge.   Ext: no clubbing, no cyanosis, trace edema.  Skin: no rashes,  no petechiae, no venous stasis changes.  Neuro: alert and oriented X3, no focal motor deficits.  LN: no SC DIANA.      [LABS: ]                        7.8    12.97 )-----------( 291      ( 02 Oct 2020 07:08 )             22.5     CBC Full  -  ( 02 Oct 2020 07:08 )  WBC Count : 12.97 K/uL  RBC Count : 2.55 M/uL  Hemoglobin : 7.8 g/dL  Hematocrit : 22.5 %  Platelet Count - Automated : 291 K/uL  Mean Cell Volume : 88.2 fl  Mean Cell Hemoglobin : 30.6 pg  Mean Cell Hemoglobin Concentration : 34.7 gm/dL  Auto Neutrophil # : 10.40 K/uL  Auto Lymphocyte # : 1.07 K/uL  Auto Monocyte # : 1.27 K/uL  Auto Eosinophil # : 0.08 K/uL  Auto Basophil # : 0.04 K/uL  Auto Neutrophil % : 80.3 %  Auto Lymphocyte % : 8.2 %  Auto Monocyte % : 9.8 %  Auto Eosinophil % : 0.6 %  Auto Basophil % : 0.3 %    10-02    139  |  103  |  14  ----------------------------<  125<H>  3.7   |  29  |  0.72    Ca    8.1<L>      02 Oct 2020 07:08  Phos  2.6     10-02  Mg     2.2     10-02    TPro  5.9<L>  /  Alb  1.9<L>  /  TBili  0.6  /  DBili  x   /  AST  27  /  ALT  44  /  AlkPhos  87  10-02      LIVER FUNCTIONS - ( 02 Oct 2020 07:08 )  Alb: 1.9 g/dL / Pro: 5.9 g/dL / ALK PHOS: 87 U/L / ALT: 44 U/L / AST: 27 U/L / GGT: x                   WBC  TREND (5 Days)  WBC Count: 12.97 K/uL (10-02 @ 07:08)  WBC Count: 16.92 K/uL (10-01 @ 07:21)    HGB  TREND (5 Days)  Hemoglobin: 7.8 g/dL (10-02 @ 07:08)  Hemoglobin: 8.8 g/dL (10-01 @ 07:21)    HCT  TREND (5 Days)  Hematocrit: 22.5 % (10-02 @ 07:08)  Hematocrit: 25.5 % (10-01 @ 07:21)    PLT  TREND (5 Days)  Platelet Count - Automated: 291 K/uL (10-02 @ 07:08)  Platelet Count - Automated: 294 K/uL (10-01 @ 07:21)      Anemia Studies  Ferritin, Serum: 190 ng/mL (10-01 @ 10:04)  Iron - Total Binding Capacity.: 203 ug/dL (10-01 @ 10:06)  Vitamin B12, Serum: 1677 pg/mL (10-01 @ 10:04)  Folate, Serum: >20.0 ng/mL (10-01 @ 10:04)       [RADIOLOGY & ADDITIONAL STUDIES:]     EXAM:  CT ABDOMEN AND PELVIS OC IC                            PROCEDURE DATE:  09/30/2020          INTERPRETATION:  CLINICAL INFORMATION: Fever. Hemorrhage. Status post recent panniculectomy with liposuction.    COMPARISON: CT scan abdomen pelvis 2/16/2016.    PROCEDURE:  CT of the Abdomen and Pelvis was performed with intravenous contrast.  Intravenous contrast: 90 ml Omnipaque 350. 10 ml discarded.  Oral contrast: positive contrast was administered.  Sagittal and coronal reformats were performed.    FINDINGS:    LOWER CHEST: Within normal limits.    Streak artifact degrades image quality.    LIVER: Within normal limits.  BILE DUCTS: Mild prominence of the common bile duct, measuring up to 7 mm.  GALLBLADDER: Prior cholecystectomy.  SPLEEN: Within normal limits.  PANCREAS: Within normal limits.  ADRENALS: Within normal limits.  KIDNEYS/URETERS:  Small indeterminate bilateral renal hypodensities.  No hydronephrosis.    BLADDER: Small amount of air within the bladder, likely related to recent instrumentation. Correlate clinically.  REPRODUCTIVE ORGANS: Intrauterine device.    BOWEL: Mildly distended small bowel and colon, without bowel obstruction, may reflect ileus.  There is transit of enteric contrast to the terminal ileum.  Thereis moderate fecal retention within the colon.  Appendix normal.  PERITONEUM: No ascites.  No localized intra-abdominal fluid collection or pneumoperitoneum noted.    VESSELS: Atherosclerotic calcification.  RETROPERITONEUM/LYMPH NODES: No enlarged lymphadenopathy.  ABDOMINAL WALL:  Small midline skin defect/wound mid abdominal wall with adjacent punctate foci of air and infiltration of the subcutaneous soft tissues likely related to recent postoperative changes.  There are bilateral surgical drainage catheters lower abdominal, pelvic wall.  There is high density hematoma in the deep subcutaneous soft tissues in the anterior pelvic abdominal wall measuring approximately 4 x 11 cm.  This is at the level of the surgical drainage catheters.    BONES: Degenerative changes spine.    IMPRESSION:    Postoperative changes anterior abdominal wall.  Approximately 4 x 11 cm hematoma deep subcutaneous soft tissues lower anterior pelvic wall at the level of the surgical drainage catheters.    No localized intra-abdominal fluid collection noted.    Other findings as discussed above.

## 2020-10-02 NOTE — PROGRESS NOTE ADULT - SUBJECTIVE AND OBJECTIVE BOX
[facility]  INFECTIOUS DISEASES   =======================================================    [facility]-[id number]  EMANUEL MONTEZ     Follow up: Fever    Last fever 100.9 last evening, BP is better today. No new complaint.   Leukocytosis better.     PAST MEDICAL & SURGICAL HISTORY:  History of aortic aneurysm  descending aorta see CT  Arthritis  Degenerative disc disease, lumbar  Neuropathy  Spinal stenosis  Bipolar 1 disorder  Kidney stone  Sleep Apnea  CPAP with oxygen since June 2020  Asthma  Hypertension  Morbid Obesity  ETOH Abuse  H/O  Benign Essential Tremor  Anxiety  Depression  Renal Calculi  chronic    Colitis  H/O  Anemia  S/P cardiac catheterization  S/P dilation and curettage  History of tonsillectomy  History of laparoscopic adjustable gastric banding  band removed few yrs ago  S/P D&amp;C  Biliary stent placement &amp; ercp  ureteroscopy with stone removal  1-    Social Hx: no smoking, ETOH or drugs     FAMILY HISTORY:  Hypertension (Sibling)  Hyperlipidemia (Sibling)  Family history of renal failure  both parents were on dialysis  Family history of bacterial pneumonia  Family history of arthritis    Allergies  penicillins (Other)  trees dogs cats cockaroaches (Rhinitis; Rhinorrhea)    Antibiotics:  Meropenum     REVIEW OF SYSTEMS:  CONSTITUTIONAL:  + Fever   HEENT:  No diplopia or blurred vision.  No sore throat or runny nose.  CARDIOVASCULAR:  No chest pain or SOB.  RESPIRATORY:  No cough, shortness of breath, PND or orthopnea.  GASTROINTESTINAL:  No nausea, vomiting or diarrhea. surgical site pain +   GENITOURINARY:  No dysuria, frequency or urgency. No Blood in urine  MUSCULOSKELETAL:  no joint aches, no muscle pain  SKIN:  No change in skin, hair or nails.  NEUROLOGIC:  No paresthesias, fasciculations, seizures or weakness.  PSYCHIATRIC:  No disorder of thought or mood.  ENDOCRINE:  No heat or cold intolerance, polyuria or polydipsia.  HEMATOLOGICAL:  No easy bruising or bleeding.     Physical Exam:  Vital Signs Last 24 Hrs  T(C): 36.6 (02 Oct 2020 05:17), Max: 38.3 (01 Oct 2020 18:17)  T(F): 97.9 (02 Oct 2020 05:17), Max: 100.9 (01 Oct 2020 18:17)  HR: 94 (02 Oct 2020 07:59) (65 - 121)  BP: 122/68 (02 Oct 2020 06:00) (78/43 - 122/68)  BP(mean): --  RR: 18 (02 Oct 2020 05:17) (18 - 98)  SpO2: 97% (02 Oct 2020 07:59) (93% - 100%)  GEN: NAD  HEENT: normocephalic and atraumatic. EOMI. PERRL.    NECK: Supple.  No lymphadenopathy   LUNGS: Clear to auscultation.  HEART: Regular rate and rhythm without murmur.  ABDOMEN: Soft, nontender, and nondistended.  Positive bowel sounds.    : No CVA tenderness  EXTREMITIES: Without any cyanosis, clubbing, rash, lesions or edema.  NEUROLOGIC: grossly intact.  PSYCHIATRIC: Appropriate affect .  SKIN: No ulceration or induration present.    Labs:                        7.8    12.97 )-----------( 291      ( 02 Oct 2020 07:08 )             22.5      10-02    139  |  103  |  14  ----------------------------<  125<H>  3.7   |  29  |  0.72    Ca    8.1<L>      02 Oct 2020 07:08  Phos  2.6     10-02  Mg     2.2     10-02    TPro  5.9<L>  /  Alb  1.9<L>  /  TBili  0.6  /  DBili  x   /  AST  27  /  ALT  44  /  AlkPhos  87  10-02      Culture - Urine (collected 09-30-20 @ 15:18)  Source: .Urine Clean Catch (Midstream)  Final Report (10-01-20 @ 11:52):    <10,000 CFU/mL Normal Urogenital Chanel    Culture - Blood (collected 09-30-20 @ 12:07)  Source: .Blood Blood-Peripheral    Culture - Blood (collected 09-30-20 @ 12:07)  Source: .Blood Blood    WBC Count: 12.97 K/uL (10-02-20 @ 07:08)  WBC Count: 16.92 K/uL (10-01-20 @ 07:21)  WBC Count: 11.39 K/uL (09-30-20 @ 06:36)  WBC Count: 9.73 K/uL (09-29-20 @ 18:57)  WBC Count: 8.69 K/uL (09-29-20 @ 06:49)  WBC Count: 11.63 K/uL (09-28-20 @ 17:20)  WBC Count: 11.10 K/uL (09-28-20 @ 05:25)  WBC Count: 13.67 K/uL (09-27-20 @ 17:13)    Creatinine, Serum: 0.72 mg/dL (10-02-20 @ 07:08)  Creatinine, Serum: 0.76 mg/dL (10-01-20 @ 07:21)  Creatinine, Serum: 0.77 mg/dL (09-30-20 @ 06:36)  Creatinine, Serum: 0.73 mg/dL (09-29-20 @ 06:49)  Creatinine, Serum: 0.69 mg/dL (09-28-20 @ 05:25)    Ferritin, Serum: 190 ng/mL (10-01-20 @ 10:04)    Procalcitonin, Serum: 0.31 ng/mL (10-02-20 @ 07:08)     COVID-19 PCR: NotDetec (09-23-20 @ 13:30)    All imaging and other data have been reviewed.  < from: Xray Chest 1 View- PORTABLE-Urgent (Xray Chest 1 View- PORTABLE-Urgent .) (09.30.20 @ 08:13) >  EXAM:  XR CHEST PORTABLE URGENT 1V                        PROCEDURE DATE:  09/30/2020    INTERPRETATION:  Portable chest x-ray  Indication: fever of unclear source  Comparison: 6/16/2020  2 portable chest x-rays are acquired for evaluation.  Impression: No evidence for acute pulmonary infiltrate, pleural effusion, or pneumothorax.  Stable small calcified granuloma in the right upper lung zone.  New small linear atelectasis in the left lower lung zone.  Stable cardiac silhouette.  No pulmonary vascular congestion.    Assessment and Plan: 59 y/o woman with PMH of obesity, HTN, JESUS on CPAP, Anemia, AAA, Arthritis, Depression and Anxiety was admitted at [facility] for elective abdominal panniculectomy and liposuction on 9/25, now has fever so ID has been called for recommendations. Currently no source of infection, wound looks fine with no signs of infection, but has 3 drains all with bleeding, possibly hematoma underneath causing fever.   10/1 with low grade fever and worsening of leukocytosis, CT of abdomen on 9/30 showed pelvic wall large hematoma that still could be reason for fever and worsening of leukocytosis but since infected hematoma is a possibility and she had a slight drop in BP, will cover empirically with Meropenum for now.   Blood and urine cultures negative , CXR negative   10/2 afebrile since last night, leukocytosis and BP are better, unclear if infection is responding to ABx or it is the same hematoma.     Fever, panniculectomy  - WBC 11k-->16k-->12k , will trend it  - Continue Meropenem 1gm q8h   - Surgery is on board for drain management, no sign of wound infection.     Will follow.    Judi Black MD  Division of Infectious Diseases   Cell [phone] between 7am and 5pm   After 5pm and weekends please call ID service at [phone].

## 2020-10-02 NOTE — PROGRESS NOTE ADULT - SUBJECTIVE AND OBJECTIVE BOX
Patient is a 60y old  Female who presents with a chief complaint of hemorrhagic shock s/p panniculectomy (02 Oct 2020 17:40)      INTERVAL HPI/OVERNIGHT EVENTS:    Had fever to 101.4 earlier today. Denies shortness of breath or cough    MEDICATIONS  (STANDING):  allopurinol 300 milliGRAM(s) Oral daily  cyanocobalamin 1000 MICROGram(s) Oral daily  docusate sodium 100 milliGRAM(s) Oral three times a day  DULoxetine 30 milliGRAM(s) Oral <User Schedule>  folic acid 1 milliGRAM(s) Oral daily  lamoTRIgine 200 milliGRAM(s) Oral daily  meropenem  IVPB      meropenem  IVPB 1000 milliGRAM(s) IV Intermittent every 8 hours  multivitamin 1 Tablet(s) Oral daily  polyethylene glycol 3350 17 Gram(s) Oral daily  propranolol 10 milliGRAM(s) Oral daily  senna 2 Tablet(s) Oral at bedtime  traZODone 200 milliGRAM(s) Oral at bedtime      MEDICATIONS  (PRN):  acetaminophen   Tablet .. 650 milliGRAM(s) Oral every 6 hours PRN Mild Pain (1 - 3)  acetaminophen   Tablet .. 650 milliGRAM(s) Oral every 6 hours PRN Temp greater or equal to 38C (100.4F)  artificial tears (preservative free) Ophthalmic Solution 1 Drop(s) Both EYES two times a day PRN Dry Eyes  glucagon  Injectable 1 milliGRAM(s) IntraMuscular once PRN Glucose LESS THAN 70 milligrams/deciliter  oxycodone    5 mG/acetaminophen 325 mG 1 Tablet(s) Oral every 4 hours PRN Moderate Pain (4 - 6)  oxycodone    5 mG/acetaminophen 325 mG 2 Tablet(s) Oral every 6 hours PRN Severe Pain (7 - 10)      Allergies    penicillins (Other)  trees dogs cats cockaroaches (Rhinitis; Rhinorrhea)    Intolerances        PAST MEDICAL & SURGICAL HISTORY:  History of aortic aneurysm  descending aorta see CT    Arthritis    Degenerative disc disease, lumbar    Neuropathy    Spinal stenosis    Bipolar 1 disorder    Kidney stone    Sleep Apnea  CPAP with oxygen since June 2020    Asthma    Hypertension    Morbid Obesity    ETOH Abuse  H/O    Benign Essential Tremor    Anxiety    Depression    Renal Calculi  chronic      Colitis  H/O    Anemia    S/P cardiac catheterization    S/P dilation and curettage    History of tonsillectomy    History of laparoscopic adjustable gastric banding  band removed few yrs ago    S/P D&amp;C    Biliary stent placement &amp; ercp    ureteroscopy with stone removal  1-        Vital Signs Last 24 Hrs  T(C): 38 (02 Oct 2020 16:24), Max: 38.6 (02 Oct 2020 14:31)  T(F): 100.4 (02 Oct 2020 16:24), Max: 101.4 (02 Oct 2020 14:31)  HR: 85 (02 Oct 2020 14:31) (85 - 94)  BP: 89/56 (02 Oct 2020 14:31) (89/56 - 122/68)  BP(mean): --  RR: 18 (02 Oct 2020 14:31) (18 - 98)  SpO2: 93% (02 Oct 2020 14:31) (93% - 99%)    PHYSICAL EXAMINATION:    GENERAL: The patient is awake and alert in no apparent distress.     HEENT: Head is normocephalic and atraumatic. Extraocular muscles are intact. Mucous membranes are moist.    NECK: Supple.    LUNGS: Clear to auscultation without wheezing, rales or rhonchi; respirations unlabored    HEART: Regular rate and rhythm without murmur.    ABDOMEN: obese, surgical dressing, nontender, and nondistended.      EXTREMITIES: Without any cyanosis, clubbing, rash, lesions or edema.    NEUROLOGIC: Grossly intact.    SKIN: No ulceration or induration present.      LABS:                        7.8    12.97 )-----------( 291      ( 02 Oct 2020 07:08 )             22.5     10-02    139  |  103  |  14  ----------------------------<  125<H>  3.7   |  29  |  0.72    Ca    8.1<L>      02 Oct 2020 07:08  Phos  2.6     10-02  Mg     2.2     10-02    TPro  5.9<L>  /  Alb  1.9<L>  /  TBili  0.6  /  DBili  x   /  AST  27  /  ALT  44  /  AlkPhos  87  10-02                    Procalcitonin, Serum: 0.31 ng/mL (10-02-20 @ 07:08)      MICROBIOLOGY:  Culture Results:   <10,000 CFU/mL Normal Urogenital Chanel (09-30 @ 15:18)  Culture Results:   No growth to date. (09-30 @ 12:07)  Culture Results:   No growth to date. (09-30 @ 12:07)      Chest x ray reveals no pulmonary infiltrates or effusions    Assessment:    S/P Abdominal Paniculectomy  Morbid Obesity  Obstructive Sleep Apnea Syndrome  Recurrent fever - no obvious source - ? secondary to hematoma  Anemia secondary to post-op bleeding  Hx Asthma  Diabetes    Plan:    Nocturnal CPAP  Continue antibiotics per ID  Pain Control

## 2020-10-02 NOTE — PROGRESS NOTE ADULT - ASSESSMENT
60 year old female with JESUS, obesity and HTN, with hypotension after an abdominal panniculectomy, in the setting of presumed blood loss anemia, now off pressors, overnight had a fever, drop in HGB and was started on IV antibiotics meropenem.    A fib  - She had brief atrial fibrillation in the setting of significant hypotension, though is now back in SR.  -no PMH of Afib  -reports palpitations this am, not picked up on tele  - telemetry with SR 70-80s   - now off of pressor support.   - C/W Propanolol.     HTN  - BP is stable  - Continue propranolol  - Hold verapamil   - low normal LV function with mild diastolic dysfunction on recent echocardiogram  - no sign of acute ischemia  - recent cath with non-obs cad  - no sign of volume overload  - hold diuretics     FEVER:  -ID is following, meropenem initiated  -surgery team aware    Anemia   -HGB 7.8 from 8.8  -continue to trend CBC as per primary team      JESUS  - CPAP at night    - Monitor and replete lytes, keep K>4, Mg>2.  - All other medical needs as per primary team.  - Other cardiovascular workup will depend on clinical course.  - Will continue to follow.  Marlyn Lopez, [facility]  Cardiology  [phone]

## 2020-10-02 NOTE — CONSULT NOTE ADULT - ASSESSMENT
[ASSESSMENT and  PLAN]  61yo F w/ PMH of JESUS on CPAP, HTN, morbid obesity, anemia, AAA, Arthritis, Depression, anxiety presented to [facility] for elective abdominal panniculectomy, admitted post-op with hemorrhagic shock s/p 3U PRBC with stabilization, now course c/b fever.    Hgb 11.9, pre-surgery with post procedure Hgb 8.4    -today had recurrence of fever at 101.4F. Also, leukocytosis improving from ~17 to ~13;  -UCx and BCx from 9/30 were negative; resent BCx this afternoon with this recurrent fever of 101.4F. Consulted ID (Wayne),     EDWIN drains with continued bloody output.     New onset Afib.     regarding anemia  Chronic anemia for many years, not ever required transfusions; chronic skin lesions, also with persistent nausea and vomiting for several months prior to initial eval  found to have mild iron deficiency and mild B12 deficiency - suspect due to malnutrition with recurrent vomiting and poor diet  hospitalized in 2/2016 at [facility] for gastric perforation due to gastric sleeve - removed per patient and repaired perforation - enterocutaneous fistula well healed now  - was considering gastric sleeve again in 1/2019 but did not have procedure done  - was planned for panniculectomy with plastic surgery in 3/2020 which was cancelled due to COVID-19  - noted acute drop in H/H in 11/2019; improved with better compliance to oral iron and Bcomplex vitamins. Hgb 13.1, Ferritin 76 on 07/23/20  Hgb 13.2 on PST on 09/14/20.     RECOMMENDATIONS  Check FOBT given decline in Hgb between 07/23/20 and hospital admission for procedure  However may simply be due to blood loss.     Would expect some inflammation with panniculectomy     Transfuse PRBC as clinically indicated.   Transfuse PRBC if Hgb <7.0 or if symptomatic.   Follow CBC    With current ferritin, and inflammation post surgery, may not have much benefit to IV iron at current time. Will reassess.     DVT Prophylaxis  SCD    post op care.    Thank you for consulting us.     I have discussed the above plan of care with patient in detail. They expressed understanding of the treatment plan . Risks, benefits and alternatives discussed in detail. I have asked if they have any questions or concerns and appropriately addressed them; all questions answered to their satisfactions and in lay terms.     Discussed with Dr Perkins.

## 2020-10-03 LAB
ALBUMIN SERPL ELPH-MCNC: 1.8 G/DL — LOW (ref 3.3–5)
ALP SERPL-CCNC: 90 U/L — SIGNIFICANT CHANGE UP (ref 40–120)
ALT FLD-CCNC: 71 U/L — SIGNIFICANT CHANGE UP (ref 12–78)
ANION GAP SERPL CALC-SCNC: 5 MMOL/L — SIGNIFICANT CHANGE UP (ref 5–17)
APTT BLD: 27.8 SEC — SIGNIFICANT CHANGE UP (ref 27.5–35.5)
AST SERPL-CCNC: 70 U/L — HIGH (ref 15–37)
BASOPHILS # BLD AUTO: 0.02 K/UL — SIGNIFICANT CHANGE UP (ref 0–0.2)
BASOPHILS NFR BLD AUTO: 0.2 % — SIGNIFICANT CHANGE UP (ref 0–2)
BILIRUB SERPL-MCNC: 0.4 MG/DL — SIGNIFICANT CHANGE UP (ref 0.2–1.2)
BUN SERPL-MCNC: 14 MG/DL — SIGNIFICANT CHANGE UP (ref 7–23)
CALCIUM SERPL-MCNC: 8.1 MG/DL — LOW (ref 8.5–10.1)
CHLORIDE SERPL-SCNC: 104 MMOL/L — SIGNIFICANT CHANGE UP (ref 96–108)
CO2 SERPL-SCNC: 30 MMOL/L — SIGNIFICANT CHANGE UP (ref 22–31)
CREAT SERPL-MCNC: 0.69 MG/DL — SIGNIFICANT CHANGE UP (ref 0.5–1.3)
EOSINOPHIL # BLD AUTO: 0.14 K/UL — SIGNIFICANT CHANGE UP (ref 0–0.5)
EOSINOPHIL NFR BLD AUTO: 1.6 % — SIGNIFICANT CHANGE UP (ref 0–6)
GLUCOSE SERPL-MCNC: 93 MG/DL — SIGNIFICANT CHANGE UP (ref 70–99)
HCT VFR BLD CALC: 22.1 % — LOW (ref 34.5–45)
HGB BLD-MCNC: 7.2 G/DL — LOW (ref 11.5–15.5)
IMM GRANULOCYTES NFR BLD AUTO: 0.7 % — SIGNIFICANT CHANGE UP (ref 0–1.5)
INR BLD: 1.42 RATIO — HIGH (ref 0.88–1.16)
LYMPHOCYTES # BLD AUTO: 1.44 K/UL — SIGNIFICANT CHANGE UP (ref 1–3.3)
LYMPHOCYTES # BLD AUTO: 16.8 % — SIGNIFICANT CHANGE UP (ref 13–44)
MAGNESIUM SERPL-MCNC: 2.4 MG/DL — SIGNIFICANT CHANGE UP (ref 1.6–2.6)
MCHC RBC-ENTMCNC: 30 PG — SIGNIFICANT CHANGE UP (ref 27–34)
MCHC RBC-ENTMCNC: 32.6 GM/DL — SIGNIFICANT CHANGE UP (ref 32–36)
MCV RBC AUTO: 92.1 FL — SIGNIFICANT CHANGE UP (ref 80–100)
MONOCYTES # BLD AUTO: 0.91 K/UL — HIGH (ref 0–0.9)
MONOCYTES NFR BLD AUTO: 10.6 % — SIGNIFICANT CHANGE UP (ref 2–14)
NEUTROPHILS # BLD AUTO: 6.02 K/UL — SIGNIFICANT CHANGE UP (ref 1.8–7.4)
NEUTROPHILS NFR BLD AUTO: 70.1 % — SIGNIFICANT CHANGE UP (ref 43–77)
NRBC # BLD: 0 /100 WBCS — SIGNIFICANT CHANGE UP (ref 0–0)
PHOSPHATE SERPL-MCNC: 2.9 MG/DL — SIGNIFICANT CHANGE UP (ref 2.5–4.5)
PLATELET # BLD AUTO: 322 K/UL — SIGNIFICANT CHANGE UP (ref 150–400)
POTASSIUM SERPL-MCNC: 3.9 MMOL/L — SIGNIFICANT CHANGE UP (ref 3.5–5.3)
POTASSIUM SERPL-SCNC: 3.9 MMOL/L — SIGNIFICANT CHANGE UP (ref 3.5–5.3)
PROT SERPL-MCNC: 5.9 G/DL — LOW (ref 6–8.3)
PROTHROM AB SERPL-ACNC: 16.3 SEC — HIGH (ref 10.6–13.6)
RBC # BLD: 2.4 M/UL — LOW (ref 3.8–5.2)
RBC # FLD: 14.3 % — SIGNIFICANT CHANGE UP (ref 10.3–14.5)
SODIUM SERPL-SCNC: 139 MMOL/L — SIGNIFICANT CHANGE UP (ref 135–145)
WBC # BLD: 8.59 K/UL — SIGNIFICANT CHANGE UP (ref 3.8–10.5)
WBC # FLD AUTO: 8.59 K/UL — SIGNIFICANT CHANGE UP (ref 3.8–10.5)

## 2020-10-03 PROCEDURE — 99232 SBSQ HOSP IP/OBS MODERATE 35: CPT

## 2020-10-03 PROCEDURE — 99233 SBSQ HOSP IP/OBS HIGH 50: CPT

## 2020-10-03 RX ORDER — MEROPENEM 1 G/30ML
1000 INJECTION INTRAVENOUS ONCE
Refills: 0 | Status: COMPLETED | OUTPATIENT
Start: 2020-10-03 | End: 2020-10-03

## 2020-10-03 RX ADMIN — PREGABALIN 1000 MICROGRAM(S): 225 CAPSULE ORAL at 12:34

## 2020-10-03 RX ADMIN — Medication 1 MILLIGRAM(S): at 12:34

## 2020-10-03 RX ADMIN — MEROPENEM 100 MILLIGRAM(S): 1 INJECTION INTRAVENOUS at 05:57

## 2020-10-03 RX ADMIN — Medication 650 MILLIGRAM(S): at 23:21

## 2020-10-03 RX ADMIN — OXYCODONE AND ACETAMINOPHEN 1 TABLET(S): 5; 325 TABLET ORAL at 00:46

## 2020-10-03 RX ADMIN — Medication 650 MILLIGRAM(S): at 16:57

## 2020-10-03 RX ADMIN — LAMOTRIGINE 200 MILLIGRAM(S): 25 TABLET, ORALLY DISINTEGRATING ORAL at 12:34

## 2020-10-03 RX ADMIN — MEROPENEM 100 MILLIGRAM(S): 1 INJECTION INTRAVENOUS at 21:42

## 2020-10-03 RX ADMIN — Medication 650 MILLIGRAM(S): at 21:58

## 2020-10-03 RX ADMIN — Medication 650 MILLIGRAM(S): at 13:46

## 2020-10-03 RX ADMIN — Medication 1 TABLET(S): at 12:34

## 2020-10-03 RX ADMIN — MEROPENEM 100 MILLIGRAM(S): 1 INJECTION INTRAVENOUS at 16:52

## 2020-10-03 RX ADMIN — Medication 650 MILLIGRAM(S): at 00:16

## 2020-10-03 RX ADMIN — SENNA PLUS 2 TABLET(S): 8.6 TABLET ORAL at 21:16

## 2020-10-03 RX ADMIN — Medication 200 MILLIGRAM(S): at 21:16

## 2020-10-03 RX ADMIN — DULOXETINE HYDROCHLORIDE 30 MILLIGRAM(S): 30 CAPSULE, DELAYED RELEASE ORAL at 09:38

## 2020-10-03 RX ADMIN — OXYCODONE AND ACETAMINOPHEN 1 TABLET(S): 5; 325 TABLET ORAL at 00:16

## 2020-10-03 RX ADMIN — DULOXETINE HYDROCHLORIDE 30 MILLIGRAM(S): 30 CAPSULE, DELAYED RELEASE ORAL at 21:16

## 2020-10-03 RX ADMIN — Medication 300 MILLIGRAM(S): at 12:34

## 2020-10-03 RX ADMIN — MEROPENEM 100 MILLIGRAM(S): 1 INJECTION INTRAVENOUS at 13:18

## 2020-10-03 RX ADMIN — Medication 100 MILLIGRAM(S): at 21:16

## 2020-10-03 RX ADMIN — Medication 1 DROP(S): at 16:52

## 2020-10-03 RX ADMIN — DULOXETINE HYDROCHLORIDE 30 MILLIGRAM(S): 30 CAPSULE, DELAYED RELEASE ORAL at 16:52

## 2020-10-03 RX ADMIN — Medication 100 MILLIGRAM(S): at 05:57

## 2020-10-03 RX ADMIN — Medication 100 MILLIGRAM(S): at 16:52

## 2020-10-03 NOTE — PROGRESS NOTE ADULT - SUBJECTIVE AND OBJECTIVE BOX
Patient is a 60y old  Female who presents with a chief complaint of hemorrhagic shock s/p panniculectomy (03 Oct 2020 17:18)      INTERVAL HPI/OVERNIGHT EVENTS:    Denies cough or shortness of breath    MEDICATIONS  (STANDING):  allopurinol 300 milliGRAM(s) Oral daily  cyanocobalamin 1000 MICROGram(s) Oral daily  docusate sodium 100 milliGRAM(s) Oral three times a day  DULoxetine 30 milliGRAM(s) Oral <User Schedule>  folic acid 1 milliGRAM(s) Oral daily  lamoTRIgine 200 milliGRAM(s) Oral daily  meropenem  IVPB      meropenem  IVPB 1000 milliGRAM(s) IV Intermittent every 8 hours  multivitamin 1 Tablet(s) Oral daily  polyethylene glycol 3350 17 Gram(s) Oral daily  propranolol 10 milliGRAM(s) Oral daily  senna 2 Tablet(s) Oral at bedtime  traZODone 200 milliGRAM(s) Oral at bedtime      MEDICATIONS  (PRN):  acetaminophen   Tablet .. 650 milliGRAM(s) Oral every 6 hours PRN Mild Pain (1 - 3)  acetaminophen   Tablet .. 650 milliGRAM(s) Oral every 6 hours PRN Temp greater or equal to 38C (100.4F)  artificial tears (preservative free) Ophthalmic Solution 1 Drop(s) Both EYES two times a day PRN Dry Eyes  glucagon  Injectable 1 milliGRAM(s) IntraMuscular once PRN Glucose LESS THAN 70 milligrams/deciliter      Allergies    penicillins (Other)  trees dogs cats cockaroaches (Rhinitis; Rhinorrhea)    Intolerances        PAST MEDICAL & SURGICAL HISTORY:  History of aortic aneurysm  descending aorta see CT    Arthritis    Degenerative disc disease, lumbar    Neuropathy    Spinal stenosis    Bipolar 1 disorder    Kidney stone    Sleep Apnea  CPAP with oxygen since June 2020    Asthma    Hypertension    Morbid Obesity    ETOH Abuse  H/O    Benign Essential Tremor    Anxiety    Depression    Renal Calculi  chronic      Colitis  H/O    Anemia    S/P cardiac catheterization    S/P dilation and curettage    History of tonsillectomy    History of laparoscopic adjustable gastric banding  band removed few yrs ago    S/P D&amp;C    Biliary stent placement &amp; ercp    ureteroscopy with stone removal  1-        Vital Signs Last 24 Hrs  T(C): 37.6 (03 Oct 2020 15:36), Max: 39 (03 Oct 2020 13:36)  T(F): 99.6 (03 Oct 2020 15:36), Max: 102.2 (03 Oct 2020 13:36)  HR: 95 (03 Oct 2020 13:36) (83 - 95)  BP: 138/73 (03 Oct 2020 13:36) (95/65 - 138/73)  BP(mean): --  RR: 18 (03 Oct 2020 13:36) (18 - 18)  SpO2: 91% (03 Oct 2020 13:36) (91% - 99%)    PHYSICAL EXAMINATION:    GENERAL: The patient is awake and alert in no apparent distress.     HEENT: Head is normocephalic and atraumatic. Extraocular muscles are intact. Mucous membranes are moist.    NECK: Supple.    LUNGS: Clear to auscultation without wheezing, rales or rhonchi; respirations unlabored    HEART: Regular rate and rhythm without murmur.    ABDOMEN: obese and distended    EXTREMITIES: Without any cyanosis, clubbing, rash, lesions or edema.    NEUROLOGIC: Grossly intact.    SKIN: No ulceration or induration present.      LABS:                        7.2    8.59  )-----------( 322      ( 03 Oct 2020 06:35 )             22.1     10-03    139  |  104  |  14  ----------------------------<  93  3.9   |  30  |  0.69    Ca    8.1<L>      03 Oct 2020 06:35  Phos  2.9     10-03  Mg     2.4     10-03    TPro  5.9<L>  /  Alb  1.8<L>  /  TBili  0.4  /  DBili  x   /  AST  70<H>  /  ALT  71  /  AlkPhos  90  10-03    PT/INR - ( 03 Oct 2020 14:13 )   PT: 16.3 sec;   INR: 1.42 ratio         PTT - ( 03 Oct 2020 14:13 )  PTT:27.8 sec                Procalcitonin, Serum: 0.31 ng/mL (10-02-20 @ 07:08)      MICROBIOLOGY:  Culture Results:   <10,000 CFU/mL Normal Urogenital Chanel (09-30 @ 15:18)  Culture Results:   No growth to date. (09-30 @ 12:07)  Culture Results:   No growth to date. (09-30 @ 12:07)        Assessment:    S/P Abdominal Paniculectomy with post-op bleeding  morbid Obesity  Obstructive sleep apnea syndrome  Hx Asthma - inactive    Plan:    Nocturnal CPAP  Pain Control  Incentive Spirometry  Continue Meropenem

## 2020-10-03 NOTE — PROGRESS NOTE ADULT - ATTENDING COMMENTS
Note written by attending, see above.  Time spent: 40min. More than 50% of the visit was spent counseling the patient on her condition - fever, atelectasis, hematoma, acute blood loss anemia, meropenem, IV access/midline.

## 2020-10-03 NOTE — PROGRESS NOTE ADULT - SUBJECTIVE AND OBJECTIVE BOX
[facility]  INFECTIOUS DISEASES   =======================================================    [id number]  EMANUEL MONTEZ     Follow up: Fever    Tmax 101.4. No new complaint.   Leukocytosis better.     PAST MEDICAL & SURGICAL HISTORY:  History of aortic aneurysm  descending aorta see CT  Arthritis  Degenerative disc disease, lumbar  Neuropathy  Spinal stenosis  Bipolar 1 disorder  Kidney stone  Sleep Apnea  CPAP with oxygen since June 2020  Asthma  Hypertension  Morbid Obesity  ETOH Abuse  H/O  Benign Essential Tremor  Anxiety  Depression  Renal Calculi  chronic    Colitis  H/O  Anemia  S/P cardiac catheterization  S/P dilation and curettage  History of tonsillectomy  History of laparoscopic adjustable gastric banding  band removed few yrs ago  S/P D&amp;C  Biliary stent placement &amp; ercp  ureteroscopy with stone removal  1-    Social Hx: no smoking, ETOH or drugs     FAMILY HISTORY:  Hypertension (Sibling)  Hyperlipidemia (Sibling)  Family history of renal failure  both parents were on dialysis  Family history of bacterial pneumonia  Family history of arthritis    Allergies  penicillins (Other)  trees dogs cats cockaroaches (Rhinitis; Rhinorrhea)    Antibiotics:  Meropenum     REVIEW OF SYSTEMS:  CONSTITUTIONAL:  + Fever   HEENT:  No diplopia or blurred vision.  No sore throat or runny nose.  CARDIOVASCULAR:  No chest pain or SOB.  RESPIRATORY:  No cough, shortness of breath, PND or orthopnea.  GASTROINTESTINAL:  No nausea, vomiting or diarrhea. surgical site pain +   GENITOURINARY:  No dysuria, frequency or urgency. No Blood in urine  MUSCULOSKELETAL:  no joint aches, no muscle pain  SKIN:  No change in skin, hair or nails.  NEUROLOGIC:  No paresthesias, fasciculations, seizures or weakness.  PSYCHIATRIC:  No disorder of thought or mood.  ENDOCRINE:  No heat or cold intolerance, polyuria or polydipsia.  HEMATOLOGICAL:  No easy bruising or bleeding.     Physical Exam:  Vital Signs Last 24 Hrs  T(C): 37.2 (03 Oct 2020 07:02), Max: 38.6 (02 Oct 2020 14:31)  T(F): 99 (03 Oct 2020 07:02), Max: 101.4 (02 Oct 2020 14:31)  HR: 83 (03 Oct 2020 07:02) (83 - 94)  BP: 95/65 (03 Oct 2020 07:02) (89/56 - 110/73)  BP(mean): --  RR: 18 (03 Oct 2020 07:02) (18 - 18)  SpO2: 93% (03 Oct 2020 07:02) (93% - 99%)  GEN: NAD  HEENT: normocephalic and atraumatic. EOMI. PERRL.    NECK: Supple.  No lymphadenopathy   LUNGS: Clear to auscultation.  HEART: Regular rate and rhythm without murmur.  ABDOMEN: Soft, nontender, and nondistended.  Positive bowel sounds.    : No CVA tenderness  EXTREMITIES: Without any cyanosis, clubbing, rash, lesions or edema.  NEUROLOGIC: grossly intact.  PSYCHIATRIC: Appropriate affect .  SKIN: No ulceration or induration present.    Labs:                        7.2    8.59  )-----------( 322      ( 03 Oct 2020 06:35 )             22.1      10-03    139  |  104  |  14  ----------------------------<  93  3.9   |  30  |  0.69    Ca    8.1<L>      03 Oct 2020 06:35  Phos  2.9     10-03  Mg     2.4     10-03    TPro  5.9<L>  /  Alb  1.8<L>  /  TBili  0.4  /  DBili  x   /  AST  70<H>  /  ALT  71  /  AlkPhos  90  10-03    Culture - Urine (collected 09-30-20 @ 15:18)  Source: .Urine Clean Catch (Midstream)  Final Report (10-01-20 @ 11:52):    <10,000 CFU/mL Normal Urogenital Chanel    Culture - Blood (collected 09-30-20 @ 12:07)  Source: .Blood Blood-Peripheral    Culture - Blood (collected 09-30-20 @ 12:07)  Source: .Blood Blood    WBC Count: 8.59 K/uL (10-03-20 @ 06:35)  WBC Count: 12.97 K/uL (10-02-20 @ 07:08)  WBC Count: 16.92 K/uL (10-01-20 @ 07:21)  WBC Count: 11.39 K/uL (09-30-20 @ 06:36)  WBC Count: 9.73 K/uL (09-29-20 @ 18:57)  WBC Count: 8.69 K/uL (09-29-20 @ 06:49)  WBC Count: 11.63 K/uL (09-28-20 @ 17:20)    Creatinine, Serum: 0.69 mg/dL (10-03-20 @ 06:35)  Creatinine, Serum: 0.72 mg/dL (10-02-20 @ 07:08)  Creatinine, Serum: 0.76 mg/dL (10-01-20 @ 07:21)  Creatinine, Serum: 0.77 mg/dL (09-30-20 @ 06:36)  Creatinine, Serum: 0.73 mg/dL (09-29-20 @ 06:49)    Ferritin, Serum: 190 ng/mL (10-01-20 @ 10:04)    Procalcitonin, Serum: 0.31 ng/mL (10-02-20 @ 07:08)    COVID-19 PCR: NotDetec (09-23-20 @ 13:30)    All imaging and other data have been reviewed.  < from: Xray Chest 1 View- PORTABLE-Urgent (Xray Chest 1 View- PORTABLE-Urgent .) (09.30.20 @ 08:13) >  EXAM:  XR CHEST PORTABLE URGENT 1V                        PROCEDURE DATE:  09/30/2020    INTERPRETATION:  Portable chest x-ray  Indication: fever of unclear source  Comparison: 6/16/2020  2 portable chest x-rays are acquired for evaluation.  Impression: No evidence for acute pulmonary infiltrate, pleural effusion, or pneumothorax.  Stable small calcified granuloma in the right upper lung zone.  New small linear atelectasis in the left lower lung zone.  Stable cardiac silhouette.  No pulmonary vascular congestion.    Assessment and Plan: 61 y/o woman with PMH of obesity, HTN, JESUS on CPAP, Anemia, AAA, Arthritis, Depression and Anxiety was admitted at [facility] for elective abdominal panniculectomy and liposuction on 9/25, now has fever so ID has been called for recommendations. Currently no source of infection, wound looks fine with no signs of infection, but has 3 drains all with bleeding, possibly hematoma underneath causing fever.   10/1 with low grade fever and worsening of leukocytosis, CT of abdomen on 9/30 showed pelvic wall large hematoma that still could be reason for fever and worsening of leukocytosis but since infected hematoma is a possibility and she had a slight drop in BP, will cover empirically with Meropenum for now.   Blood and urine cultures negative , CXR negative   10/2 afebrile since last night, leukocytosis and BP are better, unclear if infection is responding to ABx or it is the same hematoma causing all signs and symptoms.     Fever, panniculectomy  - WBC 16k-->8k , will trend it  - Continue Meropenem 1gm q8h   - Surgery is on board for drain management, no sign of wound infection.   - If remains stable will consider stopping ABx since still spiking on Meropenem.     Will follow.    Judi Black MD  Division of Infectious Diseases   Cell [phone] between 7am and 5pm   After 5pm and weekends please call ID service at [phone].        Attending Attestation:   Plan discussed with Dr. Perkins.

## 2020-10-03 NOTE — PROGRESS NOTE ADULT - ASSESSMENT
[ASSESSMENT and  PLAN]  61yo F w/ PMH of JESUS on CPAP, HTN, morbid obesity, anemia, AAA, Arthritis, Depression, anxiety presented to [facility] for elective abdominal panniculectomy, admitted post-op with hemorrhagic shock s/p 3U PRBC with stabilization, now course c/b fever.    Hgb 11.9, pre-surgery with post procedure Hgb 8.4    -today had recurrence of fever at 101.4F. Also, leukocytosis improving from ~17 to ~13;  -UCx and BCx from 9/30 were negative; resent BCx this afternoon with this recurrent fever of 101.4F. Consulted ID (Wayne),     EDWIN drains with continued bloody output.     New onset Afib.     regarding anemia  Chronic anemia for many years, not ever required transfusions; chronic skin lesions, also with persistent nausea and vomiting for several months prior to initial eval  found to have mild iron deficiency and mild B12 deficiency - suspect due to malnutrition with recurrent vomiting and poor diet  hospitalized in 2/2016 at [place] for gastric perforation due to gastric sleeve - removed per patient and repaired perforation - enterocutaneous fistula well healed now  - was considering gastric sleeve again in 1/2019 but did not have procedure done  - was planned for panniculectomy with plastic surgery in 3/2020 which was cancelled due to COVID-19  - noted acute drop in H/H in 11/2019; improved with better compliance to oral iron and Bcomplex vitamins. Hgb 13.1, Ferritin 76 on 07/23/20  Hgb 13.2 on PST on 09/14/20.     RECOMMENDATIONS  Check FOBT given decline in Hgb between 07/23/20 and hospital admission for procedure  However may simply be due to blood loss.     Check PT and PTT>     Would expect some inflammation with panniculectomy     Transfuse PRBC as clinically indicated.   Transfuse PRBC if Hgb <7.0 or if symptomatic.   Follow CBC    With current ferritin, and inflammation post surgery, may not have much benefit to IV iron at current time. Will reassess.     DVT Prophylaxis  SCD    post op care.    Thank you for consulting us.     I have discussed the above plan of care with patient in detail. They expressed understanding of the treatment plan . Risks, benefits and alternatives discussed in detail. I have asked if they have any questions or concerns and appropriately addressed them; all questions answered to their satisfactions and in lay terms.     Discussed with Dr Perkins.

## 2020-10-03 NOTE — PROGRESS NOTE ADULT - SUBJECTIVE AND OBJECTIVE BOX
[facility] -- Almas Faustin, Cara Nunez, Saurabh Johansen Savella, Goodger: Office # [phone]    Follow Up:  Hypotension     Subjective/Observations: Patient seen and examined. Patient awake, alert, resting in bed. No complaints of chest pain, dyspnea, palpitations or dizziness. No signs of orthopnea or PND. H/H low. Pending PRBC transfusion.     REVIEW OF SYSTEMS: All review of systems is negative for eye, ENT, GI, , allergic, dermatologic, musculoskeletal and neurologic except as described above    PAST MEDICAL & SURGICAL HISTORY:  History of aortic aneurysm descending aorta see CT  Arthritis  Degenerative disc disease, lumbar  Neuropathy  Spinal stenosis  Bipolar 1 disorder  Kidney stone  Sleep Apnea CPAP with oxygen since June 2020  Asthma  Hypertension  Morbid Obesity  ETOH Abuse H/O  Benign Essential Tremor  Anxiety  Depression  Renal Calculi chronic  Colitis  Anemia  S/P cardiac catheterization  S/P dilation and curettage  History of tonsillectomy  History of laparoscopic adjustable gastric banding band removed few yrs ago  S/P D&amp;C  Biliary stent placement &amp; ercp  ureteroscopy with stone removal 1-    MEDICATIONS  (STANDING):  allopurinol 300 milliGRAM(s) Oral daily  cyanocobalamin 1000 MICROGram(s) Oral daily  docusate sodium 100 milliGRAM(s) Oral three times a day  DULoxetine 30 milliGRAM(s) Oral <User Schedule>  folic acid 1 milliGRAM(s) Oral daily  lamoTRIgine 200 milliGRAM(s) Oral daily  meropenem  IVPB      meropenem  IVPB 1000 milliGRAM(s) IV Intermittent every 8 hours  multivitamin 1 Tablet(s) Oral daily  polyethylene glycol 3350 17 Gram(s) Oral daily  propranolol 10 milliGRAM(s) Oral daily  senna 2 Tablet(s) Oral at bedtime  traZODone 200 milliGRAM(s) Oral at bedtime    MEDICATIONS  (PRN):  acetaminophen   Tablet .. 650 milliGRAM(s) Oral every 6 hours PRN Mild Pain (1 - 3)  acetaminophen   Tablet .. 650 milliGRAM(s) Oral every 6 hours PRN Temp greater or equal to 38C (100.4F)  artificial tears (preservative free) Ophthalmic Solution 1 Drop(s) Both EYES two times a day PRN Dry Eyes  glucagon  Injectable 1 milliGRAM(s) IntraMuscular once PRN Glucose LESS THAN 70 milligrams/deciliter    Allergies  penicillins (Other)  trees dogs cats cockaroaches (Rhinitis; Rhinorrhea)    Vital Signs Last 24 Hrs  T(C): 37.2 (03 Oct 2020 07:02), Max: 38.6 (02 Oct 2020 14:31)  T(F): 99 (03 Oct 2020 07:02), Max: 101.4 (02 Oct 2020 14:31)  HR: 83 (03 Oct 2020 07:02) (83 - 94)  BP: 95/65 (03 Oct 2020 07:02) (89/56 - 110/73)  BP(mean): --  RR: 18 (03 Oct 2020 07:02) (18 - 18)  SpO2: 93% (03 Oct 2020 07:02) (93% - 99%)    I&O's Summary    02 Oct 2020 07:01  -  03 Oct 2020 07:00  --------------------------------------------------------  IN: 400 mL / OUT: 145 mL / NET: 255 mL    03 Oct 2020 07:01  -  03 Oct 2020 11:38  --------------------------------------------------------  IN: 120 mL / OUT: 0 mL / NET: 120 mL    TELE: SR, PAC  PHYSICAL EXAM:  Appearance: NAD, no distress, alert, Obese   HEENT: Moist Mucous Membranes, Anicteric  Cardiovascular: Regular rate and rhythm, Normal S1 S2, No JVD, No murmurs, No rubs, gallops or clicks  Respiratory: Non-labored, Clear to auscultation, No rales, No rhonchi, No wheezing.   Gastrointestinal:  Soft, Non-tender, + BS  Neurologic: Non-focal  Skin: Warm and dry, + abdominal incision with EDWIN drain, No ecchymosis, No cyanosis  Musculoskeletal: No clubbing, No cyanosis, No joint swelling/tenderness  Psychiatry: Mood & affect appropriate  Lymph: No peripheral edema.     LABS: All Labs Reviewed:                        7.2    8.59  )-----------( 322      ( 03 Oct 2020 06:35 )             22.1                         7.8    12.97 )-----------( 291      ( 02 Oct 2020 07:08 )             22.5                         8.8    16.92 )-----------( 294      ( 01 Oct 2020 07:21 )             25.5     03 Oct 2020 06:35    139    |  104    |  14     ----------------------------<  93     3.9     |  30     |  0.69   02 Oct 2020 07:08    139    |  103    |  14     ----------------------------<  125    3.7     |  29     |  0.72   01 Oct 2020 07:21    139    |  102    |  12     ----------------------------<  137    3.8     |  30     |  0.76     Ca    8.1        03 Oct 2020 06:35  Ca    8.1        02 Oct 2020 07:08  Ca    8.5        01 Oct 2020 07:21  Phos  2.9       03 Oct 2020 06:35  Phos  2.6       02 Oct 2020 07:08  Mg     2.4       03 Oct 2020 06:35  Mg     2.2       02 Oct 2020 07:08    TPro  5.9    /  Alb  1.8    /  TBili  0.4    /  DBili  x      /  AST  70     /  ALT  71     /  AlkPhos  90     03 Oct 2020 06:35  TPro  5.9    /  Alb  1.9    /  TBili  0.6    /  DBili  x      /  AST  27     /  ALT  44     /  AlkPhos  87     02 Oct 2020 07:08  TPro  6.1    /  Alb  2.2    /  TBili  0.6    /  DBili  x      /  AST  14     /  ALT  48     /  AlkPhos  83     01 Oct 2020 07:21  Creatine Kinase, Serum: 65 U/L (09-25-20 @ 21:25)  Troponin I, Serum: .033 ng/mL (09-25-20 @ 21:25)      < from: Cardiac Cath Lab - Adult (06.16.20 @ 16:26) >  CORONARY VESSELS: The coronary circulation is right dominant.  LM:   --  LM: Normal.  LAD:   --  LAD: Angiography showed minor luminal irregularities with no  flow limiting lesions.  CX:   --  Circumflex: Normal.  RCA:   --  RCA: Normal.  COMPLICATIONS: There were no complications.  DIAGNOSTIC RECOMMENDATIONS: The patient should continue with the present  medications.  < end of copied text >    < from: Xray Chest 1 View- PORTABLE-Routine (Xray Chest 1 View- PORTABLE-Routine in AM.) (10.02.20 @ 08:48) >  FINDINGS: Heartsize appears within normal limits. No hilar or superior mediastinal abnormalities are identified. There is no evidence for focal infiltrate, lobar consolidation or pulmonary edema. Stable circumscribed high density nodule right lung apex, likely parenchymal granuloma. No pleural effusion or pneumothorax is demonstrated. No mediastinal shift is noted. The visualized osseous structures appear unremarkable.    IMPRESSION: No evidence for focal infiltrate or lobar consolidation.  < end of copied text >

## 2020-10-03 NOTE — PROGRESS NOTE ADULT - ASSESSMENT
59yo F w/ PMH of JESUS on CPAP, HTN, morbid obesity, anemia, AAA, Arthritis, Depression, anxiety presented to [facility] for elective abdominal panniculectomy, admitted post-op with hemorrhagic shock s/p 3U PRBC with stabilization, now course c/b fever.    A fib  - She had brief atrial fibrillation in the setting of significant hypotension, (now off of pressor support) though is now back in SR, no PMH of Afib  - Telemetry with SR 70-80s   - Continue Propanolol.     HTN  - BP: 95/65 (10-03-20 @ 07:02) (89/56 - 110/73)  - Continue propranolol  - Hold verapamil   - low normal LV function with mild diastolic dysfunction on recent echocardiogram  - no sign of acute ischemia  - recent cath with non-obs cad  - no sign of volume overload  - hold diuretics     FEVER  - ID is following, meropenem initiated  - C/c NGTD     Anemia   - Hemoglobin <--7.2, <--7.8, <--8.8  - Hematocrit <--22.1, <--22.5, <--25.5  - PRBC transfusion today  - Continue to trend CBC as per primary team    JESUS  - CPAP at night    - Monitor and replete lytes, keep K>4, Mg>2.  - All other medical needs as per primary team.  - Other cardiovascular workup will depend on clinical course.  - Will continue to follow.    Kobe Corado, MS FNP, [facility]  Nurse Practitioner- Cardiology   Spectra #[phone]

## 2020-10-03 NOTE — PROCEDURE NOTE - ADDITIONAL PROCEDURE DETAILS
Procedure done independent of critical care time    Diagnosis:  1. SQ hematoma   2. ABLA
Procedure done independent of critical care time     Diagnosis:  1. SQ hematoma   2. ABLA

## 2020-10-03 NOTE — CHART NOTE - NSCHARTNOTEFT_GEN_A_CORE
Called by RN for fever 101.5 s/p 1U transfusion. Pt spiked fever of 102.2 earlier today and 100.9 day prior. Currently on meropenam. ID is following with recent sepsis workup performed (Bcx 10/2). No new indications for additional fever workup.

## 2020-10-03 NOTE — PROGRESS NOTE ADULT - SUBJECTIVE AND OBJECTIVE BOX
Patient is a 60y old  Female who presents with a chief complaint of elective panniculectomy.     INTERVAL HPI/OVERNIGHT EVENTS: Pt afebrile in the morning but spiked fever to 102.2F in the afternoon. Pt did have subjective fever with this measurement but no chills/rigors. Pt states she feels otherwise "fine." Pt denies abd pain. Denies nausea, vomiting, diarrhea, SOB, cough, dysuria, urinary frequency/urgency, headache.  Pt lost IV access this morning and had multiple failed attempts at placement of an IV. ICU PA was able to place an US guided IV, but then that IV failed with some extravasation of Abx in soft tissue. ICU PA then placed a midline for IV access. Pt's drains were stripped by surgical PA and bloody drain output increased. Pt's Hgb this morning was 7.2 and pt will have another unit of PRBC.     MEDICATIONS  (STANDING):  allopurinol 300 milliGRAM(s) Oral daily  cyanocobalamin 1000 MICROGram(s) Oral daily  docusate sodium 100 milliGRAM(s) Oral three times a day  DULoxetine 30 milliGRAM(s) Oral <User Schedule>  folic acid 1 milliGRAM(s) Oral daily  lamoTRIgine 200 milliGRAM(s) Oral daily  meropenem  IVPB      meropenem  IVPB 1000 milliGRAM(s) IV Intermittent every 8 hours  multivitamin 1 Tablet(s) Oral daily  polyethylene glycol 3350 17 Gram(s) Oral daily  propranolol 10 milliGRAM(s) Oral daily  senna 2 Tablet(s) Oral at bedtime  traZODone 200 milliGRAM(s) Oral at bedtime    MEDICATIONS  (PRN):  acetaminophen   Tablet .. 650 milliGRAM(s) Oral every 6 hours PRN Mild Pain (1 - 3)  acetaminophen   Tablet .. 650 milliGRAM(s) Oral every 6 hours PRN Temp greater or equal to 38C (100.4F)  artificial tears (preservative free) Ophthalmic Solution 1 Drop(s) Both EYES two times a day PRN Dry Eyes  glucagon  Injectable 1 milliGRAM(s) IntraMuscular once PRN Glucose LESS THAN 70 milligrams/deciliter      Allergies    penicillins (Other)  trees dogs cats cockaroaches (Rhinitis; Rhinorrhea)    Intolerances        REVIEW OF SYSTEMS:  CONSTITUTIONAL: +fever; no chills ; generalized weakness improving  HEENT:  No headache, no sore throat  RESPIRATORY: No cough, wheezing, or shortness of breath  CARDIOVASCULAR: No chest pain, palpitations  GASTROINTESTINAL: denies abd pain, nausea, vomiting, or diarrhea  GENITOURINARY: No dysuria, frequency, or hematuria  NEUROLOGICAL: no focal weakness or dizziness  MUSCULOSKELETAL: no myalgias    Vital Signs Last 24 Hrs  T(C): 37.6 (03 Oct 2020 15:36), Max: 39 (03 Oct 2020 13:36)  T(F): 99.6 (03 Oct 2020 15:36), Max: 102.2 (03 Oct 2020 13:36)  HR: 95 (03 Oct 2020 13:36) (83 - 95)  BP: 138/73 (03 Oct 2020 13:36) (95/65 - 138/73)  BP(mean): --  RR: 18 (03 Oct 2020 13:36) (18 - 18)  SpO2: 91% (03 Oct 2020 13:36) (91% - 99%)    PHYSICAL EXAM:  GENERAL: NAD at rest, morbidly obese  HEENT:  anicteric, moist mucous membranes  CHEST/LUNG:  CTA b/l, no rales, wheezes, or rhonchi  HEART:  RRR, S1, S2  ABDOMEN:  BS+, soft, nontender in upper abdomen, mild tenderness near incisions, nondistended; 3 EDWIN drains with dark bloody drainage; incision sealed with dermabond without significant warmth or erythema. No incisional drainage.  EXTREMITIES: no cyanosis, or calf tenderness  NERVOUS SYSTEM: answers questions and follows commands appropriately    LABS:                        7.2    8.59  )-----------( 322      ( 03 Oct 2020 06:35 )             22.1     CBC Full  -  ( 03 Oct 2020 06:35 )  WBC Count : 8.59 K/uL  Hemoglobin : 7.2 g/dL  Hematocrit : 22.1 %  Platelet Count - Automated : 322 K/uL  Mean Cell Volume : 92.1 fl  Mean Cell Hemoglobin : 30.0 pg  Mean Cell Hemoglobin Concentration : 32.6 gm/dL  Auto Neutrophil # : 6.02 K/uL  Auto Lymphocyte # : 1.44 K/uL  Auto Monocyte # : 0.91 K/uL  Auto Eosinophil # : 0.14 K/uL  Auto Basophil # : 0.02 K/uL  Auto Neutrophil % : 70.1 %  Auto Lymphocyte % : 16.8 %  Auto Monocyte % : 10.6 %  Auto Eosinophil % : 1.6 %  Auto Basophil % : 0.2 %    03 Oct 2020 06:35    139    |  104    |  14     ----------------------------<  93     3.9     |  30     |  0.69     Ca    8.1        03 Oct 2020 06:35  Phos  2.9       03 Oct 2020 06:35  Mg     2.4       03 Oct 2020 06:35    TPro  5.9    /  Alb  1.8    /  TBili  0.4    /  DBili  x      /  AST  70     /  ALT  71     /  AlkPhos  90     03 Oct 2020 06:35    PT/INR - ( 03 Oct 2020 14:13 )   PT: 16.3 sec;   INR: 1.42 ratio         PTT - ( 03 Oct 2020 14:13 )  PTT:27.8 sec    CAPILLARY BLOOD GLUCOSE            Culture - Urine (collected 09-30-20 @ 15:18)  Source: .Urine Clean Catch (Midstream)  Final Report (10-01-20 @ 11:52):    <10,000 CFU/mL Normal Urogenital Chanel    Culture - Blood (collected 09-30-20 @ 12:07)  Source: .Blood Blood-Peripheral  Preliminary Report (10-01-20 @ 13:01):    No growth to date.    Culture - Blood (collected 09-30-20 @ 12:07)  Source: .Blood Blood  Preliminary Report (10-01-20 @ 13:01):    No growth to date.        RADIOLOGY & ADDITIONAL TESTS:    Personally reviewed.     Consultant(s) Notes Reviewed:  [x] YES  [ ] NO

## 2020-10-03 NOTE — PROGRESS NOTE ADULT - SUBJECTIVE AND OBJECTIVE BOX
EMANUEL MONTEZ  MRN-[id number] 60y    PLASTIC SURGERY/ DR. MCCAIN    DENIES FEVER, CHILLS, N/V, ABDOMINAL PAIN   + FLATUS, BM, VOIDING  NOTED DRAINAGE AROUND LEFT DRAIN     T .4 NOTED     MEDICATIONS  (STANDING):  allopurinol 300 milliGRAM(s) Oral daily  cyanocobalamin 1000 MICROGram(s) Oral daily  docusate sodium 100 milliGRAM(s) Oral three times a day  DULoxetine 30 milliGRAM(s) Oral <User Schedule>  folic acid 1 milliGRAM(s) Oral daily  lamoTRIgine 200 milliGRAM(s) Oral daily  meropenem  IVPB      meropenem  IVPB 1000 milliGRAM(s) IV Intermittent every 8 hours  multivitamin 1 Tablet(s) Oral daily  polyethylene glycol 3350 17 Gram(s) Oral daily  propranolol 10 milliGRAM(s) Oral daily  senna 2 Tablet(s) Oral at bedtime  traZODone 200 milliGRAM(s) Oral at bedtime    MEDICATIONS  (PRN):  acetaminophen   Tablet .. 650 milliGRAM(s) Oral every 6 hours PRN Mild Pain (1 - 3)  acetaminophen   Tablet .. 650 milliGRAM(s) Oral every 6 hours PRN Temp greater or equal to 38C (100.4F)  artificial tears (preservative free) Ophthalmic Solution 1 Drop(s) Both EYES two times a day PRN Dry Eyes  glucagon  Injectable 1 milliGRAM(s) IntraMuscular once PRN Glucose LESS THAN 70 milligrams/deciliter      Vital Signs Last 24 Hrs  T(C): 37.2 (03 Oct 2020 07:02), Max: 38.6 (02 Oct 2020 14:31)  T(F): 99 (03 Oct 2020 07:02), Max: 101.4 (02 Oct 2020 14:31)  HR: 83 (03 Oct 2020 07:02) (83 - 94)  BP: 95/65 (03 Oct 2020 07:02) (89/56 - 110/73)  BP(mean): --  RR: 18 (03 Oct 2020 07:02) (18 - 18)  SpO2: 93% (03 Oct 2020 07:02) (93% - 99%)      10-02-20 @ 07:01  -  10-03-20 @ 07:00  --------------------------------------------------------  IN: 400 mL / OUT: 145 mL / NET: 255 mL    10-03-20 @ 07:01  -  10-03-20 @ 12:55  --------------------------------------------------------  IN: 120 mL / OUT: 0 mL / NET: 120 mL    DARK BLOOD IN THE BULBS   RIGHT EMIL DRAIN # 1  100 ML  RIGHT EMIL DRAIN # 2     45 ML  LEFT   EMIL  DRAIN # 3      0 ML    POD # 8    ABDOMEN: POST PANNICULECTOMY INCISION DRY AND INTACT, SOME SEROSANGUINOUS DRAINAGE AROUND LEFT EMIL DRAIN, UMBILICUS AND FLAPS ARE VIABLE                                                7.2    8.59  )-----------( 322      ( 03 Oct 2020 06:35 )             22.1      10-03    139  |  104  |  14  ----------------------------<  93  3.9   |  30  |  0.69    Ca    8.1<L>      03 Oct 2020 06:35  Phos  2.9     10-03  Mg     2.4     10-03    TPro  5.9<L>  /  Alb  1.8<L>  /  TBili  0.4  /  DBili  x   /  AST  70<H>  /  ALT  71  /  AlkPhos  90  10-03                          ASSESSMENT &  PLAN:      POD # 8 S/P PANNICULECTOMY  ANEMIA    JESUS ON CPAP    DRESSING CHANGED, ALL DRAINS STRIPPED AND PADDED WITH 4X4, ABDOMINAL BINDER X2 REAPPLIED   MANNITAN ALL DRAINS FOR NOW  CONTINUE MEROPENEM, ID F/U NOTED   PHYSICAL THERAPY FOR AMBULATION  HEMATOLOGY F/U NOTED, ONE UNIT PRBC TODAY   MEDICAL, CARDIOLOGY , PULMONARY F/U NOTED AND APPRECIATED

## 2020-10-03 NOTE — PROGRESS NOTE ADULT - ASSESSMENT
59yo F w/ PMH of JESUS on CPAP, HTN, morbid obesity, anemia, AAA, Arthritis, Depression, anxiety presented to [facility] for elective abdominal panniculectomy, admitted post-op with hemorrhagic shock s/p 3un PRBC with stabilization, now course c/b fever.    #Fever:  -unclear etiology  -pt without significant localizing symptoms or physical exam findings for infection   -BCx NGTD (monitor), UA quite benign, not indicative of infection (pt denies UTI symptoms)  -has had daily fever typically in early afternoon. Leukocytosis which peaked at ~17 on 10/01/20 has now resolved; had some low BP readings on electronic monitor ; low-normal when BP obtained manually -- given 1L NS on 10/1 and 500cc of NS on 10/2  -UCx and BCx from 9/30 were negative; resent BCx on 10/2 --f/up results  -consulted ID ([facility]), recs appreciated  -CT showing 4cm x 10cm hematoma and no other sign of infection   -c/w meropenem for possibility of an infected hematoma   -discussed with surgical team and Dr. De La Torre, who recommend to continue with drain via EDWIN drains, which do have bloody output (there is a drain at the site of the hematoma as seen on CT)  -other possible source of fever is atelectasis, though would be unusual to reach such high fever / persistent fevers and have a leukocytosis and is less likely scenario  -monitor closely for signs of instability  -incentive spirometry     #Hemorrhagic shock: now resolved   - acute blood loss anemia post op, had 3unit pRBC to stabilize; will give 4th unit PRBC today as Hgb has trended down to 7.2   - shock state resolved, currently off pressors with stable vital signs   - Per pt, she has been chronically anemic and has been on B12 and folic acid supplements for years. Continue Vit B12 and folic acid supplements   - Heme ([facility]) consulted; who recommends no other supplements currently aside from a unit of blood  - Continue to monitor H/H closely, Transfuse if Hgb <7 or if hemodynamically unstable   - monitor EDWIN x3 output; hold chemical DVT ppx in setting of continued bleeding.    #S/P Abdominal panniculectomy: on 09/25/20.   -mgmt per surgical team.   -pain control as ordered.   -bowel regimen on board; miralax, senna, added dulcolax   -Phys therapy  -SCDs    #Afib, new onset:  - Cardio following, ([facility]), recs appreciated   - had brief Afib in setting of severe hypotension / instability early in the admission.   - c/w propanolol per cardio  - no A/C especially given pt had hemorrhagic shock few days ago    #JESUS on CPAP:   -pulm (Dr Hanson) following - recs appreciated  -continuous pulse ox.   -CPAP QHS    #HTN:   - continue propanolol  - holding verapamil (home med)    #Anxiety/Depression:   - continue Cymbalta, trazodone and lamictal.    #Morbid Obesity:  - now s/p panniculectomy ; counseled on importance of healthy diet    #DVT ppx:   - SCDs   59yo F w/ PMH of JESUS on CPAP, HTN, morbid obesity, anemia, AAA, Arthritis, Depression, anxiety presented to [facility] for elective abdominal panniculectomy, admitted post-op with hemorrhagic shock s/p 3un PRBC with stabilization, now course c/b fever.    #Fever:  -unclear etiology  -pt without significant localizing symptoms or physical exam findings for infection   -BCx NGTD (monitor), UA quite benign, not indicative of infection (pt denies UTI symptoms)  -has had daily fever typically in early afternoon. Leukocytosis which peaked at ~17 on 10/01/20 has now resolved; had some low BP readings on electronic monitor ; low-normal when BP obtained manually -- given 1L NS on 10/1 and 500cc of NS on 10/2  -UCx and BCx from 9/30 were negative; resent BCx on 10/2 --f/up results  -consulted ID ([facility]), recs appreciated  -CT showing 4cm x 10cm hematoma and no other sign of infection   -c/w meropenem for possibility of an infected hematoma   -discussed with surgical team and Dr. De La Torre, who recommend to continue with drain via EDWIN drains, which do have bloody output (there is a drain at the site of the hematoma as seen on CT)  -other possible source of fever is atelectasis, though would be unusual to reach such high fever / persistent fevers and have a leukocytosis and is less likely scenario  -monitor closely for signs of instability  -incentive spirometry     #Hemorrhagic shock: now resolved   - acute blood loss anemia post op, had 3unit pRBC to stabilize; will give 4th unit PRBC today as Hgb has trended down to 7.2   - shock state resolved, currently off pressors with stable vital signs   - Per pt, she has been chronically anemic and has been on B12 and folic acid supplements for years. Continue Vit B12 and folic acid supplements   - Heme ([facility]) consulted; who recommends no other supplements currently aside from a unit of blood  - Continue to monitor H/H closely, Transfuse if Hgb <7 or if hemodynamically unstable   - monitor EDWIN x3 output; hold chemical DVT ppx in setting of continued bleeding.    #S/P Abdominal panniculectomy: on 09/25/20.   -mgmt per surgical team.   -pain control as ordered.   -bowel regimen on board; miralax, senna, added dulcolax   -Phys therapy  -SCDs    #Afib, new onset:  - Cardio following, ([facility]), recs appreciated   - had brief Afib in setting of severe hypotension / instability early in the admission.   - c/w propanolol per cardio  - no A/C especially given pt had hemorrhagic shock few days ago    #JESUS on CPAP:   -pulm (Dr Hanson) following - recs appreciated  -continuous pulse ox.   -CPAP QHS    #ELAINE:  - pt had ELAINE early in admission likely prerenal due to acute blood loss anemia with hemorrhagic shock  - doubt pt had ATN  - ELAINE resolved with PRBC and hemodynamic stabilization.   - monitor BMP    #HTN:   - continue propanolol  - holding verapamil (home med)    #Anxiety/Depression:   - continue Cymbalta, trazodone and lamictal.    #Morbid Obesity:  - now s/p panniculectomy ; counseled on importance of healthy diet    #DVT ppx:   - SCDs

## 2020-10-03 NOTE — PROGRESS NOTE ADULT - SUBJECTIVE AND OBJECTIVE BOX
[INTERVAL HX: ]  Patient seen and examined;  Chart reviewed and events noted;   moved and walked around this AM to bathroom. THen noted more bleeding. surgery came by to eval and readjust EDWIN drains.   to get PRBC today.       Patient is a 60y Female with a known history of :  Other disorders of skin and subcutaneous tissue [L98.8]    History of aortic aneurysm [Z86.79]    H/O aortic aneurysm repair [Z98.890]    Arthritis [M19.90]    Degenerative disc disease, lumbar [M51.36]    Neuropathy [G62.9]    Spinal stenosis [M48.00]    Bipolar 1 disorder [F31.9]    Kidney stone [592.0]    Diabetes Mellitus Type II [250.00]    Sleep Apnea [780.57]    Asthma [493.90]    Hypertension [401.9]    Obesity [278.00]    Morbid Obesity [278.01]    Drug Abuse [305.90]    ETOH Abuse [305.00]    Arrhythmia [427.9]    Benign Essential Tremor [333.1]    Anxiety [300.00]    Depression [311]    Renal Calculi [592.0]    Colitis [558.9]    Anemia [285.9]    Diabetes [250.00]    Sleep Apnea [780.57]    Asthma [493.90]    S/P cardiac catheterization [V45.89]    S/P dilation and curettage [V45.89]    History of tonsillectomy [V45.89]    History of laparoscopic adjustable gastric banding [V45.86]    S/P D&amp;C [V45.89]    Ureteral stent [593.4]    ERCP &amp; removal of biliary stent [574.20]    Biliary stent placement &amp; ercp [574.20]    Cholelithiasis [574.20]    History of Tonsillectomy [V45.89]    ureteroscopy with stone removal [592.0]      HPI:  Ms Armas is a 59 yo F who presented to [facility] for elective abdominal panniculectomy. PMH JESUS on CPAP, HTN, Obesity, Anemia, AAA, Arthritis, Depression, Anxiety.   Pt seen and examined post-operative. She states she is doing well. She has minimal abdominal pain 2/10 in severity. Its in the area of her panniluculus, no radiation elsewhere, pain is dull. She has no other complaints. Denies headaches, nausea, vomiting, chest pain, SOB, palpitations, constipation, diarrhea, melena, hematochezia, dysuria.      (25 Sep 2020 16:59)        MEDICATIONS  (STANDING):  allopurinol 300 milliGRAM(s) Oral daily  cyanocobalamin 1000 MICROGram(s) Oral daily  docusate sodium 100 milliGRAM(s) Oral three times a day  DULoxetine 30 milliGRAM(s) Oral <User Schedule>  folic acid 1 milliGRAM(s) Oral daily  lamoTRIgine 200 milliGRAM(s) Oral daily  meropenem  IVPB      meropenem  IVPB 1000 milliGRAM(s) IV Intermittent every 8 hours  multivitamin 1 Tablet(s) Oral daily  polyethylene glycol 3350 17 Gram(s) Oral daily  propranolol 10 milliGRAM(s) Oral daily  senna 2 Tablet(s) Oral at bedtime  traZODone 200 milliGRAM(s) Oral at bedtime    MEDICATIONS  (PRN):  acetaminophen   Tablet .. 650 milliGRAM(s) Oral every 6 hours PRN Mild Pain (1 - 3)  acetaminophen   Tablet .. 650 milliGRAM(s) Oral every 6 hours PRN Temp greater or equal to 38C (100.4F)  artificial tears (preservative free) Ophthalmic Solution 1 Drop(s) Both EYES two times a day PRN Dry Eyes  glucagon  Injectable 1 milliGRAM(s) IntraMuscular once PRN Glucose LESS THAN 70 milligrams/deciliter      Vital Signs Last 24 Hrs  T(C): 37.2 (03 Oct 2020 07:02), Max: 38.6 (02 Oct 2020 14:31)  T(F): 99 (03 Oct 2020 07:02), Max: 101.4 (02 Oct 2020 14:31)  HR: 83 (03 Oct 2020 07:02) (83 - 94)  BP: 95/65 (03 Oct 2020 07:02) (89/56 - 110/73)  BP(mean): --  RR: 18 (03 Oct 2020 07:02) (18 - 18)  SpO2: 93% (03 Oct 2020 07:02) (93% - 99%)      [PHYSICAL EXAM]  General: adult in NAD,  WN,  WD.  HEENT: clear oropharynx, anicteric sclera, pink conjunctivae.  Neck: supple, no masses.  CV: normal S1S2, no murmur, no rubs, no gallops.  Lungs: clear to auscultation, no wheezes, no rales, no rhonchi.  Abdomen: soft, non-tender, non-distended, no hepatosplenomegaly, normal BS, no guarding.  Ext: no clubbing, no cyanosis, no edema.  Skin: no rashes,  no petechiae, no venous stasis changes.  Neuro: alert and oriented X3  , no focal motor deficits.  LN: no SC DIANA.      [LABS:]                        7.2    8.59  )-----------( 322      ( 03 Oct 2020 06:35 )             22.1     10-03    139  |  104  |  14  ----------------------------<  93  3.9   |  30  |  0.69    Ca    8.1<L>      03 Oct 2020 06:35  Phos  2.9     10-03  Mg     2.4     10-03    TPro  5.9<L>  /  Alb  1.8<L>  /  TBili  0.4  /  DBili  x   /  AST  70<H>  /  ALT  71  /  AlkPhos  90  10-03                [RADIOLOGY STUDIES:]

## 2020-10-03 NOTE — PROCEDURE NOTE - NSPROCDETAILS_GEN_ALL_CORE
blood seen on insertion/flushes easily/secured in place/sterile technique, catheter placed/ultrasound utilization/location identified, draped/prepped, sterile technique used/dressing applied

## 2020-10-04 LAB
ALBUMIN SERPL ELPH-MCNC: 1.8 G/DL — LOW (ref 3.3–5)
ALP SERPL-CCNC: 109 U/L — SIGNIFICANT CHANGE UP (ref 40–120)
ALT FLD-CCNC: 116 U/L — HIGH (ref 12–78)
ANION GAP SERPL CALC-SCNC: 5 MMOL/L — SIGNIFICANT CHANGE UP (ref 5–17)
AST SERPL-CCNC: 102 U/L — HIGH (ref 15–37)
BASOPHILS # BLD AUTO: 0.03 K/UL — SIGNIFICANT CHANGE UP (ref 0–0.2)
BASOPHILS NFR BLD AUTO: 0.4 % — SIGNIFICANT CHANGE UP (ref 0–2)
BILIRUB SERPL-MCNC: 0.6 MG/DL — SIGNIFICANT CHANGE UP (ref 0.2–1.2)
BUN SERPL-MCNC: 10 MG/DL — SIGNIFICANT CHANGE UP (ref 7–23)
CALCIUM SERPL-MCNC: 8.1 MG/DL — LOW (ref 8.5–10.1)
CHLORIDE SERPL-SCNC: 102 MMOL/L — SIGNIFICANT CHANGE UP (ref 96–108)
CO2 SERPL-SCNC: 31 MMOL/L — SIGNIFICANT CHANGE UP (ref 22–31)
CREAT SERPL-MCNC: 0.69 MG/DL — SIGNIFICANT CHANGE UP (ref 0.5–1.3)
EOSINOPHIL # BLD AUTO: 0.14 K/UL — SIGNIFICANT CHANGE UP (ref 0–0.5)
EOSINOPHIL NFR BLD AUTO: 1.8 % — SIGNIFICANT CHANGE UP (ref 0–6)
GLUCOSE SERPL-MCNC: 121 MG/DL — HIGH (ref 70–99)
HCT VFR BLD CALC: 24.9 % — LOW (ref 34.5–45)
HGB BLD-MCNC: 8.5 G/DL — LOW (ref 11.5–15.5)
IMM GRANULOCYTES NFR BLD AUTO: 0.6 % — SIGNIFICANT CHANGE UP (ref 0–1.5)
LYMPHOCYTES # BLD AUTO: 0.82 K/UL — LOW (ref 1–3.3)
LYMPHOCYTES # BLD AUTO: 10.3 % — LOW (ref 13–44)
MCHC RBC-ENTMCNC: 29.8 PG — SIGNIFICANT CHANGE UP (ref 27–34)
MCHC RBC-ENTMCNC: 34.1 GM/DL — SIGNIFICANT CHANGE UP (ref 32–36)
MCV RBC AUTO: 87.4 FL — SIGNIFICANT CHANGE UP (ref 80–100)
MONOCYTES # BLD AUTO: 0.86 K/UL — SIGNIFICANT CHANGE UP (ref 0–0.9)
MONOCYTES NFR BLD AUTO: 10.8 % — SIGNIFICANT CHANGE UP (ref 2–14)
NEUTROPHILS # BLD AUTO: 6.04 K/UL — SIGNIFICANT CHANGE UP (ref 1.8–7.4)
NEUTROPHILS NFR BLD AUTO: 76.1 % — SIGNIFICANT CHANGE UP (ref 43–77)
NRBC # BLD: 0 /100 WBCS — SIGNIFICANT CHANGE UP (ref 0–0)
PLATELET # BLD AUTO: 313 K/UL — SIGNIFICANT CHANGE UP (ref 150–400)
POTASSIUM SERPL-MCNC: 4.5 MMOL/L — SIGNIFICANT CHANGE UP (ref 3.5–5.3)
POTASSIUM SERPL-SCNC: 4.5 MMOL/L — SIGNIFICANT CHANGE UP (ref 3.5–5.3)
PROT SERPL-MCNC: 5.8 G/DL — LOW (ref 6–8.3)
RBC # BLD: 2.85 M/UL — LOW (ref 3.8–5.2)
RBC # FLD: 14.8 % — HIGH (ref 10.3–14.5)
SODIUM SERPL-SCNC: 138 MMOL/L — SIGNIFICANT CHANGE UP (ref 135–145)
WBC # BLD: 7.94 K/UL — SIGNIFICANT CHANGE UP (ref 3.8–10.5)
WBC # FLD AUTO: 7.94 K/UL — SIGNIFICANT CHANGE UP (ref 3.8–10.5)

## 2020-10-04 PROCEDURE — 99233 SBSQ HOSP IP/OBS HIGH 50: CPT

## 2020-10-04 PROCEDURE — 99232 SBSQ HOSP IP/OBS MODERATE 35: CPT

## 2020-10-04 RX ORDER — PHYTONADIONE (VIT K1) 5 MG
5 TABLET ORAL ONCE
Refills: 0 | Status: COMPLETED | OUTPATIENT
Start: 2020-10-04 | End: 2020-10-04

## 2020-10-04 RX ADMIN — MEROPENEM 100 MILLIGRAM(S): 1 INJECTION INTRAVENOUS at 05:43

## 2020-10-04 RX ADMIN — Medication 5 MILLIGRAM(S): at 15:45

## 2020-10-04 RX ADMIN — LAMOTRIGINE 200 MILLIGRAM(S): 25 TABLET, ORALLY DISINTEGRATING ORAL at 12:08

## 2020-10-04 RX ADMIN — MEROPENEM 100 MILLIGRAM(S): 1 INJECTION INTRAVENOUS at 14:44

## 2020-10-04 RX ADMIN — DULOXETINE HYDROCHLORIDE 30 MILLIGRAM(S): 30 CAPSULE, DELAYED RELEASE ORAL at 21:20

## 2020-10-04 RX ADMIN — Medication 1 TABLET(S): at 12:08

## 2020-10-04 RX ADMIN — DULOXETINE HYDROCHLORIDE 30 MILLIGRAM(S): 30 CAPSULE, DELAYED RELEASE ORAL at 15:46

## 2020-10-04 RX ADMIN — Medication 1 MILLIGRAM(S): at 12:08

## 2020-10-04 RX ADMIN — Medication 100 MILLIGRAM(S): at 15:45

## 2020-10-04 RX ADMIN — PREGABALIN 1000 MICROGRAM(S): 225 CAPSULE ORAL at 12:08

## 2020-10-04 RX ADMIN — Medication 30 MILLILITER(S): at 19:38

## 2020-10-04 RX ADMIN — DULOXETINE HYDROCHLORIDE 30 MILLIGRAM(S): 30 CAPSULE, DELAYED RELEASE ORAL at 05:44

## 2020-10-04 RX ADMIN — MEROPENEM 100 MILLIGRAM(S): 1 INJECTION INTRAVENOUS at 21:18

## 2020-10-04 RX ADMIN — Medication 300 MILLIGRAM(S): at 12:08

## 2020-10-04 RX ADMIN — POLYETHYLENE GLYCOL 3350 17 GRAM(S): 17 POWDER, FOR SOLUTION ORAL at 12:07

## 2020-10-04 RX ADMIN — Medication 200 MILLIGRAM(S): at 21:19

## 2020-10-04 RX ADMIN — Medication 100 MILLIGRAM(S): at 05:44

## 2020-10-04 RX ADMIN — SENNA PLUS 2 TABLET(S): 8.6 TABLET ORAL at 21:19

## 2020-10-04 RX ADMIN — Medication 100 MILLIGRAM(S): at 21:19

## 2020-10-04 NOTE — PROGRESS NOTE ADULT - ASSESSMENT
61yo F w/ PMH of JESUS on CPAP, HTN, morbid obesity, anemia, AAA, Arthritis, Depression, anxiety presented to [facility] for elective abdominal panniculectomy, admitted post-op with hemorrhagic shock s/p 4un PRBC with stabilization, now course c/b fever.    #Fever:  -unclear etiology  -pt without significant localizing symptoms or physical exam findings for infection   -BCx NGTD (monitor), UA quite benign, not indicative of infection (pt denies UTI symptoms)  -has had daily fever typically in early afternoon; no fever yet today. Leukocytosis which peaked at ~17 on 10/01/20 has now resolved; had some low BP readings on electronic monitor ; low-normal when BP obtained manually -- given 1L NS on 10/1 and 500cc of NS on 10/2  -UCx and BCx from 9/30 were negative; resent BCx on 10/2 --NGTD  -consulted ID ([facility]), recs appreciated  -CT showing 4cm x 10cm hematoma and no other sign of infection   -c/w meropenem for possibility of an infected hematoma   -discussed with surgical team and Dr. De La Torre, who recommend to continue with drain via EDWIN drains, which do have bloody output (there is a drain at the site of the hematoma as seen on CT)  -other possible source of fever is atelectasis, though would be unusual to reach such high fever / persistent fevers and have a leukocytosis and is a less likely scenario  -monitor closely for signs of instability  -incentive spirometry     #Hemorrhagic shock: now resolved   - acute blood loss anemia post op, had 3 units pRBC early in admission to stabilize; gave 4th unit PRBC on 10/3 as Hgb had trended down to 7.2 ; appropriate rise in Hgb with that 4th unit  - shock state resolved, currently off pressors with stable vital signs   - Per pt, she has been chronically anemic and has been on B12 and folic acid supplements for years. Continue Vit B12 and folic acid supplements   - Heme ([facility]) consulted; who recommends no other anemia supplements currently; did give dose of vitamin K as pt still with blood draining from the EDWIN drains and INR of 1.42  - Continue to monitor H/H closely, Transfuse if Hgb <7 or if hemodynamically unstable   - monitor EDWIN x3 output; hold chemical DVT ppx in setting of continued bleeding.    #S/P Abdominal panniculectomy: on 09/25/20.   -mgmt per surgical team.   -pain control as ordered.   -bowel regimen on board; miralax, senna, dulcolax   -Phys therapy  -SCDs    #Afib, new onset:  - [facility] recs appreciated   - had brief Afib in setting of severe hypotension / instability early in the admission.   - has been in sinus rhythm since then  - c/w propanolol per cardio  - no A/C especially given pt had hemorrhagic shock several days ago    #JESUS on CPAP:   -pulm (Dr Hanson) following - recs appreciated  -continuous pulse ox.   -CPAP QHS    #ELAINE:  - pt had ELAINE early in admission likely prerenal due to acute blood loss anemia with hemorrhagic shock  - doubt pt had ATN  - ELAINE resolved with PRBC and hemodynamic stabilization.   - monitor BMP    #HTN:   - continue propanolol  - holding verapamil (home med)    #Anxiety/Depression:   - continue Cymbalta, trazodone and lamictal.    #Morbid Obesity:  - now s/p panniculectomy ; counseled on diet    #DVT ppx:   - SCDs   59yo F w/ PMH of JESUS on CPAP, HTN, morbid obesity, anemia, AAA, Arthritis, Depression, anxiety presented to [facility] for elective abdominal panniculectomy, admitted post-op with hemorrhagic shock s/p 4un PRBC with stabilization, now course c/b fever.    #Fever:  -unclear etiology  -pt without significant localizing symptoms or physical exam findings for infection   -BCx NGTD (monitor), UA quite benign, not indicative of infection (pt denies UTI symptoms)  -has had daily fever typically in early afternoon; no fever yet today. Leukocytosis which peaked at ~17 on 10/01/20 has now resolved; had some low BP readings on electronic monitor ; low-normal when BP obtained manually -- given 1L NS on 10/1 and 500cc of NS on 10/2  -UCx and BCx from 9/30 were negative; resent BCx on 10/2 --NGTD  -consulted ID ([facility]), recs appreciated  -CT showing 4cm x 10cm hematoma and no other sign of infection   -c/w meropenem for possibility of an infected hematoma   -discussed with surgical team and Dr. De La Torre, who recommend to continue with drain via EDWIN drains, which do have bloody output (there is a drain at the site of the hematoma as seen on CT)  -other possible source of fever is atelectasis, though would be unusual to reach such high fever / persistent fevers and have a leukocytosis and is a less likely scenario  -monitor closely for signs of instability  -incentive spirometry     #Hemorrhagic shock: now resolved   - acute blood loss anemia post op, had 3 units pRBC early in admission to stabilize; gave 4th unit PRBC on 10/3 as Hgb had trended down to 7.2 ; appropriate rise in Hgb with that 4th unit  - shock state resolved, currently off pressors with stable vital signs   - Per pt, she has been chronically anemic and has been on B12 and folic acid supplements for years. Continue Vit B12 and folic acid supplements   - Heme ([facility]) consulted; who recommends no other anemia supplements currently; did give dose of vitamin K as pt still with blood draining from the EDWIN drains and INR of 1.42  - Continue to monitor H/H closely, Transfuse if Hgb <7 or if hemodynamically unstable   - monitor EDWIN x3 output; hold chemical DVT ppx in setting of continued bleeding.    #Transaminitis:  - pt's AST/ALT starting to rise to low 100s (alk phos and t bili remain normal)  - pt had had ALT in the low-100s earlier in admission when was hypotensive from hemorrhage  - unclear reason for current rise-- possibly has mild elevation now due to relatively low BP couple of days ago ; ?mild DILI (meropenem has AST/ALT elevation listed as possible adverse effects)  - will recheck tomorrow and if continue to rise, will consult GI to discuss further w/up  - CT abd/P with po and IV contrast on 9/30 did not have significant pathology noted in the liver (the 7mm CBD was likely due to pt being post-cholecystectomy)    #S/P Abdominal panniculectomy: on 09/25/20.   -mgmt per surgical team.   -pain control as ordered.   -bowel regimen on board; miralax, senna, dulcolax   -Phys therapy  -SCDs    #Afib, new onset:  - [facility] recs [facility]   - had brief Afib in setting of severe hypotension / instability early in the admission.   - has been in sinus rhythm since then  - c/w propanolol per cardio  - no A/C especially given pt had hemorrhagic shock several days ago    #JESUS on CPAP:   -pulm (Dr Hanson) following - recs appreciated  -continuous pulse ox.   -CPAP QHS    #ELAINE:  - pt had ELAINE early in admission likely prerenal due to acute blood loss anemia with hemorrhagic shock  - doubt pt had ATN  - ELAINE resolved with PRBC and hemodynamic stabilization.   - monitor BMP    #HTN:   - continue propanolol  - holding verapamil (home med)    #Anxiety/Depression:   - continue Cymbalta, trazodone and lamictal.    #Morbid Obesity:  - now s/p panniculectomy ; counseled on diet    #DVT ppx:   - SCDs

## 2020-10-04 NOTE — PROGRESS NOTE ADULT - ATTENDING COMMENTS
Note written by attending, see above.  Time spent: 40min. More than 50% of the visit was spent counseling the patient on her condition - fever, atelectasis, hematoma, acute blood loss anemia, meropenem. Note written by attending, see above.  Time spent: 40min. More than 50% of the visit was spent counseling the patient on her condition - fever, atelectasis, hematoma, acute blood loss anemia, meropenem, INR/vit K.

## 2020-10-04 NOTE — PROGRESS NOTE ADULT - SUBJECTIVE AND OBJECTIVE BOX
[facility]  INFECTIOUS DISEASES   =======================================================    [id number]  EMANUEL MONTEZ     Follow up: Fever    Tmax 101.4. No new complaint.   Leukocytosis better.     PAST MEDICAL & SURGICAL HISTORY:  History of aortic aneurysm  descending aorta see CT  Arthritis  Degenerative disc disease, lumbar  Neuropathy  Spinal stenosis  Bipolar 1 disorder  Kidney stone  Sleep Apnea  CPAP with oxygen since June 2020  Asthma  Hypertension  Morbid Obesity  ETOH Abuse  H/O  Benign Essential Tremor  Anxiety  Depression  Renal Calculi  chronic    Colitis  H/O  Anemia  S/P cardiac catheterization  S/P dilation and curettage  History of tonsillectomy  History of laparoscopic adjustable gastric banding  band removed few yrs ago  S/P D&amp;C  Biliary stent placement &amp; ercp  ureteroscopy with stone removal  1-    Social Hx: no smoking, ETOH or drugs     FAMILY HISTORY:  Hypertension (Sibling)  Hyperlipidemia (Sibling)  Family history of renal failure  both parents were on dialysis  Family history of bacterial pneumonia  Family history of arthritis    Allergies  penicillins (Other)  trees dogs cats cockaroaches (Rhinitis; Rhinorrhea)    Antibiotics:  Meropenum     REVIEW OF SYSTEMS:  CONSTITUTIONAL:  + Fever   HEENT:  No diplopia or blurred vision.  No sore throat or runny nose.  CARDIOVASCULAR:  No chest pain or SOB.  RESPIRATORY:  No cough, shortness of breath, PND or orthopnea.  GASTROINTESTINAL:  No nausea, vomiting or diarrhea. surgical site pain +   GENITOURINARY:  No dysuria, frequency or urgency. No Blood in urine  MUSCULOSKELETAL:  no joint aches, no muscle pain  SKIN:  No change in skin, hair or nails.  NEUROLOGIC:  No paresthesias, fasciculations, seizures or weakness.  PSYCHIATRIC:  No disorder of thought or mood.  ENDOCRINE:  No heat or cold intolerance, polyuria or polydipsia.  HEMATOLOGICAL:  No easy bruising or bleeding.     Physical Exam:  Vital Signs Last 24 Hrs  T(C): 36.8 (04 Oct 2020 04:45), Max: 39 (03 Oct 2020 13:36)  T(F): 98.2 (04 Oct 2020 04:45), Max: 102.2 (03 Oct 2020 13:36)  HR: 90 (04 Oct 2020 04:45) (90 - 97)  BP: 136/82 (04 Oct 2020 04:45) (118/79 - 138/73)  BP(mean): --  RR: 18 (04 Oct 2020 04:45) (18 - 18)  SpO2: 97% (04 Oct 2020 04:45) (91% - 98%)  GEN: NAD  HEENT: normocephalic and atraumatic. EOMI. PERRL.    NECK: Supple.  No lymphadenopathy   LUNGS: Clear to auscultation.  HEART: Regular rate and rhythm without murmur.  ABDOMEN: Soft, nontender, and nondistended.  Positive bowel sounds.    : No CVA tenderness  EXTREMITIES: Without any cyanosis, clubbing, rash, lesions or edema.  NEUROLOGIC: grossly intact.  PSYCHIATRIC: Appropriate affect .  SKIN: No ulceration or induration present.    Labs:                     8.5    7.94  )-----------( 313      ( 04 Oct 2020 06:43 )             24.9      10-04    138  |  102  |  10  ----------------------------<  121<H>  4.5   |  31  |  0.69    Ca    8.1<L>      04 Oct 2020 06:43  Phos  2.9     10-03  Mg     2.4     10-03    TPro  5.8<L>  /  Alb  1.8<L>  /  TBili  0.6  /  DBili  x   /  AST  102<H>  /  ALT  116<H>  /  AlkPhos  109  10-04    Culture - Blood (collected 10-02-20 @ 21:16)  Source: .Blood Blood-Peripheral    Culture - Blood (collected 10-02-20 @ 21:16)  Source: .Blood Blood    Culture - Urine (collected 09-30-20 @ 15:18)  Source: .Urine Clean Catch (Midstream)  Final Report (10-01-20 @ 11:52):    <10,000 CFU/mL Normal Urogenital Chanel    Culture - Blood (collected 09-30-20 @ 12:07)  Source: .Blood Blood-Peripheral    Culture - Blood (collected 09-30-20 @ 12:07)  Source: .Blood Blood    WBC Count: 7.94 K/uL (10-04-20 @ 06:43)  WBC Count: 8.59 K/uL (10-03-20 @ 06:35)  WBC Count: 12.97 K/uL (10-02-20 @ 07:08)  WBC Count: 16.92 K/uL (10-01-20 @ 07:21)  WBC Count: 11.39 K/uL (09-30-20 @ 06:36)  WBC Count: 9.73 K/uL (09-29-20 @ 18:57)    Creatinine, Serum: 0.69 mg/dL (10-04-20 @ 06:43)  Creatinine, Serum: 0.69 mg/dL (10-03-20 @ 06:35)  Creatinine, Serum: 0.72 mg/dL (10-02-20 @ 07:08)  Creatinine, Serum: 0.76 mg/dL (10-01-20 @ 07:21)  Creatinine, Serum: 0.77 mg/dL (09-30-20 @ 06:36)    Ferritin, Serum: 190 ng/mL (10-01-20 @ 10:04)    Procalcitonin, Serum: 0.31 ng/mL (10-02-20 @ 07:08)     COVID-19 PCR: NotDetec (09-23-20 @ 13:30)    All imaging and other data have been reviewed.  < from: Xray Chest 1 View- PORTABLE-Urgent (Xray Chest 1 View- PORTABLE-Urgent .) (09.30.20 @ 08:13) >  EXAM:  XR CHEST PORTABLE URGENT 1V                        PROCEDURE DATE:  09/30/2020    INTERPRETATION:  Portable chest x-ray  Indication: fever of unclear source  Comparison: 6/16/2020  2 portable chest x-rays are acquired for evaluation.  Impression: No evidence for acute pulmonary infiltrate, pleural effusion, or pneumothorax.  Stable small calcified granuloma in the right upper lung zone.  New small linear atelectasis in the left lower lung zone.  Stable cardiac silhouette.  No pulmonary vascular congestion.    Assessment and Plan: 61 y/o woman with PMH of obesity, HTN, JESUS on CPAP, Anemia, AAA, Arthritis, Depression and Anxiety was admitted at [facility] for elective abdominal panniculectomy and liposuction on 9/25, now has fever so ID has been called for recommendations. Currently no source of infection, wound looks fine with no signs of infection, but has 3 drains all with bleeding, possibly hematoma underneath causing fever.   10/1 with low grade fever and worsening of leukocytosis, CT of abdomen on 9/30 showed pelvic wall large hematoma that still could be reason for fever and worsening of leukocytosis but since infected hematoma is a possibility and she had a slight drop in BP, will cover empirically with Meropenum for now.   Blood and urine cultures negative , CXR negative   10/2 afebrile since last night, leukocytosis and BP are better, unclear if infection is responding to ABx or it is the same hematoma causing all signs and symptoms.   10/4: left drain has been manipulated now draining blood better. Clinically stable even though had fever 102. S/P transfusion. Most likely bleeding is causing reactive fever and leukocytosis.     Fever, panniculectomy  - WBC 16k-->8k , will trend it  - Continue Meropenem 1gm q8h   - Surgery is on board for drain management, no sign of wound infection.   - If remains stable will consider stopping ABx tomorrow, since still spiking on Meropenem.    Will follow.    Judi Black MD  Division of Infectious Diseases   Cell [phone] between 7am and 5pm   After 5pm and weekends please call ID service at [phone].        Attending Attestation:   Plan discussed with Dr. Perkins.

## 2020-10-04 NOTE — PROGRESS NOTE ADULT - ASSESSMENT
59yo F w/ PMH of JESUS on CPAP, HTN, morbid obesity, anemia, AAA, Arthritis, Depression, anxiety presented to [facility] for elective abdominal panniculectomy, admitted post-op with hemorrhagic shock s/p 3U PRBC with stabilization, now course c/b fever.    A fib  - She had brief atrial fibrillation in the setting of significant hypotension, (now off of pressor support) though is now back in SR, no PMH of Afib  - Telemetry with SR 70-80s. No A fib noted. Can discontinue telemetry   - Continue Propanolol.     HTN  - BP: 97/58 (10-04-20 @ 12:41) (97/58 - 136/82)  - Continue propranolol  - Hold verapamil   - low normal LV function with mild diastolic dysfunction on recent echocardiogram  - no sign of acute ischemia  - recent cath with non-obs cad  - no sign of volume overload  - hold diuretics     FEVER  - ID is following, meropenem initiated  - C/c NGTD     Anemia   - Hemoglobin <--8.5, <--7.2, <--7.8  - Hematocrit <--24.9, <--22.1, <--22.5  - S/p PRBC transfusion   - Continue to trend CBC as per primary team    JESUS  - CPAP at night    - Monitor and replete lytes, keep K>4, Mg>2.  - All other medical needs as per primary team.  - Other cardiovascular workup will depend on clinical course.  - Will continue to follow.    Kobe Corado, MS FNP, [facility]  Nurse Practitioner- Cardiology   Spectra #[phone]

## 2020-10-04 NOTE — PROGRESS NOTE ADULT - SUBJECTIVE AND OBJECTIVE BOX
EMANUEL MONTEZ  MRN-[id number] 60y    PLASTIC SURGERY/ DR. MCCAIN    FEVER, NO RIGORS  NO CP, SOB, COUGH, URINARY SYMPTOMS   NO N/V, TOLERATING REGULAR DIET  + FLATUS, NO BM SINCE FRIDAY, VOIDING        MEDICATIONS  (STANDING):  allopurinol 300 milliGRAM(s) Oral daily  cyanocobalamin 1000 MICROGram(s) Oral daily  docusate sodium 100 milliGRAM(s) Oral three times a day  DULoxetine 30 milliGRAM(s) Oral <User Schedule>  folic acid 1 milliGRAM(s) Oral daily  lamoTRIgine 200 milliGRAM(s) Oral daily  meropenem  IVPB      meropenem  IVPB 1000 milliGRAM(s) IV Intermittent every 8 hours  multivitamin 1 Tablet(s) Oral daily  polyethylene glycol 3350 17 Gram(s) Oral daily  propranolol 10 milliGRAM(s) Oral daily  senna 2 Tablet(s) Oral at bedtime  traZODone 200 milliGRAM(s) Oral at bedtime    MEDICATIONS  (PRN):  acetaminophen   Tablet .. 650 milliGRAM(s) Oral every 6 hours PRN Mild Pain (1 - 3)  acetaminophen   Tablet .. 650 milliGRAM(s) Oral every 6 hours PRN Temp greater or equal to 38C (100.4F)  artificial tears (preservative free) Ophthalmic Solution 1 Drop(s) Both EYES two times a day PRN Dry Eyes  glucagon  Injectable 1 milliGRAM(s) IntraMuscular once PRN Glucose LESS THAN 70 milligrams/deciliter       T .2 NOTED     Vital Signs Last 24 Hrs  T(C): 36.8 (04 Oct 2020 04:45), Max: 39 (03 Oct 2020 13:36)  T(F): 98.2 (04 Oct 2020 04:45), Max: 102.2 (03 Oct 2020 13:36)  HR: 90 (04 Oct 2020 04:45) (90 - 97)  BP: 136/82 (04 Oct 2020 04:45) (118/79 - 138/73)  BP(mean): --  RR: 18 (04 Oct 2020 04:45) (18 - 18)  SpO2: 97% (04 Oct 2020 04:45) (91% - 98%)    DARK BLOOD IN THE BULBS   RIGHT EMIL DRAIN # 1  90 ML  RIGHT EMIL DRAIN # 2  110  ML  LEFT   EMIL  DRAIN # 3    80 ML    POD # 9    ABDOMEN: POST PANNICULECTOMY INCISION DRY AND INTACT. ONLY SEROSANGUINOUS DRAINAGE AROUND ALL 3 DRAINS NOTED. X2 RIGHT AND X1 LEFT EMIL DRAIN IN PLACE. NO ERYTHEMA. UMBILICUS AND FLAPS ARE VIABLE                       8.5    7.94  )-----------( 313      ( 04 Oct 2020 06:43 ) S/P ONE UNIT PRBC             24.9      10-04    138  |  102  |  10  ----------------------------<  121<H>  4.5   |  31  |  0.69    Ca    8.1<L>      04 Oct 2020 06:43  Phos  2.9     10-03  Mg     2.4     10-03    TPro  5.8<L>  /  Alb  1.8<L>  /  TBili  0.6  /  DBili  x   /  AST  102<H>  /  ALT  116<H>  /  AlkPhos  109  10-04    BLOOD C/S X2 9/30, 10/02 NO GROWTH TO DATE  URINE C/S <10,000 CFU/mL Normal Urogenital Chanel (09.30.20 @ 15:18)     ASSESSMENT &  PLAN:      POD # 9 S/P PANNICULECTOMY  FEVER   ANEMIA    JESUS ON CPAP      DRESSING CHANGED, ALL DRAINS STRIPPED AND PADDED WITH 4X4, ABDOMINAL BINDER X2 REAPPLIED   MANNITAN ALL DRAINS FOR NOW  CONTINUE MEROPENEM AS PER ID   PHYSICAL THERAPY FOR AMBULATION  HEMATOLOGY , MEDICAL, CARDIOLOGY , PULMONARY F/U NOTED AND APPRECIATED

## 2020-10-04 NOTE — PROGRESS NOTE ADULT - SUBJECTIVE AND OBJECTIVE BOX
Patient is a 60y old  Female who presents with a chief complaint of elective panniculectomy.     INTERVAL HPI/OVERNIGHT EVENTS: Pt afebrile so far today. Pt states she feels "alright." Pt denies abd pain, nausea, vomiting, diarrhea, SOB, cough, dysuria, urinary frequency/urgency, headache.      MEDICATIONS  (STANDING):  allopurinol 300 milliGRAM(s) Oral daily  cyanocobalamin 1000 MICROGram(s) Oral daily  docusate sodium 100 milliGRAM(s) Oral three times a day  DULoxetine 30 milliGRAM(s) Oral <User Schedule>  folic acid 1 milliGRAM(s) Oral daily  lamoTRIgine 200 milliGRAM(s) Oral daily  meropenem  IVPB      meropenem  IVPB 1000 milliGRAM(s) IV Intermittent every 8 hours  multivitamin 1 Tablet(s) Oral daily  polyethylene glycol 3350 17 Gram(s) Oral daily  propranolol 10 milliGRAM(s) Oral daily  senna 2 Tablet(s) Oral at bedtime  traZODone 200 milliGRAM(s) Oral at bedtime    MEDICATIONS  (PRN):  acetaminophen   Tablet .. 650 milliGRAM(s) Oral every 6 hours PRN Mild Pain (1 - 3)  acetaminophen   Tablet .. 650 milliGRAM(s) Oral every 6 hours PRN Temp greater or equal to 38C (100.4F)  artificial tears (preservative free) Ophthalmic Solution 1 Drop(s) Both EYES two times a day PRN Dry Eyes  glucagon  Injectable 1 milliGRAM(s) IntraMuscular once PRN Glucose LESS THAN 70 milligrams/deciliter      Allergies    penicillins (Other)  trees dogs cats cockaroaches (Rhinitis; Rhinorrhea)    Intolerances        REVIEW OF SYSTEMS:  CONSTITUTIONAL: generalized weakness improving; No fever or chills  HEENT:  No headache, no sore throat  RESPIRATORY: No cough, wheezing, or shortness of breath  CARDIOVASCULAR: No chest pain, palpitations  GASTROINTESTINAL: denies abd pain, nausea, vomiting, or diarrhea  GENITOURINARY: No dysuria, frequency, or hematuria  NEUROLOGICAL: no focal weakness or dizziness  MUSCULOSKELETAL: no myalgias    Vital Signs Last 24 Hrs  T(C): 36.8 (04 Oct 2020 12:41), Max: 38.6 (03 Oct 2020 21:45)  T(F): 98.2 (04 Oct 2020 12:41), Max: 101.5 (03 Oct 2020 21:45)  HR: 83 (04 Oct 2020 12:41) (83 - 97)  BP: 97/58 (04 Oct 2020 12:41) (97/58 - 136/82)  BP(mean): --  RR: 19 (04 Oct 2020 12:41) (18 - 19)  SpO2: 94% (04 Oct 2020 12:41) (94% - 98%)    PHYSICAL EXAM:  GENERAL: NAD at rest, morbidly obese  HEENT:  anicteric, moist mucous membranes  CHEST/LUNG:  CTA b/l, no rales, wheezes, or rhonchi  HEART:  RRR, S1, S2  ABDOMEN:  BS+, soft, nontender in upper abdomen, mild tenderness near incisions, nondistended; 3 EDWIN drains with dark bloody drainage; incision sealed with dermabond without significant warmth or erythema. No incisional drainage.  EXTREMITIES: no edema, cyanosis, or calf tenderness  NERVOUS SYSTEM: answers questions and follows commands appropriately    LABS:                        8.5    7.94  )-----------( 313      ( 04 Oct 2020 06:43 )             24.9     CBC Full  -  ( 04 Oct 2020 06:43 )  WBC Count : 7.94 K/uL  Hemoglobin : 8.5 g/dL  Hematocrit : 24.9 %  Platelet Count - Automated : 313 K/uL  Mean Cell Volume : 87.4 fl  Mean Cell Hemoglobin : 29.8 pg  Mean Cell Hemoglobin Concentration : 34.1 gm/dL  Auto Neutrophil # : 6.04 K/uL  Auto Lymphocyte # : 0.82 K/uL  Auto Monocyte # : 0.86 K/uL  Auto Eosinophil # : 0.14 K/uL  Auto Basophil # : 0.03 K/uL  Auto Neutrophil % : 76.1 %  Auto Lymphocyte % : 10.3 %  Auto Monocyte % : 10.8 %  Auto Eosinophil % : 1.8 %  Auto Basophil % : 0.4 %    04 Oct 2020 06:43    138    |  102    |  10     ----------------------------<  121    4.5     |  31     |  0.69     Ca    8.1        04 Oct 2020 06:43    TPro  5.8    /  Alb  1.8    /  TBili  0.6    /  DBili  x      /  AST  102    /  ALT  116    /  AlkPhos  109    04 Oct 2020 06:43    PT/INR - ( 03 Oct 2020 14:13 )   PT: 16.3 sec;   INR: 1.42 ratio         PTT - ( 03 Oct 2020 14:13 )  PTT:27.8 sec    CAPILLARY BLOOD GLUCOSE            Culture - Blood (collected 10-02-20 @ 21:16)  Source: .Blood Blood-Peripheral  Preliminary Report (10-03-20 @ 22:01):    No growth to date.    Culture - Blood (collected 10-02-20 @ 21:16)  Source: .Blood Blood  Preliminary Report (10-03-20 @ 22:01):    No growth to date.    Culture - Urine (collected 09-30-20 @ 15:18)  Source: .Urine Clean Catch (Midstream)  Final Report (10-01-20 @ 11:52):    <10,000 CFU/mL Normal Urogenital Chanel    Culture - Blood (collected 09-30-20 @ 12:07)  Source: .Blood Blood-Peripheral  Preliminary Report (10-01-20 @ 13:01):    No growth to date.    Culture - Blood (collected 09-30-20 @ 12:07)  Source: .Blood Blood  Preliminary Report (10-01-20 @ 13:01):    No growth to date.        RADIOLOGY & ADDITIONAL TESTS:    Personally reviewed.     Consultant(s) Notes Reviewed:  [x] YES  [ ] NO

## 2020-10-04 NOTE — PROGRESS NOTE ADULT - SUBJECTIVE AND OBJECTIVE BOX
Patient is a 60y old  Female who presents with a chief complaint of Panniculectomy (04 Oct 2020 14:42)      INTERVAL HPI/OVERNIGHT EVENTS:    Having intermittent fever. Denies shortness of breath    MEDICATIONS  (STANDING):  allopurinol 300 milliGRAM(s) Oral daily  cyanocobalamin 1000 MICROGram(s) Oral daily  docusate sodium 100 milliGRAM(s) Oral three times a day  DULoxetine 30 milliGRAM(s) Oral <User Schedule>  folic acid 1 milliGRAM(s) Oral daily  lamoTRIgine 200 milliGRAM(s) Oral daily  meropenem  IVPB      meropenem  IVPB 1000 milliGRAM(s) IV Intermittent every 8 hours  multivitamin 1 Tablet(s) Oral daily  polyethylene glycol 3350 17 Gram(s) Oral daily  propranolol 10 milliGRAM(s) Oral daily  senna 2 Tablet(s) Oral at bedtime  traZODone 200 milliGRAM(s) Oral at bedtime      MEDICATIONS  (PRN):  acetaminophen   Tablet .. 650 milliGRAM(s) Oral every 6 hours PRN Mild Pain (1 - 3)  acetaminophen   Tablet .. 650 milliGRAM(s) Oral every 6 hours PRN Temp greater or equal to 38C (100.4F)  artificial tears (preservative free) Ophthalmic Solution 1 Drop(s) Both EYES two times a day PRN Dry Eyes  glucagon  Injectable 1 milliGRAM(s) IntraMuscular once PRN Glucose LESS THAN 70 milligrams/deciliter      Allergies    penicillins (Other)  trees dogs cats cockaroaches (Rhinitis; Rhinorrhea)    Intolerances        PAST MEDICAL & SURGICAL HISTORY:  History of aortic aneurysm  descending aorta see CT    Arthritis    Degenerative disc disease, lumbar    Neuropathy    Spinal stenosis    Bipolar 1 disorder    Kidney stone    Sleep Apnea  CPAP with oxygen since June 2020    Asthma    Hypertension    Morbid Obesity    ETOH Abuse  H/O    Benign Essential Tremor    Anxiety    Depression    Renal Calculi  chronic      Colitis  H/O    Anemia    S/P cardiac catheterization    S/P dilation and curettage    History of tonsillectomy    History of laparoscopic adjustable gastric banding  band removed few yrs ago    S/P D&amp;C    Biliary stent placement &amp; ercp    ureteroscopy with stone removal  1-        Vital Signs Last 24 Hrs  T(C): 36.8 (04 Oct 2020 12:41), Max: 38.6 (03 Oct 2020 21:45)  T(F): 98.2 (04 Oct 2020 12:41), Max: 101.5 (03 Oct 2020 21:45)  HR: 83 (04 Oct 2020 12:41) (83 - 97)  BP: 97/58 (04 Oct 2020 12:41) (97/58 - 136/82)  BP(mean): --  RR: 19 (04 Oct 2020 12:41) (18 - 19)  SpO2: 94% (04 Oct 2020 12:41) (94% - 98%)    PHYSICAL EXAMINATION:    GENERAL: The patient is awake and alert in no apparent distress.     HEENT: Head is normocephalic and atraumatic. Extraocular muscles are intact. Mucous membranes are moist.    NECK: Supple.    LUNGS: Clear to auscultation without wheezing, rales or rhonchi; respirations unlabored    HEART: Regular rate and rhythm without murmur.    ABDOMEN: obese with positive bowel sounds    EXTREMITIES: Without any cyanosis, clubbing, rash, lesions or edema.    NEUROLOGIC: Grossly intact.    SKIN: No ulceration or induration present.      LABS:                        8.5    7.94  )-----------( 313      ( 04 Oct 2020 06:43 )             24.9     10-04    138  |  102  |  10  ----------------------------<  121<H>  4.5   |  31  |  0.69    Ca    8.1<L>      04 Oct 2020 06:43  Phos  2.9     10-03  Mg     2.4     10-03    TPro  5.8<L>  /  Alb  1.8<L>  /  TBili  0.6  /  DBili  x   /  AST  102<H>  /  ALT  116<H>  /  AlkPhos  109  10-04    PT/INR - ( 03 Oct 2020 14:13 )   PT: 16.3 sec;   INR: 1.42 ratio         PTT - ( 03 Oct 2020 14:13 )  PTT:27.8 sec                Procalcitonin, Serum: 0.31 ng/mL (10-02-20 @ 07:08)      MICROBIOLOGY:  Culture Results:   No growth to date. (10-02 @ 21:16)  Culture Results:   No growth to date. (10-02 @ 21:16)      Assessment:    S/P Abdominal Paniculectomy  Post-op blood loss  Fever - source unclear  Morbid Obesity  Obstructive Sleep Apnea Syndrome  PAF    Plan:    Antibiotics per ID  Nocturnal CPAP  Pain control  Increase ambulation   Patient is a 60y old  Female who presents with a chief complaint of Panniculectomy (04 Oct 2020 14:42)      INTERVAL HPI/OVERNIGHT EVENTS:    Having intermittent fever. Denies shortness of breath    MEDICATIONS  (STANDING):  allopurinol 300 milliGRAM(s) Oral daily  cyanocobalamin 1000 MICROGram(s) Oral daily  docusate sodium 100 milliGRAM(s) Oral three times a day  DULoxetine 30 milliGRAM(s) Oral <User Schedule>  folic acid 1 milliGRAM(s) Oral daily  lamoTRIgine 200 milliGRAM(s) Oral daily  meropenem  IVPB      meropenem  IVPB 1000 milliGRAM(s) IV Intermittent every 8 hours  multivitamin 1 Tablet(s) Oral daily  polyethylene glycol 3350 17 Gram(s) Oral daily  propranolol 10 milliGRAM(s) Oral daily  senna 2 Tablet(s) Oral at bedtime  traZODone 200 milliGRAM(s) Oral at bedtime      MEDICATIONS  (PRN):  acetaminophen   Tablet .. 650 milliGRAM(s) Oral every 6 hours PRN Mild Pain (1 - 3)  acetaminophen   Tablet .. 650 milliGRAM(s) Oral every 6 hours PRN Temp greater or equal to 38C (100.4F)  artificial tears (preservative free) Ophthalmic Solution 1 Drop(s) Both EYES two times a day PRN Dry Eyes  glucagon  Injectable 1 milliGRAM(s) IntraMuscular once PRN Glucose LESS THAN 70 milligrams/deciliter      Allergies    penicillins (Other)  trees dogs cats cockaroaches (Rhinitis; Rhinorrhea)    Intolerances        PAST MEDICAL & SURGICAL HISTORY:  History of aortic aneurysm  descending aorta see CT    Arthritis    Degenerative disc disease, lumbar    Neuropathy    Spinal stenosis    Bipolar 1 disorder    Kidney stone    Sleep Apnea  CPAP with oxygen since June 2020    Asthma    Hypertension    Morbid Obesity    ETOH Abuse  H/O    Benign Essential Tremor    Anxiety    Depression    Renal Calculi  chronic      Colitis  H/O    Anemia    S/P cardiac catheterization    S/P dilation and curettage    History of tonsillectomy    History of laparoscopic adjustable gastric banding  band removed few yrs ago    S/P D&amp;C    Biliary stent placement &amp; ercp    ureteroscopy with stone removal  1-        Vital Signs Last 24 Hrs  T(C): 36.8 (04 Oct 2020 12:41), Max: 38.6 (03 Oct 2020 21:45)  T(F): 98.2 (04 Oct 2020 12:41), Max: 101.5 (03 Oct 2020 21:45)  HR: 83 (04 Oct 2020 12:41) (83 - 97)  BP: 97/58 (04 Oct 2020 12:41) (97/58 - 136/82)  BP(mean): --  RR: 19 (04 Oct 2020 12:41) (18 - 19)  SpO2: 94% (04 Oct 2020 12:41) (94% - 98%)    PHYSICAL EXAMINATION:    GENERAL: The patient is awake and alert in no apparent distress.     HEENT: Head is normocephalic and atraumatic. Extraocular muscles are intact. Mucous membranes are moist.    NECK: Supple.    LUNGS: Clear to auscultation without wheezing, rales or rhonchi; respirations unlabored    HEART: Regular rate and rhythm without murmur.    ABDOMEN: obese with positive bowel sounds    EXTREMITIES: Without any cyanosis, clubbing, rash, lesions or edema.    NEUROLOGIC: Grossly intact.    SKIN: No ulceration or induration present.      LABS:                        8.5    7.94  )-----------( 313      ( 04 Oct 2020 06:43 )             24.9     10-04    138  |  102  |  10  ----------------------------<  121<H>  4.5   |  31  |  0.69    Ca    8.1<L>      04 Oct 2020 06:43  Phos  2.9     10-03  Mg     2.4     10-03    TPro  5.8<L>  /  Alb  1.8<L>  /  TBili  0.6  /  DBili  x   /  AST  102<H>  /  ALT  116<H>  /  AlkPhos  109  10-04    PT/INR - ( 03 Oct 2020 14:13 )   PT: 16.3 sec;   INR: 1.42 ratio         PTT - ( 03 Oct 2020 14:13 )  PTT:27.8 sec                Procalcitonin, Serum: 0.31 ng/mL (10-02-20 @ 07:08)      MICROBIOLOGY:  Culture Results:   No growth to date. (10-02 @ 21:16)  Culture Results:   No growth to date. (10-02 @ 21:16)      Assessment:    S/P Abdominal Paniculectomy  Post-op blood loss  Fever - source unclear  Morbid Obesity  Obstructive Sleep Apnea Syndrome  PAF    Plan:    Antibiotics per ID  Nocturnal CPAP  Pain control  Increase ambulation  DVT prophylaxis with SCD

## 2020-10-04 NOTE — PROGRESS NOTE ADULT - SUBJECTIVE AND OBJECTIVE BOX
[INTERVAL HX: ]  Patient seen and examined;  Chart reviewed and events noted;   still with bloody dc from drains.       Patient is a 60y Female with a known history of :  Other disorders of skin and subcutaneous tissue [L98.8]  History of aortic aneurysm [Z86.79]  H/O aortic aneurysm repair [Z98.890]  Arthritis [M19.90]  Degenerative disc disease, lumbar [M51.36]  Neuropathy [G62.9]  Spinal stenosis [M48.00]  Bipolar 1 disorder [F31.9]  Kidney stone [592.0]  Diabetes Mellitus Type II [250.00]  Sleep Apnea [780.57]  Asthma [493.90]  Hypertension [401.9]  Obesity [278.00]  Morbid Obesity [278.01]  Drug Abuse [305.90]  ETOH Abuse [305.00]  Arrhythmia [427.9]  Benign Essential Tremor [333.1]  Anxiety [300.00]  Depression [311]  Renal Calculi [592.0]  Colitis [558.9]  Anemia [285.9]  Diabetes [250.00]  Sleep Apnea [780.57]  Asthma [493.90]  S/P cardiac catheterization [V45.89]  S/P dilation and curettage [V45.89]  History of tonsillectomy [V45.89]  History of laparoscopic adjustable gastric banding [V45.86]  S/P D&amp;C [V45.89]  Ureteral stent [593.4]  ERCP &amp; removal of biliary stent [574.20]  Biliary stent placement &amp; ercp [574.20]  Cholelithiasis [574.20]  History of Tonsillectomy [V45.89]  ureteroscopy with stone removal [592.0]      HPI:  Ms Armas is a 61 yo F who presented to [facility] for elective abdominal panniculectomy. PMH JESUS on CPAP, HTN, Obesity, Anemia, AAA, Arthritis, Depression, Anxiety.   Pt seen and examined post-operative. She states she is doing well. She has minimal abdominal pain 2/10 in severity. Its in the area of her panniluculus, no radiation elsewhere, pain is dull. She has no other complaints. Denies headaches, nausea, vomiting, chest pain, SOB, palpitations, constipation, diarrhea, melena, hematochezia, dysuria.      (25 Sep 2020 16:59)        MEDICATIONS  (STANDING):  allopurinol 300 milliGRAM(s) Oral daily  cyanocobalamin 1000 MICROGram(s) Oral daily  docusate sodium 100 milliGRAM(s) Oral three times a day  DULoxetine 30 milliGRAM(s) Oral <User Schedule>  folic acid 1 milliGRAM(s) Oral daily  lamoTRIgine 200 milliGRAM(s) Oral daily  meropenem  IVPB      meropenem  IVPB 1000 milliGRAM(s) IV Intermittent every 8 hours  multivitamin 1 Tablet(s) Oral daily  polyethylene glycol 3350 17 Gram(s) Oral daily  propranolol 10 milliGRAM(s) Oral daily  senna 2 Tablet(s) Oral at bedtime  traZODone 200 milliGRAM(s) Oral at bedtime    MEDICATIONS  (PRN):  acetaminophen   Tablet .. 650 milliGRAM(s) Oral every 6 hours PRN Mild Pain (1 - 3)  acetaminophen   Tablet .. 650 milliGRAM(s) Oral every 6 hours PRN Temp greater or equal to 38C (100.4F)  artificial tears (preservative free) Ophthalmic Solution 1 Drop(s) Both EYES two times a day PRN Dry Eyes  glucagon  Injectable 1 milliGRAM(s) IntraMuscular once PRN Glucose LESS THAN 70 milligrams/deciliter      Vital Signs Last 24 Hrs  T(C): 36.8 (04 Oct 2020 12:41), Max: 38.6 (03 Oct 2020 21:45)  T(F): 98.2 (04 Oct 2020 12:41), Max: 101.5 (03 Oct 2020 21:45)  HR: 83 (04 Oct 2020 12:41) (83 - 97)  BP: 97/58 (04 Oct 2020 12:41) (97/58 - 136/82)  BP(mean): --  RR: 19 (04 Oct 2020 12:41) (18 - 19)  SpO2: 94% (04 Oct 2020 12:41) (94% - 98%)      [PHYSICAL EXAM]  General: adult in NAD,  WN,  WD.  HEENT: clear oropharynx, anicteric sclera, pink conjunctivae.  Neck: supple, no masses.  CV: normal S1S2, no murmur, no rubs, no gallops.  Lungs: clear to auscultation, no wheezes, no rales, no rhonchi.  Abdomen: soft, non-tender, mild-distended, no hepatosplenomegaly, normal BS, no guarding. EDWIN drains in place  Ext: no clubbing, no cyanosis, no edema.  Skin: no rashes,  no petechiae, no venous stasis changes.  Neuro: alert and oriented X3  , no focal motor deficits.  LN: no SC DIANA.      [LABS:]                        8.5    7.94  )-----------( 313      ( 04 Oct 2020 06:43 )             24.9     10-04    138  |  102  |  10  ----------------------------<  121<H>  4.5   |  31  |  0.69    Ca    8.1<L>      04 Oct 2020 06:43  Phos  2.9     10-03  Mg     2.4     10-03    TPro  5.8<L>  /  Alb  1.8<L>  /  TBili  0.6  /  DBili  x   /  AST  102<H>  /  ALT  116<H>  /  AlkPhos  109  10-04    PT/INR - ( 03 Oct 2020 14:13 )   PT: 16.3 sec;   INR: 1.42 ratio    PTT - ( 03 Oct 2020 14:13 )  PTT:27.8 sec              [RADIOLOGY STUDIES:]

## 2020-10-04 NOTE — PROGRESS NOTE ADULT - SUBJECTIVE AND OBJECTIVE BOX
[facility] -- Almas Faustin Grossman, [place], Saurabh Johansen, Cindy Giordano: Office # [phone]    Follow Up:  Hypotension     Subjective/Observations: Patient seen and examined. Patient awake, alert, OOB to chair. No complaints of chest pain, dyspnea, palpitations or dizziness. No signs of orthopnea or PND.     REVIEW OF SYSTEMS: All review of systems is negative for eye, ENT, GI, , allergic, dermatologic, musculoskeletal and neurologic except as described above    PAST MEDICAL & SURGICAL HISTORY:  History of aortic aneurysm descending aorta see CT  Arthritis  Degenerative disc disease, lumbar  Neuropathy  Spinal stenosis  Bipolar 1 disorder  Kidney stone  Sleep Apnea CPAP with oxygen since June 2020  Asthma  Hypertension  Morbid Obesity  ETOH Abuse H/O  Benign Essential Tremor  Anxiety  Depression  Renal Calculi chronic  Colitis H/O  Anemia  S/P cardiac catheterization  S/P dilation and curettage  History of tonsillectomy  History of laparoscopic adjustable gastric banding  band removed few yrs ago S/P D&amp;C  Biliary stent placement &amp; ercp  ureteroscopy with stone removal  1-    MEDICATIONS  (STANDING):  allopurinol 300 milliGRAM(s) Oral daily  cyanocobalamin 1000 MICROGram(s) Oral daily  docusate sodium 100 milliGRAM(s) Oral three times a day  DULoxetine 30 milliGRAM(s) Oral <User Schedule>  folic acid 1 milliGRAM(s) Oral daily  lamoTRIgine 200 milliGRAM(s) Oral daily  meropenem  IVPB      meropenem  IVPB 1000 milliGRAM(s) IV Intermittent every 8 hours  multivitamin 1 Tablet(s) Oral daily  phytonadione   Solution 5 milliGRAM(s) Oral once  polyethylene glycol 3350 17 Gram(s) Oral daily  propranolol 10 milliGRAM(s) Oral daily  senna 2 Tablet(s) Oral at bedtime  traZODone 200 milliGRAM(s) Oral at bedtime    MEDICATIONS  (PRN):  acetaminophen   Tablet .. 650 milliGRAM(s) Oral every 6 hours PRN Mild Pain (1 - 3)  acetaminophen   Tablet .. 650 milliGRAM(s) Oral every 6 hours PRN Temp greater or equal to 38C (100.4F)  artificial tears (preservative free) Ophthalmic Solution 1 Drop(s) Both EYES two times a day PRN Dry Eyes  glucagon  Injectable 1 milliGRAM(s) IntraMuscular once PRN Glucose LESS THAN 70 milligrams/deciliter    Allergies  penicillins (Other)  trees dogs cats cockaroaches (Rhinitis; Rhinorrhea)    Vital Signs Last 24 Hrs  T(C): 36.8 (04 Oct 2020 12:41), Max: 38.6 (03 Oct 2020 21:45)  T(F): 98.2 (04 Oct 2020 12:41), Max: 101.5 (03 Oct 2020 21:45)  HR: 83 (04 Oct 2020 12:41) (83 - 97)  BP: 97/58 (04 Oct 2020 12:41) (97/58 - 136/82)  BP(mean): --  RR: 19 (04 Oct 2020 12:41) (18 - 19)  SpO2: 94% (04 Oct 2020 12:41) (94% - 98%)    I&O's Summary    03 Oct 2020 07:01  -  04 Oct 2020 07:00  --------------------------------------------------------  IN: 809 mL / OUT: 680 mL / NET: 129 mL    TELE: SR 70-80s, occasionally 100s   PHYSICAL EXAM:  Appearance: NAD, no distress, alert, Obese   HEENT: Moist Mucous Membranes, Anicteric  Cardiovascular: Regular rate and rhythm, Normal S1 S2, No JVD, No murmurs, No rubs, gallops or clicks  Respiratory: Non-labored, Clear to auscultation, No rales, No rhonchi, No wheezing.   Gastrointestinal:  Soft, Non-tender, + BS  Neurologic: Non-focal  Skin: Warm and dry, + abdominal incision No visible rashes or ulcers, No ecchymosis, No cyanosis  Musculoskeletal: No clubbing, No cyanosis, No joint swelling/tenderness  Psychiatry: Mood & affect appropriate  Lymph: No peripheral edema.     LABS: All Labs Reviewed:                        8.5    7.94  )-----------( 313      ( 04 Oct 2020 06:43 )             24.9                         7.2    8.59  )-----------( 322      ( 03 Oct 2020 06:35 )             22.1                         7.8    12.97 )-----------( 291      ( 02 Oct 2020 07:08 )             22.5     04 Oct 2020 06:43    138    |  102    |  10     ----------------------------<  121    4.5     |  31     |  0.69   03 Oct 2020 06:35    139    |  104    |  14     ----------------------------<  93     3.9     |  30     |  0.69   02 Oct 2020 07:08    139    |  103    |  14     ----------------------------<  125    3.7     |  29     |  0.72     Ca    8.1        04 Oct 2020 06:43  Ca    8.1        03 Oct 2020 06:35  Ca    8.1        02 Oct 2020 07:08  Phos  2.9       03 Oct 2020 06:35  Phos  2.6       02 Oct 2020 07:08  Mg     2.4       03 Oct 2020 06:35  Mg     2.2       02 Oct 2020 07:08    TPro  5.8    /  Alb  1.8    /  TBili  0.6    /  DBili  x      /  AST  102    /  ALT  116    /  AlkPhos  109    04 Oct 2020 06:43  TPro  5.9    /  Alb  1.8    /  TBili  0.4    /  DBili  x      /  AST  70     /  ALT  71     /  AlkPhos  90     03 Oct 2020 06:35  TPro  5.9    /  Alb  1.9    /  TBili  0.6    /  DBili  x      /  AST  27     /  ALT  44     /  AlkPhos  87     02 Oct 2020 07:08    PT/INR - ( 03 Oct 2020 14:13 )   PT: 16.3 sec;   INR: 1.42 ratio    PTT - ( 03 Oct 2020 14:13 )  PTT:27.8 sec  Creatine Kinase, Serum: 65 U/L (09-25-20 @ 21:25)  Troponin I, Serum: .033 ng/mL (09-25-20 @ 21:25)      < from: Cardiac Cath Lab - Adult (06.16.20 @ 16:26) >  CORONARY VESSELS: The coronary circulation is right dominant.  LM:   --  LM: Normal.  LAD:   --  LAD: Angiography showed minor luminal irregularities with no  flow limiting lesions.  CX:   --  Circumflex: Normal.  RCA:   --  RCA: Normal.  COMPLICATIONS: There were no complications.  DIAGNOSTIC RECOMMENDATIONS: The patient should continue with the present  medications.  < end of copied text >    < from: Xray Chest 1 View- PORTABLE-Routine (Xray Chest 1 View- PORTABLE-Routine in AM.) (10.02.20 @ 08:48) >  FINDINGS: Heartsize appears within normal limits. No hilar or superior mediastinal abnormalities are identified. There is no evidence for focal infiltrate, lobar consolidation or pulmonary edema. Stable circumscribed high density nodule right lung apex, likely parenchymal granuloma. No pleural effusion or pneumothorax is demonstrated. No mediastinal shift is noted. The visualized osseous structures appear unremarkable.    IMPRESSION: No evidence for focal infiltrate or lobar consolidation.  < end of copied text >

## 2020-10-04 NOTE — PROGRESS NOTE ADULT - ASSESSMENT
[ASSESSMENT and  PLAN]  61yo F w/ PMH of JESUS on CPAP, HTN, morbid obesity, anemia, AAA, Arthritis, Depression, anxiety presented to [facility] for elective abdominal panniculectomy, admitted post-op with hemorrhagic shock s/p 3U PRBC with stabilization, now course c/b fever.    Hgb 11.9, pre-surgery with post procedure Hgb 8.4    -Hospitalization with fever at 101.4F. Also, leukocytosis improving from ~17 to ~13==>7.94;  -UCx and BCx from 9/30 were negative;   ID following.     EDWIN drains with continued bloody output.     New onset Afib.     Regarding anemia  Chronic anemia for many years, not ever required transfusions; chronic skin lesions,   iron deficiency   B12 deficiency  hospitalized in 2/2016 at [place] for gastric perforation due to gastric sleeve - removed per patient and repaired perforation - enterocutaneous fistula well healed now  - was considering gastric sleeve again in 1/2019 but did not have procedure done  - acute drop in H/H in 11/2019; improved with better compliance to oral iron and Bcomplex vitamins.   - was planned for panniculectomy with plastic surgery in 3/2020 which was cancelled due to COVID-19  Hgb 13.1, Ferritin 76 on 07/23/20  Hgb 13.2 on PST on 09/14/20.     PT increased likely due to combination decreased PO intake and abx.   Chandni def    RECOMMENDATIONS  Vit K PO once.     Would expect some inflammation with panniculectomy     Transfuse PRBC as clinically indicated.   Transfuse PRBC if Hgb <7.0 or if symptomatic.   Follow CBC    With current ferritin, and inflammation post surgery, may not have much benefit to IV iron at current time.   Will reassess intermittently     DVT Prophylaxis  SCD  Re-eval once bleeding stops  OOB as joya    post op care.    Thank you for consulting us.     I have discussed the above plan of care with patient in detail. They expressed understanding of the treatment plan . Risks, benefits and alternatives discussed in detail. I have asked if they have any questions or concerns and appropriately addressed them; all questions answered to their satisfactions and in lay terms.

## 2020-10-05 LAB
ALBUMIN SERPL ELPH-MCNC: 1.8 G/DL — LOW (ref 3.3–5)
ALP SERPL-CCNC: 120 U/L — SIGNIFICANT CHANGE UP (ref 40–120)
ALT FLD-CCNC: 124 U/L — HIGH (ref 12–78)
ANION GAP SERPL CALC-SCNC: 6 MMOL/L — SIGNIFICANT CHANGE UP (ref 5–17)
AST SERPL-CCNC: 80 U/L — HIGH (ref 15–37)
BILIRUB SERPL-MCNC: 0.6 MG/DL — SIGNIFICANT CHANGE UP (ref 0.2–1.2)
BUN SERPL-MCNC: 14 MG/DL — SIGNIFICANT CHANGE UP (ref 7–23)
CALCIUM SERPL-MCNC: 8.2 MG/DL — LOW (ref 8.5–10.1)
CHLORIDE SERPL-SCNC: 101 MMOL/L — SIGNIFICANT CHANGE UP (ref 96–108)
CO2 SERPL-SCNC: 32 MMOL/L — HIGH (ref 22–31)
CREAT SERPL-MCNC: 0.7 MG/DL — SIGNIFICANT CHANGE UP (ref 0.5–1.3)
CULTURE RESULTS: SIGNIFICANT CHANGE UP
CULTURE RESULTS: SIGNIFICANT CHANGE UP
GLUCOSE SERPL-MCNC: 143 MG/DL — HIGH (ref 70–99)
HCT VFR BLD CALC: 25.4 % — LOW (ref 34.5–45)
HGB BLD-MCNC: 8.4 G/DL — LOW (ref 11.5–15.5)
MAGNESIUM SERPL-MCNC: 2.6 MG/DL — SIGNIFICANT CHANGE UP (ref 1.6–2.6)
MCHC RBC-ENTMCNC: 29.5 PG — SIGNIFICANT CHANGE UP (ref 27–34)
MCHC RBC-ENTMCNC: 33.1 GM/DL — SIGNIFICANT CHANGE UP (ref 32–36)
MCV RBC AUTO: 89.1 FL — SIGNIFICANT CHANGE UP (ref 80–100)
NRBC # BLD: 0 /100 WBCS — SIGNIFICANT CHANGE UP (ref 0–0)
OB PNL STL: NEGATIVE — SIGNIFICANT CHANGE UP
PHOSPHATE SERPL-MCNC: 2.8 MG/DL — SIGNIFICANT CHANGE UP (ref 2.5–4.5)
PLATELET # BLD AUTO: 369 K/UL — SIGNIFICANT CHANGE UP (ref 150–400)
POTASSIUM SERPL-MCNC: 4 MMOL/L — SIGNIFICANT CHANGE UP (ref 3.5–5.3)
POTASSIUM SERPL-SCNC: 4 MMOL/L — SIGNIFICANT CHANGE UP (ref 3.5–5.3)
PROT SERPL-MCNC: 6.1 G/DL — SIGNIFICANT CHANGE UP (ref 6–8.3)
RBC # BLD: 2.85 M/UL — LOW (ref 3.8–5.2)
RBC # FLD: 15 % — HIGH (ref 10.3–14.5)
SODIUM SERPL-SCNC: 139 MMOL/L — SIGNIFICANT CHANGE UP (ref 135–145)
SPECIMEN SOURCE: SIGNIFICANT CHANGE UP
SPECIMEN SOURCE: SIGNIFICANT CHANGE UP
WBC # BLD: 8.7 K/UL — SIGNIFICANT CHANGE UP (ref 3.8–10.5)
WBC # FLD AUTO: 8.7 K/UL — SIGNIFICANT CHANGE UP (ref 3.8–10.5)

## 2020-10-05 PROCEDURE — 76705 ECHO EXAM OF ABDOMEN: CPT | Mod: 26

## 2020-10-05 PROCEDURE — 99233 SBSQ HOSP IP/OBS HIGH 50: CPT

## 2020-10-05 PROCEDURE — 99232 SBSQ HOSP IP/OBS MODERATE 35: CPT

## 2020-10-05 RX ADMIN — Medication 300 MILLIGRAM(S): at 13:42

## 2020-10-05 RX ADMIN — Medication 100 MILLIGRAM(S): at 05:40

## 2020-10-05 RX ADMIN — Medication 1 TABLET(S): at 18:38

## 2020-10-05 RX ADMIN — DULOXETINE HYDROCHLORIDE 30 MILLIGRAM(S): 30 CAPSULE, DELAYED RELEASE ORAL at 05:40

## 2020-10-05 RX ADMIN — PREGABALIN 1000 MICROGRAM(S): 225 CAPSULE ORAL at 13:47

## 2020-10-05 RX ADMIN — Medication 1 MILLIGRAM(S): at 13:41

## 2020-10-05 RX ADMIN — LAMOTRIGINE 200 MILLIGRAM(S): 25 TABLET, ORALLY DISINTEGRATING ORAL at 13:47

## 2020-10-05 RX ADMIN — DULOXETINE HYDROCHLORIDE 30 MILLIGRAM(S): 30 CAPSULE, DELAYED RELEASE ORAL at 22:26

## 2020-10-05 RX ADMIN — POLYETHYLENE GLYCOL 3350 17 GRAM(S): 17 POWDER, FOR SOLUTION ORAL at 13:41

## 2020-10-05 RX ADMIN — MEROPENEM 100 MILLIGRAM(S): 1 INJECTION INTRAVENOUS at 05:40

## 2020-10-05 RX ADMIN — Medication 100 MILLIGRAM(S): at 13:41

## 2020-10-05 RX ADMIN — Medication 10 MILLIGRAM(S): at 14:07

## 2020-10-05 RX ADMIN — DULOXETINE HYDROCHLORIDE 30 MILLIGRAM(S): 30 CAPSULE, DELAYED RELEASE ORAL at 13:47

## 2020-10-05 RX ADMIN — Medication 200 MILLIGRAM(S): at 22:26

## 2020-10-05 NOTE — PROGRESS NOTE ADULT - SUBJECTIVE AND OBJECTIVE BOX
SUBJECTIVE:  Patient seen and examined at bedside. No overnight events. Patient feeling well and would like to go home today. She is tolerating regular diet. Admits to passing flatus. Patient denies any fevers, chills, chest pain, shortness of breath, abdominal pain, nausea, vomiting or diarrhea.    Vital Signs Last 24 Hrs  T(C): 36.8 (05 Oct 2020 05:21), Max: 37.1 (04 Oct 2020 20:59)  T(F): 98.3 (05 Oct 2020 05:21), Max: 98.7 (04 Oct 2020 20:59)  HR: 85 (05 Oct 2020 05:21) (83 - 85)  BP: 106/75 (05 Oct 2020 05:21) (97/58 - 107/71)  BP(mean): --  RR: 18 (05 Oct 2020 05:21) (18 - 19)  SpO2: 95% (05 Oct 2020 05:21) (92% - 95%)    PHYSICAL EXAM:  GENERAL: NAD, resting comfortably in bed, on CPAP   HEAD:  Atraumatic, Normocephalic  ABDOMEN: Abdominal binder in place. Incision dry and intact, no erythema, warmth, drainage. EDWIN drain in place x 3 with dark blood in bulbs. Soft, non-tender, non-distended. +BS  NEUROLOGY: A&O x 3    LABS: 10/5 AM labs pending     ASSESSMENT  60y Female POD10 s/p panniculectomy with liposuction, CT showing hematoma, feeling well, wants to go home today, afebrile for past 24 hours    PLAN  - Continue care as per medical team  - pain control, supportive care, OOB  - Continue DASH diet  - continue IV abx  - Monitor EDWIN drain outputs  - F/U AM labs  - D/c planning  - Will discuss with Dr. De La Torre    Surgical Team Contact Information  Spectralink: Ext: [phone] or [phone]  Pager: [phone]

## 2020-10-05 NOTE — PROGRESS NOTE ADULT - SUBJECTIVE AND OBJECTIVE BOX
Patient is a 60y old  Female who presents with a chief complaint of elective panniculectomy.     INTERVAL HPI/OVERNIGHT EVENTS: Pt afebrile for >24hours. Pt states she feels "ok." Pt denies abd pain, nausea, vomiting, diarrhea, SOB, cough, dysuria, urinary frequency/urgency, headache, dizziness. Did report constipation but had 3 BMs after a dulcolax suppository this afternoon.     MEDICATIONS  (STANDING):  allopurinol 300 milliGRAM(s) Oral daily  cyanocobalamin 1000 MICROGram(s) Oral daily  docusate sodium 100 milliGRAM(s) Oral three times a day  DULoxetine 30 milliGRAM(s) Oral <User Schedule>  folic acid 1 milliGRAM(s) Oral daily  lamoTRIgine 200 milliGRAM(s) Oral daily  multivitamin 1 Tablet(s) Oral daily  polyethylene glycol 3350 17 Gram(s) Oral daily  propranolol 10 milliGRAM(s) Oral daily  senna 2 Tablet(s) Oral at bedtime  traZODone 200 milliGRAM(s) Oral at bedtime    MEDICATIONS  (PRN):  acetaminophen   Tablet .. 650 milliGRAM(s) Oral every 6 hours PRN Mild Pain (1 - 3)  acetaminophen   Tablet .. 650 milliGRAM(s) Oral every 6 hours PRN Temp greater or equal to 38C (100.4F)  aluminum hydroxide/magnesium hydroxide/simethicone Suspension 30 milliLiter(s) Oral every 4 hours PRN Dyspepsia  artificial tears (preservative free) Ophthalmic Solution 1 Drop(s) Both EYES two times a day PRN Dry Eyes  glucagon  Injectable 1 milliGRAM(s) IntraMuscular once PRN Glucose LESS THAN 70 milligrams/deciliter      Allergies    penicillins (Other)  trees dogs cats cockaroaches (Rhinitis; Rhinorrhea)    Intolerances        REVIEW OF SYSTEMS:  CONSTITUTIONAL: No fever or chills  HEENT:  No headache, no sore throat  RESPIRATORY: No cough, wheezing, or shortness of breath  CARDIOVASCULAR: No chest pain, palpitations  GASTROINTESTINAL: No abd pain, nausea, vomiting, or diarrhea ; +constipation resolved with suppository  GENITOURINARY: No dysuria, frequency, or hematuria  NEUROLOGICAL: no focal weakness or dizziness  MUSCULOSKELETAL: no myalgias     Vital Signs Last 24 Hrs  T(C): 37.4 (05 Oct 2020 13:55), Max: 37.4 (05 Oct 2020 13:55)  T(F): 99.3 (05 Oct 2020 13:55), Max: 99.3 (05 Oct 2020 13:55)  HR: 92 (05 Oct 2020 13:55) (85 - 98)  BP: 107/71 (05 Oct 2020 13:55) (106/75 - 107/71)  BP(mean): --  RR: 18 (05 Oct 2020 13:55) (18 - 18)  SpO2: 93% (05 Oct 2020 13:55) (92% - 96%)    PHYSICAL EXAM:  GENERAL: NAD at rest, morbidly obese  HEENT:  anicteric, moist mucous membranes  CHEST/LUNG:  CTA b/l, no rales, wheezes, or rhonchi  HEART:  RRR, S1, S2  ABDOMEN:  BS+, soft, nontender in upper abdomen, mild tenderness near incisions, nondistended; 3 EDWIN drains with dark bloody drainage; incision sealed with dermabond without significant warmth or erythema. No incisional drainage.  EXTREMITIES: no edema, cyanosis, or calf tenderness  NERVOUS SYSTEM: answers questions and follows commands appropriately    LABS:                        8.4    8.70  )-----------( 369      ( 05 Oct 2020 08:38 )             25.4     CBC Full  -  ( 05 Oct 2020 08:38 )  WBC Count : 8.70 K/uL  Hemoglobin : 8.4 g/dL  Hematocrit : 25.4 %  Platelet Count - Automated : 369 K/uL  Mean Cell Volume : 89.1 fl  Mean Cell Hemoglobin : 29.5 pg  Mean Cell Hemoglobin Concentration : 33.1 gm/dL  Auto Neutrophil # : x  Auto Lymphocyte # : x  Auto Monocyte # : x  Auto Eosinophil # : x  Auto Basophil # : x  Auto Neutrophil % : x  Auto Lymphocyte % : x  Auto Monocyte % : x  Auto Eosinophil % : x  Auto Basophil % : x    05 Oct 2020 08:38    139    |  101    |  14     ----------------------------<  143    4.0     |  32     |  0.70     Ca    8.2        05 Oct 2020 08:38  Phos  2.8       05 Oct 2020 08:38  Mg     2.6       05 Oct 2020 08:38    TPro  6.1    /  Alb  1.8    /  TBili  0.6    /  DBili  x      /  AST  80     /  ALT  124    /  AlkPhos  120    05 Oct 2020 08:38        CAPILLARY BLOOD GLUCOSE            Culture - Blood (collected 10-02-20 @ 21:16)  Source: .Blood Blood-Peripheral  Preliminary Report (10-03-20 @ 22:01):    No growth to date.    Culture - Blood (collected 10-02-20 @ 21:16)  Source: .Blood Blood  Preliminary Report (10-03-20 @ 22:01):    No growth to date.    Culture - Urine (collected 09-30-20 @ 15:18)  Source: .Urine Clean Catch (Midstream)  Final Report (10-01-20 @ 11:52):    <10,000 CFU/mL Normal Urogenital Chanel    Culture - Blood (collected 09-30-20 @ 12:07)  Source: .Blood Blood-Peripheral  Final Report (10-05-20 @ 13:00):    No Growth Final    Culture - Blood (collected 09-30-20 @ 12:07)  Source: .Blood Blood  Final Report (10-05-20 @ 13:00):    No Growth Final        RADIOLOGY & ADDITIONAL TESTS:    Personally reviewed.     Consultant(s) Notes Reviewed:  [x] YES  [ ] NO

## 2020-10-05 NOTE — PROGRESS NOTE ADULT - ASSESSMENT
59yo F w/ PMH of JESUS on CPAP, HTN, morbid obesity, anemia, AAA, Arthritis, Depression, anxiety presented to [facility] for elective abdominal panniculectomy, admitted post-op with hemorrhagic shock s/p 4un PRBC with stabilization, now course c/b fever.    #Fever:  -unclear etiology  -pt without significant localizing symptoms or physical exam findings for infection   -BCx NGTD (monitor), UA quite benign, not indicative of infection (pt denies UTI symptoms)  -has had daily fever typically in early afternoon; no fever yet today. Leukocytosis which peaked at ~17 on 10/01/20 has now resolved; had some low BP readings on electronic monitor ; low-normal when BP obtained manually -- given 1L NS on 10/1 and 500cc of NS on 10/2  -UCx and BCx from 9/30 were negative; resent BCx on 10/2 --NGTD  -consulted ID (Wayne), recs appreciated  -CT showing 4cm x 10cm hematoma and no other sign of infection   -pt has been afebrile for > 24 hours and ID discontinued meropenem -- will monitor off Abx  -discussed with surgical team and Dr. De La Torre, who recommend to continue with drain via EDWIN drains, which do have bloody output (there is a drain at the site of the hematoma as seen on CT)  -other possible source of fever is atelectasis, though would be unusual to reach such high fever / persistent fevers and have a leukocytosis and is a less likely scenario  -monitor closely for signs of instability  -incentive spirometry     #Hemorrhagic shock: now resolved   - acute blood loss anemia post op, had 3 units pRBC early in admission to stabilize; gave 4th unit PRBC on 10/3 as Hgb had trended down to 7.2 ; appropriate rise in Hgb with that 4th unit  - shock state resolved, currently off pressors with stable vital signs   - Per pt, she has been chronically anemic and has been on B12 and folic acid supplements for years. Continue Vit B12 and folic acid supplements   - Heme ([facility]) consulted; who recommends no other anemia supplements currently; did give dose of vitamin K as pt still with blood draining from the EDWIN drains and INR of 1.42  - Continue to monitor H/H closely, Transfuse if Hgb <7 or if hemodynamically unstable   - monitor EDWIN x3 output; hold chemical DVT ppx in setting of continued bleeding.  - pt's bloody EDWIN output was 280cc on 10/3 and was now 190cc on 10/4     #Transaminitis:  - pt's AST/ALT starting to rise to low 100s (alk phos and t bili remain normal)  - pt had had ALT in the low-100s earlier in admission when was hypotensive from hemorrhage  - unclear reason for current rise-- possibly has mild elevation now due to relatively low BP couple of days ago ; ?mild DILI (meropenem has AST/ALT elevation listed as possible adverse effects) -- meropenem discontinued today  - consulted GI (Nicole), recs appreciated  - performed RUQ US which demonstrated a fatty liver but no significant acute findings  - CT abd/P with po and IV contrast on 9/30 did not have significant pathology noted in the liver (the 7mm CBD was likely due to pt being post-cholecystectomy)    #S/P Abdominal panniculectomy: on 09/25/20.   -mgmt per surgical team.   -pain control as ordered.   -bowel regimen on board; miralax, senna, dulcolax   -Phys therapy  -SCDs    #Afib, new onset:  - Cardio following, ([facility]), recs appreciated   - had brief Afib in setting of severe hypotension / instability early in the admission.   - has been in sinus rhythm since then  - c/w propanolol per cardio  - no A/C especially given pt had hemorrhagic shock several days ago  - pt to have cardio follow up for possible loop recorder as outpt    #JESUS on CPAP:   -pulm (Dr Hanson) following - recs appreciated  -continuous pulse ox.   -CPAP QHS    #ELAINE:  - pt had ELAINE early in admission likely prerenal due to acute blood loss anemia with hemorrhagic shock  - doubt pt had ATN  - ELAINE resolved with PRBC and hemodynamic stabilization.   - monitor BMP    #HTN:   - continue propanolol  - holding verapamil (home med)    #Anxiety/Depression:   - continue Cymbalta, trazodone and lamictal.    #Morbid Obesity:  - now s/p panniculectomy ; counseled on diet    #DVT ppx:   - SCDs

## 2020-10-05 NOTE — PROGRESS NOTE ADULT - ASSESSMENT
[ASSESSMENT and  PLAN]  61yo F w/ PMH of JESUS on CPAP, HTN, morbid obesity, prior history of gastric sleeve which eroded and was removed, anemia, AAA, Arthritis, Depression, anxiety presented to [facility] for elective abdominal panniculectomy, admitted post-op with hemorrhagic shock s/p 3U PRBC with stabilization; course c/b fever thought to be due to hematoma    - Hg was 13 prior to admission; clearly had post-op intra-abdominal bleeding; Hg now seems to have stabilized  - received Vitamin K X1 on 10/4/20 for elevated INR  - Transfuse PRBC as clinically indicated.   - Transfuse PRBC if Hgb <7.0 or if symptomatic.   - avoid anticoagulants and continue DVT prophylaxis with SCD boots  - will re-evaluate iron studies and need for additional iron infusions as outpatient   - will follow with you  - case discussed with Dr. Perkins (hospitalist)

## 2020-10-05 NOTE — PROGRESS NOTE ADULT - SUBJECTIVE AND OBJECTIVE BOX
Patient seen and examined;  Chart reviewed and events noted;   continues to have bloody drainage from all EDWIN drains; overall feeling well; remains on B12 and folic acid;       MEDICATIONS  (STANDING):  allopurinol 300 milliGRAM(s) Oral daily  cyanocobalamin 1000 MICROGram(s) Oral daily  docusate sodium 100 milliGRAM(s) Oral three times a day  DULoxetine 30 milliGRAM(s) Oral <User Schedule>  folic acid 1 milliGRAM(s) Oral daily  lamoTRIgine 200 milliGRAM(s) Oral daily  multivitamin 1 Tablet(s) Oral daily  polyethylene glycol 3350 17 Gram(s) Oral daily  propranolol 10 milliGRAM(s) Oral daily  senna 2 Tablet(s) Oral at bedtime  traZODone 200 milliGRAM(s) Oral at bedtime    MEDICATIONS  (PRN):  acetaminophen   Tablet .. 650 milliGRAM(s) Oral every 6 hours PRN Mild Pain (1 - 3)  acetaminophen   Tablet .. 650 milliGRAM(s) Oral every 6 hours PRN Temp greater or equal to 38C (100.4F)  aluminum hydroxide/magnesium hydroxide/simethicone Suspension 30 milliLiter(s) Oral every 4 hours PRN Dyspepsia  artificial tears (preservative free) Ophthalmic Solution 1 Drop(s) Both EYES two times a day PRN Dry Eyes  glucagon  Injectable 1 milliGRAM(s) IntraMuscular once PRN Glucose LESS THAN 70 milligrams/deciliter      Vital Signs Last 24 Hrs  T(C): 37.4 (05 Oct 2020 13:55), Max: 37.4 (05 Oct 2020 13:55)  T(F): 99.3 (05 Oct 2020 13:55), Max: 99.3 (05 Oct 2020 13:55)  HR: 92 (05 Oct 2020 13:55) (85 - 98)  BP: 107/71 (05 Oct 2020 13:55) (106/75 - 107/71)  RR: 18 (05 Oct 2020 13:55) (18 - 18)  SpO2: 93% (05 Oct 2020 13:55) (92% - 96%)    PHYSICAL EXAM  General: adult obese woman in NAD  HEENT: clear oropharynx, anicteric sclera, pink conjunctivae  Neck: supple  CV: normal S1S2 with no murmur rubs or gallops  Lungs: clear to auscultation, no wheezes, no rhales  Abdomen: obese - binder in place; + EDWIN drains with blood  Ext: no clubbing cyanosis or edema  Skin: no rashes and no petichiae  Neuro: alert and oriented X3 no focal deficits      LABS:                        8.4    8.70  )-----------( 369      ( 05 Oct 2020 08:38 )             25.4     Hemoglobin: 8.4 g/dL (10-05 @ 08:38)  Hemoglobin: 8.5 g/dL (10-04 @ 06:43)  Hemoglobin: 7.2 g/dL (10-03 @ 06:35)  Hemoglobin: 7.8 g/dL (10-02 @ 07:08)  Hemoglobin: 8.8 g/dL (10-01 @ 07:21)    10-05    139  |  101  |  14  ----------------------------<  143<H>  4.0   |  32<H>  |  0.70    Ca    8.2<L>      05 Oct 2020 08:38  Phos  2.8     10-05  Mg     2.6     10-05    TPro  6.1  /  Alb  1.8<L>  /  TBili  0.6  /  DBili  x   /  AST  80<H>  /  ALT  124<H>  /  AlkPhos  120  10-05

## 2020-10-05 NOTE — PROGRESS NOTE ADULT - SUBJECTIVE AND OBJECTIVE BOX
Patient is a 60y old  Female who presents with a chief complaint of Panniculectomy (05 Oct 2020 15:10)      INTERVAL HPI/OVERNIGHT EVENTS:    fever has resolved. denies shortness of breath    MEDICATIONS  (STANDING):  allopurinol 300 milliGRAM(s) Oral daily  cyanocobalamin 1000 MICROGram(s) Oral daily  docusate sodium 100 milliGRAM(s) Oral three times a day  DULoxetine 30 milliGRAM(s) Oral <User Schedule>  folic acid 1 milliGRAM(s) Oral daily  lamoTRIgine 200 milliGRAM(s) Oral daily  multivitamin 1 Tablet(s) Oral daily  polyethylene glycol 3350 17 Gram(s) Oral daily  propranolol 10 milliGRAM(s) Oral daily  senna 2 Tablet(s) Oral at bedtime  traZODone 200 milliGRAM(s) Oral at bedtime      MEDICATIONS  (PRN):  acetaminophen   Tablet .. 650 milliGRAM(s) Oral every 6 hours PRN Mild Pain (1 - 3)  acetaminophen   Tablet .. 650 milliGRAM(s) Oral every 6 hours PRN Temp greater or equal to 38C (100.4F)  aluminum hydroxide/magnesium hydroxide/simethicone Suspension 30 milliLiter(s) Oral every 4 hours PRN Dyspepsia  artificial tears (preservative free) Ophthalmic Solution 1 Drop(s) Both EYES two times a day PRN Dry Eyes  glucagon  Injectable 1 milliGRAM(s) IntraMuscular once PRN Glucose LESS THAN 70 milligrams/deciliter      Allergies    penicillins (Other)  trees dogs cats cockaroaches (Rhinitis; Rhinorrhea)    Intolerances        PAST MEDICAL & SURGICAL HISTORY:  History of aortic aneurysm  descending aorta see CT    Arthritis    Degenerative disc disease, lumbar    Neuropathy    Spinal stenosis    Bipolar 1 disorder    Kidney stone    Sleep Apnea  CPAP with oxygen since June 2020    Asthma    Hypertension    Morbid Obesity    ETOH Abuse  H/O    Benign Essential Tremor    Anxiety    Depression    Renal Calculi  chronic      Colitis  H/O    Anemia    S/P cardiac catheterization    S/P dilation and curettage    History of tonsillectomy    History of laparoscopic adjustable gastric banding  band removed few yrs ago    S/P D&amp;C    Biliary stent placement &amp; ercp    ureteroscopy with stone removal  1-        Vital Signs Last 24 Hrs  T(C): 37.4 (05 Oct 2020 13:55), Max: 37.4 (05 Oct 2020 13:55)  T(F): 99.3 (05 Oct 2020 13:55), Max: 99.3 (05 Oct 2020 13:55)  HR: 92 (05 Oct 2020 13:55) (85 - 98)  BP: 107/71 (05 Oct 2020 13:55) (106/75 - 107/71)  BP(mean): --  RR: 18 (05 Oct 2020 13:55) (18 - 18)  SpO2: 93% (05 Oct 2020 13:55) (92% - 96%)    PHYSICAL EXAMINATION:    GENERAL: The patient is awake and alert in no apparent distress.     HEENT: Head is normocephalic and atraumatic. Extraocular muscles are intact. Mucous membranes are moist.    NECK: Supple.    LUNGS: Clear to auscultation without wheezing, rales or rhonchi; respirations unlabored    HEART: Regular rate and rhythm without murmur.    ABDOMEN: Soft, nontender, and obese     EXTREMITIES: Without any cyanosis, clubbing, rash, lesions or edema.    NEUROLOGIC: Grossly intact.    SKIN: No ulceration or induration present.      LABS:                        8.4    8.70  )-----------( 369      ( 05 Oct 2020 08:38 )             25.4     10-05    139  |  101  |  14  ----------------------------<  143<H>  4.0   |  32<H>  |  0.70    Ca    8.2<L>      05 Oct 2020 08:38  Phos  2.8     10-05  Mg     2.6     10-05    TPro  6.1  /  Alb  1.8<L>  /  TBili  0.6  /  DBili  x   /  AST  80<H>  /  ALT  124<H>  /  AlkPhos  120  10-05                        MICROBIOLOGY:  Culture Results:   No growth to date. (10-02 @ 21:16)  Culture Results:   No growth to date. (10-02 @ 21:16)      Assessment:    S/P Abdominal Paniculectomy  Morbid Obesity  Obstructive Sleep Apnea Syndrome  Hx Kidney stones    Plan:    Nocturnal CPAP  Incentive spirometry  Pain control  Discharge planning

## 2020-10-05 NOTE — CONSULT NOTE ADULT - ASSESSMENT
inc lfts  multifactorial ? drug vs fatty liver    avoid all non essential hepatotoxic drugs	  Patient presented with persistent abnormal LFT  Obtain ABD sonogram  Check viral hepatitis panel  KRIS,ASMA,AMA  Iron studies, celiac serologies  AFP, alpha 1 antitrypsin level, ceruloplasmin    Potential liver biopsy in future may be needed if persistent elevated liver function.    diet and wt control  avoid all hepatotoxic drugs also  avoid alcohol and herbel medication.  follow up liver function test.

## 2020-10-05 NOTE — PROGRESS NOTE ADULT - ATTENDING COMMENTS
Note written by attending, see above.  Time spent: 40min. More than 50% of the visit was spent counseling the patient on her condition - fever, atelectasis, hematoma, acute blood loss anemia, meropenem, INR/vit K, EDWIN output, transaminitis.

## 2020-10-05 NOTE — PROGRESS NOTE ADULT - SUBJECTIVE AND OBJECTIVE BOX
[facility]  INFECTIOUS DISEASES   =======================================================    [facility]-[id number]  EMANUEL MONTEZ     Follow up: Fever    No fever, No new complaint.   Leukocytosis better.     PAST MEDICAL & SURGICAL HISTORY:  History of aortic aneurysm  descending aorta see CT  Arthritis  Degenerative disc disease, lumbar  Neuropathy  Spinal stenosis  Bipolar 1 disorder  Kidney stone  Sleep Apnea  CPAP with oxygen since June 2020  Asthma  Hypertension  Morbid Obesity  ETOH Abuse  H/O  Benign Essential Tremor  Anxiety  Depression  Renal Calculi  chronic    Colitis  H/O  Anemia  S/P cardiac catheterization  S/P dilation and curettage  History of tonsillectomy  History of laparoscopic adjustable gastric banding  band removed few yrs ago  S/P D&amp;C  Biliary stent placement &amp; ercp  ureteroscopy with stone removal  1-    Social Hx: no smoking, ETOH or drugs     FAMILY HISTORY:  Hypertension (Sibling)  Hyperlipidemia (Sibling)  Family history of renal failure  both parents were on dialysis  Family history of bacterial pneumonia  Family history of arthritis    Allergies  penicillins (Other)  trees dogs cats cockaroaches (Rhinitis; Rhinorrhea)    Antibiotics:  Meropenum     REVIEW OF SYSTEMS:  CONSTITUTIONAL:  + Fever   HEENT:  No diplopia or blurred vision.  No sore throat or runny nose.  CARDIOVASCULAR:  No chest pain or SOB.  RESPIRATORY:  No cough, shortness of breath, PND or orthopnea.  GASTROINTESTINAL:  No nausea, vomiting or diarrhea. surgical site pain +   GENITOURINARY:  No dysuria, frequency or urgency. No Blood in urine  MUSCULOSKELETAL:  no joint aches, no muscle pain  SKIN:  No change in skin, hair or nails.  NEUROLOGIC:  No paresthesias, fasciculations, seizures or weakness.  PSYCHIATRIC:  No disorder of thought or mood.  ENDOCRINE:  No heat or cold intolerance, polyuria or polydipsia.  HEMATOLOGICAL:  No easy bruising or bleeding.     Physical Exam:  Vital Signs Last 24 Hrs  T(C): 37.4 (05 Oct 2020 13:55), Max: 37.4 (05 Oct 2020 13:55)  T(F): 99.3 (05 Oct 2020 13:55), Max: 99.3 (05 Oct 2020 13:55)  HR: 92 (05 Oct 2020 13:55) (85 - 98)  BP: 107/71 (05 Oct 2020 13:55) (106/75 - 107/71)  BP(mean): --  RR: 18 (05 Oct 2020 13:55) (18 - 18)  SpO2: 93% (05 Oct 2020 13:55) (92% - 96%)  GEN: NAD  HEENT: normocephalic and atraumatic. EOMI. PERRL.    NECK: Supple.  No lymphadenopathy   LUNGS: Clear to auscultation.  HEART: Regular rate and rhythm without murmur.  ABDOMEN: Soft, nontender, and nondistended.  Positive bowel sounds.    : No CVA tenderness  EXTREMITIES: Without any cyanosis, clubbing, rash, lesions or edema.  NEUROLOGIC: grossly intact.  PSYCHIATRIC: Appropriate affect .  SKIN: No ulceration or induration present.    Labs:          10-05    139  |  101  |  14  ----------------------------<  143<H>  4.0   |  32<H>  |  0.70    Ca    8.2<L>      05 Oct 2020 08:38  Phos  2.8     10-05  Mg     2.6     10-05    TPro  6.1  /  Alb  1.8<L>  /  TBili  0.6  /  DBili  x   /  AST  80<H>  /  ALT  124<H>  /  AlkPhos  120  10-05                       8.4    8.70  )-----------( 369      ( 05 Oct 2020 08:38 )             25.4     LIVER FUNCTIONS - ( 05 Oct 2020 08:38 )  Alb: 1.8 g/dL / Pro: 6.1 g/dL / ALK PHOS: 120 U/L / ALT: 124 U/L / AST: 80 U/L / GGT: x           RECENT CULTURES:  10-02 @ 21:16 .Blood Blood     No growth to date.    09-30 @ 15:18 .Urine Clean Catch (Midstream)     <10,000 CFU/mL Normal Urogenital Chanel    09-30 @ 12:07 .Blood Blood     No Growth Final    All imaging and other data have been reviewed.  < from: Xray Chest 1 View- PORTABLE-Urgent (Xray Chest 1 View- PORTABLE-Urgent .) (09.30.20 @ 08:13) >  EXAM:  XR CHEST PORTABLE URGENT 1V                        PROCEDURE DATE:  09/30/2020    INTERPRETATION:  Portable chest x-ray  Indication: fever of unclear source  Comparison: 6/16/2020  2 portable chest x-rays are acquired for evaluation.  Impression: No evidence for acute pulmonary infiltrate, pleural effusion, or pneumothorax.  Stable small calcified granuloma in the right upper lung zone.  New small linear atelectasis in the left lower lung zone.  Stable cardiac silhouette.  No pulmonary vascular congestion.    Assessment and Plan: 59 y/o woman with PMH of obesity, HTN, JESUS on CPAP, Anemia, AAA, Arthritis, Depression and Anxiety was admitted at [facility] for elective abdominal panniculectomy and liposuction on 9/25, now has fever so ID has been called for recommendations. Currently no source of infection, wound looks fine with no signs of infection, but has 3 drains all with bleeding, possibly hematoma underneath causing fever.   10/1 with low grade fever and worsening of leukocytosis, CT of abdomen on 9/30 showed pelvic wall large hematoma that still could be reason for fever and worsening of leukocytosis but since infected hematoma is a possibility and she had a slight drop in BP, will cover empirically with Meropenum for now.   Blood and urine cultures negative , CXR negative   10/2 afebrile since last night, leukocytosis and BP are better, unclear if infection is responding to ABx or it is the same hematoma causing all signs and symptoms.   10/4: left drain has been manipulated now draining blood better. Clinically stable even though had fever 102. S/P transfusion. Most likely bleeding is causing reactive fever and leukocytosis.     Fever, panniculectomy  - WBC 16k-->8k , will trend it  - Can stop Meropenem  - Surgery is on board for drain management, no sign of wound infection.   - Will watch off antibiotics.     Will follow.    Judi Black MD  Division of Infectious Diseases   Cell [phone] between 7am and 5pm   After 5pm and weekends please call ID service at [phone].    Plan discussed with Dr. Perkins.

## 2020-10-05 NOTE — PROGRESS NOTE ADULT - SUBJECTIVE AND OBJECTIVE BOX
[facility] -- Almas Faustin, Mayra, Cara, Saurabh Johansen Savella  Office # [phone]      Follow Up:    Hypotension  Subjective/Observations:   No events overnight resting comfortably in bed.  No complaints of chest pain, dyspnea, or palpitations reported. No signs of orthopnea or PND.     REVIEW OF SYSTEMS: All other review of systems is negative unless indicated above    PAST MEDICAL & SURGICAL HISTORY:  History of aortic aneurysm  descending aorta see CT    Arthritis    Degenerative disc disease, lumbar    Neuropathy    Spinal stenosis    Bipolar 1 disorder    Kidney stone    Sleep Apnea  CPAP with oxygen since June 2020    Asthma    Hypertension    Morbid Obesity    ETOH Abuse  H/O    Benign Essential Tremor    Anxiety    Depression    Renal Calculi  chronic      Colitis  H/O    Anemia    S/P cardiac catheterization    S/P dilation and curettage    History of tonsillectomy    History of laparoscopic adjustable gastric banding  band removed few yrs ago    S/P D&amp;C    Biliary stent placement &amp; ercp    ureteroscopy with stone removal  1-        MEDICATIONS  (STANDING):  allopurinol 300 milliGRAM(s) Oral daily  cyanocobalamin 1000 MICROGram(s) Oral daily  docusate sodium 100 milliGRAM(s) Oral three times a day  DULoxetine 30 milliGRAM(s) Oral <User Schedule>  folic acid 1 milliGRAM(s) Oral daily  lamoTRIgine 200 milliGRAM(s) Oral daily  meropenem  IVPB      meropenem  IVPB 1000 milliGRAM(s) IV Intermittent every 8 hours  multivitamin 1 Tablet(s) Oral daily  polyethylene glycol 3350 17 Gram(s) Oral daily  propranolol 10 milliGRAM(s) Oral daily  senna 2 Tablet(s) Oral at bedtime  traZODone 200 milliGRAM(s) Oral at bedtime    MEDICATIONS  (PRN):  acetaminophen   Tablet .. 650 milliGRAM(s) Oral every 6 hours PRN Mild Pain (1 - 3)  acetaminophen   Tablet .. 650 milliGRAM(s) Oral every 6 hours PRN Temp greater or equal to 38C (100.4F)  aluminum hydroxide/magnesium hydroxide/simethicone Suspension 30 milliLiter(s) Oral every 4 hours PRN Dyspepsia  artificial tears (preservative free) Ophthalmic Solution 1 Drop(s) Both EYES two times a day PRN Dry Eyes  glucagon  Injectable 1 milliGRAM(s) IntraMuscular once PRN Glucose LESS THAN 70 milligrams/deciliter      Allergies    penicillins (Other)  trees dogs cats cockaroaches (Rhinitis; Rhinorrhea)    Intolerances        Vital Signs Last 24 Hrs  T(C): 36.8 (05 Oct 2020 05:21), Max: 37.1 (04 Oct 2020 20:59)  T(F): 98.3 (05 Oct 2020 05:21), Max: 98.7 (04 Oct 2020 20:59)  HR: 85 (05 Oct 2020 05:21) (83 - 85)  BP: 106/75 (05 Oct 2020 05:21) (97/58 - 107/71)  BP(mean): --  RR: 18 (05 Oct 2020 05:21) (18 - 19)  SpO2: 95% (05 Oct 2020 05:21) (92% - 95%)    I&O's Summary    04 Oct 2020 07:01  -  05 Oct 2020 07:00  --------------------------------------------------------  IN: 360 mL / OUT: 290 mL / NET: 70 mL          PHYSICAL EXAM:  TELE:   Constitutional: NAD, awake and alert, well-developed  HEENT: Moist Mucous Membranes, Anicteric  Pulmonary: Non-labored, breath sounds are clear bilaterally, No wheezing, crackles or rhonchi  Cardiovascular: Regular, S1 and S2 nl, No murmurs, rubs, gallops or clicks  Gastrointestinal: Bowel Sounds present, soft, nontender.   Lymph: No lymphadenopathy. No peripheral edema.  Skin: No visible rashes or ulcers.  Psych:  Mood & affect appropriate    LABS: All Labs Reviewed:                        8.4    8.70  )-----------( 369      ( 05 Oct 2020 08:38 )             25.4                         8.5    7.94  )-----------( 313      ( 04 Oct 2020 06:43 )             24.9                         7.2    8.59  )-----------( 322      ( 03 Oct 2020 06:35 )             22.1     05 Oct 2020 08:38    139    |  101    |  14     ----------------------------<  143    4.0     |  32     |  0.70   04 Oct 2020 06:43    138    |  102    |  10     ----------------------------<  121    4.5     |  31     |  0.69   03 Oct 2020 06:35    139    |  104    |  14     ----------------------------<  93     3.9     |  30     |  0.69     Ca    8.2        05 Oct 2020 08:38  Ca    8.1        04 Oct 2020 06:43  Ca    8.1        03 Oct 2020 06:35  Phos  2.8       05 Oct 2020 08:38  Phos  2.9       03 Oct 2020 06:35  Mg     2.6       05 Oct 2020 08:38  Mg     2.4       03 Oct 2020 06:35    TPro  6.1    /  Alb  1.8    /  TBili  0.6    /  DBili  x      /  AST  80     /  ALT  124    /  AlkPhos  120    05 Oct 2020 08:38  TPro  5.8    /  Alb  1.8    /  TBili  0.6    /  DBili  x      /  AST  102    /  ALT  116    /  AlkPhos  109    04 Oct 2020 06:43  TPro  5.9    /  Alb  1.8    /  TBili  0.4    /  DBili  x      /  AST  70     /  ALT  71     /  AlkPhos  90     03 Oct 2020 06:35    PT/INR - ( 03 Oct 2020 14:13 )   PT: 16.3 sec;   INR: 1.42 ratio         PTT - ( 03 Oct 2020 14:13 )  PTT:27.8 sec    < from: Cardiac Cath Lab - Adult (06.16.20 @ 16:26) >  CORONARY VESSELS: The coronary circulation is right dominant.  LM:   --  LM: Normal.  LAD:   --  LAD: Angiography showed minor luminal irregularities with no  flow limiting lesions.  CX:   --  Circumflex: Normal.  RCA:   --  RCA: Normal.  COMPLICATIONS: There were no complications.  DIAGNOSTIC RECOMMENDATIONS: The patient should continue with the present  medications.  < end of copied text >    < from: Xray Chest 1 View- PORTABLE-Routine (Xray Chest 1 View- PORTABLE-Routine in AM.) (10.02.20 @ 08:48) >  FINDINGS: Heartsize appears within normal limits. No hilar or superior mediastinal abnormalities are identified. There is no evidence for focal infiltrate, lobar consolidation or pulmonary edema. Stable circumscribed high density nodule right lung apex, likely parenchymal granuloma. No pleural effusion or pneumothorax is demonstrated. No mediastinal shift is noted. The visualized osseous structures appear unremarkable.    IMPRESSION: No evidence for focal infiltrate or lobar consolidation.  < end of copied text >

## 2020-10-05 NOTE — PHARMACOTHERAPY INTERVENTION NOTE - COMMENTS
Patient receiving meropenem 1g Q8h for fever & leukocytosis s/p elective abdominal panniculectomy & liposuction on 9/25. Per ID on 10/4 (Dr. Black), if patient remains stable consider stopping antibiotics. WBC now 8.7, Hgb stable at 8.4. Spoke with Dr. Black & recommended discontinuing meropenem. Dr. Black agreed and will continue to monitor patient off of antibiotics.

## 2020-10-05 NOTE — CONSULT NOTE ADULT - SUBJECTIVE AND OBJECTIVE BOX
Chief Complaint:  Patient is a 60y old  Female who presents with a chief complaint of Panniculectomy Ms Armas is a 59 yo F who presented to [facility] for elective abdominal panniculectomy. PMH JESUS on CPAP, HTN, Obesity, Anemia, AAA, Arthritis, Depression, Anxiety.   Pt seen and examined post-operative. She states she is doing well. She has minimal abdominal pain 2/10 in severity. Its in the area of her panniluculus, no radiation elsewhere, pain is dull. She has no other complaints. Denies headaches, nausea, vomiting, chest pain, SOB, palpitations, constipation, diarrhea, melena, hematochezia, dysuria.          Review of Systems:  Other Review of Systems: All other review of systems negative, except as noted in HPI  long hospital stay and is here for follow up inc lfts when they were normal a few days ago?      Allergies:  penicillins (Other)  trees dogs cats cockaroaches (Rhinitis; Rhinorrhea)      Medications:  acetaminophen   Tablet .. 650 milliGRAM(s) Oral every 6 hours PRN  acetaminophen   Tablet .. 650 milliGRAM(s) Oral every 6 hours PRN  allopurinol 300 milliGRAM(s) Oral daily  aluminum hydroxide/magnesium hydroxide/simethicone Suspension 30 milliLiter(s) Oral every 4 hours PRN  artificial tears (preservative free) Ophthalmic Solution 1 Drop(s) Both EYES two times a day PRN  cyanocobalamin 1000 MICROGram(s) Oral daily  docusate sodium 100 milliGRAM(s) Oral three times a day  DULoxetine 30 milliGRAM(s) Oral <User Schedule>  folic acid 1 milliGRAM(s) Oral daily  glucagon  Injectable 1 milliGRAM(s) IntraMuscular once PRN  lamoTRIgine 200 milliGRAM(s) Oral daily  meropenem  IVPB      meropenem  IVPB 1000 milliGRAM(s) IV Intermittent every 8 hours  multivitamin 1 Tablet(s) Oral daily  polyethylene glycol 3350 17 Gram(s) Oral daily  propranolol 10 milliGRAM(s) Oral daily  senna 2 Tablet(s) Oral at bedtime  traZODone 200 milliGRAM(s) Oral at bedtime      PMHX/PSHX:  History of aortic aneurysm    H/O aortic aneurysm repair    Arthritis    Degenerative disc disease, lumbar    Neuropathy    Spinal stenosis    Bipolar 1 disorder    Kidney stone    Diabetes Mellitus Type II    Sleep Apnea    Asthma    Hypertension    Obesity    Morbid Obesity    Drug Abuse    ETOH Abuse    Arrhythmia    Benign Essential Tremor    Anxiety    Depression    Renal Calculi    Colitis    Anemia    Diabetes    Sleep Apnea    Asthma    S/P cardiac catheterization    S/P dilation and curettage    History of tonsillectomy    History of laparoscopic adjustable gastric banding    S/P D&amp;C    Ureteral stent    ERCP &amp; removal of biliary stent    Biliary stent placement &amp; ercp    Cholelithiasis    History of Tonsillectomy    ureteroscopy with stone removal        Family history:  Hypertension (Sibling)    Hyperlipidemia (Sibling)    Family history of renal failure    Family history of bacterial pneumonia    Family history of arthritis        Social History: no etoh no cigs no ivda livesa t home    ROS:     General:  No wt loss, fevers, chills, night sweats, fatigue,   Eyes:  Good vision, no reported pain  ENT:  No sore throat, pain, runny nose, dysphagia  CV:  No pain, palpitations, hypo/hypertension  Resp:  No dyspnea, cough, tachypnea, wheezing  GI:  No pain, No nausea, No vomiting, No diarrhea, No constipation, No weight loss, No fever, No pruritis, No rectal bleeding, No tarry stools, No dysphagia,  :  No pain, bleeding, incontinence, nocturia  Muscle:  No pain, weakness  Neuro:  No weakness, tingling, memory problems  Psych:  No fatigue, insomnia, mood problems, depression  Endocrine:  No polyuria, polydipsia, cold/heat intolerance  Heme:  No petechiae, ecchymosis, easy bruisability  Skin:  No rash, tattoos, scars, edema      PHYSICAL EXAM:   Vital Signs:  Vital Signs Last 24 Hrs  T(C): 36.8 (05 Oct 2020 05:21), Max: 37.1 (04 Oct 2020 20:59)  T(F): 98.3 (05 Oct 2020 05:21), Max: 98.7 (04 Oct 2020 20:59)  HR: 98 (05 Oct 2020 09:56) (83 - 98)  BP: 106/75 (05 Oct 2020 05:21) (97/58 - 107/71)  BP(mean): --  RR: 18 (05 Oct 2020 05:21) (18 - 19)  SpO2: 96% (05 Oct 2020 09:56) (92% - 96%)  Daily     Daily     GENERAL:  Appears stated age, well-groomed, well-nourished, no distress  HEENT:  NC/AT,  conjunctivae clear and pink, no thyromegaly, nodules, adenopathy, no JVD, sclera -anicteric  CHEST:  Full & symmetric excursion, no increased effort, breath sounds clear  HEART:  Regular rhythm, S1, S2, no murmur/rub/S3/S4, no abdominal bruit, no edema  ABDOMEN:  Soft, non-tender, non-distended, normoactive bowel sounds,  no masses ,no hepato-splenomegaly, no signs of chronic liver disease  EXTEREMITIES:  no cyanosis,clubbing or edema  SKIN:  No rash/erythema/ecchymoses/petechiae/wounds/abscess/warm/dry  NEURO:  Alert, oriented, no asterixis, no tremor, no encephalopathy    LABS:                        8.4    8.70  )-----------( 369      ( 05 Oct 2020 08:38 )             25.4     10-05    139  |  101  |  14  ----------------------------<  143<H>  4.0   |  32<H>  |  0.70    Ca    8.2<L>      05 Oct 2020 08:38  Phos  2.8     10-05  Mg     2.6     10-05    TPro  6.1  /  Alb  1.8<L>  /  TBili  0.6  /  DBili  x   /  AST  80<H>  /  ALT  124<H>  /  AlkPhos  120  10-05    LIVER FUNCTIONS - ( 05 Oct 2020 08:38 )  Alb: 1.8 g/dL / Pro: 6.1 g/dL / ALK PHOS: 120 U/L / ALT: 124 U/L / AST: 80 U/L / GGT: x           PT/INR - ( 03 Oct 2020 14:13 )   PT: 16.3 sec;   INR: 1.42 ratio         PTT - ( 03 Oct 2020 14:13 )  PTT:27.8 sec        Imaging:

## 2020-10-05 NOTE — PROGRESS NOTE ADULT - ASSESSMENT
61yo F w/ PMH of JESUS on CPAP, HTN, morbid obesity, anemia, AAA, Arthritis, Depression, anxiety presented to [facility] for elective abdominal panniculectomy, admitted post-op with hemorrhagic shock s/p 3U PRBC with stabilization, now course c/b fever.    A fib  - She had brief atrial fibrillation in the setting of significant hypotension, (now off of pressor support) though is now back in SR, no PMH of Afib  - Telemetry with SR 70-80s. No A fib noted. Will discontinue telemetry   - Continue Propanolol.     HTN  - BP: 106/75 (10-05-20 @ 05:21) (97/58 - 107/71)  - Continue propranolol  - Hold verapamil   - low normal LV function with mild diastolic dysfunction on recent echocardiogram  - no sign of acute ischemia  - recent cath with non-obs cad  - no sign of volume overload  - hold diuretics     FEVER  - ID is following, meropenem initiated  - C/c NGTD     Anemia   - Stable  - S/p PRBC transfusion   - Continue to trend CBC as per primary team    JESUS  - CPAP at night    - Monitor and replete lytes, keep K>4, Mg>2.  - All other medical needs as per primary team.  - Other cardiovascular workup will depend on clinical course.  - Will continue to follow.    Duarte Rojas DNP, ANP-c  Cardiology   Spectra #[phone]

## 2020-10-06 DIAGNOSIS — R74.8 ABNORMAL LEVELS OF OTHER SERUM ENZYMES: ICD-10-CM

## 2020-10-06 LAB
ALBUMIN SERPL ELPH-MCNC: 1.9 G/DL — LOW (ref 3.3–5)
ALP SERPL-CCNC: 123 U/L — HIGH (ref 40–120)
ALT FLD-CCNC: 120 U/L — HIGH (ref 12–78)
ANION GAP SERPL CALC-SCNC: 5 MMOL/L — SIGNIFICANT CHANGE UP (ref 5–17)
AST SERPL-CCNC: 74 U/L — HIGH (ref 15–37)
BILIRUB SERPL-MCNC: 0.6 MG/DL — SIGNIFICANT CHANGE UP (ref 0.2–1.2)
BUN SERPL-MCNC: 15 MG/DL — SIGNIFICANT CHANGE UP (ref 7–23)
CALCIUM SERPL-MCNC: 8.3 MG/DL — LOW (ref 8.5–10.1)
CHLORIDE SERPL-SCNC: 101 MMOL/L — SIGNIFICANT CHANGE UP (ref 96–108)
CO2 SERPL-SCNC: 33 MMOL/L — HIGH (ref 22–31)
CREAT SERPL-MCNC: 0.6 MG/DL — SIGNIFICANT CHANGE UP (ref 0.5–1.3)
GLUCOSE SERPL-MCNC: 121 MG/DL — HIGH (ref 70–99)
HAV IGM SER-ACNC: SIGNIFICANT CHANGE UP
HBV CORE IGM SER-ACNC: SIGNIFICANT CHANGE UP
HBV SURFACE AG SER-ACNC: SIGNIFICANT CHANGE UP
HCT VFR BLD CALC: 25.2 % — LOW (ref 34.5–45)
HCV AB S/CO SERPL IA: 0.05 S/CO — SIGNIFICANT CHANGE UP (ref 0–0.99)
HCV AB SERPL-IMP: SIGNIFICANT CHANGE UP
HGB BLD-MCNC: 8.4 G/DL — LOW (ref 11.5–15.5)
INR BLD: 1.41 RATIO — HIGH (ref 0.88–1.16)
MCHC RBC-ENTMCNC: 29.7 PG — SIGNIFICANT CHANGE UP (ref 27–34)
MCHC RBC-ENTMCNC: 33.3 GM/DL — SIGNIFICANT CHANGE UP (ref 32–36)
MCV RBC AUTO: 89 FL — SIGNIFICANT CHANGE UP (ref 80–100)
NRBC # BLD: 0 /100 WBCS — SIGNIFICANT CHANGE UP (ref 0–0)
PLATELET # BLD AUTO: 391 K/UL — SIGNIFICANT CHANGE UP (ref 150–400)
POTASSIUM SERPL-MCNC: 3.8 MMOL/L — SIGNIFICANT CHANGE UP (ref 3.5–5.3)
POTASSIUM SERPL-SCNC: 3.8 MMOL/L — SIGNIFICANT CHANGE UP (ref 3.5–5.3)
PROT SERPL-MCNC: 6.2 G/DL — SIGNIFICANT CHANGE UP (ref 6–8.3)
PROTHROM AB SERPL-ACNC: 16.2 SEC — HIGH (ref 10.6–13.6)
RBC # BLD: 2.83 M/UL — LOW (ref 3.8–5.2)
RBC # FLD: 14.9 % — HIGH (ref 10.3–14.5)
SODIUM SERPL-SCNC: 139 MMOL/L — SIGNIFICANT CHANGE UP (ref 135–145)
WBC # BLD: 9.45 K/UL — SIGNIFICANT CHANGE UP (ref 3.8–10.5)
WBC # FLD AUTO: 9.45 K/UL — SIGNIFICANT CHANGE UP (ref 3.8–10.5)

## 2020-10-06 PROCEDURE — 99233 SBSQ HOSP IP/OBS HIGH 50: CPT

## 2020-10-06 PROCEDURE — 99232 SBSQ HOSP IP/OBS MODERATE 35: CPT

## 2020-10-06 RX ADMIN — LAMOTRIGINE 200 MILLIGRAM(S): 25 TABLET, ORALLY DISINTEGRATING ORAL at 11:32

## 2020-10-06 RX ADMIN — DULOXETINE HYDROCHLORIDE 30 MILLIGRAM(S): 30 CAPSULE, DELAYED RELEASE ORAL at 06:15

## 2020-10-06 RX ADMIN — DULOXETINE HYDROCHLORIDE 30 MILLIGRAM(S): 30 CAPSULE, DELAYED RELEASE ORAL at 13:14

## 2020-10-06 RX ADMIN — DULOXETINE HYDROCHLORIDE 30 MILLIGRAM(S): 30 CAPSULE, DELAYED RELEASE ORAL at 21:37

## 2020-10-06 RX ADMIN — Medication 1 TABLET(S): at 11:32

## 2020-10-06 RX ADMIN — Medication 1 MILLIGRAM(S): at 11:32

## 2020-10-06 RX ADMIN — Medication 200 MILLIGRAM(S): at 21:37

## 2020-10-06 RX ADMIN — Medication 300 MILLIGRAM(S): at 11:32

## 2020-10-06 RX ADMIN — PREGABALIN 1000 MICROGRAM(S): 225 CAPSULE ORAL at 11:32

## 2020-10-06 NOTE — PROGRESS NOTE ADULT - SUBJECTIVE AND OBJECTIVE BOX
[facility] -- Almas Faustin, Mayra, Cara, Saurabh Johansen Savella  Office # [phone]      Follow Up:    Hypotension  Subjective/Observations:   No events overnight resting comfortably in bed.  No complaints of chest pain, dyspnea, or palpitations reported. No signs of orthopnea or PND. Afebrile overnight     REVIEW OF SYSTEMS: All other review of systems is negative unless indicated above    PAST MEDICAL & SURGICAL HISTORY:  History of aortic aneurysm  descending aorta see CT    Arthritis    Degenerative disc disease, lumbar    Neuropathy    Spinal stenosis    Bipolar 1 disorder    Kidney stone    Sleep Apnea  CPAP with oxygen since June 2020    Asthma    Hypertension    Morbid Obesity    ETOH Abuse  H/O    Benign Essential Tremor    Anxiety    Depression    Renal Calculi  chronic      Colitis  H/O    Anemia    S/P cardiac catheterization    S/P dilation and curettage    History of tonsillectomy    History of laparoscopic adjustable gastric banding  band removed few yrs ago    S/P D&amp;C    Biliary stent placement &amp; ercp    ureteroscopy with stone removal  1-        MEDICATIONS  (STANDING):  allopurinol 300 milliGRAM(s) Oral daily  cyanocobalamin 1000 MICROGram(s) Oral daily  docusate sodium 100 milliGRAM(s) Oral three times a day  DULoxetine 30 milliGRAM(s) Oral <User Schedule>  folic acid 1 milliGRAM(s) Oral daily  lamoTRIgine 200 milliGRAM(s) Oral daily  multivitamin 1 Tablet(s) Oral daily  polyethylene glycol 3350 17 Gram(s) Oral daily  propranolol 10 milliGRAM(s) Oral daily  senna 2 Tablet(s) Oral at bedtime  traZODone 200 milliGRAM(s) Oral at bedtime    MEDICATIONS  (PRN):  acetaminophen   Tablet .. 650 milliGRAM(s) Oral every 6 hours PRN Mild Pain (1 - 3)  acetaminophen   Tablet .. 650 milliGRAM(s) Oral every 6 hours PRN Temp greater or equal to 38C (100.4F)  aluminum hydroxide/magnesium hydroxide/simethicone Suspension 30 milliLiter(s) Oral every 4 hours PRN Dyspepsia  artificial tears (preservative free) Ophthalmic Solution 1 Drop(s) Both EYES two times a day PRN Dry Eyes  glucagon  Injectable 1 milliGRAM(s) IntraMuscular once PRN Glucose LESS THAN 70 milligrams/deciliter      Allergies    penicillins (Other)  trees dogs cats cockaroaches (Rhinitis; Rhinorrhea)    Intolerances        Vital Signs Last 24 Hrs  T(C): 36.9 (06 Oct 2020 05:02), Max: 37.4 (05 Oct 2020 13:55)  T(F): 98.4 (06 Oct 2020 05:02), Max: 99.3 (05 Oct 2020 13:55)  HR: 86 (06 Oct 2020 05:02) (86 - 98)  BP: 126/83 (06 Oct 2020 05:02) (107/71 - 126/83)  BP(mean): --  RR: 18 (06 Oct 2020 05:02) (18 - 18)  SpO2: 97% (06 Oct 2020 05:02) (93% - 97%)    I&O's Summary    05 Oct 2020 07:01  -  06 Oct 2020 07:00  --------------------------------------------------------  IN: 0 mL / OUT: 320 mL / NET: -320 mL          PHYSICAL EXAM:  TELE: Off tele   Constitutional: NAD, awake and alert, Obese   HEENT: Moist Mucous Membranes, Anicteric  Pulmonary: Non-labored, breath sounds are clear bilaterally, No wheezing, crackles or rhonchi  Cardiovascular: Regular, S1 and S2 nl, No murmurs, rubs, gallops or clicks  Gastrointestinal: Bowel Sounds present, soft, nontender.   Lymph: No lymphadenopathy. No peripheral edema.  Skin: No visible rashes or ulcers.  Psych:  Mood & affect appropriate    LABS: All Labs Reviewed:                        8.4    9.45  )-----------( 391      ( 06 Oct 2020 07:01 )             25.2                         8.4    8.70  )-----------( 369      ( 05 Oct 2020 08:38 )             25.4                         8.5    7.94  )-----------( 313      ( 04 Oct 2020 06:43 )             24.9     06 Oct 2020 07:01    139    |  101    |  15     ----------------------------<  121    3.8     |  33     |  0.60   05 Oct 2020 08:38    139    |  101    |  14     ----------------------------<  143    4.0     |  32     |  0.70   04 Oct 2020 06:43    138    |  102    |  10     ----------------------------<  121    4.5     |  31     |  0.69     Ca    8.3        06 Oct 2020 07:01  Ca    8.2        05 Oct 2020 08:38  Ca    8.1        04 Oct 2020 06:43  Phos  2.8       05 Oct 2020 08:38  Mg     2.6       05 Oct 2020 08:38    TPro  6.2    /  Alb  1.9    /  TBili  0.6    /  DBili  x      /  AST  74     /  ALT  120    /  AlkPhos  123    06 Oct 2020 07:01  TPro  6.1    /  Alb  1.8    /  TBili  0.6    /  DBili  x      /  AST  80     /  ALT  124    /  AlkPhos  120    05 Oct 2020 08:38  TPro  5.8    /  Alb  1.8    /  TBili  0.6    /  DBili  x      /  AST  102    /  ALT  116    /  AlkPhos  109    04 Oct 2020 06:43    PT/INR - ( 06 Oct 2020 07:01 )   PT: 16.2 sec;   INR: 1.41 ratio

## 2020-10-06 NOTE — PROGRESS NOTE ADULT - SUBJECTIVE AND OBJECTIVE BOX
[facility]  INFECTIOUS DISEASES   =======================================================    [facility]-[id number]  EMANUEL MONTEZ     Follow up: Fever    Still has high volume blood from drains.   No fever, No new complaint.   Leukocytosis better.     PAST MEDICAL & SURGICAL HISTORY:  History of aortic aneurysm  descending aorta see CT  Arthritis  Degenerative disc disease, lumbar  Neuropathy  Spinal stenosis  Bipolar 1 disorder  Kidney stone  Sleep Apnea  CPAP with oxygen since June 2020  Asthma  Hypertension  Morbid Obesity  ETOH Abuse  H/O  Benign Essential Tremor  Anxiety  Depression  Renal Calculi  chronic    Colitis  H/O  Anemia  S/P cardiac catheterization  S/P dilation and curettage  History of tonsillectomy  History of laparoscopic adjustable gastric banding  band removed few yrs ago  S/P D&amp;C  Biliary stent placement &amp; ercp  ureteroscopy with stone removal  1-    Social Hx: no smoking, ETOH or drugs     FAMILY HISTORY:  Hypertension (Sibling)  Hyperlipidemia (Sibling)  Family history of renal failure  both parents were on dialysis  Family history of bacterial pneumonia  Family history of arthritis    Allergies  penicillins (Other)  trees dogs cats cockaroaches (Rhinitis; Rhinorrhea)    Antibiotics:  Meropenum     REVIEW OF SYSTEMS:  CONSTITUTIONAL:  + Fever   HEENT:  No diplopia or blurred vision.  No sore throat or runny nose.  CARDIOVASCULAR:  No chest pain or SOB.  RESPIRATORY:  No cough, shortness of breath, PND or orthopnea.  GASTROINTESTINAL:  No nausea, vomiting or diarrhea. surgical site pain +   GENITOURINARY:  No dysuria, frequency or urgency. No Blood in urine  MUSCULOSKELETAL:  no joint aches, no muscle pain  SKIN:  No change in skin, hair or nails.  NEUROLOGIC:  No paresthesias, fasciculations, seizures or weakness.  PSYCHIATRIC:  No disorder of thought or mood.  ENDOCRINE:  No heat or cold intolerance, polyuria or polydipsia.  HEMATOLOGICAL:  No easy bruising or bleeding.     Physical Exam:  Vital Signs Last 24 Hrs  T(C): 36.8 (06 Oct 2020 10:51), Max: 37.4 (05 Oct 2020 13:55)  T(F): 98.2 (06 Oct 2020 10:51), Max: 99.3 (05 Oct 2020 13:55)  HR: 65 (06 Oct 2020 10:51) (65 - 92)  BP: 119/83 (06 Oct 2020 10:51) (107/71 - 126/83)  BP(mean): --  RR: 20 (06 Oct 2020 10:51) (18 - 20)  SpO2: 98% (06 Oct 2020 10:51) (93% - 98%)  GEN: NAD, obese  HEENT: normocephalic and atraumatic. EOMI. PERRL.    NECK: Supple.  No lymphadenopathy   LUNGS: Clear to auscultation.  HEART: Regular rate and rhythm   ABDOMEN: Soft, nontender, and nondistended, wound clean, 3 darians+   : No CVA tenderness  EXTREMITIES: +edema.  NEUROLOGIC: grossly intact.  PSYCHIATRIC: Appropriate affect .  SKIN: No rash    Labs:                        8.4    9.45  )-----------( 391      ( 06 Oct 2020 07:01 )             25.2      10-06    139  |  101  |  15  ----------------------------<  121<H>  3.8   |  33<H>  |  0.60    Ca    8.3<L>      06 Oct 2020 07:01  Phos  2.8     10-05  Mg     2.6     10-05    TPro  6.2  /  Alb  1.9<L>  /  TBili  0.6  /  DBili  x   /  AST  74<H>  /  ALT  120<H>  /  AlkPhos  123<H>  10-06    Culture - Blood (collected 10-02-20 @ 21:16)  Source: .Blood Blood-Peripheral    Culture - Blood (collected 10-02-20 @ 21:16)  Source: .Blood Blood    Culture - Urine (collected 09-30-20 @ 15:18)  Source: .Urine Clean Catch (Midstream)  Final Report (10-01-20 @ 11:52):    <10,000 CFU/mL Normal Urogenital Chanel    Culture - Blood (collected 09-30-20 @ 12:07)  Source: .Blood Blood-Peripheral  Final Report (10-05-20 @ 13:00):    No Growth Final    Culture - Blood (collected 09-30-20 @ 12:07)  Source: .Blood Blood  Final Report (10-05-20 @ 13:00):    No Growth Final    WBC Count: 9.45 K/uL (10-06-20 @ 07:01)  WBC Count: 8.70 K/uL (10-05-20 @ 08:38)  WBC Count: 7.94 K/uL (10-04-20 @ 06:43)  WBC Count: 8.59 K/uL (10-03-20 @ 06:35)  WBC Count: 12.97 K/uL (10-02-20 @ 07:08)    Creatinine, Serum: 0.60 mg/dL (10-06-20 @ 07:01)  Creatinine, Serum: 0.70 mg/dL (10-05-20 @ 08:38)  Creatinine, Serum: 0.69 mg/dL (10-04-20 @ 06:43)  Creatinine, Serum: 0.69 mg/dL (10-03-20 @ 06:35)  Creatinine, Serum: 0.72 mg/dL (10-02-20 @ 07:08)    Ferritin, Serum: 190 ng/mL (10-01-20 @ 10:04)    Procalcitonin, Serum: 0.31 ng/mL (10-02-20 @ 07:08)     COVID-19 PCR: NotDetec (09-23-20 @ 13:30)    All imaging and other data have been reviewed.  < from: Xray Chest 1 View- PORTABLE-Urgent (Xray Chest 1 View- PORTABLE-Urgent .) (09.30.20 @ 08:13) >  EXAM:  XR CHEST PORTABLE URGENT 1V                        PROCEDURE DATE:  09/30/2020    INTERPRETATION:  Portable chest x-ray  Indication: fever of unclear source  Comparison: 6/16/2020  2 portable chest x-rays are acquired for evaluation.  Impression: No evidence for acute pulmonary infiltrate, pleural effusion, or pneumothorax.  Stable small calcified granuloma in the right upper lung zone.  New small linear atelectasis in the left lower lung zone.  Stable cardiac silhouette.  No pulmonary vascular congestion.    Assessment and Plan: 61 y/o woman with PMH of obesity, HTN, JESUS on CPAP, Anemia, AAA, Arthritis, Depression and Anxiety was admitted at [facility] for elective abdominal panniculectomy and liposuction on 9/25, now has fever so ID has been called for recommendations. Currently no source of infection, wound looks fine with no signs of infection, but has 3 drains all with bleeding, possibly hematoma underneath causing fever.   10/1 with low grade fever and worsening of leukocytosis, CT of abdomen on 9/30 showed pelvic wall large hematoma that still could be reason for fever and worsening of leukocytosis but since infected hematoma is a possibility and she had a slight drop in BP, will cover empirically with Meropenum for now.   Blood and urine cultures negative , CXR negative   10/2 afebrile since last night, leukocytosis and BP are better, unclear if infection is responding to ABx or it is the same hematoma causing all signs and symptoms.   10/4: left drain has been manipulated now draining blood better. Clinically stable even though had fever 102. S/P transfusion. Most likely bleeding is causing reactive fever and leukocytosis.     Fever, panniculectomy, leukocytosis, bleeding   - WBC 16k-->9k , will trend it  - Watching off antibiotics, s/p meropenem for 3 days. 10/3-10/5  - Surgery is on board for drain management, no sign of wound infection.   - Fever and leukocytosis is related to bleeding and hematoma in abdominal wall.     Will follow PRN.    Judi Black MD  Division of Infectious Diseases   Cell [phone] between 7am and 5pm   After 5pm and weekends please call ID service at [phone].    Plan discussed with Dr. Perkins.

## 2020-10-06 NOTE — PROGRESS NOTE ADULT - ASSESSMENT
61yo F w/ PMH of EJSUS on CPAP, HTN, morbid obesity, anemia, AAA, Arthritis, Depression, anxiety presented to [facility] for elective abdominal panniculectomy, admitted post-op with hemorrhagic shock s/p 3U PRBC with stabilization, now course c/b fever.    A fib  -Rate controlled per flowsheet  - She had brief atrial fibrillation in the setting of significant hypotension, (now off of pressor support) though is now back in SR, no PMH of Afib  - Off tele   - Continue Propanolol.   -For possible loop recorder as outpt she will follow up w/ Dr. Giordano upon discharge    HTN  - BP: 126/83 (10-06-20 @ 05:02) (107/71 - 126/83)  - Continue propranolol  - Hold verapamil   - low normal LV function with mild diastolic dysfunction on recent echocardiogram  - no sign of acute ischemia  - recent cath with non-obs cad  - no sign of volume overload  - hold diuretics     FEVER  - ID is following  -Afebrile  -per primary team      Anemia   - Stable  - S/p 4 PRBC transfusion   - Continue to trend CBC as per primary team    JESUS  - CPAP at night    -From a cardiac standpoint the patient may be discharged     - Monitor and replete lytes, keep K>4, Mg>2.  - All other medical needs as per primary team.  - Other cardiovascular workup will depend on clinical course.  - Will continue to follow.    Duarte Rojas DNP, ANP-c  Cardiology   Spectra #[phone]  [phone]

## 2020-10-06 NOTE — PROGRESS NOTE ADULT - SUBJECTIVE AND OBJECTIVE BOX
INTERVAL HPI/OVERNIGHT EVENTS:    MEDICATIONS  (STANDING):  allopurinol 300 milliGRAM(s) Oral daily  cyanocobalamin 1000 MICROGram(s) Oral daily  docusate sodium 100 milliGRAM(s) Oral three times a day  DULoxetine 30 milliGRAM(s) Oral <User Schedule>  folic acid 1 milliGRAM(s) Oral daily  lamoTRIgine 200 milliGRAM(s) Oral daily  multivitamin 1 Tablet(s) Oral daily  polyethylene glycol 3350 17 Gram(s) Oral daily  propranolol 10 milliGRAM(s) Oral daily  senna 2 Tablet(s) Oral at bedtime  traZODone 200 milliGRAM(s) Oral at bedtime    MEDICATIONS  (PRN):  acetaminophen   Tablet .. 650 milliGRAM(s) Oral every 6 hours PRN Mild Pain (1 - 3)  acetaminophen   Tablet .. 650 milliGRAM(s) Oral every 6 hours PRN Temp greater or equal to 38C (100.4F)  aluminum hydroxide/magnesium hydroxide/simethicone Suspension 30 milliLiter(s) Oral every 4 hours PRN Dyspepsia  artificial tears (preservative free) Ophthalmic Solution 1 Drop(s) Both EYES two times a day PRN Dry Eyes  glucagon  Injectable 1 milliGRAM(s) IntraMuscular once PRN Glucose LESS THAN 70 milligrams/deciliter      Allergies    penicillins (Other)  trees dogs cats cockaroaches (Rhinitis; Rhinorrhea)    Intolerances        Review of Systems:    General:  No wt loss, fevers, chills, night sweats,fatigue,   Eyes:  Good vision, no reported pain  ENT:  No sore throat, pain, runny nose, dysphagia  CV:  No pain, palpitatioins, hypo/hypertension  Resp:  No dyspnea, cough, tachypnea, wheezing  GI:  No pain, No nausea, No vomiting, No diarrhea, No constipatiion, No weight loss, No fever, No pruritis, No rectal bleeding, No tarry stools, No dysphagia,  :  No pain, bleeding, incontinence, nocturia  Muscle:  No pain, weakness  Neuro:  No weakness, tingling, memory problems  Psych:  No fatigue, insomnia, mood problems, depression  Endocrine:  No polyuria, polydypsia, cold/heat intolerance  Heme:  No petechiae, ecchymosis, easy bruisability  Skin:  No rash, tattoos, scars, edema      Vital Signs Last 24 Hrs  T(C): 37.2 (06 Oct 2020 13:19), Max: 37.2 (06 Oct 2020 13:19)  T(F): 99 (06 Oct 2020 13:19), Max: 99 (06 Oct 2020 13:19)  HR: 79 (06 Oct 2020 13:19) (65 - 89)  BP: 114/77 (06 Oct 2020 13:19) (113/76 - 126/83)  BP(mean): --  RR: 20 (06 Oct 2020 13:19) (18 - 20)  SpO2: 94% (06 Oct 2020 13:19) (94% - 98%)    PHYSICAL EXAM:    Constitutional: NAD, well-developed  HEENT: EOMI, throat clear  Neck: No LAD, supple  Respiratory: CTA and P  Cardiovascular: S1 and S2, RRR, no M  Gastrointestinal: BS+, soft, NT/ND, neg HSM,  Extremities: No peripheral edema, neg clubing, cyanosis  Vascular: 2+ peripheral pulses  Neurological: A/O x 3, no focal deficits  Psychiatric: Normal mood, normal affect  Skin: No rashes      LABS:                        8.4    9.45  )-----------( 391      ( 06 Oct 2020 07:01 )             25.2     10-06    139  |  101  |  15  ----------------------------<  121<H>  3.8   |  33<H>  |  0.60    Ca    8.3<L>      06 Oct 2020 07:01  Phos  2.8     10-05  Mg     2.6     10-05    TPro  6.2  /  Alb  1.9<L>  /  TBili  0.6  /  DBili  x   /  AST  74<H>  /  ALT  120<H>  /  AlkPhos  123<H>  10-06    PT/INR - ( 06 Oct 2020 07:01 )   PT: 16.2 sec;   INR: 1.41 ratio               RADIOLOGY & ADDITIONAL TESTS:

## 2020-10-06 NOTE — PROGRESS NOTE ADULT - SUBJECTIVE AND OBJECTIVE BOX
Postoperative Day #: 11    60y Female admitted with Other disorders of skin and subcutaneous tissue  S/P Panniculectomy with liposuction    .    Patient seen and examined bedside resting comfortably.  Pt without complaints currently.  States is doing well, and would like to go home.  Denies fevers, SOB< chest pain, back pain, dizziness, lightheadedness.  Has been afebrile >24 hours. Is ambulating, passing flatus. Received suppository last night and has had multiple bowel movements since. Passing flatus. Tolerating diet. Ambulating, but would feel more comfortable ambulating at home.       T(F): 98.4 (10-06-20 @ 05:02), Max: 99.3 (10-05-20 @ 13:55)  HR: 86 (10-06-20 @ 05:02) (86 - 98)  BP: 126/83 (10-06-20 @ 05:02) (107/71 - 126/83)  RR: 18 (10-06-20 @ 05:02) (18 - 18)  SpO2: 97% (10-06-20 @ 05:02) (93% - 97%)  Wt(kg): --  CAPILLARY BLOOD GLUCOSE          PHYSICAL EXAM:  General: NAD  Neuro:  Alert & oriented x 3  CV: +S1+S2 regular rate and rhythm  Lung: clear to ausculation bilaterally  Abdomen: Abdominal binder in place.  EDWIN drains with dark serosanguinous fluid in bulbs.  Binder lifted.  Incision clean dry intact.  Umbilicus healing well. No drainage.    Extremities: no pedal edema or calf tenderness noted       LABS:                        8.4    9.45  )-----------( 391      ( 06 Oct 2020 07:01 )             25.2     10-05    139  |  101  |  14  ----------------------------<  143<H>  4.0   |  32<H>  |  0.70    Ca    8.2<L>      05 Oct 2020 08:38  Phos  2.8     10-05  Mg     2.6     10-05    TPro  6.1  /  Alb  1.8<L>  /  TBili  0.6  /  DBili  x   /  AST  80<H>  /  ALT  124<H>  /  AlkPhos  120  10-05    PT/INR - ( 06 Oct 2020 07:01 )   PT: 16.2 sec;   INR: 1.41 ratio           I&O's Detail    05 Oct 2020 07:01  -  06 Oct 2020 07:00  --------------------------------------------------------  IN:  Total IN: 0 mL    OUT:    Bulb (mL): 90 mL    Bulb (mL): 115 mL    Bulb (mL): 115 mL  Total OUT: 320 mL    Total NET: -320 mL          Impression: 60y Female admitted with Other disorders of skin and subcutaneous tissue  S/P Panniculectomy with liposuction.   Currently afebrile >24 hours.  H/H appears stable.  EDWIN drains still draining, but known hematoma on CT scan.       Plan:  -Continue analgesia as needed  -Continue current diet- DASH/TLC  -Continue abdominal compression with abdominal binder  -Continue to monitor EDWIN output   -Increase activity with PT, OOB, Ambulate  -Patient instructed on and encouraged incentive spirometry use  -continue local wound care  -f/u AM chemistry  -Continue care per primary team  -Possible d/c planning.   -will discuss with Dr De La Torre

## 2020-10-06 NOTE — PROGRESS NOTE ADULT - PROBLEM SELECTOR PLAN 1
likely secondary to antibiotics/sepsis  check hepatitis panel, lay, ama, antiLKM, ferritin, ceruloplasmin   avoid hepatotoxic medication  trend daily

## 2020-10-06 NOTE — PROGRESS NOTE ADULT - SUBJECTIVE AND OBJECTIVE BOX
All interim records and events noted.    in chair, eager to go home  ambulated in room without adverse symptoms/no SOB or dizziness      MEDICATIONS  (STANDING):  allopurinol 300 milliGRAM(s) Oral daily  cyanocobalamin 1000 MICROGram(s) Oral daily  docusate sodium 100 milliGRAM(s) Oral three times a day  DULoxetine 30 milliGRAM(s) Oral <User Schedule>  folic acid 1 milliGRAM(s) Oral daily  lamoTRIgine 200 milliGRAM(s) Oral daily  multivitamin 1 Tablet(s) Oral daily  polyethylene glycol 3350 17 Gram(s) Oral daily  propranolol 10 milliGRAM(s) Oral daily  senna 2 Tablet(s) Oral at bedtime  traZODone 200 milliGRAM(s) Oral at bedtime    MEDICATIONS  (PRN):  acetaminophen   Tablet .. 650 milliGRAM(s) Oral every 6 hours PRN Mild Pain (1 - 3)  acetaminophen   Tablet .. 650 milliGRAM(s) Oral every 6 hours PRN Temp greater or equal to 38C (100.4F)  aluminum hydroxide/magnesium hydroxide/simethicone Suspension 30 milliLiter(s) Oral every 4 hours PRN Dyspepsia  artificial tears (preservative free) Ophthalmic Solution 1 Drop(s) Both EYES two times a day PRN Dry Eyes  glucagon  Injectable 1 milliGRAM(s) IntraMuscular once PRN Glucose LESS THAN 70 milligrams/deciliter      Vital Signs Last 24 Hrs  T(C): 36.8 (06 Oct 2020 10:51), Max: 37.4 (05 Oct 2020 13:55)  T(F): 98.2 (06 Oct 2020 10:51), Max: 99.3 (05 Oct 2020 13:55)  HR: 65 (06 Oct 2020 10:51) (65 - 92)  BP: 119/83 (06 Oct 2020 10:51) (107/71 - 126/83)  BP(mean): --  RR: 20 (06 Oct 2020 10:51) (18 - 20)  SpO2: 98% (06 Oct 2020 10:51) (93% - 98%)    PHYSICAL EXAM  General: well developed  well nourished, in no acute distress  Head: atraumatic, normocephalic  ENT: sclera anicteric, buccal mucosa moist  Neck: supple, trachea midline, no palpable masses  CV: S1 S2, regular rate and rhythm  Lungs: clear to auscultation, no wheezes/rhonchi  Abdomen: soft, nontender, bowel sounds present, obese, JPs x 2 intact w bloody dark red drainage  Extrem: no clubbing/cyanosis/edema  Skin: no significant increased ecchymosis/petechiae  Neuro: alert and oriented X3,  no focal deficits      LABS:             8.4    9.45  )-----------( 391      ( 10-06 @ 07:01 )             25.2                8.4    8.70  )-----------( 369      ( 10-05 @ 08:38 )             25.4                8.5    7.94  )-----------( 313      ( 10-04 @ 06:43 )             24.9       10-06    139  |  101  |  15  ----------------------------<  121<H>  3.8   |  33<H>  |  0.60    Ca    8.3<L>      06 Oct 2020 07:01  Phos  2.8     10-05  Mg     2.6     10-05    TPro  6.2  /  Alb  1.9<L>  /  TBili  0.6  /  DBili  x   /  AST  74<H>  /  ALT  120<H>  /  AlkPhos  123<H>  10-06    10-06 @ 07:01  PT16.2 INR1.41  PTT--  10-03 @ 14:13  PT16.3 INR1.42  PTT27.8      RADIOLOGY & ADDITIONAL STUDIES:    IMPRESSION/RECOMMENDATIONS:

## 2020-10-06 NOTE — PROGRESS NOTE ADULT - SUBJECTIVE AND OBJECTIVE BOX
Patient is a 60y old  Female who presents with a chief complaint of Panniculectomy (06 Oct 2020 14:40)      INTERVAL HPI/OVERNIGHT EVENTS:    Offers no complaints    MEDICATIONS  (STANDING):  allopurinol 300 milliGRAM(s) Oral daily  clobetasol 0.05% Ointment 1 Application(s) Topical <User Schedule>  cyanocobalamin 1000 MICROGram(s) Oral daily  docusate sodium 100 milliGRAM(s) Oral three times a day  DULoxetine 30 milliGRAM(s) Oral <User Schedule>  folic acid 1 milliGRAM(s) Oral daily  lamoTRIgine 200 milliGRAM(s) Oral daily  multivitamin 1 Tablet(s) Oral daily  polyethylene glycol 3350 17 Gram(s) Oral daily  propranolol 10 milliGRAM(s) Oral daily  senna 2 Tablet(s) Oral at bedtime  traZODone 200 milliGRAM(s) Oral at bedtime  triamcinolone 0.1% Cream 1 Application(s) Topical once      MEDICATIONS  (PRN):  acetaminophen   Tablet .. 650 milliGRAM(s) Oral every 6 hours PRN Mild Pain (1 - 3)  acetaminophen   Tablet .. 650 milliGRAM(s) Oral every 6 hours PRN Temp greater or equal to 38C (100.4F)  aluminum hydroxide/magnesium hydroxide/simethicone Suspension 30 milliLiter(s) Oral every 4 hours PRN Dyspepsia  artificial tears (preservative free) Ophthalmic Solution 1 Drop(s) Both EYES two times a day PRN Dry Eyes  glucagon  Injectable 1 milliGRAM(s) IntraMuscular once PRN Glucose LESS THAN 70 milligrams/deciliter      Allergies    penicillins (Other)  trees dogs cats cockaroaches (Rhinitis; Rhinorrhea)    Intolerances        PAST MEDICAL & SURGICAL HISTORY:  History of aortic aneurysm  descending aorta see CT    Arthritis    Degenerative disc disease, lumbar    Neuropathy    Spinal stenosis    Bipolar 1 disorder    Kidney stone    Sleep Apnea  CPAP with oxygen since June 2020    Asthma    Hypertension    Morbid Obesity    ETOH Abuse  H/O    Benign Essential Tremor    Anxiety    Depression    Renal Calculi  chronic      Colitis  H/O    Anemia    S/P cardiac catheterization    S/P dilation and curettage    History of tonsillectomy    History of laparoscopic adjustable gastric banding  band removed few yrs ago    S/P D&amp;C    Biliary stent placement &amp; ercp    ureteroscopy with stone removal  1-        Vital Signs Last 24 Hrs  T(C): 37.3 (06 Oct 2020 21:42), Max: 37.3 (06 Oct 2020 21:42)  T(F): 99.2 (06 Oct 2020 21:42), Max: 99.2 (06 Oct 2020 21:42)  HR: 89 (06 Oct 2020 21:42) (65 - 89)  BP: 108/70 (06 Oct 2020 21:42) (108/70 - 126/83)  BP(mean): --  RR: 19 (06 Oct 2020 21:42) (18 - 20)  SpO2: 94% (06 Oct 2020 21:42) (94% - 98%)    PHYSICAL EXAMINATION:    GENERAL: The patient is awake and alert in no apparent distress.     HEENT: Head is normocephalic and atraumatic. Extraocular muscles are intact. Mucous membranes are moist.    NECK: Supple.    LUNGS: Clear to auscultation without wheezing, rales or rhonchi; respirations unlabored    HEART: Regular rate and rhythm without murmur.    ABDOMEN: obese, nontender, and nondistended.      EXTREMITIES: Without any cyanosis, clubbing, rash, lesions or edema.    NEUROLOGIC: Grossly intact.    SKIN: No ulceration or induration present.      LABS:                        8.4    9.45  )-----------( 391      ( 06 Oct 2020 07:01 )             25.2     10-06    139  |  101  |  15  ----------------------------<  121<H>  3.8   |  33<H>  |  0.60    Ca    8.3<L>      06 Oct 2020 07:01  Phos  2.8     10-05  Mg     2.6     10-05    TPro  6.2  /  Alb  1.9<L>  /  TBili  0.6  /  DBili  x   /  AST  74<H>  /  ALT  120<H>  /  AlkPhos  123<H>  10-06    PT/INR - ( 06 Oct 2020 07:01 )   PT: 16.2 sec;   INR: 1.41 ratio                   Assessment:    S/P Abdominal Paniculectomy  Morbid Obesity  Obstructive Sleep Apnea Syndrome  Hx Depression/Anxiety      Plan:    Incentive Spirometry  Pain Control  Nocturnal CPAP  Physical therapy for ambulation

## 2020-10-06 NOTE — PROGRESS NOTE ADULT - ASSESSMENT
59yo woman w PMH of JESUS on CPAP, HTN, morbid obesity, prior hx gastric sleeve which eroded and was removed, anemia, AAA, Arthritis, Depression, anxiety presented to [facility] for elective abdominal panniculectomy, admitted post-op with hemorrhagic shock assoc w precipitous drop in Hct, s/p 3U PRBC with stabilization; course c/b fever thought to be due to hematoma  Post Vit K 10/4/20 for INR 1.42, no sig change    -EDWIN still w dark red drainage but H/H has remained stable  -clinically asymptomatic on exam and per patient upon quetioning  -continue posts op care, contiue follow serial CBC

## 2020-10-06 NOTE — PROGRESS NOTE ADULT - ASSESSMENT
59yo F w/ PMH of JESUS on CPAP, HTN, morbid obesity, anemia, AAA, Arthritis, Depression, anxiety presented to [facility] for elective abdominal panniculectomy, admitted post-op with hemorrhagic shock s/p 4un PRBC with stabilization, now course c/b fever.    #Fever:  -unclear etiology  -pt without significant localizing symptoms or physical exam findings for infection   -BCx NGTD (monitor), UA quite benign, not indicative of infection (pt denies UTI symptoms)  -has had daily fever typically in early afternoon; no fever yet today. Leukocytosis which peaked at ~17 on 10/01/20 has now resolved; had some low BP readings on electronic monitor ; low-normal when BP obtained manually -- given 1L NS on 10/1 and 500cc of NS on 10/2  -UCx and BCx from 9/30 were negative; resent BCx on 10/2 --NGTD  -consulted ID (Way[facility]), recs appreciated  -CT showing 4cm x 10cm hematoma and no other sign of infection   -discontinued meropenem on 10/5 -- will monitor off Abx  -discussed with surgical team and Dr. De La Torre, who recommend to continue with drain via EDWIN drains, which do have bloody output (there is a drain at the site of the hematoma as seen on CT)  -other possible source of fever is atelectasis, though would be unusual to reach such high fever / persistent fevers and have a leukocytosis and is a less likely scenario  -monitor closely for signs of instability  -incentive spirometry   -fever now resolved    #Hemorrhagic shock: now resolved   - acute blood loss anemia post op, had 3 units pRBC early in admission to stabilize; gave 4th unit PRBC on 10/3 as Hgb had trended down to 7.2 ; appropriate rise in Hgb with that 4th unit  - shock state resolved, currently off pressors with stable vital signs   - Per pt, she has been chronically anemic and has been on B12 and folic acid supplements for years. Continue Vit B12 and folic acid supplements   - Heme ([facility]) consulted; who recommends no other anemia supplements currently; did give dose of vitamin K as pt still with blood draining from the EDWIN drains and INR of 1.42  - Continue to monitor H/H closely, Transfuse if Hgb <7 or if hemodynamically unstable   - monitor EDWIN x3 output; hold chemical DVT ppx in setting of continued bleeding.  - pt's bloody EDWIN output was 280cc on 10/3 and 190cc on 10/4, and now rising again to 320cc on 10/5  - consider discharge when drain output decreases as pt might not be able to sustain recovering too much blood loss given her hx of production-related anemia; discuss with surgery if any further w/up to be done regarding amount of blood still draining     #Transaminitis:  - pt's AST/ALT started to rise to low 100s (alk phos and t bili remain normal)  - pt had had ALT in the low-100s earlier in admission when was hypotensive from hemorrhage  - unclear reason for current rise-- possibly has mild elevation now due to relatively low BP couple of days ago ; ?mild DILI (meropenem has AST/ALT elevation listed as possible adverse effects) -- meropenem discontinued   - consulted GI (Nicole), recs appreciated  - performed RUQ US which demonstrated a fatty liver but no significant acute findings  - CT abd/P with po and IV contrast on 9/30 did not have significant pathology noted in the liver (the 7mm CBD was likely due to pt being post-cholecystectomy)  - transaminitis starting to improve    #S/P Abdominal panniculectomy: on 09/25/20.   -mgmt per surgical team.   -pain control as ordered.   -bowel regimen on board; miralax, senna, dulcolax   -Phys therapy  -SCDs    #Afib, new onset:  - Cardio following, ([facility]), recs appreciated   - had brief Afib in setting of severe hypotension / instability early in the admission.   - has been in sinus rhythm since then  - c/w propanolol per cardio  - no A/C especially given pt had hemorrhagic shock several days ago  - pt to have cardio follow up for possible loop recorder as outpt    #JESUS on CPAP:   -pulm (Dr Hanson) following - recs appreciated  -continuous pulse ox.   -CPAP QHS    #ELAINE:  - pt had ELAINE early in admission likely prerenal due to acute blood loss anemia with hemorrhagic shock  - doubt pt had ATN  - ELAINE resolved with PRBC and hemodynamic stabilization.   - monitor BMP    #HTN:   - continue propanolol  - holding verapamil (home med)    #Anxiety/Depression:   - continue Cymbalta, trazodone and lamictal.    #Morbid Obesity:  - now s/p panniculectomy ; counseled on diet    #DVT ppx:   - SCDs

## 2020-10-06 NOTE — CHART NOTE - NSCHARTNOTEFT_GEN_A_CORE
Assessment:   Pt is a 59 yo female admitted for an elective panniculectomy.   Pt visited at bedside. Pt reports appetite still decreased but improving. Pt is taking MVI, folic acid, vitamin B12, and drinking Faustino. Declines any other supplements at this time. PO intake encouraged. Pt was experiencing constipation but has since resolved. No report of n/v/diarrhea/constipation at this time. Last BM 10/5    Factors impacting intake: [ ] none [ ] nausea  [ ] vomiting [ ] diarrhea [ ] constipation  [ ]chewing problems [ ] swallowing issues  [ x ] other: s/p panniculectomy    Diet Presciption: Diet, DASH/TLC:   Sodium & Cholesterol Restricted  Faustino(7 Gm Arginine/7 Gm Glut/1.2 Gm HMB     Qty per Day:  1     Qty per Day:  once a day (09-29-20 @ 13:39)    Intake: Fair    Pertinent Medications: MEDICATIONS  (STANDING):  allopurinol 300 milliGRAM(s) Oral daily  cyanocobalamin 1000 MICROGram(s) Oral daily  docusate sodium 100 milliGRAM(s) Oral three times a day  DULoxetine 30 milliGRAM(s) Oral <User Schedule>  folic acid 1 milliGRAM(s) Oral daily  lamoTRIgine 200 milliGRAM(s) Oral daily  multivitamin 1 Tablet(s) Oral daily  polyethylene glycol 3350 17 Gram(s) Oral daily  propranolol 10 milliGRAM(s) Oral daily  senna 2 Tablet(s) Oral at bedtime  traZODone 200 milliGRAM(s) Oral at bedtime    MEDICATIONS  (PRN):  acetaminophen   Tablet .. 650 milliGRAM(s) Oral every 6 hours PRN Mild Pain (1 - 3)  acetaminophen   Tablet .. 650 milliGRAM(s) Oral every 6 hours PRN Temp greater or equal to 38C (100.4F)  aluminum hydroxide/magnesium hydroxide/simethicone Suspension 30 milliLiter(s) Oral every 4 hours PRN Dyspepsia  artificial tears (preservative free) Ophthalmic Solution 1 Drop(s) Both EYES two times a day PRN Dry Eyes  glucagon  Injectable 1 milliGRAM(s) IntraMuscular once PRN Glucose LESS THAN 70 milligrams/deciliter    Pertinent Labs: 10-06 Na139 mmol/L Glu 121 mg/dL<H> K+ 3.8 mmol/L Cr  0.60 mg/dL BUN 15 mg/dL 10-05 Phos 2.8 mg/dL 10-06 Alb 1.9 g/dL<L>     CAPILLARY BLOOD GLUCOSE        Skin: No pressure injuries reported at this time    Estimated Needs:   [ x ] no change since previous assessment  [ ] recalculated:     Previous Nutrition Diagnosis:   [ ] Inadequate Energy Intake [ ]Inadequate Oral Intake [ ] Excessive Energy Intake   [ ] Underweight [ x ] Increased Nutrient Needs [ ] Overweight/Obesity   [ ] Altered GI Function [ ] Unintended Weight Loss [ ] Food & Nutrition Related Knowledge Deficit [ ] Malnutrition     Nutrition Diagnosis is [ x ] ongoing  [ ] resolved [ ] not applicable     New Nutrition Diagnosis: [ x ] not applicable       Interventions: Continue Faustino. Encourage PO intake at meals.   Recommend  [ ] Change Diet To:  [ ] Nutrition Supplement  [ ] Nutrition Support  [ x ] Other: Monitor BM. Add carbohydrate controlled diet if Glu > 150 mg/dL    Monitoring and Evaluation:   [ x ] PO intake [ x ] Tolerance to diet prescription [ x ] weights [ x ] labs[ x ] follow up per protocol  [ ] other: Assessment:   Pt is a 61 yo female admitted for an elective panniculectomy.   Pt visited at bedside. Pt reports appetite still decreased but improving. Per pt report, she makes sure she "gets her protein in". Pt is taking MVI, folic acid, vitamin B12, and drinking Faustino. Declines any other supplements at this time. PO intake encouraged. Pt was experiencing constipation but has since resolved. No report of n/v/diarrhea/constipation at this time. Last BM 10/5    Factors impacting intake: [ ] none [ ] nausea  [ ] vomiting [ ] diarrhea [ ] constipation  [ ]chewing problems [ ] swallowing issues  [ x ] other: s/p panniculectomy    Diet Presciption: Diet, DASH/TLC:   Sodium & Cholesterol Restricted  Faustino(7 Gm Arginine/7 Gm Glut/1.2 Gm HMB     Qty per Day:  1     Qty per Day:  once a day (09-29-20 @ 13:39)    Intake: Fair    Pertinent Medications: MEDICATIONS  (STANDING):  allopurinol 300 milliGRAM(s) Oral daily  cyanocobalamin 1000 MICROGram(s) Oral daily  docusate sodium 100 milliGRAM(s) Oral three times a day  DULoxetine 30 milliGRAM(s) Oral <User Schedule>  folic acid 1 milliGRAM(s) Oral daily  lamoTRIgine 200 milliGRAM(s) Oral daily  multivitamin 1 Tablet(s) Oral daily  polyethylene glycol 3350 17 Gram(s) Oral daily  propranolol 10 milliGRAM(s) Oral daily  senna 2 Tablet(s) Oral at bedtime  traZODone 200 milliGRAM(s) Oral at bedtime    MEDICATIONS  (PRN):  acetaminophen   Tablet .. 650 milliGRAM(s) Oral every 6 hours PRN Mild Pain (1 - 3)  acetaminophen   Tablet .. 650 milliGRAM(s) Oral every 6 hours PRN Temp greater or equal to 38C (100.4F)  aluminum hydroxide/magnesium hydroxide/simethicone Suspension 30 milliLiter(s) Oral every 4 hours PRN Dyspepsia  artificial tears (preservative free) Ophthalmic Solution 1 Drop(s) Both EYES two times a day PRN Dry Eyes  glucagon  Injectable 1 milliGRAM(s) IntraMuscular once PRN Glucose LESS THAN 70 milligrams/deciliter    Pertinent Labs: 10-06 Na139 mmol/L Glu 121 mg/dL<H> K+ 3.8 mmol/L Cr  0.60 mg/dL BUN 15 mg/dL 10-05 Phos 2.8 mg/dL 10-06 Alb 1.9 g/dL<L>     CAPILLARY BLOOD GLUCOSE        Skin: No pressure injuries reported at this time    Estimated Needs:   [ x ] no change since previous assessment  [ ] recalculated:     Previous Nutrition Diagnosis:   [ ] Inadequate Energy Intake [ ]Inadequate Oral Intake [ ] Excessive Energy Intake   [ ] Underweight [ x ] Increased Nutrient Needs [ ] Overweight/Obesity   [ ] Altered GI Function [ ] Unintended Weight Loss [ ] Food & Nutrition Related Knowledge Deficit [ ] Malnutrition     Nutrition Diagnosis is [ x ] ongoing  [ ] resolved [ ] not applicable     New Nutrition Diagnosis: [ x ] not applicable       Interventions: Continue Faustino. Encourage PO intake at meals.   Recommend  [ ] Change Diet To:  [ ] Nutrition Supplement  [ ] Nutrition Support  [ x ] Other: Monitor BM. Add carbohydrate controlled diet if Glu > 150 mg/dL    Monitoring and Evaluation:   [ x ] PO intake [ x ] Tolerance to diet prescription [ x ] weights [ x ] labs[ x ] follow up per protocol  [ ] other:

## 2020-10-06 NOTE — PROGRESS NOTE ADULT - SUBJECTIVE AND OBJECTIVE BOX
Patient is a 60y old  Female who presents with a chief complaint of elective panniculectomy.     INTERVAL HPI/OVERNIGHT EVENTS: Pt afebrile for >24hours. Pt states she feels "alright." Pt denies abd pain, nausea, vomiting, diarrhea, SOB, cough, dysuria, urinary frequency/urgency, headache, dizziness.    MEDICATIONS  (STANDING):  allopurinol 300 milliGRAM(s) Oral daily  cyanocobalamin 1000 MICROGram(s) Oral daily  docusate sodium 100 milliGRAM(s) Oral three times a day  DULoxetine 30 milliGRAM(s) Oral <User Schedule>  folic acid 1 milliGRAM(s) Oral daily  lamoTRIgine 200 milliGRAM(s) Oral daily  multivitamin 1 Tablet(s) Oral daily  polyethylene glycol 3350 17 Gram(s) Oral daily  propranolol 10 milliGRAM(s) Oral daily  senna 2 Tablet(s) Oral at bedtime  traZODone 200 milliGRAM(s) Oral at bedtime    MEDICATIONS  (PRN):  acetaminophen   Tablet .. 650 milliGRAM(s) Oral every 6 hours PRN Mild Pain (1 - 3)  acetaminophen   Tablet .. 650 milliGRAM(s) Oral every 6 hours PRN Temp greater or equal to 38C (100.4F)  aluminum hydroxide/magnesium hydroxide/simethicone Suspension 30 milliLiter(s) Oral every 4 hours PRN Dyspepsia  artificial tears (preservative free) Ophthalmic Solution 1 Drop(s) Both EYES two times a day PRN Dry Eyes  glucagon  Injectable 1 milliGRAM(s) IntraMuscular once PRN Glucose LESS THAN 70 milligrams/deciliter      Allergies    penicillins (Other)  trees dogs cats cockaroaches (Rhinitis; Rhinorrhea)    Intolerances        REVIEW OF SYSTEMS:  CONSTITUTIONAL: No fever or chills  HEENT:  No headache, no sore throat  RESPIRATORY: No cough, wheezing, or shortness of breath  CARDIOVASCULAR: No chest pain, palpitations  GASTROINTESTINAL: No abd pain, nausea, vomiting, or diarrhea   GENITOURINARY: No dysuria, frequency, or hematuria  NEUROLOGICAL: no focal weakness or dizziness  MUSCULOSKELETAL: no myalgias     Vital Signs Last 24 Hrs  T(C): 37.2 (06 Oct 2020 13:19), Max: 37.2 (06 Oct 2020 13:19)  T(F): 99 (06 Oct 2020 13:19), Max: 99 (06 Oct 2020 13:19)  HR: 79 (06 Oct 2020 13:19) (65 - 89)  BP: 114/77 (06 Oct 2020 13:19) (113/76 - 126/83)  BP(mean): --  RR: 20 (06 Oct 2020 13:19) (18 - 20)  SpO2: 94% (06 Oct 2020 13:19) (94% - 98%    PHYSICAL EXAM:  GENERAL: NAD at rest, morbidly obese  HEENT:  anicteric, moist mucous membranes  CHEST/LUNG:  CTA b/l, no rales, wheezes, or rhonchi  HEART:  RRR, S1, S2  ABDOMEN:  BS+, soft, nontender in upper abdomen, mild tenderness near incisions, nondistended; 3 EDWIN drains with dark bloody drainage; incision sealed with dermabond without significant warmth or erythema. No incisional drainage.  EXTREMITIES: no edema, cyanosis, or calf tenderness  NERVOUS SYSTEM: answers questions and follows commands appropriately    LABS:                        8.4    9.45  )-----------( 391      ( 06 Oct 2020 07:01 )             25.2     10-06    139  |  101  |  15  ----------------------------<  121<H>  3.8   |  33<H>  |  0.60    Ca    8.3<L>      06 Oct 2020 07:01  Phos  2.8     10-05  Mg     2.6     10-05    TPro  6.2  /  Alb  1.9<L>  /  TBili  0.6  /  DBili  x   /  AST  74<H>  /  ALT  120<H>  /  AlkPhos  123<H>  10-06    PT/INR - ( 06 Oct 2020 07:01 )   PT: 16.2 sec;   INR: 1.41 ratio          CAPILLARY BLOOD GLUCOSE            Culture - Blood (collected 10-02-20 @ 21:16)  Source: .Blood Blood-Peripheral  Preliminary Report (10-03-20 @ 22:01):    No growth to date.    Culture - Blood (collected 10-02-20 @ 21:16)  Source: .Blood Blood  Preliminary Report (10-03-20 @ 22:01):    No growth to date.    Culture - Urine (collected 09-30-20 @ 15:18)  Source: .Urine Clean Catch (Midstream)  Final Report (10-01-20 @ 11:52):    <10,000 CFU/mL Normal Urogenital Chanel    Culture - Blood (collected 09-30-20 @ 12:07)  Source: .Blood Blood-Peripheral  Final Report (10-05-20 @ 13:00):    No Growth Final    Culture - Blood (collected 09-30-20 @ 12:07)  Source: .Blood Blood  Final Report (10-05-20 @ 13:00):    No Growth Final        RADIOLOGY & ADDITIONAL TESTS:    Personally reviewed.     Consultant(s) Notes Reviewed:  [x] YES  [ ] NO     Patient is a 60y old  Female who presents with a chief complaint of elective panniculectomy.      INTERVAL HPI/OVERNIGHT EVENTS: Pt afebrile for >24hours. Pt states she feels "alright." Pt denies abd pain, nausea, vomiting, diarrhea, SOB, cough, dysuria, urinary frequency/urgency, headache, dizziness.    MEDICATIONS  (STANDING):  allopurinol 300 milliGRAM(s) Oral daily  cyanocobalamin 1000 MICROGram(s) Oral daily  docusate sodium 100 milliGRAM(s) Oral three times a day  DULoxetine 30 milliGRAM(s) Oral <User Schedule>  folic acid 1 milliGRAM(s) Oral daily  lamoTRIgine 200 milliGRAM(s) Oral daily  multivitamin 1 Tablet(s) Oral daily  polyethylene glycol 3350 17 Gram(s) Oral daily  propranolol 10 milliGRAM(s) Oral daily  senna 2 Tablet(s) Oral at bedtime  traZODone 200 milliGRAM(s) Oral at bedtime    MEDICATIONS  (PRN):  acetaminophen   Tablet .. 650 milliGRAM(s) Oral every 6 hours PRN Mild Pain (1 - 3)  acetaminophen   Tablet .. 650 milliGRAM(s) Oral every 6 hours PRN Temp greater or equal to 38C (100.4F)  aluminum hydroxide/magnesium hydroxide/simethicone Suspension 30 milliLiter(s) Oral every 4 hours PRN Dyspepsia  artificial tears (preservative free) Ophthalmic Solution 1 Drop(s) Both EYES two times a day PRN Dry Eyes  glucagon  Injectable 1 milliGRAM(s) IntraMuscular once PRN Glucose LESS THAN 70 milligrams/deciliter      Allergies    penicillins (Other)  trees dogs cats cockaroaches (Rhinitis; Rhinorrhea)    Intolerances        REVIEW OF SYSTEMS:  CONSTITUTIONAL: No fever or chills  HEENT:  No headache, no sore throat  RESPIRATORY: No cough, wheezing, or shortness of breath  CARDIOVASCULAR: No chest pain, palpitations  GASTROINTESTINAL: No abd pain, nausea, vomiting, or diarrhea   GENITOURINARY: No dysuria, frequency, or hematuria  NEUROLOGICAL: no focal weakness or dizziness  MUSCULOSKELETAL: no myalgias     Vital Signs Last 24 Hrs  T(C): 37.2 (06 Oct 2020 13:19), Max: 37.2 (06 Oct 2020 13:19)  T(F): 99 (06 Oct 2020 13:19), Max: 99 (06 Oct 2020 13:19)  HR: 79 (06 Oct 2020 13:19) (65 - 89)  BP: 114/77 (06 Oct 2020 13:19) (113/76 - 126/83)  BP(mean): --  RR: 20 (06 Oct 2020 13:19) (18 - 20)  SpO2: 94% (06 Oct 2020 13:19) (94% - 98%    PHYSICAL EXAM:  GENERAL: NAD at rest, morbidly obese  HEENT:  anicteric, moist mucous membranes  CHEST/LUNG:  CTA b/l, no rales, wheezes, or rhonchi  HEART:  RRR, S1, S2  ABDOMEN:  BS+, soft, nontender in upper abdomen, mild tenderness near incisions, nondistended; 3 EDWIN drains with dark bloody drainage; incision sealed with dermabond without significant warmth or erythema. No incisional drainage.  EXTREMITIES: no edema, cyanosis, or calf tenderness  NERVOUS SYSTEM: answers questions and follows commands appropriately    LABS:                        8.4    9.45  )-----------( 391      ( 06 Oct 2020 07:01 )             25.2     10-06    139  |  101  |  15  ----------------------------<  121<H>  3.8   |  33<H>  |  0.60    Ca    8.3<L>      06 Oct 2020 07:01  Phos  2.8     10-05  Mg     2.6     10-05    TPro  6.2  /  Alb  1.9<L>  /  TBili  0.6  /  DBili  x   /  AST  74<H>  /  ALT  120<H>  /  AlkPhos  123<H>  10-06    PT/INR - ( 06 Oct 2020 07:01 )   PT: 16.2 sec;   INR: 1.41 ratio          CAPILLARY BLOOD GLUCOSE            Culture - Blood (collected 10-02-20 @ 21:16)  Source: .Blood Blood-Peripheral  Preliminary Report (10-03-20 @ 22:01):    No growth to date.    Culture - Blood (collected 10-02-20 @ 21:16)  Source: .Blood Blood  Preliminary Report (10-03-20 @ 22:01):    No growth to date.    Culture - Urine (collected 09-30-20 @ 15:18)  Source: .Urine Clean Catch (Midstream)  Final Report (10-01-20 @ 11:52):    <10,000 CFU/mL Normal Urogenital Chanel    Culture - Blood (collected 09-30-20 @ 12:07)  Source: .Blood Blood-Peripheral  Final Report (10-05-20 @ 13:00):    No Growth Final    Culture - Blood (collected 09-30-20 @ 12:07)  Source: .Blood Blood  Final Report (10-05-20 @ 13:00):    No Growth Final        RADIOLOGY & ADDITIONAL TESTS:    Personally reviewed.     Consultant(s) Notes Reviewed:  [x] YES  [ ] NO

## 2020-10-07 LAB
ALBUMIN SERPL ELPH-MCNC: 1.8 G/DL — LOW (ref 3.3–5)
ALP SERPL-CCNC: 119 U/L — SIGNIFICANT CHANGE UP (ref 40–120)
ALT FLD-CCNC: 134 U/L — HIGH (ref 12–78)
ANION GAP SERPL CALC-SCNC: 5 MMOL/L — SIGNIFICANT CHANGE UP (ref 5–17)
AST SERPL-CCNC: 99 U/L — HIGH (ref 15–37)
BILIRUB SERPL-MCNC: 0.5 MG/DL — SIGNIFICANT CHANGE UP (ref 0.2–1.2)
BUN SERPL-MCNC: 15 MG/DL — SIGNIFICANT CHANGE UP (ref 7–23)
CALCIUM SERPL-MCNC: 8.2 MG/DL — LOW (ref 8.5–10.1)
CHLORIDE SERPL-SCNC: 102 MMOL/L — SIGNIFICANT CHANGE UP (ref 96–108)
CO2 SERPL-SCNC: 32 MMOL/L — HIGH (ref 22–31)
CREAT SERPL-MCNC: 0.61 MG/DL — SIGNIFICANT CHANGE UP (ref 0.5–1.3)
CULTURE RESULTS: SIGNIFICANT CHANGE UP
CULTURE RESULTS: SIGNIFICANT CHANGE UP
GLUCOSE SERPL-MCNC: 126 MG/DL — HIGH (ref 70–99)
HCT VFR BLD CALC: 25.5 % — LOW (ref 34.5–45)
HGB BLD-MCNC: 8.2 G/DL — LOW (ref 11.5–15.5)
MCHC RBC-ENTMCNC: 29.5 PG — SIGNIFICANT CHANGE UP (ref 27–34)
MCHC RBC-ENTMCNC: 32.2 GM/DL — SIGNIFICANT CHANGE UP (ref 32–36)
MCV RBC AUTO: 91.7 FL — SIGNIFICANT CHANGE UP (ref 80–100)
NRBC # BLD: 0 /100 WBCS — SIGNIFICANT CHANGE UP (ref 0–0)
PLATELET # BLD AUTO: 399 K/UL — SIGNIFICANT CHANGE UP (ref 150–400)
POTASSIUM SERPL-MCNC: 4.6 MMOL/L — SIGNIFICANT CHANGE UP (ref 3.5–5.3)
POTASSIUM SERPL-SCNC: 4.6 MMOL/L — SIGNIFICANT CHANGE UP (ref 3.5–5.3)
PROT SERPL-MCNC: 6 G/DL — SIGNIFICANT CHANGE UP (ref 6–8.3)
RBC # BLD: 2.78 M/UL — LOW (ref 3.8–5.2)
RBC # FLD: 15 % — HIGH (ref 10.3–14.5)
SODIUM SERPL-SCNC: 139 MMOL/L — SIGNIFICANT CHANGE UP (ref 135–145)
SPECIMEN SOURCE: SIGNIFICANT CHANGE UP
SPECIMEN SOURCE: SIGNIFICANT CHANGE UP
WBC # BLD: 9.92 K/UL — SIGNIFICANT CHANGE UP (ref 3.8–10.5)
WBC # FLD AUTO: 9.92 K/UL — SIGNIFICANT CHANGE UP (ref 3.8–10.5)

## 2020-10-07 PROCEDURE — 99233 SBSQ HOSP IP/OBS HIGH 50: CPT

## 2020-10-07 PROCEDURE — 99232 SBSQ HOSP IP/OBS MODERATE 35: CPT

## 2020-10-07 RX ORDER — PHYTONADIONE (VIT K1) 5 MG
5 TABLET ORAL ONCE
Refills: 0 | Status: COMPLETED | OUTPATIENT
Start: 2020-10-07 | End: 2020-10-07

## 2020-10-07 RX ORDER — OXYCODONE AND ACETAMINOPHEN 5; 325 MG/1; MG/1
2 TABLET ORAL EVERY 4 HOURS
Refills: 0 | Status: DISCONTINUED | OUTPATIENT
Start: 2020-10-07 | End: 2020-10-10

## 2020-10-07 RX ORDER — OXYCODONE AND ACETAMINOPHEN 5; 325 MG/1; MG/1
1 TABLET ORAL EVERY 4 HOURS
Refills: 0 | Status: DISCONTINUED | OUTPATIENT
Start: 2020-10-07 | End: 2020-10-10

## 2020-10-07 RX ORDER — BENZOCAINE AND MENTHOL 5; 1 G/100ML; G/100ML
1 LIQUID ORAL ONCE
Refills: 0 | Status: COMPLETED | OUTPATIENT
Start: 2020-10-07 | End: 2020-10-07

## 2020-10-07 RX ADMIN — Medication 1 APPLICATION(S): at 17:25

## 2020-10-07 RX ADMIN — Medication 650 MILLIGRAM(S): at 23:30

## 2020-10-07 RX ADMIN — OXYCODONE AND ACETAMINOPHEN 1 TABLET(S): 5; 325 TABLET ORAL at 18:04

## 2020-10-07 RX ADMIN — OXYCODONE AND ACETAMINOPHEN 1 TABLET(S): 5; 325 TABLET ORAL at 18:58

## 2020-10-07 RX ADMIN — Medication 200 MILLIGRAM(S): at 21:40

## 2020-10-07 RX ADMIN — SENNA PLUS 2 TABLET(S): 8.6 TABLET ORAL at 21:39

## 2020-10-07 RX ADMIN — Medication 650 MILLIGRAM(S): at 22:40

## 2020-10-07 RX ADMIN — Medication 1 APPLICATION(S): at 05:38

## 2020-10-07 RX ADMIN — LAMOTRIGINE 200 MILLIGRAM(S): 25 TABLET, ORALLY DISINTEGRATING ORAL at 11:15

## 2020-10-07 RX ADMIN — BENZOCAINE AND MENTHOL 1 LOZENGE: 5; 1 LIQUID ORAL at 22:39

## 2020-10-07 RX ADMIN — DULOXETINE HYDROCHLORIDE 30 MILLIGRAM(S): 30 CAPSULE, DELAYED RELEASE ORAL at 05:36

## 2020-10-07 RX ADMIN — DULOXETINE HYDROCHLORIDE 30 MILLIGRAM(S): 30 CAPSULE, DELAYED RELEASE ORAL at 13:19

## 2020-10-07 RX ADMIN — DULOXETINE HYDROCHLORIDE 30 MILLIGRAM(S): 30 CAPSULE, DELAYED RELEASE ORAL at 21:40

## 2020-10-07 RX ADMIN — Medication 5 MILLIGRAM(S): at 17:24

## 2020-10-07 RX ADMIN — Medication 1 MILLIGRAM(S): at 11:16

## 2020-10-07 RX ADMIN — PREGABALIN 1000 MICROGRAM(S): 225 CAPSULE ORAL at 11:16

## 2020-10-07 RX ADMIN — Medication 1 TABLET(S): at 11:15

## 2020-10-07 RX ADMIN — Medication 300 MILLIGRAM(S): at 11:15

## 2020-10-07 NOTE — PROGRESS NOTE ADULT - ASSESSMENT
[ASSESSMENT and  PLAN]  61yo F w/ PMH of JESUS on CPAP, HTN, morbid obesity, prior history of gastric sleeve which eroded and was removed, anemia, AAA, Arthritis, Depression, anxiety presented to [facility] for elective abdominal panniculectomy, admitted post-op with hemorrhagic shock s/p 3U PRBC with stabilization; course c/b fever thought to be due to hematoma    - Hgb was 13 prior to admission; clearly had post-op intra-abdominal bleeding; Hg now seems to have stabilized  - received Vitamin K X1 on 10/4/20 for elevated INR    RECOMMEND  - Transfuse PRBC as clinically indicated.   - Transfuse PRBC if Hgb <7.0 or if symptomatic.   - avoid anticoagulants and continue DVT prophylaxis with SCD boots  -Give additional dose VItK 5mg today    - will re-evaluate iron studies and need for additional iron infusions as outpatient   - will follow with you

## 2020-10-07 NOTE — PROGRESS NOTE ADULT - SUBJECTIVE AND OBJECTIVE BOX
SURGERY PA NOTE ON BEHALF OF DR. DE LA TORRE / PLASTIC SURGERY:    S: Patient seen and examined at bedside.  Complains of persistent and severe pruritus, mostly on right at wound apex.  Still with some lorin-incisional soreness.  Denies john abdominal pain.  Tolerating diet, +BM, +F, +urinating.  Has been OOB and reports ambulating.  Denies fevers/chills/sweats.     MEDICATIONS:  acetaminophen   Tablet .. 650 milliGRAM(s) Oral every 6 hours PRN  acetaminophen   Tablet .. 650 milliGRAM(s) Oral every 6 hours PRN  allopurinol 300 milliGRAM(s) Oral daily  aluminum hydroxide/magnesium hydroxide/simethicone Suspension 30 milliLiter(s) Oral every 4 hours PRN  artificial tears (preservative free) Ophthalmic Solution 1 Drop(s) Both EYES two times a day PRN  clobetasol 0.05% Ointment 1 Application(s) Topical <User Schedule>  cyanocobalamin 1000 MICROGram(s) Oral daily  docusate sodium 100 milliGRAM(s) Oral three times a day  DULoxetine 30 milliGRAM(s) Oral <User Schedule>  folic acid 1 milliGRAM(s) Oral daily  glucagon  Injectable 1 milliGRAM(s) IntraMuscular once PRN  lamoTRIgine 200 milliGRAM(s) Oral daily  multivitamin 1 Tablet(s) Oral daily  polyethylene glycol 3350 17 Gram(s) Oral daily  propranolol 10 milliGRAM(s) Oral daily  senna 2 Tablet(s) Oral at bedtime  traZODone 200 milliGRAM(s) Oral at bedtime      O:  Vital Signs Last 24 Hrs  T(C): 37.4 (07 Oct 2020 05:26), Max: 37.4 (07 Oct 2020 05:26)  T(F): 99.4 (07 Oct 2020 05:26), Max: 99.4 (07 Oct 2020 05:26)  HR: 90 (07 Oct 2020 05:26) (89 - 90)  BP: 116/68 (07 Oct 2020 05:26) (108/70 - 116/68)  BP(mean): --  RR: 18 (07 Oct 2020 05:26) (18 - 19)  SpO2: 95% (07 Oct 2020 05:26) (94% - 95%)    I&O SUMMARY:    10-06-20 @ 07:01  -  10-07-20 @ 07:00  --------------------------------------------------------  IN: 0 mL / OUT: 310 mL / NET: -310 mL        PHYSICAL EXAM:  Lungs: CTA bilat without W/R/R  Card: S1S2  Abd: +Obese, soft, NT, ND.  +BS x 4.  Panniculectomy incision clean and intact.  Still with some sanguinous oozing, fibrinous exudate and sanguinous staining noted on dressings - mainly at region near wound apices bilaterally.  No appreciable purulence.  Some oozing from drain sites as well.  Approx. 60cc SS output overnight from each drain.  Umbilicus intact, appears viable.  Xeroform dressing intact.  Dressings changed, abdominal binder re-applied.   Ext: Calves soft, NT bilat     LABS:                        8.2    9.92  )-----------( 399      ( 07 Oct 2020 05:22 )             25.5     10-07    139  |  102  |  15  ----------------------------<  126<H>  4.6   |  32<H>  |  0.61    Ca    8.2<L>      07 Oct 2020 05:22    TPro  6.0  /  Alb  1.8<L>  /  TBili  0.5  /  DBili  x   /  AST  99<H>  /  ALT  134<H>  /  AlkPhos  119  10-07    PT/INR - ( 06 Oct 2020 07:01 )   PT: 16.2 sec;   INR: 1.41 ratio         A: 60-yo female, s/p panniculectomy, now with hematoma of subcutaneous tissues    P: Patient remains hemodynamically stable, H&H stable as well      Mild elevation in temp noted, though remains afebrile      Mild elevation in WBC count, though still remains within normal range      Continue OOB, pain control, incentive spirometry      Dr. De La Torre to see patient today as well (discussed earlier today)      Will follow

## 2020-10-07 NOTE — CHART NOTE - NSCHARTNOTEFT_GEN_A_CORE
PGY-1 Service Note    Called by RN due to patient's complaint of left ear pain and sore throat.    Patient seen and examined at bedside. Patient states she has been having left sided ear pain and sore throat since noon. She denies SOB, cough, phlegm, chest pain, palpitations, suprapubic pain, nausea, vomiting, swelling, new wounds, headaches. Of not temperature is 100.3.  Patient otherwise feeling well with no other complaints.     T(F): 100.3 (10-07-20 @ 21:42), Max: 100.3 (10-07-20 @ 21:42)  HR: 92 (10-07-20 @ 21:42) (78 - 92)  BP: 94/67 (10-07-20 @ 21:42) (91/60 - 94/67)  RR: 18 (10-07-20 @ 21:42) (18 - 18)  SpO2: 93% (10-07-20 @ 21:42) (90% - 93%)    Physical Exam:  General: no acute distress  HEENT: NCAT  Cardio: soft S1/S2, regular rate and rhythm  Lungs: clear to auscultation bilaterally  Abd: soft, nontender, nondistended, normoactive bowel sounds  Ext: no pedal edema, no calf tenderness    A/P:  -   - RN to call if any changes. PGY-1 Service Note    Called by RN due to patient's complaint of left ear pain and sore throat.    Patient seen and examined at bedside. Patient states she has been having 5/10 achy left sided ear pain and sore throat since noon. She denies dizziness, SOB, cough, phlegm, chest pain, palpitations, suprapubic pain, nausea, vomiting, swelling, new wounds, headaches. Of not temperature is 100.3.  Patient otherwise feeling well with no other complaints.     T(F): 100.3 (10-07-20 @ 21:42), Max: 100.3 (10-07-20 @ 21:42)  HR: 92 (10-07-20 @ 21:42) (78 - 92)  BP: 94/67 (10-07-20 @ 21:42) (91/60 - 94/67)  RR: 18 (10-07-20 @ 21:42) (18 - 18)  SpO2: 93% (10-07-20 @ 21:42) (90% - 93%)    Physical Exam:  General: no acute distress  HEENT: NCAT, tenderness to palpation of outer left external auditory canal, no erythema noted of the ear. Oral mucosa moist, no erythema noted. Submandibular lymph node is tender to palpation.  Cardio: soft S1/S2, regular rate and rhythm  Lungs: clear to auscultation bilaterally  Abd: obese, abdominal binder, present, soft, nontender, nondistended, normoactive bowel sounds, no suprapubic pain    A/P:  61yo F w/ PMH of JESUS on CPAP, HTN, morbid obesity, anemia, AAA, Arthritis, Depression, anxiety presented to [facility] for elective abdominal panniculectomy, admitted post-op with hemorrhagic shock s/p 4un PRBC with stabilization, now course c/b fever. Called by RN for left ear pain and throat pain.    - Otalgia vs otitis, will monitor for now  - Given Cepacol lozenges for throat soreness  - RN to call if any changes. PGY-1 Service Note    Called by RN due to patient's complaint of left ear pain and sore throat.    Patient seen and examined at bedside. Patient states she has been having 5/10 achy left sided ear pain and sore throat since noon. She denies dizziness, SOB, cough, phlegm, chest pain, palpitations, suprapubic pain, nausea, vomiting, swelling, new wounds, headaches. Of not temperature is 100.3.  Patient otherwise feeling well with no other complaints.     T(F): 100.3 (10-07-20 @ 21:42), Max: 100.3 (10-07-20 @ 21:42)  HR: 92 (10-07-20 @ 21:42) (78 - 92)  BP: 94/67 (10-07-20 @ 21:42) (91/60 - 94/67)  RR: 18 (10-07-20 @ 21:42) (18 - 18)  SpO2: 93% (10-07-20 @ 21:42) (90% - 93%)    Physical Exam:  General: no acute distress  HEENT: NCAT, tenderness to palpation of outer left external auditory canal, no erythema noted of the ear. Oral mucosa moist, no erythema noted. Submandibular lymph node is tender to palpation.  Cardio: soft S1/S2, regular rate and rhythm  Lungs: clear to auscultation bilaterally  Abd: obese, abdominal binder, present, soft, nontender, nondistended, normoactive bowel sounds, no suprapubic pain    A/P:  59yo F w/ PMH of JESUS on CPAP, HTN, morbid obesity, anemia, AAA, Arthritis, Depression, anxiety presented to [facility] for elective abdominal panniculectomy, admitted post-op with hemorrhagic shock s/p 4un PRBC with stabilization, now course c/b fever. Called by RN for left ear pain and throat pain.    - Otalgia vs otitis, will monitor for now  - Given Cepacol lozenges for throat soreness  - RN to call if any changes.    Addendum:  Called by RN for left wrist pain. Patient was seen and examined at beside. Patient states that she has a new 9/10 sharp wrist pain that started an hour ago. VSS. Patient denies trauma, overuse of left hand, numbness or tingling, chest pain, palpitations, headaches, dizziness, SOB, or abdominal pain. MSK exam show no swelling, light ecchymosis at previous blood draws, no gross abnormalities, decrease ROM of wrist in extension and ulnar/ radial deviation limited by pain. No snuff box tenderness. gross sensations intact of UEs, radial pulses 2+ bilaterally, tremor noted in full extension of arms (pt notes she has essential tremor). Pain is focused at palmar surface of wrist. Likely possible positional pain vs. carpal tunnel. Ordered lidoderm patch to manage pain. RN to call if anything changes.

## 2020-10-07 NOTE — PROVIDER CONTACT NOTE (CHANGE IN STATUS NOTIFICATION) - ACTION/TREATMENT ORDERED:
Ok to give Tylenol 650mg for 100.9 fever.
Pt. will be seen this morning. TBD if drsng needs to be changed.
Doctor Marek states that he will come to see patient.
Doctor states she will come and evaluate pt.

## 2020-10-07 NOTE — PROGRESS NOTE ADULT - SUBJECTIVE AND OBJECTIVE BOX
[INTERVAL HX: ]  Patient seen and examined;  Chart reviewed and events noted;   still with bloody drainage from EDWIN.   Not eating much.   Advised to eat better, as causes coagulopathy. States will eat better when home.  Administer VitK today      Patient is a 60y Female with a known history of :  Other disorders of skin and subcutaneous tissue [L98.8]  History of aortic aneurysm [Z86.79]  H/O aortic aneurysm repair [Z98.890]  Arthritis [M19.90]  Degenerative disc disease, lumbar [M51.36]  Neuropathy [G62.9]  Spinal stenosis [M48.00]  Bipolar 1 disorder [F31.9]  Kidney stone [592.0]  Diabetes Mellitus Type II [250.00]  Sleep Apnea [780.57]  Asthma [493.90]  Hypertension [401.9]  Obesity [278.00]  Morbid Obesity [278.01]  Drug Abuse [305.90]  ETOH Abuse [305.00]  Arrhythmia [427.9]  Benign Essential Tremor [333.1]  Anxiety [300.00]  Depression [311]  Renal Calculi [592.0]  Colitis [558.9]  Anemia [285.9]  Diabetes [250.00]  Sleep Apnea [780.57]  Asthma [493.90]  S/P cardiac catheterization [V45.89]  S/P dilation and curettage [V45.89]  History of tonsillectomy [V45.89]  History of laparoscopic adjustable gastric banding [V45.86]  S/P D&amp;C [V45.89]  Ureteral stent [593.4]  ERCP &amp; removal of biliary stent [574.20]  Biliary stent placement &amp; ercp [574.20]  Cholelithiasis [574.20]  History of Tonsillectomy [V45.89]  ureteroscopy with stone removal [592.0]      HPI:  Ms Armas is a 61 yo F who presented to [facility] for elective abdominal panniculectomy. PMH JESUS on CPAP, HTN, Obesity, Anemia, AAA, Arthritis, Depression, Anxiety.   Pt seen and examined post-operative. She states she is doing well. She has minimal abdominal pain 2/10 in severity. Its in the area of her panniluculus, no radiation elsewhere, pain is dull. She has no other complaints. Denies headaches, nausea, vomiting, chest pain, SOB, palpitations, constipation, diarrhea, melena, hematochezia, dysuria.      (25 Sep 2020 16:59)        MEDICATIONS  (STANDING):  allopurinol 300 milliGRAM(s) Oral daily  clobetasol 0.05% Ointment 1 Application(s) Topical <User Schedule>  cyanocobalamin 1000 MICROGram(s) Oral daily  docusate sodium 100 milliGRAM(s) Oral three times a day  DULoxetine 30 milliGRAM(s) Oral <User Schedule>  folic acid 1 milliGRAM(s) Oral daily  lamoTRIgine 200 milliGRAM(s) Oral daily  multivitamin 1 Tablet(s) Oral daily  polyethylene glycol 3350 17 Gram(s) Oral daily  propranolol 10 milliGRAM(s) Oral daily  senna 2 Tablet(s) Oral at bedtime  traZODone 200 milliGRAM(s) Oral at bedtime    MEDICATIONS  (PRN):  acetaminophen   Tablet .. 650 milliGRAM(s) Oral every 6 hours PRN Mild Pain (1 - 3)  acetaminophen   Tablet .. 650 milliGRAM(s) Oral every 6 hours PRN Temp greater or equal to 38C (100.4F)  aluminum hydroxide/magnesium hydroxide/simethicone Suspension 30 milliLiter(s) Oral every 4 hours PRN Dyspepsia  artificial tears (preservative free) Ophthalmic Solution 1 Drop(s) Both EYES two times a day PRN Dry Eyes  glucagon  Injectable 1 milliGRAM(s) IntraMuscular once PRN Glucose LESS THAN 70 milligrams/deciliter  oxycodone    5 mG/acetaminophen 325 mG 1 Tablet(s) Oral every 4 hours PRN Moderate Pain (4 - 6)  oxycodone    5 mG/acetaminophen 325 mG 2 Tablet(s) Oral every 4 hours PRN Severe Pain (7 - 10)      Vital Signs Last 24 Hrs  T(C): 37 (07 Oct 2020 13:35), Max: 37.4 (07 Oct 2020 05:26)  T(F): 98.6 (07 Oct 2020 13:35), Max: 99.4 (07 Oct 2020 05:26)  HR: 78 (07 Oct 2020 13:35) (78 - 90)  BP: 92/66 (07 Oct 2020 13:35) (92/66 - 116/68)  BP(mean): --  RR: 18 (07 Oct 2020 13:35) (18 - 19)  SpO2: 90% (07 Oct 2020 13:35) (90% - 95%)      [PHYSICAL EXAM]  General: adult in NAD,  WN,  WD.  HEENT: clear oropharynx, anicteric sclera, pink conjunctivae.  Neck: supple, no masses.  CV: normal S1S2, no murmur, no rubs, no gallops.  Lungs: clear to auscultation, no wheezes, no rales, no rhonchi.  Abdomen: soft, non-tender, non-distended, no hepatosplenomegaly, normal BS, no guarding. obese. EDWIN drains with bloody fluid.   Ext: no clubbing, no cyanosis, trace BL edema.  Skin: no rashes,  no petechiae, no venous stasis changes.  Neuro: alert and oriented X 3 , no focal motor deficits.  LN: no SC DIANA.      [LABS:]                        8.2    9.92  )-----------( 399      ( 07 Oct 2020 05:22 )             25.5     10-07  139  |  102  |  15  ----------------------------<  126<H>  4.6   |  32<H>  |  0.61  Ca    8.2<L>      07 Oct 2020 05:22  TPro  6.0  /  Alb  1.8<L>  /  TBili  0.5  /  DBili  x   /  AST  99<H>  /  ALT  134<H>  /  AlkPhos  119  10-07  PT/INR - ( 06 Oct 2020 07:01 )   PT: 16.2 sec;   INR: 1.41 ratio                     [RADIOLOGY STUDIES:]

## 2020-10-07 NOTE — PROGRESS NOTE ADULT - SUBJECTIVE AND OBJECTIVE BOX
[facility] -- Almas Faustin, Cara Nunez, Saurabh Johansen, Cindy Giordano: Office # [phone]    Follow Up: Hypotension       Subjective/Observations: Patient seen and examined. OOB to chair. No complaints of chest pain, dyspnea, palpitations or dizziness. No signs of orthopnea or PND. Continues to have low grade fever. C/o itching at abdominal dressing. Abdominal dressing with EDWIN drain in place.     REVIEW OF SYSTEMS: All review of systems is negative for eye, ENT, GI, , allergic, dermatologic, musculoskeletal and neurologic except as described above    PAST MEDICAL & SURGICAL HISTORY:  History of aortic aneurysm descending aorta see CT  Arthritis  Degenerative disc disease, lumbar  Neuropathy  Spinal stenosis  Bipolar 1 disorder  Kidney stone  Sleep Apnea CPAP with oxygen since June 2020  Asthma  Hypertension  Morbid Obesity  ETOH Abuse H/O  Benign Essential Tremor  Anxiety  Depression  Renal Calculi chronic  Colitis H/O  Anemia  S/P cardiac catheterization  S/P dilation and curettage  History of tonsillectomy  History of laparoscopic adjustable gastric banding band removed few yrs ago  S/P D&amp;C  Biliary stent placement &amp; ercp  ureteroscopy with stone removal 1-    MEDICATIONS  (STANDING):  allopurinol 300 milliGRAM(s) Oral daily  clobetasol 0.05% Ointment 1 Application(s) Topical <User Schedule>  cyanocobalamin 1000 MICROGram(s) Oral daily  docusate sodium 100 milliGRAM(s) Oral three times a day  DULoxetine 30 milliGRAM(s) Oral <User Schedule>  folic acid 1 milliGRAM(s) Oral daily  lamoTRIgine 200 milliGRAM(s) Oral daily  multivitamin 1 Tablet(s) Oral daily  polyethylene glycol 3350 17 Gram(s) Oral daily  propranolol 10 milliGRAM(s) Oral daily  senna 2 Tablet(s) Oral at bedtime  traZODone 200 milliGRAM(s) Oral at bedtime    MEDICATIONS  (PRN):  acetaminophen   Tablet .. 650 milliGRAM(s) Oral every 6 hours PRN Mild Pain (1 - 3)  acetaminophen   Tablet .. 650 milliGRAM(s) Oral every 6 hours PRN Temp greater or equal to 38C (100.4F)  aluminum hydroxide/magnesium hydroxide/simethicone Suspension 30 milliLiter(s) Oral every 4 hours PRN Dyspepsia  artificial tears (preservative free) Ophthalmic Solution 1 Drop(s) Both EYES two times a day PRN Dry Eyes  glucagon  Injectable 1 milliGRAM(s) IntraMuscular once PRN Glucose LESS THAN 70 milligrams/deciliter    Allergies  penicillins (Other)  trees dogs cats cockaroaches (Rhinitis; Rhinorrhea)    Vital Signs Last 24 Hrs  T(C): 37.4 (07 Oct 2020 05:26), Max: 37.4 (07 Oct 2020 05:26)  T(F): 99.4 (07 Oct 2020 05:26), Max: 99.4 (07 Oct 2020 05:26)  HR: 90 (07 Oct 2020 05:26) (79 - 90)  BP: 116/68 (07 Oct 2020 05:26) (108/70 - 116/68)  BP(mean): --  RR: 18 (07 Oct 2020 05:26) (18 - 20)  SpO2: 95% (07 Oct 2020 05:26) (94% - 95%)    I&O's Summary    06 Oct 2020 07:01  -  07 Oct 2020 07:00  --------------------------------------------------------  IN: 0 mL / OUT: 310 mL / NET: -310 mL    TELE: Not on telemetry   PHYSICAL EXAM:  Appearance: NAD, no distress, alert, Obese   HEENT: Moist Mucous Membranes, Anicteric  Cardiovascular: Regular rate and rhythm, Normal S1 S2, No JVD, No murmurs, No rubs, gallops or clicks  Respiratory: Non-labored, Clear to auscultation, No rales, No rhonchi, No wheezing.   Gastrointestinal:  Soft, Non-tender, + BS  Neurologic: Non-focal  Skin: Warm and dry, + abdominal incision with binder and EDWIN drain, No visible rashes or ulcers, No ecchymosis, No cyanosis  Musculoskeletal: No clubbing, No cyanosis, No joint swelling/tenderness  Psychiatry: Mood & affect appropriate  Lymph: No peripheral edema.     LABS: All Labs Reviewed:                        8.2    9.92  )-----------( 399      ( 07 Oct 2020 05:22 )             25.5                         8.4    9.45  )-----------( 391      ( 06 Oct 2020 07:01 )             25.2                         8.4    8.70  )-----------( 369      ( 05 Oct 2020 08:38 )             25.4     07 Oct 2020 05:22    139    |  102    |  15     ----------------------------<  126    4.6     |  32     |  0.61   06 Oct 2020 07:01    139    |  101    |  15     ----------------------------<  121    3.8     |  33     |  0.60   05 Oct 2020 08:38    139    |  101    |  14     ----------------------------<  143    4.0     |  32     |  0.70     Ca    8.2        07 Oct 2020 05:22  Ca    8.3        06 Oct 2020 07:01  Ca    8.2        05 Oct 2020 08:38  Phos  2.8       05 Oct 2020 08:38  Mg     2.6       05 Oct 2020 08:38    TPro  6.0    /  Alb  1.8    /  TBili  0.5    /  DBili  x      /  AST  99     /  ALT  134    /  AlkPhos  119    07 Oct 2020 05:22  TPro  6.2    /  Alb  1.9    /  TBili  0.6    /  DBili  x      /  AST  74     /  ALT  120    /  AlkPhos  123    06 Oct 2020 07:01  TPro  6.1    /  Alb  1.8    /  TBili  0.6    /  DBili  x      /  AST  80     /  ALT  124    /  AlkPhos  120    05 Oct 2020 08:38    PT/INR - ( 06 Oct 2020 07:01 )   PT: 16.2 sec;   INR: 1.41 ratio

## 2020-10-07 NOTE — PROGRESS NOTE ADULT - ATTENDING COMMENTS
Plan as above. Documented by attending Plan as above. Documented by attending. D/w Dr. De La Torre, she stated that out put from EDWIN drain for this kind of procedure is expected and recommended tight abdominal binder that surgical PA will place, from her stand point patient is cleared for discharge. Will monitor h/h and Liver enzymes for now.

## 2020-10-07 NOTE — PROGRESS NOTE ADULT - SUBJECTIVE AND OBJECTIVE BOX
Patient is a 60y old  Female who presents with a chief complaint of Panniculectomy (07 Oct 2020 18:08)      INTERVAL HPI/OVERNIGHT EVENTS:    offers no complaints. denies shortness of breath    MEDICATIONS  (STANDING):  allopurinol 300 milliGRAM(s) Oral daily  clobetasol 0.05% Ointment 1 Application(s) Topical <User Schedule>  cyanocobalamin 1000 MICROGram(s) Oral daily  docusate sodium 100 milliGRAM(s) Oral three times a day  DULoxetine 30 milliGRAM(s) Oral <User Schedule>  folic acid 1 milliGRAM(s) Oral daily  lamoTRIgine 200 milliGRAM(s) Oral daily  multivitamin 1 Tablet(s) Oral daily  polyethylene glycol 3350 17 Gram(s) Oral daily  propranolol 10 milliGRAM(s) Oral daily  senna 2 Tablet(s) Oral at bedtime  traZODone 200 milliGRAM(s) Oral at bedtime      MEDICATIONS  (PRN):  acetaminophen   Tablet .. 650 milliGRAM(s) Oral every 6 hours PRN Mild Pain (1 - 3)  acetaminophen   Tablet .. 650 milliGRAM(s) Oral every 6 hours PRN Temp greater or equal to 38C (100.4F)  aluminum hydroxide/magnesium hydroxide/simethicone Suspension 30 milliLiter(s) Oral every 4 hours PRN Dyspepsia  artificial tears (preservative free) Ophthalmic Solution 1 Drop(s) Both EYES two times a day PRN Dry Eyes  glucagon  Injectable 1 milliGRAM(s) IntraMuscular once PRN Glucose LESS THAN 70 milligrams/deciliter  oxycodone    5 mG/acetaminophen 325 mG 1 Tablet(s) Oral every 4 hours PRN Moderate Pain (4 - 6)  oxycodone    5 mG/acetaminophen 325 mG 2 Tablet(s) Oral every 4 hours PRN Severe Pain (7 - 10)      Allergies    penicillins (Other)  trees dogs cats cockaroaches (Rhinitis; Rhinorrhea)    Intolerances        PAST MEDICAL & SURGICAL HISTORY:  History of aortic aneurysm  descending aorta see CT    Arthritis    Degenerative disc disease, lumbar    Neuropathy    Spinal stenosis    Bipolar 1 disorder    Kidney stone    Sleep Apnea  CPAP with oxygen since June 2020    Asthma    Hypertension    Morbid Obesity    ETOH Abuse  H/O    Benign Essential Tremor    Anxiety    Depression    Renal Calculi  chronic      Colitis  H/O    Anemia    S/P cardiac catheterization    S/P dilation and curettage    History of tonsillectomy    History of laparoscopic adjustable gastric banding  band removed few yrs ago    S/P D&amp;C    Biliary stent placement &amp; ercp    ureteroscopy with stone removal  1-        Vital Signs Last 24 Hrs  T(C): 37.8 (07 Oct 2020 18:00), Max: 37.8 (07 Oct 2020 18:00)  T(F): 100.1 (07 Oct 2020 18:00), Max: 100.1 (07 Oct 2020 18:00)  HR: 89 (07 Oct 2020 18:00) (78 - 90)  BP: 91/60 (07 Oct 2020 18:00) (91/60 - 116/68)  BP(mean): --  RR: 18 (07 Oct 2020 18:00) (18 - 19)  SpO2: 91% (07 Oct 2020 18:00) (90% - 95%)    PHYSICAL EXAMINATION:    GENERAL: The patient is awake and alert in no apparent distress.     HEENT: Head is normocephalic and atraumatic. Extraocular muscles are intact. Mucous membranes are moist.    NECK: Supple.    LUNGS: Clear to auscultation without wheezing, rales or rhonchi; respirations unlabored    HEART: Regular rate and rhythm without murmur.    ABDOMEN: obese with positive bowel sounds    EXTREMITIES: Without any cyanosis, clubbing, rash, lesions or edema.    NEUROLOGIC: Grossly intact.    SKIN: No ulceration or induration present.      LABS:                        8.2    9.92  )-----------( 399      ( 07 Oct 2020 05:22 )             25.5     10-07    139  |  102  |  15  ----------------------------<  126<H>  4.6   |  32<H>  |  0.61    Ca    8.2<L>      07 Oct 2020 05:22    TPro  6.0  /  Alb  1.8<L>  /  TBili  0.5  /  DBili  x   /  AST  99<H>  /  ALT  134<H>  /  AlkPhos  119  10-07    PT/INR - ( 06 Oct 2020 07:01 )   PT: 16.2 sec;   INR: 1.41 ratio         Assessment:    S/P Abdominal Paniculectomy with post-op blood loss  Morbid Obesity  Obstructive Sleep Apnea Syndrome  Hx Depression/Anxiety    Plan:    Nocturnal CPAP  Pain Control  Possible discharge in AM

## 2020-10-07 NOTE — PROGRESS NOTE ADULT - SUBJECTIVE AND OBJECTIVE BOX
INTERVAL HPI/OVERNIGHT EVENTS:  No new overnight event.  No N/V/D.  Tolerating diet.     MEDICATIONS  (STANDING):  allopurinol 300 milliGRAM(s) Oral daily  clobetasol 0.05% Ointment 1 Application(s) Topical <User Schedule>  cyanocobalamin 1000 MICROGram(s) Oral daily  docusate sodium 100 milliGRAM(s) Oral three times a day  DULoxetine 30 milliGRAM(s) Oral <User Schedule>  folic acid 1 milliGRAM(s) Oral daily  lamoTRIgine 200 milliGRAM(s) Oral daily  multivitamin 1 Tablet(s) Oral daily  polyethylene glycol 3350 17 Gram(s) Oral daily  propranolol 10 milliGRAM(s) Oral daily  senna 2 Tablet(s) Oral at bedtime  traZODone 200 milliGRAM(s) Oral at bedtime    MEDICATIONS  (PRN):  acetaminophen   Tablet .. 650 milliGRAM(s) Oral every 6 hours PRN Mild Pain (1 - 3)  acetaminophen   Tablet .. 650 milliGRAM(s) Oral every 6 hours PRN Temp greater or equal to 38C (100.4F)  aluminum hydroxide/magnesium hydroxide/simethicone Suspension 30 milliLiter(s) Oral every 4 hours PRN Dyspepsia  artificial tears (preservative free) Ophthalmic Solution 1 Drop(s) Both EYES two times a day PRN Dry Eyes  glucagon  Injectable 1 milliGRAM(s) IntraMuscular once PRN Glucose LESS THAN 70 milligrams/deciliter      Allergies    penicillins (Other)  trees dogs cats cockaroaches (Rhinitis; Rhinorrhea)    Intolerances        Review of Systems:    General:  No wt loss, fevers, chills, night sweats,fatigue,   Eyes:  Good vision, no reported pain  ENT:  No sore throat, pain, runny nose, dysphagia  CV:  No pain, palpitatioins, hypo/hypertension  Resp:  No dyspnea, cough, tachypnea, wheezing  GI:  No pain, No nausea, No vomiting, No diarrhea, No constipatiion, No weight loss, No fever, No pruritis, No rectal bleeding, No tarry stools, No dysphagia,  :  No pain, bleeding, incontinence, nocturia  Muscle:  No pain, weakness  Neuro:  No weakness, tingling, memory problems  Psych:  No fatigue, insomnia, mood problems, depression  Endocrine:  No polyuria, polydypsia, cold/heat intolerance  Heme:  No petechiae, ecchymosis, easy bruisability  Skin:  No rash, tattoos, scars, edema      Vital Signs Last 24 Hrs  T(C): 37.4 (07 Oct 2020 05:26), Max: 37.4 (07 Oct 2020 05:26)  T(F): 99.4 (07 Oct 2020 05:26), Max: 99.4 (07 Oct 2020 05:26)  HR: 90 (07 Oct 2020 05:26) (79 - 90)  BP: 116/68 (07 Oct 2020 05:26) (108/70 - 116/68)  BP(mean): --  RR: 18 (07 Oct 2020 05:26) (18 - 20)  SpO2: 95% (07 Oct 2020 05:26) (94% - 95%)    PHYSICAL EXAM:    Constitutional: NAD, well-developed  HEENT: EOMI, throat clear  Neck: No LAD, supple  Respiratory: CTA and P  Cardiovascular: S1 and S2, RRR, no M  Gastrointestinal: BS+, soft, NT/ND, neg HSM,  Extremities: No peripheral edema, neg clubing, cyanosis  Vascular: 2+ peripheral pulses  Neurological: A/O x 3, no focal deficits  Psychiatric: Normal mood, normal affect  Skin: No rashes      LABS:                        8.2    9.92  )-----------( 399      ( 07 Oct 2020 05:22 )             25.5     10-07    139  |  102  |  15  ----------------------------<  126<H>  4.6   |  32<H>  |  0.61    Ca    8.2<L>      07 Oct 2020 05:22    TPro  6.0  /  Alb  1.8<L>  /  TBili  0.5  /  DBili  x   /  AST  99<H>  /  ALT  134<H>  /  AlkPhos  119  10-07    PT/INR - ( 06 Oct 2020 07:01 )   PT: 16.2 sec;   INR: 1.41 ratio               RADIOLOGY & ADDITIONAL TESTS:

## 2020-10-07 NOTE — PROVIDER CONTACT NOTE (CHANGE IN STATUS NOTIFICATION) - ASSESSMENT
Pt. is asymptomatic.
drsng more saturated than prior. PA notified.
Pt has and elevated temp of 100.3
Pt is in no distress. States that she has occasional tachycardia which she takes 0.5mg Xanax to treat. No chest pain. No resp distress. Pt is tremulous.

## 2020-10-07 NOTE — PROGRESS NOTE ADULT - SUBJECTIVE AND OBJECTIVE BOX
[facility]  INFECTIOUS DISEASES   =======================================================    [facility]-[id number]  EMANUEL MONTEZ     Follow up: Fever    Still has high volume blood from drains.   No fever, No new complaint.   Leukocytosis better. No more fever.     PAST MEDICAL & SURGICAL HISTORY:  History of aortic aneurysm  descending aorta see CT  Arthritis  Degenerative disc disease, lumbar  Neuropathy  Spinal stenosis  Bipolar 1 disorder  Kidney stone  Sleep Apnea  CPAP with oxygen since June 2020  Asthma  Hypertension  Morbid Obesity  ETOH Abuse  H/O  Benign Essential Tremor  Anxiety  Depression  Renal Calculi  chronic    Colitis  H/O  Anemia  S/P cardiac catheterization  S/P dilation and curettage  History of tonsillectomy  History of laparoscopic adjustable gastric banding  band removed few yrs ago  S/P D&amp;C  Biliary stent placement &amp; ercp  ureteroscopy with stone removal  1-    Social Hx: no smoking, ETOH or drugs     FAMILY HISTORY:  Hypertension (Sibling)  Hyperlipidemia (Sibling)  Family history of renal failure  both parents were on dialysis  Family history of bacterial pneumonia  Family history of arthritis    Allergies  penicillins (Other)  trees dogs cats cockaroaches (Rhinitis; Rhinorrhea)    Antibiotics:  Meropenum     REVIEW OF SYSTEMS:  CONSTITUTIONAL:  + Fever   HEENT:  No diplopia or blurred vision.  No sore throat or runny nose.  CARDIOVASCULAR:  No chest pain or SOB.  RESPIRATORY:  No cough, shortness of breath, PND or orthopnea.  GASTROINTESTINAL:  No nausea, vomiting or diarrhea. surgical site pain +   GENITOURINARY:  No dysuria, frequency or urgency. No Blood in urine  MUSCULOSKELETAL:  no joint aches, no muscle pain  SKIN:  No change in skin, hair or nails.  NEUROLOGIC:  No paresthesias, fasciculations, seizures or weakness.  PSYCHIATRIC:  No disorder of thought or mood.  ENDOCRINE:  No heat or cold intolerance, polyuria or polydipsia.  HEMATOLOGICAL:  No easy bruising or bleeding.     Physical Exam:  Vital Signs Last 24 Hrs  T(C): 37.4 (07 Oct 2020 05:26), Max: 37.4 (07 Oct 2020 05:26)  T(F): 99.4 (07 Oct 2020 05:26), Max: 99.4 (07 Oct 2020 05:26)  HR: 90 (07 Oct 2020 05:26) (79 - 90)  BP: 116/68 (07 Oct 2020 05:26) (108/70 - 116/68)  RR: 18 (07 Oct 2020 05:26) (18 - 20)  SpO2: 95% (07 Oct 2020 05:26) (94% - 95%)  GEN: NAD, obese  HEENT: normocephalic and atraumatic. EOMI. PERRL.    NECK: Supple.  No lymphadenopathy   LUNGS: Clear to auscultation.  HEART: Regular rate and rhythm   ABDOMEN: Soft, nontender, and nondistended, wound clean, 3 darians+   : No CVA tenderness  EXTREMITIES: +edema.  NEUROLOGIC: grossly intact.  PSYCHIATRIC: Appropriate affect .  SKIN: No rash    Labs:           10-07    139  |  102  |  15  ----------------------------<  126<H>  4.6   |  32<H>  |  0.61    Ca    8.2<L>      07 Oct 2020 05:22    TPro  6.0  /  Alb  1.8<L>  /  TBili  0.5  /  DBili  x   /  AST  99<H>  /  ALT  134<H>  /  AlkPhos  119  10-07                          8.2    9.92  )-----------( 399      ( 07 Oct 2020 05:22 )             25.5     PT/INR - ( 06 Oct 2020 07:01 )   PT: 16.2 sec;   INR: 1.41 ratio      LIVER FUNCTIONS - ( 07 Oct 2020 05:22 )  Alb: 1.8 g/dL / Pro: 6.0 g/dL / ALK PHOS: 119 U/L / ALT: 134 U/L / AST: 99 U/L / GGT: x           RECENT CULTURES:  10-02 @ 21:16 .Blood Blood     No growth to date.    09-30 @ 15:18 .Urine Clean Catch (Midstream)     <10,000 CFU/mL Normal Urogenital Chanel    09-30 @ 12:07 .Blood Blood     No Growth Final    All imaging and other data have been reviewed.  < from: Xray Chest 1 View- PORTABLE-Urgent (Xray Chest 1 View- PORTABLE-Urgent .) (09.30.20 @ 08:13) >  EXAM:  XR CHEST PORTABLE URGENT 1V                        PROCEDURE DATE:  09/30/2020    INTERPRETATION:  Portable chest x-ray  Indication: fever of unclear source  Comparison: 6/16/2020  2 portable chest x-rays are acquired for evaluation.  Impression: No evidence for acute pulmonary infiltrate, pleural effusion, or pneumothorax.  Stable small calcified granuloma in the right upper lung zone.  New small linear atelectasis in the left lower lung zone.  Stable cardiac silhouette.  No pulmonary vascular congestion.    Assessment and Plan: 59 y/o woman with PMH of obesity, HTN, JESUS on CPAP, Anemia, AAA, Arthritis, Depression and Anxiety was admitted at [facility] for elective abdominal panniculectomy and liposuction on 9/25, now has fever so ID has been called for recommendations. Currently no source of infection, wound looks fine with no signs of infection, but has 3 drains all with bleeding, possibly hematoma underneath causing fever.   10/1 with low grade fever and worsening of leukocytosis, CT of abdomen on 9/30 showed pelvic wall large hematoma that still could be reason for fever and worsening of leukocytosis but since infected hematoma is a possibility and she had a slight drop in BP, will cover empirically with Meropenum for now.   Blood and urine cultures negative , CXR negative   10/2 afebrile since last night, leukocytosis and BP are better, unclear if infection is responding to ABx or it is the same hematoma causing all signs and symptoms.   10/4: left drain has been manipulated now draining blood better. Clinically stable even though had fever 102. S/P transfusion. Most likely bleeding is causing reactive fever and leukocytosis.     Fever, panniculectomy, leukocytosis, bleeding   - WBC 16k-->9k , will trend it  - Watching off antibiotics, s/p meropenem for 3 days. 10/3-10/5  - Surgery is on board for drain management, no sign of wound infection.   - Fever and leukocytosis is related to bleeding and hematoma in abdominal wall.     Will sign off please call with any question.     Judi Black MD  Division of Infectious Diseases   Cell [phone] between 7am and 5pm   After 5pm and weekends please call ID service at [phone].

## 2020-10-07 NOTE — PROGRESS NOTE ADULT - SUBJECTIVE AND OBJECTIVE BOX
Patient is a 60y old  Female who presents with a chief complaint of Panniculectomy (06 Oct 2020 22:43)      INTERVAL HPI/OVERNIGHT EVENTS: 61yo F w/ PMH of JESUS on CPAP, HTN, morbid obesity, anemia, AAA, Arthritis, Depression, anxiety presented to [facility] for elective abdominal panniculectomy, admitted post-op with hemorrhagic shock s/p 4un PRBC with stabilization, now course c/b fever. Temp noted 99 from this am. Patient denies pain, sob, cough, urinary symptoms, abdominal pain, diarrhea.     MEDICATIONS  (STANDING):  allopurinol 300 milliGRAM(s) Oral daily  clobetasol 0.05% Ointment 1 Application(s) Topical <User Schedule>  cyanocobalamin 1000 MICROGram(s) Oral daily  docusate sodium 100 milliGRAM(s) Oral three times a day  DULoxetine 30 milliGRAM(s) Oral <User Schedule>  folic acid 1 milliGRAM(s) Oral daily  lamoTRIgine 200 milliGRAM(s) Oral daily  multivitamin 1 Tablet(s) Oral daily  polyethylene glycol 3350 17 Gram(s) Oral daily  propranolol 10 milliGRAM(s) Oral daily  senna 2 Tablet(s) Oral at bedtime  traZODone 200 milliGRAM(s) Oral at bedtime    MEDICATIONS  (PRN):  acetaminophen   Tablet .. 650 milliGRAM(s) Oral every 6 hours PRN Mild Pain (1 - 3)  acetaminophen   Tablet .. 650 milliGRAM(s) Oral every 6 hours PRN Temp greater or equal to 38C (100.4F)  aluminum hydroxide/magnesium hydroxide/simethicone Suspension 30 milliLiter(s) Oral every 4 hours PRN Dyspepsia  artificial tears (preservative free) Ophthalmic Solution 1 Drop(s) Both EYES two times a day PRN Dry Eyes  glucagon  Injectable 1 milliGRAM(s) IntraMuscular once PRN Glucose LESS THAN 70 milligrams/deciliter      Allergies    penicillins (Other)  trees dogs cats cockaroaches (Rhinitis; Rhinorrhea)    Intolerances        REVIEW OF SYSTEMS:  CONSTITUTIONAL: No fever, or fatigue  EYES: No eye pain, visual disturbances, or discharge  ENMT:  No difficulty hearing, tinnitus, vertigo; No sinus or throat pain  NECK: No pain or stiffness  BREASTS: No pain, masses, or nipple discharge  RESPIRATORY: No cough, wheezing, chills or hemoptysis; No shortness of breath  CARDIOVASCULAR: No chest pain, palpitations, dizziness, or leg swelling  GASTROINTESTINAL: No abdominal or epigastric pain. No nausea, vomiting, or hematemesis; No diarrhea or constipation. No melena or hematochezia.  GENITOURINARY: No dysuria, frequency, hematuria, or incontinence  NEUROLOGICAL: No headaches, memory loss, loss of strength, numbness, or tremors  SKIN: No itching, burning, rashes, or lesions   LYMPH NODES: No enlarged glands  ENDOCRINE: No heat or cold intolerance; No hair loss; No polydipsia or polyuria  MUSCULOSKELETAL: No joint pain or swelling; No muscle, back, or extremity pain  PSYCHIATRIC: No depression, anxiety, mood swings, or difficulty sleeping  HEME/LYMPH: No easy bruising, or bleeding gums  ALLERGY AND IMMUNOLOGIC: No hives or eczema    Vital Signs Last 24 Hrs  T(C): 37.4 (07 Oct 2020 05:26), Max: 37.4 (07 Oct 2020 05:26)  T(F): 99.4 (07 Oct 2020 05:26), Max: 99.4 (07 Oct 2020 05:26)  HR: 90 (07 Oct 2020 05:26) (65 - 90)  BP: 116/68 (07 Oct 2020 05:26) (108/70 - 119/83)  BP(mean): --  RR: 18 (07 Oct 2020 05:26) (18 - 20)  SpO2: 95% (07 Oct 2020 05:26) (94% - 98%)    PHYSICAL EXAM:  GENERAL: NAD, morbidly obese   HEAD:  Atraumatic, Normocephalic  EYES: EOMI, PERRLA, conjunctiva and sclera clear  ENMT: No tonsillar erythema, exudates, or enlargement; Moist mucous membranes  NECK: Supple, No JVD, Normal thyroid  NERVOUS SYSTEM:  Alert & Oriented X3, Good concentration; Motor Strength 5/5 B/L upper and lower extremities; DTRs 2+ intact and symmetric  CHEST/LUNG: Clear to auscultation bilaterally; No rales, rhonchi, wheezing, or rubs  HEART: Regular rate and rhythm; No murmurs, rubs, or gallops  ABDOMEN: S/p surgery, EDWIN drains in place. draining dark blood, Bowel sounds present  EXTREMITIES:  2+ Peripheral Pulses, No clubbing, cyanosis, or edema  LYMPH: No lymphadenopathy noted  SKIN: No rashes or lesions    LABS:                        8.2    9.92  )-----------( 399      ( 07 Oct 2020 05:22 )             25.5     07 Oct 2020 05:22    139    |  102    |  15     ----------------------------<  126    4.6     |  32     |  0.61     Ca    8.2        07 Oct 2020 05:22    TPro  6.0    /  Alb  1.8    /  TBili  0.5    /  DBili  x      /  AST  99     /  ALT  134    /  AlkPhos  119    07 Oct 2020 05:22    PT/INR - ( 06 Oct 2020 07:01 )   PT: 16.2 sec;   INR: 1.41 ratio           CAPILLARY BLOOD GLUCOSE        BLOOD CULTURE    RADIOLOGY & ADDITIONAL TESTS:    Imaging Personally Reviewed:  [ ] YES     Consultant(s) Notes Reviewed:      Care Discussed with Consultants/Other Providers:

## 2020-10-07 NOTE — PROGRESS NOTE ADULT - ASSESSMENT
61yo F w/ PMH of JESUS on CPAP, HTN, morbid obesity, anemia, AAA, Arthritis, Depression, anxiety presented to [facility] for elective abdominal panniculectomy, admitted post-op with hemorrhagic shock s/p 4un PRBC with stabilization, now course c/b fever.    #Fever:  -unclear etiology.   -pt without significant localizing symptoms or physical exam findings for infection   -BCx NGTD (monitor), UA quite benign, not indicative of infection (pt denies UTI symptoms)  -has had daily fever typically in early afternoon; no fever yet today. Leukocytosis which peaked at ~17 on 10/01/20 has now resolved; had some low BP readings on electronic monitor ; low-normal when BP obtained manually -- given 1L NS on 10/1 and 500cc of NS on 10/2  -UCx and BCx from 9/30 were negative; resent BCx on 10/2 --NGTD  -consulted ID (Way[facility]), recs appreciated  -CT showing 4cm x 10cm hematoma and no other sign of infection   -discontinued meropenem on 10/5 -- will monitor off Abx  -discussed with surgical team and Dr. De La Torre, who recommend to continue with drain via EDWIN drains, which do have bloody output (there is a drain at the site of the hematoma as seen on CT)  -other possible source of fever is atelectasis, though would be unusual to reach such high fever / persistent fevers and have a leukocytosis and is a less likely scenario  -monitor closely for signs of instability  -incentive spirometry   -fever now resolved, monitor for now.     #Hemorrhagic shock: now resolved   - acute blood loss anemia post op, had 3 units pRBC early in admission to stabilize; gave 4th unit PRBC on 10/3 as Hgb had trended down to 7.2 ; appropriate rise in Hgb with that 4th unit  - shock state resolved, currently off pressors with stable vital signs   - Per pt, she has been chronically anemic and has been on B12 and folic acid supplements for years. Continue Vit B12 and folic acid supplements   - Heme ([facility]) consulted; who recommends no other anemia supplements currently; did give dose of vitamin K as pt still with blood draining from the EDWIN drains and INR of 1.42  - Continue to monitor H/H closely, Transfuse if Hgb <7 or if hemodynamically unstable   - monitor EDWIN x3 output; hold chemical DVT ppx in setting of continued bleeding.  - pt's bloody EDWIN output was 280cc on 10/3 and 190cc on 10/4, and now rising again to 320cc on 10/5  - consider discharge when drain output decreases as pt might not be able to sustain recovering too much blood loss given her hx of production-related anemia; discuss with surgery if any further w/up to be done regarding amount of blood still draining     #Transaminitis:  - pt's AST/ALT started to rise to low 100s (alk phos and t bili remain normal)  - pt had had ALT in the low-100s earlier in admission when was hypotensive from hemorrhage  - unclear reason for current rise-- possibly has mild elevation now due to relatively low BP couple of days ago ; ?mild DILI (meropenem has AST/ALT elevation listed as possible adverse effects) -- meropenem discontinued   - consulted GI (Nicole), recs appreciated  - performed RUQ US which demonstrated a fatty liver but no significant acute findings  - CT abd/P with po and IV contrast on 9/30 did not have significant pathology noted in the liver (the 7mm CBD was likely due to pt being post-cholecystectomy)  - Monitor LFT's , avoid hepatotoxins     #S/P Abdominal panniculectomy: on 09/25/20.   -mgmt per surgical team.   -pain control as ordered.   -bowel regimen on board; miralax, senna, dulcolax   -Phys therapy  -SCDs    #Afib, new onset:  - [facility] recs appreciated   - had brief Afib in setting of severe hypotension / instability early in the admission.   - has been in sinus rhythm since then  - c/w propanolol per cardio  - no A/C especially given pt had hemorrhagic shock several days ago  - pt to have cardio follow up for possible loop recorder as outpt    #JESUS on CPAP:   -pulm (Dr Hanson) following - recs appreciated  -continuous pulse ox.   -CPAP QHS    #ELAINE:  - pt had ELAINE early in admission likely prerenal due to acute blood loss anemia with hemorrhagic shock  - doubt pt had ATN  - ELAINE resolved with PRBC and hemodynamic stabilization.   - monitor BMP    #HTN:   - continue propanolol  - holding verapamil (home med)    #Anxiety/Depression:   - continue Cymbalta, trazodone and lamictal.    #Morbid Obesity:  - now s/p panniculectomy ; counseled on diet    #DVT ppx:   - SCDs   61yo F w/ PMH of JESUS on CPAP, HTN, morbid obesity, anemia, AAA, Arthritis, Depression, anxiety presented to [facility] for elective abdominal panniculectomy, admitted post-op with hemorrhagic shock s/p 4un PRBC with stabilization, now course c/b fever.    #Fever:  -unclear etiology. Resolved. Suspect reactive or atelectasis   -pt without significant localizing symptoms or physical exam findings for infection   -BCx NGTD (monitor), UA quite benign, not indicative of infection (pt denies UTI symptoms)  -has had daily fever typically in early afternoon; no fever yet today. Leukocytosis which peaked at ~17 on 10/01/20 has now resolved; had some low BP readings on electronic monitor ; low-normal when BP obtained manually -- given 1L NS on 10/1 and 500cc of NS on 10/2  -UCx and BCx from 9/30 were negative; resent BCx on 10/2 --NGTD  -consulted ID (Wayne), recs appreciated  -CT showing 4cm x 10cm hematoma and no other sign of infection   -discontinued meropenem on 10/5 -- will monitor off Abx  -discussed with surgical team and Dr. De La Torre, who recommend to continue with drain via EDWIN drains, which do have bloody output (there is a drain at the site of the hematoma as seen on CT)  -other possible source of fever is atelectasis, though would be unusual to reach such high fever / persistent fevers and have a leukocytosis and is a less likely scenario  -monitor closely for signs of instability  -incentive spirometry   -ID f/u appreciated     #Hemorrhagic shock: now resolved   - acute blood loss anemia post op, had 3 units pRBC early in admission to stabilize; gave 4th unit PRBC on 10/3 as Hgb had trended down to 7.2 ; appropriate rise in Hgb with that 4th unit  - shock state resolved, currently off pressors with stable vital signs   - Per pt, she has been chronically anemic and has been on B12 and folic acid supplements for years. Continue Vit B12 and folic acid supplements   - Heme ([facility]) consulted; who recommends no other anemia supplements currently; did give dose of vitamin K as pt still with blood draining from the EDWIN drains and INR of 1.42  - Continue to monitor H/H closely, Transfuse if Hgb <7 or if hemodynamically unstable   - monitor EDWIN x3 output; hold chemical DVT ppx in setting of continued bleeding.  - pt's bloody EDWIN output was 280cc on 10/3 and 190cc on 10/4, and now rising again to 320cc on 10/5  - consider discharge when drain output decreases as pt might not be able to sustain recovering too much blood loss given her hx of production-related anemia; discuss with surgery if any further w/up to be done regarding amount of blood still draining     #Transaminitis:  - pt's AST/ALT started to rise to low 100s (alk phos and t bili remain normal)  - pt had had ALT in the low-100s earlier in admission when was hypotensive from hemorrhage  - unclear reason for current rise-- possibly has mild elevation now due to relatively low BP couple of days ago ; ?mild DILI (meropenem has AST/ALT elevation listed as possible adverse effects) -- meropenem discontinued   - consulted GI (Nicole), recs appreciated  - performed RUQ US which demonstrated a fatty liver but no significant acute findings  - CT abd/P with po and IV contrast on 9/30 did not have significant pathology noted in the liver (the 7mm CBD was likely due to pt being post-cholecystectomy)  - Monitor LFT's , avoid hepatotoxins. Trend daily.     #S/P Abdominal panniculectomy: on 09/25/20.   -mgmt per surgical team.   -pain control as ordered.   -bowel regimen on board; miralax, senna, dulcolax   -Phys therapy  -SCDs    #Afib, new onset:  - Cardio following, ([facility]), recs appreciated   - had brief Afib in setting of severe hypotension / instability early in the admission.   - has been in sinus rhythm since then  - c/w propanolol per cardio  - no A/C especially given pt had hemorrhagic shock several days ago  - pt to have cardio follow up for possible loop recorder as outpt    #JESUS on CPAP:   -pulm (Dr Hanson) following - recs appreciated  -continuous pulse ox.   -CPAP QHS    #ELAINE:  - pt had ELAINE early in admission likely prerenal due to acute blood loss anemia with hemorrhagic shock  - doubt pt had ATN  - ELAINE resolved with PRBC and hemodynamic stabilization.   - monitor BMP    #HTN:   - continue propanolol  - holding verapamil (home med)    #Anxiety/Depression:   - continue Cymbalta, trazodone and lamictal.    #Morbid Obesity:  - now s/p panniculectomy ; counseled on diet    #DVT ppx:   - SCDs

## 2020-10-07 NOTE — PROGRESS NOTE ADULT - ASSESSMENT
61yo F w/ PMH of JESUS on CPAP, HTN, morbid obesity, anemia, AAA, Arthritis, Depression, anxiety presented to [facility] for elective abdominal panniculectomy, admitted post-op with hemorrhagic shock s/p 3U PRBC with stabilization, now course c/b fever.    A fib  - Rate controlled per flow sheet  - She had brief atrial fibrillation in the setting of significant hypotension, (now off of pressor support) though is now back in SR, no PMH of Afib  - Off tele   - Continue Propanolol.   - For possible loop recorder as out pt she will follow up w/ Dr. Giordano upon discharge    HTN  - BP: 116/68 (10-07-20 @ 05:26) (108/70 - 116/68)  - Continue propranolol  - Hold verapamil   - low normal LV function with mild diastolic dysfunction on recent echocardiogram  - no sign of acute ischemia  - recent cath with non-obs cad  - no sign of volume overload  - hold diuretics     FEVER  - ID is following  - per primary team    Anemia   - Stable  - S/p PRBC transfusion   - Continue to trend CBC as per primary team    JESUS  - CPAP at night    - From a cardiac standpoint the patient may be discharged   - Monitor and replete lytes, keep K>4, Mg>2.  - All other medical needs as per primary team.  - Other cardiovascular workup will depend on clinical course.  - Will continue to follow.    Kobe Corado, MS FNP, AGAP  Nurse Practitioner- Cardiology   Spectra #[phone]

## 2020-10-08 LAB
ALBUMIN SERPL ELPH-MCNC: 1.7 G/DL — LOW (ref 3.3–5)
ALP SERPL-CCNC: 111 U/L — SIGNIFICANT CHANGE UP (ref 40–120)
ALT FLD-CCNC: 96 U/L — HIGH (ref 12–78)
ANION GAP SERPL CALC-SCNC: 8 MMOL/L — SIGNIFICANT CHANGE UP (ref 5–17)
AST SERPL-CCNC: 47 U/L — HIGH (ref 15–37)
BILIRUB SERPL-MCNC: 0.6 MG/DL — SIGNIFICANT CHANGE UP (ref 0.2–1.2)
BUN SERPL-MCNC: 16 MG/DL — SIGNIFICANT CHANGE UP (ref 7–23)
CALCIUM SERPL-MCNC: 7.9 MG/DL — LOW (ref 8.5–10.1)
CHLORIDE SERPL-SCNC: 100 MMOL/L — SIGNIFICANT CHANGE UP (ref 96–108)
CO2 SERPL-SCNC: 29 MMOL/L — SIGNIFICANT CHANGE UP (ref 22–31)
CREAT SERPL-MCNC: 0.68 MG/DL — SIGNIFICANT CHANGE UP (ref 0.5–1.3)
GLUCOSE SERPL-MCNC: 167 MG/DL — HIGH (ref 70–99)
HCT VFR BLD CALC: 25.5 % — LOW (ref 34.5–45)
HGB BLD-MCNC: 8.3 G/DL — LOW (ref 11.5–15.5)
MCHC RBC-ENTMCNC: 29.2 PG — SIGNIFICANT CHANGE UP (ref 27–34)
MCHC RBC-ENTMCNC: 32.5 GM/DL — SIGNIFICANT CHANGE UP (ref 32–36)
MCV RBC AUTO: 89.8 FL — SIGNIFICANT CHANGE UP (ref 80–100)
NRBC # BLD: 0 /100 WBCS — SIGNIFICANT CHANGE UP (ref 0–0)
PLATELET # BLD AUTO: 316 K/UL — SIGNIFICANT CHANGE UP (ref 150–400)
POTASSIUM SERPL-MCNC: 4 MMOL/L — SIGNIFICANT CHANGE UP (ref 3.5–5.3)
POTASSIUM SERPL-SCNC: 4 MMOL/L — SIGNIFICANT CHANGE UP (ref 3.5–5.3)
PROT SERPL-MCNC: 5.9 G/DL — LOW (ref 6–8.3)
RBC # BLD: 2.84 M/UL — LOW (ref 3.8–5.2)
RBC # FLD: 14.9 % — HIGH (ref 10.3–14.5)
SODIUM SERPL-SCNC: 137 MMOL/L — SIGNIFICANT CHANGE UP (ref 135–145)
WBC # BLD: 11.27 K/UL — HIGH (ref 3.8–10.5)
WBC # FLD AUTO: 11.27 K/UL — HIGH (ref 3.8–10.5)

## 2020-10-08 PROCEDURE — 99233 SBSQ HOSP IP/OBS HIGH 50: CPT

## 2020-10-08 PROCEDURE — 99232 SBSQ HOSP IP/OBS MODERATE 35: CPT

## 2020-10-08 PROCEDURE — 73110 X-RAY EXAM OF WRIST: CPT | Mod: 26,LT

## 2020-10-08 PROCEDURE — 93971 EXTREMITY STUDY: CPT | Mod: 26,LT

## 2020-10-08 RX ORDER — LIDOCAINE 4 G/100G
1 CREAM TOPICAL ONCE
Refills: 0 | Status: COMPLETED | OUTPATIENT
Start: 2020-10-08 | End: 2020-10-08

## 2020-10-08 RX ADMIN — OXYCODONE AND ACETAMINOPHEN 1 TABLET(S): 5; 325 TABLET ORAL at 23:20

## 2020-10-08 RX ADMIN — DULOXETINE HYDROCHLORIDE 30 MILLIGRAM(S): 30 CAPSULE, DELAYED RELEASE ORAL at 05:05

## 2020-10-08 RX ADMIN — OXYCODONE AND ACETAMINOPHEN 1 TABLET(S): 5; 325 TABLET ORAL at 12:42

## 2020-10-08 RX ADMIN — LIDOCAINE 1 PATCH: 4 CREAM TOPICAL at 04:58

## 2020-10-08 RX ADMIN — LAMOTRIGINE 200 MILLIGRAM(S): 25 TABLET, ORALLY DISINTEGRATING ORAL at 12:44

## 2020-10-08 RX ADMIN — Medication 200 MILLIGRAM(S): at 21:02

## 2020-10-08 RX ADMIN — Medication 1 MILLIGRAM(S): at 12:43

## 2020-10-08 RX ADMIN — Medication 650 MILLIGRAM(S): at 20:38

## 2020-10-08 RX ADMIN — OXYCODONE AND ACETAMINOPHEN 2 TABLET(S): 5; 325 TABLET ORAL at 04:20

## 2020-10-08 RX ADMIN — OXYCODONE AND ACETAMINOPHEN 2 TABLET(S): 5; 325 TABLET ORAL at 03:26

## 2020-10-08 RX ADMIN — PREGABALIN 1000 MICROGRAM(S): 225 CAPSULE ORAL at 12:43

## 2020-10-08 RX ADMIN — DULOXETINE HYDROCHLORIDE 30 MILLIGRAM(S): 30 CAPSULE, DELAYED RELEASE ORAL at 21:01

## 2020-10-08 RX ADMIN — Medication 1 TABLET(S): at 12:43

## 2020-10-08 RX ADMIN — OXYCODONE AND ACETAMINOPHEN 1 TABLET(S): 5; 325 TABLET ORAL at 13:42

## 2020-10-08 RX ADMIN — LIDOCAINE 1 PATCH: 4 CREAM TOPICAL at 16:00

## 2020-10-08 RX ADMIN — Medication 300 MILLIGRAM(S): at 12:43

## 2020-10-08 RX ADMIN — DULOXETINE HYDROCHLORIDE 30 MILLIGRAM(S): 30 CAPSULE, DELAYED RELEASE ORAL at 13:56

## 2020-10-08 RX ADMIN — LIDOCAINE 1 PATCH: 4 CREAM TOPICAL at 16:02

## 2020-10-08 RX ADMIN — SENNA PLUS 2 TABLET(S): 8.6 TABLET ORAL at 21:01

## 2020-10-08 NOTE — PROGRESS NOTE ADULT - SUBJECTIVE AND OBJECTIVE BOX
Interval History:  complains of left wrist pain    Chart reviewed and events noted;   Overnight events:    MEDICATIONS  (STANDING):  allopurinol 300 milliGRAM(s) Oral daily  clobetasol 0.05% Ointment 1 Application(s) Topical <User Schedule>  cyanocobalamin 1000 MICROGram(s) Oral daily  docusate sodium 100 milliGRAM(s) Oral three times a day  DULoxetine 30 milliGRAM(s) Oral <User Schedule>  folic acid 1 milliGRAM(s) Oral daily  lamoTRIgine 200 milliGRAM(s) Oral daily  multivitamin 1 Tablet(s) Oral daily  polyethylene glycol 3350 17 Gram(s) Oral daily  propranolol 10 milliGRAM(s) Oral daily  senna 2 Tablet(s) Oral at bedtime  traZODone 200 milliGRAM(s) Oral at bedtime    MEDICATIONS  (PRN):  acetaminophen   Tablet .. 650 milliGRAM(s) Oral every 6 hours PRN Mild Pain (1 - 3)  acetaminophen   Tablet .. 650 milliGRAM(s) Oral every 6 hours PRN Temp greater or equal to 38C (100.4F)  aluminum hydroxide/magnesium hydroxide/simethicone Suspension 30 milliLiter(s) Oral every 4 hours PRN Dyspepsia  artificial tears (preservative free) Ophthalmic Solution 1 Drop(s) Both EYES two times a day PRN Dry Eyes  glucagon  Injectable 1 milliGRAM(s) IntraMuscular once PRN Glucose LESS THAN 70 milligrams/deciliter  oxycodone    5 mG/acetaminophen 325 mG 1 Tablet(s) Oral every 4 hours PRN Moderate Pain (4 - 6)  oxycodone    5 mG/acetaminophen 325 mG 2 Tablet(s) Oral every 4 hours PRN Severe Pain (7 - 10)      Vital Signs Last 24 Hrs  T(C): 37.6 (08 Oct 2020 04:45), Max: 37.9 (07 Oct 2020 21:42)  T(F): 99.7 (08 Oct 2020 04:45), Max: 100.3 (07 Oct 2020 21:42)  HR: 96 (08 Oct 2020 04:45) (78 - 96)  BP: 100/66 (08 Oct 2020 04:45) (91/60 - 100/66)  BP(mean): --  RR: 18 (08 Oct 2020 04:45) (18 - 18)  SpO2: 93% (08 Oct 2020 04:45) (90% - 93%)    PHYSICAL EXAM  General: adult in NAD  HEENT: clear oropharynx, anicteric sclera, pink conjunctivae  Neck: supple  CV: normal S1S2 with no murmur rubs or gallops  Lungs: clear to auscultation, no wheezes, no rhales  Abdomen: soft non-tender non-distended, no hepato/splenomegaly  Ext: no clubbing cyanosis or edema. left wrist with discomfort with movement and touch  Skin: no rashes and no petichiae  Neuro: alert and oriented X3 no focal deficits      LABS:  CBC Full  -  ( 08 Oct 2020 07:24 )  WBC Count : 11.27 K/uL  RBC Count : 2.84 M/uL  Hemoglobin : 8.3 g/dL  Hematocrit : 25.5 %  Platelet Count - Automated : 316 K/uL  Mean Cell Volume : 89.8 fl  Mean Cell Hemoglobin : 29.2 pg  Mean Cell Hemoglobin Concentration : 32.5 gm/dL  Auto Neutrophil # : x  Auto Lymphocyte # : x  Auto Monocyte # : x  Auto Eosinophil # : x  Auto Basophil # : x  Auto Neutrophil % : x  Auto Lymphocyte % : x  Auto Monocyte % : x  Auto Eosinophil % : x  Auto Basophil % : x    10-08    137  |  100  |  16  ----------------------------<  167<H>  4.0   |  29  |  0.68    Ca    7.9<L>      08 Oct 2020 07:18    TPro  5.9<L>  /  Alb  1.7<L>  /  TBili  0.6  /  DBili  x   /  AST  47<H>  /  ALT  96<H>  /  AlkPhos  111  10-08        fe studies      WBC trend  11.27 K/uL (10-08-20 @ 07:24)  9.92 K/uL (10-07-20 @ 05:22)  9.45 K/uL (10-06-20 @ 07:01)      Hgb trend  8.3 g/dL (10-08-20 @ 07:24)  8.2 g/dL (10-07-20 @ 05:22)  8.4 g/dL (10-06-20 @ 07:01)      plt trend  316 K/uL (10-08-20 @ 07:24)  399 K/uL (10-07-20 @ 05:22)  391 K/uL (10-06-20 @ 07:01)        RADIOLOGY & ADDITIONAL STUDIES:

## 2020-10-08 NOTE — PROGRESS NOTE ADULT - ASSESSMENT
[ASSESSMENT and  PLAN]  61yo F w/ PMH of JESUS on CPAP, HTN, morbid obesity, prior history of gastric sleeve which eroded and was removed, anemia, AAA, Arthritis, Depression, anxiety presented to [facility] for elective abdominal panniculectomy, admitted post-op with hemorrhagic shock s/p 3U PRBC with stabilization; course c/b fever thought to be due to hematoma    - Hgb was 13 prior to admission; clearly had post-op intra-abdominal bleeding; Hg now seems to have stabilized  - received Vitamin K X1 on 10/4/20 for elevated INR    RECOMMEND  - Transfuse PRBC as clinically indicated.   - Transfuse PRBC if Hgb <7.0 or if symptomatic.   - avoid anticoagulants and continue DVT prophylaxis with SCD boots  - evaluation of wrist

## 2020-10-08 NOTE — PROGRESS NOTE ADULT - SUBJECTIVE AND OBJECTIVE BOX
Patient is a 60y old  Female who presents with a chief complaint of Panniculectomy (07 Oct 2020 19:44)       INTERVAL HPI/OVERNIGHT EVENTS: Patient seen and examined at bedside. Overnight events noted. Still c/o left hand pain and difficulty moving fingers. States that never had these symptoms before and denies trauma. Denies chest pain, palpitation, sob, nausea, vomiting or diarrhea.     MEDICATIONS  (STANDING):  allopurinol 300 milliGRAM(s) Oral daily  clobetasol 0.05% Ointment 1 Application(s) Topical <User Schedule>  cyanocobalamin 1000 MICROGram(s) Oral daily  docusate sodium 100 milliGRAM(s) Oral three times a day  DULoxetine 30 milliGRAM(s) Oral <User Schedule>  folic acid 1 milliGRAM(s) Oral daily  lamoTRIgine 200 milliGRAM(s) Oral daily  multivitamin 1 Tablet(s) Oral daily  polyethylene glycol 3350 17 Gram(s) Oral daily  propranolol 10 milliGRAM(s) Oral daily  senna 2 Tablet(s) Oral at bedtime  traZODone 200 milliGRAM(s) Oral at bedtime    MEDICATIONS  (PRN):  acetaminophen   Tablet .. 650 milliGRAM(s) Oral every 6 hours PRN Mild Pain (1 - 3)  acetaminophen   Tablet .. 650 milliGRAM(s) Oral every 6 hours PRN Temp greater or equal to 38C (100.4F)  aluminum hydroxide/magnesium hydroxide/simethicone Suspension 30 milliLiter(s) Oral every 4 hours PRN Dyspepsia  artificial tears (preservative free) Ophthalmic Solution 1 Drop(s) Both EYES two times a day PRN Dry Eyes  glucagon  Injectable 1 milliGRAM(s) IntraMuscular once PRN Glucose LESS THAN 70 milligrams/deciliter  oxycodone    5 mG/acetaminophen 325 mG 1 Tablet(s) Oral every 4 hours PRN Moderate Pain (4 - 6)  oxycodone    5 mG/acetaminophen 325 mG 2 Tablet(s) Oral every 4 hours PRN Severe Pain (7 - 10)      Allergies    penicillins (Other)  trees dogs cats cockaroaches (Rhinitis; Rhinorrhea)    Intolerances        REVIEW OF SYSTEMS:  CONSTITUTIONAL: +  fever, + Gen weakness   EYES: No eye pain, visual disturbances, or discharge  ENMT:  No difficulty hearing, tinnitus, vertigo; No sinus or throat pain  NECK: No pain or stiffness  RESPIRATORY: No cough, wheezing, chills or hemoptysis; No shortness of breath  CARDIOVASCULAR: No chest pain, palpitations, dizziness, or leg swelling  GASTROINTESTINAL: No abdominal or epigastric pain. No nausea, vomiting, or hematemesis; No diarrhea or constipation. No melena or hematochezia.  GENITOURINARY: No dysuria, frequency, hematuria, or incontinence  NEUROLOGICAL: No headaches, memory loss, loss of strength, numbness, or tremors  SKIN: No itching, burning, rashes, or lesions   LYMPH NODES: No enlarged glands  ENDOCRINE: No heat or cold intolerance; No hair loss; No polydipsia or polyuria  MUSCULOSKELETAL: Left hand pain,  No joint  swelling; No muscle, back, or extremity pain  PSYCHIATRIC: No depression, anxiety, mood swings, or difficulty sleeping  HEME/LYMPH: No easy bruising, or bleeding gums  ALLERGY AND IMMUNOLOGIC: No hives or eczema    Vital Signs Last 24 Hrs  T(C): 37.6 (08 Oct 2020 04:45), Max: 37.9 (07 Oct 2020 21:42)  T(F): 99.7 (08 Oct 2020 04:45), Max: 100.3 (07 Oct 2020 21:42)  HR: 96 (08 Oct 2020 04:45) (78 - 96)  BP: 100/66 (08 Oct 2020 04:45) (91/60 - 100/66)  BP(mean): --  RR: 18 (08 Oct 2020 04:45) (18 - 18)  SpO2: 93% (08 Oct 2020 04:45) (90% - 93%)    PHYSICAL EXAM:  GENERAL: NAD, Morbidly obese   HEAD:  Atraumatic, Normocephalic  EYES: EOMI, PERRLA, conjunctiva and sclera clear  ENMT: No tonsillar erythema, exudates, or enlargement; Moist mucous membranes  NECK: Supple, No JVD, Normal thyroid  NERVOUS SYSTEM:  Alert & Oriented X3, Good concentration; Motor Strength 5/5 B/L upper and lower extremities  CHEST/LUNG: Clear to auscultation bilaterally; No rales, rhonchi, wheezing, or rubs  HEART: Regular rate and rhythm; No murmurs, rubs, or gallops  ABDOMEN: Soft, Nontender, Nondistended; Bowel sounds present, + EDWIN drains with output better then yesterday   EXTREMITIES:  2+ Peripheral Pulses, No clubbing, cyanosis, or edema, Left hand exam negative for erythema/ rash, normal color no swelling, diminished range of motion.   LYMPH: No lymphadenopathy noted  SKIN: No rashes or lesions    LABS:                        8.3    11.27 )-----------( 316      ( 08 Oct 2020 07:24 )             25.5     08 Oct 2020 07:18    137    |  100    |  16     ----------------------------<  167    4.0     |  29     |  0.68     Ca    7.9        08 Oct 2020 07:18    TPro  5.9    /  Alb  1.7    /  TBili  0.6    /  DBili  x      /  AST  47     /  ALT  96     /  AlkPhos  111    08 Oct 2020 07:18      CAPILLARY BLOOD GLUCOSE        BLOOD CULTURE    RADIOLOGY & ADDITIONAL TESTS:    Imaging Personally Reviewed:  [ ] YES     Consultant(s) Notes Reviewed:      Care Discussed with Consultants/Other Providers:

## 2020-10-08 NOTE — PROGRESS NOTE ADULT - ASSESSMENT
59yo F w/ PMH of JESUS on CPAP, HTN, morbid obesity, anemia, AAA, Arthritis, Depression, anxiety presented to [facility] for elective abdominal panniculectomy, admitted post-op with hemorrhagic shock s/p 3U PRBC with stabilization, now course c/b fever.    A fib  - Rate controlled per flow sheet  - She had brief atrial fibrillation in the setting of significant hypotension, (now off of pressor support) though is now back in SR, no PMH of Afib  - Off tele   - Continue Propanolol.   - For possible loop recorder as out pt she will follow up w/ Dr. Giordano upon discharge    HTN  - BP: 100/66 (10-08-20 @ 04:45) (91/60 - 100/66)  - Continue propranolol  - Hold verapamil   - low normal LV function with mild diastolic dysfunction on recent echocardiogram  - no sign of acute ischemia  - recent cath with non-obs cad  - no sign of volume overload  - hold diuretics     FEVER  - ID is following  - per primary team    Anemia   - Stable  - S/p PRBC transfusion   - Continue to trend CBC as per primary team    JESUS  - CPAP at night    - From a cardiac standpoint the patient may be discharged   - Monitor and replete lytes, keep K>4, Mg>2.  - All other medical needs as per primary team.  - Other cardiovascular workup will depend on clinical course.  - Will continue to follow.    Duarte Rojas DNP, ANP-c  Cardiology   Spectra #[phone]

## 2020-10-08 NOTE — PROGRESS NOTE ADULT - SUBJECTIVE AND OBJECTIVE BOX
[facility] -- Almas Faustin, Mayra, Cara, Saurabh Johansen Savella  Office # [phone]      Follow Up:    Hypotension  Subjective/Observations:   No events overnight resting comfortably in bed.  No complaints of chest pain, dyspnea, or palpitations reported. No signs of orthopnea or PND.     REVIEW OF SYSTEMS: All other review of systems is negative unless indicated above    PAST MEDICAL & SURGICAL HISTORY:  History of aortic aneurysm  descending aorta see CT    Arthritis    Degenerative disc disease, lumbar    Neuropathy    Spinal stenosis    Bipolar 1 disorder    Kidney stone    Sleep Apnea  CPAP with oxygen since June 2020    Asthma    Hypertension    Morbid Obesity    ETOH Abuse  H/O    Benign Essential Tremor    Anxiety    Depression    Renal Calculi  chronic      Colitis  H/O    Anemia    S/P cardiac catheterization    S/P dilation and curettage    History of tonsillectomy    History of laparoscopic adjustable gastric banding  band removed few yrs ago    S/P D&amp;C    Biliary stent placement &amp; ercp    ureteroscopy with stone removal  1-        MEDICATIONS  (STANDING):  allopurinol 300 milliGRAM(s) Oral daily  clobetasol 0.05% Ointment 1 Application(s) Topical <User Schedule>  cyanocobalamin 1000 MICROGram(s) Oral daily  docusate sodium 100 milliGRAM(s) Oral three times a day  DULoxetine 30 milliGRAM(s) Oral <User Schedule>  folic acid 1 milliGRAM(s) Oral daily  lamoTRIgine 200 milliGRAM(s) Oral daily  multivitamin 1 Tablet(s) Oral daily  polyethylene glycol 3350 17 Gram(s) Oral daily  propranolol 10 milliGRAM(s) Oral daily  senna 2 Tablet(s) Oral at bedtime  traZODone 200 milliGRAM(s) Oral at bedtime    MEDICATIONS  (PRN):  acetaminophen   Tablet .. 650 milliGRAM(s) Oral every 6 hours PRN Mild Pain (1 - 3)  acetaminophen   Tablet .. 650 milliGRAM(s) Oral every 6 hours PRN Temp greater or equal to 38C (100.4F)  aluminum hydroxide/magnesium hydroxide/simethicone Suspension 30 milliLiter(s) Oral every 4 hours PRN Dyspepsia  artificial tears (preservative free) Ophthalmic Solution 1 Drop(s) Both EYES two times a day PRN Dry Eyes  glucagon  Injectable 1 milliGRAM(s) IntraMuscular once PRN Glucose LESS THAN 70 milligrams/deciliter  oxycodone    5 mG/acetaminophen 325 mG 1 Tablet(s) Oral every 4 hours PRN Moderate Pain (4 - 6)  oxycodone    5 mG/acetaminophen 325 mG 2 Tablet(s) Oral every 4 hours PRN Severe Pain (7 - 10)      Allergies    penicillins (Other)  trees dogs cats cockaroaches (Rhinitis; Rhinorrhea)    Intolerances        Vital Signs Last 24 Hrs  T(C): 37.6 (08 Oct 2020 04:45), Max: 37.9 (07 Oct 2020 21:42)  T(F): 99.7 (08 Oct 2020 04:45), Max: 100.3 (07 Oct 2020 21:42)  HR: 96 (08 Oct 2020 04:45) (78 - 96)  BP: 100/66 (08 Oct 2020 04:45) (91/60 - 100/66)  BP(mean): --  RR: 18 (08 Oct 2020 04:45) (18 - 18)  SpO2: 93% (08 Oct 2020 04:45) (90% - 93%)    I&O's Summary    07 Oct 2020 07:01  -  08 Oct 2020 07:00  --------------------------------------------------------  IN: 0 mL / OUT: 224 mL / NET: -224 mL          PHYSICAL EXAM:  TELE: Off tele   Constitutional: NAD, awake and alert, Obese   HEENT: Moist Mucous Membranes, Anicteric  Pulmonary: Non-labored, breath sounds are clear bilaterally, No wheezing, crackles or rhonchi  Cardiovascular: Regular, S1 and S2 nl, No murmurs, rubs, gallops or clicks  Gastrointestinal: Bowel Sounds present, soft, nontender.   Lymph: No lymphadenopathy. No peripheral edema.  Skin: No visible rashes or ulcers.  Psych:  Mood & affect appropriate    LABS: All Labs Reviewed:                        8.3    11.27 )-----------( 316      ( 08 Oct 2020 07:24 )             25.5                         8.2    9.92  )-----------( 399      ( 07 Oct 2020 05:22 )             25.5                         8.4    9.45  )-----------( 391      ( 06 Oct 2020 07:01 )             25.2     08 Oct 2020 07:18    137    |  100    |  16     ----------------------------<  167    4.0     |  29     |  0.68   07 Oct 2020 05:22    139    |  102    |  15     ----------------------------<  126    4.6     |  32     |  0.61   06 Oct 2020 07:01    139    |  101    |  15     ----------------------------<  121    3.8     |  33     |  0.60     Ca    7.9        08 Oct 2020 07:18  Ca    8.2        07 Oct 2020 05:22  Ca    8.3        06 Oct 2020 07:01    TPro  5.9    /  Alb  1.7    /  TBili  0.6    /  DBili  x      /  AST  47     /  ALT  96     /  AlkPhos  111    08 Oct 2020 07:18  TPro  6.0    /  Alb  1.8    /  TBili  0.5    /  DBili  x      /  AST  99     /  ALT  134    /  AlkPhos  119    07 Oct 2020 05:22  TPro  6.2    /  Alb  1.9    /  TBili  0.6    /  DBili  x      /  AST  74     /  ALT  120    /  AlkPhos  123    06 Oct 2020 07:01

## 2020-10-08 NOTE — PROGRESS NOTE ADULT - ASSESSMENT
59yo F w/ PMH of JESUS on CPAP, HTN, morbid obesity, anemia, AAA, Arthritis, Depression, anxiety presented to [facility] for elective abdominal panniculectomy, admitted post-op with hemorrhagic shock s/p 4un PRBC with stabilization, now course c/b fever and left hand pain     #Fever: Spiking fever again and elevated white count. ID follow up requested.   -unclear etiology. Resolved. Suspect reactive or atelectasis   -pt without significant localizing symptoms or physical exam findings for infection   -BCx NGTD (monitor), UA quite benign, not indicative of infection (pt denies UTI symptoms)  -has had daily fever typically in early afternoon; no fever yet today. Leukocytosis which peaked at ~17 on 10/01/20 has now resolved; had some low BP readings on electronic monitor ; low-normal when BP obtained manually -- given 1L NS on 10/1 and 500cc of NS on 10/2  -UCx and BCx from 9/30 were negative; resent BCx on 10/2 --NGTD  -consulted ID ([facility]), recs appreciated  -CT showing 4cm x 10cm hematoma and no other sign of infection   -discontinued meropenem on 10/5 -- will monitor off Abx  -discussed with surgical team and Dr. De La Torre, who recommend to continue with drain via EDWIN drains, which do have bloody output (there is a drain at the site of the hematoma as seen on CT)  -other possible source of fever is atelectasis, though would be unusual to reach such high fever / persistent fevers and have a leukocytosis and is a less likely scenario  -monitor closely for signs of instability  -incentive spirometry       # Left Hand Pain: Unclear etiology of the symptoms at present. Sudden onset of symptoms during the night. No signs of infection. Ordered X rays. Denies trauma.     #Hemorrhagic shock: now resolved . H/h is table   - acute blood loss anemia post op, had 3 units pRBC early in admission to stabilize; gave 4th unit PRBC on 10/3 as Hgb had trended down to 7.2 ; appropriate rise in Hgb with that 4th unit  - shock state resolved, currently off pressors with stable vital signs   - Per pt, she has been chronically anemic and has been on B12 and folic acid supplements for years. Continue Vit B12 and folic acid supplements   - Heme ([facility]) consulted; who recommends no other anemia supplements currently; did give dose of vitamin K as pt still with blood draining from the EDWIN drains and INR of 1.42  - Continue to monitor H/H closely, Transfuse if Hgb <7 or if hemodynamically unstable   - monitor EDWIN x3 output; hold chemical DVT ppx in setting of continued bleeding.  - pt's bloody EDWIN output was 280cc on 10/3 and 190cc on 10/4, and now rising again to 320cc on 10/5  - consider discharge when drain output decreases as pt might not be able to sustain recovering too much blood loss given her hx of production-related anemia; discuss with surgery if any further w/up to be done regarding amount of blood still draining     #Transaminitis:  -Levels improving   - unclear reason for current rise-- possibly has mild elevation now due to relatively low BP couple of days ago ; ?mild DILI (meropenem has AST/ALT elevation listed as possible adverse effects) -- meropenem discontinued   - GI f/u appreciated   - performed RUQ US which demonstrated a fatty liver but no significant acute findings  - CT abd/P with po and IV contrast on 9/30 did not have significant pathology noted in the liver (the 7mm CBD was likely due to pt being post-cholecystectomy)  - Monitor LFT's , avoid hepatotoxins. Trend daily.     #S/P Abdominal panniculectomy: on 09/25/20.   -mgmt per surgical team.   -pain control as ordered.   -bowel regimen on board; miralax, senna, dulcolax   -Phys therapy  -SCDs    #Afib, new onset:  - Cardio following, ([facility]), recs appreciated   - had brief Afib in setting of severe hypotension / instability early in the admission.   - has been in sinus rhythm since then  - c/w propanolol per cardio  - no A/C especially given pt had hemorrhagic shock several days ago  - pt to have cardio follow up for possible loop recorder as outpt    #JESUS on CPAP:   -pulm (Dr Hanson) following - recs appreciated  -continuous pulse ox.   -CPAP QHS    #ELAINE:  - pt had ELAINE early in admission likely prerenal due to acute blood loss anemia with hemorrhagic shock  - doubt pt had ATN  - ELAINE resolved with PRBC and hemodynamic stabilization.   - monitor BMP    #HTN:   - continue propanolol  - holding verapamil (home med)    #Anxiety/Depression:   - continue Cymbalta, trazodone and lamictal.    #Morbid Obesity:  - now s/p panniculectomy ; counseled on diet    #DVT ppx:   - SCDs   59yo F w/ PMH of JESUS on CPAP, HTN, morbid obesity, anemia, AAA, Arthritis, Depression, anxiety presented to [facility] for elective abdominal panniculectomy, admitted post-op with hemorrhagic shock s/p 4un PRBC with stabilization, now course c/b fever and left hand pain     #Fever: Spiking fever again and elevated white count. ID follow up requested.   -unclear etiology. Resolved. Suspect reactive or atelectasis   -pt without significant localizing symptoms or physical exam findings for infection   -BCx NGTD (monitor), UA quite benign, not indicative of infection (pt denies UTI symptoms)  -has had daily fever typically in early afternoon; no fever yet today. Leukocytosis which peaked at ~17 on 10/01/20 has now resolved; had some low BP readings on electronic monitor ; low-normal when BP obtained manually -- given 1L NS on 10/1 and 500cc of NS on 10/2  -UCx and BCx from 9/30 were negative; resent BCx on 10/2 --NGTD  -consulted ID ([facility]), recs appreciated  -CT showing 4cm x 10cm hematoma and no other sign of infection   -discontinued meropenem on 10/5 -- will monitor off Abx  -discussed with surgical team and Dr. De La Torre, who recommend to continue with drain via EDWIN drains, which do have bloody output (there is a drain at the site of the hematoma as seen on CT)  -other possible source of fever is atelectasis, though would be unusual to reach such high fever / persistent fevers and have a leukocytosis and is a less likely scenario  -monitor closely for signs of instability  -incentive spirometry       # Left Hand Pain: Unclear etiology of the symptoms at present. Sudden onset of symptoms during the night. No signs of infection. X rays negative. Will order doppler to r/o DVT, thrombophlebitis , since also spiking fevers     #Hemorrhagic shock: now resolved . H/h is table   - acute blood loss anemia post op, had 3 units pRBC early in admission to stabilize; gave 4th unit PRBC on 10/3 as Hgb had trended down to 7.2 ; appropriate rise in Hgb with that 4th unit  - shock state resolved, currently off pressors with stable vital signs   - Per pt, she has been chronically anemic and has been on B12 and folic acid supplements for years. Continue Vit B12 and folic acid supplements   - Heme ([facility]) consulted; who recommends no other anemia supplements currently; did give dose of vitamin K as pt still with blood draining from the EDWIN drains and INR of 1.42  - Continue to monitor H/H closely, Transfuse if Hgb <7 or if hemodynamically unstable   - monitor EDWIN x3 output; hold chemical DVT ppx in setting of continued bleeding.  - pt's bloody EDWIN output was 280cc on 10/3 and 190cc on 10/4, and now rising again to 320cc on 10/5  - consider discharge when drain output decreases as pt might not be able to sustain recovering too much blood loss given her hx of production-related anemia; discuss with surgery if any further w/up to be done regarding amount of blood still draining     #Transaminitis:  -Levels improving   - unclear reason for current rise-- possibly has mild elevation now due to relatively low BP couple of days ago ; ?mild DILI (meropenem has AST/ALT elevation listed as possible adverse effects) -- meropenem discontinued   - GI f/u appreciated   - performed RUQ US which demonstrated a fatty liver but no significant acute findings  - CT abd/P with po and IV contrast on 9/30 did not have significant pathology noted in the liver (the 7mm CBD was likely due to pt being post-cholecystectomy)  - Monitor LFT's , avoid hepatotoxins. Trend daily.     #S/P Abdominal panniculectomy: on 09/25/20.   -mgmt per surgical team.   -pain control as ordered.   -bowel regimen on board; miralax, senna, dulcolax   -Phys therapy  -SCDs    #Afib, new onset:  - Cardio following, ([facility]), recs appreciated   - had brief Afib in setting of severe hypotension / instability early in the admission.   - has been in sinus rhythm since then  - c/w propanolol per cardio  - no A/C especially given pt had hemorrhagic shock several days ago  - pt to have cardio follow up for possible loop recorder as outpt    #JESUS on CPAP:   -pulm (Dr Hanson) following - recs appreciated  -continuous pulse ox.   -CPAP QHS    #ELAINE:  - pt had ELAINE early in admission likely prerenal due to acute blood loss anemia with hemorrhagic shock  - doubt pt had ATN  - ELAINE resolved with PRBC and hemodynamic stabilization.   - monitor BMP    #HTN:   - continue propanolol  - holding verapamil (home med)    #Anxiety/Depression:   - continue Cymbalta, trazodone and lamictal.    #Morbid Obesity:  - now s/p panniculectomy ; counseled on diet    #DVT ppx:   - SCDs

## 2020-10-08 NOTE — PROVIDER CONTACT NOTE (OTHER) - ACTION/TREATMENT ORDERED:
Tylenol was ordered and sepsis workup started.
no new orders at this time.
Dr. Jara states that he will come see pt.

## 2020-10-08 NOTE — PROGRESS NOTE ADULT - PROBLEM SELECTOR PLAN 1
imaging reviewed; likely secondary to antibiotics/sepsis; now improving  avoid hepatotoxic medication as able  rec trend labs daily; if uptrend check serologies

## 2020-10-08 NOTE — PROGRESS NOTE ADULT - SUBJECTIVE AND OBJECTIVE BOX
INTERVAL HPI/OVERNIGHT EVENTS:  pt seen and examined   denies n/v/abd pain  +bm  joya po  low grade fever overnight    MEDICATIONS  (STANDING):  allopurinol 300 milliGRAM(s) Oral daily  clobetasol 0.05% Ointment 1 Application(s) Topical <User Schedule>  cyanocobalamin 1000 MICROGram(s) Oral daily  docusate sodium 100 milliGRAM(s) Oral three times a day  DULoxetine 30 milliGRAM(s) Oral <User Schedule>  folic acid 1 milliGRAM(s) Oral daily  lamoTRIgine 200 milliGRAM(s) Oral daily  multivitamin 1 Tablet(s) Oral daily  polyethylene glycol 3350 17 Gram(s) Oral daily  propranolol 10 milliGRAM(s) Oral daily  senna 2 Tablet(s) Oral at bedtime  traZODone 200 milliGRAM(s) Oral at bedtime    MEDICATIONS  (PRN):  acetaminophen   Tablet .. 650 milliGRAM(s) Oral every 6 hours PRN Mild Pain (1 - 3)  acetaminophen   Tablet .. 650 milliGRAM(s) Oral every 6 hours PRN Temp greater or equal to 38C (100.4F)  aluminum hydroxide/magnesium hydroxide/simethicone Suspension 30 milliLiter(s) Oral every 4 hours PRN Dyspepsia  artificial tears (preservative free) Ophthalmic Solution 1 Drop(s) Both EYES two times a day PRN Dry Eyes  glucagon  Injectable 1 milliGRAM(s) IntraMuscular once PRN Glucose LESS THAN 70 milligrams/deciliter      Allergies    penicillins (Other)  trees dogs cats cockaroaches (Rhinitis; Rhinorrhea)    Intolerances        Review of Systems:    General:  fever    Eyes:  Good vision, no reported pain  ENT:  No sore throat, pain, runny nose, dysphagia  CV:  No pain, palpitatioins, hypo/hypertension  Resp:  No dyspnea, cough, tachypnea, wheezing  GI:  No pain, No nausea, No vomiting, No diarrhea, No constipatiion, No weight loss, No fever, No pruritis, No rectal bleeding, No tarry stools, No dysphagia,  :  No pain, bleeding, incontinence, nocturia  Muscle:  No pain, weakness  Neuro:  No weakness, tingling, memory problems  Psych:  No fatigue, insomnia, mood problems, depression  Endocrine:  No polyuria, polydypsia, cold/heat intolerance  Heme:  No petechiae, ecchymosis, easy bruisability  Skin:  No rash, tattoos, scars, edema      Vital Signs Last 24 Hrs  T(C): 37.4 (07 Oct 2020 05:26), Max: 37.4 (07 Oct 2020 05:26)  T(F): 99.4 (07 Oct 2020 05:26), Max: 99.4 (07 Oct 2020 05:26)  HR: 90 (07 Oct 2020 05:26) (79 - 90)  BP: 116/68 (07 Oct 2020 05:26) (108/70 - 116/68)  BP(mean): --  RR: 18 (07 Oct 2020 05:26) (18 - 20)  SpO2: 95% (07 Oct 2020 05:26) (94% - 95%)    PHYSICAL EXAM:    Constitutional: lying in bed  HEENT: ncat  Neck: No LAD  Gastrointestinal: soft nt nd +binder  Extremities: No peripheral edema  Vascular: + peripheral pulses  Neurological: Awake alert responds appropriately      LABS:                        8.2    9.92  )-----------( 399      ( 07 Oct 2020 05:22 )             25.5     10-07    139  |  102  |  15  ----------------------------<  126<H>  4.6   |  32<H>  |  0.61    Ca    8.2<L>      07 Oct 2020 05:22    TPro  6.0  /  Alb  1.8<L>  /  TBili  0.5  /  DBili  x   /  AST  99<H>  /  ALT  134<H>  /  AlkPhos  119  10-07    PT/INR - ( 06 Oct 2020 07:01 )   PT: 16.2 sec;   INR: 1.41 ratio               RADIOLOGY & ADDITIONAL TESTS:

## 2020-10-08 NOTE — PROGRESS NOTE ADULT - SUBJECTIVE AND OBJECTIVE BOX
[facility]  INFECTIOUS DISEASES   =======================================================    [id number]  EMANUEL MONTEZ     Follow up: Fever    Still has high volume blood from 3 abdominal drains.   Had fever again, today complaining of sore throat and left hand pain.     PAST MEDICAL & SURGICAL HISTORY:  History of aortic aneurysm  descending aorta see CT  Arthritis  Degenerative disc disease, lumbar  Neuropathy  Spinal stenosis  Bipolar 1 disorder  Kidney stone  Sleep Apnea  CPAP with oxygen since June 2020  Asthma  Hypertension  Morbid Obesity  ETOH Abuse  H/O  Benign Essential Tremor  Anxiety  Depression  Renal Calculi  chronic    Colitis  H/O  Anemia  S/P cardiac catheterization  S/P dilation and curettage  History of tonsillectomy  History of laparoscopic adjustable gastric banding  band removed few yrs ago  S/P D&amp;C  Biliary stent placement &amp; ercp  ureteroscopy with stone removal  1-    Social Hx: no smoking, ETOH or drugs     FAMILY HISTORY:  Hypertension (Sibling)  Hyperlipidemia (Sibling)  Family history of renal failure  both parents were on dialysis  Family history of bacterial pneumonia  Family history of arthritis    Allergies  penicillins (Other)  trees dogs cats cockaroaches (Rhinitis; Rhinorrhea)    Antibiotics:  Meropenum     REVIEW OF SYSTEMS:  CONSTITUTIONAL:  + Fever   HEENT:  No diplopia or blurred vision.  No sore throat or runny nose.  CARDIOVASCULAR:  No chest pain or SOB.  RESPIRATORY:  No cough, shortness of breath, PND or orthopnea.  GASTROINTESTINAL:  No nausea, vomiting or diarrhea. surgical site pain +   GENITOURINARY:  No dysuria, frequency or urgency. No Blood in urine  MUSCULOSKELETAL:  no joint aches, no muscle pain  SKIN:  No change in skin, hair or nails.  NEUROLOGIC:  No paresthesias, fasciculations, seizures or weakness.  PSYCHIATRIC:  No disorder of thought or mood.  ENDOCRINE:  No heat or cold intolerance, polyuria or polydipsia.  HEMATOLOGICAL:  No easy bruising or bleeding.     Physical Exam:  Vital Signs Last 24 Hrs  T(C): 37.6 (08 Oct 2020 13:21), Max: 37.9 (07 Oct 2020 21:42)  T(F): 99.7 (08 Oct 2020 13:21), Max: 100.3 (07 Oct 2020 21:42)  HR: 105 (08 Oct 2020 13:21) (86 - 105)  BP: 133/85 (08 Oct 2020 13:21) (91/60 - 133/85)  BP(mean): --  RR: 18 (08 Oct 2020 13:21) (18 - 18)  SpO2: 90% (08 Oct 2020 13:21) (90% - 93%)  GEN: NAD, obese  HEENT: normocephalic and atraumatic. EOMI. PERRL.    NECK: Supple.  No lymphadenopathy   LUNGS: Clear to auscultation.  HEART: Regular rate and rhythm   ABDOMEN: Soft, nontender, and nondistended, wound clean, 3 darians+   : No CVA tenderness  EXTREMITIES: +edema.  NEUROLOGIC: grossly intact.  PSYCHIATRIC: Appropriate affect .  SKIN: No rash    Labs:                        8.3    11.27 )-----------( 316      ( 08 Oct 2020 07:24 )             25.5      10-08    137  |  100  |  16  ----------------------------<  167<H>  4.0   |  29  |  0.68    Ca    7.9<L>      08 Oct 2020 07:18    TPro  5.9<L>  /  Alb  1.7<L>  /  TBili  0.6  /  DBili  x   /  AST  47<H>  /  ALT  96<H>  /  AlkPhos  111  10-08    Culture - Blood (collected 10-02-20 @ 21:16)  Source: .Blood Blood-Peripheral  Final Report (10-07-20 @ 22:01):    No Growth Final    Culture - Blood (collected 10-02-20 @ 21:16)  Source: .Blood Blood  Final Report (10-07-20 @ 22:01):    No Growth Final    Culture - Urine (collected 09-30-20 @ 15:18)  Source: .Urine Clean Catch (Midstream)  Final Report (10-01-20 @ 11:52):    <10,000 CFU/mL Normal Urogenital Chanel    Culture - Blood (collected 09-30-20 @ 12:07)  Source: .Blood Blood-Peripheral  Final Report (10-05-20 @ 13:00):    No Growth Final    Culture - Blood (collected 09-30-20 @ 12:07)  Source: .Blood Blood  Final Report (10-05-20 @ 13:00):    No Growth Final    WBC Count: 11.27 K/uL (10-08-20 @ 07:24)  WBC Count: 9.92 K/uL (10-07-20 @ 05:22)  WBC Count: 9.45 K/uL (10-06-20 @ 07:01)  WBC Count: 8.70 K/uL (10-05-20 @ 08:38)  WBC Count: 7.94 K/uL (10-04-20 @ 06:43)    Creatinine, Serum: 0.68 mg/dL (10-08-20 @ 07:18)  Creatinine, Serum: 0.61 mg/dL (10-07-20 @ 05:22)  Creatinine, Serum: 0.60 mg/dL (10-06-20 @ 07:01)  Creatinine, Serum: 0.70 mg/dL (10-05-20 @ 08:38)  Creatinine, Serum: 0.69 mg/dL (10-04-20 @ 06:43)    Ferritin, Serum: 190 ng/mL (10-01-20 @ 10:04)    Procalcitonin, Serum: 0.31 ng/mL (10-02-20 @ 07:08)     COVID-19 PCR: NotDetec (09-23-20 @ 13:30)      All imaging and other data have been reviewed.  < from: Xray Chest 1 View- PORTABLE-Urgent (Xray Chest 1 View- PORTABLE-Urgent .) (09.30.20 @ 08:13) >  EXAM:  XR CHEST PORTABLE URGENT 1V                        PROCEDURE DATE:  09/30/2020    INTERPRETATION:  Portable chest x-ray  Indication: fever of unclear source  Comparison: 6/16/2020  2 portable chest x-rays are acquired for evaluation.  Impression: No evidence for acute pulmonary infiltrate, pleural effusion, or pneumothorax.  Stable small calcified granuloma in the right upper lung zone.  New small linear atelectasis in the left lower lung zone.  Stable cardiac silhouette.  No pulmonary vascular congestion.    Assessment and Plan: 59 y/o woman with PMH of obesity, HTN, JESUS on CPAP, Anemia, AAA, Arthritis, Depression and Anxiety was admitted at \[facility]\"" for elective abdominal panniculectomy and liposuction on 9/25, now has fever so ID has been called for recommendations. Currently no source of infection, wound looks fine with no signs of infection, but has 3 drains all with bleeding, possibly hematoma underneath causing fever.   10/1 with low grade fever and worsening of leukocytosis, CT of abdomen on 9/30 showed pelvic wall large hematoma that still could be reason for fever and worsening of leukocytosis but since infected hematoma is a possibility and she had a slight drop in BP, will cover empirically with Meropenum for now.   Blood and urine cultures negative , CXR negative   10/2 afebrile since last night, leukocytosis and BP are better, unclear if infection is responding to ABx or it is the same hematoma causing all signs and symptoms.   10/4: left drain has been manipulated now draining blood better. Clinically stable even though had fever 102. S/P transfusion. Most likely bleeding is causing reactive fever and leukocytosis.   10/8 low grade fever and left hand pain, has sore throat.     Fever, panniculectomy, leukocytosis, bleeding   - WBC 16k-->11k , will trend it  - Watching off antibiotics, s/p meropenem for 3 days. 10/3-10/5  - Surgery is on board for drain management, no sign of wound infection.   - If higher fever >100.8 will send cultures and RVP  - Sending blood from drains will be contaminated. Wound looks fine, no cellulitis.     Will follow.   Judi Black MD  Division of Infectious Diseases   Cell [phone] between 7am and 5pm   After 5pm and weekends please call ID service at [phone].

## 2020-10-09 DIAGNOSIS — D64.9 ANEMIA, UNSPECIFIED: ICD-10-CM

## 2020-10-09 DIAGNOSIS — R50.9 FEVER, UNSPECIFIED: ICD-10-CM

## 2020-10-09 LAB
ALBUMIN SERPL ELPH-MCNC: 1.7 G/DL — LOW (ref 3.3–5)
ALP SERPL-CCNC: 108 U/L — SIGNIFICANT CHANGE UP (ref 40–120)
ALT FLD-CCNC: 65 U/L — SIGNIFICANT CHANGE UP (ref 12–78)
ANION GAP SERPL CALC-SCNC: 10 MMOL/L — SIGNIFICANT CHANGE UP (ref 5–17)
AST SERPL-CCNC: 26 U/L — SIGNIFICANT CHANGE UP (ref 15–37)
BILIRUB DIRECT SERPL-MCNC: 0.3 MG/DL — HIGH (ref 0.05–0.2)
BILIRUB INDIRECT FLD-MCNC: 0.3 MG/DL — SIGNIFICANT CHANGE UP (ref 0.2–1)
BILIRUB SERPL-MCNC: 0.6 MG/DL — SIGNIFICANT CHANGE UP (ref 0.2–1.2)
BUN SERPL-MCNC: 28 MG/DL — HIGH (ref 7–23)
CALCIUM SERPL-MCNC: 8.3 MG/DL — LOW (ref 8.5–10.1)
CHLORIDE SERPL-SCNC: 99 MMOL/L — SIGNIFICANT CHANGE UP (ref 96–108)
CO2 SERPL-SCNC: 26 MMOL/L — SIGNIFICANT CHANGE UP (ref 22–31)
CREAT SERPL-MCNC: 1.3 MG/DL — SIGNIFICANT CHANGE UP (ref 0.5–1.3)
GLUCOSE SERPL-MCNC: 196 MG/DL — HIGH (ref 70–99)
HCT VFR BLD CALC: 27 % — LOW (ref 34.5–45)
HGB BLD-MCNC: 9.2 G/DL — LOW (ref 11.5–15.5)
MCHC RBC-ENTMCNC: 29.4 PG — SIGNIFICANT CHANGE UP (ref 27–34)
MCHC RBC-ENTMCNC: 34.1 GM/DL — SIGNIFICANT CHANGE UP (ref 32–36)
MCV RBC AUTO: 86.3 FL — SIGNIFICANT CHANGE UP (ref 80–100)
NRBC # BLD: 0 /100 WBCS — SIGNIFICANT CHANGE UP (ref 0–0)
PLATELET # BLD AUTO: 168 K/UL — SIGNIFICANT CHANGE UP (ref 150–400)
POTASSIUM SERPL-MCNC: 4 MMOL/L — SIGNIFICANT CHANGE UP (ref 3.5–5.3)
POTASSIUM SERPL-SCNC: 4 MMOL/L — SIGNIFICANT CHANGE UP (ref 3.5–5.3)
PROT SERPL-MCNC: 6.2 G/DL — SIGNIFICANT CHANGE UP (ref 6–8.3)
RAPID RVP RESULT: SIGNIFICANT CHANGE UP
RBC # BLD: 3.13 M/UL — LOW (ref 3.8–5.2)
RBC # FLD: 15 % — HIGH (ref 10.3–14.5)
SARS-COV-2 RNA SPEC QL NAA+PROBE: SIGNIFICANT CHANGE UP
SODIUM SERPL-SCNC: 135 MMOL/L — SIGNIFICANT CHANGE UP (ref 135–145)
WBC # BLD: 13.74 K/UL — HIGH (ref 3.8–10.5)
WBC # FLD AUTO: 13.74 K/UL — HIGH (ref 3.8–10.5)

## 2020-10-09 PROCEDURE — 99233 SBSQ HOSP IP/OBS HIGH 50: CPT

## 2020-10-09 PROCEDURE — 99232 SBSQ HOSP IP/OBS MODERATE 35: CPT

## 2020-10-09 RX ORDER — LORATADINE 10 MG/1
10 TABLET ORAL DAILY
Refills: 0 | Status: DISCONTINUED | OUTPATIENT
Start: 2020-10-09 | End: 2020-10-10

## 2020-10-09 RX ADMIN — Medication 1 MILLIGRAM(S): at 14:43

## 2020-10-09 RX ADMIN — PREGABALIN 1000 MICROGRAM(S): 225 CAPSULE ORAL at 14:43

## 2020-10-09 RX ADMIN — Medication 1 TABLET(S): at 14:45

## 2020-10-09 RX ADMIN — Medication 300 MILLIGRAM(S): at 14:43

## 2020-10-09 RX ADMIN — DULOXETINE HYDROCHLORIDE 30 MILLIGRAM(S): 30 CAPSULE, DELAYED RELEASE ORAL at 22:37

## 2020-10-09 RX ADMIN — Medication 200 MILLIGRAM(S): at 22:37

## 2020-10-09 RX ADMIN — OXYCODONE AND ACETAMINOPHEN 1 TABLET(S): 5; 325 TABLET ORAL at 00:20

## 2020-10-09 RX ADMIN — LAMOTRIGINE 200 MILLIGRAM(S): 25 TABLET, ORALLY DISINTEGRATING ORAL at 14:45

## 2020-10-09 RX ADMIN — Medication 100 MILLIGRAM(S): at 22:37

## 2020-10-09 RX ADMIN — SENNA PLUS 2 TABLET(S): 8.6 TABLET ORAL at 22:37

## 2020-10-09 RX ADMIN — DULOXETINE HYDROCHLORIDE 30 MILLIGRAM(S): 30 CAPSULE, DELAYED RELEASE ORAL at 18:12

## 2020-10-09 RX ADMIN — LORATADINE 10 MILLIGRAM(S): 10 TABLET ORAL at 22:36

## 2020-10-09 RX ADMIN — DULOXETINE HYDROCHLORIDE 30 MILLIGRAM(S): 30 CAPSULE, DELAYED RELEASE ORAL at 06:22

## 2020-10-09 RX ADMIN — OXYCODONE AND ACETAMINOPHEN 1 TABLET(S): 5; 325 TABLET ORAL at 23:55

## 2020-10-09 NOTE — PROGRESS NOTE ADULT - SUBJECTIVE AND OBJECTIVE BOX
[facility]  INFECTIOUS DISEASES   =======================================================    [id number]  EMANUEL MONTEZ     Follow up: Fever    Still has high volume blood from one of the drains.   Had fever again last night.   Since had sore throat we checked RVP that came back negative. COVID pending.     PAST MEDICAL & SURGICAL HISTORY:  History of aortic aneurysm  descending aorta see CT  Arthritis  Degenerative disc disease, lumbar  Neuropathy  Spinal stenosis  Bipolar 1 disorder  Kidney stone  Sleep Apnea  CPAP with oxygen since June 2020  Asthma  Hypertension  Morbid Obesity  ETOH Abuse  H/O  Benign Essential Tremor  Anxiety  Depression  Renal Calculi  chronic    Colitis  H/O  Anemia  S/P cardiac catheterization  S/P dilation and curettage  History of tonsillectomy  History of laparoscopic adjustable gastric banding  band removed few yrs ago  S/P D&amp;C  Biliary stent placement &amp; ercp  ureteroscopy with stone removal  1-    Social Hx: no smoking, ETOH or drugs     FAMILY HISTORY:  Hypertension (Sibling)  Hyperlipidemia (Sibling)  Family history of renal failure  both parents were on dialysis  Family history of bacterial pneumonia  Family history of arthritis    Allergies  penicillins (Other)  trees dogs cats cockaroaches (Rhinitis; Rhinorrhea)    Antibiotics:  Meropenum     REVIEW OF SYSTEMS:  CONSTITUTIONAL:  + Fever   HEENT:  No diplopia or blurred vision.  No sore throat or runny nose.  CARDIOVASCULAR:  No chest pain or SOB.  RESPIRATORY:  No cough, shortness of breath, PND or orthopnea.  GASTROINTESTINAL:  No nausea, vomiting or diarrhea. surgical site pain +   GENITOURINARY:  No dysuria, frequency or urgency. No Blood in urine  MUSCULOSKELETAL:  no joint aches, no muscle pain  SKIN:  No change in skin, hair or nails.  NEUROLOGIC:  No paresthesias, fasciculations, seizures or weakness.  PSYCHIATRIC:  No disorder of thought or mood.  ENDOCRINE:  No heat or cold intolerance, polyuria or polydipsia.  HEMATOLOGICAL:  No easy bruising or bleeding.     Physical Exam:  Vital Signs Last 24 Hrs  T(C): 37.8 (09 Oct 2020 13:39), Max: 39 (08 Oct 2020 20:36)  T(F): 100.1 (09 Oct 2020 13:39), Max: 102.2 (08 Oct 2020 20:36)  HR: 101 (09 Oct 2020 13:39) (98 - 114)  BP: 94/68 (09 Oct 2020 13:39) (85/58 - 133/77)  BP(mean): --  RR: 18 (09 Oct 2020 13:39) (18 - 18)  SpO2: 92% (09 Oct 2020 13:39) (90% - 94%)  GEN: NAD, obese  HEENT: normocephalic and atraumatic. EOMI. PERRL.    NECK: Supple.  No lymphadenopathy   LUNGS: Clear to auscultation.  HEART: Regular rate and rhythm   ABDOMEN: Soft, nontender, and nondistended, wound clean, 3 darians+   : No CVA tenderness  EXTREMITIES: +edema.  NEUROLOGIC: grossly intact.  PSYCHIATRIC: Appropriate affect .  SKIN: No rash      Labs:                        9.2    13.74 )-----------( 168      ( 09 Oct 2020 07:33 )             27.0      10-09    135  |  99  |  28<H>  ----------------------------<  196<H>  4.0   |  26  |  1.30    Ca    8.3<L>      09 Oct 2020 07:33    TPro  6.2  /  Alb  1.7<L>  /  TBili  0.6  /  DBili  .30<H>  /  AST  26  /  ALT  65  /  AlkPhos  108  10-09      Culture - Blood (collected 10-02-20 @ 21:16)  Source: .Blood Blood-Peripheral  Final Report (10-07-20 @ 22:01):    No Growth Final    Culture - Blood (collected 10-02-20 @ 21:16)  Source: .Blood Blood  Final Report (10-07-20 @ 22:01):    No Growth Final    Culture - Urine (collected 09-30-20 @ 15:18)  Source: .Urine Clean Catch (Midstream)  Final Report (10-01-20 @ 11:52):    <10,000 CFU/mL Normal Urogenital Chanel    Culture - Blood (collected 09-30-20 @ 12:07)  Source: .Blood Blood-Peripheral  Final Report (10-05-20 @ 13:00):    No Growth Final    Culture - Blood (collected 09-30-20 @ 12:07)  Source: .Blood Blood  Final Report (10-05-20 @ 13:00):    No Growth Final    WBC Count: 13.74 K/uL (10-09-20 @ 07:33)  WBC Count: 11.27 K/uL (10-08-20 @ 07:24)  WBC Count: 9.92 K/uL (10-07-20 @ 05:22)  WBC Count: 9.45 K/uL (10-06-20 @ 07:01)  WBC Count: 8.70 K/uL (10-05-20 @ 08:38)    Creatinine, Serum: 1.30 mg/dL (10-09-20 @ 07:33)  Creatinine, Serum: 0.68 mg/dL (10-08-20 @ 07:18)  Creatinine, Serum: 0.61 mg/dL (10-07-20 @ 05:22)  Creatinine, Serum: 0.60 mg/dL (10-06-20 @ 07:01)  Creatinine, Serum: 0.70 mg/dL (10-05-20 @ 08:38)    Ferritin, Serum: 190 ng/mL (10-01-20 @ 10:04)    Procalcitonin, Serum: 0.31 ng/mL (10-02-20 @ 07:08)     Rapid RVP Result: NotDetec (10-09-20 @ 12:00)    COVID-19 PCR: NotDetec (09-23-20 @ 13:30)    All imaging and other data have been reviewed.  < from: Xray Chest 1 View- PORTABLE-Urgent (Xray Chest 1 View- PORTABLE-Urgent .) (09.30.20 @ 08:13) >  EXAM:  XR CHEST PORTABLE URGENT 1V                        PROCEDURE DATE:  09/30/2020    INTERPRETATION:  Portable chest x-ray  Indication: fever of unclear source  Comparison: 6/16/2020  2 portable chest x-rays are acquired for evaluation.  Impression: No evidence for acute pulmonary infiltrate, pleural effusion, or pneumothorax.  Stable small calcified granuloma in the right upper lung zone.  New small linear atelectasis in the left lower lung zone.  Stable cardiac silhouette.  No pulmonary vascular congestion.    Assessment and Plan: 61 y/o woman with PMH of obesity, HTN, JESUS on CPAP, Anemia, AAA, Arthritis, Depression and Anxiety was admitted [facility] for elective abdominal panniculectomy and liposuction on 9/25, now has fever so ID has been called for recommendations. Currently no source of infection, wound looks fine with no signs of infection, but has 3 drains all with bleeding, possibly hematoma underneath causing fever.   10/1 with low grade fever and worsening of leukocytosis, CT of abdomen on 9/30 showed pelvic wall large hematoma that still could be reason for fever and worsening of leukocytosis but since infected hematoma is a possibility and she had a slight drop in BP, will cover empirically with Meropenum for now.   Blood and urine cultures negative , CXR negative   10/2 afebrile since last night, leukocytosis and BP are better, unclear if infection is responding to ABx or it is the same hematoma causing all signs and symptoms.   10/4: left drain has been manipulated now draining blood better. Clinically stable even though had fever 102. S/P transfusion. Most likely bleeding is causing reactive fever and leukocytosis.   10/8 low grade fever and left hand pain, has sore throat.   10/9: Had fever 102.2 last night.     Fever, panniculectomy, leukocytosis, bleeding   - WBC went up to 13k, will trend it  - Watching off antibiotics, s/p meropenem for 3 days. 10/3-10/5, was spiking on meropenem   - Surgery is on board for drain management, no sign of wound infection.   - New cultures are sent, will follow results  - RVP negative, team has sent COVID as well that is pending.   - Sending blood from drains will be contaminated. Wound looks fine, no cellulitis.   - Bleeding and hematoma on surgical site could be the etiology of fever.     Dr. Macedo will cover over the weekend.     Will follow.   Judi Black MD  Division of Infectious Diseases   Cell [phone] between 7am and 5pm   After 5pm and weekends please call ID service at [phone].       [facility]  INFECTIOUS DISEASES   =======================================================    [id number]  EMANUEL MONTEZ     Follow up: Fever    Still has high volume blood from one of the drains.   Had fever again last night.   Since had sore throat we checked RVP that came back negative. COVID pending.     PAST MEDICAL & SURGICAL HISTORY:  History of aortic aneurysm  descending aorta see CT  Arthritis  Degenerative disc disease, lumbar  Neuropathy  Spinal stenosis  Bipolar 1 disorder  Kidney stone  Sleep Apnea  CPAP with oxygen since June 2020  Asthma  Hypertension  Morbid Obesity  ETOH Abuse  H/O  Benign Essential Tremor  Anxiety  Depression  Renal Calculi  chronic    Colitis  H/O  Anemia  S/P cardiac catheterization  S/P dilation and curettage  History of tonsillectomy  History of laparoscopic adjustable gastric banding  band removed few yrs ago  S/P D&amp;C  Biliary stent placement &amp; ercp  ureteroscopy with stone removal  1-    Social Hx: no smoking, ETOH or drugs     FAMILY HISTORY:  Hypertension (Sibling)  Hyperlipidemia (Sibling)  Family history of renal failure  both parents were on dialysis  Family history of bacterial pneumonia  Family history of arthritis    Allergies  penicillins (Other)  trees dogs cats cockaroaches (Rhinitis; Rhinorrhea)    Antibiotics:  Meropenum     REVIEW OF SYSTEMS:  CONSTITUTIONAL:  + Fever   HEENT:  No diplopia or blurred vision.  No sore throat or runny nose.  CARDIOVASCULAR:  No chest pain or SOB.  RESPIRATORY:  No cough, shortness of breath, PND or orthopnea.  GASTROINTESTINAL:  No nausea, vomiting or diarrhea. surgical site pain +   GENITOURINARY:  No dysuria, frequency or urgency. No Blood in urine  MUSCULOSKELETAL:  no joint aches, no muscle pain  SKIN:  No change in skin, hair or nails.  NEUROLOGIC:  No paresthesias, fasciculations, seizures or weakness.  PSYCHIATRIC:  No disorder of thought or mood.  ENDOCRINE:  No heat or cold intolerance, polyuria or polydipsia.  HEMATOLOGICAL:  No easy bruising or bleeding.     Physical Exam:  Vital Signs Last 24 Hrs  T(C): 37.8 (09 Oct 2020 13:39), Max: 39 (08 Oct 2020 20:36)  T(F): 100.1 (09 Oct 2020 13:39), Max: 102.2 (08 Oct 2020 20:36)  HR: 101 (09 Oct 2020 13:39) (98 - 114)  BP: 94/68 (09 Oct 2020 13:39) (85/58 - 133/77)  BP(mean): --  RR: 18 (09 Oct 2020 13:39) (18 - 18)  SpO2: 92% (09 Oct 2020 13:39) (90% - 94%)  GEN: NAD, obese  HEENT: normocephalic and atraumatic. EOMI. PERRL.    NECK: Supple.  No lymphadenopathy   LUNGS: Clear to auscultation.  HEART: Regular rate and rhythm   ABDOMEN: Soft, nontender, and nondistended, wound clean, 3 darians+   : No CVA tenderness  EXTREMITIES: +edema.  NEUROLOGIC: grossly intact.  PSYCHIATRIC: Appropriate affect .  SKIN: No rash      Labs:                        9.2    13.74 )-----------( 168      ( 09 Oct 2020 07:33 )             27.0      10-09    135  |  99  |  28<H>  ----------------------------<  196<H>  4.0   |  26  |  1.30    Ca    8.3<L>      09 Oct 2020 07:33    TPro  6.2  /  Alb  1.7<L>  /  TBili  0.6  /  DBili  .30<H>  /  AST  26  /  ALT  65  /  AlkPhos  108  10-09      Culture - Blood (collected 10-02-20 @ 21:16)  Source: .Blood Blood-Peripheral  Final Report (10-07-20 @ 22:01):    No Growth Final    Culture - Blood (collected 10-02-20 @ 21:16)  Source: .Blood Blood  Final Report (10-07-20 @ 22:01):    No Growth Final    Culture - Urine (collected 09-30-20 @ 15:18)  Source: .Urine Clean Catch (Midstream)  Final Report (10-01-20 @ 11:52):    <10,000 CFU/mL Normal Urogenital Chanel    Culture - Blood (collected 09-30-20 @ 12:07)  Source: .Blood Blood-Peripheral  Final Report (10-05-20 @ 13:00):    No Growth Final    Culture - Blood (collected 09-30-20 @ 12:07)  Source: .Blood Blood  Final Report (10-05-20 @ 13:00):    No Growth Final    WBC Count: 13.74 K/uL (10-09-20 @ 07:33)  WBC Count: 11.27 K/uL (10-08-20 @ 07:24)  WBC Count: 9.92 K/uL (10-07-20 @ 05:22)  WBC Count: 9.45 K/uL (10-06-20 @ 07:01)  WBC Count: 8.70 K/uL (10-05-20 @ 08:38)    Creatinine, Serum: 1.30 mg/dL (10-09-20 @ 07:33)  Creatinine, Serum: 0.68 mg/dL (10-08-20 @ 07:18)  Creatinine, Serum: 0.61 mg/dL (10-07-20 @ 05:22)  Creatinine, Serum: 0.60 mg/dL (10-06-20 @ 07:01)  Creatinine, Serum: 0.70 mg/dL (10-05-20 @ 08:38)    Ferritin, Serum: 190 ng/mL (10-01-20 @ 10:04)    Procalcitonin, Serum: 0.31 ng/mL (10-02-20 @ 07:08)     Rapid RVP Result: NotDetec (10-09-20 @ 12:00)    COVID-19 PCR: NotDetec (09-23-20 @ 13:30)    All imaging and other data have been reviewed.  < from: Xray Chest 1 View- PORTABLE-Urgent (Xray Chest 1 View- PORTABLE-Urgent .) (09.30.20 @ 08:13) >  EXAM:  XR CHEST PORTABLE URGENT 1V                        PROCEDURE DATE:  09/30/2020    INTERPRETATION:  Portable chest x-ray  Indication: fever of unclear source  Comparison: 6/16/2020  2 portable chest x-rays are acquired for evaluation.  Impression: No evidence for acute pulmonary infiltrate, pleural effusion, or pneumothorax.  Stable small calcified granuloma in the right upper lung zone.  New small linear atelectasis in the left lower lung zone.  Stable cardiac silhouette.  No pulmonary vascular congestion.    Assessment and Plan: 59 y/o woman with PMH of obesity, HTN, JESUS on CPAP, Anemia, AAA, Arthritis, Depression and Anxiety was admitted at [facility] for elective abdominal panniculectomy and liposuction on 9/25, now has fever so ID has been called for recommendations. Currently no source of infection, wound looks fine with no signs of infection, but has 3 drains all with bleeding, possibly hematoma underneath causing fever.   10/1 with low grade fever and worsening of leukocytosis, CT of abdomen on 9/30 showed pelvic wall large hematoma that still could be reason for fever and worsening of leukocytosis but since infected hematoma is a possibility and she had a slight drop in BP, will cover empirically with Meropenum for now.   Blood and urine cultures negative , CXR negative   10/2 afebrile since last night, leukocytosis and BP are better, unclear if infection is responding to ABx or it is the same hematoma causing all signs and symptoms.   10/4: left drain has been manipulated now draining blood better. Clinically stable even though had fever 102. S/P transfusion. Most likely bleeding is causing reactive fever and leukocytosis.   10/8 low grade fever and left hand pain, has sore throat.   10/9: Had fever 102.2 last night. She is declining antibiotic treatment at this point due to abnormal LFTs.     Fever, panniculectomy, leukocytosis, bleeding   - WBC went up to 13k, will trend it  - Watching off antibiotics, s/p meropenem for 3 days. 10/3-10/5, was spiking on meropenem   - Surgery is on board for drain management, no sign of wound infection.   - New cultures are sent, will follow results  - RVP negative, team has sent COVID as well that is pending.   - Sending blood from drains will be contaminated. Wound looks fine, no cellulitis.   - Bleeding and hematoma on surgical site could be the etiology of fever.     Dr. Macedo will cover over the weekend.     Will follow.   Judi Black MD  Division of Infectious Diseases   Cell [phone] between 7am and 5pm   After 5pm and weekends please call ID service at [phone].

## 2020-10-09 NOTE — PROGRESS NOTE ADULT - SUBJECTIVE AND OBJECTIVE BOX
STATUS POST:  panniculectomy, liposuction    POST OPERATIVE DAY #:  14    SUBJECTIVE:  Received call from RN patient's Left-sided drain fell out.  Patient seen and examined at bedside immediately.  Right drain at incisional apex fell out yesterday, no suture was noted, wound dressed with steris, 4x4, abd pads and paper tape.  Now left drain fell out.  Patient also notes serosanguinous leakage from around remaining Right drain.    Vital Signs Last 24 Hrs  T(C): 37.6 (09 Oct 2020 17:13), Max: 37.9 (08 Oct 2020 22:27)  T(F): 99.7 (09 Oct 2020 17:13), Max: 100.3 (08 Oct 2020 22:27)  HR: 108 (09 Oct 2020 17:13) (98 - 114)  BP: 112/75 (09 Oct 2020 17:13) (85/58 - 133/77)  RR: 22 (09 Oct 2020 17:13) (18 - 22)  SpO2: 92% (09 Oct 2020 17:13) (90% - 94%)    PHYSICAL EXAM  GENERAL:  Obese female lying comfortably in bed in NAD  ABDOMEN:  Soft, nondistended, nontender; Incision remains well-approximated, no surrounding erythema or discharge noted.  Left drain site with no suture left in skin, wound closed with steri strips, dressed with 4x4, abd pad and paper tape.  Right-sided old drain site at apex of incision remains well-approximated with steris.  Moderately blood stained dressing around remaining Right anterior drain site, replaced with bulky dressing.  Right anterior drain bulb with approximately 5cc serosanguinous fluid.  SKIN:  No jaundice, pallor, or cyanosis  NEURO:  A&O x 3    I&O's Summary    08 Oct 2020 07:01  -  09 Oct 2020 07:00  --------------------------------------------------------  IN: 0 mL / OUT: 120 mL / NET: -120 mL    09 Oct 2020 07:01  -  09 Oct 2020 21:21  --------------------------------------------------------  IN: 0 mL / OUT: 30 mL / NET: -30 mL    I&O's Detail    08 Oct 2020 07:01  -  09 Oct 2020 07:00  --------------------------------------------------------  IN:  Total IN: 0 mL    OUT:    Bulb (mL): 45 mL    Bulb (mL): 70 mL    Bulb (mL): 5 mL    Voided (mL): 0 mL  Total OUT: 120 mL    Total NET: -120 mL    09 Oct 2020 07:01  -  09 Oct 2020 21:21  --------------------------------------------------------  IN:  Total IN: 0 mL    OUT:    Bulb (mL): 30 mL    Bulb (mL): 0 mL  Total OUT: 30 mL    Total NET: -30 mL    MEDICATIONS  (STANDING):  allopurinol 300 milliGRAM(s) Oral daily  clobetasol 0.05% Ointment 1 Application(s) Topical <User Schedule>  cyanocobalamin 1000 MICROGram(s) Oral daily  docusate sodium 100 milliGRAM(s) Oral three times a day  DULoxetine 30 milliGRAM(s) Oral <User Schedule>  folic acid 1 milliGRAM(s) Oral daily  lamoTRIgine 200 milliGRAM(s) Oral daily  multivitamin 1 Tablet(s) Oral daily  polyethylene glycol 3350 17 Gram(s) Oral daily  propranolol 10 milliGRAM(s) Oral daily  senna 2 Tablet(s) Oral at bedtime  traZODone 200 milliGRAM(s) Oral at bedtime    MEDICATIONS  (PRN):  acetaminophen   Tablet .. 650 milliGRAM(s) Oral every 6 hours PRN Mild Pain (1 - 3)  acetaminophen   Tablet .. 650 milliGRAM(s) Oral every 6 hours PRN Temp greater or equal to 38C (100.4F)  aluminum hydroxide/magnesium hydroxide/simethicone Suspension 30 milliLiter(s) Oral every 4 hours PRN Dyspepsia  artificial tears (preservative free) Ophthalmic Solution 1 Drop(s) Both EYES two times a day PRN Dry Eyes  glucagon  Injectable 1 milliGRAM(s) IntraMuscular once PRN Glucose LESS THAN 70 milligrams/deciliter  loratadine 10 milliGRAM(s) Oral daily PRN Post nasal drip  oxycodone    5 mG/acetaminophen 325 mG 2 Tablet(s) Oral every 4 hours PRN Severe Pain (7 - 10)  oxycodone    5 mG/acetaminophen 325 mG 1 Tablet(s) Oral every 4 hours PRN Moderate Pain (4 - 6)    LABS:                        9.2    13.74 )-----------( 168      ( 09 Oct 2020 07:33 )             27.0     10-09    135  |  99  |  28<H>  ----------------------------<  196<H>  4.0   |  26  |  1.30    Ca    8.3<L>      09 Oct 2020 07:33    TPro  6.2  /  Alb  1.7<L>  /  TBili  0.6  /  DBili  .30<H>  /  AST  26  /  ALT  65  /  AlkPhos  108  10-09    ASSESSMENT & PLAN  60 year old female POD#14 s/p panniculectomy with liposuction.  Hematoma in deep subcutaneous soft tissues lower anterior pelvis on CT 9/30  Continued fevers and leukocytosis, ?atelectasis    - Continue to monitor Right drain output  - Bulky dressing changes PRN to Right drain site  - May need repeat imaging to evaluate hematoma  - Bilateral wrist splints for carpal tunnel at bedside; patient does not want to wear them at this time.  - Continue DASH diet  - SCDs  - Encourage ambulation  - Incentive spirometry  - Pain control  - Follow up AM labs  - Care per primary team  - Will continue to follow  - Case discussed with Dr. De La Torre STATUS POST:  panniculectomy, liposuction    POST OPERATIVE DAY #:  14    SUBJECTIVE:  Received call from RN patient's Left-sided drain fell out.  Patient seen and examined at bedside immediately.  Right drain at incisional apex fell out yesterday, no suture was noted, wound dressed with steris, 4x4, abd pads and paper tape.  Now left drain fell out, had collected 30cc/12hr serosanguinous fluid.  Patient also notes serosanguinous leakage from around remaining Right drain.    Vital Signs Last 24 Hrs  T(C): 37.6 (09 Oct 2020 17:13), Max: 37.9 (08 Oct 2020 22:27)  T(F): 99.7 (09 Oct 2020 17:13), Max: 100.3 (08 Oct 2020 22:27)  HR: 108 (09 Oct 2020 17:13) (98 - 114)  BP: 112/75 (09 Oct 2020 17:13) (85/58 - 133/77)  RR: 22 (09 Oct 2020 17:13) (18 - 22)  SpO2: 92% (09 Oct 2020 17:13) (90% - 94%)    PHYSICAL EXAM  GENERAL:  Obese female lying comfortably in bed in NAD  ABDOMEN:  Soft, nondistended, nontender; Incision remains well-approximated, no surrounding erythema or discharge noted.  Left drain site with no suture left in skin, wound closed with steri strips, dressed with 4x4, abd pad and paper tape.  Right-sided old drain site at apex of incision remains well-approximated with steris.  Moderately blood stained dressing around remaining Right anterior drain site, replaced with bulky dressing.  Right anterior drain bulb with approximately 5cc serosanguinous fluid.  SKIN:  No jaundice, pallor, or cyanosis  NEURO:  A&O x 3    I&O's Summary    08 Oct 2020 07:01  -  09 Oct 2020 07:00  --------------------------------------------------------  IN: 0 mL / OUT: 120 mL / NET: -120 mL    09 Oct 2020 07:01  -  09 Oct 2020 21:21  --------------------------------------------------------  IN: 0 mL / OUT: 30 mL / NET: -30 mL    I&O's Detail    08 Oct 2020 07:01  -  09 Oct 2020 07:00  --------------------------------------------------------  IN:  Total IN: 0 mL    OUT:    Bulb (mL): 45 mL    Bulb (mL): 70 mL    Bulb (mL): 5 mL    Voided (mL): 0 mL  Total OUT: 120 mL    Total NET: -120 mL    09 Oct 2020 07:01  -  09 Oct 2020 21:21  --------------------------------------------------------  IN:  Total IN: 0 mL    OUT:    Bulb (mL): 30 mL    Bulb (mL): 0 mL  Total OUT: 30 mL    Total NET: -30 mL    MEDICATIONS  (STANDING):  allopurinol 300 milliGRAM(s) Oral daily  clobetasol 0.05% Ointment 1 Application(s) Topical <User Schedule>  cyanocobalamin 1000 MICROGram(s) Oral daily  docusate sodium 100 milliGRAM(s) Oral three times a day  DULoxetine 30 milliGRAM(s) Oral <User Schedule>  folic acid 1 milliGRAM(s) Oral daily  lamoTRIgine 200 milliGRAM(s) Oral daily  multivitamin 1 Tablet(s) Oral daily  polyethylene glycol 3350 17 Gram(s) Oral daily  propranolol 10 milliGRAM(s) Oral daily  senna 2 Tablet(s) Oral at bedtime  traZODone 200 milliGRAM(s) Oral at bedtime    MEDICATIONS  (PRN):  acetaminophen   Tablet .. 650 milliGRAM(s) Oral every 6 hours PRN Mild Pain (1 - 3)  acetaminophen   Tablet .. 650 milliGRAM(s) Oral every 6 hours PRN Temp greater or equal to 38C (100.4F)  aluminum hydroxide/magnesium hydroxide/simethicone Suspension 30 milliLiter(s) Oral every 4 hours PRN Dyspepsia  artificial tears (preservative free) Ophthalmic Solution 1 Drop(s) Both EYES two times a day PRN Dry Eyes  glucagon  Injectable 1 milliGRAM(s) IntraMuscular once PRN Glucose LESS THAN 70 milligrams/deciliter  loratadine 10 milliGRAM(s) Oral daily PRN Post nasal drip  oxycodone    5 mG/acetaminophen 325 mG 2 Tablet(s) Oral every 4 hours PRN Severe Pain (7 - 10)  oxycodone    5 mG/acetaminophen 325 mG 1 Tablet(s) Oral every 4 hours PRN Moderate Pain (4 - 6)    LABS:                        9.2    13.74 )-----------( 168      ( 09 Oct 2020 07:33 )             27.0     10-09    135  |  99  |  28<H>  ----------------------------<  196<H>  4.0   |  26  |  1.30    Ca    8.3<L>      09 Oct 2020 07:33    TPro  6.2  /  Alb  1.7<L>  /  TBili  0.6  /  DBili  .30<H>  /  AST  26  /  ALT  65  /  AlkPhos  108  10-09    ASSESSMENT & PLAN  60 year old female POD#14 s/p panniculectomy with liposuction.  Hematoma in deep subcutaneous soft tissues lower anterior pelvis on CT 9/30  Continued fevers and leukocytosis, ?atelectasis    - Continue to monitor Right drain output  - Bulky dressing changes PRN to Right drain site  - May need repeat imaging to evaluate hematoma  - Bilateral wrist splints for carpal tunnel at bedside; patient does not want to wear them at this time.  - Continue DASH diet  - SCDs  - Encourage ambulation  - Incentive spirometry  - Pain control  - Follow up AM labs  - Care per primary team  - Will continue to follow  - Case discussed with Dr. De La Torre

## 2020-10-09 NOTE — PROGRESS NOTE ADULT - ASSESSMENT
61yo F w/ PMH of JESUS on CPAP, HTN, morbid obesity, anemia, AAA, Arthritis, Depression, anxiety presented to [facility] for elective abdominal panniculectomy, admitted post-op with hemorrhagic shock s/p 3U PRBC with stabilization, now course c/b fever.    A fib  - Rate controlled per flow sheet  HR: 98 (10-09 @ 09:02) (98 - 114)  - She had brief atrial fibrillation in the setting of significant hypotension, (now off of pressor support) though is now back in SR, no PMH of Afib  - Off tele   - Continue Propanolol.   - For possible loop recorder as out pt she will follow up w/ Dr. Giordano upon discharge    HTN  - BP: 133/77 (10-09-20 @ 09:02) (85/58 - 133/85)  - Continue propranolol  - Hold verapamil   - low normal LV function with mild diastolic dysfunction on recent echocardiogram  - no sign of acute ischemia  - recent cath with non-obs cad  - no sign of volume overload  - hold diuretics     FEVER  - ID is following  - per primary team    Anemia   - Stable  - S/p PRBC transfusion   - Continue to trend CBC as per primary team    JESUS  - CPAP at night    - From a cardiac standpoint the patient may be discharged   - Monitor and replete lytes, keep K>4, Mg>2.  - All other medical needs as per primary team.  - Other cardiovascular workup will depend on clinical course.  - Will continue to follow.    Kobe Corado MS FNP, [facility]  Nurse Practitioner- Cardiology   Spectra #[phone]

## 2020-10-09 NOTE — PROGRESS NOTE ADULT - SUBJECTIVE AND OBJECTIVE BOX
INTERVAL HPI/OVERNIGHT EVENTS:  pt seen and examined   interim events noted- febrile, hypotensive  pt denies n/v/abd pain/overt gib  last bm 2 days ago  joya po  no acute gi events per nursing    MEDICATIONS  (STANDING):  allopurinol 300 milliGRAM(s) Oral daily  clobetasol 0.05% Ointment 1 Application(s) Topical <User Schedule>  cyanocobalamin 1000 MICROGram(s) Oral daily  docusate sodium 100 milliGRAM(s) Oral three times a day  DULoxetine 30 milliGRAM(s) Oral <User Schedule>  folic acid 1 milliGRAM(s) Oral daily  lamoTRIgine 200 milliGRAM(s) Oral daily  multivitamin 1 Tablet(s) Oral daily  polyethylene glycol 3350 17 Gram(s) Oral daily  propranolol 10 milliGRAM(s) Oral daily  senna 2 Tablet(s) Oral at bedtime  traZODone 200 milliGRAM(s) Oral at bedtime    MEDICATIONS  (PRN):  acetaminophen   Tablet .. 650 milliGRAM(s) Oral every 6 hours PRN Mild Pain (1 - 3)  acetaminophen   Tablet .. 650 milliGRAM(s) Oral every 6 hours PRN Temp greater or equal to 38C (100.4F)  aluminum hydroxide/magnesium hydroxide/simethicone Suspension 30 milliLiter(s) Oral every 4 hours PRN Dyspepsia  artificial tears (preservative free) Ophthalmic Solution 1 Drop(s) Both EYES two times a day PRN Dry Eyes  glucagon  Injectable 1 milliGRAM(s) IntraMuscular once PRN Glucose LESS THAN 70 milligrams/deciliter      Allergies    penicillins (Other)  trees dogs cats cockaroaches (Rhinitis; Rhinorrhea)    Intolerances        Review of Systems:    General:  fever    Eyes:  Good vision, no reported pain  ENT:  No sore throat, pain, runny nose, dysphagia  CV:  No pain, palpitatioins, hypo/hypertension  Resp:  No dyspnea, cough, tachypnea, wheezing  GI:  No pain, No nausea, No vomiting, No diarrhea, No constipatiion, No weight loss, No fever, No pruritis, No rectal bleeding, No tarry stools, No dysphagia,  :  No pain, bleeding, incontinence, nocturia  Muscle:  No pain, weakness  Neuro:  No weakness, tingling, memory problems  Psych:  No fatigue, insomnia, mood problems, depression  Endocrine:  No polyuria, polydypsia, cold/heat intolerance  Heme:  No petechiae, ecchymosis, easy bruisability  Skin:  No rash, tattoos, scars, edema      Vital Signs Last 24 Hrs  T(C): 37.4 (07 Oct 2020 05:26), Max: 37.4 (07 Oct 2020 05:26)  T(F): 99.4 (07 Oct 2020 05:26), Max: 99.4 (07 Oct 2020 05:26)  HR: 90 (07 Oct 2020 05:26) (79 - 90)  BP: 116/68 (07 Oct 2020 05:26) (108/70 - 116/68)  BP(mean): --  RR: 18 (07 Oct 2020 05:26) (18 - 20)  SpO2: 95% (07 Oct 2020 05:26) (94% - 95%)    PHYSICAL EXAM:    Constitutional: oob in chair  HEENT: ncat  Neck: No LAD  Gastrointestinal: soft nt nd +binder +drain  Extremities: No peripheral edema  Vascular: + peripheral pulses  Neurological: Awake alert responds appropriately      LABS:                        8.2    9.92  )-----------( 399      ( 07 Oct 2020 05:22 )             25.5     10-07    139  |  102  |  15  ----------------------------<  126<H>  4.6   |  32<H>  |  0.61    Ca    8.2<L>      07 Oct 2020 05:22    TPro  6.0  /  Alb  1.8<L>  /  TBili  0.5  /  DBili  x   /  AST  99<H>  /  ALT  134<H>  /  AlkPhos  119  10-07    PT/INR - ( 06 Oct 2020 07:01 )   PT: 16.2 sec;   INR: 1.41 ratio               RADIOLOGY & ADDITIONAL TESTS:

## 2020-10-09 NOTE — PROGRESS NOTE ADULT - SUBJECTIVE AND OBJECTIVE BOX
[facility] -- Almas Faustin, Cara Nunez, Saurabh Johansen, Cindy Giordano: Office # [phone]    Follow Up: Cardiac optimization pre/post op     Subjective/Observations:  Patient seen and examined. Patient awake, alert, resting in bed. No complaints of chest pain, dyspnea, palpitations or dizziness. No signs of orthopnea or PND. Tolerating room air. Continued to be febrile. Repeat cultures drawn.     REVIEW OF SYSTEMS: All review of systems is negative for eye, ENT, GI, , allergic, dermatologic, musculoskeletal and neurologic except as described above    PAST MEDICAL & SURGICAL HISTORY:  History of aortic aneurysm descending aorta see CT  Arthritis  Degenerative disc disease, lumbar  Neuropathy  Spinal stenosis  Bipolar 1 disorder  Kidney stone  Sleep Apnea CPAP with oxygen since June 2020  Asthma  Hypertension  Morbid Obesity  ETOH Abuse H/O  Benign Essential Tremor  Anxiety  Depression  Renal Calculi chronic  Colitis H/O  Anemia  S/P cardiac catheterization  S/P dilation and curettage  History of tonsillectomy  History of laparoscopic adjustable gastric banding band removed few yrs ago  S/P D&amp;C  Biliary stent placement &amp; ercp  ureteroscopy with stone removal 1-    MEDICATIONS  (STANDING):  allopurinol 300 milliGRAM(s) Oral daily  clobetasol 0.05% Ointment 1 Application(s) Topical <User Schedule>  cyanocobalamin 1000 MICROGram(s) Oral daily  docusate sodium 100 milliGRAM(s) Oral three times a day  DULoxetine 30 milliGRAM(s) Oral <User Schedule>  folic acid 1 milliGRAM(s) Oral daily  lamoTRIgine 200 milliGRAM(s) Oral daily  multivitamin 1 Tablet(s) Oral daily  polyethylene glycol 3350 17 Gram(s) Oral daily  propranolol 10 milliGRAM(s) Oral daily  senna 2 Tablet(s) Oral at bedtime  traZODone 200 milliGRAM(s) Oral at bedtime    MEDICATIONS  (PRN):  acetaminophen   Tablet .. 650 milliGRAM(s) Oral every 6 hours PRN Mild Pain (1 - 3)  acetaminophen   Tablet .. 650 milliGRAM(s) Oral every 6 hours PRN Temp greater or equal to 38C (100.4F)  aluminum hydroxide/magnesium hydroxide/simethicone Suspension 30 milliLiter(s) Oral every 4 hours PRN Dyspepsia  artificial tears (preservative free) Ophthalmic Solution 1 Drop(s) Both EYES two times a day PRN Dry Eyes  glucagon  Injectable 1 milliGRAM(s) IntraMuscular once PRN Glucose LESS THAN 70 milligrams/deciliter  oxycodone    5 mG/acetaminophen 325 mG 2 Tablet(s) Oral every 4 hours PRN Severe Pain (7 - 10)  oxycodone    5 mG/acetaminophen 325 mG 1 Tablet(s) Oral every 4 hours PRN Moderate Pain (4 - 6)    Allergies  penicillins (Other)  trees dogs cats cockaroaches (Rhinitis; Rhinorrhea)    Vital Signs Last 24 Hrs  T(C): 37.2 (09 Oct 2020 09:02), Max: 39 (08 Oct 2020 20:36)  T(F): 99 (09 Oct 2020 09:02), Max: 102.2 (08 Oct 2020 20:36)  HR: 98 (09 Oct 2020 09:02) (98 - 114)  BP: 133/77 (09 Oct 2020 09:02) (85/58 - 133/85)  BP(mean): --  RR: 18 (09 Oct 2020 09:02) (18 - 18)  SpO2: 94% (09 Oct 2020 09:02) (90% - 94%)    I&O's Summary    08 Oct 2020 07:01  -  09 Oct 2020 07:00  --------------------------------------------------------  IN: 0 mL / OUT: 120 mL / NET: -120 mL    TELE: Not on telemetry   PHYSICAL EXAM:  Appearance: NAD, no distress, alert, Obese   HEENT: Moist Mucous Membranes, Anicteric  Cardiovascular: Regular rate and rhythm, Normal S1 S2, No JVD, No murmurs, No rubs, gallops or clicks  Respiratory: Non-labored, Clear to auscultation, No rales, No rhonchi, No wheezing.   Gastrointestinal:  Soft, Non-tender, + BS  Neurologic: Non-focal  Skin: Warm and dry, No visible rashes or ulcers, No ecchymosis, No cyanosis  Musculoskeletal: No clubbing, No cyanosis, No joint swelling/tenderness  Psychiatry: Mood & affect appropriate  Lymph: No peripheral edema.     LABS: All Labs Reviewed:                        9.2    13.74 )-----------( 168      ( 09 Oct 2020 07:33 )             27.0                         8.3    11.27 )-----------( 316      ( 08 Oct 2020 07:24 )             25.5                         8.2    9.92  )-----------( 399      ( 07 Oct 2020 05:22 )             25.5     09 Oct 2020 07:33    135    |  99     |  28     ----------------------------<  196    4.0     |  26     |  1.30   08 Oct 2020 07:18    137    |  100    |  16     ----------------------------<  167    4.0     |  29     |  0.68   07 Oct 2020 05:22    139    |  102    |  15     ----------------------------<  126    4.6     |  32     |  0.61     Ca    8.3        09 Oct 2020 07:33  Ca    7.9        08 Oct 2020 07:18  Ca    8.2        07 Oct 2020 05:22    TPro  6.2    /  Alb  1.7    /  TBili  0.6    /  DBili  .30    /  AST  26     /  ALT  65     /  AlkPhos  108    09 Oct 2020 09:42  TPro  5.9    /  Alb  1.7    /  TBili  0.6    /  DBili  x      /  AST  47     /  ALT  96     /  AlkPhos  111    08 Oct 2020 07:18  TPro  6.0    /  Alb  1.8    /  TBili  0.5    /  DBili  x      /  AST  99     /  ALT  134    /  AlkPhos  119    07 Oct 2020 05:22    < from: Cardiac Cath Lab - Adult (06.16.20 @ 16:26) >  CORONARY VESSELS: The coronary circulation is right dominant.  LM:   --  LM: Normal.  LAD:   --  LAD: Angiography showed minor luminal irregularities with no  flow limiting lesions.  CX:   --  Circumflex: Normal.  RCA:   --  RCA: Normal.  COMPLICATIONS: There were no complications.  DIAGNOSTIC RECOMMENDATIONS: The patient should continue with the present  medications.  < end of copied text > [facility] -- Almas Faustin, Cara Nunez, Saurabh Johansen, Cindy Giordano: Office # [phone]    Follow Up: Cardiac optimization pre/post op     Subjective/Observations:  Patient seen and examined. Patient awake, alert, resting in bed. No complaints of chest pain, dyspnea, palpitations or dizziness. No signs of orthopnea or PND. Tolerating room air. Continued to be febrile. Repeat cultures drawn.     REVIEW OF SYSTEMS: All review of systems is negative for eye, ENT, GI, , allergic, dermatologic, musculoskeletal and neurologic except as described above    PAST MEDICAL & SURGICAL HISTORY:  History of aortic aneurysm descending aorta see CT  Arthritis  Degenerative disc disease, lumbar  Neuropathy  Spinal stenosis  Bipolar 1 disorder  Kidney stone  Sleep Apnea CPAP with oxygen since June 2020  Asthma  Hypertension  Morbid Obesity  ETOH Abuse H/O  Benign Essential Tremor  Anxiety  Depression  Renal Calculi chronic  Colitis H/O  Anemia  S/P cardiac catheterization  S/P dilation and curettage  History of tonsillectomy  History of laparoscopic adjustable gastric banding band removed few yrs ago  S/P D&amp;C  Biliary stent placement &amp; ercp  ureteroscopy with stone removal 1-    MEDICATIONS  (STANDING):  allopurinol 300 milliGRAM(s) Oral daily  clobetasol 0.05% Ointment 1 Application(s) Topical <User Schedule>  cyanocobalamin 1000 MICROGram(s) Oral daily  docusate sodium 100 milliGRAM(s) Oral three times a day  DULoxetine 30 milliGRAM(s) Oral <User Schedule>  folic acid 1 milliGRAM(s) Oral daily  lamoTRIgine 200 milliGRAM(s) Oral daily  multivitamin 1 Tablet(s) Oral daily  polyethylene glycol 3350 17 Gram(s) Oral daily  propranolol 10 milliGRAM(s) Oral daily  senna 2 Tablet(s) Oral at bedtime  traZODone 200 milliGRAM(s) Oral at bedtime    MEDICATIONS  (PRN):  acetaminophen   Tablet .. 650 milliGRAM(s) Oral every 6 hours PRN Mild Pain (1 - 3)  acetaminophen   Tablet .. 650 milliGRAM(s) Oral every 6 hours PRN Temp greater or equal to 38C (100.4F)  aluminum hydroxide/magnesium hydroxide/simethicone Suspension 30 milliLiter(s) Oral every 4 hours PRN Dyspepsia  artificial tears (preservative free) Ophthalmic Solution 1 Drop(s) Both EYES two times a day PRN Dry Eyes  glucagon  Injectable 1 milliGRAM(s) IntraMuscular once PRN Glucose LESS THAN 70 milligrams/deciliter  oxycodone    5 mG/acetaminophen 325 mG 2 Tablet(s) Oral every 4 hours PRN Severe Pain (7 - 10)  oxycodone    5 mG/acetaminophen 325 mG 1 Tablet(s) Oral every 4 hours PRN Moderate Pain (4 - 6)    Allergies  penicillins (Other)  trees dogs cats cockaroaches (Rhinitis; Rhinorrhea)    Vital Signs Last 24 Hrs  T(C): 37.2 (09 Oct 2020 09:02), Max: 39 (08 Oct 2020 20:36)  T(F): 99 (09 Oct 2020 09:02), Max: 102.2 (08 Oct 2020 20:36)  HR: 98 (09 Oct 2020 09:02) (98 - 114)  BP: 133/77 (09 Oct 2020 09:02) (85/58 - 133/85)  BP(mean): --  RR: 18 (09 Oct 2020 09:02) (18 - 18)  SpO2: 94% (09 Oct 2020 09:02) (90% - 94%)    I&O's Summary    08 Oct 2020 07:01  -  09 Oct 2020 07:00  --------------------------------------------------------  IN: 0 mL / OUT: 120 mL / NET: -120 mL    TELE: Not on telemetry   PHYSICAL EXAM:  Appearance: NAD, no distress, alert, Obese   HEENT: Moist Mucous Membranes, Anicteric  Cardiovascular: Regular rate and rhythm, Normal S1 S2, No JVD, No murmurs, No rubs, gallops or clicks  Respiratory: Non-labored, Clear to auscultation, No rales, No rhonchi, No wheezing.   Gastrointestinal:  Soft, Non-tender, + BS  Neurologic: Non-focal  Skin: Warm and dry, + abdominal wound with EDWIN drain, No visible rashes or ulcers, No ecchymosis, No cyanosis  Musculoskeletal: No clubbing, No cyanosis, No joint swelling/tenderness  Psychiatry: Mood & affect appropriate  Lymph: No peripheral edema.     LABS: All Labs Reviewed:                        9.2    13.74 )-----------( 168      ( 09 Oct 2020 07:33 )             27.0                         8.3    11.27 )-----------( 316      ( 08 Oct 2020 07:24 )             25.5                         8.2    9.92  )-----------( 399      ( 07 Oct 2020 05:22 )             25.5     09 Oct 2020 07:33    135    |  99     |  28     ----------------------------<  196    4.0     |  26     |  1.30   08 Oct 2020 07:18    137    |  100    |  16     ----------------------------<  167    4.0     |  29     |  0.68   07 Oct 2020 05:22    139    |  102    |  15     ----------------------------<  126    4.6     |  32     |  0.61     Ca    8.3        09 Oct 2020 07:33  Ca    7.9        08 Oct 2020 07:18  Ca    8.2        07 Oct 2020 05:22    TPro  6.2    /  Alb  1.7    /  TBili  0.6    /  DBili  .30    /  AST  26     /  ALT  65     /  AlkPhos  108    09 Oct 2020 09:42  TPro  5.9    /  Alb  1.7    /  TBili  0.6    /  DBili  x      /  AST  47     /  ALT  96     /  AlkPhos  111    08 Oct 2020 07:18  TPro  6.0    /  Alb  1.8    /  TBili  0.5    /  DBili  x      /  AST  99     /  ALT  134    /  AlkPhos  119    07 Oct 2020 05:22    < from: Cardiac Cath Lab - Adult (06.16.20 @ 16:26) >  CORONARY VESSELS: The coronary circulation is right dominant.  LM:   --  LM: Normal.  LAD:   --  LAD: Angiography showed minor luminal irregularities with no  flow limiting lesions.  CX:   --  Circumflex: Normal.  RCA:   --  RCA: Normal.  COMPLICATIONS: There were no complications.  DIAGNOSTIC RECOMMENDATIONS: The patient should continue with the present  medications.  < end of copied text >

## 2020-10-09 NOTE — PROGRESS NOTE ADULT - PROBLEM SELECTOR PLAN 1
imaging reviewed  likely secondary to antibiotics/sepsis  prior lfts improving, f/u am labs   avoid hepatotoxic medication as able  to follow

## 2020-10-09 NOTE — PROGRESS NOTE ADULT - ASSESSMENT
59yo F w/ PMH of JESUS on CPAP, HTN, morbid obesity, anemia, AAA, Arthritis, Depression, anxiety presented to [facility] for elective abdominal panniculectomy, admitted post-op with hemorrhagic shock s/p 4un PRBC with stabilization, now course c/b fever and left hand pain     #Fever: Still  Spiking fever again and elevated white count. D/w ID, will not recommend to culture EDWIN drain fluid because will be contaminant.     - Will check bladder scan to look for retention as creatinine 1.3 from 0.68. Repeat blood cultures, Incentive Spirometry. Doppler left hand negative for DVT   -unclear etiology. Resolved. Suspect reactive or atelectasis   -pt without significant localizing symptoms or physical exam findings for infection   -BCx NGTD (monitor), UA quite benign, not indicative of infection (pt denies UTI symptoms)  -has had daily fever typically in early afternoon; no fever yet today. Leukocytosis which peaked at ~17 on 10/01/20 has now resolved; had some low BP readings on electronic monitor ; low-normal when BP obtained manually -- given 1L NS on 10/1 and 500cc of NS on 10/2  -UCx and BCx from 9/30 were negative; resent BCx on 10/2 --NGTD  -consulted ID ([facility]), recs appreciated  -CT showing 4cm x 10cm hematoma and no other sign of infection   -discontinued meropenem on 10/5 -- will monitor off Abx  -discussed with surgical team and Dr. De La Torre, who recommend to continue with drain via EDWIN drains, which do have bloody output (there is a drain at the site of the hematoma as seen on CT)  -other possible source of fever is atelectasis, though would be unusual to reach such high fever / persistent fevers and have a leukocytosis and is a less likely scenario  -monitor closely for signs of instability  -incentive spirometry       # Left Hand Pain: Unclear etiology of the symptoms at present. Sudden onset of symptoms during the night. No signs of infection. X rays negative, Doppler also negative. She now states that she normally has problem with both hands in the morning, they feel numb but later they improve during the day. Dr. 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 H/h is table   - acute blood loss anemia post op, had 3 units pRBC early in admission to stabilize; gave 4th unit PRBC on 10/3 as Hgb had trended down to 7.2 ; appropriate rise in Hgb with that 4th unit  - shock state resolved, currently off pressors with stable vital signs   - Per pt, she has been chronically anemic and has been on B12 and folic acid supplements for years. Continue Vit B12 and folic acid supplements   - Heme ([facility]) consulted; who recommends no other anemia supplements currently; did give dose of vitamin K as pt still with blood draining from the EDWIN drains and INR of 1.42  - Continue to monitor H/H closely, Transfuse if Hgb <7 or if hemodynamically unstable   - monitor EDWIN x3 output; hold chemical DVT ppx in setting of continued bleeding.  - pt's bloody EDWIN output was 280cc on 10/3 and 190cc on 10/4, and now rising again to 320cc on 10/5  - consider discharge when drain output decreases as pt might not be able to sustain recovering too much blood loss given her hx of production-related anemia; discuss with surgery if any further w/up to be done regarding amount of blood still draining     #Transaminitis:  -Levels improving   - unclear reason for current rise-- possibly has mild elevation now due to relatively low BP couple of days ago ; ?mild DILI (meropenem has AST/ALT elevation listed as possible adverse effects) -- meropenem discontinued   - GI f/u appreciated   - performed RUQ US which demonstrated a fatty liver but no significant acute findings  - CT abd/P with po and IV contrast on 9/30 did not have significant pathology noted in the liver (the 7mm CBD was likely due to pt being post-cholecystectomy)  - Monitor LFT's , avoid hepatotoxins. Trend daily.     #S/P Abdominal panniculectomy: on 09/25/20.   -mgmt per surgical team.   -pain control as ordered.   -bowel regimen on board; miralax, senna, dulcolax   -Phys therapy  -SCDs    #Afib, new onset:  - Cardio following, ([facility]), recs appreciated   - had brief Afib in setting of severe hypotension / instability early in the admission.   - has been in sinus rhythm since then  - c/w propanolol per cardio  - no A/C especially given pt had hemorrhagic shock several days ago and still has EDWIN drains with bleeding   - pt to have cardio follow up for possible loop recorder as outpt    #JESUS on CPAP:   -pulm (Dr Hanson) following - recs appreciated  -continuous pulse ox.   -CPAP QHS    #ELAINE:  - pt had ELAINE early in admission likely prerenal due to acute blood loss anemia with hemorrhagic shock  - doubt pt had ATN  - ELAINE resolved with PRBC and hemodynamic stabilization.   - monitor BMP    #HTN:   - continue propanolol  - holding verapamil (home med)    #Anxiety/Depression:   - continue Cymbalta, trazodone and lamictal.    #Morbid Obesity:  - now s/p panniculectomy ; counseled on diet    #DVT ppx:   - SCDs

## 2020-10-09 NOTE — PROGRESS NOTE ADULT - SUBJECTIVE AND OBJECTIVE BOX
Patient is a 60y old  Female who presents with a chief complaint of Panniculectomy (09 Oct 2020 14:25)      INTERVAL HPI/OVERNIGHT EVENTS:    had fever to 102.2 last evening. Denies cough or shortness of breath. ID follow-up noted    MEDICATIONS  (STANDING):  allopurinol 300 milliGRAM(s) Oral daily  clobetasol 0.05% Ointment 1 Application(s) Topical <User Schedule>  cyanocobalamin 1000 MICROGram(s) Oral daily  docusate sodium 100 milliGRAM(s) Oral three times a day  DULoxetine 30 milliGRAM(s) Oral <User Schedule>  folic acid 1 milliGRAM(s) Oral daily  lamoTRIgine 200 milliGRAM(s) Oral daily  multivitamin 1 Tablet(s) Oral daily  polyethylene glycol 3350 17 Gram(s) Oral daily  propranolol 10 milliGRAM(s) Oral daily  senna 2 Tablet(s) Oral at bedtime  traZODone 200 milliGRAM(s) Oral at bedtime      MEDICATIONS  (PRN):  acetaminophen   Tablet .. 650 milliGRAM(s) Oral every 6 hours PRN Mild Pain (1 - 3)  acetaminophen   Tablet .. 650 milliGRAM(s) Oral every 6 hours PRN Temp greater or equal to 38C (100.4F)  aluminum hydroxide/magnesium hydroxide/simethicone Suspension 30 milliLiter(s) Oral every 4 hours PRN Dyspepsia  artificial tears (preservative free) Ophthalmic Solution 1 Drop(s) Both EYES two times a day PRN Dry Eyes  glucagon  Injectable 1 milliGRAM(s) IntraMuscular once PRN Glucose LESS THAN 70 milligrams/deciliter  loratadine 10 milliGRAM(s) Oral daily PRN Post nasal drip  oxycodone    5 mG/acetaminophen 325 mG 2 Tablet(s) Oral every 4 hours PRN Severe Pain (7 - 10)  oxycodone    5 mG/acetaminophen 325 mG 1 Tablet(s) Oral every 4 hours PRN Moderate Pain (4 - 6)      Allergies    penicillins (Other)  trees dogs cats cockaroaches (Rhinitis; Rhinorrhea)    Intolerances        PAST MEDICAL & SURGICAL HISTORY:  History of aortic aneurysm  descending aorta see CT    Arthritis    Degenerative disc disease, lumbar    Neuropathy    Spinal stenosis    Bipolar 1 disorder    Kidney stone    Sleep Apnea  CPAP with oxygen since June 2020    Asthma    Hypertension    Morbid Obesity    ETOH Abuse  H/O    Benign Essential Tremor    Anxiety    Depression    Renal Calculi  chronic      Colitis  H/O    Anemia    S/P cardiac catheterization    S/P dilation and curettage    History of tonsillectomy    History of laparoscopic adjustable gastric banding  band removed few yrs ago    S/P D&amp;C    Biliary stent placement &amp; ercp    ureteroscopy with stone removal  1-        Vital Signs Last 24 Hrs  T(C): 37.8 (09 Oct 2020 13:39), Max: 39 (08 Oct 2020 20:36)  T(F): 100.1 (09 Oct 2020 13:39), Max: 102.2 (08 Oct 2020 20:36)  HR: 101 (09 Oct 2020 13:39) (98 - 114)  BP: 94/68 (09 Oct 2020 13:39) (85/58 - 133/77)  BP(mean): --  RR: 18 (09 Oct 2020 13:39) (18 - 18)  SpO2: 92% (09 Oct 2020 13:39) (90% - 94%)    PHYSICAL EXAMINATION:    GENERAL: The patient is awake and alert in no apparent distress.     HEENT: Head is normocephalic and atraumatic. Extraocular muscles are intact. Mucous membranes are moist.    NECK: Supple.    LUNGS: Clear to auscultation without wheezing, rales or rhonchi; respirations unlabored    HEART: Regular rate and rhythm without murmur.    ABDOMEN: obese with positive bowel sounds    EXTREMITIES: Without any cyanosis, clubbing, rash, lesions or edema.    NEUROLOGIC: Grossly intact.    SKIN: No ulceration or induration present.      LABS:                        9.2    13.74 )-----------( 168      ( 09 Oct 2020 07:33 )             27.0     10-09    135  |  99  |  28<H>  ----------------------------<  196<H>  4.0   |  26  |  1.30    Ca    8.3<L>      09 Oct 2020 07:33    TPro  6.2  /  Alb  1.7<L>  /  TBili  0.6  /  DBili  .30<H>  /  AST  26  /  ALT  65  /  AlkPhos  108  10-09          Assessment:    S/P Abdominal Panniculectomy  Post-op recurrent fevers - no obvious source of infection - fever may due to hematoma  Morbid Obesity  Obstructive Sleep apnea Syndrome    Plan:    Observe off antibiotics  Consider repeat CT abdomen and pelvis  Nocturnal CPAP

## 2020-10-09 NOTE — PROGRESS NOTE ADULT - SUBJECTIVE AND OBJECTIVE BOX
Patient is a 60y old  Female who presents with a chief complaint of Panniculectomy (08 Oct 2020 14:26)      INTERVAL HPI/OVERNIGHT EVENTS: Seen and examined at bedside. Spiked Fever last night to 102.2. Denies cough. States that her hands sometime feel numb in the morning after waking up but improve during the day. She denies chest pain, palpitation, sob, nausea, vomiting or diarrhea.     MEDICATIONS  (STANDING):  allopurinol 300 milliGRAM(s) Oral daily  clobetasol 0.05% Ointment 1 Application(s) Topical <User Schedule>  cyanocobalamin 1000 MICROGram(s) Oral daily  docusate sodium 100 milliGRAM(s) Oral three times a day  DULoxetine 30 milliGRAM(s) Oral <User Schedule>  folic acid 1 milliGRAM(s) Oral daily  lamoTRIgine 200 milliGRAM(s) Oral daily  multivitamin 1 Tablet(s) Oral daily  polyethylene glycol 3350 17 Gram(s) Oral daily  propranolol 10 milliGRAM(s) Oral daily  senna 2 Tablet(s) Oral at bedtime  traZODone 200 milliGRAM(s) Oral at bedtime    MEDICATIONS  (PRN):  acetaminophen   Tablet .. 650 milliGRAM(s) Oral every 6 hours PRN Mild Pain (1 - 3)  acetaminophen   Tablet .. 650 milliGRAM(s) Oral every 6 hours PRN Temp greater or equal to 38C (100.4F)  aluminum hydroxide/magnesium hydroxide/simethicone Suspension 30 milliLiter(s) Oral every 4 hours PRN Dyspepsia  artificial tears (preservative free) Ophthalmic Solution 1 Drop(s) Both EYES two times a day PRN Dry Eyes  glucagon  Injectable 1 milliGRAM(s) IntraMuscular once PRN Glucose LESS THAN 70 milligrams/deciliter  oxycodone    5 mG/acetaminophen 325 mG 2 Tablet(s) Oral every 4 hours PRN Severe Pain (7 - 10)  oxycodone    5 mG/acetaminophen 325 mG 1 Tablet(s) Oral every 4 hours PRN Moderate Pain (4 - 6)      Allergies    penicillins (Other)  trees dogs cats cockaroaches (Rhinitis; Rhinorrhea)    Intolerances        REVIEW OF SYSTEMS:  CONSTITUTIONAL: + fever, No  fatigue  EYES: No eye pain, visual disturbances, or discharge  ENMT:  No difficulty hearing, tinnitus, vertigo; No sinus or throat pain  NECK: No pain or stiffness  RESPIRATORY: No cough, wheezing, chills or hemoptysis; No shortness of breath  CARDIOVASCULAR: No chest pain, palpitations, dizziness, or leg swelling  GASTROINTESTINAL: No abdominal or epigastric pain. No nausea, vomiting, or hematemesis; No diarrhea or constipation. No melena or hematochezia.  GENITOURINARY: No dysuria, frequency, hematuria, or incontinence  NEUROLOGICAL: No headaches, memory loss, loss of strength, numbness, or tremors  SKIN: No itching, burning, rashes, or lesions   LYMPH NODES: No enlarged glands  ENDOCRINE: No heat or cold intolerance; No hair loss; No polydipsia or polyuria  MUSCULOSKELETAL: Left hand pain better than before, no back pain   PSYCHIATRIC: No depression, anxiety, mood swings, or difficulty sleeping  HEME/LYMPH: No easy bruising, or bleeding gums  ALLERGY AND IMMUNOLOGIC: No hives or eczema    Vital Signs Last 24 Hrs  T(C): 37 (09 Oct 2020 04:56), Max: 39 (08 Oct 2020 20:36)  T(F): 98.6 (09 Oct 2020 04:56), Max: 102.2 (08 Oct 2020 20:36)  HR: 101 (09 Oct 2020 04:56) (101 - 114)  BP: 85/58 (09 Oct 2020 04:56) (85/58 - 133/85)  BP(mean): --  RR: 18 (09 Oct 2020 04:56) (18 - 18)  SpO2: 90% (09 Oct 2020 04:56) (90% - 90%)    PHYSICAL EXAM:  GENERAL: NAD, AAOX3  HEAD:  Atraumatic, Normocephalic  EYES: EOMI, PERRLA, conjunctiva and sclera clear  ENMT: No tonsillar erythema, exudates, or enlargement; Moist mucous membranes  NECK: Supple, No JVD, Normal thyroid  NERVOUS SYSTEM:  Alert & Oriented X3, Good concentration, no focal deficits   CHEST/LUNG: Clear to auscultation bilaterally; No rales, rhonchi, wheezing, or rubs  HEART: Regular rate and rhythm; No murmurs, rubs, or gallops  ABDOMEN: Soft, Nontender, Nondistended; Bowel sounds present  EXTREMITIES:  2+ Peripheral Pulses, No clubbing, cyanosis, or edema, both hands look normal + pulses, soft compartments, no erythema/ cyanosis  LYMPH: No lymphadenopathy noted  SKIN: No rashes or lesions    LABS:                        9.2    13.74 )-----------( 168      ( 09 Oct 2020 07:33 )             27.0     09 Oct 2020 07:33    135    |  99     |  28     ----------------------------<  196    4.0     |  26     |  1.30     Ca    8.3        09 Oct 2020 07:33        CAPILLARY BLOOD GLUCOSE        BLOOD CULTURE    RADIOLOGY & ADDITIONAL TESTS:    Imaging Personally Reviewed:  [ ] YES     Consultant(s) Notes Reviewed:      Care Discussed with Consultants/Other Providers:

## 2020-10-10 ENCOUNTER — TRANSCRIPTION ENCOUNTER (OUTPATIENT)
Age: 60
End: 2020-10-10

## 2020-10-10 VITALS
TEMPERATURE: 100 F | OXYGEN SATURATION: 92 % | SYSTOLIC BLOOD PRESSURE: 100 MMHG | DIASTOLIC BLOOD PRESSURE: 66 MMHG | RESPIRATION RATE: 16 BRPM | HEART RATE: 94 BPM

## 2020-10-10 LAB
ALBUMIN SERPL ELPH-MCNC: 1.6 G/DL — LOW (ref 3.3–5)
ALP SERPL-CCNC: 98 U/L — SIGNIFICANT CHANGE UP (ref 40–120)
ALT FLD-CCNC: 51 U/L — SIGNIFICANT CHANGE UP (ref 12–78)
ANION GAP SERPL CALC-SCNC: 9 MMOL/L — SIGNIFICANT CHANGE UP (ref 5–17)
AST SERPL-CCNC: 47 U/L — HIGH (ref 15–37)
BASOPHILS # BLD AUTO: 0 K/UL — SIGNIFICANT CHANGE UP (ref 0–0.2)
BASOPHILS NFR BLD AUTO: 0 % — SIGNIFICANT CHANGE UP (ref 0–2)
BILIRUB SERPL-MCNC: 0.5 MG/DL — SIGNIFICANT CHANGE UP (ref 0.2–1.2)
BUN SERPL-MCNC: 44 MG/DL — HIGH (ref 7–23)
CALCIUM SERPL-MCNC: 8.2 MG/DL — LOW (ref 8.5–10.1)
CHLORIDE SERPL-SCNC: 97 MMOL/L — SIGNIFICANT CHANGE UP (ref 96–108)
CO2 SERPL-SCNC: 29 MMOL/L — SIGNIFICANT CHANGE UP (ref 22–31)
CREAT SERPL-MCNC: 1.1 MG/DL — SIGNIFICANT CHANGE UP (ref 0.5–1.3)
EOSINOPHIL # BLD AUTO: 0 K/UL — SIGNIFICANT CHANGE UP (ref 0–0.5)
EOSINOPHIL NFR BLD AUTO: 0 % — SIGNIFICANT CHANGE UP (ref 0–6)
GLUCOSE SERPL-MCNC: 133 MG/DL — HIGH (ref 70–99)
HCT VFR BLD CALC: 24.9 % — LOW (ref 34.5–45)
HGB BLD-MCNC: 8.4 G/DL — LOW (ref 11.5–15.5)
LYMPHOCYTES # BLD AUTO: 0.79 K/UL — LOW (ref 1–3.3)
LYMPHOCYTES # BLD AUTO: 11 % — LOW (ref 13–44)
MCHC RBC-ENTMCNC: 28.8 PG — SIGNIFICANT CHANGE UP (ref 27–34)
MCHC RBC-ENTMCNC: 33.7 GM/DL — SIGNIFICANT CHANGE UP (ref 32–36)
MCV RBC AUTO: 85.3 FL — SIGNIFICANT CHANGE UP (ref 80–100)
MONOCYTES # BLD AUTO: 0.22 K/UL — SIGNIFICANT CHANGE UP (ref 0–0.9)
MONOCYTES NFR BLD AUTO: 3 % — SIGNIFICANT CHANGE UP (ref 2–14)
NEUTROPHILS # BLD AUTO: 6.12 K/UL — SIGNIFICANT CHANGE UP (ref 1.8–7.4)
NEUTROPHILS NFR BLD AUTO: 78 % — HIGH (ref 43–77)
NRBC # BLD: SIGNIFICANT CHANGE UP /100 WBCS (ref 0–0)
PLATELET # BLD AUTO: 178 K/UL — SIGNIFICANT CHANGE UP (ref 150–400)
POTASSIUM SERPL-MCNC: 4.4 MMOL/L — SIGNIFICANT CHANGE UP (ref 3.5–5.3)
POTASSIUM SERPL-SCNC: 4.4 MMOL/L — SIGNIFICANT CHANGE UP (ref 3.5–5.3)
PROT SERPL-MCNC: 6 G/DL — SIGNIFICANT CHANGE UP (ref 6–8.3)
RBC # BLD: 2.92 M/UL — LOW (ref 3.8–5.2)
RBC # FLD: 15.2 % — HIGH (ref 10.3–14.5)
SODIUM SERPL-SCNC: 135 MMOL/L — SIGNIFICANT CHANGE UP (ref 135–145)
WBC # BLD: 7.2 K/UL — SIGNIFICANT CHANGE UP (ref 3.8–10.5)
WBC # FLD AUTO: 7.2 K/UL — SIGNIFICANT CHANGE UP (ref 3.8–10.5)

## 2020-10-10 PROCEDURE — 85027 COMPLETE CBC AUTOMATED: CPT

## 2020-10-10 PROCEDURE — C1889: CPT

## 2020-10-10 PROCEDURE — 83605 ASSAY OF LACTIC ACID: CPT

## 2020-10-10 PROCEDURE — 83735 ASSAY OF MAGNESIUM: CPT

## 2020-10-10 PROCEDURE — 82009 KETONE BODYS QUAL: CPT

## 2020-10-10 PROCEDURE — 73110 X-RAY EXAM OF WRIST: CPT

## 2020-10-10 PROCEDURE — 82272 OCCULT BLD FECES 1-3 TESTS: CPT

## 2020-10-10 PROCEDURE — 86900 BLOOD TYPING SEROLOGIC ABO: CPT

## 2020-10-10 PROCEDURE — 76705 ECHO EXAM OF ABDOMEN: CPT

## 2020-10-10 PROCEDURE — 36415 COLL VENOUS BLD VENIPUNCTURE: CPT

## 2020-10-10 PROCEDURE — 97162 PT EVAL MOD COMPLEX 30 MIN: CPT

## 2020-10-10 PROCEDURE — 94760 N-INVAS EAR/PLS OXIMETRY 1: CPT

## 2020-10-10 PROCEDURE — 83540 ASSAY OF IRON: CPT

## 2020-10-10 PROCEDURE — 82550 ASSAY OF CK (CPK): CPT

## 2020-10-10 PROCEDURE — 86803 HEPATITIS C AB TEST: CPT

## 2020-10-10 PROCEDURE — 87040 BLOOD CULTURE FOR BACTERIA: CPT

## 2020-10-10 PROCEDURE — 74177 CT ABD & PELVIS W/CONTRAST: CPT

## 2020-10-10 PROCEDURE — 86901 BLOOD TYPING SEROLOGIC RH(D): CPT

## 2020-10-10 PROCEDURE — 36430 TRANSFUSION BLD/BLD COMPNT: CPT

## 2020-10-10 PROCEDURE — 84100 ASSAY OF PHOSPHORUS: CPT

## 2020-10-10 PROCEDURE — 97112 NEUROMUSCULAR REEDUCATION: CPT

## 2020-10-10 PROCEDURE — 87086 URINE CULTURE/COLONY COUNT: CPT

## 2020-10-10 PROCEDURE — P9016: CPT

## 2020-10-10 PROCEDURE — 82607 VITAMIN B-12: CPT

## 2020-10-10 PROCEDURE — 85025 COMPLETE CBC W/AUTO DIFF WBC: CPT

## 2020-10-10 PROCEDURE — 85610 PROTHROMBIN TIME: CPT

## 2020-10-10 PROCEDURE — 97116 GAIT TRAINING THERAPY: CPT

## 2020-10-10 PROCEDURE — 99232 SBSQ HOSP IP/OBS MODERATE 35: CPT

## 2020-10-10 PROCEDURE — 82962 GLUCOSE BLOOD TEST: CPT

## 2020-10-10 PROCEDURE — 86923 COMPATIBILITY TEST ELECTRIC: CPT

## 2020-10-10 PROCEDURE — 97530 THERAPEUTIC ACTIVITIES: CPT

## 2020-10-10 PROCEDURE — 85379 FIBRIN DEGRADATION QUANT: CPT

## 2020-10-10 PROCEDURE — 82728 ASSAY OF FERRITIN: CPT

## 2020-10-10 PROCEDURE — 93971 EXTREMITY STUDY: CPT

## 2020-10-10 PROCEDURE — 88300 SURGICAL PATH GROSS: CPT

## 2020-10-10 PROCEDURE — 82553 CREATINE MB FRACTION: CPT

## 2020-10-10 PROCEDURE — 80074 ACUTE HEPATITIS PANEL: CPT

## 2020-10-10 PROCEDURE — 84484 ASSAY OF TROPONIN QUANT: CPT

## 2020-10-10 PROCEDURE — 83550 IRON BINDING TEST: CPT

## 2020-10-10 PROCEDURE — 84145 PROCALCITONIN (PCT): CPT

## 2020-10-10 PROCEDURE — 94660 CPAP INITIATION&MGMT: CPT

## 2020-10-10 PROCEDURE — 86769 SARS-COV-2 COVID-19 ANTIBODY: CPT

## 2020-10-10 PROCEDURE — 85730 THROMBOPLASTIN TIME PARTIAL: CPT

## 2020-10-10 PROCEDURE — 0225U NFCT DS DNA&RNA 21 SARSCOV2: CPT

## 2020-10-10 PROCEDURE — 81001 URINALYSIS AUTO W/SCOPE: CPT

## 2020-10-10 PROCEDURE — 71045 X-RAY EXAM CHEST 1 VIEW: CPT

## 2020-10-10 PROCEDURE — 80053 COMPREHEN METABOLIC PANEL: CPT

## 2020-10-10 PROCEDURE — 82746 ASSAY OF FOLIC ACID SERUM: CPT

## 2020-10-10 PROCEDURE — 80076 HEPATIC FUNCTION PANEL: CPT

## 2020-10-10 PROCEDURE — 86850 RBC ANTIBODY SCREEN: CPT

## 2020-10-10 PROCEDURE — 99239 HOSP IP/OBS DSCHRG MGMT >30: CPT

## 2020-10-10 PROCEDURE — 80048 BASIC METABOLIC PNL TOTAL CA: CPT

## 2020-10-10 PROCEDURE — 83036 HEMOGLOBIN GLYCOSYLATED A1C: CPT

## 2020-10-10 PROCEDURE — 88302 TISSUE EXAM BY PATHOLOGIST: CPT

## 2020-10-10 PROCEDURE — U0003: CPT

## 2020-10-10 RX ORDER — PREGABALIN 225 MG/1
1 CAPSULE ORAL
Qty: 0 | Refills: 0 | DISCHARGE
Start: 2020-10-10

## 2020-10-10 RX ORDER — DICLOFENAC SODIUM/MISOPROSTOL 50 MG-200
1 TABLET,IMMEDIATE,DELAY RELEASE,BIPHASE ORAL
Qty: 0 | Refills: 0 | DISCHARGE

## 2020-10-10 RX ORDER — SENNA PLUS 8.6 MG/1
2 TABLET ORAL
Qty: 0 | Refills: 0 | DISCHARGE
Start: 2020-10-10

## 2020-10-10 RX ORDER — LANOLIN ALCOHOL/MO/W.PET/CERES
10 CREAM (GRAM) TOPICAL
Qty: 0 | Refills: 0 | DISCHARGE

## 2020-10-10 RX ORDER — VERAPAMIL HCL 240 MG
1 CAPSULE, EXTENDED RELEASE PELLETS 24 HR ORAL
Qty: 0 | Refills: 0 | DISCHARGE

## 2020-10-10 RX ORDER — POLYETHYLENE GLYCOL 3350 17 G/17G
17 POWDER, FOR SOLUTION ORAL
Qty: 0 | Refills: 0 | DISCHARGE
Start: 2020-10-10

## 2020-10-10 RX ORDER — LORATADINE 10 MG/1
1 TABLET ORAL
Qty: 0 | Refills: 0 | DISCHARGE
Start: 2020-10-10

## 2020-10-10 RX ORDER — ACETAMINOPHEN 500 MG
2 TABLET ORAL
Qty: 0 | Refills: 0 | DISCHARGE
Start: 2020-10-10

## 2020-10-10 RX ORDER — ACETAMINOPHEN 500 MG
3 TABLET ORAL
Qty: 0 | Refills: 0 | DISCHARGE
Start: 2020-10-10

## 2020-10-10 RX ORDER — FOLIC ACID 0.8 MG
1 TABLET ORAL
Qty: 0 | Refills: 0 | DISCHARGE

## 2020-10-10 RX ORDER — IRBESARTAN 75 MG/1
1 TABLET ORAL
Qty: 0 | Refills: 0 | DISCHARGE

## 2020-10-10 RX ORDER — FOLIC ACID 0.8 MG
1 TABLET ORAL
Qty: 0 | Refills: 0 | DISCHARGE
Start: 2020-10-10

## 2020-10-10 RX ADMIN — Medication 1 TABLET(S): at 12:35

## 2020-10-10 RX ADMIN — OXYCODONE AND ACETAMINOPHEN 1 TABLET(S): 5; 325 TABLET ORAL at 00:55

## 2020-10-10 RX ADMIN — DULOXETINE HYDROCHLORIDE 30 MILLIGRAM(S): 30 CAPSULE, DELAYED RELEASE ORAL at 14:29

## 2020-10-10 RX ADMIN — Medication 1 MILLIGRAM(S): at 12:35

## 2020-10-10 RX ADMIN — LAMOTRIGINE 200 MILLIGRAM(S): 25 TABLET, ORALLY DISINTEGRATING ORAL at 12:34

## 2020-10-10 RX ADMIN — Medication 300 MILLIGRAM(S): at 12:34

## 2020-10-10 RX ADMIN — PREGABALIN 1000 MICROGRAM(S): 225 CAPSULE ORAL at 12:35

## 2020-10-10 RX ADMIN — DULOXETINE HYDROCHLORIDE 30 MILLIGRAM(S): 30 CAPSULE, DELAYED RELEASE ORAL at 06:04

## 2020-10-10 NOTE — PROGRESS NOTE ADULT - SUBJECTIVE AND OBJECTIVE BOX
All interim records and events noted.    One EDWIN fell out,  no pain, the other one still draining bloody liquid but not much  Feeling well, being discharged soon      MEDICATIONS  (STANDING):  allopurinol 300 milliGRAM(s) Oral daily  clobetasol 0.05% Ointment 1 Application(s) Topical <User Schedule>  cyanocobalamin 1000 MICROGram(s) Oral daily  docusate sodium 100 milliGRAM(s) Oral three times a day  DULoxetine 30 milliGRAM(s) Oral <User Schedule>  folic acid 1 milliGRAM(s) Oral daily  lamoTRIgine 200 milliGRAM(s) Oral daily  multivitamin 1 Tablet(s) Oral daily  polyethylene glycol 3350 17 Gram(s) Oral daily  propranolol 10 milliGRAM(s) Oral daily  senna 2 Tablet(s) Oral at bedtime  traZODone 200 milliGRAM(s) Oral at bedtime    MEDICATIONS  (PRN):  acetaminophen   Tablet .. 650 milliGRAM(s) Oral every 6 hours PRN Mild Pain (1 - 3)  acetaminophen   Tablet .. 650 milliGRAM(s) Oral every 6 hours PRN Temp greater or equal to 38C (100.4F)  aluminum hydroxide/magnesium hydroxide/simethicone Suspension 30 milliLiter(s) Oral every 4 hours PRN Dyspepsia  artificial tears (preservative free) Ophthalmic Solution 1 Drop(s) Both EYES two times a day PRN Dry Eyes  glucagon  Injectable 1 milliGRAM(s) IntraMuscular once PRN Glucose LESS THAN 70 milligrams/deciliter  loratadine 10 milliGRAM(s) Oral daily PRN Post nasal drip  oxycodone    5 mG/acetaminophen 325 mG 2 Tablet(s) Oral every 4 hours PRN Severe Pain (7 - 10)  oxycodone    5 mG/acetaminophen 325 mG 1 Tablet(s) Oral every 4 hours PRN Moderate Pain (4 - 6)      Vital Signs Last 24 Hrs  T(C): 36.7 (10 Oct 2020 05:11), Max: 37.8 (09 Oct 2020 13:39)  T(F): 98 (10 Oct 2020 05:11), Max: 100.1 (09 Oct 2020 13:39)  HR: 109 (10 Oct 2020 05:11) (101 - 109)  BP: 114/80 (10 Oct 2020 05:11) (94/68 - 114/80)  BP(mean): --  RR: 20 (10 Oct 2020 05:11) (18 - 22)  SpO2: 92% (10 Oct 2020 05:11) (90% - 92%)    PHYSICAL EXAM  General: well developed  well nourished, in no acute distress  Head: atraumatic, normocephalic  ENT: sclera anicteric, buccal mucosa moist  Neck: supple, trachea midline, no palpable masses  CV: S1 S2, regular rate and rhythm  Lungs: clear to auscultation, no wheezes/rhonchi  Abdomen: soft, nontender, bowel sounds present, no palpable masses, abdomen dressing clean, EDWIN x1 intact  Extrem: no clubbing/cyanosis/edema  Skin: no significant increased ecchymosis/petechiae  Neuro: alert and oriented X3,  no focal deficits      LABS:             8.4    7.20  )-----------( 178      ( 10-10 @ 06:50 )             24.9                9.2    13.74 )-----------( 168      ( 10-09 @ 07:33 )             27.0                8.3    11.27 )-----------( 316      ( 10-08 @ 07:24 )             25.5       10-10    135  |  97  |  44<H>  ----------------------------<  133<H>  4.4   |  29  |  1.10    Ca    8.2<L>      10 Oct 2020 06:50    TPro  6.0  /  Alb  1.6<L>  /  TBili  0.5  /  DBili  x   /  AST  47<H>  /  ALT  51  /  AlkPhos  98  10-10    10-06 @ 07:01  PT16.2 INR1.41  PTT--      RADIOLOGY & ADDITIONAL STUDIES:    IMPRESSION/RECOMMENDATIONS:

## 2020-10-10 NOTE — PROGRESS NOTE ADULT - REASON FOR ADMISSION
Panniculectomy
hemorrhagic shock s/p panniculectomy
Panniculectomy
hemorrhagic shock s/p panniculectomy
Panniculectomy
hemorrhagic shock s/p panniculectomy
Panniculectomy
Panniculectomy

## 2020-10-10 NOTE — DISCHARGE NOTE NURSING/CASE MANAGEMENT/SOCIAL WORK - NSDCFUADDAPPT_GEN_ALL_CORE_FT
F/u with Dr. Cueto in 3 to 5 days. Call for appointment  F/u with all your doctors  F/u with Cardio Dr. Giordano, next week.  F/u with Dr. Cinthia Pelayo hematologist

## 2020-10-10 NOTE — PROGRESS NOTE ADULT - SUBJECTIVE AND OBJECTIVE BOX
[facility] -- Almas Faustin, Cara Nunez, Saurabh Johansen, Cindy Giordano: Office # [phone]    Follow Up:  Cardiac optimization pre/post op     Subjective/Observations: Patient seen and examined. Patient awake, alert, resting in bed. No complaints of chest pain, dyspnea, palpitations or dizziness. No signs of orthopnea or PND. Tolerating room air. Low grade fever at night     REVIEW OF SYSTEMS: All review of systems is negative for eye, ENT, GI, , allergic, dermatologic, musculoskeletal and neurologic except as described above    PAST MEDICAL & SURGICAL HISTORY:  History of aortic aneurysm  descending aorta see CT    Arthritis    Degenerative disc disease, lumbar    Neuropathy    Spinal stenosis    Bipolar 1 disorder    Kidney stone    Sleep Apnea  CPAP with oxygen since June 2020    Asthma    Hypertension    Morbid Obesity    ETOH Abuse  H/O    Benign Essential Tremor    Anxiety    Depression    Renal Calculi  chronic      Colitis  H/O    Anemia    S/P cardiac catheterization    S/P dilation and curettage    History of tonsillectomy    History of laparoscopic adjustable gastric banding  band removed few yrs ago    S/P D&amp;C    Biliary stent placement &amp; ercp    ureteroscopy with stone removal  1-    MEDICATIONS  (STANDING):  allopurinol 300 milliGRAM(s) Oral daily  clobetasol 0.05% Ointment 1 Application(s) Topical <User Schedule>  cyanocobalamin 1000 MICROGram(s) Oral daily  docusate sodium 100 milliGRAM(s) Oral three times a day  DULoxetine 30 milliGRAM(s) Oral <User Schedule>  folic acid 1 milliGRAM(s) Oral daily  lamoTRIgine 200 milliGRAM(s) Oral daily  multivitamin 1 Tablet(s) Oral daily  polyethylene glycol 3350 17 Gram(s) Oral daily  propranolol 10 milliGRAM(s) Oral daily  senna 2 Tablet(s) Oral at bedtime  traZODone 200 milliGRAM(s) Oral at bedtime    MEDICATIONS  (PRN):  acetaminophen   Tablet .. 650 milliGRAM(s) Oral every 6 hours PRN Mild Pain (1 - 3)  acetaminophen   Tablet .. 650 milliGRAM(s) Oral every 6 hours PRN Temp greater or equal to 38C (100.4F)  aluminum hydroxide/magnesium hydroxide/simethicone Suspension 30 milliLiter(s) Oral every 4 hours PRN Dyspepsia  artificial tears (preservative free) Ophthalmic Solution 1 Drop(s) Both EYES two times a day PRN Dry Eyes  glucagon  Injectable 1 milliGRAM(s) IntraMuscular once PRN Glucose LESS THAN 70 milligrams/deciliter  loratadine 10 milliGRAM(s) Oral daily PRN Post nasal drip  oxycodone    5 mG/acetaminophen 325 mG 1 Tablet(s) Oral every 4 hours PRN Moderate Pain (4 - 6)  oxycodone    5 mG/acetaminophen 325 mG 2 Tablet(s) Oral every 4 hours PRN Severe Pain (7 - 10)    Allergies  penicillins (Other)  trees dogs cats cockaroaches (Rhinitis; Rhinorrhea)    Vital Signs Last 24 Hrs  T(C): 36.7 (10 Oct 2020 05:11), Max: 37.8 (09 Oct 2020 13:39)  T(F): 98 (10 Oct 2020 05:11), Max: 100.1 (09 Oct 2020 13:39)  HR: 109 (10 Oct 2020 05:11) (101 - 109)  BP: 114/80 (10 Oct 2020 05:11) (94/68 - 114/80)  BP(mean): --  RR: 20 (10 Oct 2020 05:11) (18 - 22)  SpO2: 92% (10 Oct 2020 05:11) (90% - 92%)    I&O's Summary    09 Oct 2020 07:01  -  10 Oct 2020 07:00  --------------------------------------------------------  IN: 0 mL / OUT: 530 mL / NET: -530 mL    TELE: Not on telemetry   PHYSICAL EXAM:  Appearance: NAD, no distress, alert, Obese   HEENT: Moist Mucous Membranes, Anicteric  Cardiovascular: Regular rate and rhythm, Normal S1 S2, No JVD, No murmurs, No rubs, gallops or clicks  Respiratory: Non-labored, Clear to auscultation, No rales, No rhonchi, No wheezing.   Gastrointestinal:  Soft, Non-tender, + BS  Neurologic: Non-focal  Skin: Warm and dry,+ abdominal wound with 1 drain No ecchymosis, No cyanosis  Musculoskeletal: No clubbing, No cyanosis, No joint swelling/tenderness  Psychiatry: Mood & affect appropriate  Lymph: No peripheral edema.     LABS: All Labs Reviewed:                        8.4    7.20  )-----------( 178      ( 10 Oct 2020 06:50 )             24.9                         9.2    13.74 )-----------( 168      ( 09 Oct 2020 07:33 )             27.0                         8.3    11.27 )-----------( 316      ( 08 Oct 2020 07:24 )             25.5     10 Oct 2020 06:50    135    |  97     |  44     ----------------------------<  133    4.4     |  29     |  1.10   09 Oct 2020 07:33    135    |  99     |  28     ----------------------------<  196    4.0     |  26     |  1.30   08 Oct 2020 07:18    137    |  100    |  16     ----------------------------<  167    4.0     |  29     |  0.68     Ca    8.2        10 Oct 2020 06:50  Ca    8.3        09 Oct 2020 07:33  Ca    7.9        08 Oct 2020 07:18    TPro  6.0    /  Alb  1.6    /  TBili  0.5    /  DBili  x      /  AST  47     /  ALT  51     /  AlkPhos  98     10 Oct 2020 06:50  TPro  6.2    /  Alb  1.7    /  TBili  0.6    /  DBili  .30    /  AST  26     /  ALT  65     /  AlkPhos  108    09 Oct 2020 09:42  TPro  5.9    /  Alb  1.7    /  TBili  0.6    /  DBili  x      /  AST  47     /  ALT  96     /  AlkPhos  111    08 Oct 2020 07:18      < from: Cardiac Cath Lab - Adult (06.16.20 @ 16:26) >  CORONARY VESSELS: The coronary circulation is right dominant.  LM:   --  LM: Normal.  LAD:   --  LAD: Angiography showed minor luminal irregularities with no  flow limiting lesions.  CX:   --  Circumflex: Normal.  RCA:   --  RCA: Normal.  COMPLICATIONS: There were no complications.  DIAGNOSTIC RECOMMENDATIONS: The patient should continue with the present  medications.  < end of copied text >

## 2020-10-10 NOTE — PROGRESS NOTE ADULT - SUBJECTIVE AND OBJECTIVE BOX
S: Patient seen and examined at bedside.  Currently denies complaints.  Sitting in bedside chair.  Tolerating diet, denies N/V, abd pain, F/C, CP/SOB/dyspnea.  Right-sided drain still in place.      MEDICATIONS:  acetaminophen   Tablet .. 650 milliGRAM(s) Oral every 6 hours PRN  acetaminophen   Tablet .. 650 milliGRAM(s) Oral every 6 hours PRN  allopurinol 300 milliGRAM(s) Oral daily  aluminum hydroxide/magnesium hydroxide/simethicone Suspension 30 milliLiter(s) Oral every 4 hours PRN  artificial tears (preservative free) Ophthalmic Solution 1 Drop(s) Both EYES two times a day PRN  clobetasol 0.05% Ointment 1 Application(s) Topical <User Schedule>  cyanocobalamin 1000 MICROGram(s) Oral daily  docusate sodium 100 milliGRAM(s) Oral three times a day  DULoxetine 30 milliGRAM(s) Oral <User Schedule>  folic acid 1 milliGRAM(s) Oral daily  glucagon  Injectable 1 milliGRAM(s) IntraMuscular once PRN  lamoTRIgine 200 milliGRAM(s) Oral daily  loratadine 10 milliGRAM(s) Oral daily PRN  multivitamin 1 Tablet(s) Oral daily  oxycodone    5 mG/acetaminophen 325 mG 1 Tablet(s) Oral every 4 hours PRN  oxycodone    5 mG/acetaminophen 325 mG 2 Tablet(s) Oral every 4 hours PRN  polyethylene glycol 3350 17 Gram(s) Oral daily  propranolol 10 milliGRAM(s) Oral daily  senna 2 Tablet(s) Oral at bedtime  traZODone 200 milliGRAM(s) Oral at bedtime      O:  Vital Signs Last 24 Hrs  T(C): 36.7 (10 Oct 2020 05:11), Max: 37.8 (09 Oct 2020 13:39)  T(F): 98 (10 Oct 2020 05:11), Max: 100.1 (09 Oct 2020 13:39)  HR: 109 (10 Oct 2020 05:11) (101 - 109)  BP: 114/80 (10 Oct 2020 05:11) (94/68 - 114/80)  BP(mean): --  RR: 20 (10 Oct 2020 05:11) (18 - 22)  SpO2: 92% (10 Oct 2020 05:11) (90% - 92%)    I&O SUMMARY:    10-09-20 @ 07:01  -  10-10-20 @ 07:00  --------------------------------------------------------  IN: 0 mL / OUT: 530 mL / NET: -530 mL        PHYSICAL EXAM:  Lungs: CTA bilat without W/R/R  Card: S1S2  Abd: Obese, soft, NT, ND.  Umbilicus viable, healing well without drainage or signs of infection.  Panniculectomy incision still with some SS drainage, particularly on the right side - no purulence appreciated.  Remaining drain tubing milked, sanguinous fluid coming continuously through tubing (cleared small blockage).  Abdominal binder re-applied.    Ext: Calves soft, NT, without edema bilat     LABS:                        8.4    7.20  )-----------( 178      ( 10 Oct 2020 06:50 )             24.9     10-10    135  |  97  |  44<H>  ----------------------------<  133<H>  4.4   |  29  |  1.10    Ca    8.2<L>      10 Oct 2020 06:50    TPro  6.0  /  Alb  1.6<L>  /  TBili  0.5  /  DBili  x   /  AST  47<H>  /  ALT  51  /  AlkPhos  98  10-10            A: S/P panniculectomy, with subsequent subcutaneous hematoma development    P: VS and labwork remain non-suggestive      Maintain remaining drain, continue abdominal binder      Continue current care, DVT prophylaxis, incentive spirometry, encourage ambulation      No objection to discharge to home - recommend maintaining a log of drainage output, bring on follow-up visit with Dr. De La Torre             S: Patient seen and examined at bedside.  Currently denies complaints.  Sitting in bedside chair.  Tolerating diet, denies N/V, abd pain, F/C, CP/SOB/dyspnea.  Right-sided drain still in place.      MEDICATIONS:  acetaminophen   Tablet .. 650 milliGRAM(s) Oral every 6 hours PRN  acetaminophen   Tablet .. 650 milliGRAM(s) Oral every 6 hours PRN  allopurinol 300 milliGRAM(s) Oral daily  aluminum hydroxide/magnesium hydroxide/simethicone Suspension 30 milliLiter(s) Oral every 4 hours PRN  artificial tears (preservative free) Ophthalmic Solution 1 Drop(s) Both EYES two times a day PRN  clobetasol 0.05% Ointment 1 Application(s) Topical <User Schedule>  cyanocobalamin 1000 MICROGram(s) Oral daily  docusate sodium 100 milliGRAM(s) Oral three times a day  DULoxetine 30 milliGRAM(s) Oral <User Schedule>  folic acid 1 milliGRAM(s) Oral daily  glucagon  Injectable 1 milliGRAM(s) IntraMuscular once PRN  lamoTRIgine 200 milliGRAM(s) Oral daily  loratadine 10 milliGRAM(s) Oral daily PRN  multivitamin 1 Tablet(s) Oral daily  oxycodone    5 mG/acetaminophen 325 mG 1 Tablet(s) Oral every 4 hours PRN  oxycodone    5 mG/acetaminophen 325 mG 2 Tablet(s) Oral every 4 hours PRN  polyethylene glycol 3350 17 Gram(s) Oral daily  propranolol 10 milliGRAM(s) Oral daily  senna 2 Tablet(s) Oral at bedtime  traZODone 200 milliGRAM(s) Oral at bedtime      O:  Vital Signs Last 24 Hrs  T(C): 36.7 (10 Oct 2020 05:11), Max: 37.8 (09 Oct 2020 13:39)  T(F): 98 (10 Oct 2020 05:11), Max: 100.1 (09 Oct 2020 13:39)  HR: 109 (10 Oct 2020 05:11) (101 - 109)  BP: 114/80 (10 Oct 2020 05:11) (94/68 - 114/80)  BP(mean): --  RR: 20 (10 Oct 2020 05:11) (18 - 22)  SpO2: 92% (10 Oct 2020 05:11) (90% - 92%)    I&O SUMMARY:    10-09-20 @ 07:01  -  10-10-20 @ 07:00  --------------------------------------------------------  IN: 0 mL / OUT: 530 mL / NET: -530 mL        PHYSICAL EXAM:  Lungs: CTA bilat without W/R/R  Card: S1S2  Abd: Obese, soft, NT, ND.  Umbilicus viable, healing well without drainage or signs of infection.  Panniculectomy incision still with some SS drainage, particularly on the right side - no purulence appreciated.  Remaining drain tubing milked, sanguinous fluid coming continuously through tubing (cleared small blockage).  Abdominal binder re-applied.    Ext: Calves soft, NT, without edema bilat     LABS:                        8.4    7.20  )-----------( 178      ( 10 Oct 2020 06:50 )             24.9     10-10    135  |  97  |  44<H>  ----------------------------<  133<H>  4.4   |  29  |  1.10    Ca    8.2<L>      10 Oct 2020 06:50    TPro  6.0  /  Alb  1.6<L>  /  TBili  0.5  /  DBili  x   /  AST  47<H>  /  ALT  51  /  AlkPhos  98  10-10            A: S/P panniculectomy, with subsequent subcutaneous hematoma development    P: VS and labwork remain non-suggestive      Maintain remaining drain, continue abdominal binder      Continue current care, DVT prophylaxis, incentive spirometry, encourage ambulation      No objection to discharge to home       Follow-up with Dr. De La Torer in her office next week - recommend maintaining a log of drainage output, bring on follow-up

## 2020-10-10 NOTE — PROGRESS NOTE ADULT - ASSESSMENT
59yo woman w hx  JESUS on CPAP, HTN, morbid obesity, prior hx gastric sleeve which eroded and was removed, anemia, AAA, Arthritis, Depression, anxiety presented to [facility] for elective abdominal panniculectomy, admitted post-op with hemorrhagic shock s/p 3U PRBC with stabilization; course c/b fever thought to be due to hematoma    -H/H remain stable, clinically not symptomatic  -stable from Heme standpoint for the planned discharge    pt has office appointment for f/u    will sign off for now, please call if any questions

## 2020-10-10 NOTE — PROGRESS NOTE ADULT - ASSESSMENT
61yo F w/ PMH of JESUS on CPAP, HTN, morbid obesity, anemia, AAA, Arthritis, Depression, anxiety presented to [facility] for elective abdominal panniculectomy, admitted post-op with hemorrhagic shock s/p 3U PRBC with stabilization, now course c/b fever.    A fib  - She had brief atrial fibrillation in the setting of significant hypotension, (now off of pressor support) though is now back in SR, no PMH of Afib  - Rate controlled per flow sheet  - HR: 109 (10-10 @ 05:11) (101 - 109)  - Continue Propanolol.   - For possible loop recorder as out pt she will follow up w/ Dr. Giordano upon discharge    HTN  - BP: 114/80 (10-10-20 @ 05:11) (94/68 - 114/80)  - Continue propranolol  - Hold verapamil   - low normal LV function with mild diastolic dysfunction on recent echocardiogram  - no sign of acute ischemia  - recent cath with non-obs cad  - no sign of volume overload  - hold diuretics     FEVER  - ID is following  - per primary team    Anemia   - Stable  - S/p PRBC transfusion   - Continue to trend CBC as per primary team    JESUS  - CPAP at night    - From a cardiac standpoint the patient may be discharged   - Monitor and replete lytes, keep K>4, Mg>2.  - All other medical needs as per primary team.  - Other cardiovascular workup will depend on clinical course.  - Will continue to follow.    Kobe Corado, MS FNP, [facility]  Nurse Practitioner- Cardiology   Spectra #[phone]

## 2020-10-10 NOTE — PROGRESS NOTE ADULT - SUBJECTIVE AND OBJECTIVE BOX
INTERVAL HPI/OVERNIGHT EVENTS:    MEDICATIONS  (STANDING):  allopurinol 300 milliGRAM(s) Oral daily  clobetasol 0.05% Ointment 1 Application(s) Topical <User Schedule>  cyanocobalamin 1000 MICROGram(s) Oral daily  docusate sodium 100 milliGRAM(s) Oral three times a day  DULoxetine 30 milliGRAM(s) Oral <User Schedule>  folic acid 1 milliGRAM(s) Oral daily  lamoTRIgine 200 milliGRAM(s) Oral daily  multivitamin 1 Tablet(s) Oral daily  polyethylene glycol 3350 17 Gram(s) Oral daily  propranolol 10 milliGRAM(s) Oral daily  senna 2 Tablet(s) Oral at bedtime  traZODone 200 milliGRAM(s) Oral at bedtime    MEDICATIONS  (PRN):  acetaminophen   Tablet .. 650 milliGRAM(s) Oral every 6 hours PRN Mild Pain (1 - 3)  acetaminophen   Tablet .. 650 milliGRAM(s) Oral every 6 hours PRN Temp greater or equal to 38C (100.4F)  aluminum hydroxide/magnesium hydroxide/simethicone Suspension 30 milliLiter(s) Oral every 4 hours PRN Dyspepsia  artificial tears (preservative free) Ophthalmic Solution 1 Drop(s) Both EYES two times a day PRN Dry Eyes  glucagon  Injectable 1 milliGRAM(s) IntraMuscular once PRN Glucose LESS THAN 70 milligrams/deciliter  loratadine 10 milliGRAM(s) Oral daily PRN Post nasal drip  oxycodone    5 mG/acetaminophen 325 mG 1 Tablet(s) Oral every 4 hours PRN Moderate Pain (4 - 6)  oxycodone    5 mG/acetaminophen 325 mG 2 Tablet(s) Oral every 4 hours PRN Severe Pain (7 - 10)      Allergies    penicillins (Other)  trees dogs cats cockaroaches (Rhinitis; Rhinorrhea)    Intolerances        Review of Systems:    General:  No wt loss, fevers, chills, night sweats,fatigue,   Eyes:  Good vision, no reported pain  ENT:  No sore throat, pain, runny nose, dysphagia  CV:  No pain, palpitatioins, hypo/hypertension  Resp:  No dyspnea, cough, tachypnea, wheezing  GI:  No pain, No nausea, No vomiting, No diarrhea, No constipatiion, No weight loss, No fever, No pruritis, No rectal bleeding, No tarry stools, No dysphagia,  :  No pain, bleeding, incontinence, nocturia  Muscle:  No pain, weakness  Neuro:  No weakness, tingling, memory problems  Psych:  No fatigue, insomnia, mood problems, depression  Endocrine:  No polyuria, polydypsia, cold/heat intolerance  Heme:  No petechiae, ecchymosis, easy bruisability  Skin:  No rash, tattoos, scars, edema      Vital Signs Last 24 Hrs  T(C): 36.7 (10 Oct 2020 05:11), Max: 37.8 (09 Oct 2020 13:39)  T(F): 98 (10 Oct 2020 05:11), Max: 100.1 (09 Oct 2020 13:39)  HR: 109 (10 Oct 2020 05:11) (101 - 109)  BP: 114/80 (10 Oct 2020 05:11) (94/68 - 114/80)  BP(mean): --  RR: 20 (10 Oct 2020 05:11) (18 - 22)  SpO2: 92% (10 Oct 2020 05:11) (90% - 92%)    PHYSICAL EXAM:    Constitutional: NAD, well-developed  HEENT: EOMI, throat clear  Neck: No LAD, supple  Respiratory: CTA and P  Cardiovascular: S1 and S2, RRR, no M  Gastrointestinal: BS+, soft, NT/ND, neg HSM,  Extremities: No peripheral edema, neg clubing, cyanosis  Vascular: 2+ peripheral pulses  Neurological: A/O x 3, no focal deficits  Psychiatric: Normal mood, normal affect  Skin: No rashes      LABS:                        8.4    7.20  )-----------( 178      ( 10 Oct 2020 06:50 )             24.9     10-10    135  |  97  |  44<H>  ----------------------------<  133<H>  4.4   |  29  |  1.10    Ca    8.2<L>      10 Oct 2020 06:50    TPro  6.0  /  Alb  1.6<L>  /  TBili  0.5  /  DBili  x   /  AST  47<H>  /  ALT  51  /  AlkPhos  98  10-10          RADIOLOGY & ADDITIONAL TESTS:

## 2020-10-10 NOTE — PROGRESS NOTE ADULT - PROVIDER SPECIALTY LIST ADULT
Anesthesia
CT Surgery
Cardiology
Critical Care
Gastroenterology
Heme/Onc
Hospitalist
Infectious Disease
Plastic Surgery
Pulmonology
Surgery
Cardiology
Heme/Onc
Hospitalist

## 2020-10-10 NOTE — DISCHARGE NOTE NURSING/CASE MANAGEMENT/SOCIAL WORK - PATIENT PORTAL LINK FT
You can access the FollowMyHealth Patient Portal offered by [facility] by registering at the following website: [email or web address]. By joining [employer]’s FollowMyHealth portal, you will also be able to view your health information using other applications (apps) compatible with our system.

## 2020-10-13 ENCOUNTER — INPATIENT (INPATIENT)
Facility: HOSPITAL | Age: 60
LOS: 6 days | Discharge: HOME | DRG: 920 | End: 2020-10-20
Attending: INTERNAL MEDICINE | Admitting: INTERNAL MEDICINE
Payer: COMMERCIAL

## 2020-10-13 VITALS
TEMPERATURE: 98 F | RESPIRATION RATE: 18 BRPM | WEIGHT: 279.99 LBS | HEIGHT: 66 IN | SYSTOLIC BLOOD PRESSURE: 142 MMHG | DIASTOLIC BLOOD PRESSURE: 91 MMHG | HEART RATE: 99 BPM | OXYGEN SATURATION: 98 %

## 2020-10-13 DIAGNOSIS — I10 ESSENTIAL (PRIMARY) HYPERTENSION: ICD-10-CM

## 2020-10-13 DIAGNOSIS — F41.9 ANXIETY DISORDER, UNSPECIFIED: ICD-10-CM

## 2020-10-13 DIAGNOSIS — Z98.89 OTHER SPECIFIED POSTPROCEDURAL STATES: Chronic | ICD-10-CM

## 2020-10-13 DIAGNOSIS — N17.9 ACUTE KIDNEY FAILURE, UNSPECIFIED: ICD-10-CM

## 2020-10-13 DIAGNOSIS — G47.33 OBSTRUCTIVE SLEEP APNEA (ADULT) (PEDIATRIC): ICD-10-CM

## 2020-10-13 DIAGNOSIS — Z98.890 OTHER SPECIFIED POSTPROCEDURAL STATES: Chronic | ICD-10-CM

## 2020-10-13 DIAGNOSIS — D64.9 ANEMIA, UNSPECIFIED: ICD-10-CM

## 2020-10-13 DIAGNOSIS — R09.89 OTHER SPECIFIED SYMPTOMS AND SIGNS INVOLVING THE CIRCULATORY AND RESPIRATORY SYSTEMS: ICD-10-CM

## 2020-10-13 DIAGNOSIS — Z29.9 ENCOUNTER FOR PROPHYLACTIC MEASURES, UNSPECIFIED: ICD-10-CM

## 2020-10-13 DIAGNOSIS — M19.90 UNSPECIFIED OSTEOARTHRITIS, UNSPECIFIED SITE: ICD-10-CM

## 2020-10-13 DIAGNOSIS — Z87.442 PERSONAL HISTORY OF URINARY CALCULI: ICD-10-CM

## 2020-10-13 DIAGNOSIS — R55 SYNCOPE AND COLLAPSE: ICD-10-CM

## 2020-10-13 LAB
ALBUMIN SERPL ELPH-MCNC: 1.4 G/DL — LOW (ref 3.3–5)
ALP SERPL-CCNC: 141 U/L — HIGH (ref 40–120)
ALT FLD-CCNC: 52 U/L — SIGNIFICANT CHANGE UP (ref 12–78)
ANION GAP SERPL CALC-SCNC: 9 MMOL/L — SIGNIFICANT CHANGE UP (ref 5–17)
APTT BLD: 33.7 SEC — SIGNIFICANT CHANGE UP (ref 27.5–35.5)
AST SERPL-CCNC: 50 U/L — HIGH (ref 15–37)
BASOPHILS # BLD AUTO: 0 K/UL — SIGNIFICANT CHANGE UP (ref 0–0.2)
BASOPHILS NFR BLD AUTO: 0 % — SIGNIFICANT CHANGE UP (ref 0–2)
BILIRUB SERPL-MCNC: 0.7 MG/DL — SIGNIFICANT CHANGE UP (ref 0.2–1.2)
BUN SERPL-MCNC: 65 MG/DL — HIGH (ref 7–23)
CALCIUM SERPL-MCNC: 8.1 MG/DL — LOW (ref 8.5–10.1)
CHLORIDE SERPL-SCNC: 96 MMOL/L — SIGNIFICANT CHANGE UP (ref 96–108)
CO2 SERPL-SCNC: 27 MMOL/L — SIGNIFICANT CHANGE UP (ref 22–31)
CREAT SERPL-MCNC: 2.2 MG/DL — HIGH (ref 0.5–1.3)
EOSINOPHIL # BLD AUTO: 0.1 K/UL — SIGNIFICANT CHANGE UP (ref 0–0.5)
EOSINOPHIL NFR BLD AUTO: 1 % — SIGNIFICANT CHANGE UP (ref 0–6)
GLUCOSE SERPL-MCNC: 226 MG/DL — HIGH (ref 70–99)
HCT VFR BLD CALC: 24.6 % — LOW (ref 34.5–45)
HGB BLD-MCNC: 8.3 G/DL — LOW (ref 11.5–15.5)
INR BLD: 1.26 RATIO — HIGH (ref 0.88–1.16)
LACTATE SERPL-SCNC: 1.8 MMOL/L — SIGNIFICANT CHANGE UP (ref 0.7–2)
LACTATE SERPL-SCNC: 3.3 MMOL/L — HIGH (ref 0.7–2)
LYMPHOCYTES # BLD AUTO: 0.92 K/UL — LOW (ref 1–3.3)
LYMPHOCYTES # BLD AUTO: 9 % — LOW (ref 13–44)
MCHC RBC-ENTMCNC: 28.7 PG — SIGNIFICANT CHANGE UP (ref 27–34)
MCHC RBC-ENTMCNC: 33.7 GM/DL — SIGNIFICANT CHANGE UP (ref 32–36)
MCV RBC AUTO: 85.1 FL — SIGNIFICANT CHANGE UP (ref 80–100)
MONOCYTES # BLD AUTO: 0.82 K/UL — SIGNIFICANT CHANGE UP (ref 0–0.9)
MONOCYTES NFR BLD AUTO: 8 % — SIGNIFICANT CHANGE UP (ref 2–14)
NEUTROPHILS # BLD AUTO: 8.18 K/UL — HIGH (ref 1.8–7.4)
NEUTROPHILS NFR BLD AUTO: 77 % — SIGNIFICANT CHANGE UP (ref 43–77)
NRBC # BLD: SIGNIFICANT CHANGE UP /100 WBCS (ref 0–0)
PLATELET # BLD AUTO: 300 K/UL — SIGNIFICANT CHANGE UP (ref 150–400)
POTASSIUM SERPL-MCNC: 4.2 MMOL/L — SIGNIFICANT CHANGE UP (ref 3.5–5.3)
POTASSIUM SERPL-SCNC: 4.2 MMOL/L — SIGNIFICANT CHANGE UP (ref 3.5–5.3)
PROT SERPL-MCNC: 6.2 G/DL — SIGNIFICANT CHANGE UP (ref 6–8.3)
PROTHROM AB SERPL-ACNC: 14.6 SEC — HIGH (ref 10.6–13.6)
RBC # BLD: 2.89 M/UL — LOW (ref 3.8–5.2)
RBC # FLD: 15.9 % — HIGH (ref 10.3–14.5)
SARS-COV-2 RNA SPEC QL NAA+PROBE: SIGNIFICANT CHANGE UP
SODIUM SERPL-SCNC: 132 MMOL/L — LOW (ref 135–145)
TROPONIN I SERPL-MCNC: <.015 NG/ML — SIGNIFICANT CHANGE UP (ref 0.01–0.04)
WBC # BLD: 10.23 K/UL — SIGNIFICANT CHANGE UP (ref 3.8–10.5)
WBC # FLD AUTO: 10.23 K/UL — SIGNIFICANT CHANGE UP (ref 3.8–10.5)

## 2020-10-13 PROCEDURE — 99223 1ST HOSP IP/OBS HIGH 75: CPT

## 2020-10-13 PROCEDURE — 74176 CT ABD & PELVIS W/O CONTRAST: CPT | Mod: 26

## 2020-10-13 PROCEDURE — 71250 CT THORAX DX C-: CPT | Mod: 26

## 2020-10-13 PROCEDURE — 71045 X-RAY EXAM CHEST 1 VIEW: CPT | Mod: 26

## 2020-10-13 PROCEDURE — 99223 1ST HOSP IP/OBS HIGH 75: CPT | Mod: GC,AI

## 2020-10-13 PROCEDURE — 99285 EMERGENCY DEPT VISIT HI MDM: CPT

## 2020-10-13 RX ORDER — HEPARIN SODIUM 5000 [USP'U]/ML
5000 INJECTION INTRAVENOUS; SUBCUTANEOUS EVERY 12 HOURS
Refills: 0 | Status: DISCONTINUED | OUTPATIENT
Start: 2020-10-13 | End: 2020-10-14

## 2020-10-13 RX ORDER — ACETAMINOPHEN 500 MG
650 TABLET ORAL DAILY
Refills: 0 | Status: DISCONTINUED | OUTPATIENT
Start: 2020-10-13 | End: 2020-10-20

## 2020-10-13 RX ORDER — SODIUM CHLORIDE 9 MG/ML
1000 INJECTION INTRAMUSCULAR; INTRAVENOUS; SUBCUTANEOUS ONCE
Refills: 0 | Status: COMPLETED | OUTPATIENT
Start: 2020-10-13 | End: 2020-10-13

## 2020-10-13 RX ORDER — TRAZODONE HCL 50 MG
200 TABLET ORAL AT BEDTIME
Refills: 0 | Status: DISCONTINUED | OUTPATIENT
Start: 2020-10-13 | End: 2020-10-20

## 2020-10-13 RX ORDER — LANOLIN ALCOHOL/MO/W.PET/CERES
5 CREAM (GRAM) TOPICAL ONCE
Refills: 0 | Status: COMPLETED | OUTPATIENT
Start: 2020-10-13 | End: 2020-10-13

## 2020-10-13 RX ORDER — PREGABALIN 225 MG/1
1000 CAPSULE ORAL DAILY
Refills: 0 | Status: DISCONTINUED | OUTPATIENT
Start: 2020-10-13 | End: 2020-10-20

## 2020-10-13 RX ORDER — SODIUM CHLORIDE 9 MG/ML
1000 INJECTION INTRAMUSCULAR; INTRAVENOUS; SUBCUTANEOUS
Refills: 0 | Status: DISCONTINUED | OUTPATIENT
Start: 2020-10-13 | End: 2020-10-15

## 2020-10-13 RX ORDER — LAMOTRIGINE 25 MG/1
200 TABLET, ORALLY DISINTEGRATING ORAL DAILY
Refills: 0 | Status: DISCONTINUED | OUTPATIENT
Start: 2020-10-13 | End: 2020-10-20

## 2020-10-13 RX ORDER — ALLOPURINOL 300 MG
300 TABLET ORAL DAILY
Refills: 0 | Status: DISCONTINUED | OUTPATIENT
Start: 2020-10-13 | End: 2020-10-20

## 2020-10-13 RX ORDER — CHOLECALCIFEROL (VITAMIN D3) 125 MCG
5000 CAPSULE ORAL DAILY
Refills: 0 | Status: DISCONTINUED | OUTPATIENT
Start: 2020-10-13 | End: 2020-10-20

## 2020-10-13 RX ORDER — DULOXETINE HYDROCHLORIDE 30 MG/1
30 CAPSULE, DELAYED RELEASE ORAL
Refills: 0 | Status: DISCONTINUED | OUTPATIENT
Start: 2020-10-13 | End: 2020-10-20

## 2020-10-13 RX ORDER — ALPRAZOLAM 0.25 MG
1 TABLET ORAL
Refills: 0 | Status: DISCONTINUED | OUTPATIENT
Start: 2020-10-13 | End: 2020-10-13

## 2020-10-13 RX ORDER — FOLIC ACID 0.8 MG
1 TABLET ORAL DAILY
Refills: 0 | Status: DISCONTINUED | OUTPATIENT
Start: 2020-10-13 | End: 2020-10-20

## 2020-10-13 RX ADMIN — DULOXETINE HYDROCHLORIDE 30 MILLIGRAM(S): 30 CAPSULE, DELAYED RELEASE ORAL at 23:10

## 2020-10-13 RX ADMIN — Medication 200 MILLIGRAM(S): at 23:10

## 2020-10-13 RX ADMIN — SODIUM CHLORIDE 1000 MILLILITER(S): 9 INJECTION INTRAMUSCULAR; INTRAVENOUS; SUBCUTANEOUS at 15:00

## 2020-10-13 NOTE — H&P ADULT - NSHPPHYSICALEXAM_GEN_ALL_CORE
Vital Signs Last 24 Hrs  T(C): 36.8 (13 Oct 2020 12:36), Max: 36.8 (13 Oct 2020 12:36)  T(F): 98.3 (13 Oct 2020 12:36), Max: 98.3 (13 Oct 2020 12:36)  HR: 99 (13 Oct 2020 12:36) (99 - 99)  BP: 142/91 (13 Oct 2020 12:36) (142/91 - 142/91)  BP(mean): --  RR: 18 (13 Oct 2020 12:36) (18 - 18)  SpO2: 98% (13 Oct 2020 12:36) (98% - 98%)    Physical Exam:  General: Well developed, well nourished, NAD  HEENT: NCAT, EOMI bl, moist mucous membranes   Neck: Supple, nontender, no mass  Neurology: A&Ox3, responds appropriately    Respiratory: CTA B/L, No W/R/R  CV: RRR, +S1/S2, no murmurs, rubs or gallops  Abdominal: EDWIN drain on right abdomen draining small amount of blood. bandaging across lower abdomen c/d/i. Periumbilical EDWIN drain opening nonerythematous with no discharge. Soft, NT, ND +BS  Extremities: No C/C/E  MSK: Normal ROM  Skin: warm, dry, normal color T(C): 36.8 (13 Oct 2020 12:36), Max: 36.8 (13 Oct 2020 12:36)  T(F): 98.3 (13 Oct 2020 12:36), Max: 98.3 (13 Oct 2020 12:36)  HR: 99 (13 Oct 2020 12:36) (99 - 99)  BP: 142/91 (13 Oct 2020 12:36) (142/91 - 142/91)  RR: 18 (13 Oct 2020 12:36) (18 - 18)  SpO2: 98% (13 Oct 2020 12:36) (98% - 98%)    General: obese female, Well developed, well nourished, NAD  HEENT: NCAT, EOMI bl, moist mucous membranes   Neck: Supple, nontender, no mass  Neurology: A&Ox3, responds appropriately, sensation to light touch intact  Respiratory: CTA B/L, No W/R/R  CV: RRR, +S1/S2, no murmurs, rubs or gallops  Abdominal: EDWIN drain on right abdomen draining small amount of blood. bandaging across lower abdomen c/d/i. Periumbilical EDWIN drain opening nonerythematous with no discharge. Soft, NT, ND +BS  Extremities: No C/C/E  MSK: Normal ROM  Skin: warm, dry, normal color

## 2020-10-13 NOTE — H&P ADULT - PROBLEM SELECTOR PLAN 9
Hep 5000U SC BID    BB IMPROVE VTE Individual Risk Assessment        RISK                                                          Points  [  ] Previous VTE                                                3  [  ] Thrombophilia                                             2  [  ] Lower limb paralysis                                   2        (unable to hold up >15 seconds)    [  ] Current Cancer                                            2         (within 6 months)  [  ] Immobilization > 24 hrs                              1  [  ] ICU/CCU stay > 24 hours                            1  [  ] Age > 60                                                    1  IMPROVE VTE Score ____2_____ VTE ppx: heparin subQ until return of renal function    BB IMPROVE VTE Individual Risk Assessment        RISK                                                          Points  [  ] Previous VTE                                                3  [  ] Thrombophilia                                             2  [  ] Lower limb paralysis                                   2        (unable to hold up >15 seconds)    [  ] Current Cancer                                            2         (within 6 months)  [  ] Immobilization > 24 hrs                              1  [  ] ICU/CCU stay > 24 hours                            1  [  ] Age > 60                                                    1  IMPROVE VTE Score ____2_____

## 2020-10-13 NOTE — ED PROVIDER NOTE - CLINICAL SUMMARY MEDICAL DECISION MAKING FREE TEXT BOX
59 y/o F s/p pannulectpmy complicated by ELAINE and septic shock with extensive ICU stay on pressors requeired 4 units PRBCs recently d/c with episode of diaphoresis at MD office, afbrile, BG >300, no chets pain or , plan= labs, eval for causes of infection, T chest to r/o PE and CT abd to eval for possible post op complications ie bleeding vs abscess- will admit

## 2020-10-13 NOTE — CONSULT NOTE ADULT - SUBJECTIVE AND OBJECTIVE BOX
Nephrology Consultation: MD TC GonzalesEMANUEL  60y    HPI:    60 year old female with  morbid obesity s/p lap band, since removed, HTN, BPD, anxiety disorder, thoracic aortic aneurysm, JESUS on CPAP, essential tremor sp paniculectomy and liposuction 9/2020 complicated with  hemorrhagic shock, lactic acidosis, ELAINE and hyperglycemia,  s/p pRBC discharged now presenting with diaphoresis. Patient was discharged on 10/10 presented today for follow up with Oncologist and while waiting in room felt diaphoretic. Denies chest pain, dizziness, palpitations , syncope,. States her glucose in office was > 300.  Since discharge she has been feeling well with no exertional chest pain or shortness of breath. In er found with ELAINE cr 2.2 , admits to decreased PO intake < 8 oz water per day. Was seen yesterday by surgery sp packing of abdominal wounds. + vilma drain still in place. Denies fever chills nausea vomiting diarrhea. Denies any history of NSAIDS. usage. Spoke to Dr. Brown who said her creatinine was normal recently.     PAST MEDICAL & SURGICAL HISTORY:  History of aortic aneurysm  descending aorta see CT    Arthritis    Degenerative disc disease, lumbar    Neuropathy    Spinal stenosis    Bipolar 1 disorder    Kidney stone    Sleep Apnea  CPAP with oxygen since June 2020    Asthma    Hypertension    Morbid Obesity    ETOH Abuse  H/O    Benign Essential Tremor    Anxiety    Depression    Renal Calculi  chronic      Colitis  H/O    Anemia    S/P cardiac catheterization    S/P dilation and curettage    History of tonsillectomy    History of laparoscopic adjustable gastric banding  band removed few yrs ago    S/P D&amp;C    Biliary stent placement &amp; ercp    ureteroscopy with stone removal  1-        Allergies    penicillins (Other)  trees dogs cats cockaroaches (Rhinitis; Rhinorrhea)    Intolerances        Home Medications:  acetaminophen 325 mg oral tablet: 2 tab(s) orally every 6 hours, As needed, Temp greater or equal to 38C (100.4F) (10 Oct 2020 08:42)  allopurinol 300 mg oral tablet: 1 tab(s) orally once a day (25 Sep 2020 06:30)  aluminum hydroxide-magnesium hydroxide 200 mg-200 mg/5 mL oral suspension: 30 milliliter(s) orally every 4 hours, As needed, Dyspepsia (10 Oct 2020 08:42)  clobetasol 0.05% topical ointment: 1 application topically  (10 Oct 2020 08:42)  cyanocobalamin 1000 mcg oral tablet: 1 tab(s) orally once a day (10 Oct 2020 08:42)  Cymbalta 30 mg oral delayed release capsule: 1 cap(s) orally 3 times a day (25 Sep 2020 06:30)  folic acid 1 mg oral tablet: 1 tab(s) orally once a day (10 Oct 2020 08:42)  Lamictal 200 mg oral tablet: 1 tab(s) orally once a day (25 Sep 2020 06:30)  loratadine 10 mg oral tablet: 1 tab(s) orally once a day, As needed, Post nasal drip (10 Oct 2020 08:42)  Multiple Vitamins oral tablet: 1 tab(s) orally once a day (10 Oct 2020 08:42)  polyethylene glycol 3350 oral powder for reconstitution: 17 gram(s) orally once a day. Hold for loose bowel movements  (10 Oct 2020 08:44)  propranolol 10 mg oral tablet: 1 tab(s) orally once a day (25 Sep 2020 06:30)  senna oral tablet: 2 tab(s) orally once a day (at bedtime). Hold for loose bowel movements  (10 Oct 2020 08:44)  trazodone 100 mg oral tablet: 2 tab(s) orally once a day (at bedtime) (25 Sep 2020 06:30)  Vitamin D3 5000 intl units (125 mcg) oral tablet: once daily (25 Sep 2020 06:30)        FAMILY HISTORY:  Hypertension (Sibling)    Hyperlipidemia (Sibling)    Family history of renal failure  both parents were on dialysis    Family history of bacterial pneumonia    Family history of arthritis        SOCIAL HISTORY:    REVIEW OF SYSTEMS:    Constitutional: No fever, weight loss or fatigue  Eyes: No eye pain, visual disturbances, or discharge  ENT:  No difficulty hearing, tinnitus, vertigo; No sinus or throat pain  Neck: No pain or stiffness  Breasts: No pain, masses or nipple discharge  Respiratory: No cough, wheezing, chills or hemoptysis  Cardiovascular: No chest pain, palpitations, shortness of breath, dizziness or leg swelling  Gastrointestinal: No abdominal or epigastric pain. No nausea, vomiting or hematemesis; No diarrhea or constipation. No melena or hematochezia.  Genitourinary: No dysuria, frequency, hematuria or incontinence  Rectal: No pain, hemorrhoids or incontinence  Neurological: No headaches, memory loss, loss of strength, numbness or tremors  Skin: No itching, burning, rashes or lesions   Lymph Nodes: No enlarged glands  Endocrine: No heat or cold intolerance; No hair loss  Musculoskeletal: No joint pain or swelling; No muscle, back or extremity pain  Psychiatric: No depression, anxiety, mood swings or difficulty sleeping  Heme/Lymph: No easy bruising or bleeding gums  Allergy and Immunologic: No hives or eczema    current medications:    sodium chloride 0.9% Bolus 1000 milliLiter(s) IV Bolus once      Vital Signs Last 24 Hrs  T(C): 36.8 (13 Oct 2020 12:36), Max: 36.8 (13 Oct 2020 12:36)  T(F): 98.3 (13 Oct 2020 12:36), Max: 98.3 (13 Oct 2020 12:36)  HR: 99 (13 Oct 2020 12:36) (99 - 99)  BP: 142/91 (13 Oct 2020 12:36) (142/91 - 142/91)  BP(mean): --  RR: 18 (13 Oct 2020 12:36) (18 - 18)  SpO2: 98% (13 Oct 2020 12:36) (98% - 98%)    PHYSICAL EXAM:    Constitutional: NAD, well-groomed, well-developed  HEENT: PERRLA, EOMI, Normal Hearing, MMM  Neck: No LAD, No JVD  Back: Normal spine flexure, No CVA tenderness  Respiratory: CTAB/L   Cardiovascular: S1 and S2, RRR, no M/G/R  Gastrointestinal: BS+, soft, NT/ND  Extremities: No peripheral edema  Vascular: 2+ peripheral pulses  Neurological: A/O x 3, no focal deficits  Skin: No rashes      LABS:                        8.3    10.23 )-----------( 300      ( 13 Oct 2020 13:28 )             24.6     10-13    132<L>  |  96  |  65<H>  ----------------------------<  226<H>  4.2   |  27  |  2.20<H>    Ca    8.1<L>      13 Oct 2020 13:28    TPro  6.2  /  Alb  1.4<L>  /  TBili  0.7  /  DBili  x   /  AST  50<H>  /  ALT  52  /  AlkPhos  141<H>  10-13    PT/INR - ( 13 Oct 2020 13:28 )   PT: 14.6 sec;   INR: 1.26 ratio         PTT - ( 13 Oct 2020 13:28 )  PTT:33.7 sec    Creatinine Trend: 2.20<--, 1.10<--, 1.30<--, 0.68<--, 0.61<--, 0.60<--    MICROBIOLOGY:  RECENT CULTURES:  10-09 .Blood Blood XXXX XXXX   No growth to date.          RADIOLOGY & ADDITIONAL STUDIES:    EXAM:  CT ABDOMEN AND PELVIS                          EXAM:  CT CHEST                            PROCEDURE DATE:  10/13/2020          INTERPRETATION:  CT CHEST, CT ABDOMEN AND PELVIS    CLINICAL INFORMATION: Shortness of breath after abdominal wallresection, abdominal abscess infection    COMPARISON: Same day chest x-ray, CT abdomen and pelvis 9/30/2020, chest CT 6/16/2020    PROCEDURE:  CT of the Chest, Abdomen and Pelvis was performed without intravenous contrast.  Intravenous contrast: None  Oral contrast: None.  Sagittal and coronal reformats were performed.    FINDINGS:    CHEST:    LUNGS, AIRWAYS: The central airways are patent. Small area of reverse halo sign in the basilar left lower lobe.  PLEURA: No pleural abnormality.  VESSELS:Stable 4.6 cm ascending thoracic aorta.  HEART: Normal heart size. No pericardial effusion. Coronary artery calcifications are present.  MEDIASTINUM AND MILADIS: No adenopathy.  CHEST WALL: No masses.    ABDOMEN AND PELVIS:    LIVER: Normal.  BILE DUCTS: Nondilated.  GALLBLADDER: Prior cholecystectomy.  SPLEEN: Normal.  PANCREAS: Normal.  ADRENALS: Normal.  KIDNEYS/URETERS: Non obstructing right kidney stones.    BLADDER: Underdistended limiting evaluation.  REPRODUCTIVE ORGANS: No uterine or adnexal abnormality. IUD in place.    BOWEL: No bowel-related abnormality. Specifically, no evidence of acute diverticulitis. Normal appendix and ileocecal region. No bowel obstruction or bowel inflammation.  PERITONEUM: No intra-abdominal fluid collection.  VESSELS:  Normal caliber aorta.  RETROPERITONEUM/LYMPH NODES: No adenopathy.  ABDOMINAL WALL: Postsurgical changes without discrete drainable collection.  BONES: No acute bony abnormality.    IMPRESSION:    Postsurgical changes without discrete drainable collection remaining.    Small area of reverse halo sign in the basilar left lower lobe

## 2020-10-13 NOTE — ED PROVIDER NOTE - ATTENDING CONTRIBUTION TO CARE
59 y/o female s/p pannulectpmy complicated by ELAINE and septic shock with  ICU admission required pressors blood transfusion recently d/c with episode of diaphoresis at MD office, afbrile, BG >300, no chest pain or , imaging and labs noted. seen by cardio and surgery, requires admission for cardiac monitoring and further testing.

## 2020-10-13 NOTE — H&P ADULT - ASSESSMENT
61 yo F with PMH of JESUS on CPAP, HTN, Obesity, Anemia, AAA, Arthritis, Depression, Anxiety, and Kidney stones presents to ED from Heme/Onc for sweating and lightheadedness admitted for ELAINE and vasovagal episode. 61 yo F with PMH of JESUS on CPAP, HTN, Obesity, Anemia, AAA, Arthritis, Depression, Anxiety, and Kidney stones presents to ED from Heme/Onc for sweating and lightheadedness admitted for ELAINE and near syncope - likely vasovagal episode in setting of dehydration, cannot rule out postoperative pulmonary embolism.

## 2020-10-13 NOTE — H&P ADULT - PROBLEM SELECTOR PLAN 4
Chronic  -Hold   -Monitor hemodynamics Chronic  -Hold Irbesarten, Propanolol, Verapamil, Furosemide per Cardia  -Monitor hemodynamics Chronic  -Hold Irbesartan, Propanolol, Verapamil, Furosemide per Cardio  -Monitor hemodynamics

## 2020-10-13 NOTE — H&P ADULT - PROBLEM SELECTOR PLAN 3
Chronic   -CPAP qhs  -Pulm consulted Dr Caba Chronic   -describes autoPAP at 5-15  -states that she is currently on CPAP qhs at pressure of 10.  -continue NIPPV with CPAP qhs  -Pulm consulted Dr Caba Chronic   -describes autoPAP at 5-15  -states that she is currently on CPAP qhs at pressure of 10.  -continue NIPPV with CPAP qhs  -Pulm consulted Dr. Hanson

## 2020-10-13 NOTE — H&P ADULT - PROBLEM SELECTOR PLAN 2
Sudden onset of sweating and dizziness for 30 min while seated. Pt has hx of a-fib in the setting of significant hypotension on prior admission per Cardio  -EKG shows NSR with no acute changes   -Low normal LV function with mild diastolic dysfunction on recent echocardiogram  -Check orthostatics  -Cardio consulted Dr Giordano: Hold anti-hypertensives. CTA chest on hold for now with ELAINE and stable vitals Sudden onset of sweating and dizziness for 30 min while seated. Pt has hx of a-fib in the setting of significant hypotension on prior admission per Cardio  -EKG shows NSR with no acute changes   -Low-normal LV function with mild diastolic dysfunction on recent echocardiogram  -Placed on remote tele  -Check orthostatics  -Cardio consulted Dr Giordano: Hold anti-hypertensives. CTA chest on hold for now with ELAINE and stable vitals Sudden onset of sweating and dizziness for 30 min while seated. This is consistent with a vasovagal syncope in the setting of elevated creatinine indicative of dehydration. Given her recent post-operative status, obesity and relative immobility, we cannot completely exclude PE as a cause for her symptoms.  - unable to perform CTA chest due to ELAINE  - Pt has hx of a-fib in the setting of significant hypotension on prior admission per Cardiology  -EKG shows NSR with no acute changes   -Low-normal LV function with mild diastolic dysfunction on recent echocardiogram  -Placed on remote telemetry  -Check orthostatics  -Cardio consulted Dr Giordano: Hold anti-hypertensives. CTA chest on hold for now with ELAINE and stable vitals. Will await return of kidney function. Rosales consulted. Sudden onset of sweating and dizziness for 30 min while seated. This is consistent with a vasovagal syncope in the setting of elevated creatinine indicative of dehydration. Given her recent post-operative status, obesity and relative immobility, we cannot completely exclude PE as a cause for her symptoms.  - unable to perform CTA chest due to ELAINE  - Pt has hx of a-fib in the setting of significant hypotension on prior admission per Cardiology  -EKG shows NSR with no acute changes   -Low-normal LV function with mild diastolic dysfunction on recent echocardiogram  -Placed on remote telemetry  -Check orthostatics  -Cardio consulted Dr Giordano: Hold anti-hypertensives. CTA chest on hold for now with ELAINE and stable vitals. Will await return of kidney function. Dr. Hanson consulted.

## 2020-10-13 NOTE — ED ADULT TRIAGE NOTE - CHIEF COMPLAINT QUOTE
Pt sent by MD Ness for eval of sudden onset of SOB, diaphoresis and lethargy PTA while at doctors office. Pt had panilectomy 9/26. Pt presents pale diaphoretic in triage.

## 2020-10-13 NOTE — H&P ADULT - NSHPSOCIALHISTORY_GEN_ALL_CORE
Denies tob, etoh, drug use   Lives with family  Ambulates independently   Dash diet Denies tobacco, etoh, drug use   Lives with family  Ambulates independently   Dash diet

## 2020-10-13 NOTE — ED PROVIDER NOTE - DISPOSITION TYPE
ADMIT No Repair - Repaired With Adjacent Surgical Defect Text (Leave Blank If You Do Not Want): After the excision the defect was repaired concurrently with another surgical defect which was in close approximation.

## 2020-10-13 NOTE — H&P ADULT - PROBLEM SELECTOR PLAN 8
Hep 5000U SC BID    BB IMPROVE VTE Individual Risk Assessment        RISK                                                          Points  [  ] Previous VTE                                                3  [  ] Thrombophilia                                             2  [  ] Lower limb paralysis                                   2        (unable to hold up >15 seconds)    [  ] Current Cancer                                            2         (within 6 months)  [  ] Immobilization > 24 hrs                              1  [  ] ICU/CCU stay > 24 hours                            1  [  ] Age > 60                                                    1  IMPROVE VTE Score ____2_____ Chronic  -Continue home Cymbalta, Trazodone, Lamictal, Xanax prn

## 2020-10-13 NOTE — CONSULT NOTE ADULT - ATTENDING COMMENTS
Seen/examined. agree with above.  symptoms could be all vagal, though need to r/o abdominal bleed/collection and pe  hemodynamically stable

## 2020-10-13 NOTE — ED ADULT NURSE NOTE - NSIMPLEMENTINTERV_GEN_ALL_ED
Implemented All Fall Risk Interventions:  [place] to call system. Call bell, personal items and telephone within reach. Instruct patient to call for assistance. Room bathroom lighting operational. Non-slip footwear when patient is off stretcher. Physically safe environment: no spills, clutter or unnecessary equipment. Stretcher in lowest position, wheels locked, appropriate side rails in place. Provide visual cue, wrist band, yellow gown, etc. Monitor gait and stability. Monitor for mental status changes and reorient to person, place, and time. Review medications for side effects contributing to fall risk. Reinforce activity limits and safety measures with patient and family.

## 2020-10-13 NOTE — CONSULT NOTE ADULT - SUBJECTIVE AND OBJECTIVE BOX
[facility] - Almas Faustin, Mayra, Cara, Eleno, Pasquale Wiley  Office Number: [phone]    Initial Consult Note    CHIEF COMPLAINT: Patient is a 60y old  Female who presents with a chief complaint of     HPI:60 year old female with  morbid obesity s/p lap band, since removed, HTN, BPD, anxiety disorder, thoracic aortic aneurysm, JESUS on CPAP, essential tremor sp paniculectomy and liposuction 9/2020 complicated with  hemorrhagic shock, lactic acidosis, ELAINE and hyperglycemia,  s/p pRBC discharged now presenting with shortness of breath.      PAST MEDICAL & SURGICAL HISTORY:  History of aortic aneurysm  descending aorta see CT    Arthritis    Degenerative disc disease, lumbar    Neuropathy    Spinal stenosis    Bipolar 1 disorder    Kidney stone    Sleep Apnea  CPAP with oxygen since June 2020    Asthma    Hypertension    Morbid Obesity    ETOH Abuse  H/O    Benign Essential Tremor    Anxiety    Depression    Renal Calculi  chronic      Colitis  H/O    Anemia    S/P cardiac catheterization    S/P dilation and curettage    History of tonsillectomy    History of laparoscopic adjustable gastric banding  band removed few yrs ago    S/P D&amp;C    Biliary stent placement &amp; ercp    ureteroscopy with stone removal  1-        SOCIAL HISTORY:  No tobacco, ethanol, or drug abuse.    FAMILY HISTORY:  Hypertension (Sibling)    Hyperlipidemia (Sibling)    Family history of renal failure  both parents were on dialysis    Family history of bacterial pneumonia    Family history of arthritis      No family history of acute MI or sudden cardiac death.    MEDICATIONS  (STANDING):    MEDICATIONS  (PRN):      Allergies    penicillins (Other)  trees dogs cats cockaroaches (Rhinitis; Rhinorrhea)    Intolerances        REVIEW OF SYSTEMS:    CONSTITUTIONAL: No weakness, fevers or chills  EYES/ENT: No visual changes;  No vertigo or throat pain   NECK: No pain or stiffness  RESPIRATORY: No cough, wheezing, hemoptysis; No shortness of breath  CARDIOVASCULAR: No chest pain or palpitations  GASTROINTESTINAL: No abdominal pain. No nausea, vomiting, or hematemesis; No diarrhea or constipation. No melena or hematochezia.  GENITOURINARY: No dysuria, frequency or hematuria  NEUROLOGICAL: No numbness or weakness  SKIN: No itching or rash  All other review of systems is negative unless indicated above    VITAL SIGNS:   Vital Signs Last 24 Hrs  T(C): 36.8 (13 Oct 2020 12:36), Max: 36.8 (13 Oct 2020 12:36)  T(F): 98.3 (13 Oct 2020 12:36), Max: 98.3 (13 Oct 2020 12:36)  HR: 99 (13 Oct 2020 12:36) (99 - 99)  BP: 142/91 (13 Oct 2020 12:36) (142/91 - 142/91)  BP(mean): --  RR: 18 (13 Oct 2020 12:36) (18 - 18)  SpO2: 98% (13 Oct 2020 12:36) (98% - 98%)    I&O's Summary      On Exam:    Constitutional: NAD, alert and oriented x 3  Lungs:  Non-labored, breath sounds are clear bilaterally, No wheezing, rales or rhonchi  Cardiovascular: RRR.  S1 and S2 positive.  No murmurs, rubs, gallops or clicks  Gastrointestinal: Bowel Sounds present, soft, nontender.   Lymph: No peripheral edema. No cervical lymphadenopathy.  Neurological: Alert, no focal deficits  Skin: No rashes or ulcers   Psych:  Mood & affect appropriate.    LABS: All Labs Reviewed:                        8.3    x     )-----------( 300      ( 13 Oct 2020 13:28 )             24.6     13 Oct 2020 13:28    132    |  96     |  65     ----------------------------<  226    4.2     |  27     |  2.20     Ca    8.1        13 Oct 2020 13:28    TPro  6.2    /  Alb  1.4    /  TBili  0.7    /  DBili  x      /  AST  50     /  ALT  52     /  AlkPhos  141    13 Oct 2020 13:28    PT/INR - ( 13 Oct 2020 13:28 )   PT: 14.6 sec;   INR: 1.26 ratio         PTT - ( 13 Oct 2020 13:28 )  PTT:33.7 sec  CARDIAC MARKERS ( 13 Oct 2020 13:28 )  <.015 ng/mL / x     / x     / x     / x          Blood Culture: Organism --  Gram Stain Blood -- Gram Stain --  Specimen Source .Blood Blood  Culture-Blood --            RADIOLOGY:    EKG:       [facility] - Almas Faustin, Mayra, Cara, Eleno, Pasquale Wiley  Office Number: [phone]    Initial Consult Note    CHIEF COMPLAINT: Patient is a 60y old  Female who presents with a chief complaint of     HPI:60 year old female with  morbid obesity s/p lap band, since removed, HTN, BPD, anxiety disorder, thoracic aortic aneurysm, JESUS on CPAP, essential tremor sp paniculectomy and liposuction 9/2020 complicated with  hemorrhagic shock, lactic acidosis, ELAINE and hyperglycemia,  s/p pRBC discharged now presenting with diaphoresis. patient was discharged on 10/10 presented today for follow up and while waiting in room felt diaphoretic At that she was in room waiting to be seen . Denies chest pain, dizziness, palpitations , syncope,. States he glucose in office was > 300.  Since discharge she has been feeling well with no exertional chest pain or shortness of breath. In er found with elaine cr 2.2 , admits to decreased PO intake < 8 oz water per day.   Was seen yesterday by surgery sp packing of abdominal wounds. + vilma drain still in place. Denies fever chills nausea vomiting diarrhea         PAST MEDICAL & SURGICAL HISTORY:  History of aortic aneurysm  descending aorta see CT    Arthritis    Degenerative disc disease, lumbar    Neuropathy    Spinal stenosis    Bipolar 1 disorder    Kidney stone    Sleep Apnea  CPAP with oxygen since June 2020    Asthma    Hypertension    Morbid Obesity    ETOH Abuse  H/O    Benign Essential Tremor    Anxiety    Depression    Renal Calculi  chronic      Colitis  H/O    Anemia    S/P cardiac catheterization    S/P dilation and curettage    History of tonsillectomy    History of laparoscopic adjustable gastric banding  band removed few yrs ago    S/P D&amp;C    Biliary stent placement &amp; ercp    ureteroscopy with stone removal  1-        SOCIAL HISTORY:  No tobacco, ethanol, or drug abuse.    FAMILY HISTORY:  Hypertension (Sibling)    Hyperlipidemia (Sibling)    Family history of renal failure  both parents were on dialysis    Family history of bacterial pneumonia    Family history of arthritis      No family history of acute MI or sudden cardiac death.    MEDICATIONS  (STANDING):    MEDICATIONS  (PRN):      Allergies    penicillins (Other)  trees dogs cats cockaroaches (Rhinitis; Rhinorrhea)    Intolerances        REVIEW OF SYSTEMS:    CONSTITUTIONAL: No weakness, fevers or chills  EYES/ENT: No visual changes;  No vertigo or throat pain   NECK: No pain or stiffness  RESPIRATORY: No cough, wheezing, hemoptysis; No shortness of breath  CARDIOVASCULAR: No chest pain or palpitations  GASTROINTESTINAL: No abdominal pain. No nausea, vomiting, or hematemesis; No diarrhea or constipation. No melena or hematochezia.  GENITOURINARY: No dysuria, frequency or hematuria  NEUROLOGICAL: No numbness or weakness  SKIN: No itching or rash  All other review of systems is negative unless indicated above    VITAL SIGNS:   Vital Signs Last 24 Hrs  T(C): 36.8 (13 Oct 2020 12:36), Max: 36.8 (13 Oct 2020 12:36)  T(F): 98.3 (13 Oct 2020 12:36), Max: 98.3 (13 Oct 2020 12:36)  HR: 99 (13 Oct 2020 12:36) (99 - 99)  BP: 142/91 (13 Oct 2020 12:36) (142/91 - 142/91)  BP(mean): --  RR: 18 (13 Oct 2020 12:36) (18 - 18)  SpO2: 98% (13 Oct 2020 12:36) (98% - 98%)    I&O's Summary      On Exam:    Constitutional: NAD, alert and oriented x 3  Lungs:  Non-labored, breath sounds are clear bilaterally, No wheezing, rales or rhonchi  Cardiovascular: RRR.  S1 and S2 positive.  No murmurs, rubs, gallops or clicks  Gastrointestinal: Bowel Sounds present, soft obese, abdominal dressing with blood noted, + VILMA drain   Lymph: No peripheral edema. No cervical lymphadenopathy.  Neurological: Alert, no focal deficits  Skin: No rashes or ulcers   Psych:  Mood & affect appropriate.    LABS: All Labs Reviewed:                        8.3    x     )-----------( 300      ( 13 Oct 2020 13:28 )             24.6     13 Oct 2020 13:28    132    |  96     |  65     ----------------------------<  226    4.2     |  27     |  2.20     Ca    8.1        13 Oct 2020 13:28    TPro  6.2    /  Alb  1.4    /  TBili  0.7    /  DBili  x      /  AST  50     /  ALT  52     /  AlkPhos  141    13 Oct 2020 13:28    PT/INR - ( 13 Oct 2020 13:28 )   PT: 14.6 sec;   INR: 1.26 ratio         PTT - ( 13 Oct 2020 13:28 )  PTT:33.7 sec  CARDIAC MARKERS ( 13 Oct 2020 13:28 )  <.015 ng/mL / x     / x     / x     / x          Blood Culture: Organism --  Gram Stain Blood -- Gram Stain --  Specimen Source .Blood Blood  Culture-Blood --           [facility] - Almas Faustin, Mayra, Cara, Eleno, Pasquale Wiley  Office Number: [phone]    Initial Consult Note    CHIEF COMPLAINT: Patient is a 60y old  Female who presents with a chief complaint of     HPI:60 year old female with  morbid obesity s/p lap band, since removed, HTN, BPD, anxiety disorder, thoracic aortic aneurysm, JESUS on CPAP, essential tremor sp paniculectomy and liposuction 9/2020 complicated with  hemorrhagic shock, lactic acidosis, ELAINE and hyperglycemia,  s/p pRBC discharged now presenting with diaphoresis. patient was discharged on 10/10 presented today for follow up and while waiting in room felt diaphoretic At that she was in room waiting to be seen . Denies chest pain, dizziness, palpitations , syncope,. States her glucose in office was > 300.  Since discharge she has been feeling well with no exertional chest pain or shortness of breath. In er found with elaine cr 2.2 , admits to decreased PO intake < 8 oz water per day.   Was seen yesterday by surgery sp packing of abdominal wounds. + vilma drain still in place. Denies fever chills nausea vomiting diarrhea   PAST MEDICAL & SURGICAL HISTORY:  History of aortic aneurysm  descending aorta see CT    Arthritis    Degenerative disc disease, lumbar    Neuropathy    Spinal stenosis    Bipolar 1 disorder    Kidney stone    Sleep Apnea  CPAP with oxygen since June 2020    Asthma    Hypertension    Morbid Obesity    ETOH Abuse  H/O    Benign Essential Tremor    Anxiety    Depression    Renal Calculi  chronic      Colitis  H/O    Anemia    S/P cardiac catheterization    S/P dilation and curettage    History of tonsillectomy    History of laparoscopic adjustable gastric banding  band removed few yrs ago    S/P D&amp;C    Biliary stent placement &amp; ercp    ureteroscopy with stone removal  1-        SOCIAL HISTORY:  No tobacco, ethanol, or drug abuse.    FAMILY HISTORY:  Hypertension (Sibling)    Hyperlipidemia (Sibling)    Family history of renal failure  both parents were on dialysis    Family history of bacterial pneumonia    Family history of arthritis      No family history of acute MI or sudden cardiac death.    MEDICATIONS  (STANDING):    MEDICATIONS  (PRN):      Allergies    penicillins (Other)  trees dogs cats cockaroaches (Rhinitis; Rhinorrhea)    Intolerances        REVIEW OF SYSTEMS:    CONSTITUTIONAL: No weakness, fevers or chills  EYES/ENT: No visual changes;  No vertigo or throat pain   NECK: No pain or stiffness  RESPIRATORY: No cough, wheezing, hemoptysis; No shortness of breath  CARDIOVASCULAR: No chest pain or palpitations  GASTROINTESTINAL: No abdominal pain. No nausea, vomiting, or hematemesis; No diarrhea or constipation. No melena or hematochezia.  GENITOURINARY: No dysuria, frequency or hematuria  NEUROLOGICAL: No numbness or weakness  SKIN: No itching or rash  All other review of systems is negative unless indicated above    VITAL SIGNS:   Vital Signs Last 24 Hrs  T(C): 36.8 (13 Oct 2020 12:36), Max: 36.8 (13 Oct 2020 12:36)  T(F): 98.3 (13 Oct 2020 12:36), Max: 98.3 (13 Oct 2020 12:36)  HR: 99 (13 Oct 2020 12:36) (99 - 99)  BP: 142/91 (13 Oct 2020 12:36) (142/91 - 142/91)  BP(mean): --  RR: 18 (13 Oct 2020 12:36) (18 - 18)  SpO2: 98% (13 Oct 2020 12:36) (98% - 98%)    I&O's Summary      On Exam:    Constitutional: NAD, alert and oriented x 3  Lungs:  Non-labored, breath sounds are clear bilaterally, No wheezing, rales or rhonchi  Cardiovascular: RRR.  S1 and S2 positive.  No murmurs, rubs, gallops or clicks  Gastrointestinal: Bowel Sounds present, soft obese, abdominal dressing with blood noted, + VILMA drain   Lymph: No peripheral edema. No cervical lymphadenopathy.  Neurological: Alert, no focal deficits  Skin: No rashes or ulcers   Psych:  Mood & affect appropriate.    LABS: All Labs Reviewed:                        8.3    x     )-----------( 300      ( 13 Oct 2020 13:28 )             24.6     13 Oct 2020 13:28    132    |  96     |  65     ----------------------------<  226    4.2     |  27     |  2.20     Ca    8.1        13 Oct 2020 13:28    TPro  6.2    /  Alb  1.4    /  TBili  0.7    /  DBili  x      /  AST  50     /  ALT  52     /  AlkPhos  141    13 Oct 2020 13:28    PT/INR - ( 13 Oct 2020 13:28 )   PT: 14.6 sec;   INR: 1.26 ratio         PTT - ( 13 Oct 2020 13:28 )  PTT:33.7 sec  CARDIAC MARKERS ( 13 Oct 2020 13:28 )  <.015 ng/mL / x     / x     / x     / x          Blood Culture: Organism --  Gram Stain Blood -- Gram Stain --  Specimen Source .Blood Blood  Culture-Blood --

## 2020-10-13 NOTE — H&P ADULT - NSHPREVIEWOFSYSTEMS_GEN_ALL_CORE
Constitutional: denies fever, chills, diaphoresis   HEENT: Positive lightheadedness. denies blurry vision  Respiratory: denies SOB, wheezing  Cardiovascular: denies CP, palpitations, edema  Gastrointestinal: denies nausea, vomiting, abdominal pain  Skin: Positive sweating   Musculoskeletal: denies myalgias, muscle weakness  Neurologic: denies headache, weakness, dizziness, paresthesias, numbness/tingling   ROS negative except as noted above Constitutional: denies fever, chills, diaphoresis   HEENT: Positive lightheadedness. denies blurry vision  Respiratory: denies SOB, wheezing  Cardiovascular: denies CP, palpitations, edema  Gastrointestinal: denies nausea, vomiting, abdominal pain  Skin: admits diaphoresis  Musculoskeletal: denies myalgias, muscle weakness  Neurologic: denies headache, weakness, dizziness, paresthesias, numbness/tingling   Psych: admits insomnia

## 2020-10-13 NOTE — H&P ADULT - PROBLEM SELECTOR PLAN 1
Cr 2.2 on admission. Baseline ~0.7. Likely a combination of anemia and dehydration decreasing oxygen carrying capacity   -BUN/Cr 65/2.2 indicating pre-renal ELAINE   -Avoid nephrotoxic drugs  -IVF hydration  -Trend Cr  -F/u CMP for Na 132 s/p IVF hydration   -Nephro consulted Dr Mittal following Cr 2.2 on admission. Baseline ~0.7. Likely a combination of anemia and dehydration decreasing oxygen carrying capacity   -BUN/Cr 65/2.2 indicating pre-renal ELIANE   -Avoid nephrotoxic drugs. Hold home NSAID and Furosemide   -IVF hydration  -Trend Cr  -F/u CMP for Na 132 s/p IVF hydration   -Nephro consulted Dr Mittal following Cr 2.2 on admission. Baseline ~0.7. Likely a combination of anemia and dehydration decreasing oxygen carrying capacity   -BUN/Cr 65/2.2 indicating pre-renal ELAINE   -Avoid nephrotoxic drugs. Hold home NSAID and Furosemide   -Continue IVF NS @75cc/hr  -Trend Cr  -F/u CMP for Na 132 s/p IVF hydration   -Nephro consulted Dr Mittal following Cr 2.2 on admission. Baseline ~0.7. Likely prerenal azotemia in setting of hypovolemia and dehydration.  -Avoid nephrotoxic drugs. Hold home ARB, NSAID and Furosemide   -Continue IVF NS @75cc/hr  -F/u CMP for Na 132 s/p IVF hydration   -Nephro consulted Dr Mittal following

## 2020-10-13 NOTE — ED ADULT NURSE NOTE - OBJECTIVE STATEMENT
Pt BIBA from home - was discharged from the hospital on Saturday s/p pannectomy.  Pt was at hematologist office this am when she became pale and diaphoretic.  Pt remains diaphoretic -surgical wound with copious amounts serosanguineous drainage/packing in place with EDWIN drain of right side of abd.

## 2020-10-13 NOTE — H&P ADULT - HISTORY OF PRESENT ILLNESS
61 yo F with PMH of JESUS on CPAP, HTN, Obesity, Anemia, AAA, Arthritis, Depression, Anxiety, and Kidney stones presents to ED from Heme/Onc for sweating and lightheadedness. Hematologist was concerned for PE considering pt's recent complicated surgical procedure. Pt was discharged on 10/10/20 from post surgical complications of hemorraghic shock requiring transfer to ICU for blood transfusions s/p panniculectomy. Pt's sweating and lightheadedness came on suddenly when pt was sitting in the waiting room and lasted for 30 min. Symptoms have not recurred. Pt has EDWIN drain in right abdomen currently draining small amount of blood. Pt says post surgical wounds have frequently drenching bandaging with blood. Pt denies any recent medication changes or illness. Pt denies chest pain, sob, palpitations, abdominal pain, leg pain, nausea, weakness.    VS: T 98.3F HR 99 /91 RR 18 SpO2 98% on RA  Labs: Hbg 8.3 PT 14.6 INR 1.26 D-Dimer 828 Lactate 3.3 Na 132 BUN 65 Cr 2.2 ALK phos 141 Trop negative   CXR: negative for acute pulmonary process  CT Chest/Abd/Pelvic: Postsurgical changes without discrete drainable collection remaining. Small area of reverse halo sign in the basilar left lower lobe  EKG: NSR with Left axis deviation    In ED received 1L NS 61 yo F with PMH of JESUS on CPAP, HTN, Obesity, Anemia, AAA, Arthritis, Depression, Anxiety, and Kidney stones presents to ED from Heme/Onc for sweating and lightheadedness. Hematologist was concerned for PE considering pt's recent complicated surgical procedure. Pt was discharged on 10/10/20 from post surgical complications of hemorraghic shock requiring transfer to ICU for blood transfusions s/p panniculectomy. Pt's sweating and lightheadedness came on suddenly when pt was sitting in the waiting room and lasted for 30 min. Symptoms have not recurred. Pt has EDWIN drain in right abdomen currently draining small amount of blood. Pt says post surgical wounds have frequently drenching bandaging with blood. Pt denies any recent medication changes or illness. Pt denies chest pain, sob, palpitations, abdominal pain, leg pain, nausea, weakness. She feels better with IV fluids.    VS: T 98.3F HR 99 /91 RR 18 SpO2 98% on RA  Labs: Hbg 8.3 PT 14.6 INR 1.26 D-Dimer 828 Lactate 3.3 Na 132 BUN 65 Cr 2.2 ALK phos 141 Trop negative   CXR: negative for acute pulmonary process  CT Chest/Abd/Pelvic: Postsurgical changes without discrete drainable collection remaining. Small area of reverse halo sign in the basilar left lower lobe  EKG: NSR with Left axis deviation    In ED received 1L NS

## 2020-10-13 NOTE — ED PROVIDER NOTE - CARE PLAN
Principal Discharge DX:	ELAINE (acute kidney injury)  Secondary Diagnosis:	Hyponatremia  Secondary Diagnosis:	Dehydration

## 2020-10-13 NOTE — ED PROVIDER NOTE - OBJECTIVE STATEMENT
60 year old female with  morbid obesity s/p lap band, (since removed), HTN, BPD, anxiety disorder, thoracic aortic aneurysm, JESUS on CPAP at night, essential tremor, sp paniculectomy and liposuction 9/2020 which was complicated by  hemorrhagic shock, lactic acidosis, ELAINE and hyperglycemia,  who received 4 units PRBC and had extensive ICU stay was discharged  3 days ago now presents to ED with diaphoresis. Patient was at Oncologist office for follow up and while waiting in room felt diaphoretic. Denies chest pain, dizziness, palpitations , syncope, SOB. Her glucose in office was > 300.  Since discharge she has been feeling well with no exertional chest pain or shortness of breath however has had decreased PO intake. Was seen by plastics yesterday for wound check. has drain in RLQ which drained 40 cc in the last 12 hours which pt reports was more than usual but her doctor flushed drain yesterday.  Call was placed to surgery by heme and pt sent in to r/o PE and for CT abdome to r/o abscess or bleeding.  Denies fever chills nausea vomiting diarrhea.     PCP dr Chowdary  Surgery Dr De La Torre  Hematology Dr Albert

## 2020-10-13 NOTE — H&P ADULT - NSHPOUTPATIENTPROVIDERS_GEN_ALL_CORE
PMD Dr Honorio Brown  Plastic Surgery Dr De La Torre   Cardio Dr Giordano  Heme/Onc Dr Nair   Urologist Dr Ellis

## 2020-10-13 NOTE — H&P ADULT - NSICDXPASTSURGICALHX_GEN_ALL_CORE_FT
PAST SURGICAL HISTORY:  Biliary stent placement & ercp     History of laparoscopic adjustable gastric banding band removed few yrs ago    History of tonsillectomy     S/P cardiac catheterization     S/P D&C     S/P dilation and curettage     S/P panniculectomy     ureteroscopy with stone removal 1-

## 2020-10-13 NOTE — H&P ADULT - PROBLEM SELECTOR PLAN 5
Chronic - managed by Dr Nair   -Vitals stable  -Hgb 8.3 on admission. Unchanged from Hbg 8.4 3 days ago  Chronic  -CT abd/pelvic shows no fluid collection to support hematoma   -Monitor hemodynamics   -F/u CBC Chronic - managed by Dr Nair   -Vitals stable  -Hgb 8.3 on admission. Unchanged from Hbg 8.4 3 days ago  Chronic  -CT abd/pelvic shows no fluid collection to support hematoma   -Home supplement [employer] Formula iron that pt claims is only one that works for her is not held at pharmacy here   -Monitor hemodynamics   -F/u CBC Chronic - managed by Dr Aguilar   -Vitals stable  -Hgb 8.3 on admission. Unchanged from Hbg 8.4 3 days ago  Chronic  -CT abd/pelvic shows no fluid collection to support hematoma   -Home supplement [employer] Formula iron that pt claims is only one that works for her is not held at pharmacy here   -Monitor hemodynamics   -F/u CBC

## 2020-10-13 NOTE — CONSULT NOTE ADULT - ASSESSMENT
60 year old female with PMH of JESUS on CPAP, HTN, morbid obesity, anemia, AAA, Arthritis, Depression, anxiety recently admitted to [facility] for elective abdominal panniculectomy, admitted post-op with hemorrhagic shock s/p 3unit prbc now readmitted with complaint of shortness of breath       Acute shortness of breath   -Ct angio chest pending   -fu pulmonary recs  -troponin negative   -chest xray negative for acute pulmonary disease   -covid, cultures pending     ELAINE  -cr 2.2    A fib  - She had brief atrial fibrillation in the setting of significant hypotension on prior admission   - For possible loop recorder as out pt she will follow up w/ Dr. Giordano upon discharge    HTN  - Continue propranolol  - Hold verapamil   - low normal LV function with mild diastolic dysfunction on recent echocardiograma  - recent cath with non-obs cad    Anemia   -CT abdomen pelvis pending  - Continue to trend CBC as per primary team    JESUS  - CPAP at night      - Monitor and replete lytes, keep K>4, Mg>2.  - All other medical needs as per primary team.  - Other cardiovascular workup will depend on clinical course.  - Will continue to follow  Cecile Sherman FNP-C  Cardiology NP  [facility] [address] [phone]     60 year old female with PMH of JESUS on CPAP, HTN, morbid obesity, anemia, AAA, Arthritis, Depression, anxiety recently admitted to [facility] for elective abdominal panniculectomy, admitted post-op with hemorrhagic shock s/p 3unit prbcdischarged on 10/10  now readmitted with complaint of weakness diaphoresis while at follow up, in ed found with ELAINE    near syncope  -appears to be vasovagal in nature   -check orthostatic vital signs   -Ct angio chest pending - would defer at this time given cr 2.2 , low suspicion of PE -no hypoxia, no shortness of breath pulse 82  -ekg with nsr no acute changes   -troponin negative   -chest xray negative for acute pulmonary disease   -covid, cultures pending   -cr 2.2 likely pre renal in setting of dehydration   - po fluids encouraged, agree with IVF bolus     Anemia   -CT abdomen pelvis pending  - Continue to trend CBC as per primary team  -fu surgery recs   -elevated lactate     A fib  - She had brief atrial fibrillation in the setting of significant hypotension on prior admission   - For possible loop recorder as out pt she will follow up w/ Dr. Giordano upon discharge    HTN  - hold off on antihypertensives at this time   - low normal LV function with mild diastolic dysfunction on recent echocardiogram  - recent cath with non-obs cad    hyperglycemia  -glucose 226  check hgba1c  as per primary   - Monitor and replete lytes, keep K>4, Mg>2.  - All other medical needs as per primary team.  - Other cardiovascular workup will depend on clinical course.  - Will continue to follow  Cecile Sherman FNP-C  Cardiology NP  [facility] [phone]     60 year old female with PMH of JESUS on CPAP, HTN, morbid obesity, anemia, AAA, Arthritis, Depression, anxiety recently admitted to [facility] for elective abdominal panniculectomy, admitted post-op with hemorrhagic shock s/p 3unit prbcdischarged on 10/10  now readmitted with complaint of weakness diaphoresis while at follow up, in ed found with ELAINE.    near syncope  - could be vasovagal in nature given dizziness/diaphoresis and dyspnea  - check orthostatic vital signs   - Ct angio chest pending - may need to defer at this time given cr 2.2, though no hypoxia, no shortness of breath pulse 82  - ekg with nsr no acute changes   - troponin negative. Recent cath without significant cad  - chest xray negative for acute pulmonary disease   - covid, cultures pending   - cr 2.2 likely pre renal in setting of dehydration   - po fluids encouraged, agree with IVF bolus     Anemia   - CT abdomen pelvis pending  - Continue to trend CBC as per primary team  - fu surgery recs   - elevated lactate     A fib  - She had brief atrial fibrillation in the setting of significant hypotension on prior admission   - in sr today    HTN  - hold off on antihypertensives at this time   - low normal LV function with mild diastolic dysfunction on recent echocardiogram  - recent cath with non-obs cad    hyperglycemia  -glucose 226  check hgba1c  as per primary     - Monitor and replete lytes, keep K>4, Mg>2.  - All other medical needs as per primary team.  - Other cardiovascular workup will depend on clinical course.  - Will continue to follow  Cecile Sherman FNP-C  Cardiology NP  [facility] [address] [phone]

## 2020-10-13 NOTE — CONSULT NOTE ADULT - ASSESSMENT
60 year old obese white female with a history of lap band surgery, HTN, MDP,  anxiety disorder, thoracic aortic aneurysm, JESUS on CPAP, essential tremor sp paniculectomy and recently liposuction complicated with  hemorrhagic shock, lactic acidosis, with ELAINE.  Now admitted with diaphoresis and found to have ELAINE, most likely due to pre renal azotemia. Avoid IV contrast and ACEI or ARB. Continue gentle hydration. Will follow.

## 2020-10-14 LAB
A1C WITH ESTIMATED AVERAGE GLUCOSE RESULT: 5.9 % — HIGH (ref 4–5.6)
ALBUMIN SERPL ELPH-MCNC: 1.4 G/DL — LOW (ref 3.3–5)
ALP SERPL-CCNC: 123 U/L — HIGH (ref 40–120)
ALT FLD-CCNC: 38 U/L — SIGNIFICANT CHANGE UP (ref 12–78)
ANION GAP SERPL CALC-SCNC: 9 MMOL/L — SIGNIFICANT CHANGE UP (ref 5–17)
APPEARANCE UR: ABNORMAL
APTT BLD: 34 SEC — SIGNIFICANT CHANGE UP (ref 27.5–35.5)
AST SERPL-CCNC: 36 U/L — SIGNIFICANT CHANGE UP (ref 15–37)
BASOPHILS # BLD AUTO: 0.03 K/UL — SIGNIFICANT CHANGE UP (ref 0–0.2)
BASOPHILS NFR BLD AUTO: 0.4 % — SIGNIFICANT CHANGE UP (ref 0–2)
BILIRUB SERPL-MCNC: 0.6 MG/DL — SIGNIFICANT CHANGE UP (ref 0.2–1.2)
BILIRUB UR-MCNC: NEGATIVE — SIGNIFICANT CHANGE UP
BUN SERPL-MCNC: 61 MG/DL — HIGH (ref 7–23)
CALCIUM SERPL-MCNC: 8.2 MG/DL — LOW (ref 8.5–10.1)
CHLORIDE SERPL-SCNC: 100 MMOL/L — SIGNIFICANT CHANGE UP (ref 96–108)
CO2 SERPL-SCNC: 27 MMOL/L — SIGNIFICANT CHANGE UP (ref 22–31)
COLOR SPEC: YELLOW — SIGNIFICANT CHANGE UP
CREAT SERPL-MCNC: 1.7 MG/DL — HIGH (ref 0.5–1.3)
CULTURE RESULTS: SIGNIFICANT CHANGE UP
CULTURE RESULTS: SIGNIFICANT CHANGE UP
DIFF PNL FLD: ABNORMAL
EOSINOPHIL # BLD AUTO: 0.17 K/UL — SIGNIFICANT CHANGE UP (ref 0–0.5)
EOSINOPHIL NFR BLD AUTO: 2 % — SIGNIFICANT CHANGE UP (ref 0–6)
ESTIMATED AVERAGE GLUCOSE: 123 MG/DL — HIGH (ref 68–114)
GLUCOSE SERPL-MCNC: 154 MG/DL — HIGH (ref 70–99)
GLUCOSE UR QL: NEGATIVE — SIGNIFICANT CHANGE UP
HCT VFR BLD CALC: 22.2 % — LOW (ref 34.5–45)
HGB BLD-MCNC: 7.2 G/DL — LOW (ref 11.5–15.5)
IMM GRANULOCYTES NFR BLD AUTO: 1.8 % — HIGH (ref 0–1.5)
INR BLD: 1.24 RATIO — HIGH (ref 0.88–1.16)
KETONES UR-MCNC: ABNORMAL
LEUKOCYTE ESTERASE UR-ACNC: ABNORMAL
LYMPHOCYTES # BLD AUTO: 1.21 K/UL — SIGNIFICANT CHANGE UP (ref 1–3.3)
LYMPHOCYTES # BLD AUTO: 14.6 % — SIGNIFICANT CHANGE UP (ref 13–44)
MCHC RBC-ENTMCNC: 28.1 PG — SIGNIFICANT CHANGE UP (ref 27–34)
MCHC RBC-ENTMCNC: 32.4 GM/DL — SIGNIFICANT CHANGE UP (ref 32–36)
MCV RBC AUTO: 86.7 FL — SIGNIFICANT CHANGE UP (ref 80–100)
MONOCYTES # BLD AUTO: 0.54 K/UL — SIGNIFICANT CHANGE UP (ref 0–0.9)
MONOCYTES NFR BLD AUTO: 6.5 % — SIGNIFICANT CHANGE UP (ref 2–14)
NEUTROPHILS # BLD AUTO: 6.2 K/UL — SIGNIFICANT CHANGE UP (ref 1.8–7.4)
NEUTROPHILS NFR BLD AUTO: 74.7 % — SIGNIFICANT CHANGE UP (ref 43–77)
NITRITE UR-MCNC: NEGATIVE — SIGNIFICANT CHANGE UP
NRBC # BLD: 0 /100 WBCS — SIGNIFICANT CHANGE UP (ref 0–0)
PH UR: 5 — SIGNIFICANT CHANGE UP (ref 5–8)
PLATELET # BLD AUTO: 279 K/UL — SIGNIFICANT CHANGE UP (ref 150–400)
POTASSIUM SERPL-MCNC: 4.4 MMOL/L — SIGNIFICANT CHANGE UP (ref 3.5–5.3)
POTASSIUM SERPL-SCNC: 4.4 MMOL/L — SIGNIFICANT CHANGE UP (ref 3.5–5.3)
PROT SERPL-MCNC: 5.8 G/DL — LOW (ref 6–8.3)
PROT UR-MCNC: 15
PROTHROM AB SERPL-ACNC: 14.4 SEC — HIGH (ref 10.6–13.6)
RBC # BLD: 2.56 M/UL — LOW (ref 3.8–5.2)
RBC # FLD: 16.1 % — HIGH (ref 10.3–14.5)
SARS-COV-2 IGG SERPL QL IA: NEGATIVE — SIGNIFICANT CHANGE UP
SARS-COV-2 IGM SERPL IA-ACNC: 0.09 INDEX — SIGNIFICANT CHANGE UP
SODIUM SERPL-SCNC: 136 MMOL/L — SIGNIFICANT CHANGE UP (ref 135–145)
SP GR SPEC: 1.01 — SIGNIFICANT CHANGE UP (ref 1.01–1.02)
SPECIMEN SOURCE: SIGNIFICANT CHANGE UP
SPECIMEN SOURCE: SIGNIFICANT CHANGE UP
UROBILINOGEN FLD QL: 1
WBC # BLD: 8.3 K/UL — SIGNIFICANT CHANGE UP (ref 3.8–10.5)
WBC # FLD AUTO: 8.3 K/UL — SIGNIFICANT CHANGE UP (ref 3.8–10.5)

## 2020-10-14 PROCEDURE — 99232 SBSQ HOSP IP/OBS MODERATE 35: CPT

## 2020-10-14 PROCEDURE — 93970 EXTREMITY STUDY: CPT | Mod: 26

## 2020-10-14 PROCEDURE — 99233 SBSQ HOSP IP/OBS HIGH 50: CPT

## 2020-10-14 RX ORDER — LANOLIN ALCOHOL/MO/W.PET/CERES
5 CREAM (GRAM) TOPICAL AT BEDTIME
Refills: 0 | Status: COMPLETED | OUTPATIENT
Start: 2020-10-14 | End: 2020-10-14

## 2020-10-14 RX ADMIN — DULOXETINE HYDROCHLORIDE 30 MILLIGRAM(S): 30 CAPSULE, DELAYED RELEASE ORAL at 22:11

## 2020-10-14 RX ADMIN — DULOXETINE HYDROCHLORIDE 30 MILLIGRAM(S): 30 CAPSULE, DELAYED RELEASE ORAL at 13:57

## 2020-10-14 RX ADMIN — Medication 1 TABLET(S): at 11:08

## 2020-10-14 RX ADMIN — Medication 5 MILLIGRAM(S): at 00:08

## 2020-10-14 RX ADMIN — Medication 1 MILLIGRAM(S): at 11:08

## 2020-10-14 RX ADMIN — Medication 300 MILLIGRAM(S): at 11:08

## 2020-10-14 RX ADMIN — Medication 650 MILLIGRAM(S): at 17:10

## 2020-10-14 RX ADMIN — Medication 650 MILLIGRAM(S): at 18:10

## 2020-10-14 RX ADMIN — HEPARIN SODIUM 5000 UNIT(S): 5000 INJECTION INTRAVENOUS; SUBCUTANEOUS at 05:24

## 2020-10-14 RX ADMIN — Medication 5 MILLIGRAM(S): at 22:11

## 2020-10-14 RX ADMIN — Medication 5000 UNIT(S): at 11:08

## 2020-10-14 RX ADMIN — LAMOTRIGINE 200 MILLIGRAM(S): 25 TABLET, ORALLY DISINTEGRATING ORAL at 11:08

## 2020-10-14 RX ADMIN — HEPARIN SODIUM 5000 UNIT(S): 5000 INJECTION INTRAVENOUS; SUBCUTANEOUS at 17:10

## 2020-10-14 RX ADMIN — DULOXETINE HYDROCHLORIDE 30 MILLIGRAM(S): 30 CAPSULE, DELAYED RELEASE ORAL at 05:24

## 2020-10-14 RX ADMIN — Medication 200 MILLIGRAM(S): at 22:11

## 2020-10-14 RX ADMIN — PREGABALIN 1000 MICROGRAM(S): 225 CAPSULE ORAL at 11:08

## 2020-10-14 RX ADMIN — SODIUM CHLORIDE 75 MILLILITER(S): 9 INJECTION INTRAMUSCULAR; INTRAVENOUS; SUBCUTANEOUS at 06:22

## 2020-10-14 NOTE — PROGRESS NOTE ADULT - ASSESSMENT
60 year old obese white female with a history of lap band surgery, HTN, MDP,  anxiety disorder, thoracic aortic aneurysm, JESUS on CPAP, essential tremor sp paniculectomy and recently liposuction complicated with  hemorrhagic shock, lactic acidosis, with ELAINE.  Now admitted with diaphoresis and found to have ELAINE, most likely due to pre renal azotemia.     Improving renal indices. To continue current meds. Avoid IV contrast and ACEI or ARB. Will follow electrolytes and renal function trend.

## 2020-10-14 NOTE — PROGRESS NOTE ADULT - PROBLEM SELECTOR PLAN 3
pt has chronic - managed by Dr Aguilar, but also had recent acute blood loss anemia s/p panniculectomy and is still draining blood from abdomen.   -Vitals stable  -Hgb down to 7.2 this morning -- will give 1un PRBC  -CT abd/pelvic (without IV contrast) did not visualize significant hematoma   -Home supplement [employer] iron that pt claims is only one that works for her is not held at pharmacy here   -heme ([facility]) consulting, recs appreciated  -will recheck iron studies  -Monitor hemodynamics   -F/u CBC

## 2020-10-14 NOTE — PROGRESS NOTE ADULT - SUBJECTIVE AND OBJECTIVE BOX
Patient is a 60y old  Female who presents with a chief complaint of diaphoresis, lightheadedness.      INTERVAL HPI/OVERNIGHT EVENTS: Pt states that her lightheadedness has resolved and she is feeling better. Still has generalized weakness. Admitted to having bloody drainage around the dressing of her abdomen. Denies fever, chills, dysuria, SOB, CP, abd pain.     MEDICATIONS  (STANDING):  allopurinol 300 milliGRAM(s) Oral daily  cholecalciferol 5000 Unit(s) Oral daily  cyanocobalamin 1000 MICROGram(s) Oral daily  DULoxetine 30 milliGRAM(s) Oral <User Schedule>  folic acid 1 milliGRAM(s) Oral daily  lamoTRIgine 200 milliGRAM(s) Oral daily  multivitamin 1 Tablet(s) Oral daily  sodium chloride 0.9%. 1000 milliLiter(s) (75 mL/Hr) IV Continuous <Continuous>  traZODone 200 milliGRAM(s) Oral at bedtime    MEDICATIONS  (PRN):  acetaminophen   Tablet .. 650 milliGRAM(s) Oral daily PRN Mild Pain (1 - 3)  ALPRAZolam 1 milliGRAM(s) Oral two times a day PRN anxiety      Allergies    penicillins (Other)  trees dogs cats cockaroaches (Rhinitis; Rhinorrhea)    Intolerances        REVIEW OF SYSTEMS:  CONSTITUTIONAL: +generalized weakness, poor appetite, No fever or chills  HEENT:  No headache, no sore throat  RESPIRATORY: No cough, wheezing, or shortness of breath  CARDIOVASCULAR: No chest pain, palpitations  GASTROINTESTINAL: +bloody drainage along prior drain line of surgical incision on abdomen; No abd pain, nausea, vomiting, or diarrhea  GENITOURINARY: No dysuria, frequency, or hematuria  NEUROLOGICAL: no focal weakness ; dizziness improved  MUSCULOSKELETAL: no myalgias     Vital Signs Last 24 Hrs  T(C): 36.8 (14 Oct 2020 05:01), Max: 36.8 (13 Oct 2020 12:36)  T(F): 98.3 (14 Oct 2020 05:01), Max: 98.3 (13 Oct 2020 12:36)  HR: 93 (14 Oct 2020 08:07) (80 - 99)  BP: 114/76 (14 Oct 2020 05:01) (100/57 - 142/91)  BP(mean): --  RR: 19 (14 Oct 2020 05:01) (18 - 19)  SpO2: 94% (14 Oct 2020 08:07) (93% - 98%)    PHYSICAL EXAM:  GENERAL: NAD, morbidly obese  HEENT:  pale conjunctiva, anicteric, moist mucous membranes  CHEST/LUNG:  CTA b/l, no rales, wheezes, or rhonchi  HEART:  RRR, S1, S2  ABDOMEN:  BS+, soft, nontender, nondistended; EDWIN drain with dark bloody drainage; incision sealed with dermabond without warmth or erythema; bloody drainage from opening where prior EDWIN drain had been  EXTREMITIES: + lower extremity edema, no cyanosis, or calf tenderness  NERVOUS SYSTEM: answers questions and follows commands appropriately    LABS:                        7.2    8.30  )-----------( 279      ( 14 Oct 2020 06:57 )             22.2     CBC Full  -  ( 14 Oct 2020 06:57 )  WBC Count : 8.30 K/uL  Hemoglobin : 7.2 g/dL  Hematocrit : 22.2 %  Platelet Count - Automated : 279 K/uL  Mean Cell Volume : 86.7 fl  Mean Cell Hemoglobin : 28.1 pg  Mean Cell Hemoglobin Concentration : 32.4 gm/dL  Auto Neutrophil # : 6.20 K/uL  Auto Lymphocyte # : 1.21 K/uL  Auto Monocyte # : 0.54 K/uL  Auto Eosinophil # : 0.17 K/uL  Auto Basophil # : 0.03 K/uL  Auto Neutrophil % : 74.7 %  Auto Lymphocyte % : 14.6 %  Auto Monocyte % : 6.5 %  Auto Eosinophil % : 2.0 %  Auto Basophil % : 0.4 %    14 Oct 2020 06:57    136    |  100    |  61     ----------------------------<  154    4.4     |  27     |  1.70     Ca    8.2        14 Oct 2020 06:57    TPro  5.8    /  Alb  1.4    /  TBili  0.6    /  DBili  x      /  AST  36     /  ALT  38     /  AlkPhos  123    14 Oct 2020 06:57    PT/INR - ( 14 Oct 2020 12:36 )   PT: 14.4 sec;   INR: 1.24 ratio         PTT - ( 14 Oct 2020 12:36 )  PTT:34.0 sec  Urinalysis Basic - ( 14 Oct 2020 20:01 )    Color: Yellow / Appearance: Slightly Turbid / S.015 / pH: x  Gluc: x / Ketone: Trace  / Bili: Negative / Urobili: 1   Blood: x / Protein: 15 / Nitrite: Negative   Leuk Esterase: Trace / RBC: 0-2 /HPF / WBC 0-2   Sq Epi: x / Non Sq Epi: Moderate / Bacteria: Few      CAPILLARY BLOOD GLUCOSE              Culture - Blood (collected 10-09-20 @ 12:15)  Source: .Blood Blood  Final Report (10-14-20 @ 13:01):    No Growth Final    Culture - Blood (collected 10-09-20 @ 12:15)  Source: .Blood Blood  Final Report (10-14-20 @ 13:01):    No Growth Final        RADIOLOGY & ADDITIONAL TESTS:    Personally reviewed.     Consultant(s) Notes Reviewed:  [x] YES  [ ] NO     Patient is a 60y old  Female who presents with a chief complaint of diaphoresis, lightheadedness.    INTERVAL HPI/OVERNIGHT EVENTS: Pt states that her lightheadedness has resolved and she is feeling better. Still has generalized weakness. Admitted to having bloody drainage around the dressing of her abdomen. Denies fever, chills, dysuria, SOB, CP, abd pain.     MEDICATIONS  (STANDING):  allopurinol 300 milliGRAM(s) Oral daily  cholecalciferol 5000 Unit(s) Oral daily  cyanocobalamin 1000 MICROGram(s) Oral daily  DULoxetine 30 milliGRAM(s) Oral <User Schedule>  folic acid 1 milliGRAM(s) Oral daily  lamoTRIgine 200 milliGRAM(s) Oral daily  multivitamin 1 Tablet(s) Oral daily  sodium chloride 0.9%. 1000 milliLiter(s) (75 mL/Hr) IV Continuous <Continuous>  traZODone 200 milliGRAM(s) Oral at bedtime    MEDICATIONS  (PRN):  acetaminophen   Tablet .. 650 milliGRAM(s) Oral daily PRN Mild Pain (1 - 3)  ALPRAZolam 1 milliGRAM(s) Oral two times a day PRN anxiety      Allergies    penicillins (Other)  trees dogs cats cockaroaches (Rhinitis; Rhinorrhea)    Intolerances        REVIEW OF SYSTEMS:  CONSTITUTIONAL: +generalized weakness, poor appetite, No fever or chills  HEENT:  No headache, no sore throat  RESPIRATORY: No cough, wheezing, or shortness of breath  CARDIOVASCULAR: No chest pain, palpitations  GASTROINTESTINAL: +bloody drainage along prior drain line of surgical incision on abdomen; No abd pain, nausea, vomiting, or diarrhea  GENITOURINARY: No dysuria, frequency, or hematuria  NEUROLOGICAL: no focal weakness ; dizziness improved  MUSCULOSKELETAL: no myalgias     Vital Signs Last 24 Hrs  T(C): 36.8 (14 Oct 2020 05:01), Max: 36.8 (13 Oct 2020 12:36)  T(F): 98.3 (14 Oct 2020 05:01), Max: 98.3 (13 Oct 2020 12:36)  HR: 93 (14 Oct 2020 08:07) (80 - 99)  BP: 114/76 (14 Oct 2020 05:01) (100/57 - 142/91)  BP(mean): --  RR: 19 (14 Oct 2020 05:01) (18 - 19)  SpO2: 94% (14 Oct 2020 08:07) (93% - 98%)    PHYSICAL EXAM:  GENERAL: NAD, morbidly obese  HEENT:  pale conjunctiva, anicteric, moist mucous membranes  CHEST/LUNG:  CTA b/l, no rales, wheezes, or rhonchi  HEART:  RRR, S1, S2  ABDOMEN:  BS+, soft, nontender, nondistended; EDWIN drain with dark bloody drainage; incision sealed with dermabond without warmth or erythema; bloody drainage from opening where prior EDWIN drain had been  EXTREMITIES: + lower extremity edema, no cyanosis, or calf tenderness  NERVOUS SYSTEM: answers questions and follows commands appropriately    LABS:                        7.2    8.30  )-----------( 279      ( 14 Oct 2020 06:57 )             22.2     CBC Full  -  ( 14 Oct 2020 06:57 )  WBC Count : 8.30 K/uL  Hemoglobin : 7.2 g/dL  Hematocrit : 22.2 %  Platelet Count - Automated : 279 K/uL  Mean Cell Volume : 86.7 fl  Mean Cell Hemoglobin : 28.1 pg  Mean Cell Hemoglobin Concentration : 32.4 gm/dL  Auto Neutrophil # : 6.20 K/uL  Auto Lymphocyte # : 1.21 K/uL  Auto Monocyte # : 0.54 K/uL  Auto Eosinophil # : 0.17 K/uL  Auto Basophil # : 0.03 K/uL  Auto Neutrophil % : 74.7 %  Auto Lymphocyte % : 14.6 %  Auto Monocyte % : 6.5 %  Auto Eosinophil % : 2.0 %  Auto Basophil % : 0.4 %    14 Oct 2020 06:57    136    |  100    |  61     ----------------------------<  154    4.4     |  27     |  1.70     Ca    8.2        14 Oct 2020 06:57    TPro  5.8    /  Alb  1.4    /  TBili  0.6    /  DBili  x      /  AST  36     /  ALT  38     /  AlkPhos  123    14 Oct 2020 06:57    PT/INR - ( 14 Oct 2020 12:36 )   PT: 14.4 sec;   INR: 1.24 ratio         PTT - ( 14 Oct 2020 12:36 )  PTT:34.0 sec  Urinalysis Basic - ( 14 Oct 2020 20:01 )    Color: Yellow / Appearance: Slightly Turbid / S.015 / pH: x  Gluc: x / Ketone: Trace  / Bili: Negative / Urobili: 1   Blood: x / Protein: 15 / Nitrite: Negative   Leuk Esterase: Trace / RBC: 0-2 /HPF / WBC 0-2   Sq Epi: x / Non Sq Epi: Moderate / Bacteria: Few      CAPILLARY BLOOD GLUCOSE              Culture - Blood (collected 10-09-20 @ 12:15)  Source: .Blood Blood  Final Report (10-14-20 @ 13:01):    No Growth Final    Culture - Blood (collected 10-09-20 @ 12:15)  Source: .Blood Blood  Final Report (10-14-20 @ 13:01):    No Growth Final        RADIOLOGY & ADDITIONAL TESTS:    Personally reviewed.     Consultant(s) Notes Reviewed:  [x] YES  [ ] NO

## 2020-10-14 NOTE — PROGRESS NOTE ADULT - PROBLEM SELECTOR PLAN 4
-had panniculectomy on 9/25 with Dr. De La Torre (consulted, recs appreciated)  -pt still with blood from drain and from site of past EDWIN drain.   -surgery (Wil) consulted, will change the packing and dressing changes as deemed important per surgical team  -incentive spirometry

## 2020-10-14 NOTE — PROGRESS NOTE ADULT - PROBLEM SELECTOR PLAN 2
Sudden onset of sweating and dizziness for 30 min while seated. This is consistent with a vasovagal episode likely due to general hypotension in setting of anemia and poor oncotic pressure. Might have had an orthostatic component, as well.   - PE less likely and unable to perform CTA Chest safely due to ELAINE  - Pt had hx of a-fib in the setting of significant hypotension on prior admission per Cardiology  -EKG shows NSR with no acute changes   -Low-normal LV function with mild diastolic dysfunction on recent echocardiogram  -Placed on remote telemetry  -will give 1un PRBC today  -Check orthostatics  -Cardio consulted Dr Giordano: Hold anti-hypertensives.

## 2020-10-14 NOTE — CONSULT NOTE ADULT - ASSESSMENT
[ASSESSMENT and  PLAN]    Admitted for ELAINE.   Anemia worse, post hydration.       61yo F w/ PMH of JESUS on CPAP, HTN, morbid obesity, anemia, AAA, Arthritis, Depression, anxiety presented to [facility] for elective abdominal panniculectomy, admitted last admission post-op with hemorrhagic shock s/p 3U PRBC with stabilization, now course c/b fever.  Hgb 11.9, pre-surgery with post procedure Hgb 8.4  Had Fever post op, and placed on abx.   PO intake was poor and given PO VitK for coagulopathy.     Dx last admission with brief episode new onset Afib.   In Sinus now    regarding anemia  Chronic anemia for many years, not ever required transfusions; chronic skin lesions, also with persistent nausea and vomiting for several months prior to initial eval  found to have mild iron deficiency and mild B12 deficiency     Prior hospitalized in 2/2016 at [place] for gastric perforation due to gastric sleeve, s/p removal, repair of perforation   hx enterocutaneous fistula since healed.     Had contemplatedgastric sleeve again in 1/2019 but ultimately deferred.  Planned panniculectomy  3/2020 which was cancelled due to COVID-19  Now s/p procedure last admissin.       Hgb 13.1, Ferritin 76 on 07/23/20  Hgb 13.2 on PST on 09/14/20.     Cr improving with hydration.     RECOMMENDATIONS  IVF hydration   Continue.   PO intake continues to be poor.   Cardiology followup for Afib.      Check PT, PTT.     Transfuse PRBC as clinically indicated.   Transfuse PRBC if Hgb <7.0 or if symptomatic.   Follow CBC    Repeat iron, ferritin, ESR, CRP.     DVT Prophylaxis  SCD and SQ heparin.     Thank you for consulting us.     I have discussed the above plan of care with patient in detail. They expressed understanding of the treatment plan . Risks, benefits and alternatives discussed in detail. I have asked if they have any questions or concerns and appropriately addressed them; all questions answered to their satisfactions and in lay terms.      [ASSESSMENT and  PLAN]  Admitted for ELAINE.   Anemia worse, post hydration.     61yo F w/ PMH of JESUS on CPAP, HTN, morbid obesity, anemia, AAA, Arthritis, Depression, anxiety presented to [facility] for elective abdominal panniculectomy, admitted last admission post-op with hemorrhagic shock s/p 3U PRBC with stabilization, now course c/b fever.  Hgb 11.9, pre-surgery with post procedure Hgb 8.4  Had Fever post op, and placed on abx.   PO intake was poor and given PO VitK for coagulopathy.     Dx last admission with brief episode new onset Afib.   In Sinus now    regarding anemia  Chronic anemia for many years, not ever required transfusions; chronic skin lesions, also with persistent nausea and vomiting for several months prior to initial eval  found to have mild iron deficiency and mild B12 deficiency     Prior hospitalized in 2/2016 at [place] for gastric perforation due to gastric sleeve, s/p removal, repair of perforation   hx enterocutaneous fistula since healed.     Had contemplatedgastric sleeve again in 1/2019 but ultimately deferred.  Planned panniculectomy  3/2020 which was cancelled due to COVID-19  Now s/p procedure last admissin.       Hgb 13.1, Ferritin 76 on 07/23/20  Hgb 13.2 on PST on 09/14/20.     Cr improving with hydration.     RECOMMENDATIONS  IVF hydration   Continue.   PO intake continues to be poor.   Cardiology followup for Afib.      Check PT, PTT.     Transfuse PRBC as clinically indicated.   Transfuse PRBC if Hgb <7.0 or if symptomatic.   Follow CBC    Repeat iron, ferritin, ESR, CRP.     DVT Prophylaxis  SCD and SQ heparin.     Plastic surgery to see.   Surgical PA notified.     Thank you for consulting us.     I have discussed the above plan of care with patient in detail. They expressed understanding of the treatment plan . Risks, benefits and alternatives discussed in detail. I have asked if they have any questions or concerns and appropriately addressed them; all questions answered to their satisfactions and in lay terms.

## 2020-10-14 NOTE — CONSULT NOTE ADULT - SUBJECTIVE AND OBJECTIVE BOX
Patient is a 60y old  Female who presents with a chief complaint of ELAINE and vasovagal episode (14 Oct 2020 11:05)      HPI:  59 yo F with PMH of JESUS on CPAP, HTN, Obesity, Anemia, AAA, Arthritis, Depression, Anxiety, and Kidney stones presents to ED from Heme/Onc for sweating and lightheadedness. Hematologist was concerned for PE considering pt's recent complicated surgical procedure. Pt was discharged on 10/10/20 from post surgical complications of hemorraghic shock requiring transfer to ICU for blood transfusions s/p panniculectomy. Pt's sweating and lightheadedness came on suddenly when pt was sitting in the waiting room and lasted for 30 min. Symptoms have not recurred. Pt has EDWIN drain in right abdomen currently draining small amount of blood. Pt says post surgical wounds have frequently drenching bandaging with blood. Pt denies any recent medication changes or illness. Pt denies chest pain, sob, palpitations, abdominal pain, leg pain, nausea, weakness. She feels better with IV fluids.    VS: T 98.3F HR 99 /91 RR 18 SpO2 98% on RA  Labs: Hbg 8.3 PT 14.6 INR 1.26 D-Dimer 828 Lactate 3.3 Na 132 BUN 65 Cr 2.2 ALK phos 141 Trop negative   CXR: negative for acute pulmonary process  CT Chest/Abd/Pelvic: Postsurgical changes without discrete drainable collection remaining. Small area of reverse halo sign in the basilar left lower lobe  EKG: NSR with Left axis deviation    In ED received 1L NS (13 Oct 2020 18:47)        PAST MEDICAL & SURGICAL HISTORY:  History of aortic aneurysm descending aorta see CT  Arthritis  Degenerative disc disease, lumbar  Neuropathy  Spinal stenosis  Bipolar 1 disorder  Kidney stone  Sleep Apnea CPAP with oxygen since June 2020  Asthma  Hypertension  Morbid Obesity  ETOH Abuse H/O  Benign Essential Tremor  Anxiety  Depression  Renal Calculi chronic  Colitis H/O  Anemia   S/P panniculectomy  S/P cardiac catheterization  S/P dilation and curettage  History of tonsillectomy  History of laparoscopic adjustable gastric banding  band removed few yrs ago  S/P D& C  Biliary stent placement & ercp  ureteroscopy with stone removal 1-      HEALTH ISSUES - PROBLEM Dx:  Acute renal failure [N17.9]  History of aortic aneurysm [Z86.79]  H/O aortic aneurysm repair [Z98.890]  Arthritis [M19.90]  Degenerative disc disease, lumbar [M51.36]  Neuropathy [G62.9]  Spinal stenosis [M48.00]  Bipolar 1 disorder [F31.9]  Kidney stone [592.0]  Diabetes Mellitus Type II [250.00]  Sleep Apnea [780.57]  Asthma [493.90]  Hypertension [401.9]  Obesity [278.00]  Morbid Obesity [278.01]  Drug Abuse [305.90]  ETOH Abuse [305.00]  Arrhythmia [427.9]  Benign Essential Tremor [333.1]  Anxiety [300.00]  Depression [311]  Renal Calculi [592.0]  Colitis [558.9]  Anemia [285.9]  Diabetes [250.00]  Sleep Apnea [780.57]  Asthma [493.90]  S/P panniculectomy [Z98.890]  S/P cardiac catheterization [V45.89]  S/P dilation and curettage [V45.89]  History of tonsillectomy [V45.89]  History of laparoscopic adjustable gastric banding [V45.86]  S/P D&amp;C [V45.89]  Ureteral stent [593.4]  ERCP &amp; removal of biliary stent [574.20]  Biliary stent placement &amp; ercp [574.20]  Cholelithiasis [574.20]  History of Tonsillectomy [V45.89]  ureteroscopy with stone removal [592.0]  Dehydration [E86.0]  Hyponatremia [E87.1]        FAMILY HISTORY:  Hypertension (Sibling)  Hyperlipidemia (Sibling)  Family history of renal failure  both parents were on dialysis  Family history of bacterial pneumonia  Family history of arthritis          [SOCIAL HISTORY: ]     smoking:  ex-smoker     EtOH:  denies     illicit drugs:  denies     occupation:  retired  for [employer].      marital status:  single. Lives with brother and his wife.     [ALLERGIES/INTOLERANCES:]  Allergies     penicillins (Other)     trees dogs cats cockaroaches (Rhinitis; Rhinorrhea)  Intolerances          [MEDICATIONS]  MEDICATIONS  (STANDING):  allopurinol 300 milliGRAM(s) Oral daily  cholecalciferol 5000 Unit(s) Oral daily  cyanocobalamin 1000 MICROGram(s) Oral daily  DULoxetine 30 milliGRAM(s) Oral <User Schedule>  folic acid 1 milliGRAM(s) Oral daily  heparin   Injectable 5000 Unit(s) SubCutaneous every 12 hours  lamoTRIgine 200 milliGRAM(s) Oral daily  multivitamin 1 Tablet(s) Oral daily  sodium chloride 0.9%. 1000 milliLiter(s) (75 mL/Hr) IV Continuous <Continuous>  traZODone 200 milliGRAM(s) Oral at bedtime    MEDICATIONS  (PRN):  acetaminophen   Tablet .. 650 milliGRAM(s) Oral daily PRN Mild Pain (1 - 3)  ALPRAZolam 1 milliGRAM(s) Oral two times a day PRN anxiety        [REVIEW OF SYSTEMS: ]  CONSTITUTIONAL: +LH, no fever, no shakes, no chills   EYES: No eye pain, no visual disturbances, no discharge  ENMT:  no discharge  NECK: No pain, no stiffness  BREASTS: No pain, no masses, no nipple discharge  RESPIRATORY: No cough, no wheezing, no chills, no hemoptysis; No shortness of breath  CARDIOVASCULAR: No chest pain, no palpitations, +dizziness, no leg swelling  GASTROINTESTINAL: No abdominal, no epigastric pain. No nausea, no vomiting, no hematemesis; No diarrhea , no constipation. No melena, no hematochezia.  GENITOURINARY: No dysuria, no frequency, no hematuria, no incontinence  NEUROLOGICAL: No headaches, no memory loss, no loss of strength, no numbness, no tremors  SKIN: No itching, no burning, no rashes, no lesions   LYMPH NODES: No enlarged glands  ENDOCRINE: No heat or cold intolerance; No hair loss  MUSCULOSKELETAL: No joint pain or swelling; No muscle, no back, no extremity pain  PSYCHIATRIC: No depression, no anxiety, no mood swings, no difficulty sleeping  HEME/LYMPH: No easy bruising, no bleeding gums      [VITALS SIGNS 24hrs]  Vital Signs Last 24 Hrs  T(C): 36.8 (14 Oct 2020 05:01), Max: 36.8 (13 Oct 2020 12:36)  T(F): 98.3 (14 Oct 2020 05:01), Max: 98.3 (13 Oct 2020 12:36)  HR: 93 (14 Oct 2020 08:07) (80 - 99)  BP: 114/76 (14 Oct 2020 05:01) (100/57 - 142/91)  BP(mean): --  RR: 19 (14 Oct 2020 05:01) (18 - 19)  SpO2: 94% (14 Oct 2020 08:07) (93% - 98%)    [PHYSICAL EXAM]  General: adult in NAD,  WN,  WD.  HEENT: clear oropharynx, anicteric sclera, pink conjunctivae.  Neck: supple, no masses.  CV: normal S1S2, no murmur, no rubs, no gallops.  Lungs: clear to auscultation, no wheezes, no rales, no rhonchi.  Abdomen: soft, non-tender, non-distended, no hepatosplenomegaly, normal BS, no guarding. scars CIDI. Drain with serous sanginous fluid  Ext: no clubbing, no cyanosis, no edema.  Skin: no rashes,  no petechiae, no venous stasis changes.  Neuro: alert and oriented X3, no focal motor deficits.  LN: no SC DIANA.      [LABS: ]                        7.2    8.30  )-----------( 279      ( 14 Oct 2020 06:57 )             22.2     CBC Full  -  ( 14 Oct 2020 06:57 )  WBC Count : 8.30 K/uL  RBC Count : 2.56 M/uL  Hemoglobin : 7.2 g/dL  Hematocrit : 22.2 %  Platelet Count - Automated : 279 K/uL  Mean Cell Volume : 86.7 fl  Mean Cell Hemoglobin : 28.1 pg  Mean Cell Hemoglobin Concentration : 32.4 gm/dL  Auto Neutrophil # : 6.20 K/uL  Auto Lymphocyte # : 1.21 K/uL  Auto Monocyte # : 0.54 K/uL  Auto Eosinophil # : 0.17 K/uL  Auto Basophil # : 0.03 K/uL  Auto Neutrophil % : 74.7 %  Auto Lymphocyte % : 14.6 %  Auto Monocyte % : 6.5 %  Auto Eosinophil % : 2.0 %  Auto Basophil % : 0.4 %    10-14    136  |  100  |  61<H>  ----------------------------<  154<H>  4.4   |  27  |  1.70<H>  Ca    8.2<L>      14 Oct 2020 06:57  TPro  5.8<L>  /  Alb  1.4<L>  /  TBili  0.6  /  DBili  x   /  AST  36  /  ALT  38  /  AlkPhos  123<H>  10-14  PT/INR - ( 13 Oct 2020 13:28 )   PT: 14.6 sec;   INR: 1.26 ratio         PTT - ( 13 Oct 2020 13:28 )  PTT:33.7 sec  LIVER FUNCTIONS - ( 14 Oct 2020 06:57 )  Alb: 1.4 g/dL / Pro: 5.8 g/dL / ALK PHOS: 123 U/L / ALT: 38 U/L / AST: 36 U/L / GGT: x           CARDIAC MARKERS ( 13 Oct 2020 13:28 )  <.015 ng/mL / x     / x     / x     / x          WBC  TREND (5 Days)  WBC Count: 8.30 K/uL (10-14 @ 06:57)  WBC Count: 10.23 K/uL (10-13 @ 13:28)    HGB  TREND (5 Days)  Hemoglobin: 7.2 g/dL (10-14 @ 06:57)  Hemoglobin: 8.3 g/dL (10-13 @ 13:28)    HCT  TREND (5 Days)  Hematocrit: 22.2 % (10-14 @ 06:57)  Hematocrit: 24.6 % (10-13 @ 13:28)    PLT  TREND (5 Days)  Platelet Count - Automated: 279 K/uL (10-14 @ 06:57)  Platelet Count - Automated: 300 K/uL (10-13 @ 13:28)      Ferritin, Serum: 190 ng/mL (10-01 @ 10:04)  Iron - Total Binding Capacity.: 203 ug/dL (10-01 @ 10:06)  Vitamin B12, Serum: 1677 pg/mL (10-01 @ 10:04)  Folate, Serum: >20.0 ng/mL (10-01 @ 10:04)                 [RADIOLOGY & ADDITIONAL STUDIES:]     < from: US Duplex Venous Lower Ext Complete, Bilateral (10.14.20 @ 08:44) >  EXAM:  US DPLX LWR EXT VEINS COMPL BI                        PROCEDURE DATE:  10/14/2020    INTERPRETATION:  CLINICAL INFORMATION: Bilateral lower extremity swelling.  COMPARISON: 2/17/2016  TECHNIQUE: Duplex sonography of the BILATERAL LOWER extremity veins with color and spectral Doppler, with and without compression.  FINDINGS:  ·	There is normal compressibility of the bilateral common femoral, femoral and popliteal veins.  ·	Doppler examination shows normal spontaneous and phasic flow.  ·	The greater saphenous veins were not visualized bilaterally. Patient states that she had vein removal.  ·	Bilateral proximal posterior tibial veins are visualized, patent and compressible. The remainder of the veins of both lower extremities are not visualized.  IMPRESSION:  No evidence of deep venous thrombosis in either proximal lower extremity.  Only the proximal posterior tibial veins are visualized bilaterally within the calves.  < end of copied text >      < from: CT Abdomen and Pelvis No Cont (10.13.20 @ 16:17) >  EXAM:  CT ABDOMEN AND PELVIS                        EXAM:  CT CHEST                        PROCEDURE DATE:  10/13/2020    INTERPRETATION:  CT CHEST, CT ABDOMEN AND PELVIS  CLINICAL INFORMATION: Shortness of breath after abdominal wallresection, abdominal abscess infection  COMPARISON: Same day chest x-ray, CT abdomen and pelvis 9/30/2020, chest CT 6/16/2020  PROCEDURE:  CT of the Chest, Abdomen and Pelvis was performed without intravenous contrast.  Intravenous contrast: None  Oral contrast: None.  Sagittal and coronal reformats were performed.  FINDINGS:  CHEST:  ·	LUNGS, AIRWAYS: The central airways are patent. Small area of reverse halo sign in the basilar left lower lobe.  ·	PLEURA: No pleural abnormality.  ·	VESSELS:Stable 4.6 cm ascending thoracic aorta.  ·	HEART: Normal heart size. No pericardial effusion. Coronary artery calcifications are present.  ·	MEDIASTINUM AND MILADIS: No adenopathy.  ·	CHEST WALL: No masses.  ABDOMEN AND PELVIS:  ·	LIVER: Normal.  ·	BILE DUCTS: Nondilated.  ·	GALLBLADDER: Prior cholecystectomy.  ·	SPLEEN: Normal.  ·	PANCREAS: Normal.  ·	ADRENALS: Normal.  ·	KIDNEYS/URETERS: Nonobstructing right kidney stones.  ·	BLADDER: Underdistended limiting evaluation.  ·	REPRODUCTIVE ORGANS: No uterine or adnexalabnormality. IUD in place.  ·	BOWEL: No bowel-related abnormality. Specifically, no evidence of acute diverticulitis. Normal appendix and ileocecal region. No bowel obstruction or bowel inflammation.  ·	PERITONEUM: No intra-abdominal fluid collection.  ·	VESSELS:  Normal caliber aorta.  ·	RETROPERITONEUM/LYMPH NODES: No adenopathy.  ·	ABDOMINAL WALL: Postsurgical changes without discrete drainable collection.  ·	BONES: No acute bony abnormality.  IMPRESSION:  Postsurgical changes without discrete drainable collection remaining.  Small area of reverse halo sign in the basilar left lower lobe  < end of copied text >      < from: Xray Chest 1 View-PORTABLE IMMEDIATE (Xray Chest 1 View-PORTABLE IMMEDIATE .) (10.13.20 @ 13:37) >  EXAM:  XR CHEST PORTABLE IMMED 1V                        PROCEDURE DATE:  10/13/2020    INTERPRETATION:  DATE OF STUDY: 10/13/2020  PRIOR:10/2/20  CLINICAL INDICATION: Assess for pneumonia.  TECHNIQUE: portable chest.  FINDINGS:  The cardiomediastinal silhouette is unremarkable.  Stable tiny right upper lobe calcific granuloma.  No focal consolidations. No pleural effusion or pneumothorax.  There are degenerative changes of the thoracic spine.  IMPRESSION:  Negative for acute pulmonary process.  < end of copied text >        < from: US Duplex Venous Upper Ext Ltd, Left (10.08.20 @ 18:10) >  EXAM:  US DPLX UPR EXT VEINS LTD LT                        PROCEDURE DATE:  10/08/2020    INTERPRETATION:  CLINICAL INFORMATION: Left arm swelling  COMPARISON: None available.  FINDINGS:  The left internal jugular, subclavian, axillary and brachial veins are patent and compressible where applicable.  The basilic vein (superficial vein) is patent and without thrombus. The left cephalic vein was not visualized.  Doppler examination shows normal spontaneous and phasic flow.  IMPRESSION:  No evidence of left upper extremity deep venous thrombosis. Of note, the left cephalic vein, superficial vein, was not visualized.  < end of copied text >

## 2020-10-14 NOTE — PROGRESS NOTE ADULT - PROBLEM SELECTOR PLAN 6
-describes autoPAP at 5-15  -states that she is currently on CPAP qhs at pressure of 10.  -continue NIPPV with CPAP qhs  -Pulm consulted Dr. Hanson, pt refused pulm consult at this time

## 2020-10-14 NOTE — PROGRESS NOTE ADULT - ASSESSMENT
59yo F with PMH of JESUS on CPAP, HTN, Obesity, Anemia, AAA, Arthritis, Depression, Anxiety, and Kidney stones, recent admission for panniculectomy complicated by hemorrhagic shock, now presents to ED from outpatient office visit for sweating and lightheadedness admitted for ELAINE and presyncope likely due to hypotension and hypoperfusion.

## 2020-10-14 NOTE — PROGRESS NOTE ADULT - ASSESSMENT
60 year old female s/p abdominoplasty with Dr. De La Torre on 9/25 now post op day 19 with return to hospital with anemia , acute renal failure likely due to prerenal etiology of blood loss and possible dehydration  - monitor post transfusion CBC  - continue daily packing change with iodoform dressings at incision openings   - monitor for fever and leukocytosis  - no surgical interventions at this time.   - will discuss with Dr. De La Torre

## 2020-10-14 NOTE — DOWNTIME INTERRUPTION NOTE - WHICH MANUAL FORMS INITIATED?
On 10/13/2020 [place] document hold starting 0t 2200. Following documentation on paper, Assessment  intervention, plan of care and patient profile.

## 2020-10-14 NOTE — PROGRESS NOTE ADULT - ASSESSMENT
60 year old female with PMH of JESUS on CPAP, HTN, morbid obesity, anemia, AAA, Arthritis, Depression, anxiety recently admitted to [facility] for elective abdominal panniculectomy, admitted post-op with hemorrhagic shock s/p 3unit prbc, discharged on 10/10  now readmitted with complaint of weakness diaphoresis while at follow up, in ed found with ELAINE.    Near syncope  - Could be vasovagal in nature given dizziness/diaphoresis and dyspnea. Symptoms improved   - EKG with NSR no acute changes   - Troponin negative. Recent cath without significant CAD  - Chest x-ray negative for acute pulmonary disease   - Telemetry showed SR 90s. No events     Anemia   - Hemoglobin <--7.2, <--8.3, <--8.4  - Hematocrit <--22.2, <--24.6, <--24.9  - CT abdomen pelvis unremarkable   - Continue to trend CBC as per primary team    A fib  - She had brief atrial fibrillation in the setting of significant hypotension on prior admission   - Patient in SR. Possible loop outpatient     HTN  - BP: 114/76 (10-14-20 @ 05:01) (100/57 - 142/91)  - hold off on antihypertensives at this time, can resume when BP improves  - low normal LV function with mild diastolic dysfunction on recent echocardiogram  - recent cath with non-obs cad  - no sign of volume overload    Acute Kidney Injury   - Creatinine trend:  <--1.70,  <--2.20,  <--1.10  - Improved after IVF.     - Monitor and replete lytes, keep K>4, Mg>2.  - All other medical needs as per primary team.  - Other cardiovascular workup will depend on clinical course.  - Will continue to follow.    Kobe Corado, MS FNP, [facility]  Nurse Practitioner- Cardiology   Spectra #[phone]

## 2020-10-14 NOTE — PROGRESS NOTE ADULT - PROBLEM SELECTOR PLAN 10
VTE ppx: consider holding A/C given continued blood draining from abdomen and worsening anemia  -SCDs

## 2020-10-14 NOTE — PROGRESS NOTE ADULT - SUBJECTIVE AND OBJECTIVE BOX
Patient is a 60y old  Female who presents with a chief complaint of ELAINE and vasovagal episode (14 Oct 2020 13:19)  Patient seen in follow up for ELAINE.        PAST MEDICAL HISTORY:  History of aortic aneurysm    H/O aortic aneurysm repair    Arthritis    Degenerative disc disease, lumbar    Neuropathy    Spinal stenosis    Bipolar 1 disorder    Kidney stone    Diabetes Mellitus Type II    Sleep Apnea    Asthma    Hypertension    Obesity    Morbid Obesity    Drug Abuse    ETOH Abuse    Arrhythmia    Benign Essential Tremor    Anxiety    Depression    Renal Calculi    Colitis    Anemia    Diabetes    Sleep Apnea    Asthma      MEDICATIONS  (STANDING):  allopurinol 300 milliGRAM(s) Oral daily  cholecalciferol 5000 Unit(s) Oral daily  cyanocobalamin 1000 MICROGram(s) Oral daily  DULoxetine 30 milliGRAM(s) Oral <User Schedule>  folic acid 1 milliGRAM(s) Oral daily  heparin   Injectable 5000 Unit(s) SubCutaneous every 12 hours  lamoTRIgine 200 milliGRAM(s) Oral daily  multivitamin 1 Tablet(s) Oral daily  sodium chloride 0.9%. 1000 milliLiter(s) (75 mL/Hr) IV Continuous <Continuous>  traZODone 200 milliGRAM(s) Oral at bedtime    MEDICATIONS  (PRN):  acetaminophen   Tablet .. 650 milliGRAM(s) Oral daily PRN Mild Pain (1 - 3)  ALPRAZolam 1 milliGRAM(s) Oral two times a day PRN anxiety    T(C): 36.7 (10-14-20 @ 13:09), Max: 36.8 (10-13-20 @ 12:36)  HR: 90 (10-14-20 @ 13:09) (80 - 99)  BP: 120/80 (10-14-20 @ 13:09) (100/57 - 142/91)  RR: 18 (10-14-20 @ 13:09)  SpO2: 94% (10-14-20 @ 13:09)  Wt(kg): --  I&O's Detail      PHYSICAL EXAM:  General: No distress  Respiratory: b/l air entry  Cardiovascular: S1 S2  Gastrointestinal: soft  Extremities:  no edema      LABORATORY:                        7.2    8.30  )-----------( 279      ( 14 Oct 2020 06:57 )             22.2     10-14    136  |  100  |  61<H>  ----------------------------<  154<H>  4.4   |  27  |  1.70<H>    Ca    8.2<L>      14 Oct 2020 06:57    TPro  5.8<L>  /  Alb  1.4<L>  /  TBili  0.6  /  DBili  x   /  AST  36  /  ALT  38  /  AlkPhos  123<H>  10-14    Sodium, Serum: 136 mmol/L (10-14 @ 06:57)  Sodium, Serum: 132 mmol/L (10-13 @ 13:28)    Potassium, Serum: 4.4 mmol/L (10-14 @ 06:57)  Potassium, Serum: 4.2 mmol/L (10-13 @ 13:28)    Hemoglobin: 7.2 g/dL (10-14 @ 06:57)  Hemoglobin: 8.3 g/dL (10-13 @ 13:28)    Creatinine, Serum 1.70 (10-14 @ 06:57)  Creatinine, Serum 2.20 (10-13 @ 13:28)    CARDIAC MARKERS ( 13 Oct 2020 13:28 )  <.015 ng/mL / x     / x     / x     / x          LIVER FUNCTIONS - ( 14 Oct 2020 06:57 )  Alb: 1.4 g/dL / Pro: 5.8 g/dL / ALK PHOS: 123 U/L / ALT: 38 U/L / AST: 36 U/L / GGT: x

## 2020-10-14 NOTE — PROGRESS NOTE ADULT - SUBJECTIVE AND OBJECTIVE BOX
General Surgery Consult Note    Asked to see patient for return to hospital for diaphoresis and dizziness. Patient s/p abdominoplasty with Dr. De La Torre on 9/25 now post op day 19.  Patients last admission complicated with acute blood loss anemia, hemorrhagic shock requiring ICU stay and multiple transfusions.  Patients last admission was complicated by multiple episodes of patient being febrile with no significant findings during workup. Patient seen by Dr. De La Torre on Monday had a few openings in her wound which were packed with iodoform.     Patient now admitted for acute renal failure likely due to anemia, dehydration as patient states she does not eat or drink much. Patient denies any chest pain, shortness of breath, dizziness, palpitations, malaise, anorexia, fever, chills, nausea or vomiting, excessive nsaid use at this time.       REVIEW OF SYSTEMS:    CONSTITUTIONAL: No weakness, fevers or chills, weight loss  EYES/ENT: No visual changes;  dizziness or throat pain   NECK: No pain or stiffness/ swelling  RESPIRATORY: No cough, wheezing, hemoptysis; No shortness of breath  CARDIOVASCULAR: No chest pain or palpitations, Dyspnea on exertion  GASTROINTESTINAL: No abdominal or epigastric pain. No nausea, vomiting, or hematemesis; No diarrhea or constipation. No melena or hematochezia.  GENITOURINARY: No dysuria, frequency or hematuria  NEUROLOGICAL: No numbness or weakness  SKIN: No itching, rashes  VASCULAR: no rest pain, claudication, peripheral edema, calf tenderness       PAST MEDICAL & SURGICAL HISTORY:  History of aortic aneurysm  descending aorta see CT  Arthritis  Degenerative disc disease, lumbar  Neuropathy  Spinal stenosis  Bipolar 1 disorder  Kidney stone  Sleep Apnea  CPAP with oxygen since June 2020  Asthma  Hypertension  Morbid Obesity  ETOH Abuse  H/O Benign Essential Tremor  Anxiety  Deprssion  Renal Calculi  chronic  Colitis  H/O Anemia    S/P panniculectomy  S/P cardiac catheterization  S/P dilation and curettage  History of tonsillectomy  History of laparoscopic adjustable gastric banding  band removed few yrs ago  S/P D&amp;C  Biliary stent placement &amp; ercp  ureteroscopy with stone removal  1-        Home Medications:  acetaminophen 325 mg oral tablet: 3 tab(s) orally once a day, As Needed (13 Oct 2020 20:46)  allopurinol 300 mg oral tablet: 1 tab(s) orally once a day (13 Oct 2020 20:46)  clobetasol 0.05% topical cream: Apply topically to affected area , As Needed (13 Oct 2020 20:46)  cyanocobalamin 1000 mcg oral tablet: 1 tab(s) orally once a day (13 Oct 2020 20:46)  Cymbalta 30 mg oral delayed release capsule: 1 cap(s) orally 3 times a day (13 Oct 2020 20:46)  diclofenac-misoprostol 75 mg-200 mcg oral tablet: 1 tab(s) orally 2 times a day, As Needed (13 Oct 2020 20:46)  folic acid 1 mg oral tablet: 1 tab(s) orally once a day (13 Oct 2020 20:46)  furosemide 20 mg oral tablet: 1 tab(s) orally once a day (13 Oct 2020 20:46)  irbesartan 150 mg oral tablet: 1 tab(s) orally once a day.   Cardio Dr. Giordano told patient to hold BP meds (13 Oct 2020 20:46)  Lamictal 200 mg oral tablet: 1 tab(s) orally once a day (13 Oct 2020 20:46)  Linzess 145 mcg oral capsule: 1 cap(s) orally once a day, As Needed (13 Oct 2020 20:46)  Multiple Vitamins oral tablet: 1 tab(s) orally once a day (13 Oct 2020 20:46)  potassium citrate 15 mEq oral tablet, extended release: 1 tab(s) orally once a day (13 Oct 2020 20:46)  propranolol 10 mg oral tablet: 1 tab(s) orally once a day.  Cardio Dr. Giordano told patient to hold BP meds (13 Oct 2020 20:46)  trazodone 100 mg oral tablet: 2 tab(s) orally once a day (at bedtime) (13 Oct 2020 20:46)  verapamil 240 mg/24 hours oral tablet, extended release: 1 tab(s) orally once a day  Cardio Dr. Giordano told patient to hold BP meds (13 Oct 2020 20:46)  Vitamin D3 5000 intl units (125 mcg) oral tablet: once daily (13 Oct 2020 20:46)  Xanax 1 mg oral tablet: 1 tab(s) orally 2 times a day, As Needed (13 Oct 2020 20:46)      MEDICATIONS  (STANDING):  allopurinol 300 milliGRAM(s) Oral daily  cholecalciferol 5000 Unit(s) Oral daily  cyanocobalamin 1000 MICROGram(s) Oral daily  DULoxetine 30 milliGRAM(s) Oral <User Schedule>  folic acid 1 milliGRAM(s) Oral daily  heparin   Injectable 5000 Unit(s) SubCutaneous every 12 hours  lamoTRIgine 200 milliGRAM(s) Oral daily  multivitamin 1 Tablet(s) Oral daily  sodium chloride 0.9%. 1000 milliLiter(s) (75 mL/Hr) IV Continuous <Continuous>  traZODone 200 milliGRAM(s) Oral at bedtime    MEDICATIONS  (PRN):  acetaminophen   Tablet .. 650 milliGRAM(s) Oral daily PRN Mild Pain (1 - 3)  ALPRAZolam 1 milliGRAM(s) Oral two times a day PRN anxiety      Social History:  Denies tobacco, etoh, drug use   Lives with family  Ambulates independently   Dash diet (13 Oct 2020 18:47)      FAMILY HISTORY:  Hypertension (Sibling)  Hyperlipidemia (Sibling)  Family history of renal failure  both parents were on dialysis  Family history of bacterial pneumonia  Family history of arthritis            T(F): 98 (10-14-20 @ 13:09), Max: 98.3 (10-14-20 @ 05:01)  HR: 90 (10-14-20 @ 13:09) (80 - 93)  BP: 120/80 (10-14-20 @ 13:09) (100/57 - 120/80)  BP(mean): --  RR: 18 (10-14-20 @ 13:09) (18 - 19)  SpO2: 94% (10-14-20 @ 13:09) (93% - 98%)        General: A+O x 3 in NAD  HEENT: PERRLA, EOM intact  Neck: trachea midline  Chest: Clear through auscultation bilaterally, No rales, rhonchi wheezes noted bilaterally  Heart: S1,S1 RRR, 2/6 SEJ murmurs noted  Abdomen: soft non distended, non tender, non tympanic, BS x 4, no guarding noted,   Incision: intact except small opening on bilateral flank regions and one opening on left upper region, packing removed and during change some foul smelling liquified hematoma had self evacuated, mild erythema noted at these openings and not warm to touch. Some sanguinous oozing along right flank opening as well.   Extremities: no edema noted, warm,  no calf tenderness                             7.2    8.30  )-----------( 279      ( 14 Oct 2020 06:57 )             22.2       10-14    136  |  100  |  61<H>  ----------------------------<  154<H>  4.4   |  27  |  1.70<H>    Ca    8.2<L>      14 Oct 2020 06:57    TPro  5.8<L>  /  Alb  1.4<L>  /  TBili  0.6  /  DBili  x   /  AST  36  /  ALT  38  /  AlkPhos  123<H>  10-14      PT/INR - ( 14 Oct 2020 12:36 )   PT: 14.4 sec;   INR: 1.24 ratio         PTT - ( 14 Oct 2020 12:36 )  PTT:34.0 sec    Imaging completed                    General Surgery Consult Note    Asked to see patient for return to hospital for diaphoresis and dizziness. Patient s/p abdominoplasty with Dr. De La Torre on 9/25 now post op day 19.  Patients last admission complicated with acute blood loss anemia, hemorrhagic shock requiring ICU stay and multiple transfusions.  Patients last admission was complicated by multiple episodes of patient being febrile with no significant findings during workup. Patient seen by Dr. De La Torre on Monday had a few openings in her wound which were packed with iodoform.     Patient now admitted for acute renal failure likely due to anemia, dehydration as patient states she does not eat or drink much. Patient denies any chest pain, shortness of breath, dizziness, palpitations, malaise, anorexia, fever, chills, nausea or vomiting, excessive nsaid use at this time.       REVIEW OF SYSTEMS:    CONSTITUTIONAL: No weakness, fevers or chills, weight loss  EYES/ENT: No visual changes;  dizziness or throat pain   NECK: No pain or stiffness/ swelling  RESPIRATORY: No cough, wheezing, hemoptysis; No shortness of breath  CARDIOVASCULAR: No chest pain or palpitations, Dyspnea on exertion  GASTROINTESTINAL: No abdominal or epigastric pain. No nausea, vomiting, or hematemesis; No diarrhea or constipation. No melena or hematochezia.  GENITOURINARY: No dysuria, frequency or hematuria  NEUROLOGICAL: No numbness or weakness  SKIN: No itching, rashes  VASCULAR: no rest pain, claudication, peripheral edema, calf tenderness       PAST MEDICAL & SURGICAL HISTORY:  History of aortic aneurysm  descending aorta see CT  Arthritis  Degenerative disc disease, lumbar  Neuropathy  Spinal stenosis  Bipolar 1 disorder  Kidney stone  Sleep Apnea  CPAP with oxygen since June 2020  Asthma  Hypertension  Morbid Obesity  ETOH Abuse  H/O Benign Essential Tremor  Anxiety  Deprssion  Renal Calculi  chronic  Colitis  H/O Anemia    S/P panniculectomy  S/P cardiac catheterization  S/P dilation and curettage  History of tonsillectomy  History of laparoscopic adjustable gastric banding  band removed few yrs ago  S/P D&amp;C  Biliary stent placement &amp; ercp  ureteroscopy with stone removal  1-        Home Medications:  acetaminophen 325 mg oral tablet: 3 tab(s) orally once a day, As Needed (13 Oct 2020 20:46)  allopurinol 300 mg oral tablet: 1 tab(s) orally once a day (13 Oct 2020 20:46)  clobetasol 0.05% topical cream: Apply topically to affected area , As Needed (13 Oct 2020 20:46)  cyanocobalamin 1000 mcg oral tablet: 1 tab(s) orally once a day (13 Oct 2020 20:46)  Cymbalta 30 mg oral delayed release capsule: 1 cap(s) orally 3 times a day (13 Oct 2020 20:46)  diclofenac-misoprostol 75 mg-200 mcg oral tablet: 1 tab(s) orally 2 times a day, As Needed (13 Oct 2020 20:46)  folic acid 1 mg oral tablet: 1 tab(s) orally once a day (13 Oct 2020 20:46)  furosemide 20 mg oral tablet: 1 tab(s) orally once a day (13 Oct 2020 20:46)  irbesartan 150 mg oral tablet: 1 tab(s) orally once a day.   Cardio Dr. Giordano told patient to hold BP meds (13 Oct 2020 20:46)  Lamictal 200 mg oral tablet: 1 tab(s) orally once a day (13 Oct 2020 20:46)  Linzess 145 mcg oral capsule: 1 cap(s) orally once a day, As Needed (13 Oct 2020 20:46)  Multiple Vitamins oral tablet: 1 tab(s) orally once a day (13 Oct 2020 20:46)  potassium citrate 15 mEq oral tablet, extended release: 1 tab(s) orally once a day (13 Oct 2020 20:46)  propranolol 10 mg oral tablet: 1 tab(s) orally once a day.  Cardio Dr. Giordano told patient to hold BP meds (13 Oct 2020 20:46)  trazodone 100 mg oral tablet: 2 tab(s) orally once a day (at bedtime) (13 Oct 2020 20:46)  verapamil 240 mg/24 hours oral tablet, extended release: 1 tab(s) orally once a day  Cardio Dr. Giordano told patient to hold BP meds (13 Oct 2020 20:46)  Vitamin D3 5000 intl units (125 mcg) oral tablet: once daily (13 Oct 2020 20:46)  Xanax 1 mg oral tablet: 1 tab(s) orally 2 times a day, As Needed (13 Oct 2020 20:46)      MEDICATIONS  (STANDING):  allopurinol 300 milliGRAM(s) Oral daily  cholecalciferol 5000 Unit(s) Oral daily  cyanocobalamin 1000 MICROGram(s) Oral daily  DULoxetine 30 milliGRAM(s) Oral <User Schedule>  folic acid 1 milliGRAM(s) Oral daily  heparin   Injectable 5000 Unit(s) SubCutaneous every 12 hours  lamoTRIgine 200 milliGRAM(s) Oral daily  multivitamin 1 Tablet(s) Oral daily  sodium chloride 0.9%. 1000 milliLiter(s) (75 mL/Hr) IV Continuous <Continuous>  traZODone 200 milliGRAM(s) Oral at bedtime    MEDICATIONS  (PRN):  acetaminophen   Tablet .. 650 milliGRAM(s) Oral daily PRN Mild Pain (1 - 3)  ALPRAZolam 1 milliGRAM(s) Oral two times a day PRN anxiety      Social History:  Denies tobacco, etoh, drug use   Lives with family  Ambulates independently   Dash diet (13 Oct 2020 18:47)      FAMILY HISTORY:  Hypertension (Sibling)  Hyperlipidemia (Sibling)  Family history of renal failure  both parents were on dialysis  Family history of bacterial pneumonia  Family history of arthritis            T(F): 98 (10-14-20 @ 13:09), Max: 98.3 (10-14-20 @ 05:01)  HR: 90 (10-14-20 @ 13:09) (80 - 93)  BP: 120/80 (10-14-20 @ 13:09) (100/57 - 120/80)  BP(mean): --  RR: 18 (10-14-20 @ 13:09) (18 - 19)  SpO2: 94% (10-14-20 @ 13:09) (93% - 98%)        General: A+O x 3 in NAD  HEENT: PERRLA, EOM intact  Neck: trachea midline  Chest: Clear through auscultation bilaterally, No rales, rhonchi wheezes noted bilaterally  Heart: S1,S1 RRR, 2/6 SEJ murmurs noted  Abdomen: soft non distended, non tender, non tympanic, BS x 4, no guarding noted,   Incision: intact except small opening on bilateral flank regions and one opening on left upper region, packing removed and during change some foul smelling liquified hematoma had self evacuated, mild erythema noted at these openings and not warm to touch. Some sanguinous oozing along right flank opening as well.   Extremities: no edema noted, warm,  no calf tenderness                             7.2    8.30  )-----------( 279      ( 14 Oct 2020 06:57 )             22.2       10-14    136  |  100  |  61<H>  ----------------------------<  154<H>  4.4   |  27  |  1.70<H>    Ca    8.2<L>      14 Oct 2020 06:57    TPro  5.8<L>  /  Alb  1.4<L>  /  TBili  0.6  /  DBili  x   /  AST  36  /  ALT  38  /  AlkPhos  123<H>  10-14      PT/INR - ( 14 Oct 2020 12:36 )   PT: 14.4 sec;   INR: 1.24 ratio         PTT - ( 14 Oct 2020 12:36 )  PTT:34.0 sec    Imaging completed     < from: CT Abdomen and Pelvis No Cont (10.13.20 @ 16:17) >    EXAM:  CT ABDOMEN AND PELVIS                          EXAM:  CT CHEST                            PROCEDURE DATE:  10/13/2020          INTERPRETATION:  CT CHEST, CT ABDOMEN AND PELVIS    CLINICAL INFORMATION: Shortness of breath after abdominal wallresection, abdominal abscess infection    COMPARISON: Same day chest x-ray, CT abdomen and pelvis 9/30/2020, chest CT 6/16/2020    PROCEDURE:  CT of the Chest, Abdomen and Pelvis was performed without intravenous contrast.  Intravenous contrast: None  Oral contrast: None.  Sagittal and coronal reformats were performed.    FINDINGS:    CHEST:    LUNGS, AIRWAYS: The central airways are patent. Small area of reverse halo sign in the basilar left lower lobe.  PLEURA: No pleural abnormality.  VESSELS:Stable 4.6 cm ascending thoracic aorta.  HEART: Normal heart size. No pericardial effusion. Coronary artery calcifications are present.  MEDIASTINUM AND MILADIS: No adenopathy.  CHEST WALL: No masses.    ABDOMEN AND PELVIS:    LIVER: Normal.  BILE DUCTS: Nondilated.  GALLBLADDER: Prior cholecystectomy.  SPLEEN: Normal.  PANCREAS: Normal.  ADRENALS: Normal.  KIDNEYS/URETERS: Nonobstructing right kidney stones.    BLADDER: Underdistended limiting evaluation.  REPRODUCTIVE ORGANS: No uterine or adnexalabnormality. IUD in place.    BOWEL: No bowel-related abnormality. Specifically, no evidence of acute diverticulitis. Normal appendix and ileocecal region. No bowel obstruction or bowel inflammation.  PERITONEUM: No intra-abdominal fluid collection.  VESSELS:  Normal caliber aorta.  RETROPERITONEUM/LYMPH NODES: No adenopathy.  ABDOMINAL WALL: Postsurgical changes without discrete drainable collection.  BONES: No acute bony abnormality.    IMPRESSION:    Postsurgical changes without discrete drainable collection remaining.    Small area of reverse halo sign in the basilar left lower lobe            NADINE STEWART M.D., ATTENDING RADIOLOGIST  This document has been electronically signed. Oct 13 2020  5:25PM    < end of copied text >

## 2020-10-14 NOTE — PROGRESS NOTE ADULT - SUBJECTIVE AND OBJECTIVE BOX
[facility] -- Almas Faustin, Cara Nunez, Saurabh Johansen, Cindy Giordano: Office # [phone]    Follow Up: SOB     Subjective/Observations: Patient seen and examined. Patient awake, alert, resting in bed. No complaints of chest pain, dyspnea, palpitations or dizziness. No signs of orthopnea or PND. No c/o SOB.     REVIEW OF SYSTEMS: All review of systems is negative for eye, ENT, GI, , allergic, dermatologic, musculoskeletal and neurologic except as described above    PAST MEDICAL & SURGICAL HISTORY:  History of aortic aneurysm  descending aorta see CT  Arthritis  Degenerative disc disease, lumbar  Neuropathy  Spinal stenosis  Bipolar 1 disorder  Kidney stone  Sleep Apnea  CPAP with oxygen since June 2020  Asthma  Hypertension  Morbid Obesity  ETOH Abuse  H/O  Benign Essential Tremor  Anxiety  Depression  Renal Calculi  chronic  Colitis  H/O  Anemia  S/P panniculectomy  S/P cardiac catheterization  S/P dilation and curettage  History of tonsillectomy  History of laparoscopic adjustable gastric banding  band removed few yrs ago  S/P D&amp;C  Biliary stent placement &amp; ercp  ureteroscopy with stone removal  1-    MEDICATIONS  (STANDING):  allopurinol 300 milliGRAM(s) Oral daily  cholecalciferol 5000 Unit(s) Oral daily  cyanocobalamin 1000 MICROGram(s) Oral daily  DULoxetine 30 milliGRAM(s) Oral <User Schedule>  folic acid 1 milliGRAM(s) Oral daily  heparin   Injectable 5000 Unit(s) SubCutaneous every 12 hours  lamoTRIgine 200 milliGRAM(s) Oral daily  multivitamin 1 Tablet(s) Oral daily  sodium chloride 0.9%. 1000 milliLiter(s) (75 mL/Hr) IV Continuous <Continuous>  traZODone 200 milliGRAM(s) Oral at bedtime    MEDICATIONS  (PRN):  acetaminophen   Tablet .. 650 milliGRAM(s) Oral daily PRN Mild Pain (1 - 3)  ALPRAZolam 1 milliGRAM(s) Oral two times a day PRN anxiety    Allergies  penicillins (Other)  trees dogs cats cockaroaches (Rhinitis; Rhinorrhea)    Vital Signs Last 24 Hrs  T(C): 36.8 (14 Oct 2020 05:01), Max: 36.8 (13 Oct 2020 12:36)  T(F): 98.3 (14 Oct 2020 05:01), Max: 98.3 (13 Oct 2020 12:36)  HR: 93 (14 Oct 2020 08:07) (80 - 99)  BP: 114/76 (14 Oct 2020 05:01) (100/57 - 142/91)  BP(mean): --  RR: 19 (14 Oct 2020 05:01) (18 - 19)  SpO2: 94% (14 Oct 2020 08:07) (93% - 98%)    I&O's Summary    Weight (kg): 127 (10-13 @ 12:36)    TELE: ST 1HB 90s   PHYSICAL EXAM:  Appearance: NAD, no distress, alert, Obese   HEENT: Moist Mucous Membranes, Anicteric  Cardiovascular: Regular rate and rhythm, Normal S1 S2, No JVD, No murmurs, No rubs, gallops or clicks  Respiratory: Non-labored, Clear to auscultation, No rales, No rhonchi, No wheezing.   Gastrointestinal:  Soft, Non-tender, + BS  Neurologic: Non-focal  Skin: Warm and dry, + abdominal incision with drain, No ecchymosis, No cyanosis  Musculoskeletal: No clubbing, No cyanosis, No joint swelling/tenderness  Psychiatry: Mood & affect appropriate  Lymph: No peripheral edema.     LABS: All Labs Reviewed:                        7.2    8.30  )-----------( 279      ( 14 Oct 2020 06:57 )             22.2                         8.3    10.23 )-----------( 300      ( 13 Oct 2020 13:28 )             24.6     14 Oct 2020 06:57    136    |  100    |  61     ----------------------------<  154    4.4     |  27     |  1.70   13 Oct 2020 13:28    132    |  96     |  65     ----------------------------<  226    4.2     |  27     |  2.20     Ca    8.2        14 Oct 2020 06:57  Ca    8.1        13 Oct 2020 13:28    TPro  5.8    /  Alb  1.4    /  TBili  0.6    /  DBili  x      /  AST  36     /  ALT  38     /  AlkPhos  123    14 Oct 2020 06:57  TPro  6.2    /  Alb  1.4    /  TBili  0.7    /  DBili  x      /  AST  50     /  ALT  52     /  AlkPhos  141    13 Oct 2020 13:28    PT/INR - ( 13 Oct 2020 13:28 )   PT: 14.6 sec;   INR: 1.26 ratio    PTT - ( 13 Oct 2020 13:28 )  PTT:33.7 sec  Troponin I, Serum: <.015 ng/mL (10-13-20 @ 13:28)  D-Dimer Assay, Quantitative: 828 ng/mL DDU (10-13-20 @ 13:28)  Lactate, Blood: 1.8 mmol/L (10-13-20 @ 16:24)  Lactate, Blood: 3.3 mmol/L (10-13-20 @ 13:28)    12 Lead ECG:   Ventricular Rate 95 BPM  Atrial Rate 95 BPM  P-R Interval 200 ms  QRS Duration 100 ms  Q-T Interval 332 ms  QTC Calculation(Bazett) 417 ms  P Axis 5 degrees  R Axis -45 degrees  T Axis 23 degrees  Diagnosis Line Normal sinus rhythm  Left axis deviation  Minimal voltage criteria for LVH, may be normal variant  Cannot rule out Anterior infarct (cited on or before 25-SEP-2020)  Abnormal ECG  Confirmed by ana Giordano ([id number]) on 10/13/2020 4:00:07 PM (10-13-20 @ 12:56)    < from: Cardiac Cath Lab - Adult (06.16.20 @ 16:26) >  CORONARY VESSELS: The coronary circulation is right dominant.  LM:   --  LM: Normal.  LAD:   --  LAD: Angiography showed minor luminal irregularities with no  flow limiting lesions.  CX:   --  Circumflex: Normal.  RCA:   --  RCA: Normal.  COMPLICATIONS: There were no complications.  DIAGNOSTIC RECOMMENDATIONS: The patient should continue with the present  medications.  < end of copied text >

## 2020-10-15 DIAGNOSIS — Z98.890 OTHER SPECIFIED POSTPROCEDURAL STATES: ICD-10-CM

## 2020-10-15 LAB
ALBUMIN SERPL ELPH-MCNC: 1.4 G/DL — LOW (ref 3.3–5)
ALP SERPL-CCNC: 106 U/L — SIGNIFICANT CHANGE UP (ref 40–120)
ALT FLD-CCNC: 30 U/L — SIGNIFICANT CHANGE UP (ref 12–78)
ANION GAP SERPL CALC-SCNC: 4 MMOL/L — LOW (ref 5–17)
AST SERPL-CCNC: 29 U/L — SIGNIFICANT CHANGE UP (ref 15–37)
BILIRUB SERPL-MCNC: 0.6 MG/DL — SIGNIFICANT CHANGE UP (ref 0.2–1.2)
BUN SERPL-MCNC: 41 MG/DL — HIGH (ref 7–23)
CALCIUM SERPL-MCNC: 7.8 MG/DL — LOW (ref 8.5–10.1)
CHLORIDE SERPL-SCNC: 103 MMOL/L — SIGNIFICANT CHANGE UP (ref 96–108)
CO2 SERPL-SCNC: 31 MMOL/L — SIGNIFICANT CHANGE UP (ref 22–31)
CREAT SERPL-MCNC: 1 MG/DL — SIGNIFICANT CHANGE UP (ref 0.5–1.3)
CRP SERPL-MCNC: 27.76 MG/DL — HIGH (ref 0–0.4)
ERYTHROCYTE [SEDIMENTATION RATE] IN BLOOD: 121 MM/HR — HIGH (ref 0–20)
FERRITIN SERPL-MCNC: 745 NG/ML — HIGH (ref 15–150)
GLUCOSE SERPL-MCNC: 148 MG/DL — HIGH (ref 70–99)
HCT VFR BLD CALC: 23.6 % — LOW (ref 34.5–45)
HGB BLD-MCNC: 7.9 G/DL — LOW (ref 11.5–15.5)
IRON SATN MFR SERPL: 14 UG/DL — LOW (ref 30–160)
IRON SATN MFR SERPL: 8 % — LOW (ref 14–50)
MCHC RBC-ENTMCNC: 28.7 PG — SIGNIFICANT CHANGE UP (ref 27–34)
MCHC RBC-ENTMCNC: 33.5 GM/DL — SIGNIFICANT CHANGE UP (ref 32–36)
MCV RBC AUTO: 85.8 FL — SIGNIFICANT CHANGE UP (ref 80–100)
NRBC # BLD: 0 /100 WBCS — SIGNIFICANT CHANGE UP (ref 0–0)
PLATELET # BLD AUTO: 309 K/UL — SIGNIFICANT CHANGE UP (ref 150–400)
POTASSIUM SERPL-MCNC: 4 MMOL/L — SIGNIFICANT CHANGE UP (ref 3.5–5.3)
POTASSIUM SERPL-SCNC: 4 MMOL/L — SIGNIFICANT CHANGE UP (ref 3.5–5.3)
PROT SERPL-MCNC: 5.7 G/DL — LOW (ref 6–8.3)
RBC # BLD: 2.75 M/UL — LOW (ref 3.8–5.2)
RBC # BLD: 2.75 M/UL — LOW (ref 3.8–5.2)
RBC # FLD: 16.2 % — HIGH (ref 10.3–14.5)
RETICS #: 115.8 K/UL — SIGNIFICANT CHANGE UP (ref 25–125)
RETICS/RBC NFR: 4.2 % — HIGH (ref 0.5–2.5)
SODIUM SERPL-SCNC: 138 MMOL/L — SIGNIFICANT CHANGE UP (ref 135–145)
TIBC SERPL-MCNC: 171 UG/DL — LOW (ref 220–430)
UIBC SERPL-MCNC: 157 UG/DL — SIGNIFICANT CHANGE UP (ref 110–370)
WBC # BLD: 7.36 K/UL — SIGNIFICANT CHANGE UP (ref 3.8–10.5)
WBC # FLD AUTO: 7.36 K/UL — SIGNIFICANT CHANGE UP (ref 3.8–10.5)

## 2020-10-15 PROCEDURE — 99233 SBSQ HOSP IP/OBS HIGH 50: CPT

## 2020-10-15 PROCEDURE — 99232 SBSQ HOSP IP/OBS MODERATE 35: CPT

## 2020-10-15 RX ORDER — LANOLIN ALCOHOL/MO/W.PET/CERES
3 CREAM (GRAM) TOPICAL AT BEDTIME
Refills: 0 | Status: DISCONTINUED | OUTPATIENT
Start: 2020-10-15 | End: 2020-10-20

## 2020-10-15 RX ADMIN — Medication 1 MILLIGRAM(S): at 12:17

## 2020-10-15 RX ADMIN — Medication 300 MILLIGRAM(S): at 12:16

## 2020-10-15 RX ADMIN — Medication 1 TABLET(S): at 12:16

## 2020-10-15 RX ADMIN — Medication 650 MILLIGRAM(S): at 16:59

## 2020-10-15 RX ADMIN — Medication 5000 UNIT(S): at 12:16

## 2020-10-15 RX ADMIN — DULOXETINE HYDROCHLORIDE 30 MILLIGRAM(S): 30 CAPSULE, DELAYED RELEASE ORAL at 14:09

## 2020-10-15 RX ADMIN — Medication 3 MILLIGRAM(S): at 21:17

## 2020-10-15 RX ADMIN — PREGABALIN 1000 MICROGRAM(S): 225 CAPSULE ORAL at 12:16

## 2020-10-15 RX ADMIN — DULOXETINE HYDROCHLORIDE 30 MILLIGRAM(S): 30 CAPSULE, DELAYED RELEASE ORAL at 05:30

## 2020-10-15 RX ADMIN — DULOXETINE HYDROCHLORIDE 30 MILLIGRAM(S): 30 CAPSULE, DELAYED RELEASE ORAL at 21:17

## 2020-10-15 RX ADMIN — Medication 200 MILLIGRAM(S): at 21:17

## 2020-10-15 RX ADMIN — LAMOTRIGINE 200 MILLIGRAM(S): 25 TABLET, ORALLY DISINTEGRATING ORAL at 12:16

## 2020-10-15 NOTE — PROGRESS NOTE ADULT - ASSESSMENT
61yo F with PMH of JESUS on CPAP, HTN, Obesity, Anemia, AAA, Arthritis, Depression, Anxiety, and Kidney stones, recent admission for panniculectomy complicated by hemorrhagic shock, now presents to ED from outpatient office visit for sweating and lightheadedness admitted for ELAINE and presyncope likely due to hypotension and hypoperfusion.

## 2020-10-15 NOTE — DIETITIAN INITIAL EVALUATION ADULT. - OTHER INFO
Pt is 61 yo female with a PMH of JESUS on CPAP, HTN, obesity, anemia, AAA, arthritis, depression, anxiety, kidney stones, bipolar 1 disorder, neuropathy, and EtOH abuse. Pt had gastric band, however removed a few years ago. Pt recently admitted s/p panniculectomy with hemorrhagic shock complication and received blood transfusion. Pt discharged on 10/10 however presented to ER with sweating and lightheadedness and admitted for ELAINE with near syncope.    Pt visited at bedside. Pt states poor appetite during admission, however consuming [email or web address] whopper meal at time of visit. Per RN notes, pt consumed 50% of breakfast on 10/14. Pt reports she doesn't typically like ot eat breakfast however recently started having protein yogurts in the morning PTA. Pt reports cooks at home and has foods like meatloaf and chicken cutlet for lunch and dinner. Per pt, takes multivitamin, B12, D3, folic acid, magnesium, and iron supplements at home. NKFA, however admits to occasional diarrhea with dairy products. Pt denies any n/v/diarrhea at this time however endorses occasional constipation and on bowel regimen at home. Last BM PTA. Admission ht 5'6" and wt 280lbs. BMI 45.4. Pt unsure of weight but reports wt was 297 prior to panniculectomy and states the surgeons "took 25lbs of fat off".  Pt declines any verbal education at this time however agrees to written education left at bedside. Written weight loss provided. Pt is 61 yo female with a PMH of JESUS on CPAP, HTN, obesity, anemia, AAA, arthritis, depression, anxiety, kidney stones, bipolar 1 disorder, neuropathy, and EtOH abuse. Pt had gastric band, however removed a few years ago. Pt recently admitted s/p panniculectomy with hemorrhagic shock complication and received blood transfusion. Pt discharged on 10/10 however presented to ER with sweating and lightheadedness and admitted for ELAINE with near syncope.    Pt visited at bedside. Pt states poor appetite during admission, however consuming [employer] whopper meal at time of visit. Per RN notes, pt consumed 50% of breakfast on 10/14. Pt reports she doesn't typically like ot eat breakfast however recently started having protein yogurts in the morning PTA. Pt reports cooks at home and has foods like meatloaf and chicken cutlet for lunch and dinner. Per pt, takes multivitamin, B12, D3, folic acid, magnesium, and iron supplements at home. NKFA,  however admits to occasional diarrhea with dairy products. Pt currently on Faustino supplement once per day.  Pt denies any n/v/diarrhea at this time however endorses occasional constipation and on bowel regimen at home. Last BM PTA. Admission ht 5'6" and wt 280lbs. BMI 45.4. Pt unsure of weight but reports wt was 297 prior to panniculectomy and states the surgeons "took 25lbs of fat off".  Pt declines any verbal education at this time however agrees to written education left at bedside. Written weight loss provided.

## 2020-10-15 NOTE — PROGRESS NOTE ADULT - SUBJECTIVE AND OBJECTIVE BOX
Patient is a 60y old  Female who presents with a chief complaint of diaphoresis, lightheadedness.    INTERVAL HPI/OVERNIGHT EVENTS: Pt states that her lightheadedness has resolved. Reports generalized weakness. Admitted to having bloody drainage around the dressing of her abdomen. Denies fever, chills, dysuria, SOB, CP, abd pain.     MEDICATIONS  (STANDING):  allopurinol 300 milliGRAM(s) Oral daily  cholecalciferol 5000 Unit(s) Oral daily  cyanocobalamin 1000 MICROGram(s) Oral daily  DULoxetine 30 milliGRAM(s) Oral <User Schedule>  folic acid 1 milliGRAM(s) Oral daily  lamoTRIgine 200 milliGRAM(s) Oral daily  melatonin 3 milliGRAM(s) Oral at bedtime  multivitamin 1 Tablet(s) Oral daily  traZODone 200 milliGRAM(s) Oral at bedtime    MEDICATIONS  (PRN):  acetaminophen   Tablet .. 650 milliGRAM(s) Oral daily PRN Mild Pain (1 - 3)  ALPRAZolam 1 milliGRAM(s) Oral two times a day PRN anxiety      Allergies    penicillins (Other)  trees dogs cats cockaroaches (Rhinitis; Rhinorrhea)    Intolerances        REVIEW OF SYSTEMS:  CONSTITUTIONAL: No fever or chills  HEENT:  No headache, no sore throat  RESPIRATORY: No cough, wheezing, or shortness of breath  CARDIOVASCULAR: No chest pain, palpitations  GASTROINTESTINAL: No abd pain, nausea, vomiting, or diarrhea  GENITOURINARY: No dysuria, frequency, or hematuria  NEUROLOGICAL: no focal weakness or dizziness  MUSCULOSKELETAL: no myalgias     Vital Signs Last 24 Hrs  T(C): 37.1 (15 Oct 2020 21:29), Max: 37.8 (15 Oct 2020 17:06)  T(F): 98.7 (15 Oct 2020 21:29), Max: 100 (15 Oct 2020 17:06)  HR: 83 (15 Oct 2020 21:29) (72 - 84)  BP: 103/66 (15 Oct 2020 21:29) (99/64 - 120/72)  BP(mean): --  RR: 17 (15 Oct 2020 21:29) (17 - 18)  SpO2: 92% (15 Oct 2020 21:29) (92% - 98%)    PHYSICAL EXAM:  GENERAL: NAD  HEENT:  anicteric, moist mucous membranes  CHEST/LUNG:  CTA b/l, no rales, wheezes, or rhonchi  HEART:  RRR, S1, S2  ABDOMEN:  BS+, soft, nontender, nondistended  EXTREMITIES: no edema, cyanosis, or calf tenderness  NERVOUS SYSTEM: answers questions and follows commands appropriately    LABS:                        7.9    7.36  )-----------( 309      ( 15 Oct 2020 07:01 )             23.6     CBC Full  -  ( 15 Oct 2020 07:01 )  WBC Count : 7.36 K/uL  Hemoglobin : 7.9 g/dL  Hematocrit : 23.6 %  Platelet Count - Automated : 309 K/uL  Mean Cell Volume : 85.8 fl  Mean Cell Hemoglobin : 28.7 pg  Mean Cell Hemoglobin Concentration : 33.5 gm/dL  Auto Neutrophil # : x  Auto Lymphocyte # : x  Auto Monocyte # : x  Auto Eosinophil # : x  Auto Basophil # : x  Auto Neutrophil % : x  Auto Lymphocyte % : x  Auto Monocyte % : x  Auto Eosinophil % : x  Auto Basophil % : x    15 Oct 2020 07:01    138    |  103    |  41     ----------------------------<  148    4.0     |  31     |  1.00     Ca    7.8        15 Oct 2020 07:01    TPro  5.7    /  Alb  1.4    /  TBili  0.6    /  DBili  x      /  AST  29     /  ALT  30     /  AlkPhos  106    15 Oct 2020 07:01    PT/INR - ( 14 Oct 2020 12:36 )   PT: 14.4 sec;   INR: 1.24 ratio         PTT - ( 14 Oct 2020 12:36 )  PTT:34.0 sec  Urinalysis Basic - ( 14 Oct 2020 20:01 )    Color: Yellow / Appearance: Slightly Turbid / S.015 / pH: x  Gluc: x / Ketone: Trace  / Bili: Negative / Urobili: 1   Blood: x / Protein: 15 / Nitrite: Negative   Leuk Esterase: Trace / RBC: 0-2 /HPF / WBC 0-2   Sq Epi: x / Non Sq Epi: Moderate / Bacteria: Few      CAPILLARY BLOOD GLUCOSE            Culture - Blood (collected 10-13-20 @ 19:10)  Source: .Blood Blood  Preliminary Report (10-14-20 @ 20:01):    No growth to date.    Culture - Blood (collected 10-13-20 @ 19:10)  Source: .Blood Blood  Preliminary Report (10-14-20 @ 20:):    No growth to date.    Culture - Blood (collected 10-09-20 @ 12:15)  Source: .Blood Blood  Final Report (10-14-20 @ 13:01):    No Growth Final    Culture - Blood (collected 10-09-20 @ 12:15)  Source: .Blood Blood  Final Report (10-14-20 @ 13:01):    No Growth Final        RADIOLOGY & ADDITIONAL TESTS:    Personally reviewed.     Consultant(s) Notes Reviewed:  [x] YES  [ ] NO     Patient is a 60y old  Female who presents with a chief complaint of diaphoresis, lightheadedness.     INTERVAL HPI/OVERNIGHT EVENTS: Pt states that her lightheadedness has resolved. Reports generalized weakness. Admitted to having bloody drainage around the dressing of her abdomen. Denies fever, chills, dysuria, SOB, CP, abd pain.     MEDICATIONS  (STANDING):  allopurinol 300 milliGRAM(s) Oral daily  cholecalciferol 5000 Unit(s) Oral daily  cyanocobalamin 1000 MICROGram(s) Oral daily  DULoxetine 30 milliGRAM(s) Oral <User Schedule>  folic acid 1 milliGRAM(s) Oral daily  lamoTRIgine 200 milliGRAM(s) Oral daily  melatonin 3 milliGRAM(s) Oral at bedtime  multivitamin 1 Tablet(s) Oral daily  traZODone 200 milliGRAM(s) Oral at bedtime    MEDICATIONS  (PRN):  acetaminophen   Tablet .. 650 milliGRAM(s) Oral daily PRN Mild Pain (1 - 3)  ALPRAZolam 1 milliGRAM(s) Oral two times a day PRN anxiety      Allergies    penicillins (Other)  trees dogs cats cockaroaches (Rhinitis; Rhinorrhea)    Intolerances        REVIEW OF SYSTEMS:  CONSTITUTIONAL: No fever or chills  HEENT:  No headache, no sore throat  RESPIRATORY: No cough, wheezing, or shortness of breath  CARDIOVASCULAR: No chest pain, palpitations  GASTROINTESTINAL: No abd pain, nausea, vomiting, or diarrhea  GENITOURINARY: No dysuria, frequency, or hematuria  NEUROLOGICAL: no focal weakness or dizziness  MUSCULOSKELETAL: no myalgias     Vital Signs Last 24 Hrs  T(C): 37.1 (15 Oct 2020 21:29), Max: 37.8 (15 Oct 2020 17:06)  T(F): 98.7 (15 Oct 2020 21:29), Max: 100 (15 Oct 2020 17:06)  HR: 83 (15 Oct 2020 21:29) (72 - 84)  BP: 103/66 (15 Oct 2020 21:29) (99/64 - 120/72)  BP(mean): --  RR: 17 (15 Oct 2020 21:29) (17 - 18)  SpO2: 92% (15 Oct 2020 21:29) (92% - 98%)    PHYSICAL EXAM:  GENERAL: NAD  HEENT:  anicteric, moist mucous membranes  CHEST/LUNG:  CTA b/l, no rales, wheezes, or rhonchi  HEART:  RRR, S1, S2  ABDOMEN:  BS+, soft, nontender, nondistended  EXTREMITIES: no edema, cyanosis, or calf tenderness  NERVOUS SYSTEM: answers questions and follows commands appropriately    LABS:                        7.9    7.36  )-----------( 309      ( 15 Oct 2020 07:01 )             23.6     CBC Full  -  ( 15 Oct 2020 07:01 )  WBC Count : 7.36 K/uL  Hemoglobin : 7.9 g/dL  Hematocrit : 23.6 %  Platelet Count - Automated : 309 K/uL  Mean Cell Volume : 85.8 fl  Mean Cell Hemoglobin : 28.7 pg  Mean Cell Hemoglobin Concentration : 33.5 gm/dL  Auto Neutrophil # : x  Auto Lymphocyte # : x  Auto Monocyte # : x  Auto Eosinophil # : x  Auto Basophil # : x  Auto Neutrophil % : x  Auto Lymphocyte % : x  Auto Monocyte % : x  Auto Eosinophil % : x  Auto Basophil % : x    15 Oct 2020 07:01    138    |  103    |  41     ----------------------------<  148    4.0     |  31     |  1.00     Ca    7.8        15 Oct 2020 07:01    TPro  5.7    /  Alb  1.4    /  TBili  0.6    /  DBili  x      /  AST  29     /  ALT  30     /  AlkPhos  106    15 Oct 2020 07:01    PT/INR - ( 14 Oct 2020 12:36 )   PT: 14.4 sec;   INR: 1.24 ratio         PTT - ( 14 Oct 2020 12:36 )  PTT:34.0 sec  Urinalysis Basic - ( 14 Oct 2020 20:01 )    Color: Yellow / Appearance: Slightly Turbid / S.015 / pH: x  Gluc: x / Ketone: Trace  / Bili: Negative / Urobili: 1   Blood: x / Protein: 15 / Nitrite: Negative   Leuk Esterase: Trace / RBC: 0-2 /HPF / WBC 0-2   Sq Epi: x / Non Sq Epi: Moderate / Bacteria: Few      CAPILLARY BLOOD GLUCOSE            Culture - Blood (collected 10-13-20 @ 19:10)  Source: .Blood Blood  Preliminary Report (10-14-20 @ 20:01):    No growth to date.    Culture - Blood (collected 10-13-20 @ 19:10)  Source: .Blood Blood  Preliminary Report (10-14-20 @ 20:):    No growth to date.    Culture - Blood (collected 10-09-20 @ 12:15)  Source: .Blood Blood  Final Report (10-14-20 @ 13:01):    No Growth Final    Culture - Blood (collected 10-09-20 @ 12:15)  Source: .Blood Blood  Final Report (10-14-20 @ 13:01):    No Growth Final        RADIOLOGY & ADDITIONAL TESTS:    Personally reviewed.     Consultant(s) Notes Reviewed:  [x] YES  [ ] NO

## 2020-10-15 NOTE — PROGRESS NOTE ADULT - SUBJECTIVE AND OBJECTIVE BOX
Department of Surgery ACP Note    Overnight there were no significant events noted, received one unit prbc with moderate response  Creatnine improving with resuscitation and volume  Patient denies back pain, fever, chills, nausea, diarrhea.   Patient alert and orientated x 3 at time of visit        T(C): 36.8 (10-15-20 @ 05:24), Max: 37.4 (10-14-20 @ 21:17)  HR: 84 (10-15-20 @ 05:24) (72 - 95)  BP: 120/72 (10-15-20 @ 05:24) (98/61 - 122/70)  RR: 18 (10-15-20 @ 05:24) (17 - 18)  SpO2: 98% (10-15-20 @ 05:24) (82% - 98%)      10-14-20 @ 07:01  -  10-15-20 @ 07:00  --------------------------------------------------------  IN: 525 mL / OUT: 1720 mL / NET: -1195 mL        acetaminophen   Tablet .. 650 milliGRAM(s) Oral PRN  allopurinol 300 milliGRAM(s) Oral  ALPRAZolam 1 milliGRAM(s) Oral PRN  cholecalciferol 5000 Unit(s) Oral  cyanocobalamin 1000 MICROGram(s) Oral  DULoxetine 30 milliGRAM(s) Oral  folic acid 1 milliGRAM(s) Oral  lamoTRIgine 200 milliGRAM(s) Oral  multivitamin 1 Tablet(s) Oral  sodium chloride 0.9%. 1000 milliLiter(s) IV Continuous  traZODone 200 milliGRAM(s) Oral          General: A+O x 3 in NAD  HEENT: PERRLA, EOM intact  Neck: trachea midline  Abdomen: soft non distended, non tender, non tympanic, BS x 4, no guarding noted  Incision: new wound opening next to previous left anterior abdominal wound, no worsening erythema noted, drain present and unable to hold suction as leak is likely at site after troubleshooting                            7.9    7.36  )-----------( 309      ( 15 Oct 2020 07:01 )             23.6       10-15    138  |  103  |  41<H>  ----------------------------<  148<H>  4.0   |  31  |  1.00    Ca    7.8<L>      15 Oct 2020 07:01    TPro  5.7<L>  /  Alb  1.4<L>  /  TBili  0.6  /  DBili  x   /  AST  29  /  ALT  30  /  AlkPhos  106  10-15      PT/INR - ( 14 Oct 2020 12:36 )   PT: 14.4 sec;   INR: 1.24 ratio         PTT - ( 14 Oct 2020 12:36 )  PTT:34.0 sec      Assessment and Plan    60 year old female s/p abdominoplasty with Dr. De La Torre on 9/25 now post op day 20 with return to hospital with anemia , acute renal failure with moderate response to transfusion, improved renal funtion, and new opening next to previous left sided anterior abdominal wound opening   - likely transfuse 1 unit prbc  - continue daily packing change at incision openings, ? VAC in the future at left anterior opening, will discuss with Dr. De La Torre  - monitor for fever and leukocytosis  - no surgical interventions at this time.   - will discuss with Dr. De La Torre

## 2020-10-15 NOTE — DIETITIAN INITIAL EVALUATION ADULT. - PERTINENT LABORATORY DATA
On 10/15 hemoglobin 7.9, hematocrit 23.6, ferritin 745, iron 14, iron total binding capacity 171, %saturation iron 8, c-reactive protein 27.76, BUN 41, calcium 7.8, protein total 5.7

## 2020-10-15 NOTE — DIETITIAN INITIAL EVALUATION ADULT. - PROBLEM SELECTOR PLAN 2
Sudden onset of sweating and dizziness for 30 min while seated. This is consistent with a vasovagal syncope in the setting of elevated creatinine indicative of dehydration. Given her recent post-operative status, obesity and relative immobility, we cannot completely exclude PE as a cause for her symptoms.  - unable to perform CTA chest due to ELAINE  - Pt has hx of a-fib in the setting of significant hypotension on prior admission per Cardiology  -EKG shows NSR with no acute changes   -Low-normal LV function with mild diastolic dysfunction on recent echocardiogram  -Placed on remote telemetry  -Check orthostatics  -Cardio consulted Dr Giordano: Hold anti-hypertensives. CTA chest on hold for now with ELAINE and stable vitals. Will await return of kidney function. Dr. Hanson consulted.

## 2020-10-15 NOTE — DIETITIAN INITIAL EVALUATION ADULT. - NUTRITION DIAGNOSITC TERMINOLOGY #1
Increased Nutrient Needs... Star Wedge Flap Text: The defect edges were debeveled with a #15 scalpel blade.  Given the location of the defect, shape of the defect and the proximity to free margins a star wedge flap was deemed most appropriate.  Using a sterile surgical marker, an appropriate rotation flap was drawn incorporating the defect and placing the expected incisions within the relaxed skin tension lines where possible. The area thus outlined was incised deep to adipose tissue with a #15 scalpel blade.  The skin margins were undermined to an appropriate distance in all directions utilizing iris scissors.

## 2020-10-15 NOTE — DIETITIAN INITIAL EVALUATION ADULT. - PROBLEM SELECTOR PLAN 5
Chronic - managed by Dr Aguilar   -Vitals stable  -Hgb 8.3 on admission. Unchanged from Hbg 8.4 3 days ago  Chronic  -CT abd/pelvic shows no fluid collection to support hematoma   -Home supplement [employer] Formula iron that pt claims is only one that works for her is not held at pharmacy here   -Monitor hemodynamics   -F/u CBC

## 2020-10-15 NOTE — PROGRESS NOTE ADULT - ASSESSMENT
60 year old obese white female with a history of lap band surgery, HTN, MDP,  anxiety disorder, thoracic aortic aneurysm, JESUS on CPAP, essential tremor sp paniculectomy and recently liposuction complicated with  hemorrhagic shock, lactic acidosis, with ELAINE.  Now admitted with diaphoresis and found to have ELAINE, most likely due to pre renal azotemia.     Improved renal indices. To continue current meds. Avoid IV contrast and ACEI or ARB. Will follow electrolytes and renal function trend.

## 2020-10-15 NOTE — DIETITIAN INITIAL EVALUATION ADULT. - PROBLEM SELECTOR PLAN 1
Cr 2.2 on admission. Baseline ~0.7. Likely prerenal azotemia in setting of hypovolemia and dehydration.  -Avoid nephrotoxic drugs. Hold home ARB, NSAID and Furosemide   -Continue IVF NS @75cc/hr  -F/u CMP for Na 132 s/p IVF hydration   -Nephro consulted Dr Mittal following

## 2020-10-15 NOTE — DIETITIAN INITIAL EVALUATION ADULT. - ADD RECOMMEND
1.) Continue with DASH/TLC diet per MD orders 2.) Encourage PO intake of meals with emphasis on protein 3.) Dietary preferences obtained and honored  4.) Continue with multivitamin, D3, B12, folic acid per MD orders  5.) Monitor PO intake, weights, labs, skin, GI symptoms 1.) Continue with DASH/TLC diet and Faustino once daily per MD orders 2.) Encourage PO intake of meals with emphasis on protein 3.) Dietary preferences obtained and honored  4.) Continue with multivitamin, D3, B12, folic acid per MD orders  5.) Monitor PO intake, weights, labs, skin, GI symptoms

## 2020-10-15 NOTE — PROGRESS NOTE ADULT - SUBJECTIVE AND OBJECTIVE BOX
Interval History:  continues with wound issues  Chart reviewed and events noted;   Overnight events:    MEDICATIONS  (STANDING):  allopurinol 300 milliGRAM(s) Oral daily  cholecalciferol 5000 Unit(s) Oral daily  cyanocobalamin 1000 MICROGram(s) Oral daily  DULoxetine 30 milliGRAM(s) Oral <User Schedule>  folic acid 1 milliGRAM(s) Oral daily  lamoTRIgine 200 milliGRAM(s) Oral daily  multivitamin 1 Tablet(s) Oral daily  sodium chloride 0.9%. 1000 milliLiter(s) (75 mL/Hr) IV Continuous <Continuous>  traZODone 200 milliGRAM(s) Oral at bedtime    MEDICATIONS  (PRN):  acetaminophen   Tablet .. 650 milliGRAM(s) Oral daily PRN Mild Pain (1 - 3)  ALPRAZolam 1 milliGRAM(s) Oral two times a day PRN anxiety      Vital Signs Last 24 Hrs  T(C): 36.8 (15 Oct 2020 05:24), Max: 37.4 (14 Oct 2020 21:17)  T(F): 98.3 (15 Oct 2020 05:24), Max: 99.3 (14 Oct 2020 21:17)  HR: 84 (15 Oct 2020 05:24) (72 - 95)  BP: 120/72 (15 Oct 2020 05:24) (98/61 - 122/70)  BP(mean): --  RR: 18 (15 Oct 2020 05:24) (17 - 18)  SpO2: 98% (15 Oct 2020 05:24) (82% - 98%)    PHYSICAL EXAM  General: adult in NAD  HEENT: clear oropharynx, anicteric sclera, pink conjunctivae  Neck: supple  CV: normal S1S2 with no murmur rubs or gallops  Lungs: clear to auscultation, no wheezes, no rhales  Abdomen: soft non-tender non-distended, no hepato/splenomegaly. bandage not removed  Ext: no clubbing cyanosis or edema  Skin: no rashes and no petichiae  Neuro: alert and oriented X3 no focal deficits      LABS:  CBC Full  -  ( 15 Oct 2020 07:01 )  WBC Count : 7.36 K/uL  RBC Count : 2.75 M/uL  Hemoglobin : 7.9 g/dL  Hematocrit : 23.6 %  Platelet Count - Automated : 309 K/uL  Mean Cell Volume : 85.8 fl  Mean Cell Hemoglobin : 28.7 pg  Mean Cell Hemoglobin Concentration : 33.5 gm/dL  Auto Neutrophil # : x  Auto Lymphocyte # : x  Auto Monocyte # : x  Auto Eosinophil # : x  Auto Basophil # : x  Auto Neutrophil % : x  Auto Lymphocyte % : x  Auto Monocyte % : x  Auto Eosinophil % : x  Auto Basophil % : x    10-15    138  |  103  |  41<H>  ----------------------------<  148<H>  4.0   |  31  |  1.00    Ca    7.8<L>      15 Oct 2020 07:01    TPro  5.7<L>  /  Alb  1.4<L>  /  TBili  0.6  /  DBili  x   /  AST  29  /  ALT  30  /  AlkPhos  106  10-15    PT/INR - ( 14 Oct 2020 12:36 )   PT: 14.4 sec;   INR: 1.24 ratio         PTT - ( 14 Oct 2020 12:36 )  PTT:34.0 sec    fe studies  Ferritin, Serum: 745 ng/mL (10-15 @ 11:25)  Iron - Total Binding Capacity.: 171 ug/dL (10-15 @ 11:21)      WBC trend  7.36 K/uL (10-15-20 @ 07:01)  8.30 K/uL (10-14-20 @ 06:57)  10.23 K/uL (10-13-20 @ 13:28)      Hgb trend  7.9 g/dL (10-15-20 @ 07:01)  7.2 g/dL (10-14-20 @ 06:57)  8.3 g/dL (10-13-20 @ 13:28)      plt trend  309 K/uL (10-15-20 @ 07:01)  279 K/uL (10-14-20 @ 06:57)  300 K/uL (10-13-20 @ 13:28)        RADIOLOGY & ADDITIONAL STUDIES:

## 2020-10-15 NOTE — DIETITIAN INITIAL EVALUATION ADULT. - PROBLEM SELECTOR PLAN 3
Chronic   -describes autoPAP at 5-15  -states that she is currently on CPAP qhs at pressure of 10.  -continue NIPPV with CPAP qhs  -Pulm consulted Dr. Hanson

## 2020-10-15 NOTE — PROGRESS NOTE ADULT - PROBLEM SELECTOR PLAN 3
pt has chronic - managed by Dr Aguilar, but also had recent acute blood loss anemia s/p panniculectomy and is still draining blood from abdomen.   -Vitals stable  -Hgb down to 7.2 yesterday -- given 1un PRBC, with suboptimal response. Will give 2nd unit PRBC today  -CT abd/pelvic (without IV contrast) did not visualize significant hematoma   -Home supplement J[employer] Formula iron that pt claims is only one that works for her is not held at pharmacy here   -heme ([facility]) consulting, recs appreciated  -will recheck iron studies  -Monitor hemodynamics   -F/u CBC

## 2020-10-15 NOTE — PROGRESS NOTE ADULT - SUBJECTIVE AND OBJECTIVE BOX
[facility] -- Almas Faustin, Cara Nunez, Saurabh Johansen, Cindy Giordano: Office # [phone]    Follow Up:  SOB    Subjective/Observations:  Patient seen and examined. Patient awake, alert, resting in bed. No complaints of chest pain, dyspnea, palpitations or dizziness. No signs of orthopnea or PND. S/p prbc. Creatnine improving.     REVIEW OF SYSTEMS: All review of systems is negative for eye, ENT, GI, , allergic, dermatologic, musculoskeletal and neurologic except as described above    PAST MEDICAL & SURGICAL HISTORY:  History of aortic aneurysm  descending aorta see CT  Arthritis  Degenerative disc disease, lumbar  Neuropathy  Spinal stenosis  Bipolar 1 disorder  Kidney stone  Sleep Apnea  CPAP with oxygen since June 2020  Asthma  Hypertension  Morbid Obesity  ETOH Abuse  H/O  Benign Essential Tremor  Anxiety  Depression  Renal Calculi  chronic  Colitis  H/O  Anemia  S/P panniculectomy  S/P cardiac catheterization  S/P dilation and curettage  History of tonsillectomy  History of laparoscopic adjustable gastric banding  band removed few yrs ago  S/P D&amp;C  Biliary stent placement &amp; ercp  ureteroscopy with stone removal  1-    MEDICATIONS  (STANDING):  allopurinol 300 milliGRAM(s) Oral daily  cholecalciferol 5000 Unit(s) Oral daily  cyanocobalamin 1000 MICROGram(s) Oral daily  DULoxetine 30 milliGRAM(s) Oral <User Schedule>  folic acid 1 milliGRAM(s) Oral daily  lamoTRIgine 200 milliGRAM(s) Oral daily  multivitamin 1 Tablet(s) Oral daily  sodium chloride 0.9%. 1000 milliLiter(s) (75 mL/Hr) IV Continuous <Continuous>  traZODone 200 milliGRAM(s) Oral at bedtime    MEDICATIONS  (PRN):  acetaminophen   Tablet .. 650 milliGRAM(s) Oral daily PRN Mild Pain (1 - 3)  ALPRAZolam 1 milliGRAM(s) Oral two times a day PRN anxiety    Allergies  penicillins (Other)  trees dogs cats cockaroaches (Rhinitis; Rhinorrhea)    Vital Signs Last 24 Hrs  T(C): 36.8 (15 Oct 2020 05:24), Max: 37.4 (14 Oct 2020 21:17)  T(F): 98.3 (15 Oct 2020 05:24), Max: 99.3 (14 Oct 2020 21:17)  HR: 84 (15 Oct 2020 05:24) (72 - 95)  BP: 120/72 (15 Oct 2020 05:24) (98/61 - 122/70)  BP(mean): --  RR: 18 (15 Oct 2020 05:24) (17 - 18)  SpO2: 98% (15 Oct 2020 05:24) (82% - 98%)    I&O's Summary    14 Oct 2020 07:01  -  15 Oct 2020 07:00  --------------------------------------------------------  IN: 525 mL / OUT: 1720 mL / NET: -1195 mL    TELE:  60-80s   PHYSICAL EXAM:  Appearance: NAD, no distress, alert, Obese   HEENT: Moist Mucous Membranes, Anicteric  Cardiovascular: Regular rate and rhythm, Normal S1 S2, No JVD, No murmurs, No rubs, gallops or clicks  Respiratory: Non-labored, Clear to auscultation, No rales, No rhonchi, No wheezing.   Gastrointestinal:  Soft, Non-tender, + BS  Neurologic: Non-focal  Skin: Warm and dry, + abdominal incision with drain, No visible rashes or ulcers, No ecchymosis, No cyanosis  Musculoskeletal: No clubbing, No cyanosis, No joint swelling/tenderness  Psychiatry: Mood & affect appropriate  Lymph: No peripheral edema.     LABS: All Labs Reviewed:                        7.9    7.36  )-----------( 309      ( 15 Oct 2020 07:01 )             23.6                         7.2    8.30  )-----------( 279      ( 14 Oct 2020 06:57 )             22.2                         8.3    10.23 )-----------( 300      ( 13 Oct 2020 13:28 )             24.6     15 Oct 2020 07:01    138    |  103    |  41     ----------------------------<  148    4.0     |  31     |  1.00   14 Oct 2020 06:57    136    |  100    |  61     ----------------------------<  154    4.4     |  27     |  1.70   13 Oct 2020 13:28    132    |  96     |  65     ----------------------------<  226    4.2     |  27     |  2.20     Ca    7.8        15 Oct 2020 07:01  Ca    8.2        14 Oct 2020 06:57  Ca    8.1        13 Oct 2020 13:28    TPro  5.7    /  Alb  1.4    /  TBili  0.6    /  DBili  x      /  AST  29     /  ALT  30     /  AlkPhos  106    15 Oct 2020 07:01  TPro  5.8    /  Alb  1.4    /  TBili  0.6    /  DBili  x      /  AST  36     /  ALT  38     /  AlkPhos  123    14 Oct 2020 06:57  TPro  6.2    /  Alb  1.4    /  TBili  0.7    /  DBili  x      /  AST  50     /  ALT  52     /  AlkPhos  141    13 Oct 2020 13:28    PT/INR - ( 14 Oct 2020 12:36 )   PT: 14.4 sec;   INR: 1.24 ratio    PTT - ( 14 Oct 2020 12:36 )  PTT:34.0 sec  Troponin I, Serum: <.015 ng/mL (10-13-20 @ 13:28)  D-Dimer Assay, Quantitative: 828 ng/mL DDU (10-13-20 @ 13:28)  Lactate, Blood: 1.8 mmol/L (10-13-20 @ 16:24)  Lactate, Blood: 3.3 mmol/L (10-13-20 @ 13:28)      12 Lead ECG:   Ventricular Rate 95 BPM  Atrial Rate 95 BPM  P-R Interval 200 ms  QRS Duration 100 ms  Q-T Interval 332 ms  QTC Calculation(Bazett) 417 ms  P Axis 5 degrees  R Axis -45 degrees  T Axis 23 degrees  Diagnosis Line Normal sinus rhythm  Left axis deviation  Minimal voltage criteria for LVH, may be normal variant  Cannot rule out Anterior infarct (cited on or before 25-SEP-2020)  Abnormal ECG  Confirmed by ana Giordano ([id number]) on 10/13/2020 4:00:07 PM (10-13-20 @ 12:56)    < from: Cardiac Cath Lab - Adult (06.16.20 @ 16:26) >  CORONARY VESSELS: The coronary circulation is right dominant.  LM:   --  LM: Normal.  LAD:   --  LAD: Angiography showed minor luminal irregularities with no  flow limiting lesions.  CX:   --  Circumflex: Normal.  RCA:   --  RCA: Normal.  COMPLICATIONS: There were no complications.  DIAGNOSTIC RECOMMENDATIONS: The patient should continue with the present  medications.  < end of copied text >

## 2020-10-15 NOTE — PROGRESS NOTE ADULT - PROBLEM SELECTOR PLAN 2
Sudden onset of sweating and dizziness for 30 min while seated. This is consistent with a vasovagal episode likely due to general hypotension in setting of anemia and poor oncotic pressure. Might have had an orthostatic component, as well.   - PE less likely and unable to perform CTA Chest safely due to ELAINE  - Pt had hx of a-fib in the setting of significant hypotension on prior admission per Cardiology  -EKG shows NSR with no acute changes   -Low-normal LV function with mild diastolic dysfunction on recent echocardiogram  -Placed on remote telemetry  -will give 2nd unit PRBC today  -Check orthostatics  -Cardio consulted Dr Giordano: Hold anti-hypertensives.

## 2020-10-15 NOTE — PROGRESS NOTE ADULT - ASSESSMENT
[ASSESSMENT and  PLAN]  Admitted for ELAINE.   Anemia worse, post hydration.     61yo F w/ PMH of JESUS on CPAP, HTN, morbid obesity, anemia, AAA, Arthritis, Depression, anxiety presented to [facility] for elective abdominal panniculectomy, admitted last admission post-op with hemorrhagic shock s/p 3U PRBC with stabilization, now course c/b fever.  Hgb 11.9, pre-surgery with post procedure Hgb 8.4  Had Fever post op, and placed on abx.   PO intake was poor and given PO VitK for coagulopathy.     Dx last admission with brief episode new onset Afib.   In Sinus now    regarding anemia  Chronic anemia for many years, not ever required transfusions; chronic skin lesions, also with persistent nausea and vomiting for several months prior to initial eval  found to have mild iron deficiency and mild B12 deficiency     Prior hospitalized in 2/2016 at [place] for gastric perforation due to gastric sleeve, s/p removal, repair of perforation   hx enterocutaneous fistula since healed.     Had contemplatedgastric sleeve again in 1/2019 but ultimately deferred.  Planned panniculectomy  3/2020 which was cancelled due to COVID-19  Now s/p procedure last admissin.       Hgb 13.1, Ferritin 76 on 07/23/20  Hgb 13.2 on PST on 09/14/20.     Cr improving with hydration.     fe studies c/w acute/chronic disease most likely related to abdominal wound    RECOMMENDATIONS  IVF hydration   Continue.   PO intake continues to be poor.   Cardiology followup for Afib.      Check PT, PTT.     Transfuse PRBC as clinically indicated.   Transfuse PRBC if Hgb <7.0 or if symptomatic.   Follow CBC  is not a candidate for procrit or IV iron    continue surgical management

## 2020-10-15 NOTE — PROGRESS NOTE ADULT - ASSESSMENT
60 year old female with PMH of JESUS on CPAP, HTN, morbid obesity, anemia, AAA, Arthritis, Depression, anxiety recently admitted to [facility] for elective abdominal panniculectomy, admitted post-op with hemorrhagic shock s/p 3unit prbc, discharged on 10/10  now readmitted with complaint of weakness diaphoresis while at follow up, in ed found with ELAINE.    Near syncope  - Could be vasovagal in nature given dizziness/diaphoresis and dyspnea. Symptoms improved   - EKG with NSR no acute changes   - Troponin negative. Recent cath without significant CAD  - Chest x-ray negative for acute pulmonary disease   - Telemetry showed SR 90s. No events     Anemia   - Hemoglobin <--7.9, <--7.2, <--8.3  - Hematocrit <--23.6, <--22.2, <--24.6  - CT abdomen pelvis unremarkable   - Continue to trend CBC as per primary team    A fib  - She had brief atrial fibrillation in the setting of significant hypotension on prior admission   - Patient in SR. Possible loop outpatient     HTN  - BP: 120/72 (10-15-20 @ 05:24) (98/61 - 122/70)  - hold off on antihypertensives at this time, can resume when BP improves  - low normal LV function with mild diastolic dysfunction on recent echocardiogram  - recent cath with non-obs cad  - no sign of volume overload    Acute Kidney Injury   - Creatinine trend:  <--1.00,  <--1.70,  <--2.20,  <--1.10,  <--1.30,  <--0.68  - Improved after IVF.     - Monitor and replete lytes, keep K>4, Mg>2.  - All other medical needs as per primary team.  - Other cardiovascular workup will depend on clinical course.  - Will continue to follow.    Kobe Corado, MS FNP, [facility]  Nurse Practitioner- Cardiology   Spectra #[phone]

## 2020-10-15 NOTE — PROGRESS NOTE ADULT - SUBJECTIVE AND OBJECTIVE BOX
Patient is a 60y old  Female who presents with a chief complaint of ELAINE and vasovagal episode (14 Oct 2020 13:19)  Patient seen in follow up for ELAINE.     No new complaints.        PAST MEDICAL HISTORY:  History of aortic aneurysm    H/O aortic aneurysm repair    Arthritis    Degenerative disc disease, lumbar    Neuropathy    Spinal stenosis    Bipolar 1 disorder    Kidney stone    Diabetes Mellitus Type II    Sleep Apnea    Asthma    Hypertension    Obesity    Morbid Obesity    Drug Abuse    ETOH Abuse    Arrhythmia    Benign Essential Tremor    Anxiety    Depression    Renal Calculi    Colitis    Anemia    Diabetes    Sleep Apnea    Asthma      MEDICATIONS  (STANDING):  allopurinol 300 milliGRAM(s) Oral daily  cholecalciferol 5000 Unit(s) Oral daily  cyanocobalamin 1000 MICROGram(s) Oral daily  DULoxetine 30 milliGRAM(s) Oral <User Schedule>  folic acid 1 milliGRAM(s) Oral daily  heparin   Injectable 5000 Unit(s) SubCutaneous every 12 hours  lamoTRIgine 200 milliGRAM(s) Oral daily  multivitamin 1 Tablet(s) Oral daily  sodium chloride 0.9%. 1000 milliLiter(s) (75 mL/Hr) IV Continuous <Continuous>  traZODone 200 milliGRAM(s) Oral at bedtime    MEDICATIONS  (PRN):  acetaminophen   Tablet .. 650 milliGRAM(s) Oral daily PRN Mild Pain (1 - 3)  ALPRAZolam 1 milliGRAM(s) Oral two times a day PRN anxiety    T(C): 36.7 (10-14-20 @ 13:09), Max: 36.8 (10-13-20 @ 12:36)  HR: 90 (10-14-20 @ 13:09) (80 - 99)  BP: 120/80 (10-14-20 @ 13:09) (100/57 - 142/91)  RR: 18 (10-14-20 @ 13:09)  SpO2: 94% (10-14-20 @ 13:09)  Wt(kg): --  I&O's Detail      PHYSICAL EXAM:  General: No distress  Respiratory: b/l air entry  Cardiovascular: S1 S2  Gastrointestinal: soft, obese, + drains  Extremities:  no edema        LABORATORY:                        7.9    7.36  )-----------( 309      ( 15 Oct 2020 07:01 )             23.6     10-15    138  |  103  |  41<H>  ----------------------------<  148<H>  4.0   |  31  |  1.00    Ca    7.8<L>      15 Oct 2020 07:01    TPro  5.7<L>  /  Alb  1.4<L>  /  TBili  0.6  /  DBili  x   /  AST  29  /  ALT  30  /  AlkPhos  106  10-15    Sodium, Serum: 138 mmol/L (10-15 @ 07:01)  Sodium, Serum: 136 mmol/L (10-14 @ 06:57)    Potassium, Serum: 4.0 mmol/L (10-15 @ 07:01)  Potassium, Serum: 4.4 mmol/L (10-14 @ 06:57)    Hemoglobin: 7.9 g/dL (10-15 @ 07:01)  Hemoglobin: 7.2 g/dL (10-14 @ 06:57)  Hemoglobin: 8.3 g/dL (10-13 @ 13:28)    Creatinine, Serum 1.00 (10-15 @ 07:01)  Creatinine, Serum 1.70 (10-14 @ 06:57)  Creatinine, Serum 2.20 (10-13 @ 13:28)        LIVER FUNCTIONS - ( 15 Oct 2020 07:01 )  Alb: 1.4 g/dL / Pro: 5.7 g/dL / ALK PHOS: 106 U/L / ALT: 30 U/L / AST: 29 U/L / GGT: x           Urinalysis Basic - ( 14 Oct 2020 20:01 )    Color: Yellow / Appearance: Slightly Turbid / S.015 / pH: x  Gluc: x / Ketone: Trace  / Bili: Negative / Urobili: 1   Blood: x / Protein: 15 / Nitrite: Negative   Leuk Esterase: Trace / RBC: 0-2 /HPF / WBC 0-2   Sq Epi: x / Non Sq Epi: Moderate / Bacteria: Few

## 2020-10-15 NOTE — DIETITIAN INITIAL EVALUATION ADULT. - PROBLEM SELECTOR PLAN 9
VTE ppx: heparin subQ until return of renal function    BB IMPROVE VTE Individual Risk Assessment        RISK                                                          Points  [  ] Previous VTE                                                3  [  ] Thrombophilia                                             2  [  ] Lower limb paralysis                                   2        (unable to hold up >15 seconds)    [  ] Current Cancer                                            2         (within 6 months)  [  ] Immobilization > 24 hrs                              1  [  ] ICU/CCU stay > 24 hours                            1  [  ] Age > 60                                                    1  IMPROVE VTE Score ____2_____

## 2020-10-16 LAB
ANION GAP SERPL CALC-SCNC: 7 MMOL/L — SIGNIFICANT CHANGE UP (ref 5–17)
BUN SERPL-MCNC: 26 MG/DL — HIGH (ref 7–23)
CALCIUM SERPL-MCNC: 8.2 MG/DL — LOW (ref 8.5–10.1)
CHLORIDE SERPL-SCNC: 104 MMOL/L — SIGNIFICANT CHANGE UP (ref 96–108)
CO2 SERPL-SCNC: 30 MMOL/L — SIGNIFICANT CHANGE UP (ref 22–31)
CREAT SERPL-MCNC: 0.77 MG/DL — SIGNIFICANT CHANGE UP (ref 0.5–1.3)
CULTURE RESULTS: SIGNIFICANT CHANGE UP
GLUCOSE SERPL-MCNC: 130 MG/DL — HIGH (ref 70–99)
HCT VFR BLD CALC: 27 % — LOW (ref 34.5–45)
HGB BLD-MCNC: 8.9 G/DL — LOW (ref 11.5–15.5)
MCHC RBC-ENTMCNC: 28.8 PG — SIGNIFICANT CHANGE UP (ref 27–34)
MCHC RBC-ENTMCNC: 33 GM/DL — SIGNIFICANT CHANGE UP (ref 32–36)
MCV RBC AUTO: 87.4 FL — SIGNIFICANT CHANGE UP (ref 80–100)
NRBC # BLD: 0 /100 WBCS — SIGNIFICANT CHANGE UP (ref 0–0)
PLATELET # BLD AUTO: 315 K/UL — SIGNIFICANT CHANGE UP (ref 150–400)
POTASSIUM SERPL-MCNC: 4.2 MMOL/L — SIGNIFICANT CHANGE UP (ref 3.5–5.3)
POTASSIUM SERPL-SCNC: 4.2 MMOL/L — SIGNIFICANT CHANGE UP (ref 3.5–5.3)
RBC # BLD: 3.09 M/UL — LOW (ref 3.8–5.2)
RBC # FLD: 16 % — HIGH (ref 10.3–14.5)
SODIUM SERPL-SCNC: 141 MMOL/L — SIGNIFICANT CHANGE UP (ref 135–145)
SPECIMEN SOURCE: SIGNIFICANT CHANGE UP
WBC # BLD: 8.81 K/UL — SIGNIFICANT CHANGE UP (ref 3.8–10.5)
WBC # FLD AUTO: 8.81 K/UL — SIGNIFICANT CHANGE UP (ref 3.8–10.5)

## 2020-10-16 PROCEDURE — 99233 SBSQ HOSP IP/OBS HIGH 50: CPT

## 2020-10-16 PROCEDURE — 99232 SBSQ HOSP IP/OBS MODERATE 35: CPT

## 2020-10-16 RX ADMIN — LAMOTRIGINE 200 MILLIGRAM(S): 25 TABLET, ORALLY DISINTEGRATING ORAL at 11:59

## 2020-10-16 RX ADMIN — Medication 1 MILLIGRAM(S): at 11:59

## 2020-10-16 RX ADMIN — DULOXETINE HYDROCHLORIDE 30 MILLIGRAM(S): 30 CAPSULE, DELAYED RELEASE ORAL at 05:10

## 2020-10-16 RX ADMIN — Medication 200 MILLIGRAM(S): at 21:53

## 2020-10-16 RX ADMIN — DULOXETINE HYDROCHLORIDE 30 MILLIGRAM(S): 30 CAPSULE, DELAYED RELEASE ORAL at 21:53

## 2020-10-16 RX ADMIN — Medication 3 MILLIGRAM(S): at 21:53

## 2020-10-16 RX ADMIN — Medication 1 TABLET(S): at 11:59

## 2020-10-16 RX ADMIN — Medication 300 MILLIGRAM(S): at 11:59

## 2020-10-16 RX ADMIN — DULOXETINE HYDROCHLORIDE 30 MILLIGRAM(S): 30 CAPSULE, DELAYED RELEASE ORAL at 13:41

## 2020-10-16 RX ADMIN — PREGABALIN 1000 MICROGRAM(S): 225 CAPSULE ORAL at 11:59

## 2020-10-16 RX ADMIN — Medication 5000 UNIT(S): at 11:58

## 2020-10-16 NOTE — PROGRESS NOTE ADULT - SUBJECTIVE AND OBJECTIVE BOX
[INTERVAL HX: ]  Patient seen and examined;  Chart reviewed and events noted;   Cr better.   Feels better.   PO food intake suboptimal.   States drinking fluids.   Discussed with pt needs to hydrate.       Patient is a 60y Female with a known history of :  Acute renal failure [N17.9]    History of aortic aneurysm [Z86.79]    H/O aortic aneurysm repair [Z98.890]    Arthritis [M19.90]    Degenerative disc disease, lumbar [M51.36]    Neuropathy [G62.9]    Spinal stenosis [M48.00]    Bipolar 1 disorder [F31.9]    Kidney stone [592.0]    Diabetes Mellitus Type II [250.00]    Sleep Apnea [780.57]    Asthma [493.90]    Hypertension [401.9]    Obesity [278.00]    Morbid Obesity [278.01]    Drug Abuse [305.90]    ETOH Abuse [305.00]    Arrhythmia [427.9]    Benign Essential Tremor [333.1]    Anxiety [300.00]    Depression [311]    Renal Calculi [592.0]    Colitis [558.9]    Anemia [285.9]    Diabetes [250.00]    Sleep Apnea [780.57]    Asthma [493.90]    S/P panniculectomy [Z98.890]    S/P cardiac catheterization [V45.89]    S/P dilation and curettage [V45.89]    History of tonsillectomy [V45.89]    History of laparoscopic adjustable gastric banding [V45.86]    S/P D&amp;C [V45.89]    Ureteral stent [593.4]    ERCP &amp; removal of biliary stent [574.20]    Biliary stent placement &amp; ercp [574.20]    Cholelithiasis [574.20]    History of Tonsillectomy [V45.89]    ureteroscopy with stone removal [592.0]      HPI:  61 yo F with PMH of JESUS on CPAP, HTN, Obesity, Anemia, AAA, Arthritis, Depression, Anxiety, and Kidney stones presents to ED from Heme/Onc for sweating and lightheadedness. Hematologist was concerned for PE considering pt's recent complicated surgical procedure. Pt was discharged on 10/10/20 from post surgical complications of hemorraghic shock requiring transfer to ICU for blood transfusions s/p panniculectomy. Pt's sweating and lightheadedness came on suddenly when pt was sitting in the waiting room and lasted for 30 min. Symptoms have not recurred. Pt has EDWIN drain in right abdomen currently draining small amount of blood. Pt says post surgical wounds have frequently drenching bandaging with blood. Pt denies any recent medication changes or illness. Pt denies chest pain, sob, palpitations, abdominal pain, leg pain, nausea, weakness. She feels better with IV fluids.    VS: T 98.3F HR 99 /91 RR 18 SpO2 98% on RA  Labs: Hbg 8.3 PT 14.6 INR 1.26 D-Dimer 828 Lactate 3.3 Na 132 BUN 65 Cr 2.2 ALK phos 141 Trop negative   CXR: negative for acute pulmonary process  CT Chest/Abd/Pelvic: Postsurgical changes without discrete drainable collection remaining. Small area of reverse halo sign in the basilar left lower lobe  EKG: NSR with Left axis deviation    In ED received 1L NS (13 Oct 2020 18:47)        MEDICATIONS  (STANDING):  allopurinol 300 milliGRAM(s) Oral daily  cholecalciferol 5000 Unit(s) Oral daily  cyanocobalamin 1000 MICROGram(s) Oral daily  DULoxetine 30 milliGRAM(s) Oral <User Schedule>  folic acid 1 milliGRAM(s) Oral daily  lamoTRIgine 200 milliGRAM(s) Oral daily  melatonin 3 milliGRAM(s) Oral at bedtime  multivitamin 1 Tablet(s) Oral daily  traZODone 200 milliGRAM(s) Oral at bedtime    MEDICATIONS  (PRN):  acetaminophen   Tablet .. 650 milliGRAM(s) Oral daily PRN Mild Pain (1 - 3)  ALPRAZolam 1 milliGRAM(s) Oral two times a day PRN anxiety      Vital Signs Last 24 Hrs  T(C): 36.8 (16 Oct 2020 12:52), Max: 37.8 (15 Oct 2020 17:06)  T(F): 98.3 (16 Oct 2020 12:52), Max: 100 (15 Oct 2020 17:06)  HR: 87 (16 Oct 2020 12:52) (78 - 87)  BP: 115/80 (16 Oct 2020 12:52) (103/66 - 151/77)  BP(mean): --  RR: 20 (16 Oct 2020 12:52) (17 - 20)  SpO2: 96% (16 Oct 2020 12:52) (92% - 98%)      [PHYSICAL EXAM]  General: adult in NAD,  WN,  WD.  HEENT: clear oropharynx, anicteric sclera, pink conjunctivae.  Neck: supple, no masses.  CV: normal S1S2, no murmur, no rubs, no gallops.  Lungs: clear to auscultation, no wheezes, no rales, no rhonchi.  Abdomen: soft, non-tender, non-distended, no hepatosplenomegaly, normal BS, no guarding. EDWIN drain with serous sanginous drainage  Ext: no clubbing, no cyanosis, trace BL edema.  Skin: no rashes,  no petechiae, no venous stasis changes.  Neuro: alert and oriented X3  , no focal motor deficits.  LN: no SC DIANA.      [LABS:]                        8.9    8.81  )-----------( 315      ( 16 Oct 2020 07:55 )             27.0     10-16    141  |  104  |  26<H>  ----------------------------<  130<H>  4.2   |  30  |  0.77    Ca    8.2<L>      16 Oct 2020 07:55    TPro  5.7<L>  /  Alb  1.4<L>  /  TBili  0.6  /  DBili  x   /  AST  29  /  ALT  30  /  AlkPhos  106  10-15                [RADIOLOGY STUDIES:]

## 2020-10-16 NOTE — PROGRESS NOTE ADULT - SUBJECTIVE AND OBJECTIVE BOX
[facility] -- Almas Faustin, Cara Nunez, Saurabh Johansen Savella, [place]  Office # [phone]    Follow Up:    shortness of breath  Subjective/Observations:   Patient seen and examined with no complaints of chest pain or shortness of breath.     REVIEW OF SYSTEMS: All other review of systems is negative unless indicated above  PAST MEDICAL & SURGICAL HISTORY:  History of aortic aneurysm  descending aorta see CT  Arthritis  Degenerative disc disease, lumbar  Neuropathy  Spinal stenosis  Bipolar 1 disorder  Kidney stone  Sleep Apnea  CPAP with oxygen since June 2020  Asthma  Hypertension  Morbid Obesity  ETOH Abuse  H/O  Benign Essential Tremor  Anxiety  Depression  Renal Calculi  chronic  Colitis  H/O  Anemia  S/P panniculectomy  S/P cardiac catheterization  S/P dilation and curettage  History of tonsillectomy  History of laparoscopic adjustable gastric banding  band removed few yrs ago  S/P D&amp;C  Biliary stent placement &amp; ercp  ureteroscopy with stone removal  1-      MEDICATIONS  (STANDING):  allopurinol 300 milliGRAM(s) Oral daily  cholecalciferol 5000 Unit(s) Oral daily  cyanocobalamin 1000 MICROGram(s) Oral daily  DULoxetine 30 milliGRAM(s) Oral <User Schedule>  folic acid 1 milliGRAM(s) Oral daily  lamoTRIgine 200 milliGRAM(s) Oral daily  melatonin 3 milliGRAM(s) Oral at bedtime  multivitamin 1 Tablet(s) Oral daily  traZODone 200 milliGRAM(s) Oral at bedtime    MEDICATIONS  (PRN):  acetaminophen   Tablet .. 650 milliGRAM(s) Oral daily PRN Mild Pain (1 - 3)  ALPRAZolam 1 milliGRAM(s) Oral two times a day PRN anxiety    Allergies    penicillins (Other)  trees dogs cats cockaroaches (Rhinitis; Rhinorrhea)    Intolerances      Vital Signs Last 24 Hrs  T(C): 37 (16 Oct 2020 05:21), Max: 37.8 (15 Oct 2020 17:06)  T(F): 98.6 (16 Oct 2020 05:21), Max: 100 (15 Oct 2020 17:06)  HR: 80 (16 Oct 2020 05:21) (72 - 83)  BP: 151/77 (16 Oct 2020 05:21) (99/64 - 151/77)  BP(mean): --  RR: 18 (16 Oct 2020 05:21) (17 - 18)  SpO2: 94% (16 Oct 2020 05:21) (92% - 98%)  I&O's Summary    15 Oct 2020 07:01  -  16 Oct 2020 07:00  --------------------------------------------------------  IN: 0 mL / OUT: 680 mL / NET: -680 mL       PHYSICAL EXAM:  TELE: normal sinus with a first degree heart block  Constitutional: moribly obese, NAD, awake and alert, well-developed  HEENT: Moist Mucous Membranes, Anicteric  Pulmonary: breathing is non-labored  Cardiovascular: Regular, S1 and S2, No murmurs, rubs, gallops or clicks  Gastrointestinal: Bowel Sounds present, soft, nontender.   Lymph: No peripheral edema. No lymphadenopathy.  Skin: No visible rashes or ulcers.  Psych:  Mood & affect appropriate    LABS: All Labs Reviewed:                        8.9    8.81  )-----------( 315      ( 16 Oct 2020 07:55 )             27.0                         7.9    7.36  )-----------( 309      ( 15 Oct 2020 07:01 )             23.6                         7.2    8.30  )-----------( 279      ( 14 Oct 2020 06:57 )             22.2     16 Oct 2020 07:55    141    |  104    |  26     ----------------------------<  130    4.2     |  30     |  0.77   15 Oct 2020 07:01    138    |  103    |  41     ----------------------------<  148    4.0     |  31     |  1.00   14 Oct 2020 06:57    136    |  100    |  61     ----------------------------<  154    4.4     |  27     |  1.70     Ca    8.2        16 Oct 2020 07:55  Ca    7.8        15 Oct 2020 07:01  Ca    8.2        14 Oct 2020 06:57    TPro  5.7    /  Alb  1.4    /  TBili  0.6    /  DBili  x      /  AST  29     /  ALT  30     /  AlkPhos  106    15 Oct 2020 07:01  TPro  5.8    /  Alb  1.4    /  TBili  0.6    /  DBili  x      /  AST  36     /  ALT  38     /  AlkPhos  123    14 Oct 2020 06:57  TPro  6.2    /  Alb  1.4    /  TBili  0.7    /  DBili  x      /  AST  50     /  ALT  52     /  AlkPhos  141    13 Oct 2020 13:28      CT Abdomen and Pelvis No Cont (10.13.20 @ 16:17) >  EXAM:  CT ABDOMEN AND PELVIS                          EXAM:  CT CHEST                            PROCEDURE DATE:  10/13/2020          INTERPRETATION:  CT CHEST, CT ABDOMEN AND PELVIS    CLINICAL INFORMATION: Shortness of breath after abdominal wallresection, abdominal abscess infection    COMPARISON: Same day chest x-ray, CT abdomen and pelvis 9/30/2020, chest CT 6/16/2020    PROCEDURE:  CT of the Chest, Abdomen and Pelvis was performed without intravenous contrast.  Intravenous contrast: None  Oral contrast: None.  Sagittal and coronal reformats were performed.    FINDINGS:    CHEST:    LUNGS, AIRWAYS: The central airways are patent. Small area of reverse halo sign in the basilar left lower lobe.  PLEURA: No pleural abnormality.  VESSELS:Stable 4.6 cm ascending thoracic aorta.  HEART: Normal heart size. No pericardial effusion. Coronary artery calcifications are present.  MEDIASTINUM AND MILADIS: No adenopathy.  CHEST WALL: No masses.    ABDOMEN AND PELVIS:    LIVER: Normal.  BILE DUCTS: Nondilated.  GALLBLADDER: Prior cholecystectomy.  SPLEEN: Normal.  PANCREAS: Normal.  ADRENALS: Normal.  KIDNEYS/URETERS: Nonobstructing right kidney stones.    BLADDER: Underdistended limiting evaluation.  REPRODUCTIVE ORGANS: No uterine or adnexalabnormality. IUD in place.    BOWEL: No bowel-related abnormality. Specifically, no evidence of acute diverticulitis. Normal appendix and ileocecal region. No bowel obstruction or bowel inflammation.  PERITONEUM: No intra-abdominal fluid collection.  VESSELS:  Normal caliber aorta.  RETROPERITONEUM/LYMPH NODES: No adenopathy.  ABDOMINAL WALL: Postsurgical changes without discrete drainable collection.  BONES: No acute bony abnormality.    IMPRESSION:    Postsurgical changes without discrete drainable collection remaining.    Small area of reverse halo sign in the basilar left lower lobe      NADINE STEWART M.D., ATTENDING RADIOLOGIST  This document has been electronically signed. Oct 13 2020  5:25PM

## 2020-10-16 NOTE — PROGRESS NOTE ADULT - PROBLEM SELECTOR PLAN 4
-had panniculectomy on 9/25 with Dr. De La Torre (consulted, recs appreciated)  -pt still had blood from drain and from site of past EDWIN drain. There was some dehiscence of the incision now where it had been draining the hematoma, as per surgery, and surgical team placed a wound vac over the wound. CM notified to arrange for home wound vac and home care.   -surgery (Wil), recs appreciated  -incentive spirometry

## 2020-10-16 NOTE — PROGRESS NOTE ADULT - SUBJECTIVE AND OBJECTIVE BOX
Patient is a 60y old  Female who presents with a chief complaint of ELAINE and vasovagal episode (14 Oct 2020 13:19)  Patient seen in follow up for ELAINE.     No new complaints.        PAST MEDICAL HISTORY:  History of aortic aneurysm    H/O aortic aneurysm repair    Arthritis    Degenerative disc disease, lumbar    Neuropathy    Spinal stenosis    Bipolar 1 disorder    Kidney stone    Diabetes Mellitus Type II    Sleep Apnea    Asthma    Hypertension    Obesity    Morbid Obesity    Drug Abuse    ETOH Abuse    Arrhythmia    Benign Essential Tremor    Anxiety    Depression    Renal Calculi    Colitis    Anemia    Diabetes    Sleep Apnea    Asthma      MEDICATIONS  (STANDING):  allopurinol 300 milliGRAM(s) Oral daily  cholecalciferol 5000 Unit(s) Oral daily  cyanocobalamin 1000 MICROGram(s) Oral daily  DULoxetine 30 milliGRAM(s) Oral <User Schedule>  folic acid 1 milliGRAM(s) Oral daily  lamoTRIgine 200 milliGRAM(s) Oral daily  melatonin 3 milliGRAM(s) Oral at bedtime  multivitamin 1 Tablet(s) Oral daily  traZODone 200 milliGRAM(s) Oral at bedtime    MEDICATIONS  (PRN):  acetaminophen   Tablet .. 650 milliGRAM(s) Oral daily PRN Mild Pain (1 - 3)  ALPRAZolam 1 milliGRAM(s) Oral two times a day PRN anxiety    T(C): 36.8 (10-16-20 @ 12:52), Max: 37.8 (10-15-20 @ 17:06)  HR: 87 (10-16-20 @ 12:52) (72 - 95)  BP: 115/80 (10-16-20 @ 12:52) (99/64 - 151/77)  RR: 20 (10-16-20 @ 12:52)  SpO2: 96% (10-16-20 @ 12:52)  Wt(kg): --  I&O's Detail    15 Oct 2020 07:01  -  16 Oct 2020 07:00  --------------------------------------------------------  IN:  Total IN: 0 mL    OUT:    Bulb (mL): 80 mL    Voided (mL): 600 mL  Total OUT: 680 mL    Total NET: -680 mL          PHYSICAL EXAM:  General: No distress  Respiratory: b/l air entry  Cardiovascular: S1 S2  Gastrointestinal: soft, obese, + drains  Extremities:  no edema          LABORATORY:                        8.9    8.81  )-----------( 315      ( 16 Oct 2020 07:55 )             27.0     10-16    141  |  104  |  26<H>  ----------------------------<  130<H>  4.2   |  30  |  0.77    Ca    8.2<L>      16 Oct 2020 07:55    TPro  5.7<L>  /  Alb  1.4<L>  /  TBili  0.6  /  DBili  x   /  AST  29  /  ALT  30  /  AlkPhos  106  10-15    Sodium, Serum: 141 mmol/L (10-16 @ 07:55)  Sodium, Serum: 138 mmol/L (10-15 @ 07:01)    Potassium, Serum: 4.2 mmol/L (10-16 @ 07:55)  Potassium, Serum: 4.0 mmol/L (10-15 @ 07:01)    Hemoglobin: 8.9 g/dL (10-16 @ 07:55)  Hemoglobin: 7.9 g/dL (10-15 @ 07:01)  Hemoglobin: 7.2 g/dL (10-14 @ 06:57)    Creatinine, Serum 0.77 (10-16 @ 07:55)  Creatinine, Serum 1.00 (10-15 @ 07:01)  Creatinine, Serum 1.70 (10-14 @ 06:57)        LIVER FUNCTIONS - ( 15 Oct 2020 07:01 )  Alb: 1.4 g/dL / Pro: 5.7 g/dL / ALK PHOS: 106 U/L / ALT: 30 U/L / AST: 29 U/L / GGT: x           Urinalysis Basic - ( 14 Oct 2020 20:01 )    Color: Yellow / Appearance: Slightly Turbid / S.015 / pH: x  Gluc: x / Ketone: Trace  / Bili: Negative / Urobili: 1   Blood: x / Protein: 15 / Nitrite: Negative   Leuk Esterase: Trace / RBC: 0-2 /HPF / WBC 0-2   Sq Epi: x / Non Sq Epi: Moderate / Bacteria: Few

## 2020-10-16 NOTE — PROGRESS NOTE ADULT - PROBLEM SELECTOR PLAN 10
VTE ppx: holding A/C given continued blood draining from abdomen and worsening anemia -- if drainage decreases and H&H stable, will consider restarting pharmacologic VTE ppx  -SCDs

## 2020-10-16 NOTE — PROGRESS NOTE ADULT - PROBLEM/PLAN-2
Wound RN Visit (15minutes)    Reason for visit: wound RN recheck     Wound background: Following patient for buttocks wound. Improvement in size with minimal drainage.     Assessment: See Epic Wound Flowsheet    Recommendations:   Continue current wound care recommendations.      DISPLAY PLAN FREE TEXT

## 2020-10-16 NOTE — CHART NOTE - NSCHARTNOTEFT_GEN_A_CORE
Discussed with patient the CT chest finding of left lower lobe reverse halo opacity. There is no clinical evidence to suggest respiratory infection. Will schedule repeat Chest CAT scan in 3 months following hospital discharge. No indication for antibiotics at this time

## 2020-10-16 NOTE — PROGRESS NOTE ADULT - PROBLEM SELECTOR PLAN 3
pt has chronic anemia - managed by Dr Aguilar, but also had recent acute blood loss anemia s/p panniculectomy and is still draining blood from abdomen.   -Vitals stable  -given 1un PRBC on 10/14 and 2nd unit PRBC given on 10/15. Appropriate response in H&H to the second unit PRBC.  -CT abd/pelvic (without IV contrast) did not visualize significant hematoma   -Home supplement [employer] Formula iron that pt claims is only one that works for her is not held at pharmacy here   -heme ([facility]) consulting, recs appreciated  -rechecked iron studies and ferritin now quite high (likely in part due to being acute phase reactant and pt with significant signs of inflammatory state (ESR of 121) -- the inflammatory state likely contributing to poor marrow response and also to pt's hypoalbuminemia. Discussed high ESR with heme/onc who mariano the test and with surgery -- rec to continue with management of the surgical wound.   -Monitor hemodynamics   -monitor CBC

## 2020-10-16 NOTE — PROGRESS NOTE ADULT - SUBJECTIVE AND OBJECTIVE BOX
SURGERY  Spectralink [id number]    Pt seen and examined. Pt denies any overnight events. Pt reports pain is well controlled. Tolerating diet. Ambulating to bathroom with assistance.    T(C): 37 (10-16-20 @ 05:21), Max: 37.8 (10-15-20 @ 17:06)  HR: 80 (10-16-20 @ 05:21) (72 - 83)  BP: 151/77 (10-16-20 @ 05:21) (99/64 - 151/77)  RR: 18 (10-16-20 @ 05:21) (17 - 18)  SpO2: 94% (10-16-20 @ 05:21) (92% - 98%)  Wt(kg): --  ICU Vital Signs Last 24 Hrs  T(C): 37 (16 Oct 2020 05:21), Max: 37.8 (15 Oct 2020 17:06)  T(F): 98.6 (16 Oct 2020 05:21), Max: 100 (15 Oct 2020 17:06)  HR: 80 (16 Oct 2020 05:21) (72 - 83)  BP: 151/77 (16 Oct 2020 05:21) (99/64 - 151/77)  BP(mean): --  ABP: --  ABP(mean): --  RR: 18 (16 Oct 2020 05:21) (17 - 18)  SpO2: 94% (16 Oct 2020 05:21) (92% - 98%)    CAPILLARY BLOOD GLUCOSE    I&O's Detail    15 Oct 2020 07:01  -  16 Oct 2020 07:00  --------------------------------------------------------  IN:  Total IN: 0 mL    OUT:    Bulb (mL): 80 mL    Voided (mL): 600 mL  Total OUT: 680 mL    Total NET: -680 mL    Physical exam: Pt sitting comfortably in bed in NAD  Chest- CTA bilaterally   CV- S1 & S2, RRR  Abdomen- Soft, non-tender, non-distended. Lower abdominal incision left lateral region with thick serosanguinous drainage and old clots expressed- ~7 cm opening to incision down to fascia. Left old drain site with similar drainage and old foul smelling drainage expressed, wounds packed with wet to dry lydia packing and covered with 4x4's and ABD's. Right drain site with serosanguinous drainage from around drain site. No evidence of erythema, (+) maceration of skin edges. Binder re-applied.  Incision-    LABS:                        8.9    8.81  )-----------( 315      ( 16 Oct 2020 07:55 )             27.0     10-16    141  |  104  |  26<H>  ----------------------------<  130<H>  4.2   |  30  |  0.77    Ca    8.2<L>      16 Oct 2020 07:55    TPro  5.7<L>  /  Alb  1.4<L>  /  TBili  0.6  /  DBili  x   /  AST  29  /  ALT  30  /  AlkPhos  106  10-15    PT/INR - ( 14 Oct 2020 12:36 )   PT: 14.4 sec;   INR: 1.24 ratio         PTT - ( 14 Oct 2020 12:36 )  PTT:34.0 sec  Urinalysis Basic - ( 14 Oct 2020 20:01 )    Color: Yellow / Appearance: Slightly Turbid / S.015 / pH: x  Gluc: x / Ketone: Trace  / Bili: Negative / Urobili: 1   Blood: x / Protein: 15 / Nitrite: Negative   Leuk Esterase: Trace / RBC: 0-2 /HPF / WBC 0-2   Sq Epi: x / Non Sq Epi: Moderate / Bacteria: Few      Culture - Blood (collected 13 Oct 2020 19:10)  Source: .Blood Blood  Preliminary Report (14 Oct 2020 20:01):    No growth to date.    Culture - Blood (collected 13 Oct 2020 19:10)  Source: .Blood Blood  Preliminary Report (14 Oct 2020 20:01):    No growth to date.    RADIOLOGY & ADDITIONAL STUDIES:    < from: US Duplex Venous Lower Ext Complete, Bilateral (10.14.20 @ 08:44) >  IMPRESSION:  No evidence of deep venous thrombosis in either proximal lower extremity.    Only the proximal posterior tibial veins are visualized bilaterally within the calves.    < end of copied text >        HPI:  61 yo F with PMH of JESUS on CPAP, HTN, Obesity, Anemia, AAA, Arthritis, Depression, Anxiety, and Kidney stones presents to ED from Heme/Onc for sweating and lightheadedness. Pt was discharged on 10/10/20 from post surgical complications of hemorraghic shock requiring transfer to ICU for blood transfusions s/p panniculectomy on 20, afebrile, VSS, off anitbiotics, chemical anticoagulation held in light of acute blood loss anemia, renal function normalized after hydration/volume repletion.      -s/p 1 unit PRBC's yesterday with adequate response- H/H stable  -Continue DVT Prophylaxis with SCD's   -Continue IV Antibiotics  -Continue Incentive Spirometry  -Continue analgesia  -Encourage OOB/ambulation with assistance- OOB for all meals  -Continue daily dressing changes/wound care, possible need for vac therapy- will discuss with Dr. De La Torre

## 2020-10-16 NOTE — PROGRESS NOTE ADULT - SUBJECTIVE AND OBJECTIVE BOX
Patient is a 60y old  Female who presents with a chief complaint of diaphoresis, lightheadedness.     INTERVAL HPI/OVERNIGHT EVENTS: Pt states she feels "alright." Reports generalized weakness. Admitted to having bloody drainage around the dressing of her abdomen. Denies fever, chills, dysuria, SOB, CP, abd pain.   Surgical team evaluated the wound today and there was some dehiscence on the incision on the left side and thus surgical team removed the last EDWIN drain and placed a wound vac across the abdominal incision.     MEDICATIONS  (STANDING):  allopurinol 300 milliGRAM(s) Oral daily  cholecalciferol 5000 Unit(s) Oral daily  cyanocobalamin 1000 MICROGram(s) Oral daily  DULoxetine 30 milliGRAM(s) Oral <User Schedule>  folic acid 1 milliGRAM(s) Oral daily  lamoTRIgine 200 milliGRAM(s) Oral daily  melatonin 3 milliGRAM(s) Oral at bedtime  multivitamin 1 Tablet(s) Oral daily  traZODone 200 milliGRAM(s) Oral at bedtime    MEDICATIONS  (PRN):  acetaminophen   Tablet .. 650 milliGRAM(s) Oral daily PRN Mild Pain (1 - 3)  ALPRAZolam 1 milliGRAM(s) Oral two times a day PRN anxiety      Allergies    penicillins (Other)  trees dogs cats cockaroaches (Rhinitis; Rhinorrhea)    Intolerances        REVIEW OF SYSTEMS:  CONSTITUTIONAL: +generalized weakness, poor appetite, No fever or chills  HEENT:  No headache, no sore throat  RESPIRATORY: No cough, wheezing, or shortness of breath  CARDIOVASCULAR: No chest pain, palpitations  GASTROINTESTINAL: +bloody drainage along prior drain line of surgical incision on abdomen; No abd pain, nausea, vomiting, or diarrhea  GENITOURINARY: No dysuria, frequency, or hematuria  NEUROLOGICAL: no focal weakness ; no dizziness   MUSCULOSKELETAL: no myalgias     Vital Signs Last 24 Hrs  T(C): 36.8 (16 Oct 2020 12:52), Max: 37.1 (15 Oct 2020 21:29)  T(F): 98.3 (16 Oct 2020 12:52), Max: 98.7 (15 Oct 2020 21:29)  HR: 87 (16 Oct 2020 12:52) (78 - 87)  BP: 115/80 (16 Oct 2020 12:52) (103/66 - 151/77)  BP(mean): --  RR: 20 (16 Oct 2020 12:52) (17 - 20)  SpO2: 96% (16 Oct 2020 12:52) (92% - 98%)    PHYSICAL EXAM:  GENERAL: NAD, morbidly obese  HEENT: anicteric, moist mucous membranes  CHEST/LUNG:  CTA b/l, no rales, wheezes, or rhonchi  HEART:  RRR, S1, S2  ABDOMEN:  BS+, soft, nontender, nondistended; wound vac across lower abdomen covering the incision; no erythema, warmth, or tenderness on the skin surrounding the wound vac  EXTREMITIES: trace lower extremity edema, no cyanosis, or calf tenderness  NERVOUS SYSTEM: answers questions and follows commands appropriately    LABS:                        8.9    8.81  )-----------( 315      ( 16 Oct 2020 07:55 )             27.0     CBC Full  -  ( 16 Oct 2020 07:55 )  WBC Count : 8.81 K/uL  Hemoglobin : 8.9 g/dL  Hematocrit : 27.0 %  Platelet Count - Automated : 315 K/uL  Mean Cell Volume : 87.4 fl  Mean Cell Hemoglobin : 28.8 pg  Mean Cell Hemoglobin Concentration : 33.0 gm/dL  Auto Neutrophil # : x  Auto Lymphocyte # : x  Auto Monocyte # : x  Auto Eosinophil # : x  Auto Basophil # : x  Auto Neutrophil % : x  Auto Lymphocyte % : x  Auto Monocyte % : x  Auto Eosinophil % : x  Auto Basophil % : x    16 Oct 2020 07:55    141    |  104    |  26     ----------------------------<  130    4.2     |  30     |  0.77     Ca    8.2        16 Oct 2020 07:55          CAPILLARY BLOOD GLUCOSE            Culture - Urine (collected 10-15-20 @ 01:09)  Source: .Urine Clean Catch (Midstream)  Final Report (10-16-20 @ 13:23):    >=3 organisms. Probable collection contamination.    Culture - Blood (collected 10-13-20 @ 19:10)  Source: .Blood Blood  Preliminary Report (10-14-20 @ 20:01):    No growth to date.    Culture - Blood (collected 10-13-20 @ 19:10)  Source: .Blood Blood  Preliminary Report (10-14-20 @ 20:01):    No growth to date.        RADIOLOGY & ADDITIONAL TESTS:    Personally reviewed.     Consultant(s) Notes Reviewed:  [x] YES  [ ] NO

## 2020-10-16 NOTE — PROGRESS NOTE ADULT - PROBLEM SELECTOR PLAN 2
Sudden onset of sweating and dizziness for 30 min while seated. This is consistent with a vasovagal episode likely due to general hypotension in setting of anemia and poor oncotic pressure. Likely had an orthostatic component, as well.   - PE less likely and unable to perform CTA Chest safely due to ELAINE  - Pt had hx of a-fib in the setting of significant hypotension on prior admission per Cardiology  -EKG shows NSR with no acute changes   -Low-normal LV function with mild diastolic dysfunction on recent echocardiogram  -orthostatics are normal now  -Cardio consulted Dr Giordano group, recs appreciated - continue to hold anti-hypertensives.

## 2020-10-16 NOTE — PROGRESS NOTE ADULT - ASSESSMENT
[ASSESSMENT and  PLAN]  Admitted for ELAINE.   Anemia worse, post hydration.     61yo F w/ PMH of JESUS on CPAP, HTN, morbid obesity, anemia, AAA, Arthritis, Depression, anxiety presented to [facility] for elective abdominal panniculectomy, admitted last admission post-op with hemorrhagic shock s/p 3U PRBC with stabilization, now course c/b fever.  Hgb 11.9, pre-surgery with post procedure Hgb 8.4  Had Fever post op, and placed on abx.   PO intake was poor and given PO VitK for coagulopathy.     Dx last admission with brief episode new onset Afib.   In Sinus now    regarding anemia  Chronic anemia for many years, not ever required transfusions; chronic skin lesions, also with persistent nausea and vomiting for several months prior to initial eval  found to have mild iron deficiency and mild B12 deficiency     Prior hospitalized in 2/2016 at [facility] for gastric perforation due to gastric sleeve, s/p removal, repair of perforation   hx enterocutaneous fistula since healed.     Had contemplatedgastric sleeve again in 1/2019 but ultimately deferred.  Planned panniculectomy  3/2020 which was cancelled due to COVID-19  Now s/p procedure last admissin.       Hgb 13.1, Ferritin 76 on 07/23/20  Hgb 13.2 on PST on 09/14/20.     Cr improving with hydration.     fe studies c/w acute/chronic disease most likely related to abdominal wound  ESR and CRP high.     INR 1.24      RECOMMENDATIONS  PO hydration.   Cardiology followup for Afib.    Encourage PO flood.   Ambulation to precent DVT    Transfuse PRBC as clinically indicated.   Transfuse PRBC if Hgb <7.0 or if symptomatic.   Follow CBC    No indication for  for procrit or IV iron  Reassess outpt.    continue surgical management    Thank you for consulting us.   No additional recommendations at current time.   Will sign off on case for now.   Please call, or re-consult if needed.

## 2020-10-16 NOTE — PROGRESS NOTE ADULT - ASSESSMENT
60 year old female with PMH of JESUS on CPAP, HTN, morbid obesity, anemia, AAA, Arthritis, Depression, anxiety recently admitted to [facility] for elective abdominal panniculectomy, admitted post-op with hemorrhagic shock s/p 3unit prbc, discharged on 10/10  now readmitted with complaint of weakness diaphoresis while, admitted with ELAINE, now resolved.    Near syncope  normal sinus rhythm with BP that is normal  -possibly vasovagal    Anemia   -HGB and HCT are stable at this time s/p 3 units PRBC  - CT abdomen pelvis unremarkable   - Continue to trend CBC as per primary team    Paroxysmal atrial fibrillation:  -elevated CHADSVASC score  -in setting of hemorrhagic shock, would hold off on anticoagulation, there was a brief episode of afib on prior admission  - Patient is in NSR at [facility] time  -consider ILR  as outpatient    HTN  -home meds are irbesartan 150 and verapamil 240  - hold off on antihypertensives at this time, can resume when BP improves  - low normal LV function with mild diastolic dysfunction on recent echocardiogram  - recent cath with non-obs cad  - no sign of volume overload    Acute Kidney Injury   - resolved  -creatinine 0.77  - Improved after IVF  -would still hold antihypertensives for now, BP is soft  -can reassess daily    - Monitor and replete lytes, keep K>4, Mg>2.  - All other medical needs as per primary team.  - Other cardiovascular workup will depend on clinical course.  - Will continue to follow.  Marlyn Lopez, [facility]  Cardiology  [phone] 60 year old female with PMH of JESUS on CPAP, HTN, morbid obesity, anemia, AAA, Arthritis, Depression, anxiety recently admitted to [facility] [place] for elective abdominal panniculectomy, admitted post-op with hemorrhagic shock s/p 3unit prbc, discharged on 10/10  now readmitted with complaint of weakness diaphoresis while, admitted with ELAINE, now resolved.    Near syncope  normal sinus rhythm with BP that is normal  -possibly vasovagal    Anemia   -HGB and HCT are stable at this time s/p 3 units PRBC  - CT abdomen pelvis unremarkable   - Continue to trend CBC as per primary team    Paroxysmal atrial fibrillation:  -elevated CHADSVASC score  -in setting of hemorrhagic shock, would hold off on anticoagulation, there was a brief episode of afib on prior admission  - Patient is in NSR at [facility] time  -consider ILR  as outpatient  -hold verapamil 240    HTN  -home meds are irbesartan 150 and verapamil 240  - hold off on antihypertensives at this time, can resume when BP improves  - low normal LV function with mild diastolic dysfunction on recent echocardiogram  - recent cath with non-obs cad  - no sign of volume overload    Acute Kidney Injury   - resolved  -creatinine 0.77  - Improved after IVF  -would still hold antihypertensives for now, BP is soft  -hold diuretic therapy, euvolemic on exam  -can reassess daily    - Monitor and replete lytes, keep K>4, Mg>2.  - All other medical needs as per primary team.  - Other cardiovascular workup will depend on clinical course.  - Will continue to follow.  Marlyn Lopez, [facility]  Cardiology  [phone]

## 2020-10-16 NOTE — PROGRESS NOTE ADULT - PROBLEM SELECTOR PLAN 6
-describes autoPAP at 5-15  -states that she is currently on CPAP qhs at pressure of 10.  -continue NIPPV with CPAP qhs  -Pulm consulted Dr. Hanson, regarding the CPAP and also to weigh on pt's incidental finding of reverse halo sign on admission CT. Per discussion with Dr. Hanson, no change in management currently and will have repeat CT Chest in 3 months to re-eval for resolution

## 2020-10-16 NOTE — PROGRESS NOTE ADULT - ASSESSMENT
60 year old obese white female with a history of lap band surgery, HTN, MDP,  anxiety disorder, thoracic aortic aneurysm, JESUS on CPAP, essential tremor sp paniculectomy and recently liposuction complicated with  hemorrhagic shock, lactic acidosis, with ELAINE.  Now admitted with diaphoresis and found to have ELAINE, most likely due to pre renal azotemia.     Stable renal indices. To continue current meds. Avoid IV contrast and ACEI or ARB. Will follow electrolytes and renal function trend.

## 2020-10-17 LAB
ANION GAP SERPL CALC-SCNC: 5 MMOL/L — SIGNIFICANT CHANGE UP (ref 5–17)
BUN SERPL-MCNC: 21 MG/DL — SIGNIFICANT CHANGE UP (ref 7–23)
CALCIUM SERPL-MCNC: 8.6 MG/DL — SIGNIFICANT CHANGE UP (ref 8.5–10.1)
CHLORIDE SERPL-SCNC: 104 MMOL/L — SIGNIFICANT CHANGE UP (ref 96–108)
CO2 SERPL-SCNC: 32 MMOL/L — HIGH (ref 22–31)
CREAT SERPL-MCNC: 0.77 MG/DL — SIGNIFICANT CHANGE UP (ref 0.5–1.3)
GLUCOSE SERPL-MCNC: 124 MG/DL — HIGH (ref 70–99)
HCT VFR BLD CALC: 28.7 % — LOW (ref 34.5–45)
HGB BLD-MCNC: 9.2 G/DL — LOW (ref 11.5–15.5)
MCHC RBC-ENTMCNC: 28.7 PG — SIGNIFICANT CHANGE UP (ref 27–34)
MCHC RBC-ENTMCNC: 32.1 GM/DL — SIGNIFICANT CHANGE UP (ref 32–36)
MCV RBC AUTO: 89.4 FL — SIGNIFICANT CHANGE UP (ref 80–100)
NRBC # BLD: 0 /100 WBCS — SIGNIFICANT CHANGE UP (ref 0–0)
PLATELET # BLD AUTO: 327 K/UL — SIGNIFICANT CHANGE UP (ref 150–400)
POTASSIUM SERPL-MCNC: 4.3 MMOL/L — SIGNIFICANT CHANGE UP (ref 3.5–5.3)
POTASSIUM SERPL-SCNC: 4.3 MMOL/L — SIGNIFICANT CHANGE UP (ref 3.5–5.3)
RBC # BLD: 3.21 M/UL — LOW (ref 3.8–5.2)
RBC # FLD: 15.9 % — HIGH (ref 10.3–14.5)
SODIUM SERPL-SCNC: 141 MMOL/L — SIGNIFICANT CHANGE UP (ref 135–145)
WBC # BLD: 9.45 K/UL — SIGNIFICANT CHANGE UP (ref 3.8–10.5)
WBC # FLD AUTO: 9.45 K/UL — SIGNIFICANT CHANGE UP (ref 3.8–10.5)

## 2020-10-17 PROCEDURE — 99233 SBSQ HOSP IP/OBS HIGH 50: CPT

## 2020-10-17 PROCEDURE — 99232 SBSQ HOSP IP/OBS MODERATE 35: CPT

## 2020-10-17 RX ORDER — LORATADINE 10 MG/1
10 TABLET ORAL DAILY
Refills: 0 | Status: DISCONTINUED | OUTPATIENT
Start: 2020-10-17 | End: 2020-10-20

## 2020-10-17 RX ADMIN — Medication 3 MILLIGRAM(S): at 22:50

## 2020-10-17 RX ADMIN — PREGABALIN 1000 MICROGRAM(S): 225 CAPSULE ORAL at 11:46

## 2020-10-17 RX ADMIN — Medication 1 MILLIGRAM(S): at 11:46

## 2020-10-17 RX ADMIN — LORATADINE 10 MILLIGRAM(S): 10 TABLET ORAL at 23:56

## 2020-10-17 RX ADMIN — DULOXETINE HYDROCHLORIDE 30 MILLIGRAM(S): 30 CAPSULE, DELAYED RELEASE ORAL at 17:29

## 2020-10-17 RX ADMIN — DULOXETINE HYDROCHLORIDE 30 MILLIGRAM(S): 30 CAPSULE, DELAYED RELEASE ORAL at 06:26

## 2020-10-17 RX ADMIN — LAMOTRIGINE 200 MILLIGRAM(S): 25 TABLET, ORALLY DISINTEGRATING ORAL at 11:45

## 2020-10-17 RX ADMIN — Medication 5000 UNIT(S): at 11:45

## 2020-10-17 RX ADMIN — Medication 200 MILLIGRAM(S): at 22:50

## 2020-10-17 RX ADMIN — Medication 1 TABLET(S): at 11:45

## 2020-10-17 RX ADMIN — Medication 300 MILLIGRAM(S): at 11:46

## 2020-10-17 RX ADMIN — DULOXETINE HYDROCHLORIDE 30 MILLIGRAM(S): 30 CAPSULE, DELAYED RELEASE ORAL at 22:51

## 2020-10-17 NOTE — PROGRESS NOTE ADULT - SUBJECTIVE AND OBJECTIVE BOX
[facility] -- Almas Faustin, Cara Nunez, Saurabh Johansen Savella, [place]  Office # [phone]    Follow Up:  SOB    Subjective/Observations: Sitting on the chair, on NC.  Was on nocturnal BIPAP.  c/o post nasal drip but denies cough.  Denies SOB or PARDO.  Denies chest pain or palpitations.  Denies postop pain    REVIEW OF SYSTEMS: All other review of systems is negative unless indicated above  PAST MEDICAL & SURGICAL HISTORY:  History of aortic aneurysm  descending aorta see CT    Arthritis    Degenerative disc disease, lumbar    Neuropathy    Spinal stenosis    Bipolar 1 disorder    Kidney stone    Sleep Apnea  CPAP with oxygen since June 2020    Asthma    Hypertension    Morbid Obesity    ETOH Abuse  H/O    Benign Essential Tremor    Anxiety    Depression    Renal Calculi  chronic      Colitis  H/O    Anemia    S/P panniculectomy    S/P cardiac catheterization    S/P dilation and curettage    History of tonsillectomy    History of laparoscopic adjustable gastric banding  band removed few yrs ago    S/P D&amp;C    Biliary stent placement &amp; ercp    ureteroscopy with stone removal  1-    MEDICATIONS  (STANDING):  allopurinol 300 milliGRAM(s) Oral daily  cholecalciferol 5000 Unit(s) Oral daily  cyanocobalamin 1000 MICROGram(s) Oral daily  DULoxetine 30 milliGRAM(s) Oral <User Schedule>  folic acid 1 milliGRAM(s) Oral daily  lamoTRIgine 200 milliGRAM(s) Oral daily  melatonin 3 milliGRAM(s) Oral at bedtime  multivitamin 1 Tablet(s) Oral daily  traZODone 200 milliGRAM(s) Oral at bedtime    MEDICATIONS  (PRN):  acetaminophen   Tablet .. 650 milliGRAM(s) Oral daily PRN Mild Pain (1 - 3)  ALPRAZolam 1 milliGRAM(s) Oral two times a day PRN anxiety    Allergies    penicillins (Other)  trees dogs cats cockaroaches (Rhinitis; Rhinorrhea)    Intolerances    Vital Signs Last 24 Hrs  T(C): 36.8 (17 Oct 2020 05:14), Max: 37.4 (16 Oct 2020 22:26)  T(F): 98.2 (17 Oct 2020 05:14), Max: 99.4 (16 Oct 2020 22:26)  HR: 82 (17 Oct 2020 05:14) (82 - 98)  BP: 139/84 (17 Oct 2020 05:14) (115/80 - 139/84)  BP(mean): --  RR: 19 (17 Oct 2020 05:14) (19 - 20)  SpO2: 92% (17 Oct 2020 05:14) (86% - 96%)  I&O's Summary      PHYSICAL EXAM:  TELE: NSR  Constitutional: NAD, awake and alert, morbidly obese  HEENT: Moist Mucous Membranes, Anicteric  Pulmonary: Non-labored, breath sounds are clear bilaterally, No wheezing, rales or rhonchi  Cardiovascular: Regular, S1 and S2, No murmurs, rubs, gallops or clicks  Gastrointestinal: Bowel Sounds present, soft, nontender.   Lymph: No peripheral edema. No lymphadenopathy.  Skin: No visible rashes or ulcers.  Lower abdominal incision + vac dressing  Psych:  Mood & affect appropriate  LABS: All Labs Reviewed:                        9.2    9.45  )-----------( 327      ( 17 Oct 2020 07:23 )             28.7                         8.9    8.81  )-----------( 315      ( 16 Oct 2020 07:55 )             27.0                         7.9    7.36  )-----------( 309      ( 15 Oct 2020 07:01 )             23.6     17 Oct 2020 07:23    141    |  104    |  21     ----------------------------<  124    4.3     |  32     |  0.77   16 Oct 2020 07:55    141    |  104    |  26     ----------------------------<  130    4.2     |  30     |  0.77   15 Oct 2020 07:01    138    |  103    |  41     ----------------------------<  148    4.0     |  31     |  1.00     Ca    8.6        17 Oct 2020 07:23  Ca    8.2        16 Oct 2020 07:55  Ca    7.8        15 Oct 2020 07:01    TPro  5.7    /  Alb  1.4    /  TBili  0.6    /  DBili  x      /  AST  29     /  ALT  30     /  AlkPhos  106    15 Oct 2020 07:01      EXAM:  CT ABDOMEN AND PELVIS                          EXAM:  CT CHEST                            PROCEDURE DATE:  10/13/2020          INTERPRETATION:  CT CHEST, CT ABDOMEN AND PELVIS    CLINICAL INFORMATION: Shortness of breath after abdominal wallresection, abdominal abscess infection    COMPARISON: Same day chest x-ray, CT abdomen and pelvis 9/30/2020, chest CT 6/16/2020    PROCEDURE:  CT of the Chest, Abdomen and Pelvis was performed without intravenous contrast.  Intravenous contrast: None  Oral contrast: None.  Sagittal and coronal reformats were performed.    FINDINGS:    CHEST:    LUNGS, AIRWAYS: The central airways are patent. Small area of reverse halo sign in the basilar left lower lobe.  PLEURA: No pleural abnormality.  VESSELS:Stable 4.6 cm ascending thoracic aorta.  HEART: Normal heart size. No pericardial effusion. Coronary artery calcifications are present.  MEDIASTINUM AND MILADIS: No adenopathy.  CHEST WALL: No masses.    ABDOMEN AND PELVIS:    LIVER: Normal.  BILE DUCTS: Nondilated.  GALLBLADDER: Prior cholecystectomy.  SPLEEN: Normal.  PANCREAS: Normal.  ADRENALS: Normal.  KIDNEYS/URETERS: Nonobstructing right kidney stones.    BLADDER: Underdistended limiting evaluation.  REPRODUCTIVE ORGANS: No uterine or adnexalabnormality. IUD in place.    BOWEL: No bowel-related abnormality. Specifically, no evidence of acute diverticulitis. Normal appendix and ileocecal region. No bowel obstruction or bowel inflammation.  PERITONEUM: No intra-abdominal fluid collection.  VESSELS:  Normal caliber aorta.  RETROPERITONEUM/LYMPH NODES: No adenopathy.  ABDOMINAL WALL: Postsurgical changes without discrete drainable collection.  BONES: No acute bony abnormality.    IMPRESSION:    Postsurgical changes without discrete drainable collection remaining.    Small area of reverse halo sign in the basilar left lower lobe    NADINE STEWART M.D., ATTENDING RADIOLOGIST  This document has been electronically signed. Oct 13 2020  5:25PM    EXAM:  CT ABDOMEN AND PELVIS                          EXAM:  CT CHEST                          PROCEDURE DATE:  10/13/2020      INTERPRETATION:  CT CHEST, CT ABDOMEN AND PELVIS    CLINICAL INFORMATION: Shortness of breath after abdominal wallresection, abdominal abscess infection    COMPARISON: Same day chest x-ray, CT abdomen and pelvis 9/30/2020, chest CT 6/16/2020    PROCEDURE:  CT of the Chest, Abdomen and Pelvis was performed without intravenous contrast.  Intravenous contrast: None  Oral contrast: None.  Sagittal and coronal reformats were performed.    FINDINGS:    CHEST:    LUNGS, AIRWAYS: The central airways are patent. Small area of reverse halo sign in the basilar left lower lobe.  PLEURA: No pleural abnormality.  VESSELS:Stable 4.6 cm ascending thoracic aorta.  HEART: Normal heart size. No pericardial effusion. Coronary artery calcifications are present.  MEDIASTINUM AND MILADIS: No adenopathy.  CHEST WALL: No masses.    ABDOMEN AND PELVIS:    LIVER: Normal.  BILE DUCTS: Nondilated.  GALLBLADDER: Prior cholecystectomy.  SPLEEN: Normal.  PANCREAS: Normal.  ADRENALS: Normal.  KIDNEYS/URETERS: Nonobstructing right kidney stones.    BLADDER: Underdistended limiting evaluation.  REPRODUCTIVE ORGANS: No uterine or adnexalabnormality. IUD in place.    BOWEL: No bowel-related abnormality. Specifically, no evidence of acute diverticulitis. Normal appendix and ileocecal region. No bowel obstruction or bowel inflammation.  PERITONEUM: No intra-abdominal fluid collection.  VESSELS:  Normal caliber aorta.  RETROPERITONEUM/LYMPH NODES: No adenopathy.  ABDOMINAL WALL: Postsurgical changes without discrete drainable collection.  BONES: No acute bony abnormality.    IMPRESSION:    Postsurgical changes without discrete drainable collection remaining.    Small area of reverse halo sign in the basilar left lower lobe    NADINE STEWART M.D., ATTENDING RADIOLOGIST  This document has been electronically signed. Oct 13 2020  5:25PM        Ventricular Rate 95 BPM    Atrial Rate 95 BPM    P-R Interval 200 ms    QRS Duration 100 ms    Q-T Interval 332 ms    QTC Calculation(Bazett) 417 ms    P Axis 5 degrees    R Axis -45 degrees    T Axis 23 degrees    Diagnosis Line Normal sinus rhythm  Left axis deviation  Minimal voltage criteria for LVH, may be normal variant  Cannot rule out Anterior infarct (cited on or before 25-SEP-2020)  Abnormal ECG  Confirmed by ana Giordano ([id number]) on 10/13/2020 4:00:07 PM

## 2020-10-17 NOTE — PROGRESS NOTE ADULT - PROBLEM SELECTOR PLAN 5
-had held Irbesartan, Propanolol, Verapamil, Furosemide for hypotension  -Monitor hemodynamics  -BP still on low side of normal, but if stable, will try to reintroduce propranolol tomorrow

## 2020-10-17 NOTE — PROGRESS NOTE ADULT - SUBJECTIVE AND OBJECTIVE BOX
Patient is a 60y old  Female who presents with a chief complaint of diaphoresis, lightheadedness.     INTERVAL HPI/OVERNIGHT EVENTS: Pt states she feels "fine." Denies fever, chills, dysuria, SOB, CP, abd pain.   Surgical team evaluated the wound today and there was a small part of the wound not covered by the vac on the left side and vac was adjusted to cover the entirety of it.     MEDICATIONS  (STANDING):  allopurinol 300 milliGRAM(s) Oral daily  cholecalciferol 5000 Unit(s) Oral daily  cyanocobalamin 1000 MICROGram(s) Oral daily  DULoxetine 30 milliGRAM(s) Oral <User Schedule>  folic acid 1 milliGRAM(s) Oral daily  lamoTRIgine 200 milliGRAM(s) Oral daily  melatonin 3 milliGRAM(s) Oral at bedtime  multivitamin 1 Tablet(s) Oral daily  traZODone 200 milliGRAM(s) Oral at bedtime    MEDICATIONS  (PRN):  acetaminophen   Tablet .. 650 milliGRAM(s) Oral daily PRN Mild Pain (1 - 3)  ALPRAZolam 1 milliGRAM(s) Oral two times a day PRN anxiety  loratadine 10 milliGRAM(s) Oral daily PRN allergies      Allergies    penicillins (Other)  trees dogs cats cockaroaches (Rhinitis; Rhinorrhea)    Intolerances        REVIEW OF SYSTEMS:  CONSTITUTIONAL: +generalized weakness, poor appetite (improving), No fever or chills  HEENT:  No headache, no sore throat  RESPIRATORY: No cough, wheezing, or shortness of breath  CARDIOVASCULAR: No chest pain, palpitations  GASTROINTESTINAL: no discomfort with wound vac; No abd pain, nausea, vomiting, or diarrhea  GENITOURINARY: No dysuria, frequency, or hematuria  NEUROLOGICAL: no focal weakness ; no dizziness   MUSCULOSKELETAL: no myalgias     Vital Signs Last 24 Hrs  T(C): 36.9 (17 Oct 2020 13:58), Max: 37.4 (16 Oct 2020 22:26)  T(F): 98.5 (17 Oct 2020 13:58), Max: 99.4 (16 Oct 2020 22:26)  HR: 90 (17 Oct 2020 13:58) (82 - 98)  BP: 119/81 (17 Oct 2020 13:58) (119/81 - 139/84)  BP(mean): --  RR: 20 (17 Oct 2020 13:58) (19 - 20)  SpO2: 93% (17 Oct 2020 13:58) (86% - 93%)    PHYSICAL EXAM:  GENERAL: NAD, morbidly obese  HEENT: anicteric, moist mucous membranes  CHEST/LUNG:  CTA b/l, no rales, wheezes, or rhonchi  HEART:  RRR, S1, S2  ABDOMEN:  BS+, soft, nontender, nondistended; wound vac across lower abdomen covering the incision; no erythema, warmth, or tenderness on the skin surrounding the wound vac  EXTREMITIES: trace lower extremity edema, no cyanosis, or calf tenderness  NERVOUS SYSTEM: answers questions and follows commands appropriately    LABS:                        9.2    9.45  )-----------( 327      ( 17 Oct 2020 07:23 )             28.7     CBC Full  -  ( 17 Oct 2020 07:23 )  WBC Count : 9.45 K/uL  Hemoglobin : 9.2 g/dL  Hematocrit : 28.7 %  Platelet Count - Automated : 327 K/uL  Mean Cell Volume : 89.4 fl  Mean Cell Hemoglobin : 28.7 pg  Mean Cell Hemoglobin Concentration : 32.1 gm/dL  Auto Neutrophil # : x  Auto Lymphocyte # : x  Auto Monocyte # : x  Auto Eosinophil # : x  Auto Basophil # : x  Auto Neutrophil % : x  Auto Lymphocyte % : x  Auto Monocyte % : x  Auto Eosinophil % : x  Auto Basophil % : x    17 Oct 2020 07:23    141    |  104    |  21     ----------------------------<  124    4.3     |  32     |  0.77     Ca    8.6        17 Oct 2020 07:23          CAPILLARY BLOOD GLUCOSE            Culture - Urine (collected 10-15-20 @ 01:09)  Source: .Urine Clean Catch (Midstream)  Final Report (10-16-20 @ 13:23):    >=3 organisms. Probable collection contamination.    Culture - Blood (collected 10-13-20 @ 19:10)  Source: .Blood Blood  Preliminary Report (10-14-20 @ 20:01):    No growth to date.    Culture - Blood (collected 10-13-20 @ 19:10)  Source: .Blood Blood  Preliminary Report (10-14-20 @ 20:01):    No growth to date.        RADIOLOGY & ADDITIONAL TESTS:    Personally reviewed.     Consultant(s) Notes Reviewed:  [x] YES  [ ] NO     Patient is a 60y old  Female who presents with a chief complaint of diaphoresis, lightheadedness.      INTERVAL HPI/OVERNIGHT EVENTS: Pt states she feels "fine." Denies fever, chills, dysuria, SOB, CP, abd pain.   Surgical team evaluated the wound today and there was a small part of the wound not covered by the vac on the left side and vac was adjusted to cover the entirety of it.     MEDICATIONS  (STANDING):  allopurinol 300 milliGRAM(s) Oral daily  cholecalciferol 5000 Unit(s) Oral daily  cyanocobalamin 1000 MICROGram(s) Oral daily  DULoxetine 30 milliGRAM(s) Oral <User Schedule>  folic acid 1 milliGRAM(s) Oral daily  lamoTRIgine 200 milliGRAM(s) Oral daily  melatonin 3 milliGRAM(s) Oral at bedtime  multivitamin 1 Tablet(s) Oral daily  traZODone 200 milliGRAM(s) Oral at bedtime    MEDICATIONS  (PRN):  acetaminophen   Tablet .. 650 milliGRAM(s) Oral daily PRN Mild Pain (1 - 3)  ALPRAZolam 1 milliGRAM(s) Oral two times a day PRN anxiety  loratadine 10 milliGRAM(s) Oral daily PRN allergies      Allergies    penicillins (Other)  trees dogs cats cockaroaches (Rhinitis; Rhinorrhea)    Intolerances        REVIEW OF SYSTEMS:  CONSTITUTIONAL: +generalized weakness, poor appetite (improving), No fever or chills  HEENT:  No headache, no sore throat  RESPIRATORY: No cough, wheezing, or shortness of breath  CARDIOVASCULAR: No chest pain, palpitations  GASTROINTESTINAL: no discomfort with wound vac; No abd pain, nausea, vomiting, or diarrhea  GENITOURINARY: No dysuria, frequency, or hematuria  NEUROLOGICAL: no focal weakness ; no dizziness   MUSCULOSKELETAL: no myalgias     Vital Signs Last 24 Hrs  T(C): 36.9 (17 Oct 2020 13:58), Max: 37.4 (16 Oct 2020 22:26)  T(F): 98.5 (17 Oct 2020 13:58), Max: 99.4 (16 Oct 2020 22:26)  HR: 90 (17 Oct 2020 13:58) (82 - 98)  BP: 119/81 (17 Oct 2020 13:58) (119/81 - 139/84)  BP(mean): --  RR: 20 (17 Oct 2020 13:58) (19 - 20)  SpO2: 93% (17 Oct 2020 13:58) (86% - 93%)    PHYSICAL EXAM:  GENERAL: NAD, morbidly obese  HEENT: anicteric, moist mucous membranes  CHEST/LUNG:  CTA b/l, no rales, wheezes, or rhonchi  HEART:  RRR, S1, S2  ABDOMEN:  BS+, soft, nontender, nondistended; wound vac across lower abdomen covering the incision; no erythema, warmth, or tenderness on the skin surrounding the wound vac  EXTREMITIES: trace lower extremity edema, no cyanosis, or calf tenderness  NERVOUS SYSTEM: answers questions and follows commands appropriately    LABS:                        9.2    9.45  )-----------( 327      ( 17 Oct 2020 07:23 )             28.7     CBC Full  -  ( 17 Oct 2020 07:23 )  WBC Count : 9.45 K/uL  Hemoglobin : 9.2 g/dL  Hematocrit : 28.7 %  Platelet Count - Automated : 327 K/uL  Mean Cell Volume : 89.4 fl  Mean Cell Hemoglobin : 28.7 pg  Mean Cell Hemoglobin Concentration : 32.1 gm/dL  Auto Neutrophil # : x  Auto Lymphocyte # : x  Auto Monocyte # : x  Auto Eosinophil # : x  Auto Basophil # : x  Auto Neutrophil % : x  Auto Lymphocyte % : x  Auto Monocyte % : x  Auto Eosinophil % : x  Auto Basophil % : x    17 Oct 2020 07:23    141    |  104    |  21     ----------------------------<  124    4.3     |  32     |  0.77     Ca    8.6        17 Oct 2020 07:23          CAPILLARY BLOOD GLUCOSE            Culture - Urine (collected 10-15-20 @ 01:09)  Source: .Urine Clean Catch (Midstream)  Final Report (10-16-20 @ 13:23):    >=3 organisms. Probable collection contamination.    Culture - Blood (collected 10-13-20 @ 19:10)  Source: .Blood Blood  Preliminary Report (10-14-20 @ 20:01):    No growth to date.    Culture - Blood (collected 10-13-20 @ 19:10)  Source: .Blood Blood  Preliminary Report (10-14-20 @ 20:01):    No growth to date.        RADIOLOGY & ADDITIONAL TESTS:    Personally reviewed.     Consultant(s) Notes Reviewed:  [x] YES  [ ] NO

## 2020-10-17 NOTE — PROGRESS NOTE ADULT - PROBLEM SELECTOR PLAN 10
VTE ppx: have been holding A/C given continued blood draining from abdomen and worsening anemia with 2 more units PRBC given this admission -- if drainage decreases and H&H stable, will consider restarting pharmacologic VTE ppx  -SCDs

## 2020-10-17 NOTE — PROGRESS NOTE ADULT - SUBJECTIVE AND OBJECTIVE BOX
S: Patient seen and examined in bed.  Resting comfortably with VAC dressing over wound.  Currently without complaints.  Tolerating diet.      MEDICATIONS:  acetaminophen   Tablet .. 650 milliGRAM(s) Oral daily PRN  allopurinol 300 milliGRAM(s) Oral daily  ALPRAZolam 1 milliGRAM(s) Oral two times a day PRN  cholecalciferol 5000 Unit(s) Oral daily  cyanocobalamin 1000 MICROGram(s) Oral daily  DULoxetine 30 milliGRAM(s) Oral <User Schedule>  folic acid 1 milliGRAM(s) Oral daily  lamoTRIgine 200 milliGRAM(s) Oral daily  loratadine 10 milliGRAM(s) Oral daily PRN  melatonin 3 milliGRAM(s) Oral at bedtime  multivitamin 1 Tablet(s) Oral daily  traZODone 200 milliGRAM(s) Oral at bedtime      O:  Vital Signs Last 24 Hrs  T(C): 36.9 (17 Oct 2020 13:58), Max: 37.4 (16 Oct 2020 22:26)  T(F): 98.5 (17 Oct 2020 13:58), Max: 99.4 (16 Oct 2020 22:26)  HR: 90 (17 Oct 2020 13:58) (82 - 98)  BP: 119/81 (17 Oct 2020 13:58) (119/81 - 139/84)  BP(mean): --  RR: 20 (17 Oct 2020 13:58) (19 - 20)  SpO2: 93% (17 Oct 2020 13:58) (86% - 93%)      PHYSICAL EXAM:  Lungs: CTA bilat  Card: S1S2  Abd: Obese, soft, NT, ND.  +BS x 4.  Incision covered with black granufoam wound vac - approx. 200cc SS fluid present in reservoir and tubing.  Wound VAC ends short of end of incision - approx. 5cm at right terminal end exposed, draining SS fluid as well.    Ext: Calves soft, NT, without edema bilat LE.      LABS:                        9.2    9.45  )-----------( 327      ( 17 Oct 2020 07:23 )             28.7     10-17    141  |  104  |  21  ----------------------------<  124<H>  4.3   |  32<H>  |  0.77    Ca    8.6      17 Oct 2020 07:23              A: POD #19 s/p panniculectomy, re-admitted for renal failure/anemia     P: Discussed with Dr. De La Torre      H&H remain stable      Black granufoam VAC dressing extended to exposed portion of wound - good seal with adequate negative pressure established      Continue care per primary team      Maintain VAC dressing      Will follow

## 2020-10-17 NOTE — PROGRESS NOTE ADULT - PROBLEM SELECTOR PLAN 4
-had panniculectomy on 9/25 with Dr. De La Torre (consulted, recs appreciated)  -pt still had blood from drain and from site of past EDWIN drain. There was some dehiscence of the incision where it had been draining the hematoma, as per surgery, and surgical team placed a wound vac over the wound. CM notified to arrange for home wound vac and home care.   -surgery (Wil), recs appreciated  -incentive spirometry

## 2020-10-17 NOTE — PROGRESS NOTE ADULT - ASSESSMENT
60 year old female with PMH of JESUS on CPAP, HTN, morbid obesity, anemia, AAA, Arthritis, Depression, anxiety recently admitted to [facility] for elective abdominal panniculectomy, admitted post-op with hemorrhagic shock s/p 3unit prbc, discharged on 10/10  now readmitted with complaint of weakness diaphoresis while, admitted with ELAINE, now resolved.    Near syncope  - Likely vasovagal in nature  - No evidence of cardiac arrhythmias  - Can discontinue tele    Anemia   - s/p 3 units PRBC.  H/H now stable.    - CT abdomen pelvis unremarkable   - Continue to trend CBC as per primary team    Paroxysmal atrial fibrillation:  - Elevated CHADSVASC score  - In setting of hemorrhagic shock requiring PRBC's, would hold off on anticoagulation.  Can resume when cleared by Surgery  - NSR on tele  - Can resume home CCB  - Monitor and replete lytes, keep K>4, Mg>2.    HTN  - Home meds includeirbesartan 150 and verapamil 240  - Can resume above one at a time while observing hemodynamic response  - Low normal LV function with mild diastolic dysfunction on recent echocardiogram  - recent cath with non-obs CAD  - No evidence of volume overload    Acute Kidney Injury   - Resolved  -  Continue to hold diuresis    DVT ppx  - Per Primary    - All other medical needs as per primary team.  - Other cardiovascular workup will depend on clinical course.  - Will continue to follow.    Tiffany Brown DNP, NP-C  Cardiology   Spectra #[phone]

## 2020-10-18 LAB
ALBUMIN SERPL ELPH-MCNC: 1.5 G/DL — LOW (ref 3.3–5)
ANION GAP SERPL CALC-SCNC: 5 MMOL/L — SIGNIFICANT CHANGE UP (ref 5–17)
BUN SERPL-MCNC: 15 MG/DL — SIGNIFICANT CHANGE UP (ref 7–23)
CALCIUM SERPL-MCNC: 8.3 MG/DL — LOW (ref 8.5–10.1)
CHLORIDE SERPL-SCNC: 105 MMOL/L — SIGNIFICANT CHANGE UP (ref 96–108)
CO2 SERPL-SCNC: 31 MMOL/L — SIGNIFICANT CHANGE UP (ref 22–31)
CREAT SERPL-MCNC: 0.64 MG/DL — SIGNIFICANT CHANGE UP (ref 0.5–1.3)
CULTURE RESULTS: SIGNIFICANT CHANGE UP
CULTURE RESULTS: SIGNIFICANT CHANGE UP
GLUCOSE SERPL-MCNC: 120 MG/DL — HIGH (ref 70–99)
HCT VFR BLD CALC: 27.4 % — LOW (ref 34.5–45)
HGB BLD-MCNC: 9 G/DL — LOW (ref 11.5–15.5)
MCHC RBC-ENTMCNC: 29.2 PG — SIGNIFICANT CHANGE UP (ref 27–34)
MCHC RBC-ENTMCNC: 32.8 GM/DL — SIGNIFICANT CHANGE UP (ref 32–36)
MCV RBC AUTO: 89 FL — SIGNIFICANT CHANGE UP (ref 80–100)
NRBC # BLD: 0 /100 WBCS — SIGNIFICANT CHANGE UP (ref 0–0)
PLATELET # BLD AUTO: 270 K/UL — SIGNIFICANT CHANGE UP (ref 150–400)
POTASSIUM SERPL-MCNC: 4.9 MMOL/L — SIGNIFICANT CHANGE UP (ref 3.5–5.3)
POTASSIUM SERPL-SCNC: 4.9 MMOL/L — SIGNIFICANT CHANGE UP (ref 3.5–5.3)
RBC # BLD: 3.08 M/UL — LOW (ref 3.8–5.2)
RBC # FLD: 15.9 % — HIGH (ref 10.3–14.5)
SODIUM SERPL-SCNC: 141 MMOL/L — SIGNIFICANT CHANGE UP (ref 135–145)
SPECIMEN SOURCE: SIGNIFICANT CHANGE UP
SPECIMEN SOURCE: SIGNIFICANT CHANGE UP
WBC # BLD: 8.83 K/UL — SIGNIFICANT CHANGE UP (ref 3.8–10.5)
WBC # FLD AUTO: 8.83 K/UL — SIGNIFICANT CHANGE UP (ref 3.8–10.5)

## 2020-10-18 PROCEDURE — 99232 SBSQ HOSP IP/OBS MODERATE 35: CPT

## 2020-10-18 RX ADMIN — DULOXETINE HYDROCHLORIDE 30 MILLIGRAM(S): 30 CAPSULE, DELAYED RELEASE ORAL at 06:17

## 2020-10-18 RX ADMIN — LORATADINE 10 MILLIGRAM(S): 10 TABLET ORAL at 12:41

## 2020-10-18 RX ADMIN — Medication 3 MILLIGRAM(S): at 21:58

## 2020-10-18 RX ADMIN — Medication 650 MILLIGRAM(S): at 13:42

## 2020-10-18 RX ADMIN — Medication 650 MILLIGRAM(S): at 12:38

## 2020-10-18 RX ADMIN — Medication 200 MILLIGRAM(S): at 21:58

## 2020-10-18 RX ADMIN — Medication 5000 UNIT(S): at 12:38

## 2020-10-18 RX ADMIN — Medication 1 TABLET(S): at 12:42

## 2020-10-18 RX ADMIN — DULOXETINE HYDROCHLORIDE 30 MILLIGRAM(S): 30 CAPSULE, DELAYED RELEASE ORAL at 21:58

## 2020-10-18 RX ADMIN — Medication 300 MILLIGRAM(S): at 12:40

## 2020-10-18 RX ADMIN — Medication 1 MILLIGRAM(S): at 12:40

## 2020-10-18 RX ADMIN — PREGABALIN 1000 MICROGRAM(S): 225 CAPSULE ORAL at 12:43

## 2020-10-18 RX ADMIN — DULOXETINE HYDROCHLORIDE 30 MILLIGRAM(S): 30 CAPSULE, DELAYED RELEASE ORAL at 13:42

## 2020-10-18 RX ADMIN — LAMOTRIGINE 200 MILLIGRAM(S): 25 TABLET, ORALLY DISINTEGRATING ORAL at 12:39

## 2020-10-18 NOTE — PROGRESS NOTE ADULT - ASSESSMENT
60 year old female with PMH of JESUS on CPAP, HTN, morbid obesity, anemia, AAA, Arthritis, Depression, anxiety recently admitted to [facility] for elective abdominal panniculectomy, admitted post-op with hemorrhagic shock s/p 3unit prbc, discharged on 10/10  now readmitted with complaint of weakness diaphoresis while, admitted with ELAINE, now resolved.    Near syncope  - Likely vasovagal in nature  - No evidence of cardiac arrhythmias  - OOB to chair with no dizziness or lightheadedness    Anemia   - s/p 3 units PRBC.  H/H now stable.    - CT abdomen pelvis unremarkable   - H/H remains stable  - No evidence of bleeding    Paroxysmal atrial fibrillation:  - Elevated CHADSVASC score  - In setting of hemorrhagic shock requiring PRBC's, would continue to hold off on anticoagulation.  Can resume when cleared by Surgery  - Can resume home CCB slowly as needed  - Monitor and replete lytes, keep K>4, Mg>2.    HTN  - Home meds include irbesartan 150 and verapamil 240  - Can resume above one at a time while observing hemodynamic response, at a slower pace  - Low normal LV function with mild diastolic dysfunction on recent echocardiogram  - Recent cath with non-obs CAD  - No evidence of volume overload    Acute Kidney Injury   - Resolved  - Continue to hold diuresis    DVT ppx  - Per Primary    - All other medical needs as per primary team.  - Other cardiovascular workup will depend on clinical course.  - Will continue to follow.    Tiffany Brown DNP, NP-C  Cardiology   Spectra #[phone]

## 2020-10-18 NOTE — PROGRESS NOTE ADULT - SUBJECTIVE AND OBJECTIVE BOX
[facility] -- Almas Faustin, Cara Nunez, Saurabh Johansen Savella, Goodger  Office # [phone]    Follow Up:  SOB    Subjective/Observations:  Sitting on chair, comfortable on RA.  Wore CPAP overnight.  Admits to have improved post nasal drip.  Denies any respiratory or cardiac discomfort    REVIEW OF SYSTEMS: All other review of systems is negative unless indicated above  PAST MEDICAL & SURGICAL HISTORY:  History of aortic aneurysm  descending aorta see CT    Arthritis    Degenerative disc disease, lumbar    Neuropathy    Spinal stenosis    Bipolar 1 disorder    Kidney stone    Sleep Apnea  CPAP with oxygen since June 2020    Asthma    Hypertension    Morbid Obesity    ETOH Abuse  H/O    Benign Essential Tremor    Anxiety    Depression    Renal Calculi  chronic      Colitis  H/O    Anemia    S/P panniculectomy    S/P cardiac catheterization    S/P dilation and curettage    History of tonsillectomy    History of laparoscopic adjustable gastric banding  band removed few yrs ago    S/P D&amp;C    Biliary stent placement &amp; ercp    ureteroscopy with stone removal  1-    MEDICATIONS  (STANDING):  allopurinol 300 milliGRAM(s) Oral daily  cholecalciferol 5000 Unit(s) Oral daily  cyanocobalamin 1000 MICROGram(s) Oral daily  DULoxetine 30 milliGRAM(s) Oral <User Schedule>  folic acid 1 milliGRAM(s) Oral daily  lamoTRIgine 200 milliGRAM(s) Oral daily  melatonin 3 milliGRAM(s) Oral at bedtime  multivitamin 1 Tablet(s) Oral daily  propranolol 10 milliGRAM(s) Oral daily  traZODone 200 milliGRAM(s) Oral at bedtime    MEDICATIONS  (PRN):  acetaminophen   Tablet .. 650 milliGRAM(s) Oral daily PRN Mild Pain (1 - 3)  ALPRAZolam 1 milliGRAM(s) Oral two times a day PRN anxiety  loratadine 10 milliGRAM(s) Oral daily PRN allergies    Allergies    penicillins (Other)  trees dogs cats cockaroaches (Rhinitis; Rhinorrhea)    Intolerances    Vital Signs Last 24 Hrs  T(C): 36.6 (18 Oct 2020 02:54), Max: 37 (17 Oct 2020 21:12)  T(F): 97.8 (18 Oct 2020 02:54), Max: 98.6 (17 Oct 2020 21:12)  HR: 86 (18 Oct 2020 08:15) (86 - 94)  BP: 114/79 (18 Oct 2020 02:54) (114/79 - 122/81)  BP(mean): --  RR: 20 (18 Oct 2020 02:54) (20 - 20)  SpO2: 98% (18 Oct 2020 08:15) (93% - 98%)  I&O's Summary      PHYSICAL EXAM:  TELE: Not on tele  Constitutional: NAD, awake and alert, morbidly obese  HEENT: Moist Mucous Membranes, Anicteric  Pulmonary: Non-labored, breath sounds are clear bilaterally, No wheezing, rales or rhonchi  Cardiovascular: Regular, S1 and S2, + murmurs, no rubs, gallops or clicks  Gastrointestinal: Bowel Sounds present, soft, nontender.   Lymph: No peripheral edema. No lymphadenopathy.  Skin: No visible rashes or ulcers.  Psych:  Mood & affect appropriate  LABS: All Labs Reviewed:                        9.0    8.83  )-----------( 270      ( 18 Oct 2020 10:40 )             27.4                         9.2    9.45  )-----------( 327      ( 17 Oct 2020 07:23 )             28.7                         8.9    8.81  )-----------( 315      ( 16 Oct 2020 07:55 )             27.0     18 Oct 2020 10:40    141    |  105    |  15     ----------------------------<  120    4.9     |  31     |  0.64   17 Oct 2020 07:23    141    |  104    |  21     ----------------------------<  124    4.3     |  32     |  0.77   16 Oct 2020 07:55    141    |  104    |  26     ----------------------------<  130    4.2     |  30     |  0.77     Ca    8.3        18 Oct 2020 10:40  Ca    8.6        17 Oct 2020 07:23  Ca    8.2        16 Oct 2020 07:55    TPro  x      /  Alb  1.5    /  TBili  x      /  DBili  x      /  AST  x      /  ALT  x      /  AlkPhos  x      18 Oct 2020 10:40    EXAM:  CT ABDOMEN AND PELVIS                          EXAM:  CT CHEST                            PROCEDURE DATE:  10/13/2020          INTERPRETATION:  CT CHEST, CT ABDOMEN AND PELVIS    CLINICAL INFORMATION: Shortness of breath after abdominal wallresection, abdominal abscess infection    COMPARISON: Same day chest x-ray, CT abdomen and pelvis 9/30/2020, chest CT 6/16/2020    PROCEDURE:  CT of the Chest, Abdomen and Pelvis was performed without intravenous contrast.  Intravenous contrast: None  Oral contrast: None.  Sagittal and coronal reformats were performed.    FINDINGS:    CHEST:    LUNGS, AIRWAYS: The central airways are patent. Small area of reverse halo sign in the basilar left lower lobe.  PLEURA: No pleural abnormality.  VESSELS:Stable 4.6 cm ascending thoracic aorta.  HEART: Normal heart size. No pericardial effusion. Coronary artery calcifications are present.  MEDIASTINUM AND MILADIS: No adenopathy.  CHEST WALL: No masses.    ABDOMEN AND PELVIS:    LIVER: Normal.  BILE DUCTS: Nondilated.  GALLBLADDER: Prior cholecystectomy.  SPLEEN: Normal.  PANCREAS: Normal.  ADRENALS: Normal.  KIDNEYS/URETERS: Nonobstructing right kidney stones.    BLADDER: Underdistended limiting evaluation.  REPRODUCTIVE ORGANS: No uterine or adnexalabnormality. IUD in place.    BOWEL: No bowel-related abnormality. Specifically, no evidence of acute diverticulitis. Normal appendix and ileocecal region. No bowel obstruction or bowel inflammation.  PERITONEUM: No intra-abdominal fluid collection.  VESSELS:  Normal caliber aorta.  RETROPERITONEUM/LYMPH NODES: No adenopathy.  ABDOMINAL WALL: Postsurgical changes without discrete drainable collection.  BONES: No acute bony abnormality.    IMPRESSION:    Postsurgical changes without discrete drainable collection remaining.    Small area of reverse halo sign in the basilar left lower lobe    NADINE STEWART M.D., ATTENDING RADIOLOGIST  This document has been electronically signed. Oct 13 2020  5:25PM    EXAM:  CT ABDOMEN AND PELVIS                          EXAM:  CT CHEST                          PROCEDURE DATE:  10/13/2020      INTERPRETATION:  CT CHEST, CT ABDOMEN AND PELVIS    CLINICAL INFORMATION: Shortness of breath after abdominal wallresection, abdominal abscess infection    COMPARISON: Same day chest x-ray, CT abdomen and pelvis 9/30/2020, chest CT 6/16/2020    PROCEDURE:  CT of the Chest, Abdomen and Pelvis was performed without intravenous contrast.  Intravenous contrast: None  Oral contrast: None.  Sagittal and coronal reformats were performed.    FINDINGS:    CHEST:    LUNGS, AIRWAYS: The central airways are patent. Small area of reverse halo sign in the basilar left lower lobe.  PLEURA: No pleural abnormality.  VESSELS:Stable 4.6 cm ascending thoracic aorta.  HEART: Normal heart size. No pericardial effusion. Coronary artery calcifications are present.  MEDIASTINUM AND MILADIS: No adenopathy.  CHEST WALL: No masses.    ABDOMEN AND PELVIS:    LIVER: Normal.  BILE DUCTS: Nondilated.  GALLBLADDER: Prior cholecystectomy.  SPLEEN: Normal.  PANCREAS: Normal.  ADRENALS: Normal.  KIDNEYS/URETERS: Nonobstructing right kidney stones.    BLADDER: Underdistended limiting evaluation.  REPRODUCTIVE ORGANS: No uterine or adnexalabnormality. IUD in place.    BOWEL: No bowel-related abnormality. Specifically, no evidence of acute diverticulitis. Normal appendix and ileocecal region. No bowel obstruction or bowel inflammation.  PERITONEUM: No intra-abdominal fluid collection.  VESSELS:  Normal caliber aorta.  RETROPERITONEUM/LYMPH NODES: No adenopathy.  ABDOMINAL WALL: Postsurgical changes without discrete drainable collection.  BONES: No acute bony abnormality.    IMPRESSION:    Postsurgical changes without discrete drainable collection remaining.    Small area of reverse halo sign in the basilar left lower lobe    NADINE STEWART M.D., ATTENDING RADIOLOGIST  This document has been electronically signed. Oct 13 2020  5:25PM        Ventricular Rate 95 BPM    Atrial Rate 95 BPM    P-R Interval 200 ms    QRS Duration 100 ms    Q-T Interval 332 ms    QTC Calculation(Bazett) 417 ms    P Axis 5 degrees    R Axis -45 degrees    T Axis 23 degrees    Diagnosis Line Normal sinus rhythm  Left axis deviation  Minimal voltage criteria for LVH, may be normal variant  Cannot rule out Anterior infarct (cited on or before 25-SEP-2020)  Abnormal ECG  Confirmed by ana Giordano ([id number]) on 10/13/2020 4:00:07 PM

## 2020-10-18 NOTE — PROGRESS NOTE ADULT - PROBLEM SELECTOR PLAN 2
Sudden onset of sweating and dizziness for 30 min while seated. This is consistent with a vasovagal episode likely due to general hypotension in setting of anemia and poor oncotic pressure. Likely had an orthostatic component, as well.   - PE less likely and unable to perform CTA Chest safely due to ELAINE  - Pt had hx of a-fib in the setting of significant hypotension on prior admission per Cardiology  -EKG shows NSR with no acute changes   -Low-normal LV function with mild diastolic dysfunction on recent echocardiogram  -orthostatics are normal now  -Cardio consulted Dr Giordano group, recs appreciated - continue to hold anti-hypertensives; restarting propranolol

## 2020-10-18 NOTE — PROGRESS NOTE ADULT - PROBLEM SELECTOR PLAN 5
-had held Irbesartan, Propanolol, Verapamil, Furosemide for hypotension  -Monitor hemodynamics  -BP still on low side of normal, but stable, so restarted propranolol now, monitor BP

## 2020-10-18 NOTE — PROGRESS NOTE ADULT - SUBJECTIVE AND OBJECTIVE BOX
Patient is a 60y old  Female who presents with a chief complaint of diaphoresis, lightheadedness.      INTERVAL HPI/OVERNIGHT EVENTS: Pt states she feels "alright." Denies fever, chills, dysuria, SOB, CP, abd pain.     MEDICATIONS  (STANDING):  allopurinol 300 milliGRAM(s) Oral daily  cholecalciferol 5000 Unit(s) Oral daily  cyanocobalamin 1000 MICROGram(s) Oral daily  DULoxetine 30 milliGRAM(s) Oral <User Schedule>  folic acid 1 milliGRAM(s) Oral daily  lamoTRIgine 200 milliGRAM(s) Oral daily  melatonin 3 milliGRAM(s) Oral at bedtime  multivitamin 1 Tablet(s) Oral daily  propranolol 10 milliGRAM(s) Oral daily  traZODone 200 milliGRAM(s) Oral at bedtime    MEDICATIONS  (PRN):  acetaminophen   Tablet .. 650 milliGRAM(s) Oral daily PRN Mild Pain (1 - 3)  ALPRAZolam 1 milliGRAM(s) Oral two times a day PRN anxiety  loratadine 10 milliGRAM(s) Oral daily PRN allergies      Allergies    penicillins (Other)  trees dogs cats cockaroaches (Rhinitis; Rhinorrhea)    Intolerances        REVIEW OF SYSTEMS:  CONSTITUTIONAL: +generalized weakness, poor appetite (improving), No fever or chills  HEENT:  No headache, no sore throat  RESPIRATORY: No cough, wheezing, or shortness of breath  CARDIOVASCULAR: No chest pain, palpitations  GASTROINTESTINAL: no discomfort with wound vac; No abd pain, nausea, vomiting, or diarrhea  GENITOURINARY: No dysuria, frequency, or hematuria  NEUROLOGICAL: no focal weakness ; no dizziness   MUSCULOSKELETAL: no myalgias     Vital Signs Last 24 Hrs  T(C): 37.2 (18 Oct 2020 21:06), Max: 37.2 (18 Oct 2020 21:06)  T(F): 99 (18 Oct 2020 21:06), Max: 99 (18 Oct 2020 21:06)  HR: 92 (18 Oct 2020 21:06) (86 - 95)  BP: 109/71 (18 Oct 2020 21:06) (109/71 - 114/79)  BP(mean): --  RR: 18 (18 Oct 2020 21:06) (18 - 20)  SpO2: 92% (18 Oct 2020 21:06) (92% - 98%)    PHYSICAL EXAM:  GENERAL: NAD, morbidly obese  HEENT: anicteric, moist mucous membranes  CHEST/LUNG:  CTA b/l, no rales, wheezes, or rhonchi  HEART:  RRR, S1, S2  ABDOMEN:  BS+, soft, nontender, nondistended; wound vac across lower abdomen covering the incision; no erythema, warmth, or tenderness on the skin surrounding the wound vac  EXTREMITIES: trace lower extremity edema, no cyanosis, or calf tenderness  NERVOUS SYSTEM: answers questions and follows commands     LABS:                        9.0    8.83  )-----------( 270      ( 18 Oct 2020 10:40 )             27.4     CBC Full  -  ( 18 Oct 2020 10:40 )  WBC Count : 8.83 K/uL  Hemoglobin : 9.0 g/dL  Hematocrit : 27.4 %  Platelet Count - Automated : 270 K/uL  Mean Cell Volume : 89.0 fl  Mean Cell Hemoglobin : 29.2 pg  Mean Cell Hemoglobin Concentration : 32.8 gm/dL  Auto Neutrophil # : x  Auto Lymphocyte # : x  Auto Monocyte # : x  Auto Eosinophil # : x  Auto Basophil # : x  Auto Neutrophil % : x  Auto Lymphocyte % : x  Auto Monocyte % : x  Auto Eosinophil % : x  Auto Basophil % : x    18 Oct 2020 10:40    141    |  105    |  15     ----------------------------<  120    4.9     |  31     |  0.64     Ca    8.3        18 Oct 2020 10:40    TPro  x      /  Alb  1.5    /  TBili  x      /  DBili  x      /  AST  x      /  ALT  x      /  AlkPhos  x      18 Oct 2020 10:40        CAPILLARY BLOOD GLUCOSE            Culture - Urine (collected 10-15-20 @ 01:09)  Source: .Urine Clean Catch (Midstream)  Final Report (10-16-20 @ 13:23):    >=3 organisms. Probable collection contamination.    Culture - Blood (collected 10-13-20 @ 19:10)  Source: .Blood Blood  Final Report (10-18-20 @ 20:01):    No Growth Final    Culture - Blood (collected 10-13-20 @ 19:10)  Source: .Blood Blood  Final Report (10-18-20 @ 20:01):    No Growth Final        RADIOLOGY & ADDITIONAL TESTS:    Personally reviewed.     Consultant(s) Notes Reviewed:  [x] YES  [ ] NO     Patient is a 60y old  Female who presents with a chief complaint of diaphoresis, lightheadedness.     INTERVAL HPI/OVERNIGHT EVENTS: Pt states she feels "alright." Denies fever, chills, dysuria, SOB, CP, abd pain.     MEDICATIONS  (STANDING):  allopurinol 300 milliGRAM(s) Oral daily  cholecalciferol 5000 Unit(s) Oral daily  cyanocobalamin 1000 MICROGram(s) Oral daily  DULoxetine 30 milliGRAM(s) Oral <User Schedule>  folic acid 1 milliGRAM(s) Oral daily  lamoTRIgine 200 milliGRAM(s) Oral daily  melatonin 3 milliGRAM(s) Oral at bedtime  multivitamin 1 Tablet(s) Oral daily  propranolol 10 milliGRAM(s) Oral daily  traZODone 200 milliGRAM(s) Oral at bedtime    MEDICATIONS  (PRN):  acetaminophen   Tablet .. 650 milliGRAM(s) Oral daily PRN Mild Pain (1 - 3)  ALPRAZolam 1 milliGRAM(s) Oral two times a day PRN anxiety  loratadine 10 milliGRAM(s) Oral daily PRN allergies      Allergies    penicillins (Other)  trees dogs cats cockaroaches (Rhinitis; Rhinorrhea)    Intolerances        REVIEW OF SYSTEMS:  CONSTITUTIONAL: +generalized weakness, poor appetite (improving), No fever or chills  HEENT:  No headache, no sore throat  RESPIRATORY: No cough, wheezing, or shortness of breath  CARDIOVASCULAR: No chest pain, palpitations  GASTROINTESTINAL: no discomfort with wound vac; No abd pain, nausea, vomiting, or diarrhea  GENITOURINARY: No dysuria, frequency, or hematuria  NEUROLOGICAL: no focal weakness ; no dizziness   MUSCULOSKELETAL: no myalgias     Vital Signs Last 24 Hrs  T(C): 37.2 (18 Oct 2020 21:06), Max: 37.2 (18 Oct 2020 21:06)  T(F): 99 (18 Oct 2020 21:06), Max: 99 (18 Oct 2020 21:06)  HR: 92 (18 Oct 2020 21:06) (86 - 95)  BP: 109/71 (18 Oct 2020 21:06) (109/71 - 114/79)  BP(mean): --  RR: 18 (18 Oct 2020 21:06) (18 - 20)  SpO2: 92% (18 Oct 2020 21:06) (92% - 98%)    PHYSICAL EXAM:  GENERAL: NAD, morbidly obese  HEENT: anicteric, moist mucous membranes  CHEST/LUNG:  CTA b/l, no rales, wheezes, or rhonchi  HEART:  RRR, S1, S2  ABDOMEN:  BS+, soft, nontender, nondistended; wound vac across lower abdomen covering the incision; no erythema, warmth, or tenderness on the skin surrounding the wound vac  EXTREMITIES: trace lower extremity edema, no cyanosis, or calf tenderness  NERVOUS SYSTEM: answers questions and follows commands     LABS:                        9.0    8.83  )-----------( 270      ( 18 Oct 2020 10:40 )             27.4     CBC Full  -  ( 18 Oct 2020 10:40 )  WBC Count : 8.83 K/uL  Hemoglobin : 9.0 g/dL  Hematocrit : 27.4 %  Platelet Count - Automated : 270 K/uL  Mean Cell Volume : 89.0 fl  Mean Cell Hemoglobin : 29.2 pg  Mean Cell Hemoglobin Concentration : 32.8 gm/dL  Auto Neutrophil # : x  Auto Lymphocyte # : x  Auto Monocyte # : x  Auto Eosinophil # : x  Auto Basophil # : x  Auto Neutrophil % : x  Auto Lymphocyte % : x  Auto Monocyte % : x  Auto Eosinophil % : x  Auto Basophil % : x    18 Oct 2020 10:40    141    |  105    |  15     ----------------------------<  120    4.9     |  31     |  0.64     Ca    8.3        18 Oct 2020 10:40    TPro  x      /  Alb  1.5    /  TBili  x      /  DBili  x      /  AST  x      /  ALT  x      /  AlkPhos  x      18 Oct 2020 10:40        CAPILLARY BLOOD GLUCOSE            Culture - Urine (collected 10-15-20 @ 01:09)  Source: .Urine Clean Catch (Midstream)  Final Report (10-16-20 @ 13:23):    >=3 organisms. Probable collection contamination.    Culture - Blood (collected 10-13-20 @ 19:10)  Source: .Blood Blood  Final Report (10-18-20 @ 20:01):    No Growth Final    Culture - Blood (collected 10-13-20 @ 19:10)  Source: .Blood Blood  Final Report (10-18-20 @ 20:01):    No Growth Final        RADIOLOGY & ADDITIONAL TESTS:    Personally reviewed.     Consultant(s) Notes Reviewed:  [x] YES  [ ] NO

## 2020-10-18 NOTE — PROGRESS NOTE ADULT - SUBJECTIVE AND OBJECTIVE BOX
Postoperative Day #: 24    60y Female admitted with Acute renal failure  S/P Panniculectomy 9/25/2020    .    Patient seen and examined bedside resting comfortably.  Pt without complaints.  Tolerating diet.  Has been ambulating a bit more, sitting in chair.  Overall appears better.  Wound vac in place.  Denies fevers, chills, SOB, chest pain, back pain, lower leg/calf tenderness.     T(F): 98.3 (10-18-20 @ 12:32), Max: 98.6 (10-17-20 @ 21:12)  HR: 95 (10-18-20 @ 12:32) (86 - 95)  BP: 113/66 (10-18-20 @ 12:32) (113/66 - 122/81)  RR: 20 (10-18-20 @ 12:32) (20 - 20)  SpO2: 93% (10-18-20 @ 12:32) (93% - 98%)  Wt(kg): --  CAPILLARY BLOOD GLUCOSE          PHYSICAL EXAM:  General: NAD  Neuro:  Alert & oriented x 3  CV: +S1+S2 regular rate and rhythm  Lung: clear to ausculation bilaterally  Abdomen: BS+ Soft.  Wound vac in place.   Extremities: no pedal edema or calf tenderness noted       LABS:                        9.0    8.83  )-----------( 270      ( 18 Oct 2020 10:40 )             27.4     10-18    141  |  105  |  15  ----------------------------<  120<H>  4.9   |  31  |  0.64    Ca    8.3<L>      18 Oct 2020 10:40    TPro  x   /  Alb  1.5<L>  /  TBili  x   /  DBili  x   /  AST  x   /  ALT  x   /  AlkPhos  x   10-18      I&O's Detail      Impression: 60y Female admitted with Acute renal failure  S/P Panniculectomy        Plan:  -Increase activity with PT, OOB, Ambulate  -Patient instructed on and encouraged incentive spirometry use  -continue local wound care- Wound vac in place.  Expected to be changed tomorrow 10/19/2020  -Continue current management per Primary team.  -Discussed with Dr. De La Torre.

## 2020-10-19 LAB
ANION GAP SERPL CALC-SCNC: 5 MMOL/L — SIGNIFICANT CHANGE UP (ref 5–17)
BUN SERPL-MCNC: 16 MG/DL — SIGNIFICANT CHANGE UP (ref 7–23)
CALCIUM SERPL-MCNC: 8.2 MG/DL — LOW (ref 8.5–10.1)
CHLORIDE SERPL-SCNC: 105 MMOL/L — SIGNIFICANT CHANGE UP (ref 96–108)
CO2 SERPL-SCNC: 32 MMOL/L — HIGH (ref 22–31)
CREAT SERPL-MCNC: 0.71 MG/DL — SIGNIFICANT CHANGE UP (ref 0.5–1.3)
GLUCOSE SERPL-MCNC: 137 MG/DL — HIGH (ref 70–99)
HCT VFR BLD CALC: 28.9 % — LOW (ref 34.5–45)
HGB BLD-MCNC: 8.9 G/DL — LOW (ref 11.5–15.5)
MCHC RBC-ENTMCNC: 28.2 PG — SIGNIFICANT CHANGE UP (ref 27–34)
MCHC RBC-ENTMCNC: 30.8 GM/DL — LOW (ref 32–36)
MCV RBC AUTO: 91.5 FL — SIGNIFICANT CHANGE UP (ref 80–100)
NRBC # BLD: 0 /100 WBCS — SIGNIFICANT CHANGE UP (ref 0–0)
PLATELET # BLD AUTO: 307 K/UL — SIGNIFICANT CHANGE UP (ref 150–400)
POTASSIUM SERPL-MCNC: 4.7 MMOL/L — SIGNIFICANT CHANGE UP (ref 3.5–5.3)
POTASSIUM SERPL-SCNC: 4.7 MMOL/L — SIGNIFICANT CHANGE UP (ref 3.5–5.3)
RBC # BLD: 3.16 M/UL — LOW (ref 3.8–5.2)
RBC # FLD: 15.6 % — HIGH (ref 10.3–14.5)
SODIUM SERPL-SCNC: 142 MMOL/L — SIGNIFICANT CHANGE UP (ref 135–145)
WBC # BLD: 11.05 K/UL — HIGH (ref 3.8–10.5)
WBC # FLD AUTO: 11.05 K/UL — HIGH (ref 3.8–10.5)

## 2020-10-19 PROCEDURE — 99233 SBSQ HOSP IP/OBS HIGH 50: CPT

## 2020-10-19 PROCEDURE — 99232 SBSQ HOSP IP/OBS MODERATE 35: CPT

## 2020-10-19 RX ORDER — VERAPAMIL HCL 240 MG
80 CAPSULE, EXTENDED RELEASE PELLETS 24 HR ORAL DAILY
Refills: 0 | Status: DISCONTINUED | OUTPATIENT
Start: 2020-10-19 | End: 2020-10-20

## 2020-10-19 RX ADMIN — Medication 650 MILLIGRAM(S): at 09:55

## 2020-10-19 RX ADMIN — Medication 650 MILLIGRAM(S): at 09:24

## 2020-10-19 RX ADMIN — Medication 1 MILLIGRAM(S): at 12:44

## 2020-10-19 RX ADMIN — Medication 3 MILLIGRAM(S): at 21:42

## 2020-10-19 RX ADMIN — Medication 5000 UNIT(S): at 12:44

## 2020-10-19 RX ADMIN — Medication 1 TABLET(S): at 12:45

## 2020-10-19 RX ADMIN — PREGABALIN 1000 MICROGRAM(S): 225 CAPSULE ORAL at 12:44

## 2020-10-19 RX ADMIN — Medication 200 MILLIGRAM(S): at 21:43

## 2020-10-19 RX ADMIN — DULOXETINE HYDROCHLORIDE 30 MILLIGRAM(S): 30 CAPSULE, DELAYED RELEASE ORAL at 13:27

## 2020-10-19 RX ADMIN — LAMOTRIGINE 200 MILLIGRAM(S): 25 TABLET, ORALLY DISINTEGRATING ORAL at 12:45

## 2020-10-19 RX ADMIN — Medication 300 MILLIGRAM(S): at 12:44

## 2020-10-19 RX ADMIN — DULOXETINE HYDROCHLORIDE 30 MILLIGRAM(S): 30 CAPSULE, DELAYED RELEASE ORAL at 21:43

## 2020-10-19 RX ADMIN — DULOXETINE HYDROCHLORIDE 30 MILLIGRAM(S): 30 CAPSULE, DELAYED RELEASE ORAL at 05:00

## 2020-10-19 NOTE — PROGRESS NOTE ADULT - PROBLEM/PLAN-8
DISPLAY PLAN FREE TEXT
07-Oct-2018

## 2020-10-19 NOTE — PROGRESS NOTE ADULT - PROBLEM SELECTOR PROBLEM 9
hard copy, drawn during this pregnancy
Anxiety and depression

## 2020-10-19 NOTE — ADVANCED PRACTICE NURSE CONSULT - ASSESSMENT
Patients negative pressure wound therapy dressing changed today by surgical PA: Patient complains of pain with touch to right lower incision No erythema, no swelling noted 100mL fluid in canister changed 1 day ago. Surgeon made aware Patients negative pressure wound therapy dressing changed today by surgical PA: Patient complains of pain with touch to right lower incision No erythema, no swelling noted 100mL serosanguinous fluid in canister changed 1 day ago. Surgeon made aware

## 2020-10-19 NOTE — PROGRESS NOTE ADULT - SUBJECTIVE AND OBJECTIVE BOX
Patient is a 60y old  Female who presents with a chief complaint of ELAINE and vasovagal episode (14 Oct 2020 13:19)  Patient seen in follow up for ELAINE.     No new complaints.        PAST MEDICAL HISTORY:  History of aortic aneurysm    H/O aortic aneurysm repair    Arthritis    Degenerative disc disease, lumbar    Neuropathy    Spinal stenosis    Bipolar 1 disorder    Kidney stone    Diabetes Mellitus Type II    Sleep Apnea    Asthma    Hypertension    Obesity    Morbid Obesity    Drug Abuse    ETOH Abuse    Arrhythmia    Benign Essential Tremor    Anxiety    Depression    Renal Calculi    Colitis    Anemia    Diabetes    Sleep Apnea    Asthma        MEDICATIONS  (STANDING):  allopurinol 300 milliGRAM(s) Oral daily  cholecalciferol 5000 Unit(s) Oral daily  cyanocobalamin 1000 MICROGram(s) Oral daily  DULoxetine 30 milliGRAM(s) Oral <User Schedule>  folic acid 1 milliGRAM(s) Oral daily  lamoTRIgine 200 milliGRAM(s) Oral daily  melatonin 3 milliGRAM(s) Oral at bedtime  multivitamin 1 Tablet(s) Oral daily  propranolol 10 milliGRAM(s) Oral daily  traZODone 200 milliGRAM(s) Oral at bedtime    MEDICATIONS  (PRN):  acetaminophen   Tablet .. 650 milliGRAM(s) Oral daily PRN Mild Pain (1 - 3)  ALPRAZolam 1 milliGRAM(s) Oral two times a day PRN anxiety  loratadine 10 milliGRAM(s) Oral daily PRN allergies    T(C): 36.9 (10-19-20 @ 13:16), Max: 37.2 (10-18-20 @ 21:06)  HR: 90 (10-19-20 @ 13:16) (82 - 95)  BP: 120/81 (10-19-20 @ 13:16) (109/71 - 125/83)  RR: 18 (10-19-20 @ 13:16)  SpO2: 94% (10-19-20 @ 13:16)  Wt(kg): --  I&O's Detail            PHYSICAL EXAM:  General: No distress  Respiratory: b/l air entry  Cardiovascular: S1 S2  Gastrointestinal: soft, obese, + drains  Extremities:  no edema            LABORATORY:                        8.9    11.05 )-----------( 307      ( 19 Oct 2020 06:48 )             28.9     10-19    142  |  105  |  16  ----------------------------<  137<H>  4.7   |  32<H>  |  0.71    Ca    8.2<L>      19 Oct 2020 06:48    TPro  x   /  Alb  1.5<L>  /  TBili  x   /  DBili  x   /  AST  x   /  ALT  x   /  AlkPhos  x   10-18    Sodium, Serum: 142 mmol/L (10-19 @ 06:48)  Sodium, Serum: 141 mmol/L (10-18 @ 10:40)    Potassium, Serum: 4.7 mmol/L (10-19 @ 06:48)  Potassium, Serum: 4.9 mmol/L (10-18 @ 10:40)    Hemoglobin: 8.9 g/dL (10-19 @ 06:48)  Hemoglobin: 9.0 g/dL (10-18 @ 10:40)  Hemoglobin: 9.2 g/dL (10-17 @ 07:23)    Creatinine, Serum 0.71 (10-19 @ 06:48)  Creatinine, Serum 0.64 (10-18 @ 10:40)  Creatinine, Serum 0.77 (10-17 @ 07:23)        LIVER FUNCTIONS - ( 18 Oct 2020 10:40 )  Alb: 1.5 g/dL / Pro: x     / ALK PHOS: x     / ALT: x     / AST: x     / GGT: x

## 2020-10-19 NOTE — PROGRESS NOTE ADULT - SUBJECTIVE AND OBJECTIVE BOX
Patient is a 60y old  Female who presents with a chief complaint of diaphoresis, lightheadedness.     INTERVAL HPI/OVERNIGHT EVENTS: Pt states she feels "alright." Pt states she had some pain with dressing/wound vac change today. Denies fever, chills, dysuria, SOB, CP.     MEDICATIONS  (STANDING):  allopurinol 300 milliGRAM(s) Oral daily  cholecalciferol 5000 Unit(s) Oral daily  cyanocobalamin 1000 MICROGram(s) Oral daily  DULoxetine 30 milliGRAM(s) Oral <User Schedule>  folic acid 1 milliGRAM(s) Oral daily  lamoTRIgine 200 milliGRAM(s) Oral daily  melatonin 3 milliGRAM(s) Oral at bedtime  multivitamin 1 Tablet(s) Oral daily  propranolol 10 milliGRAM(s) Oral daily  traZODone 200 milliGRAM(s) Oral at bedtime  verapamil 80 milliGRAM(s) Oral daily    MEDICATIONS  (PRN):  acetaminophen   Tablet .. 650 milliGRAM(s) Oral daily PRN Mild Pain (1 - 3)  ALPRAZolam 1 milliGRAM(s) Oral two times a day PRN anxiety  loratadine 10 milliGRAM(s) Oral daily PRN allergies      Allergies    penicillins (Other)  trees dogs cats cockaroaches (Rhinitis; Rhinorrhea)    Intolerances        REVIEW OF SYSTEMS:  CONSTITUTIONAL: +poor appetite (improving); No fever or chills  HEENT:  No headache, no sore throat  RESPIRATORY: No cough, wheezing, or shortness of breath  CARDIOVASCULAR: No chest pain, palpitations  GASTROINTESTINAL: abd pain when wound vac changed (improving), no nausea, vomiting, or diarrhea  GENITOURINARY: No dysuria, frequency, or hematuria  NEUROLOGICAL: no focal weakness or dizziness  MUSCULOSKELETAL: no myalgias     Vital Signs Last 24 Hrs  T(C): 36.9 (19 Oct 2020 13:16), Max: 36.9 (19 Oct 2020 13:16)  T(F): 98.5 (19 Oct 2020 13:16), Max: 98.5 (19 Oct 2020 13:16)  HR: 90 (19 Oct 2020 13:16) (82 - 90)  BP: 120/81 (19 Oct 2020 13:16) (120/81 - 125/83)  BP(mean): --  RR: 18 (19 Oct 2020 13:16) (18 - 18)  SpO2: 94% (19 Oct 2020 13:16) (94% - 94%)    PHYSICAL EXAM:  GENERAL: NAD, morbidly obese  HEENT: anicteric, moist mucous membranes  CHEST/LUNG:  CTA b/l, no rales, wheezes, or rhonchi  HEART:  RRR, S1, S2  ABDOMEN:  BS+, soft, nontender, nondistended; wound vac across lower abdomen covering the incision; no erythema, warmth, or tenderness on the skin above the wound vac  EXTREMITIES: trace lower extremity edema, no cyanosis, or calf tenderness  NERVOUS SYSTEM: answers questions and follows commands    LABS:                        8.9    11.05 )-----------( 307      ( 19 Oct 2020 06:48 )             28.9     CBC Full  -  ( 19 Oct 2020 06:48 )  WBC Count : 11.05 K/uL  Hemoglobin : 8.9 g/dL  Hematocrit : 28.9 %  Platelet Count - Automated : 307 K/uL  Mean Cell Volume : 91.5 fl  Mean Cell Hemoglobin : 28.2 pg  Mean Cell Hemoglobin Concentration : 30.8 gm/dL  Auto Neutrophil # : x  Auto Lymphocyte # : x  Auto Monocyte # : x  Auto Eosinophil # : x  Auto Basophil # : x  Auto Neutrophil % : x  Auto Lymphocyte % : x  Auto Monocyte % : x  Auto Eosinophil % : x  Auto Basophil % : x    19 Oct 2020 06:48    142    |  105    |  16     ----------------------------<  137    4.7     |  32     |  0.71     Ca    8.2        19 Oct 2020 06:48          CAPILLARY BLOOD GLUCOSE            Culture - Urine (collected 10-15-20 @ 01:09)  Source: .Urine Clean Catch (Midstream)  Final Report (10-16-20 @ 13:23):    >=3 organisms. Probable collection contamination.    Culture - Blood (collected 10-13-20 @ 19:10)  Source: .Blood Blood  Final Report (10-18-20 @ 20:01):    No Growth Final    Culture - Blood (collected 10-13-20 @ 19:10)  Source: .Blood Blood  Final Report (10-18-20 @ 20:01):    No Growth Final        RADIOLOGY & ADDITIONAL TESTS:    Personally reviewed.     Consultant(s) Notes Reviewed:  [x] YES  [ ] NO

## 2020-10-19 NOTE — PROGRESS NOTE ADULT - SUBJECTIVE AND OBJECTIVE BOX
SURGERY PA NOTE ON BEHALF OF DR. DE LA TORRE / PLASTIC SURGERY:    S: Patient seen and examined at bedside.  Currently lying supine, comfortable and without complaints - denies pain, tolerating diet without N/V.  Minimally OOB thus far.      MEDICATIONS:  acetaminophen   Tablet .. 650 milliGRAM(s) Oral daily PRN  allopurinol 300 milliGRAM(s) Oral daily  ALPRAZolam 1 milliGRAM(s) Oral two times a day PRN  cholecalciferol 5000 Unit(s) Oral daily  cyanocobalamin 1000 MICROGram(s) Oral daily  DULoxetine 30 milliGRAM(s) Oral <User Schedule>  folic acid 1 milliGRAM(s) Oral daily  lamoTRIgine 200 milliGRAM(s) Oral daily  loratadine 10 milliGRAM(s) Oral daily PRN  melatonin 3 milliGRAM(s) Oral at bedtime  multivitamin 1 Tablet(s) Oral daily  propranolol 10 milliGRAM(s) Oral daily  traZODone 200 milliGRAM(s) Oral at bedtime      O:  Vital Signs Last 24 Hrs  T(C): 36.4 (19 Oct 2020 04:59), Max: 37.2 (18 Oct 2020 21:06)  T(F): 97.5 (19 Oct 2020 04:59), Max: 99 (18 Oct 2020 21:06)  HR: 87 (19 Oct 2020 04:59) (82 - 95)  BP: 125/83 (19 Oct 2020 04:59) (109/71 - 125/83)  BP(mean): --  RR: 18 (19 Oct 2020 04:59) (18 - 20)  SpO2: 94% (19 Oct 2020 04:59) (92% - 94%)    I&O SUMMARY:      PHYSICAL EXAM:  Lungs: CTA bilat  Card: S1S2  Abd: Umbilical wound with some scabbing - appears to be viable and healing well.  Good negative pressure throughout black granufoam noted, with 150cc SS fluid in reservoir and tubing.  VAC dressing removed in its entirety today, with some malodor appreciated. Right side of wound at terminal 7cm with persistent breakdown, subcutaneous fat visible within wound that extends approx. 2cm into adipose - probed, no appreciable tracking in any direction.  SS drainage noted from wound.  No appreciable purulence, some hyperemia of skin around wound without john erythema.  Approx 4-5cm medial to right-sided wound breakdown is another region of breakdown, approx 1cm deep and 2cm long with minimal SS, non-purulent drainage noted, with hyperemia and no john erythema of directly surrounding skin.  Mid-portion of wound superficially remains intact, with good healing noted.  On the left side of the wound lies the deepest region of breakdown, aprrox. 2.5-3cm deep with subcutaneous tracking medially (under the healed portion of the wound at mid-abdomen) and laterally.  Again, some SS, non-purulent drainage noted with hyperemia of surrounding skin.  Last region of breakdown approx. 4cm to the left of the deepest region, approx. 2cm long, and 1.5cm deep - again, with SS/non-purulent drainage and hyperemia of surrounding skin.    Black granufoam placed into deepest wounds, and overlying more superficial.  Tegaderm barrier placed over intact/healed regions of incision with a strip of connecting granufoam placed connecting all wounds.  Good negative pressure achieved throughout the construct, with no air leaks identified as per VAC unit.    Ext: Calves soft, NT, without edema bilat.     LABS:                        8.9    11.05 )-----------( 307      ( 19 Oct 2020 06:48 )             28.9     10-19    142  |  105  |  16  ----------------------------<  137<H>  4.7   |  32<H>  |  0.71    Ca    8.2<L>      19 Oct 2020 06:48    TPro  x   /  Alb  1.5<L>  /  TBili  x   /  DBili  x   /  AST  x   /  ALT  x   /  AlkPhos  x   10-18            A: POD #21 s/p panniculectomy, re-admitted for renal failure/anemia      Now with VAC dressing in place over     P: Currently, patient without complaints, appears to be improving clinically      H&H remains stable at this time      Mild leukocytosis noted, though no other significant indications of infection (likely reactive?)      Vital signs non-suggestive, afeb      Electrolytes stable, BUN/Cr have normalized      Continue care per medicine       Continue VAC therapy, to be changed on Thursday (if still in house)      OOB, ambulation/increase activity as tolerated      Will discuss with Dr. De La Torre      Will follow

## 2020-10-19 NOTE — PROGRESS NOTE ADULT - SUBJECTIVE AND OBJECTIVE BOX
[facility] -- Almas Faustin, Cara Nunez, Saurabh Johansen [place], [place]  Office # [phone]    Follow Up:    shortness of breath  Subjective/Observations:   Patient seen and examined. She has some shortness of breath with exertion. Abdominal wound vac in place. She has no cardiac complaints      REVIEW OF SYSTEMS: All other review of systems is negative unless indicated above  PAST MEDICAL & SURGICAL HISTORY:  History of aortic aneurysm  descending aorta see CT    Arthritis    Degenerative disc disease, lumbar    Neuropathy    Spinal stenosis    Bipolar 1 disorder    Kidney stone    Sleep Apnea  CPAP with oxygen since June 2020    Asthma    Hypertension    Morbid Obesity    ETOH Abuse  H/O    Benign Essential Tremor    Anxiety    Depression    Renal Calculi  chronic      Colitis  H/O    Anemia    S/P panniculectomy    S/P cardiac catheterization    S/P dilation and curettage    History of tonsillectomy    History of laparoscopic adjustable gastric banding  band removed few yrs ago    S/P D&amp;C    Biliary stent placement &amp; ercp    ureteroscopy with stone removal  1-      MEDICATIONS  (STANDING):  allopurinol 300 milliGRAM(s) Oral daily  cholecalciferol 5000 Unit(s) Oral daily  cyanocobalamin 1000 MICROGram(s) Oral daily  DULoxetine 30 milliGRAM(s) Oral <User Schedule>  folic acid 1 milliGRAM(s) Oral daily  lamoTRIgine 200 milliGRAM(s) Oral daily  melatonin 3 milliGRAM(s) Oral at bedtime  multivitamin 1 Tablet(s) Oral daily  propranolol 10 milliGRAM(s) Oral daily  traZODone 200 milliGRAM(s) Oral at bedtime    MEDICATIONS  (PRN):  acetaminophen   Tablet .. 650 milliGRAM(s) Oral daily PRN Mild Pain (1 - 3)  ALPRAZolam 1 milliGRAM(s) Oral two times a day PRN anxiety  loratadine 10 milliGRAM(s) Oral daily PRN allergies    Allergies    penicillins (Other)  trees dogs cats cockaroaches (Rhinitis; Rhinorrhea)    Intolerances      Vital Signs Last 24 Hrs  T(C): 36.9 (19 Oct 2020 13:16), Max: 37.2 (18 Oct 2020 21:06)  T(F): 98.5 (19 Oct 2020 13:16), Max: 99 (18 Oct 2020 21:06)  HR: 90 (19 Oct 2020 13:16) (82 - 92)  BP: 120/81 (19 Oct 2020 13:16) (109/71 - 125/83)  BP(mean): --  RR: 18 (19 Oct 2020 13:16) (18 - 18)  SpO2: 94% (19 Oct 2020 13:16) (92% - 94%)  I&O's Summary      PHYSICAL EXAM:  TELE: not on tele  Constitutional: obese, NAD, awake and alert, well-developed  HEENT: Moist Mucous Membranes, Anicteric  Pulmonary: Non-labored, breath sounds are clear bilaterally, No wheezing, rales or rhonchi  Cardiovascular: Regular, S1 and S2, No murmurs, rubs, gallops or clicks  Gastrointestinal: abdominal wound with wound vac,  Bowel Sounds present, soft, nontender.   Lymph: No peripheral edema. No lymphadenopathy.  Skin: No visible rashes or ulcers.  Psych:  Mood & affect appropriate  LABS: All Labs Reviewed:                        8.9    11.05 )-----------( 307      ( 19 Oct 2020 06:48 )             28.9                         9.0    8.83  )-----------( 270      ( 18 Oct 2020 10:40 )             27.4                         9.2    9.45  )-----------( 327      ( 17 Oct 2020 07:23 )             28.7     19 Oct 2020 06:48    142    |  105    |  16     ----------------------------<  137    4.7     |  32     |  0.71   18 Oct 2020 10:40    141    |  105    |  15     ----------------------------<  120    4.9     |  31     |  0.64   17 Oct 2020 07:23    141    |  104    |  21     ----------------------------<  124    4.3     |  32     |  0.77     Ca    8.2        19 Oct 2020 06:48  Ca    8.3        18 Oct 2020 10:40  Ca    8.6        17 Oct 2020 07:23    TPro  x      /  Alb  1.5    /  TBili  x      /  DBili  x      /  AST  x      /  ALT  x      /  AlkPhos  x      18 Oct 2020 10:40        EXAM:  CT ABDOMEN AND PELVIS                          EXAM:  CT CHEST                            PROCEDURE DATE:  10/13/2020          INTERPRETATION:  CT CHEST, CT ABDOMEN AND PELVIS    CLINICAL INFORMATION: Shortness of breath after abdominal wallresection, abdominal abscess infection    COMPARISON: Same day chest x-ray, CT abdomen and pelvis 9/30/2020, chest CT 6/16/2020    PROCEDURE:  CT of the Chest, Abdomen and Pelvis was performed without intravenous contrast.  Intravenous contrast: None  Oral contrast: None.  Sagittal and coronal reformats were performed.    FINDINGS:    CHEST:    LUNGS, AIRWAYS: The central airways are patent. Small area of reverse halo sign in the basilar left lower lobe.  PLEURA: No pleural abnormality.  VESSELS:Stable 4.6 cm ascending thoracic aorta.  HEART: Normal heart size. No pericardial effusion. Coronary artery calcifications are present.  MEDIASTINUM AND MILADIS: No adenopathy.  CHEST WALL: No masses.    ABDOMEN AND PELVIS:    LIVER: Normal.  BILE DUCTS: Nondilated.  GALLBLADDER: Prior cholecystectomy.  SPLEEN: Normal.  PANCREAS: Normal.  ADRENALS: Normal.  KIDNEYS/URETERS: Nonobstructing right kidney stones.    BLADDER: Underdistended limiting evaluation.  REPRODUCTIVE ORGANS: No uterine or adnexalabnormality. IUD in place.    BOWEL: No bowel-related abnormality. Specifically, no evidence of acute diverticulitis. Normal appendix and ileocecal region. No bowel obstruction or bowel inflammation.  PERITONEUM: No intra-abdominal fluid collection.  VESSELS:  Normal caliber aorta.  RETROPERITONEUM/LYMPH NODES: No adenopathy.  ABDOMINAL WALL: Postsurgical changes without discrete drainable collection.  BONES: No acute bony abnormality.    IMPRESSION:    Postsurgical changes without discrete drainable collection remaining.    Small area of reverse halo sign in the basilar left lower lobe    NADINE STEWART M.D., ATTENDING RADIOLOGIST  This document has been electronically signed. Oct 13 2020  5:25PM    EXAM:  CT ABDOMEN AND PELVIS                          EXAM:  CT CHEST                          PROCEDURE DATE:  10/13/2020      INTERPRETATION:  CT CHEST, CT ABDOMEN AND PELVIS    CLINICAL INFORMATION: Shortness of breath after abdominal wallresection, abdominal abscess infection    COMPARISON: Same day chest x-ray, CT abdomen and pelvis 9/30/2020, chest CT 6/16/2020    PROCEDURE:  CT of the Chest, Abdomen and Pelvis was performed without intravenous contrast.  Intravenous contrast: None  Oral contrast: None.  Sagittal and coronal reformats were performed.    FINDINGS:    CHEST:    LUNGS, AIRWAYS: The central airways are patent. Small area of reverse halo sign in the basilar left lower lobe.  PLEURA: No pleural abnormality.  VESSELS:Stable 4.6 cm ascending thoracic aorta.  HEART: Normal heart size. No pericardial effusion. Coronary artery calcifications are present.  MEDIASTINUM AND MILADIS: No adenopathy.  CHEST WALL: No masses.    ABDOMEN AND PELVIS:    LIVER: Normal.  BILE DUCTS: Nondilated.  GALLBLADDER: Prior cholecystectomy.  SPLEEN: Normal.  PANCREAS: Normal.  ADRENALS: Normal.  KIDNEYS/URETERS: Nonobstructing right kidney stones.    BLADDER: Underdistended limiting evaluation.  REPRODUCTIVE ORGANS: No uterine or adnexalabnormality. IUD in place.    BOWEL: No bowel-related abnormality. Specifically, no evidence of acute diverticulitis. Normal appendix and ileocecal region. No bowel obstruction or bowel inflammation.  PERITONEUM: No intra-abdominal fluid collection.  VESSELS:  Normal caliber aorta.  RETROPERITONEUM/LYMPH NODES: No adenopathy.  ABDOMINAL WALL: Postsurgical changes without discrete drainable collection.  BONES: No acute bony abnormality.    IMPRESSION:    Postsurgical changes without discrete drainable collection remaining.    Small area of reverse halo sign in the basilar left lower lobe    NADINE STEWART M.D., ATTENDING RADIOLOGIST  This document has been electronically signed. Oct 13 2020  5:25PM        Ventricular Rate 95 BPM    Atrial Rate 95 BPM    P-R Interval 200 ms    QRS Duration 100 ms    Q-T Interval 332 ms    QTC Calculation(Bazett) 417 ms    P Axis 5 degrees    R Axis -45 degrees    T Axis 23 degrees    Diagnosis Line Normal sinus rhythm  Left axis deviation  Minimal voltage criteria for LVH, may be normal variant  Cannot rule out Anterior infarct (cited on or before 25-SEP-2020)  Abnormal ECG  Confirmed by ana Giordano ([id number]) on 10/13/2020 4:00:07 PM

## 2020-10-19 NOTE — PROGRESS NOTE ADULT - PROBLEM SELECTOR PLAN 2
Sudden onset of sweating and dizziness for 30 min while seated. This is consistent with a vasovagal episode likely due to general hypotension in setting of anemia and poor oncotic pressure. Likely had an orthostatic component, as well.   - PE less likely and unable to perform CTA Chest safely due to ELAINE  - Pt had hx of a-fib in the setting of significant hypotension on prior admission per Cardiology  -EKG shows NSR with no acute changes   -Low-normal LV function with mild diastolic dysfunction on recent echocardiogram  -orthostatics are normal now  -Cardio consulted Dr Giordano group, recs appreciated - continue to hold anti-hypertensives; restarted propranolol and low dose verapamil

## 2020-10-19 NOTE — PROGRESS NOTE ADULT - PROBLEM SELECTOR PLAN 5
-had held Irbesartan, Propanolol, Verapamil, Furosemide for hypotension  -Monitor hemodynamics  -BP still on low side of normal, but stable  -restarted propranolol and low dose verapamil

## 2020-10-19 NOTE — PROGRESS NOTE ADULT - PROBLEM SELECTOR PLAN 3
pt has chronic anemia - managed by Dr Aguilar, but also had recent acute blood loss anemia s/p panniculectomy and is still draining blood from abdomen.   -Vitals stable  -given 1un PRBC on 10/14 and 2nd unit PRBC given on 10/15. Appropriate response in H&H to the second unit PRBC.  -CT abd/pelvic (without IV contrast) did not visualize significant hematoma   -Home supplement [employer] iron that pt claims is only one that works for her is not held at pharmacy here   -heme ([facility]) consulting, recs appreciated  -rechecked iron studies and ferritin now quite high (likely in part due to being acute phase reactant and pt with significant signs of inflammatory state (ESR of 121) -- the inflammatory state likely contributing to poor marrow response and also to pt's hypoalbuminemia. Discussed high ESR with heme/onc who mariano the test and with surgery -- rec to continue with management of the surgical wound.   -Monitor hemodynamics   -monitor CBC -- Hgb stable now ~9

## 2020-10-19 NOTE — PROGRESS NOTE ADULT - NSHPATTENDINGPLANDISCUSS_GEN_ALL_CORE
pt, consultants, nursing
pt, pt's brother, consultants, nursing
pt, consultants, nursing

## 2020-10-19 NOTE — PROGRESS NOTE ADULT - ASSESSMENT
60 year old female with PMH of JESUS on CPAP, HTN, morbid obesity, anemia, AAA, Arthritis, Depression, anxiety recently admitted to [facility] for elective abdominal panniculectomy, admitted post-op with hemorrhagic shock s/p 3unit prbc, discharged on 10/10  now readmitted with complaint of weakness diaphoresis while, admitted with ELAINE, now resolved.    Near syncope  - Likely vasovagal in nature  - No evidence of cardiac arrhythmias  - OOB to chair with no dizziness or lightheadedness    Anemia   - s/p 3 units PRBC.  H/H now stable.    - CT abdomen pelvis unremarkable   - H/H remains stable  - No evidence of bleeding    Paroxysmal atrial fibrillation:  - Elevated CHADSVASC score  - In setting of hemorrhagic shock requiring PRBC's, would continue to hold off on anticoagulation.   - Can resume when cleared by Surgery  -on inderal 10 for rate control  -initiated verpamil 80- home dose is 240  - Monitor and replete lytes, keep K>4, Mg>2.    HTN  - Home meds include irbesartan 150 and verapamil 240  -slowly uptitrate as tolerated  - Low normal LV function with mild diastolic dysfunction on recent echocardiogram  - Recent cath with non-obs CAD  - No evidence of volume overload    Acute Kidney Injury   - Resolved  - Continue to hold diuresis    DVT ppx  - Per Primary    - All other medical needs as per primary team.  - Other cardiovascular workup will depend on clinical course.  - Will continue to follow.  Marlyn Lopez, MAVIS  Cardiology  [phone]

## 2020-10-19 NOTE — PROGRESS NOTE ADULT - PROBLEM SELECTOR PLAN 4
-had panniculectomy on 9/25 with Dr. De La Torre (consulted, recs appreciated)  -pt still had blood from drain and from site of past EDWIN drain. There was some dehiscence of the incision where it had been draining the hematoma, as per surgery, and surgical team placed a wound vac over the wound. CM notified to arrange for home wound vac and home care. -- Wound vac to be delivered to the hospital by tomorrow and pt can be discharged with it tomorrow, if otherwise stable.  -surgery (Wil), recs appreciated  -incentive spirometry

## 2020-10-20 ENCOUNTER — TRANSCRIPTION ENCOUNTER (OUTPATIENT)
Age: 60
End: 2020-10-20

## 2020-10-20 ENCOUNTER — APPOINTMENT (OUTPATIENT)
Dept: CARDIOLOGY | Facility: CLINIC | Age: 60
End: 2020-10-20

## 2020-10-20 VITALS
DIASTOLIC BLOOD PRESSURE: 77 MMHG | TEMPERATURE: 99 F | HEART RATE: 91 BPM | OXYGEN SATURATION: 92 % | RESPIRATION RATE: 18 BRPM | SYSTOLIC BLOOD PRESSURE: 107 MMHG

## 2020-10-20 LAB
ALBUMIN SERPL ELPH-MCNC: 1.6 G/DL — LOW (ref 3.3–5)
ANION GAP SERPL CALC-SCNC: 7 MMOL/L — SIGNIFICANT CHANGE UP (ref 5–17)
BUN SERPL-MCNC: 15 MG/DL — SIGNIFICANT CHANGE UP (ref 7–23)
CALCIUM SERPL-MCNC: 8.2 MG/DL — LOW (ref 8.5–10.1)
CHLORIDE SERPL-SCNC: 105 MMOL/L — SIGNIFICANT CHANGE UP (ref 96–108)
CO2 SERPL-SCNC: 28 MMOL/L — SIGNIFICANT CHANGE UP (ref 22–31)
CREAT SERPL-MCNC: 0.65 MG/DL — SIGNIFICANT CHANGE UP (ref 0.5–1.3)
GLUCOSE SERPL-MCNC: 136 MG/DL — HIGH (ref 70–99)
POTASSIUM SERPL-MCNC: 4.9 MMOL/L — SIGNIFICANT CHANGE UP (ref 3.5–5.3)
POTASSIUM SERPL-SCNC: 4.9 MMOL/L — SIGNIFICANT CHANGE UP (ref 3.5–5.3)
SODIUM SERPL-SCNC: 140 MMOL/L — SIGNIFICANT CHANGE UP (ref 135–145)

## 2020-10-20 PROCEDURE — 94660 CPAP INITIATION&MGMT: CPT

## 2020-10-20 PROCEDURE — 86901 BLOOD TYPING SEROLOGIC RH(D): CPT

## 2020-10-20 PROCEDURE — 87040 BLOOD CULTURE FOR BACTERIA: CPT

## 2020-10-20 PROCEDURE — 83036 HEMOGLOBIN GLYCOSYLATED A1C: CPT

## 2020-10-20 PROCEDURE — 99232 SBSQ HOSP IP/OBS MODERATE 35: CPT

## 2020-10-20 PROCEDURE — 36430 TRANSFUSION BLD/BLD COMPNT: CPT

## 2020-10-20 PROCEDURE — 93970 EXTREMITY STUDY: CPT

## 2020-10-20 PROCEDURE — 99285 EMERGENCY DEPT VISIT HI MDM: CPT

## 2020-10-20 PROCEDURE — 80048 BASIC METABOLIC PNL TOTAL CA: CPT

## 2020-10-20 PROCEDURE — 85610 PROTHROMBIN TIME: CPT

## 2020-10-20 PROCEDURE — 86140 C-REACTIVE PROTEIN: CPT

## 2020-10-20 PROCEDURE — 71045 X-RAY EXAM CHEST 1 VIEW: CPT

## 2020-10-20 PROCEDURE — 71250 CT THORAX DX C-: CPT

## 2020-10-20 PROCEDURE — 83540 ASSAY OF IRON: CPT

## 2020-10-20 PROCEDURE — 83550 IRON BINDING TEST: CPT

## 2020-10-20 PROCEDURE — 85652 RBC SED RATE AUTOMATED: CPT

## 2020-10-20 PROCEDURE — 82040 ASSAY OF SERUM ALBUMIN: CPT

## 2020-10-20 PROCEDURE — U0003: CPT

## 2020-10-20 PROCEDURE — 84484 ASSAY OF TROPONIN QUANT: CPT

## 2020-10-20 PROCEDURE — 85730 THROMBOPLASTIN TIME PARTIAL: CPT

## 2020-10-20 PROCEDURE — 86923 COMPATIBILITY TEST ELECTRIC: CPT

## 2020-10-20 PROCEDURE — 85379 FIBRIN DEGRADATION QUANT: CPT

## 2020-10-20 PROCEDURE — 86769 SARS-COV-2 COVID-19 ANTIBODY: CPT

## 2020-10-20 PROCEDURE — 85045 AUTOMATED RETICULOCYTE COUNT: CPT

## 2020-10-20 PROCEDURE — 99239 HOSP IP/OBS DSCHRG MGMT >30: CPT

## 2020-10-20 PROCEDURE — 81001 URINALYSIS AUTO W/SCOPE: CPT

## 2020-10-20 PROCEDURE — P9016: CPT

## 2020-10-20 PROCEDURE — 82962 GLUCOSE BLOOD TEST: CPT

## 2020-10-20 PROCEDURE — 85027 COMPLETE CBC AUTOMATED: CPT

## 2020-10-20 PROCEDURE — 83605 ASSAY OF LACTIC ACID: CPT

## 2020-10-20 PROCEDURE — 74176 CT ABD & PELVIS W/O CONTRAST: CPT

## 2020-10-20 PROCEDURE — 93005 ELECTROCARDIOGRAM TRACING: CPT

## 2020-10-20 PROCEDURE — 86900 BLOOD TYPING SEROLOGIC ABO: CPT

## 2020-10-20 PROCEDURE — 87086 URINE CULTURE/COLONY COUNT: CPT

## 2020-10-20 PROCEDURE — 85025 COMPLETE CBC W/AUTO DIFF WBC: CPT

## 2020-10-20 PROCEDURE — 36415 COLL VENOUS BLD VENIPUNCTURE: CPT

## 2020-10-20 PROCEDURE — 80053 COMPREHEN METABOLIC PANEL: CPT

## 2020-10-20 PROCEDURE — 82728 ASSAY OF FERRITIN: CPT

## 2020-10-20 PROCEDURE — 86850 RBC ANTIBODY SCREEN: CPT

## 2020-10-20 RX ORDER — ACETAMINOPHEN 500 MG
2 TABLET ORAL
Qty: 0 | Refills: 0 | DISCHARGE
Start: 2020-10-20

## 2020-10-20 RX ORDER — POTASSIUM CITRATE MONOHYDRATE 100 %
1 POWDER (GRAM) MISCELLANEOUS
Qty: 0 | Refills: 0 | DISCHARGE

## 2020-10-20 RX ORDER — FUROSEMIDE 40 MG
1 TABLET ORAL
Qty: 0 | Refills: 0 | DISCHARGE

## 2020-10-20 RX ORDER — DICLOFENAC SODIUM/MISOPROSTOL 50 MG-200
1 TABLET,IMMEDIATE,DELAY RELEASE,BIPHASE ORAL
Qty: 0 | Refills: 0 | DISCHARGE

## 2020-10-20 RX ORDER — IRBESARTAN 75 MG/1
1 TABLET ORAL
Qty: 0 | Refills: 0 | DISCHARGE

## 2020-10-20 RX ADMIN — DULOXETINE HYDROCHLORIDE 30 MILLIGRAM(S): 30 CAPSULE, DELAYED RELEASE ORAL at 05:49

## 2020-10-20 RX ADMIN — Medication 5000 UNIT(S): at 11:59

## 2020-10-20 RX ADMIN — PREGABALIN 1000 MICROGRAM(S): 225 CAPSULE ORAL at 12:01

## 2020-10-20 RX ADMIN — DULOXETINE HYDROCHLORIDE 30 MILLIGRAM(S): 30 CAPSULE, DELAYED RELEASE ORAL at 14:18

## 2020-10-20 RX ADMIN — Medication 1 TABLET(S): at 11:59

## 2020-10-20 RX ADMIN — Medication 1 MILLIGRAM(S): at 12:00

## 2020-10-20 RX ADMIN — Medication 300 MILLIGRAM(S): at 12:00

## 2020-10-20 RX ADMIN — LAMOTRIGINE 200 MILLIGRAM(S): 25 TABLET, ORALLY DISINTEGRATING ORAL at 11:59

## 2020-10-20 RX ADMIN — Medication 80 MILLIGRAM(S): at 05:49

## 2020-10-20 NOTE — PROGRESS NOTE ADULT - ATTENDING COMMENTS
Chart reviewed    Patient seen and examined    Agree with plan as outlined above
I personally saw and examined the patient in detail.  I have spoken to the above provider regarding the assessment and plan of care.  I reviewed the above assessment and plan of care, and agree.  I have made changes in the body of the note where appropriate.
The patient was personally seen and examined, in addition to being examined and evaluated by NP.  All elements of the note were edited where appropriate.
I personally saw and examined the patient in detail.  I have spoken to the above provider regarding the assessment and plan of care.  I reviewed the above assessment and plan of care, and agree.  I have made changes in the body of the note where appropriate.
I saw and examined the patient personally. Spoke with above provider regarding this case. I reviewed the above findings completely.  I agree with the above history, physical, and plan which I have edited where appropriate.
Note written by attending, see above.  Time spent: 30min. More than 50% of the visit was spent counseling the patient on her condition - acute blood loss anemia, oncotic pressure, hypoalbuminemia, ELAINE, wound vac, nutrition and supplements.
Note written by attending, see above.  Time spent: 37min. More than 50% of the visit was spent counseling the patient on her condition - acute blood loss anemia, oncotic pressure, hypoalbuminemia, ELAINE, wound vac, nutrition and supplements, restarting propranolol and verapamil.
Note written by attending, see above.  Time spent: 40min. More than 50% of the visit was spent counseling the patient on her condition - acute blood loss anemia, oncotic pressure, hypoalbuminemia, ELAINE, wound vac, nutrition and supplements.
Note written by attending, see above.  Time spent: 40min. More than 50% of the visit was spent counseling the patient on her condition - acute blood loss anemia, oncotic pressure, hypoalbuminemia, ELAINE, wound vac, orthostatics.
Note written by attending, see above.  Time spent: 40min. More than 50% of the visit was spent counseling the patient on her condition - acute blood loss anemia, oncotic pressure, hypoalbuminemia, ELAINE.
Note written by attending, see above.  Time spent: 40min. More than 50% of the visit was spent counseling the patient on her condition - acute blood loss anemia, oncotic pressure, hypoalbuminemia, ELAINE.

## 2020-10-20 NOTE — DISCHARGE NOTE PROVIDER - PROVIDER TOKENS
PROVIDER:[TOKEN:[[id number]]],PROVIDER:[TOKEN:[[id number]]],PROVIDER:[TOKEN:[[id number]]]

## 2020-10-20 NOTE — PROGRESS NOTE ADULT - REASON FOR ADMISSION
ELAINE and vasovagal episode
ELAINE and presyncope

## 2020-10-20 NOTE — DISCHARGE NOTE PROVIDER - NSDCCPCAREPLAN_GEN_ALL_CORE_FT
PRINCIPAL DISCHARGE DIAGNOSIS  Diagnosis: ELAINE (acute kidney injury)  Assessment and Plan of Treatment: Your kidney function was abnormal when you came to the hospital likely in part due to dehydration and in part from anemia and low blood pressure. You were given 2 units of blood and rehydrated and your kidney function normalized. Please follow up with your hematologist and cardiologist and monitor your kidney function and monitor your blood pressure at home. If you have dizziness, significant decrease in urination, or other concerns, call your doctor or return to the ER. Show your blood pressure readings to your cardiologist. For now, do take your lasix or your irbesartan as your blood pressure is on the low end of normal and you are not fluid overloaded. Discuss with your cardiologist, Dr. Giordano, when/if you should restart those medications.      SECONDARY DISCHARGE DIAGNOSES  Diagnosis: Anemia  Assessment and Plan of Treatment: Follow up with your hematologist, Dr. Aguilar and monitor your blood counts. Currently, your hemoglobin has been ~9. Take your vitamins and supplements. If you feel dizzy, short of breath or chest pain, call your doctor immediately or return to the ER.    Diagnosis: S/P panniculectomy  Assessment and Plan of Treatment: Follow up with your surgeon Dr. De La Torre, in a week regarding your surgical wound and wound vac.   If you have fever, chills, significant increase in abdominal pain, call your doctor or return to the ER.    Diagnosis: HTN (hypertension)  Assessment and Plan of Treatment: Monitor your blood pressure at home 1-2 times per day. Make a log of the values. Show your blood pressure readings to your cardiologist. For now, do take your lasix or your irbesartan as your blood pressure is on the low end of normal and you are not fluid overloaded. Discuss with your cardiologist, Dr. Giordano, when/if you should restart those medications.

## 2020-10-20 NOTE — PROGRESS NOTE ADULT - ASSESSMENT
60 year old female with PMH of JESUS on CPAP, HTN, morbid obesity, anemia, AAA, Arthritis, Depression, anxiety recently admitted to [facility] for elective abdominal panniculectomy, admitted post-op with hemorrhagic shock s/p 3unit prbc, discharged on 10/10  now readmitted with complaint of weakness diaphoresis while, admitted with ELAINE, now resolved.    Near syncope  - Likely vasovagal in nature  - No evidence of cardiac arrhythmias  - OOB to chair with no dizziness or lightheadedness    Anemia   - s/p 3 units PRBC.    - CT abdomen pelvis unremarkable   - H/H remains stable  - No evidence of bleeding    Paroxysmal atrial fibrillation:  - Elevated CHADSVASC score  - HR: 88 (10-20 @ 07:58) (88 - 91)  - In setting of hemorrhagic shock requiring PRBC's, would continue to hold off on anticoagulation.   - Can resume when cleared by Surgery  - on inderal 10 for rate control  - initiated verapamil 80- home dose is 240  - Monitor and replete lytes, keep K>4, Mg>2.    HTN  - Home meds include irbesartan 150 and verapamil 240  - slowly up titrate as tolerated  - Low normal LV function with mild diastolic dysfunction on recent echocardiogram  - Recent cath with non-obs CAD  - No evidence of volume overload    Acute Kidney Injury   - Resolved  - Continue to hold diuresis    DVT ppx  - Per Primary    - Monitor and replete lytes, keep K>4, Mg>2.  - All other medical needs as per primary team.  - Other cardiovascular workup will depend on clinical course.  - Will continue to follow.    Kobe Corado, MS FNP, [facility]  Nurse Practitioner- Cardiology   Spectra #[phone]     60 year old female with PMH of JESUS on CPAP, HTN, morbid obesity, anemia, AAA, Arthritis, Depression, anxiety recently admitted to [facility] for elective abdominal panniculectomy, admitted post-op with hemorrhagic shock s/p 3unit prbc, discharged on 10/10  now readmitted with complaint of weakness diaphoresis while, admitted with ELAINE, now resolved.    Near syncope  - Likely vasovagal in nature  - No evidence of cardiac arrhythmias  - OOB to chair with no dizziness or lightheadedness    Anemia   - s/p 3 units PRBC.    - CT abdomen pelvis unremarkable   - H/H remains stable  - No evidence of bleeding    Paroxysmal atrial fibrillation:  - Elevated CHADSVASC score  - HR: 88 (10-20 @ 07:58) (88 - 91)  - In setting of hemorrhagic shock requiring PRBC's, would continue to hold off on anticoagulation.   - Can resume when cleared by Surgery  - on inderal 10 for rate control  -  verapamil 80 has been initiated- home dose is 240  - Monitor and replete lytes, keep K>4, Mg>2.    HTN  - Home meds include irbesartan 150 and verapamil 240  - slowly up titrate as tolerated  - Low normal LV function with mild diastolic dysfunction on recent echocardiogram  - Recent cath with non-obs CAD  - No evidence of volume overload    Acute Kidney Injury   - Resolved  - Continue to hold diuresis    DVT ppx  - Per Primary    - Monitor and replete lytes, keep K>4, Mg>2.  - All other medical needs as per primary team.  - Other cardiovascular workup will depend on clinical course.  - Will continue to follow.    Kobe Corado, MS FNP, AGACNP  Nurse Practitioner- Cardiology   Spectra #[phone]

## 2020-10-20 NOTE — DISCHARGE NOTE PROVIDER - HOSPITAL COURSE
HPI as documented in H&P:  61 yo F with PMH of JESUS on CPAP, HTN, Obesity, Anemia, AAA, Arthritis, Depression, Anxiety, and Kidney stones presents to ED from Heme/Onc for sweating and lightheadedness. Hematologist was concerned for PE considering pt's recent complicated surgical procedure. Pt was discharged on 10/10/20 from post surgical complications of hemorraghic shock requiring transfer to ICU for blood transfusions s/p panniculectomy. Pt's sweating and lightheadedness came on suddenly when pt was sitting in the waiting room and lasted for 30 min. Symptoms have not recurred. Pt has EDWIN drain in right abdomen currently draining small amount of blood. Pt says post surgical wounds have frequently drenching bandaging with blood. Pt denies any recent medication changes or illness. Pt denies chest pain, sob, palpitations, abdominal pain, leg pain, nausea, weakness. She feels better with IV fluids.    VS: T 98.3F HR 99 /91 RR 18 SpO2 98% on RA  Labs: Hbg 8.3 PT 14.6 INR 1.26 D-Dimer 828 Lactate 3.3 Na 132 BUN 65 Cr 2.2 ALK phos 141 Trop negative   CXR: negative for acute pulmonary process  CT Chest/Abd/Pelvic: Postsurgical changes without discrete drainable collection remaining. Small area of reverse halo sign in the basilar left lower lobe    Hospital Course:   HPI as documented in H&P:  59 yo F with PMH of JESUS on CPAP, HTN, Obesity, Anemia, AAA, Arthritis, Depression, Anxiety, and Kidney stones presents to ED from Heme/Onc for sweating and lightheadedness. Hematologist was concerned for PE considering pt's recent complicated surgical procedure. Pt was discharged on 10/10/20 from post surgical complications of hemorraghic shock requiring transfer to ICU for blood transfusions s/p panniculectomy. Pt's sweating and lightheadedness came on suddenly when pt was sitting in the waiting room and lasted for 30 min. Symptoms have not recurred. Pt has EDWIN drain in right abdomen currently draining small amount of blood. Pt says post surgical wounds have frequently drenching bandaging with blood. Pt denies any recent medication changes or illness. Pt denies chest pain, sob, palpitations, abdominal pain, leg pain, nausea, weakness. She feels better with IV fluids.    VS: T 98.3F HR 99 /91 RR 18 SpO2 98% on RA  Labs: Hbg 8.3 PT 14.6 INR 1.26 D-Dimer 828 Lactate 3.3 Na 132 BUN 65 Cr 2.2 ALK phos 141 Trop negative   CXR: negative for acute pulmonary process  CT Chest/Abd/Pelvic: Postsurgical changes without discrete drainable collection remaining. Small area of reverse halo sign in the basilar left lower lobe    Hospital Course:  59yo F with PMH of JESUS on CPAP, HTN, Obesity, Anemia, AAA, Arthritis, Depression, Anxiety, and Kidney stones, recent admission for panniculectomy complicated by hemorrhagic shock, now presents to ED from outpatient office visit for sweating and lightheadedness admitted for ELAINE and presyncope likely due to hypotension and hypoperfusion. Pt's ELAINE was likely pre-renal due to anemia and hypotension from poor oncotic pressure due to severe hypoalbuminemia. Pt given 2un PRBC for symptomatic anemia likely acute blood loss anemia from oozing from abdominal surgical wound. Hgb stabilized around 9 and BP stabilized. Orthostatics negative. Plastic surgery (Wil), nephro (Young), cardio (Eleno cho), hematology (Filippo cho) consulted on the case. Pt had dehiscence of part of the surgical wound and plastic surgery placed a wound vac, which pt will continue to use at home. Pt stabilized and was discharged to home.     On day of discharge:  ROS: denies fever, chills, CP, SOB, palpitations, dizziness, nausea, vomiting.     Vital Signs Last 24 Hrs  T(C): 36.8 (20 Oct 2020 05:34), Max: 37.3 (19 Oct 2020 21:17)  T(F): 98.3 (20 Oct 2020 05:34), Max: 99.1 (19 Oct 2020 21:17)  HR: 88 (20 Oct 2020 07:58) (88 - 91)  BP: 130/80 (20 Oct 2020 05:34) (108/75 - 130/80)  BP(mean): --  RR: 18 (20 Oct 2020 05:34) (18 - 18)  SpO2: 93% (20 Oct 2020 07:58) (91% - 94%)    PHYSICAL EXAM:  GENERAL: NAD, morbidly obese  HEENT: anicteric, moist mucous membranes  CHEST/LUNG:  CTA b/l, no rales, wheezes, or rhonchi  HEART:  RRR, S1, S2  ABDOMEN:  BS+, soft, nontender, nondistended; wound vac across lower abdomen covering the incision; no erythema, warmth, or tenderness on the skin above the wound vac  EXTREMITIES: trace lower extremity edema, no cyanosis, or calf tenderness  NERVOUS SYSTEM: answers questions and follows commands    Time spent: 40min

## 2020-10-20 NOTE — PROGRESS NOTE ADULT - SUBJECTIVE AND OBJECTIVE BOX
Patient seen and examined bedside resting comfortably.No complaints offered.   Abdominal pain is well controlled.Denies nausea and vomiting. Tolerating diet.   Denies chest pain, dyspnea, cough.     T(F): 98.3 (10-20-20 @ 05:34), Max: 99.1 (10-19-20 @ 21:17)  HR: 88 (10-20-20 @ 07:58) (88 - 91)  BP: 130/80 (10-20-20 @ 05:34) (108/75 - 130/80)  RR: 18 (10-20-20 @ 05:34) (18 - 18)  SpO2: 93% (10-20-20 @ 07:58) (91% - 94%)  Wt(kg): --  CAPILLARY BLOOD GLUCOSE          PHYSICAL EXAM:  General: NAD, WDWN.   Neuro:  Alert & oriented x 3  CV: +S1+S2 regular rate and rhythm  Lung: clear to ausculation bilaterally, respirations nonlabored, good inspiratory effort  Abdomen: soft, NTND. Normactive BS, wound with vac in place   Extremities: no pedal edema or calf tenderness noted       LABS:                        8.8    11.62 )-----------( 307      ( 20 Oct 2020 10:17 )             27.9     10-20    140  |  105  |  15  ----------------------------<  136<H>  4.9   |  28  |  0.65    Ca    8.2<L>      20 Oct 2020 09:12    TPro  x   /  Alb  1.6<L>  /  TBili  x   /  DBili  x   /  AST  x   /  ALT  x   /  AlkPhos  x   10-20      I&O's Detail    19 Oct 2020 07:01  -  20 Oct 2020 07:00  --------------------------------------------------------  IN:  Total IN: 0 mL    OUT:    Voided (mL): 0 mL  Total OUT: 0 mL    Total NET: 0 mL          Impression: 60y Female admitted with Acute renal failure      PMH History of aortic aneurysm    H/O aortic aneurysm repair    Arthritis    Degenerative disc disease, lumbar    Neuropathy    Spinal stenosis    Bipolar 1 disorder    Kidney stone    Diabetes Mellitus Type II    Sleep Apnea    Asthma    Hypertension    Obesity    Morbid Obesity    Drug Abuse    ETOH Abuse    Arrhythmia    Benign Essential Tremor    Anxiety    Depression    Renal Calculi    Colitis    Anemia    Diabetes    Sleep Apnea    Asthma        Plan:  -patient for discharge and home vac therapy today  will change canister prior to discharge

## 2020-10-20 NOTE — DISCHARGE NOTE PROVIDER - CARE PROVIDER_API CALL
Stephon Giordano)  Cardiovascular Disease; Internal Medicine  [address], [place] [id number]  Phone: [phone]  Fax: [phone]  Follow Up Time:     Gemma Aguilar  HEMATOLOGY  [address] [facility], [address] [address]  Phone: [phone]  Fax: [phone]  Follow Up Time:     Vashti De La Torre  PLASTIC SURGERY  [address] [place] [address], [address] [address]  Phone: [phone]  Fax: [phone]  Follow Up Time:

## 2020-10-20 NOTE — DISCHARGE NOTE PROVIDER - NSDCQMAMI_CARD_ALL_CORE
"-- Message is from the [employer]--    Reason for Call: Patient pin needles for her insulin wont be covered by her insurance she is wondering if there is an alternative for it please assist.     Caller Information       Type Contact Phone    05/21/2019 03:13 PM Phone (Incoming) Rosalba Hsieh (Self) [phone] (H)          Alternative phone number: n/a    Turnaround time given to caller: ""This message will be sent to [facility] Provider's name]. The clinical team will fulfill your request as soon as they review your message. \""    "
Please see what is covered.
Spoke with patient problem resolved, will call back concerning famotidine if PA is required
No

## 2020-10-20 NOTE — DISCHARGE NOTE NURSING/CASE MANAGEMENT/SOCIAL WORK - PATIENT PORTAL LINK FT
You can access the FollowMyHealth Patient Portal offered by [facility] by registering at the following website: [email or web address]. By joining [facility]’s FollowMyHealth portal, you will also be able to view your health information using other applications (apps) compatible with our system.

## 2020-10-20 NOTE — DISCHARGE NOTE PROVIDER - NSDCMRMEDTOKEN_GEN_ALL_CORE_FT
acetaminophen 325 mg oral tablet: 2 tab(s) orally once a day, As needed, Mild Pain (1 - 3)  allopurinol 300 mg oral tablet: 1 tab(s) orally once a day  clobetasol 0.05% topical cream: Apply topically to affected area , As Needed  cyanocobalamin 1000 mcg oral tablet: 1 tab(s) orally once a day  Cymbalta 30 mg oral delayed release capsule: 1 cap(s) orally 3 times a day  folic acid 1 mg oral tablet: 1 tab(s) orally once a day  Lamictal 200 mg oral tablet: 1 tab(s) orally once a day  Linzess 145 mcg oral capsule: 1 cap(s) orally once a day, As Needed  Multiple Vitamins oral tablet: 1 tab(s) orally once a day  propranolol 10 mg oral tablet: 1 tab(s) orally once a day.  Cardio Dr. Giordano told patient to hold BP meds  trazodone 100 mg oral tablet: 2 tab(s) orally once a day (at bedtime)  verapamil 240 mg/24 hours oral tablet, extended release: 1 tab(s) orally once a day  Cardio Dr. Giordano told patient to hold BP meds  Vitamin D3 5000 intl units (125 mcg) oral tablet: once daily  Xanax 1 mg oral tablet: 1 tab(s) orally 2 times a day, As Needed

## 2020-10-20 NOTE — PROGRESS NOTE ADULT - SUBJECTIVE AND OBJECTIVE BOX
[facility] -- Almas Faustin, Mayra, Cara, Saurabh Johansen, Cindy Giordano: Office # [phone]    Follow Up:  SOB    Subjective/Observations: Patient seen and examined. Patient awake and alert, resting comfortably in bed. No complaints of chest pain, SOB, LE edema, cough. No signs of orthopnea or PND. + abdominal wound vac. Tolerating room air.     REVIEW OF SYSTEMS: All review of systems is negative for eye, ENT, GI, , allergic, dermatologic, musculoskeletal and neurologic except as described above    PAST MEDICAL & SURGICAL HISTORY:  History of aortic aneurysm  descending aorta see CT    Arthritis    Degenerative disc disease, lumbar    Neuropathy    Spinal stenosis    Bipolar 1 disorder    Kidney stone    Sleep Apnea  CPAP with oxygen since June 2020    Asthma    Hypertension    Morbid Obesity    ETOH Abuse  H/O    Benign Essential Tremor    Anxiety    Depression    Renal Calculi  chronic      Colitis  H/O    Anemia    S/P panniculectomy    S/P cardiac catheterization    S/P dilation and curettage    History of tonsillectomy    History of laparoscopic adjustable gastric banding  band removed few yrs ago    S/P D&amp;C    Biliary stent placement &amp; ercp    ureteroscopy with stone removal  1-    MEDICATIONS  (STANDING):  allopurinol 300 milliGRAM(s) Oral daily  cholecalciferol 5000 Unit(s) Oral daily  cyanocobalamin 1000 MICROGram(s) Oral daily  DULoxetine 30 milliGRAM(s) Oral <User Schedule>  folic acid 1 milliGRAM(s) Oral daily  lamoTRIgine 200 milliGRAM(s) Oral daily  melatonin 3 milliGRAM(s) Oral at bedtime  multivitamin 1 Tablet(s) Oral daily  propranolol 10 milliGRAM(s) Oral daily  traZODone 200 milliGRAM(s) Oral at bedtime  verapamil 80 milliGRAM(s) Oral daily    MEDICATIONS  (PRN):  acetaminophen   Tablet .. 650 milliGRAM(s) Oral daily PRN Mild Pain (1 - 3)  ALPRAZolam 1 milliGRAM(s) Oral two times a day PRN anxiety  loratadine 10 milliGRAM(s) Oral daily PRN allergies    Allergies  penicillins (Other)  trees dogs cats cockaroaches (Rhinitis; Rhinorrhea)    Vital Signs Last 24 Hrs  T(C): 36.8 (20 Oct 2020 05:34), Max: 37.3 (19 Oct 2020 21:17)  T(F): 98.3 (20 Oct 2020 05:34), Max: 99.1 (19 Oct 2020 21:17)  HR: 88 (20 Oct 2020 07:58) (88 - 91)  BP: 130/80 (20 Oct 2020 05:34) (108/75 - 130/80)  BP(mean): --  RR: 18 (20 Oct 2020 05:34) (18 - 18)  SpO2: 93% (20 Oct 2020 07:58) (91% - 94%)    I&O's Summary    19 Oct 2020 07:01  -  20 Oct 2020 07:00  --------------------------------------------------------  IN: 0 mL / OUT: 0 mL / NET: 0 mL    TELE: Not on telemetry   PHYSICAL EXAM:  Appearance: NAD, no distress, alert, Obese   HEENT: Moist Mucous Membranes, Anicteric  Cardiovascular: Regular rate and rhythm, Normal S1 S2, No JVD, No murmurs, No rubs, gallops or clicks  Respiratory: Non-labored, Clear to auscultation, No rales, No rhonchi, No wheezing.   Gastrointestinal:  Soft, Non-tender, + BS  Neurologic: Non-focal  Skin: Warm and dry, + abdominal incision with vac dressing, No ecchymosis, No cyanosis  Musculoskeletal: No clubbing, No cyanosis, No joint swelling/tenderness  Psychiatry: Mood & affect appropriate  Lymph: No peripheral edema.     LABS: All Labs Reviewed:                        8.8    11.62 )-----------( 307      ( 20 Oct 2020 10:17 )             27.9                         8.9    11.05 )-----------( 307      ( 19 Oct 2020 06:48 )             28.9                         9.0    8.83  )-----------( 270      ( 18 Oct 2020 10:40 )             27.4     20 Oct 2020 09:12    140    |  105    |  15     ----------------------------<  136    4.9     |  28     |  0.65   19 Oct 2020 06:48    142    |  105    |  16     ----------------------------<  137    4.7     |  32     |  0.71   18 Oct 2020 10:40    141    |  105    |  15     ----------------------------<  120    4.9     |  31     |  0.64     Ca    8.2        20 Oct 2020 09:12  Ca    8.2        19 Oct 2020 06:48  Ca    8.3        18 Oct 2020 10:40    TPro  x      /  Alb  1.6    /  TBili  x      /  DBili  x      /  AST  x      /  ALT  x      /  AlkPhos  x      20 Oct 2020 09:12  TPro  x      /  Alb  1.5    /  TBili  x      /  DBili  x      /  AST  x      /  ALT  x      /  AlkPhos  x      18 Oct 2020 10:40    Troponin I, Serum: <.015 ng/mL (10-13-20 @ 13:28)  D-Dimer Assay, Quantitative: 828 ng/mL DDU (10-13-20 @ 13:28)    12 Lead ECG:   Ventricular Rate 95 BPM  Atrial Rate 95 BPM  P-R Interval 200 ms  QRS Duration 100 ms  Q-T Interval 332 ms  QTC Calculation(Bazett) 417 ms  P Axis 5 degrees  R Axis -45 degrees  T Axis 23 degrees  Diagnosis Line Normal sinus rhythm  Left axis deviation  Minimal voltage criteria for LVH, may be normal variant  Cannot rule out Anterior infarct (cited on or before 25-SEP-2020)  Abnormal ECG  Confirmed by ana Giordano ([id number]) on 10/13/2020 4:00:07 PM (10-13-20 @ 12:56)    < from: Cardiac Cath Lab - Adult (06.16.20 @ 16:26) >  CORONARY VESSELS: The coronary circulation is right dominant.  LM:   --  LM: Normal.  LAD:   --  LAD: Angiography showed minor luminal irregularities with no  flow limiting lesions.  CX:   --  Circumflex: Normal.  RCA:   --  RCA: Normal.  COMPLICATIONS: There were no complications.  DIAGNOSTIC RECOMMENDATIONS: The patient should continue with the present  medications.  < end of copied text >

## 2020-10-21 ENCOUNTER — APPOINTMENT (OUTPATIENT)
Dept: CARDIOLOGY | Facility: CLINIC | Age: 60
End: 2020-10-21

## 2020-11-28 ENCOUNTER — INPATIENT (INPATIENT)
Facility: HOSPITAL | Age: 60
LOS: 1 days | Discharge: HOME | DRG: 300 | End: 2020-11-30
Attending: INTERNAL MEDICINE | Admitting: FAMILY MEDICINE
Payer: COMMERCIAL

## 2020-11-28 VITALS
SYSTOLIC BLOOD PRESSURE: 146 MMHG | WEIGHT: 255.96 LBS | DIASTOLIC BLOOD PRESSURE: 98 MMHG | TEMPERATURE: 98 F | RESPIRATION RATE: 18 BRPM | HEIGHT: 66 IN | HEART RATE: 127 BPM | OXYGEN SATURATION: 97 %

## 2020-11-28 DIAGNOSIS — Z98.89 OTHER SPECIFIED POSTPROCEDURAL STATES: Chronic | ICD-10-CM

## 2020-11-28 DIAGNOSIS — I26.99 OTHER PULMONARY EMBOLISM WITHOUT ACUTE COR PULMONALE: ICD-10-CM

## 2020-11-28 DIAGNOSIS — Z98.890 OTHER SPECIFIED POSTPROCEDURAL STATES: Chronic | ICD-10-CM

## 2020-11-28 LAB
ALBUMIN SERPL ELPH-MCNC: 2.7 G/DL — LOW (ref 3.3–5)
ALP SERPL-CCNC: 129 U/L — HIGH (ref 40–120)
ALT FLD-CCNC: 14 U/L — SIGNIFICANT CHANGE UP (ref 12–78)
ANION GAP SERPL CALC-SCNC: 10 MMOL/L — SIGNIFICANT CHANGE UP (ref 5–17)
APPEARANCE UR: ABNORMAL
APTT BLD: 150.3 SEC — CRITICAL HIGH (ref 27.5–35.5)
APTT BLD: 33.9 SEC — SIGNIFICANT CHANGE UP (ref 27.5–35.5)
AST SERPL-CCNC: 16 U/L — SIGNIFICANT CHANGE UP (ref 15–37)
BASOPHILS # BLD AUTO: 0.06 K/UL — SIGNIFICANT CHANGE UP (ref 0–0.2)
BASOPHILS NFR BLD AUTO: 0.7 % — SIGNIFICANT CHANGE UP (ref 0–2)
BILIRUB SERPL-MCNC: 0.2 MG/DL — SIGNIFICANT CHANGE UP (ref 0.2–1.2)
BILIRUB UR-MCNC: NEGATIVE — SIGNIFICANT CHANGE UP
BUN SERPL-MCNC: 18 MG/DL — SIGNIFICANT CHANGE UP (ref 7–23)
CALCIUM SERPL-MCNC: 9.2 MG/DL — SIGNIFICANT CHANGE UP (ref 8.5–10.1)
CHLORIDE SERPL-SCNC: 111 MMOL/L — HIGH (ref 96–108)
CO2 SERPL-SCNC: 23 MMOL/L — SIGNIFICANT CHANGE UP (ref 22–31)
COLOR SPEC: YELLOW — SIGNIFICANT CHANGE UP
CREAT SERPL-MCNC: 0.97 MG/DL — SIGNIFICANT CHANGE UP (ref 0.5–1.3)
DIFF PNL FLD: ABNORMAL
EOSINOPHIL # BLD AUTO: 0.24 K/UL — SIGNIFICANT CHANGE UP (ref 0–0.5)
EOSINOPHIL NFR BLD AUTO: 2.9 % — SIGNIFICANT CHANGE UP (ref 0–6)
GLUCOSE SERPL-MCNC: 145 MG/DL — HIGH (ref 70–99)
GLUCOSE UR QL: NEGATIVE — SIGNIFICANT CHANGE UP
HCT VFR BLD CALC: 32.1 % — LOW (ref 34.5–45)
HCT VFR BLD CALC: 34.4 % — LOW (ref 34.5–45)
HGB BLD-MCNC: 10.7 G/DL — LOW (ref 11.5–15.5)
HGB BLD-MCNC: 9.8 G/DL — LOW (ref 11.5–15.5)
IMM GRANULOCYTES NFR BLD AUTO: 0.4 % — SIGNIFICANT CHANGE UP (ref 0–1.5)
INR BLD: 1.11 RATIO — SIGNIFICANT CHANGE UP (ref 0.88–1.16)
KETONES UR-MCNC: NEGATIVE — SIGNIFICANT CHANGE UP
LACTATE SERPL-SCNC: 2 MMOL/L — SIGNIFICANT CHANGE UP (ref 0.7–2)
LEUKOCYTE ESTERASE UR-ACNC: ABNORMAL
LIDOCAIN IGE QN: 131 U/L — SIGNIFICANT CHANGE UP (ref 73–393)
LYMPHOCYTES # BLD AUTO: 2.15 K/UL — SIGNIFICANT CHANGE UP (ref 1–3.3)
LYMPHOCYTES # BLD AUTO: 26.1 % — SIGNIFICANT CHANGE UP (ref 13–44)
MCHC RBC-ENTMCNC: 26.9 PG — LOW (ref 27–34)
MCHC RBC-ENTMCNC: 27.4 PG — SIGNIFICANT CHANGE UP (ref 27–34)
MCHC RBC-ENTMCNC: 30.5 GM/DL — LOW (ref 32–36)
MCHC RBC-ENTMCNC: 31.1 GM/DL — LOW (ref 32–36)
MCV RBC AUTO: 88.2 FL — SIGNIFICANT CHANGE UP (ref 80–100)
MCV RBC AUTO: 88.2 FL — SIGNIFICANT CHANGE UP (ref 80–100)
MONOCYTES # BLD AUTO: 0.39 K/UL — SIGNIFICANT CHANGE UP (ref 0–0.9)
MONOCYTES NFR BLD AUTO: 4.7 % — SIGNIFICANT CHANGE UP (ref 2–14)
NEUTROPHILS # BLD AUTO: 5.36 K/UL — SIGNIFICANT CHANGE UP (ref 1.8–7.4)
NEUTROPHILS NFR BLD AUTO: 65.2 % — SIGNIFICANT CHANGE UP (ref 43–77)
NITRITE UR-MCNC: NEGATIVE — SIGNIFICANT CHANGE UP
NRBC # BLD: 0 /100 WBCS — SIGNIFICANT CHANGE UP (ref 0–0)
NRBC # BLD: 0 /100 WBCS — SIGNIFICANT CHANGE UP (ref 0–0)
NT-PROBNP SERPL-SCNC: 114 PG/ML — SIGNIFICANT CHANGE UP (ref 0–125)
PH UR: 5 — SIGNIFICANT CHANGE UP (ref 5–8)
PLATELET # BLD AUTO: 460 K/UL — HIGH (ref 150–400)
PLATELET # BLD AUTO: 583 K/UL — HIGH (ref 150–400)
POTASSIUM SERPL-MCNC: 3.8 MMOL/L — SIGNIFICANT CHANGE UP (ref 3.5–5.3)
POTASSIUM SERPL-SCNC: 3.8 MMOL/L — SIGNIFICANT CHANGE UP (ref 3.5–5.3)
PROCALCITONIN SERPL-MCNC: <0.05 — SIGNIFICANT CHANGE UP (ref 0–0.04)
PROT SERPL-MCNC: 7.5 G/DL — SIGNIFICANT CHANGE UP (ref 6–8.3)
PROT UR-MCNC: 15
PROTHROM AB SERPL-ACNC: 12.9 SEC — SIGNIFICANT CHANGE UP (ref 10.6–13.6)
RBC # BLD: 3.64 M/UL — LOW (ref 3.8–5.2)
RBC # BLD: 3.9 M/UL — SIGNIFICANT CHANGE UP (ref 3.8–5.2)
RBC # FLD: 15.3 % — HIGH (ref 10.3–14.5)
RBC # FLD: 15.3 % — HIGH (ref 10.3–14.5)
SARS-COV-2 RNA SPEC QL NAA+PROBE: SIGNIFICANT CHANGE UP
SODIUM SERPL-SCNC: 144 MMOL/L — SIGNIFICANT CHANGE UP (ref 135–145)
SP GR SPEC: 1.02 — SIGNIFICANT CHANGE UP (ref 1.01–1.02)
TROPONIN I SERPL-MCNC: <.015 NG/ML — SIGNIFICANT CHANGE UP (ref 0.01–0.04)
UROBILINOGEN FLD QL: NEGATIVE — SIGNIFICANT CHANGE UP
WBC # BLD: 8.23 K/UL — SIGNIFICANT CHANGE UP (ref 3.8–10.5)
WBC # BLD: 8.95 K/UL — SIGNIFICANT CHANGE UP (ref 3.8–10.5)
WBC # FLD AUTO: 8.23 K/UL — SIGNIFICANT CHANGE UP (ref 3.8–10.5)
WBC # FLD AUTO: 8.95 K/UL — SIGNIFICANT CHANGE UP (ref 3.8–10.5)

## 2020-11-28 PROCEDURE — 93970 EXTREMITY STUDY: CPT | Mod: 26

## 2020-11-28 PROCEDURE — 99223 1ST HOSP IP/OBS HIGH 75: CPT

## 2020-11-28 PROCEDURE — 93010 ELECTROCARDIOGRAM REPORT: CPT

## 2020-11-28 PROCEDURE — 71045 X-RAY EXAM CHEST 1 VIEW: CPT | Mod: 26

## 2020-11-28 PROCEDURE — 71275 CT ANGIOGRAPHY CHEST: CPT | Mod: 26

## 2020-11-28 PROCEDURE — 99285 EMERGENCY DEPT VISIT HI MDM: CPT

## 2020-11-28 RX ORDER — ONDANSETRON 8 MG/1
4 TABLET, FILM COATED ORAL EVERY 6 HOURS
Refills: 0 | Status: DISCONTINUED | OUTPATIENT
Start: 2020-11-28 | End: 2020-11-30

## 2020-11-28 RX ORDER — FERROUS SULFATE 325(65) MG
325 TABLET ORAL DAILY
Refills: 0 | Status: DISCONTINUED | OUTPATIENT
Start: 2020-11-28 | End: 2020-11-30

## 2020-11-28 RX ORDER — ACETAMINOPHEN 500 MG
650 TABLET ORAL EVERY 6 HOURS
Refills: 0 | Status: DISCONTINUED | OUTPATIENT
Start: 2020-11-28 | End: 2020-11-29

## 2020-11-28 RX ORDER — TRAZODONE HCL 50 MG
200 TABLET ORAL AT BEDTIME
Refills: 0 | Status: DISCONTINUED | OUTPATIENT
Start: 2020-11-28 | End: 2020-11-30

## 2020-11-28 RX ORDER — DULOXETINE HYDROCHLORIDE 30 MG/1
90 CAPSULE, DELAYED RELEASE ORAL DAILY
Refills: 0 | Status: DISCONTINUED | OUTPATIENT
Start: 2020-11-28 | End: 2020-11-30

## 2020-11-28 RX ORDER — SODIUM CHLORIDE 9 MG/ML
1000 INJECTION INTRAMUSCULAR; INTRAVENOUS; SUBCUTANEOUS ONCE
Refills: 0 | Status: COMPLETED | OUTPATIENT
Start: 2020-11-28 | End: 2020-11-28

## 2020-11-28 RX ORDER — HEPARIN SODIUM 5000 [USP'U]/ML
9500 INJECTION INTRAVENOUS; SUBCUTANEOUS ONCE
Refills: 0 | Status: COMPLETED | OUTPATIENT
Start: 2020-11-28 | End: 2020-11-28

## 2020-11-28 RX ORDER — HEPARIN SODIUM 5000 [USP'U]/ML
9500 INJECTION INTRAVENOUS; SUBCUTANEOUS EVERY 6 HOURS
Refills: 0 | Status: DISCONTINUED | OUTPATIENT
Start: 2020-11-28 | End: 2020-11-29

## 2020-11-28 RX ORDER — CHOLECALCIFEROL (VITAMIN D3) 125 MCG
5000 CAPSULE ORAL DAILY
Refills: 0 | Status: DISCONTINUED | OUTPATIENT
Start: 2020-11-28 | End: 2020-11-30

## 2020-11-28 RX ORDER — KETOROLAC TROMETHAMINE 30 MG/ML
15 SYRINGE (ML) INJECTION ONCE
Refills: 0 | Status: DISCONTINUED | OUTPATIENT
Start: 2020-11-28 | End: 2020-11-28

## 2020-11-28 RX ORDER — PROPRANOLOL HCL 160 MG
1 CAPSULE, EXTENDED RELEASE 24HR ORAL
Qty: 0 | Refills: 0 | DISCHARGE

## 2020-11-28 RX ORDER — VERAPAMIL HCL 240 MG
120 CAPSULE, EXTENDED RELEASE PELLETS 24 HR ORAL DAILY
Refills: 0 | Status: DISCONTINUED | OUTPATIENT
Start: 2020-11-28 | End: 2020-11-30

## 2020-11-28 RX ORDER — ALPRAZOLAM 0.25 MG
1 TABLET ORAL
Qty: 0 | Refills: 0 | DISCHARGE

## 2020-11-28 RX ORDER — LINACLOTIDE 145 UG/1
1 CAPSULE, GELATIN COATED ORAL
Qty: 0 | Refills: 0 | DISCHARGE

## 2020-11-28 RX ORDER — LAMOTRIGINE 25 MG/1
200 TABLET, ORALLY DISINTEGRATING ORAL AT BEDTIME
Refills: 0 | Status: DISCONTINUED | OUTPATIENT
Start: 2020-11-28 | End: 2020-11-30

## 2020-11-28 RX ORDER — LAMOTRIGINE 25 MG/1
200 TABLET, ORALLY DISINTEGRATING ORAL DAILY
Refills: 0 | Status: DISCONTINUED | OUTPATIENT
Start: 2020-11-28 | End: 2020-11-28

## 2020-11-28 RX ORDER — ALLOPURINOL 300 MG
300 TABLET ORAL DAILY
Refills: 0 | Status: DISCONTINUED | OUTPATIENT
Start: 2020-11-28 | End: 2020-11-30

## 2020-11-28 RX ORDER — HEPARIN SODIUM 5000 [USP'U]/ML
INJECTION INTRAVENOUS; SUBCUTANEOUS
Qty: 25000 | Refills: 0 | Status: DISCONTINUED | OUTPATIENT
Start: 2020-11-28 | End: 2020-11-29

## 2020-11-28 RX ORDER — HEPARIN SODIUM 5000 [USP'U]/ML
4500 INJECTION INTRAVENOUS; SUBCUTANEOUS EVERY 6 HOURS
Refills: 0 | Status: DISCONTINUED | OUTPATIENT
Start: 2020-11-28 | End: 2020-11-29

## 2020-11-28 RX ORDER — FOLIC ACID 0.8 MG
1 TABLET ORAL DAILY
Refills: 0 | Status: DISCONTINUED | OUTPATIENT
Start: 2020-11-28 | End: 2020-11-30

## 2020-11-28 RX ADMIN — Medication 200 MILLIGRAM(S): at 21:48

## 2020-11-28 RX ADMIN — SODIUM CHLORIDE 1000 MILLILITER(S): 9 INJECTION INTRAMUSCULAR; INTRAVENOUS; SUBCUTANEOUS at 07:30

## 2020-11-28 RX ADMIN — Medication 15 MILLIGRAM(S): at 16:35

## 2020-11-28 RX ADMIN — HEPARIN SODIUM 2100 UNIT(S)/HR: 5000 INJECTION INTRAVENOUS; SUBCUTANEOUS at 10:55

## 2020-11-28 RX ADMIN — Medication 15 MILLIGRAM(S): at 16:05

## 2020-11-28 RX ADMIN — SODIUM CHLORIDE 1000 MILLILITER(S): 9 INJECTION INTRAMUSCULAR; INTRAVENOUS; SUBCUTANEOUS at 08:30

## 2020-11-28 RX ADMIN — Medication 650 MILLIGRAM(S): at 14:58

## 2020-11-28 RX ADMIN — Medication 650 MILLIGRAM(S): at 13:58

## 2020-11-28 RX ADMIN — ONDANSETRON 4 MILLIGRAM(S): 8 TABLET, FILM COATED ORAL at 13:58

## 2020-11-28 RX ADMIN — HEPARIN SODIUM 0 UNIT(S)/HR: 5000 INJECTION INTRAVENOUS; SUBCUTANEOUS at 19:04

## 2020-11-28 RX ADMIN — HEPARIN SODIUM 9500 UNIT(S): 5000 INJECTION INTRAVENOUS; SUBCUTANEOUS at 10:55

## 2020-11-28 RX ADMIN — LAMOTRIGINE 200 MILLIGRAM(S): 25 TABLET, ORALLY DISINTEGRATING ORAL at 21:48

## 2020-11-28 NOTE — ED ADULT NURSE REASSESSMENT NOTE - NS ED NURSE REASSESS COMMENT FT1
Pt is Aox4 on heparin drip rate 17 ml/hr, next blood draw is at 1 a.m. Pt denies pain in leg and chest, no SOB. L leg is warm and madeleine. Pt is obese, skin is dry, wound vac is on and bandage is intact on lower abd. Pt had a panolectomy about 1 month ago. VSS will reassess.

## 2020-11-28 NOTE — ED ADULT NURSE REASSESSMENT NOTE - NS ED NURSE REASSESS COMMENT FT1
Received pt in bed alert and oriented x4.  Pt awaiting u/s.  Pt on monitor.  Pt has left lower leg swelling.  Ongoing nursing care and safety maintained. Received pt in bed alert and oriented x4.  Pt awaiting u/s.  Pt on monitor.  Pt has left lower leg swelling. Pt had recent abdominal surgery and has wound vac across abdomin.  Ongoing nursing care and safety maintained.

## 2020-11-28 NOTE — H&P ADULT - ASSESSMENT
59 y/o F PMHx Bipolar Disorder, Anxiety disorder, depression, anemia, arthritis, HTN, Thoracic AA 4.4cm (stable), JESUS on CPAP, asthma, Hx of gastric lap band and removal, Essential Tremor, Obesity, hx cholecystectomy, hx nephrolithiasis, h/o ETOH abuse, neuropathy, Spinal stenosis presented to ED with complaint of LE swelling for past 2-3 days    #PE  #Acute nonocclusive DVT  -Admit to telemetry  -Started on heparin gtt in ED, would continue for now  -Monitor H/H closely as patient with recent history of hemorrhagic shock after OR and history of intraabdominal hematoma  -Heme/Onc consulted, appreciate recs  -Negative troponin, negative proBNP- no evidence of R heart strain on lab work  -F/U TTE  -Cardio consulted, appreciate recs    #HTN  -Patient was advised to stop all BP meds on her last DC  -Patient states she started taking half a pill of verapamil  -/73 on last vitals, continue to monitor  -Appreciate cardio recs    #Anxiety, depression, bipolar  -Continue all home meds    #Hx of nephrolithiasis  -Continue allopurinol    #DVT ppx  -Heparin gtt for PE   61 y/o F PMHx Bipolar Disorder, Anxiety disorder, depression, anemia, arthritis, HTN, Thoracic AA 4.4cm (stable), JESUS on CPAP, asthma, Hx of gastric lap band and removal, Essential Tremor, Obesity, hx cholecystectomy, hx nephrolithiasis, h/o ETOH abuse, neuropathy, Spinal stenosis presented to ED with complaint of LE swelling for past 2-3 days    #PE  #Acute nonocclusive DVT  -Admit to telemetry  -Started on heparin gtt in ED, would continue for now  -Monitor H/H closely as patient with recent history of hemorrhagic shock after OR and history of intraabdominal hematoma  -Heme/Onc consulted, appreciate recs  -Negative troponin, negative proBNP- no evidence of R heart strain on lab work  -F/U TTE  -Cardio consulted, appreciate recs    #HTN  -Patient was advised to stop all BP meds on her last DC  -Patient states she started taking half a pill of verapamil  -/73 on last vitals, continue to monitor  -Continue verapamil SR 120mg (half of previous dose as patient has been taking) with hold parameters  -Appreciate cardio recs    #Anxiety, depression, bipolar  -Continue all home meds    #Hx of nephrolithiasis  -Continue allopurinol    #DVT ppx  -Heparin gtt for PE

## 2020-11-28 NOTE — H&P ADULT - RS GEN PE MLT RESP DETAILS PC
good air movement/clear to auscultation bilaterally/no intercostal retractions/respirations non-labored/no rales/no wheezes/no chest wall tenderness/no rhonchi/breath sounds equal

## 2020-11-28 NOTE — CONSULT NOTE ADULT - SUBJECTIVE AND OBJECTIVE BOX
INCOMPLETE    SURGERY PA CONSULT NOTE:    CHIEF COMPLAINT:  Patient is a 60y old  Female who presents with a chief complaint of PE (2020 16:13)      HPI FROM ED:  61 y/o F PMHx Bipolar Disorder, Anxiety disorder, depression, anemia, arthritis, HTN, Thoracic AA 4.4cm (stable), JESUS on CPAP, asthma, Hx of gastric lap band and removal, Essential Tremor, Obesity, hx cholecystectomy, hx nephrolithiasis, h/o ETOH abuse, neuropathy, Spinal stenosis presented to ED with complaint of LE swelling for past 2-3 days.  Pt was recently admitted to the hospital from - 10/10 for and elective panniculectomy. Pt was admitted post procedure and subsequently developed hemorrhagic shock requiring pressor support and multiple blood transfusions. During her stay she also was noted to have an intra-abdominal hematoma and developed a fever. The fever was thought to be secondary to the hematoma and resolved during the hospital course. Pt was then again admitted from 10/13-10/20 after she developed diaphoresis and lightheadedness and there was concern for PE. She was again found to be anemic and received and additional 2 units of blood. Pt reports that she has since been recovering well.  On Wednesday she noted that she developed left lower leg swelling with some increased warmth. She also noted that for the last 2 weeks R sided back pain, but patient attributed it to her history of kidney stones. Denies fever, chills, N/V, CP.  Pt reports that her abdominal pain has been improving.    In ED, patient noted to have elevated D-dimer- 768. Patient had LE doppler which showed nonocclusive acute thrombus in L common femoral, femoral and popliteal vein extending into tibial vein. Patient had CTA done which showed R sided PE. Patient received 1L NS bolus in ED and was started on heparin gtt. (2020 13:28)       PAST MEDICAL HISTORY:  History of aortic aneurysm  descending aorta see CT    Arthritis    Degenerative disc disease, lumbar    Neuropathy    Spinal stenosis    Bipolar 1 disorder    Kidney stone    Sleep Apnea  CPAP with oxygen since 2020    Asthma    Hypertension    Morbid Obesity    ETOH Abuse  H/O    Benign Essential Tremor    Anxiety    Depression    Renal Calculi  chronic      Colitis  H/O    Anemia    S/P panniculectomy    S/P cardiac catheterization    S/P dilation and curettage    History of tonsillectomy    History of laparoscopic adjustable gastric banding  band removed few yrs ago    S/P D&amp;C    Biliary stent placement &amp; ercp    ureteroscopy with stone removal  2016    REVIEW OF SYSTEMS:  General: No acute distress, awake and alert  Head: Normal cephalic/atraumatic  Neck: Denies neck pain or palpable masses, hoarseness or stridor  Lymphatic: Denies cervical or axillary or femoral lymphadenopathy  Chest: No chest pain, cough or hemoptysis  Heart: No chest pain, palpitations  Abdomen: No abdominal distention, nausea or vomiting, no abdominal pain  Extremities: Denies cyanosis, open sores or swollen extremity  Neuro: Denies weakness, paralysis, syncope, loss of vision  Psych: Denies hallucinations, visual disturbances, or depression    MEDICATIONS:  Home Medications:  allopurinol 300 mg oral tablet: 1 tab(s) orally once a day (2020 12:32)  Cymbalta 30 mg oral delayed release capsule: 3 cap(s) orally once a day (2020 12:32)  ferrous sulfate 325 mg (65 mg elemental iron) oral delayed release tablet: 1 tab(s) orally once a day (2020 12:32)  folic acid: 1 tab(s) orally once a day - OTC (2020 12:32)  Lamictal 200 mg oral tablet: 1 tab(s) orally once a day (2020 12:32)  Multiple Vitamins oral tablet: 1 tab(s) orally once a day (2020 12:32)  potassium citrate 15 mEq oral tablet, extended release: 1 tab(s) orally once a day (2020 12:32)  trazodone 100 mg oral tablet: 2 tab(s) orally once a day (at bedtime) (2020 12:32)  verapamil 240 mg/24 hours oral tablet, extended release: 1 tab(s) orally once a day  Cardio Dr. Giordano told patient to hold BP meds    **as per patient she has began taking 1/2 tablet daily (2020 12:32)  Vitamin D3 5000 intl units (125 mcg) oral tablet: once daily (2020 12:32)    MEDICATIONS  (STANDING):  allopurinol 300 milliGRAM(s) Oral daily  cholecalciferol 5000 Unit(s) Oral daily  DULoxetine 90 milliGRAM(s) Oral daily  ferrous    sulfate 325 milliGRAM(s) Oral daily  folic acid 1 milliGRAM(s) Oral daily  heparin  Infusion.  Unit(s)/Hr (21 mL/Hr) IV Continuous <Continuous>  lamoTRIgine 200 milliGRAM(s) Oral at bedtime  multivitamin 1 Tablet(s) Oral daily  traZODone 200 milliGRAM(s) Oral at bedtime  verapamil  milliGRAM(s) Oral daily    MEDICATIONS  (PRN):  acetaminophen   Tablet .. 650 milliGRAM(s) Oral every 6 hours PRN Mild Pain (1 - 3)  heparin   Injectable 9500 Unit(s) IV Push every 6 hours PRN For aPTT less than 40  heparin   Injectable 4500 Unit(s) IV Push every 6 hours PRN For aPTT between 40 - 57  ondansetron Injectable 4 milliGRAM(s) IV Push every 6 hours PRN Nausea and/or Vomiting      ALLERGIES:  penicillins (Other)  trees dogs cats cockaroaches (Rhinitis; Rhinorrhea)    SOCIAL HISTORY:  Social History:  Denies tobacco, etoh, drug use   Lives with family  Ambulates independently   Dash diet (2020 13:28)    FAMILY HISTORY:  Hypertension (Sibling)  Hyperlipidemia (Sibling)  Family history of renal failure  both parents were on dialysis  Family history of bacterial pneumonia  Family history of arthritis    VITAL SIGNS:  Vital Signs Last 24 Hrs  T(C): 36.8 (2020 17:01), Max: 37.2 (2020 07:16)  T(F): 98.2 (2020 17:01), Max: 99 (2020 07:16)  HR: 88 (2020 17:01) (88 - 127)  BP: 153/88 (2020 17:01) (114/73 - 153/88)  BP(mean): --  RR: 17 (2020 17:01) (17 - 18)  SpO2: 97% (2020 17:01) (96% - 98%)    PHYSICAL EXAM:  General:  Appears stated age, well-groomed, well-nourished, no distress  HENT:  WNL, no JVD  Chest:  Clear to auscultation bilaterally without W/R/R, no pain on deep inspiration   Cardiovascular:  Regular rate & rhythm, S1S2  Abdomen:    Extremities:  Calves soft, NT bilat without edema  Skin:  No rash  Musculoskeletal:  Normal strength  Neuro/Psych:  Alert, oriented to time, place, and person     LABS:                        9.8    8.95  )-----------( 460      ( 2020 17:01 )             32.1         144  |  111<H>  |  18  ----------------------------<  145<H>  3.8   |  23  |  0.97    Ca    9.2      2020 07:33    TPro  7.5  /  Alb  2.7<L>  /  TBili  0.2  /  DBili  x   /  AST  16  /  ALT  14  /  AlkPhos  129<H>      PT/INR - ( 2020 07:33 )   PT: 12.9 sec;   INR: 1.11 ratio         PTT - ( 2020 07:33 )  PTT:33.9 sec  Urinalysis Basic - ( 2020 11:04 )    Color: Yellow / Appearance: Slightly Turbid / S.020 / pH: x  Gluc: x / Ketone: Negative  / Bili: Negative / Urobili: Negative   Blood: x / Protein: 15 / Nitrite: Negative   Leuk Esterase: Moderate / RBC: 3-5 /HPF / WBC 6-10   Sq Epi: x / Non Sq Epi: Moderate / Bacteria: Few      LIVER FUNCTIONS - ( 2020 07:33 )  Alb: 2.7 g/dL / Pro: 7.5 g/dL / ALK PHOS: 129 U/L / ALT: 14 U/L / AST: 16 U/L / GGT: x               RADIOLOGY & ADDITIONAL STUDIES:    ASSESSMENT:    PLAN: SURGERY PA CONSULT NOTE:    CHIEF COMPLAINT:  Patient is a 60y old  Female who presents with a chief complaint of PE (2020 16:13)      HPI FROM ED:  59 y/o F PMHx Bipolar Disorder, Anxiety disorder, depression, anemia, arthritis, HTN, Thoracic AA 4.4cm (stable), JESUS on CPAP, asthma, Hx of gastric lap band and removal, Essential Tremor, Obesity, hx cholecystectomy, hx nephrolithiasis, h/o ETOH abuse, neuropathy, Spinal stenosis presented to ED with complaint of LE swelling for past 2-3 days.  Pt was recently admitted to the hospital from - 10/10 for and elective panniculectomy. Pt was admitted post procedure and subsequently developed hemorrhagic shock requiring pressor support and multiple blood transfusions. During her stay she also was noted to have an intra-abdominal hematoma and developed a fever. The fever was thought to be secondary to the hematoma and resolved during the hospital course. Pt was then again admitted from 10/13-10/20 after she developed diaphoresis and lightheadedness and there was concern for PE. She was again found to be anemic and received and additional 2 units of blood. Pt reports that she has since been recovering well.  On Wednesday she noted that she developed left lower leg swelling with some increased warmth. She also noted that for the last 2 weeks R sided back pain, but patient attributed it to her history of kidney stones. Denies fever, chills, N/V, CP.  Pt reports that her abdominal pain has been improving.    In ED, patient noted to have elevated D-dimer- 768. Patient had LE doppler which showed nonocclusive acute thrombus in L common femoral, femoral and popliteal vein extending into tibial vein. Patient had CTA done which showed R sided PE. Patient received 1L NS bolus in ED and was started on heparin gtt. (2020 13:28)     INTERVAL HPI:  Patient is well-known to the surgical service.  Has been following with Dr. De La Torre outpatient for continued treatment of her wounds.  Still has VAC dressing in place, had granufoam and tegaderm changed yesterday in the office.  Patient reports there is still a more superficial wound to the right of the VAC as well (being treated with topicals and dressing changes).      PAST MEDICAL HISTORY:  History of aortic aneurysm  descending aorta see CT    Arthritis    Degenerative disc disease, lumbar    Neuropathy    Spinal stenosis    Bipolar 1 disorder    Kidney stone    Sleep Apnea  CPAP with oxygen since 2020    Asthma    Hypertension    Morbid Obesity    ETOH Abuse  H/O    Benign Essential Tremor    Anxiety    Depression    Renal Calculi  chronic      Colitis  H/O    Anemia    S/P panniculectomy    S/P cardiac catheterization    S/P dilation and curettage    History of tonsillectomy    History of laparoscopic adjustable gastric banding  band removed few yrs ago    S/P D&amp;C    Biliary stent placement &amp; ercp    ureteroscopy with stone removal  2016    REVIEW OF SYSTEMS:  General: No acute distress, awake and alert  Head: Normal cephalic/atraumatic  Neck: Denies neck pain or palpable masses, hoarseness or stridor  Lymphatic: Denies cervical or axillary or femoral lymphadenopathy  Chest: No chest pain, cough or hemoptysis  Heart: No chest pain, palpitations  Abdomen: No abdominal distention, nausea or vomiting, no abdominal pain  Extremities: Denies cyanosis, open sores or swollen extremity  Neuro: Denies weakness, paralysis, syncope, loss of vision  Psych: Denies hallucinations, visual disturbances, or depression    MEDICATIONS:  Home Medications:  allopurinol 300 mg oral tablet: 1 tab(s) orally once a day (2020 12:32)  Cymbalta 30 mg oral delayed release capsule: 3 cap(s) orally once a day (2020 12:32)  ferrous sulfate 325 mg (65 mg elemental iron) oral delayed release tablet: 1 tab(s) orally once a day (2020 12:32)  folic acid: 1 tab(s) orally once a day - OTC (2020 12:32)  Lamictal 200 mg oral tablet: 1 tab(s) orally once a day (2020 12:32)  Multiple Vitamins oral tablet: 1 tab(s) orally once a day (2020 12:32)  potassium citrate 15 mEq oral tablet, extended release: 1 tab(s) orally once a day (2020 12:32)  trazodone 100 mg oral tablet: 2 tab(s) orally once a day (at bedtime) (2020 12:32)  verapamil 240 mg/24 hours oral tablet, extended release: 1 tab(s) orally once a day  Cardio Dr. Giordano told patient to hold BP meds    **as per patient she has began taking 1/2 tablet daily (2020 12:32)  Vitamin D3 5000 intl units (125 mcg) oral tablet: once daily (2020 12:32)    MEDICATIONS  (STANDING):  allopurinol 300 milliGRAM(s) Oral daily  cholecalciferol 5000 Unit(s) Oral daily  DULoxetine 90 milliGRAM(s) Oral daily  ferrous    sulfate 325 milliGRAM(s) Oral daily  folic acid 1 milliGRAM(s) Oral daily  heparin  Infusion.  Unit(s)/Hr (21 mL/Hr) IV Continuous <Continuous>  lamoTRIgine 200 milliGRAM(s) Oral at bedtime  multivitamin 1 Tablet(s) Oral daily  traZODone 200 milliGRAM(s) Oral at bedtime  verapamil  milliGRAM(s) Oral daily    MEDICATIONS  (PRN):  acetaminophen   Tablet .. 650 milliGRAM(s) Oral every 6 hours PRN Mild Pain (1 - 3)  heparin   Injectable 9500 Unit(s) IV Push every 6 hours PRN For aPTT less than 40  heparin   Injectable 4500 Unit(s) IV Push every 6 hours PRN For aPTT between 40 - 57  ondansetron Injectable 4 milliGRAM(s) IV Push every 6 hours PRN Nausea and/or Vomiting      ALLERGIES:  penicillins (Other)  trees dogs cats cockaroaches (Rhinitis; Rhinorrhea)    SOCIAL HISTORY:  Social History:  Denies tobacco, etoh, drug use   Lives with family  Ambulates independently   Dash diet (2020 13:28)    FAMILY HISTORY:  Hypertension (Sibling)  Hyperlipidemia (Sibling)  Family history of renal failure  both parents were on dialysis  Family history of bacterial pneumonia  Family history of arthritis    VITAL SIGNS:  Vital Signs Last 24 Hrs  T(C): 36.8 (2020 17:01), Max: 37.2 (2020 07:16)  T(F): 98.2 (2020 17:01), Max: 99 (2020 07:16)  HR: 88 (2020 17:01) (88 - 127)  BP: 153/88 (2020 17:01) (114/73 - 153/88)  BP(mean): --  RR: 17 (2020 17:01) (17 - 18)  SpO2: 97% (2020 17:01) (96% - 98%)    PHYSICAL EXAM:  General:  Appears stated age, well-groomed, well-nourished, no distress  HENT:  WNL, no JVD  Chest:  Clear to auscultation bilaterally without W/R/R, no pain on deep inspiration   Cardiovascular:  Regular rate & rhythm, S1S2  Abdomen: Soft, NT, ND.  +BS x 4.  No rebound/guarding.  Panniculectomy incision healed on the left side.  Still with approx. 30cm of wound on the right side being treated with VAC - black granufoam and tegaderm intact throughout with good negative pressure, and minimal serous fluid present in the reservoir.  Smaller, standard gauze/tegaderm dressing further lateral to the right of the granufoam C/D/I.    Extremities:  Calves soft, NT bilat.  Left leg with increased warmth and swelling, and is noticeably larger in circumference as compared to the right.    Skin:  No rash  Musculoskeletal:  Normal strength  Neuro/Psych:  Alert, oriented to time, place, and person     LABS:                        9.8    8.95  )-----------( 460      ( 2020 17:01 )             32.1         144  |  111<H>  |  18  ----------------------------<  145<H>  3.8   |  23  |  0.97    Ca    9.2      2020 07:33    TPro  7.5  /  Alb  2.7<L>  /  TBili  0.2  /  DBili  x   /  AST  16  /  ALT  14  /  AlkPhos  129<H>  1128    PT/INR - ( 2020 07:33 )   PT: 12.9 sec;   INR: 1.11 ratio         PTT - ( 2020 07:33 )  PTT:33.9 sec  Urinalysis Basic - ( 2020 11:04 )    Color: Yellow / Appearance: Slightly Turbid / S.020 / pH: x  Gluc: x / Ketone: Negative  / Bili: Negative / Urobili: Negative   Blood: x / Protein: 15 / Nitrite: Negative   Leuk Esterase: Moderate / RBC: 3-5 /HPF / WBC 6-10   Sq Epi: x / Non Sq Epi: Moderate / Bacteria: Few      LIVER FUNCTIONS - ( 2020 07:33 )  Alb: 2.7 g/dL / Pro: 7.5 g/dL / ALK PHOS: 129 U/L / ALT: 14 U/L / AST: 16 U/L / GGT: x           RADIOLOGY & ADDITIONAL STUDIES:    EXAM:  CT ANGIO CHEST (W)AW IC                        PROCEDURE DATE:  2020    INTERPRETATION:  CLINICAL INFORMATION: Shortness of breath. Assess for pulmonary embolism. High pretest probability.  COMPARISON: Noncontrast chest CT from 10/13/2020  PROCEDURE:  CT Angiography of the Chest.  90 ml of Omnipaque 350 was injected intravenously. 10 ml were discarded.  Sagittal and coronal reformats were performed as well as 3D (MIP) reconstructions.  FINDINGS:  LUNGS AND AIRWAYS: Motion artifact limits evaluation. 5 mm calcified granuloma in the right upper lobe.  1.2 x 1.0 cm nodular density at the left lung base posteriorly on series 2 image 73. A larger groundglass opacity measuring 2 x 1.3 cm was present in this region on the prior study from 10/13/2020.  PLEURA: No pleural effusion.  MEDIASTINUM AND MILADIS: No lymphadenopathy.  VESSELS: Motion artifact limits evaluation of the pulmonary vessels. Nonetheless, emboli is seen in the right lower lobe pulmonary artery on series 2 images 51 and 52. Additional smaller subsegmental emboli are limited in evaluation.  HEART: Heart size is normal. No pericardial effusion.  CHEST WALL AND LOWER NECK: 1.7 x 1.5 cm low-attenuation nodule in the right lobe of the thyroid gland inferiorly which was present on the prior recent study. This may have been present on 2016 although artifact limits evaluation. If this has not previously been characterized, nonemergent thyroid ultrasound may be performed..  VISUALIZED UPPER ABDOMEN: Staghorn calculus in the upper pole of the right kidney without hydronephrosis.  BONES: Degenerative changes of bone.  IMPRESSION:  Thrombus seen in the right lower lobe pulmonary artery. Motion artifact limits evaluation of more distal subsegmental branches. This critical value was discussed with Dr. Cartagena in the emergency room at approximately 10:45 AM on 2020.  1.2 x 1.0 cm nodule in the left lower lobe. This was larger in size and groundglass opacity 1 1/2 months prior. Thismay be due to infectious or inflammatory etiology. Short interval follow-up with low-dose noncontrast chest CT scan in 3 months is recommended.  1.7 x 1.5 cm low-attenuation nodule in the right lobe of the thyroid gland which is unchanged when compared with 10/13/2020. This may have been present on 2016 although artifact limits evaluation. If this has not previously been characterized, nonemergent thyroid ultrasound may be performed.  HEATHER ARMIJO MD; Attending Radiologist  This document has been electronically signed. 2020 10:50AM    EXAM:  US DPLX LWR EXT VEINS COMPL BI                        PROCEDURE DATE:  2020    INTERPRETATION:  CLINICAL INFORMATION: Bilateral leg pain and swelling  COMPARISON: Venous duplex 10/14/2020  TECHNIQUE: Duplex sonography of the BILATERAL LOWER extremity veins with color and spectral Doppler, with and without compression.  FINDINGS:  There is acute, nonocclusive thrombus in the left common femoral, femoral, and popliteal vein extending through the posterior tibial vein in the calf.  Doppler examination shows partial flow.  IMPRESSION:  Acute, nonocclusive thrombus in the left common femoral, femoral, and popliteal vein extending through the posterior tibial vein in the calf  NADINE STEWART MD; Attending Radiologist  This document has been electronically signed. 2020  8:36AM    ASSESSMENT: 59 y/o F PMHx Bipolar Disorder, Anxiety disorder, depression, anemia, arthritis, HTN, Thoracic AA 4.4cm (stable), JESUS on CPAP, asthma, Hx of gastric lap band and removal, Essential Tremor, Obesity, hx cholecystectomy, hx nephrolithiasis, h/o ETOH abuse, neuropathy, Spinal stenosis; s/p panniculectomy with subsequent development of post-op shock/blood loss   Now admitted with PE and LLE DVT    PLAN:  Care per medical/primary team for DVT/PE  Full dose Heparin per nomogram for anti-coagulation  VAC dressing to be changed on Monday,    Will d/w Dr. De La Torre  Will follow

## 2020-11-28 NOTE — CONSULT NOTE ADULT - ASSESSMENT
59 y/o woman w 2 recent hospitalizations in Oct, first for panniculectomy w complications of hemorrhagic shock and abdomen hematoma, post pressor support and multiple PRBC transfusions, and second time later in same month for diaphoresis and dizziness required again PRBC transfusions.  Now adm w left calf pain and swelling x4 days and right shoulder pain x~2wks, Venous Dopplers sig for left leg DVT from common femoral to post tibial, and CTA chest w RLL PE  Started on Heparin    -DVT and PE appear provoked due to decreased mobility and recent prolonged hospitalizations/inflammatory state of events assoc w Panniculectomy.  -in view of recent bleeds, pt on IV heparin, continue close monitoring, if no signs of bleed can change to oral DOAC, ie Eliquis when ready for discharge  -monitor serial CBC  -anemia due to recent blood loss w poor recovery/response during stress, as well as continued woundvac/hemovac drainage. Note Hgb sig improved since last month, and pt notes woundvac drainage have decreased. No acute intervention needed,  -will f/u pt in office as outpatient after discharge(already has appt for this coming Friday)    discussed w pt  discussed w Medicine  thank you, will follow w you
59 y/o female with Afib (not on AC due to hemorrhagic shock), Bipolar Disorder, Anxiety disorder, depression, anemia, arthritis, HTN, Thoracic AA 4.4cm (stable), JESUS on CPAP, asthma, Hx of gastric lap band and removal, Essential Tremor, Obesity, hx cholecystectomy, hx nephrolithiasis, h/o ETOH abuse, neuropathy, Spinal stenosis presented to ED with complaint of LE swelling for past 2-3 days.  Pt was recently admitted to the hospital from 9/25- 10/10 for and elective panniculectomy c/w hemorrhagic shock.  Today, she was found to have acute non-occlusive LLE DVT and RLL PE.  Admits to have been having pleuritic pain and back pain under right scapula x 2 weeks at least.  Has been initially inactive post discharge.  Denies chest pain, palpitations, SOB, PARDO, or orthopnea.  We recently had seen her for near syncope which was felt to be vasovagal in etiology.     PE/DVT  - Likely provoked in the setting of recent surgery and inactivity  - D-dimer is elevated, though, slightly lower than previous  - CTA chest showed RLL PE  - BLE USD showed acute, nonocclusive thrombus in the left common femoral, femoral, and popliteal vein extending through the posterior tibial vein in the calf  - Heparin drip for now with full AC nomogram.  Will eventually switch to oral AC, possibly DOAC  - Monitor for s/s decompensation.  Tachycardic initially, now in NSR with O2 Sat 96% on RA  - Obtain TTE    Afib  - NSR on EKG with no ischemic changes  - Was no placed on AC on discharge from recent hospitalization in the setting of hemorrhagic shock postop  - On Heparin drip.  Monitor for evidence of bleeding  - Continue low dose CCB  - Monitor electrolytes, replete to keep K>4 and Mag>2    HTN  - BP is controlled and stable  - Continue Verapamil XR    JESUS  - Continue nocturnal CPAP  - Supplemental O2 as needed    Further cardiac w/u pending clinical course  Will continue to follow closely    Tiffany Brown DNP, NP-C  Cardiology  Spectra #[phone]

## 2020-11-28 NOTE — H&P ADULT - HISTORY OF PRESENT ILLNESS
61 y/o F PMHx Bipolar Disorder, Anxiety disorder, depression, anemia, arthritis, HTN, Thoracic AA 4.4 (stable), JESUS on CPAP, asthma, Hx of gastric lap band and removal, Essential Tremor, Obesity, hx cholecystectomy, hx nephrolithiasis, h/o ETOH abuse, neuropathy, Spinal stenosis presented to ED.  Pt was recently admitted to the hospital from 9/25- 10/10 for and elective panniculectomy. Pt was admitted post procedure and subsequently developed hemorrhagic shock requiring pressor support and multiple blood transfusions. During her stay she also was noted to have an intra-abdominal hematoma and developed a fever. The fever was thought to be secondary to the hematoma and resolved during the hospital course. Pt was then again admitted from 10/13-10/20 after she developed diaphoresis and lightheadedness and there was concern for PE. She was again found to be anemic and received and additional 2 units of blood. Pt reports that she has since been recovering well.  On Wednesday she noted that she developed left lower leg swelling with some increased warmth. She also noted that for the last 2 days she developed right shoulder pain and states that the pain seems to worsen when she takes a deep breath. Denies fever, chills, N/V, CP.  Pt reports that her abdominal pain has been improving. 61 y/o F PMHx Bipolar Disorder, Anxiety disorder, depression, anemia, arthritis, HTN, Thoracic AA 4.4cm (stable), JESUS on CPAP, asthma, Hx of gastric lap band and removal, Essential Tremor, Obesity, hx cholecystectomy, hx nephrolithiasis, h/o ETOH abuse, neuropathy, Spinal stenosis presented to ED with complaint of LE swelling for past 2-3 days.  Pt was recently admitted to the hospital from 9/25- 10/10 for and elective panniculectomy. Pt was admitted post procedure and subsequently developed hemorrhagic shock requiring pressor support and multiple blood transfusions. During her stay she also was noted to have an intra-abdominal hematoma and developed a fever. The fever was thought to be secondary to the hematoma and resolved during the hospital course. Pt was then again admitted from 10/13-10/20 after she developed diaphoresis and lightheadedness and there was concern for PE. She was again found to be anemic and received and additional 2 units of blood. Pt reports that she has since been recovering well.  On Wednesday she noted that she developed left lower leg swelling with some increased warmth. She also noted that for the last 2 weeks R sided back pain, but patient attributed it to her history of kidney stones. Denies fever, chills, N/V, CP.  Pt reports that her abdominal pain has been improving.    In ED, patient noted to have elevated D-dimer- 768. Patient had LE doppler which showed nonocclusive acute thrombus in L common femoral, femoral and popliteal vein extending into tibial vein. Patient had CTA done which showed R sided PE. Patient received 1L NS bolus in ED and was started on heparin gtt.

## 2020-11-28 NOTE — ED ADULT NURSE NOTE - OBJECTIVE STATEMENT
Pt received sitting on stretcher in NAD. Pt AOx3 C/o left leg swelling and pain with inspiration to right shoulder. Neuro WNL. PERRLA. Lungs CTA, RR even unlabored. Ab soft non tender, + bowel sounds x 4quads. Denies Nausea, Vomiting, Diarrhea. Skin warm, dry, color appropriate for age and race.  Left leg is warm and slightly swollen. Pt noted to have would vac to lower abdomin from left to right. working and draining properly.   states she has been more SOB last few days.

## 2020-11-28 NOTE — ED PROVIDER NOTE - OBJECTIVE STATEMENT
Pt is a 59 yo female who presents to the ED with a cc of leg pain and concern for DVT. PMHx Bipolar Disorder, Anxiety disorder, depression, anemia, arthritis, HTN, Thoracic AA 4.4 (stable), JESUS on CPAP, asthma, Hx of gastric lap band and removal, Essential Tremor, Obesity, hx cholecystectomy, hx nephrolithiasis, h/o ETOH abuse, neuropathy, Spinal stenosis.  Pt was recently admitted to the hospital from 9/25- 10/10 for and elective panniculectomy. Pt was admitted post procedure and subsequently developed hemorrhagic shock requiring pressor support and multiple blood transfusions. During her stay she also was noted to have an intra-abdominal hematoma and developed a fever. The fever was thought to be secondary to the hematoma and resolved during the hospital course. Pt was then again admitted from 10/13-10/20 after she developed diaphoresis and lightheadedness and there was concern for PE. She was again found to be anemic and received and additional 2 units of blood. Pt reports that she has since been recovering well.  On Wednesday she noted that she developed left lower leg swelling with some increased warmth. She also noted that for the last 2 days she developed right shoulder pain and states that the pain seems to worsen when she takes a deep breath. Denies fever, chills, N/V, CP.  Pt reports that her abdominal pain has been improving.

## 2020-11-28 NOTE — CONSULT NOTE ADULT - ATTENDING COMMENTS
The patient was personally seen and examined, in addition to being examined and evaluated by NP.  All elements of the note were edited where appropriate.    recent hemorrhage  now dvt/pe  cautious heparin and watch for bleeding

## 2020-11-28 NOTE — ED PROVIDER NOTE - PSH
Biliary stent placement & ercp    History of laparoscopic adjustable gastric banding  band removed few yrs ago  History of tonsillectomy    S/P cardiac catheterization    S/P D&C    S/P dilation and curettage    S/P panniculectomy    ureteroscopy with stone removal  1-

## 2020-11-28 NOTE — CONSULT NOTE ADULT - SUBJECTIVE AND OBJECTIVE BOX
All records reviewed.    HPI:  61 y/o woman known to our office for hx of anemia, hx bipolar disorder, anxiety, depression, arthritis, HTN, thoracic AA, JESUS on CPAP, asthma, gaasric lap band surgery and subseq removal, essential tremor, spinal stenosis,  recent hospitalization 20-10/10/20 for elective panniculectomy but complicated by subsequent hemorrhagic shock due to bleed/abdomen hematoma, required pressors and multiple transfusions. Again admitted 10/13/20-10/20/20 for diaphoresis/lightheadedness needing again PRBC transfusions, CTA chest negative for PE at that time.    Pt now admitted w c/o left calf swelling and pain x4days, also right shoulderblade pain for ~2wks.  Workup sig for Left leg DVT from common femoral to posterior tibial and RLL PE  Pt started on IV heparin, Hgb 10+, improved from 9+ the previous month.    Still w 2 woundvacs but reports small amounts of drainage only  Admits to still poor/decreased activity level, mostly sedantary, since the above previos hospitalizations but improving.  Denies SOB/PARDO/CO  No family hx of thromboembolism    PAST MEDICAL & SURGICAL HISTORY:  History of aortic aneurysm  descending aorta see CT    Arthritis    Degenerative disc disease, lumbar    Neuropathy    Spinal stenosis    Bipolar 1 disorder    Kidney stone    Sleep Apnea  CPAP with oxygen since 2020    Asthma    Hypertension    Morbid Obesity    ETOH Abuse  H/O    Benign Essential Tremor    Anxiety    Depression    Renal Calculi  chronic      Colitis  H/O    Anemia    S/P panniculectomy    S/P cardiac catheterization    S/P dilation and curettage    History of tonsillectomy    History of laparoscopic adjustable gastric banding  band removed few yrs ago    S/P D&amp;C    Biliary stent placement &amp; ercp    ureteroscopy with stone removal  2016        Review of System:  left leg pain and swelling improved  others see above  no sig anorexia/weight loss  no HA/dizziness    MEDICATIONS  (STANDING):  allopurinol 300 milliGRAM(s) Oral daily  cholecalciferol 5000 Unit(s) Oral daily  DULoxetine 90 milliGRAM(s) Oral daily  ferrous    sulfate 325 milliGRAM(s) Oral daily  folic acid 1 milliGRAM(s) Oral daily  heparin  Infusion.  Unit(s)/Hr (21 mL/Hr) IV Continuous <Continuous>  lamoTRIgine 200 milliGRAM(s) Oral daily  multivitamin 1 Tablet(s) Oral daily  traZODone 200 milliGRAM(s) Oral at bedtime  verapamil  milliGRAM(s) Oral daily    MEDICATIONS  (PRN):  acetaminophen   Tablet .. 650 milliGRAM(s) Oral every 6 hours PRN Mild Pain (1 - 3)  heparin   Injectable 9500 Unit(s) IV Push every 6 hours PRN For aPTT less than 40  heparin   Injectable 4500 Unit(s) IV Push every 6 hours PRN For aPTT between 40 - 57  ondansetron Injectable 4 milliGRAM(s) IV Push every 6 hours PRN Nausea and/or Vomiting      Allergies    penicillins (Other)  trees dogs cats cockaroaches (Rhinitis; Rhinorrhea)    Intolerances        SOCIAL HISTORY:  d/c tobacco x35 years  hx of incr Etoh    FAMILY HISTORY:  Hypertension (Sibling)    Hyperlipidemia (Sibling)    Family history of renal failure  both parents were on dialysis    Family history of bacterial pneumonia    Family history of arthritis        Vital Signs Last 24 Hrs  T(C): 37.1 (2020 11:01), Max: 37.2 (2020 07:16)  T(F): 98.7 (2020 11:01), Max: 99 (2020 07:16)  HR: 92 (2020 16:02) (91 - 127)  BP: 114/73 (2020 11:01) (114/73 - 146/98)  BP(mean): --  RR: 17 (2020 16:02) (17 - 18)  SpO2: 96% (2020 16:02) (96% - 98%)    PHYSICAL EXAM:      General:well developed well nourished, overweight woman on ER stretcher, in no acute distress  Eyes:sclera anicteric, pupils equal and reactive to light  ENMT:buccal mucosa moist, pharynx not injected  Neck:supple, trachea midline  Lungs:clear, no wheeze/rhonchi  Cardiovascular:regular rate and rhythm, S1 S2  Abdomen:soft, nontender, no organomegaly present, bowel sounds normal, pouchy, 2 woundvacs intact.  Neurological:alert and oriented x3, Cranial Nerves II-XII grossly intact  Skin:no increased ecchymosis/petechiae/purpura  Lymph Nodes:no palpable cervical/supraclavicular lymph nodes enlargements  Extremities:no cyanosis/clubbing, left leg w trace edema anad erythema to below knee, mild incr warmth in region      LABS:                        10.7   8.23  )-----------( 583      ( 2020 07:33 )             34.4      @ 07:33  WBC8.23  RBC3.90 Hgb10.7 Hct34.4  MCV88.2  Focd882  Auto Eeqyga47.2 Band-- Auto Lymph26.1 Atypical Lymph-- Reactive Lymph-- Auto Mono4.7 Auto Eos2.9 Auto Baso0.7              144  |  111<H>  |  18  ----------------------------<  145<H>  3.8   |  23  |  0.97    Ca    9.2      2020 07:33    TPro  7.5  /  Alb  2.7<L>  /  TBili  0.2  /  DBili  x   /  AST  16  /  ALT  14  /  AlkPhos  129<H>   @ 07:33  PT12.9 INR1.11  PTT33.9    Urinalysis Basic - ( 2020 11:04 )    Color: Yellow / Appearance: Slightly Turbid / S.020 / pH: x  Gluc: x / Ketone: Negative  / Bili: Negative / Urobili: Negative   Blood: x / Protein: 15 / Nitrite: Negative   Leuk Esterase: Moderate / RBC: 3-5 /HPF / WBC 6-10   Sq Epi: x / Non Sq Epi: Moderate / Bacteria: Few        PERIPHERAL BLOOD SMEAR REVIEW:      RADIOLOGY & ADDITIONAL STUDIES:  < from: CT Angio Chest w/ IV Cont (20 @ 10:32) >    EXAM:  CT ANGIO CHEST (W)AW IC                            PROCEDURE DATE:  2020          INTERPRETATION:  CLINICAL INFORMATION: Shortness of breath. Assess for pulmonary embolism. High pretest probability.    COMPARISON: Noncontrast chest CT from 10/13/2020    PROCEDURE:  CT Angiography of the Chest.  90 ml of Omnipaque 350 was injected intravenously. 10 ml were discarded.  Sagittal and coronal reformats were performed as well as 3D (MIP) reconstructions.    FINDINGS:    LUNGS AND AIRWAYS: Motion artifact limits evaluation. 5 mm calcified granuloma in the right upper lobe.  1.2 x 1.0 cm nodular density at the left lung base posteriorly on series 2 image 73. A larger groundglass opacity measuring 2 x 1.3 cm was present in this region on the prior study from 10/13/2020.  PLEURA: No pleural effusion.  MEDIASTINUM AND MILADIS: No lymphadenopathy.  VESSELS: Motion artifact limits evaluation of the pulmonary vessels. Nonetheless, emboli is seen in the right lower lobe pulmonary artery on series 2 images 51 and 52. Additional smaller subsegmental emboli are limited in evaluation.  HEART: Heart size is normal. No pericardial effusion.  CHEST WALL AND LOWER NECK: 1.7 x 1.5 cm low-attenuation nodule in the right lobe of the thyroid gland inferiorly which was present on the prior recent study. This may have been present on 2016 although artifact limits evaluation. If this has not previously been characterized, nonemergent thyroid ultrasound may be performed..  VISUALIZED UPPER ABDOMEN: Staghorn calculus in the upper pole of the right kidney without hydronephrosis.  BONES: Degenerative changes of bone.    IMPRESSION:  Thrombus seen in the right lower lobe pulmonary artery. Motion artifact limits evaluation of more distal subsegmental branches. This critical value was discussed with Dr. Cartagena in the emergency room at approximately 10:45 AM on 2020.    1.2 x 1.0 cm nodule in the left lower lobe. This was larger in size and groundglass opacity 1 1/2 months prior. Thismay be due to infectious or inflammatory etiology. Short interval follow-up with low-dose noncontrast chest CT scan in 3 months is recommended.    1.7 x 1.5 cm low-attenuation nodule in the right lobe of the thyroid gland which is unchanged when compared with 10/13/2020. This may have been present on 2016 although artifact limits evaluation. If this has not previously been characterized, nonemergent thyroid ultrasound may be performed.        < end of copied text >  < from: US Duplex Venous Lower Ext Complete, Bilateral (20 @ 08:31) >    EXAM:  US DPLX LWR EXT VEINS COMPL BI                            PROCEDURE DATE:  2020          INTERPRETATION:  CLINICAL INFORMATION: Bilateral leg pain and swelling    COMPARISON: Venous duplex 10/14/2020    TECHNIQUE: Duplex sonography of the BILATERAL LOWER extremity veins with color and spectral Doppler, with and without compression.    FINDINGS:    There is acute, nonocclusive thrombus in the left common femoral, femoral, and popliteal vein extending through the posterior tibial vein in the calf.  Doppler examination shows partial flow.    IMPRESSION:  Acute, nonocclusive thrombus in the left common femoral, femoral, and popliteal vein extending through the posterior tibial vein in the calf        < end of copied text >

## 2020-11-28 NOTE — ED PROVIDER NOTE - CLINICAL SUMMARY MEDICAL DECISION MAKING FREE TEXT BOX
Pt is a 61 yo female who presents to the ED with a cc of leg pain and concern for DVT. PMHx Bipolar Disorder, Anxiety disorder, depression, anemia, arthritis, HTN, Thoracic AA 4.4 (stable), JESUS on CPAP, asthma, Hx of gastric lap band and removal, Essential Tremor, Obesity, hx cholecystectomy, hx nephrolithiasis, h/o ETOH abuse, neuropathy, Spinal stenosis.  Pt was recently admitted to the hospital from 9/25- 10/10 for and elective panniculectomy. Pt was admitted post procedure and subsequently developed hemorrhagic shock requiring pressor support and multiple blood transfusions. During her stay she also was noted to have an intra-abdominal hematoma and developed a fever. The fever was thought to be secondary to the hematoma and resolved during the hospital course. Pt was then again admitted from 10/13-10/20 after she developed diaphoresis and lightheadedness and there was concern for PE. She was again found to be anemic and received and additional 2 units of blood. Pt reports that she has since been recovering well.  On Wednesday she noted that she developed left lower leg swelling with some increased warmth. She also noted that for the last 2 days she developed right shoulder pain and states that the pain seems to worsen when she takes a deep breath. Denies fever, chills, N/V, CP.  Pt reports that her abdominal pain has been improving. Pt is a 59 yo female who presents to the ED with a cc of leg pain and concern for DVT. PMHx Bipolar Disorder, Anxiety disorder, depression, anemia, arthritis, HTN, Thoracic AA 4.4 (stable), JESUS on CPAP, asthma, Hx of gastric lap band and removal, Essential Tremor, Obesity, hx cholecystectomy, hx nephrolithiasis, h/o ETOH abuse, neuropathy, Spinal stenosis.  Pt was recently admitted to the hospital from 9/25- 10/10 for and elective panniculectomy. Pt was admitted post procedure and subsequently developed hemorrhagic shock requiring pressor support and multiple blood transfusions. During her stay she also was noted to have an intra-abdominal hematoma and developed a fever. The fever was thought to be secondary to the hematoma and resolved during the hospital course. Pt was then again admitted from 10/13-10/20 after she developed diaphoresis and lightheadedness and there was concern for PE. She was again found to be anemic and received and additional 2 units of blood. Pt reports that she has since been recovering well.  On Wednesday she noted that she developed left lower leg swelling with some increased warmth. She also noted that for the last 2 days she developed right shoulder pain and states that the pain seems to worsen when she takes a deep breath. Denies fever, chills, N/V, CP.  Pt reports that her abdominal pain has been improving. Pt with s/s concerning for PE/DVT given recent surgery history. Will obtain screening labs, check d dimer, BNP, troponin, EKG, obtain venous duplex, and CTA chest

## 2020-11-28 NOTE — ED ADULT NURSE NOTE - NS ED NURSE TRANSPORT WITH
Evaluated by [id number] [facility] Provider          [facility] ED  eMERGENCY dEPARTMENT eNCOUnter        Pt Name: Lina Hernandez  MRN: [id number]  [place] 1988  Dateof evaluation: 4/25/2019  Provider: Gaile Phoenix, APRN - CNP  PCP: Moses Murray  ED Attending: No att. providers found    [address] [place] [address]       Chief Complaint   Patient presents with    Dizziness     Pt states \" I hit my head on my truck on Tuesday and since then I have been really dizzy. \" Pt also c/o headache       HISTORY OF PRESENTILLNESS   (Location/Symptom, Timing/Onset, Context/Setting, Quality, Duration, Modifying Factors, Severity)  Note limiting factors. Lina Hernandez is a 31 y.o. female for dizziness and headache. Onset was tues. Duration has been since the onset. Context includes pt states she hit her head on tues while changing a tire and has had a headache and dizziness since then. Alleviating factors include nothing. Aggravating factors include moving head. Pain is 7/10. nothing has been used for pain today. Nursing Notes were all reviewed and agreed with or any disagreements were addressed  in the HPI. REVIEW OF SYSTEMS    (2-9 systems for level 4, 10 or more for level 5)     Review of Systems   Constitutional: Negative for fever. Respiratory: Negative for shortness of breath. Cardiovascular: Negative for chest pain. Gastrointestinal: Negative for abdominal pain. Genitourinary: Negative for difficulty urinating. Neurological: Positive for dizziness and headaches. All other systems reviewed and are negative. Positives and Pertinent negatives as per HPI. Except as noted above in the ROS, all other systems were reviewed and negative. PAST MEDICAL HISTORY     Past Medical History:   Diagnosis Date    Acid reflux     Fibromyoma     Hearing loss     Hypoglycemia     Meniere's disease          SURGICAL HISTORY     History reviewed.  No pertinent surgical [04/25/19 1030]   BP Temp Temp Source Pulse Resp SpO2 Height Weight   122/86 98.1 °F (36.7 °C) Temporal 80 15 100 % 5' 6\" (1.676 m) 180 lb (81.6 kg)       Physical Exam   Constitutional: She is oriented to person, place, and time. She appears well-developed and well-nourished. HENT:   Head: Normocephalic and atraumatic. Eyes: Pupils are equal, round, and reactive to light. EOM are normal.   Neck: Normal range of motion. Cardiovascular: Normal rate. Pulmonary/Chest: Effort normal. No respiratory distress. Abdominal: Soft. She exhibits no distension. Musculoskeletal: Normal range of motion. Neurological: She is alert and oriented to person, place, and time. No cranial nerve deficit. Skin: Skin is warm and dry. Psychiatric: She has a normal mood and affect. DIAGNOSTIC RESULTS   LABS:    Labs Reviewed   COMPREHENSIVE METABOLIC PANEL W/ REFLEX TO MG FOR LOW K - Abnormal; Notable for the following components:       Result Value    Glucose 101 (*)     All other components within normal limits    Narrative:     Performed at:  [facility] [address],  [place], [place]   Phone [phone]   CBC WITH AUTO DIFFERENTIAL    Narrative:     Performed at:  [facility] [address],  [place], [facility]   Phone [phone]   HCG, SERUM, QUALITATIVE    Narrative:     Performed at:  [facility] [address],  [place], [facility]   Phone [phone]   URINE DRUG SCREEN    Narrative:     Performed at:  [facility]) - [facility],  [place], [facility]   Phone [phone]   URINE RT REFLEX TO CULTURE    Narrative:     Performed at:  [facility]) [facility],  [place], [facility]   Phone [phone]       All other labs werewithin normal range or not returned as of this dictation. EKG:  All EKG's are interpreted by the Emergency Department Physician who either signs or Co-signs this chart in the absence of acardiologist.  Please see their note for interpretation of EKG. RADIOLOGY:     Chest x-ray interpreted by radiologist for unremarkable chest x-ray. Interpretation per the Radiologist below, if available at the time of this note:    XR CHEST STANDARD (2 VW)   Final Result   Unremarkable chest.           No results found. PROCEDURES   Unless otherwise noted below, none     Procedures    CRITICAL CARE TIME   N/A    CONSULTS:  None      EMERGENCYDEPARTMENT COURSE and DIFFERENTIAL DIAGNOSIS/MDM:   Vitals:    Vitals:    04/25/19 1156 04/25/19 1157 04/25/19 1158 04/25/19 1203   BP: 113/73 114/80 114/79 109/67   Pulse: 87 89 89 71   Resp: 14 16 18 19   Temp:       TempSrc:       SpO2: 100% 100% 100% 99%   Weight:       Height:           Patient was given the following medications:  Medications   0.9 % sodium chloride bolus (2,448 mLs Intravenous New Bag 4/25/19 1117)   ondansetron (ZOFRAN) injection 4 mg (4 mg Intravenous Given 4/25/19 1118)   meclizine (ANTIVERT) chewable tablet 25 mg (25 mg Oral Given 4/25/19 1111)   metoclopramide (REGLAN) injection 10 mg (10 mg Intravenous Given 4/25/19 1115)   diphenhydrAMINE (BENADRYL) injection 12.5 mg (12.5 mg Intravenous Given 4/25/19 1113)   ketorolac (TORADOL) injection 30 mg (30 mg Intravenous Given 4/25/19 1125)       Patient was seen and evaluated by myself. Patient here for complaints of dizziness. Patient reports that she had her head on Tuesday however did not have any loss of consciousness. Patient reports that she had her head on a car while she was changing a tire. Patient reports that she said dizziness and headache since then. Patient does not use blood thinners. On exam she is awake and alert hemodynamically stable nontoxic in appearance. Patient does demonstrate increased dizziness with positioning.   I do not feel a CT would benefit at this time and suspect that she has a postconcussive vertigo/postconcussive headache. Patient was given multiple medications in the ED. On reevaluation she states that she does feel better. Patient will be discharged home with Antivert. She was given instructions to follow-up with her primary care doctor in the next few days and return to the ED for worsening symptoms. Patient was discharged home with all questions answered. The patient tolerated their visit well. I have evaluated thispatient. My supervising physician was available for consultation. The patient and / or the family were informed of the results of any tests, a time was given to answer questions, a plan was proposed and they agreed Carlitos Sheets. FINAL IMPRESSION      1. Post-concussion headache    2.  Post-concussion vertigo          DISPOSITION/PLAN   DISPOSITION Decision To Discharge 04/25/2019 12:02:13 PM      PATIENT REFERRED TO:  Oleg Rivasingford  [address]                                [address] [facility]  [phone]    Schedule an appointment as soon as possible for a visit in 2 days  for re-evaluation    [address]  [address] [place] [address]  [phone]    If symptoms worsen      DISCHARGE MEDICATIONS:  New Prescriptions    MECLIZINE (ANTIVERT) 25 MG TABLET    Take 1 tablet by mouth 3 times daily as needed for Dizziness       DISCONTINUED MEDICATIONS:  Discontinued Medications    IBUPROFEN (ADVIL;MOTRIN) 800 MG TABLET        LANSOPRAZOLE (PREVACID) 30 MG DELAYED RELEASE CAPSULE                  (Please note that portions of this note were completed with a voice recognition program.  Efforts were made to edit the dictations but occasionally words are mis-transcribed.)    ANIBAL Mendez CNP (electronically signed)         ANIBAL Mendez CNP  04/25/19 3592 Cardiac Monitor/Defib/ACLS/Rescue Kit/O2/BVM/IV pump/pulse ox

## 2020-11-28 NOTE — ED PROVIDER NOTE - PROGRESS NOTE DETAILS
pt with high risk for bleeding given recent surgerical procedure and complicated post op course. Will begin heparin for better control. Pt to be admitted

## 2020-11-28 NOTE — ED ADULT NURSE REASSESSMENT NOTE - NS ED NURSE REASSESS COMMENT FT1
pt comfortable at this time.  heparin drip started as per md orders and nanogram.  Ongoing nursing care given.

## 2020-11-28 NOTE — ED ADULT TRIAGE NOTE - CHIEF COMPLAINT QUOTE
reports left leg swelling and hot to touch since wednesday, also reports pain to right shoulder blade intermittently

## 2020-11-28 NOTE — CONSULT NOTE ADULT - SUBJECTIVE AND OBJECTIVE BOX
[facility] - Almas Faustin, Cara Nunez, Eleno, Saurabh, Pasquale White  Office Number: [phone]    Initial Consult Note: This is a 59 y/o female with complex medical Hx as below with recent surgery presented c/o leg swelling x 2-3 days, found to have acute non-occlussive LLE DVT and RLL PE.  Patient now on AC.    CHIEF COMPLAINT: Patient is a 60y old  Female who presents with a chief complaint of PE (28 Nov 2020 13:28)    HPI:  59 y/o F PMHx Bipolar Disorder, Anxiety disorder, depression, anemia, arthritis, HTN, Thoracic AA 4.4cm (stable), JESUS on CPAP, asthma, Hx of gastric lap band and removal, Essential Tremor, Obesity, hx cholecystectomy, hx nephrolithiasis, h/o ETOH abuse, neuropathy, Spinal stenosis presented to ED with complaint of LE swelling for past 2-3 days.  Pt was recently admitted to the hospital from 9/25- 10/10 for and elective panniculectomy. Pt was admitted post procedure and subsequently developed hemorrhagic shock requiring pressor support and multiple blood transfusions. During her stay she also was noted to have an intra-abdominal hematoma and developed a fever. The fever was thought to be secondary to the hematoma and resolved during the hospital course. Pt was then again admitted from 10/13-10/20 after she developed diaphoresis and lightheadedness and there was concern for PE. She was again found to be anemic and received and additional 2 units of blood. Pt reports that she has since been recovering well.  On Wednesday she noted that she developed left lower leg swelling with some increased warmth. She also noted that for the last 2 weeks R sided back pain, but patient attributed it to her history of kidney stones. Denies fever, chills, N/V, CP.  Pt reports that her abdominal pain has been improving.    In ED, patient noted to have elevated D-dimer- 768. Patient had LE doppler which showed nonocclusive acute thrombus in L common femoral, femoral and popliteal vein extending into tibial vein. Patient had CTA done which showed R sided PE. Patient received 1L NS bolus in ED and was started on heparin gtt. (28 Nov 2020 13:28)    PAST MEDICAL & SURGICAL HISTORY:  History of aortic aneurysm  descending aorta see CT    Arthritis    Degenerative disc disease, lumbar    Neuropathy    Spinal stenosis    Bipolar 1 disorder    Kidney stone    Sleep Apnea  CPAP with oxygen since June 2020    Asthma    Hypertension    Morbid Obesity    ETOH Abuse  H/O    Benign Essential Tremor    Anxiety    Depression    Renal Calculi  chronic      Colitis  H/O    Anemia    S/P panniculectomy    S/P cardiac catheterization    S/P dilation and curettage    History of tonsillectomy    History of laparoscopic adjustable gastric banding  band removed few yrs ago    S/P D&amp;C    Biliary stent placement &amp; ercp    ureteroscopy with stone removal  1-      SOCIAL HISTORY:  No tobacco, ethanol, or drug abuse.  FAMILY HISTORY:  Hypertension (Sibling)    Hyperlipidemia (Sibling)    Family history of renal failure  both parents were on dialysis    Family history of bacterial pneumonia    Family history of arthritis      No family history of acute MI or sudden cardiac death.  MEDICATIONS  (STANDING):  allopurinol 300 milliGRAM(s) Oral daily  cholecalciferol 5000 Unit(s) Oral daily  DULoxetine 90 milliGRAM(s) Oral daily  ferrous    sulfate 325 milliGRAM(s) Oral daily  folic acid 1 milliGRAM(s) Oral daily  heparin  Infusion.  Unit(s)/Hr (21 mL/Hr) IV Continuous <Continuous>  lamoTRIgine 200 milliGRAM(s) Oral daily  multivitamin 1 Tablet(s) Oral daily  traZODone 200 milliGRAM(s) Oral at bedtime    MEDICATIONS  (PRN):  acetaminophen   Tablet .. 650 milliGRAM(s) Oral every 6 hours PRN Mild Pain (1 - 3)  heparin   Injectable 9500 Unit(s) IV Push every 6 hours PRN For aPTT less than 40  heparin   Injectable 4500 Unit(s) IV Push every 6 hours PRN For aPTT between 40 - 57  ondansetron Injectable 4 milliGRAM(s) IV Push every 6 hours PRN Nausea and/or Vomiting    Allergies    penicillins (Other)  trees dogs cats cockaroaches (Rhinitis; Rhinorrhea)    Intolerances      REVIEW OF SYSTEMS:  CONSTITUTIONAL: No weakness, fevers or chills  EYES/ENT: No visual changes;  No vertigo or throat pain   NECK: No pain or stiffness  RESPIRATORY: No cough, wheezing, hemoptysis; No shortness of breath  CARDIOVASCULAR: No chest pain or palpitations  GASTROINTESTINAL: No abdominal pain. No nausea, vomiting, or hematemesis; No diarrhea or constipation. No melena or hematochezia.  GENITOURINARY: No dysuria, frequency or hematuria  NEUROLOGICAL: No numbness or weakness  SKIN: No itching or rash  All other review of systems is negative unless indicated above  VITAL SIGNS:   Vital Signs Last 24 Hrs  T(C): 37.1 (28 Nov 2020 11:01), Max: 37.2 (28 Nov 2020 07:16)  T(F): 98.7 (28 Nov 2020 11:01), Max: 99 (28 Nov 2020 07:16)  HR: 91 (28 Nov 2020 11:01) (91 - 127)  BP: 114/73 (28 Nov 2020 11:01) (114/73 - 146/98)  BP(mean): --  RR: 17 (28 Nov 2020 11:01) (17 - 18)  SpO2: 98% (28 Nov 2020 11:01) (97% - 98%)  I&O's Summary    On Exam:  Constitutional: NAD, alert and oriented x 3  Lungs:  Non-labored, breath sounds are clear bilaterally, No wheezing, rales or rhonchi  Cardiovascular: RRR.  S1 and S2 positive.  No murmurs, rubs, gallops or clicks  Gastrointestinal: Bowel Sounds present, soft, nontender.   Lymph: No peripheral edema. No cervical lymphadenopathy.  Neurological: Alert, no focal deficits  Skin: No rashes or ulcers   Psych:  Mood & affect appropriate.    LABS: All Labs Reviewed:                        10.7   8.23  )-----------( 583      ( 28 Nov 2020 07:33 )             34.4     28 Nov 2020 07:33    144    |  111    |  18     ----------------------------<  145    3.8     |  23     |  0.97     Ca    9.2        28 Nov 2020 07:33    TPro  7.5    /  Alb  2.7    /  TBili  0.2    /  DBili  x      /  AST  16     /  ALT  14     /  AlkPhos  129    28 Nov 2020 07:33    PT/INR - ( 28 Nov 2020 07:33 )   PT: 12.9 sec;   INR: 1.11 ratio         PTT - ( 28 Nov 2020 07:33 )  PTT:33.9 sec  CARDIAC MARKERS ( 28 Nov 2020 07:33 )  <.015 ng/mL / x     / x     / x     / x          Blood Culture:   11-28 @ 07:33  Pro Bnp 114    RADIOLOGY:    EXAM:  CT ANGIO CHEST (W)AW IC                          PROCEDURE DATE:  11/28/2020      INTERPRETATION:  CLINICAL INFORMATION: Shortness of breath. Assess for pulmonary embolism. High pretest probability.    COMPARISON: Noncontrast chest CT from 10/13/2020    PROCEDURE:  CT Angiography of the Chest.  90 ml of Omnipaque 350 was injected intravenously. 10 ml were discarded.  Sagittal and coronal reformats were performed as well as 3D (MIP) reconstructions.    FINDINGS:    LUNGS AND AIRWAYS: Motion artifact limits evaluation. 5 mm calcified granuloma in the right upper lobe.  1.2 x 1.0 cm nodular density at the left lung base posteriorly on series 2 image 73. A larger groundglass opacity measuring 2 x 1.3 cm was present in this region on the prior study from 10/13/2020.  PLEURA: No pleural effusion.  MEDIASTINUM AND MILADIS: No lymphadenopathy.  VESSELS: Motion artifact limits evaluation of the pulmonary vessels. Nonetheless, emboli is seen in the right lower lobe pulmonary artery on series 2 images 51 and 52. Additional smaller subsegmental emboli are limited in evaluation.  HEART: Heart size is normal. No pericardial effusion.  CHEST WALL AND LOWER NECK: 1.7 x 1.5 cm low-attenuation nodule in the right lobe of the thyroid gland inferiorly which was present on the prior recent study. This may have been present on 2/16/2016 although artifact limits evaluation. If this has not previously been characterized, nonemergent thyroid ultrasound may be performed..  VISUALIZED UPPER ABDOMEN: Staghorn calculus in the upper pole of the right kidney without hydronephrosis.  BONES: Degenerative changes of bone.    IMPRESSION:  Thrombus seen in the right lower lobe pulmonary artery. Motion artifact limits evaluation of more distal subsegmental branches. This critical value was discussed with Dr. Cartagena in the emergency room at approximately 10:45 AM on 11/20/2020.    1.2 x 1.0 cm nodule in the left lower lobe. This was larger in size and groundglass opacity 1 1/2 months prior. Thismay be due to infectious or inflammatory etiology. Short interval follow-up with low-dose noncontrast chest CT scan in 3 months is recommended.    1.7 x 1.5 cm low-attenuation nodule in the right lobe of the thyroid gland which is unchanged when compared with 10/13/2020. This may have been present on 2/16/2016 although artifact limits evaluation. If this has not previously been characterized, nonemergent thyroid ultrasound may be performed.    HEATHER ARMIJO MD; Attending Radiologist  This document has been electronically signed. Nov 28 2020 10:50AM  EKG:    Ventricular Rate 91 BPM    Atrial Rate 90 BPM    QRS Duration 88 ms    Q-T Interval 358 ms    QTC Calculation(Bazett) 440 ms    R Axis -43 degrees    T Axis 41 degrees    Diagnosis Line Normal sinus rhythm  Left axis deviation  Moderate voltage criteria for LVH, may be normal variant  Nonspecific ST abnormality  Abnormal ECG  When compared with ECG of 13-OCT-2020 12:56,  No significant change was found  Confirmed by Lenin Marroquin MD [id number]) on 11/28/2020 2:22:56 PM  Assessment/Plan:  60y Female    Tiffany Brown DNP, ANP-C  Cardiology  Spectra #[phone]       [facility] - Almas Faustin, Cara Nunez, Eleno, Saurabh, Pasquale White  Office Number: [phone]    Initial Consult Note: This is a 59 y/o female with complex medical Hx as below with recent surgery presented c/o leg swelling x 2-3 days, found to have acute non-occlussive LLE DVT and RLL PE.  Patient now on AC.    CHIEF COMPLAINT: Patient is a 60y old  Female who presents with a chief complaint of PE (28 Nov 2020 13:28)    HPI:  59 y/o F PMHx Bipolar Disorder, Anxiety disorder, depression, anemia, arthritis, HTN, Thoracic AA 4.4cm (stable), JESUS on CPAP, asthma, Hx of gastric lap band and removal, Essential Tremor, Obesity, hx cholecystectomy, hx nephrolithiasis, h/o ETOH abuse, neuropathy, Spinal stenosis presented to ED with complaint of LE swelling for past 2-3 days.  Pt was recently admitted to the hospital from 9/25- 10/10 for and elective panniculectomy. Pt was admitted post procedure and subsequently developed hemorrhagic shock requiring pressor support and multiple blood transfusions. During her stay she also was noted to have an intra-abdominal hematoma and developed a fever. The fever was thought to be secondary to the hematoma and resolved during the hospital course. Pt was then again admitted from 10/13-10/20 after she developed diaphoresis and lightheadedness and there was concern for PE. She was again found to be anemic and received and additional 2 units of blood. Pt reports that she has since been recovering well.  On Wednesday she noted that she developed left lower leg swelling with some increased warmth. She also noted that for the last 2 weeks R sided back pain, but patient attributed it to her history of kidney stones. Denies fever, chills, N/V, CP.  Pt reports that her abdominal pain has been improving.    In ED, patient noted to have elevated D-dimer- 768. Patient had LE doppler which showed nonocclusive acute thrombus in L common femoral, femoral and popliteal vein extending into tibial vein. Patient had CTA done which showed R sided PE. Patient received 1L NS bolus in ED and was started on heparin gtt. (28 Nov 2020 13:28)    PAST MEDICAL & SURGICAL HISTORY:  History of aortic aneurysm  descending aorta see CT    Arthritis    Degenerative disc disease, lumbar    Neuropathy    Spinal stenosis    Bipolar 1 disorder    Kidney stone    Sleep Apnea  CPAP with oxygen since June 2020    Asthma    Hypertension    Morbid Obesity    ETOH Abuse  H/O    Benign Essential Tremor    Anxiety    Depression    Renal Calculi  chronic      Colitis  H/O    Anemia    S/P panniculectomy    S/P cardiac catheterization    S/P dilation and curettage    History of tonsillectomy    History of laparoscopic adjustable gastric banding  band removed few yrs ago    S/P D&amp;C    Biliary stent placement &amp; ercp    ureteroscopy with stone removal  1-      SOCIAL HISTORY:  No tobacco, ethanol, or drug abuse.  FAMILY HISTORY:  Hypertension (Sibling)    Hyperlipidemia (Sibling)    Family history of renal failure  both parents were on dialysis    Family history of bacterial pneumonia    Family history of arthritis      No family history of acute MI or sudden cardiac death.  MEDICATIONS  (STANDING):  allopurinol 300 milliGRAM(s) Oral daily  cholecalciferol 5000 Unit(s) Oral daily  DULoxetine 90 milliGRAM(s) Oral daily  ferrous    sulfate 325 milliGRAM(s) Oral daily  folic acid 1 milliGRAM(s) Oral daily  heparin  Infusion.  Unit(s)/Hr (21 mL/Hr) IV Continuous <Continuous>  lamoTRIgine 200 milliGRAM(s) Oral daily  multivitamin 1 Tablet(s) Oral daily  traZODone 200 milliGRAM(s) Oral at bedtime    MEDICATIONS  (PRN):  acetaminophen   Tablet .. 650 milliGRAM(s) Oral every 6 hours PRN Mild Pain (1 - 3)  heparin   Injectable 9500 Unit(s) IV Push every 6 hours PRN For aPTT less than 40  heparin   Injectable 4500 Unit(s) IV Push every 6 hours PRN For aPTT between 40 - 57  ondansetron Injectable 4 milliGRAM(s) IV Push every 6 hours PRN Nausea and/or Vomiting    Allergies    penicillins (Other)  trees dogs cats cockaroaches (Rhinitis; Rhinorrhea)    Intolerances    REVIEW OF SYSTEMS:  CONSTITUTIONAL: No weakness, fevers or chills  EYES/ENT: No visual changes;  No vertigo or throat pain   NECK: No pain or stiffness  RESPIRATORY: No cough, wheezing, hemoptysis; No shortness of breath  + Pleuritic pain  CARDIOVASCULAR: No chest pain or palpitations  MSK: back pain  GASTROINTESTINAL: No abdominal pain. No nausea, vomiting, or hematemesis; No diarrhea or constipation. No melena or hematochezia.  GENITOURINARY: No dysuria, frequency or hematuria  NEUROLOGICAL: No numbness or weakness  SKIN: No itching or rash.  All other review of systems is negative unless indicated above  VITAL SIGNS:   Vital Signs Last 24 Hrs  T(C): 37.1 (28 Nov 2020 11:01), Max: 37.2 (28 Nov 2020 07:16)  T(F): 98.7 (28 Nov 2020 11:01), Max: 99 (28 Nov 2020 07:16)  HR: 91 (28 Nov 2020 11:01) (91 - 127)  BP: 114/73 (28 Nov 2020 11:01) (114/73 - 146/98)  BP(mean): --  RR: 17 (28 Nov 2020 11:01) (17 - 18)  SpO2: 98% (28 Nov 2020 11:01) (97% - 98%)  I&O's Summary    On Exam:  Constitutional: NAD, alert and oriented x 3, morbidly obese  Lungs:  Non-labored, breath sounds are clear  but diminished bilaterally, No wheezing, rales or rhonchi  Cardiovascular: RRR.  S1 and S2 positive.  No murmurs, rubs, gallops or clicks  Gastrointestinal: Bowel Sounds present, soft, nontender.   Lymph: No peripheral edema. No cervical lymphadenopathy.  Neurological: Alert, no focal deficits  Skin: No rashes or ulcer.  + lower abdominal incision witn vacdresisng  Psych:  Mood & affect appropriate.    LABS: All Labs Reviewed:                        10.7   8.23  )-----------( 583      ( 28 Nov 2020 07:33 )             34.4     28 Nov 2020 07:33    144    |  111    |  18     ----------------------------<  145    3.8     |  23     |  0.97     Ca    9.2        28 Nov 2020 07:33    TPro  7.5    /  Alb  2.7    /  TBili  0.2    /  DBili  x      /  AST  16     /  ALT  14     /  AlkPhos  129    28 Nov 2020 07:33    PT/INR - ( 28 Nov 2020 07:33 )   PT: 12.9 sec;   INR: 1.11 ratio         PTT - ( 28 Nov 2020 07:33 )  PTT:33.9 sec  CARDIAC MARKERS ( 28 Nov 2020 07:33 )  <.015 ng/mL / x     / x     / x     / x        Blood Culture:   11-28 @ 07:33  Pro Bnp 114    RADIOLOGY:    EXAM:  CT ANGIO CHEST (W)AW IC                          PROCEDURE DATE:  11/28/2020      INTERPRETATION:  CLINICAL INFORMATION: Shortness of breath. Assess for pulmonary embolism. High pretest probability.    COMPARISON: Noncontrast chest CT from 10/13/2020    PROCEDURE:  CT Angiography of the Chest.  90 ml of Omnipaque 350 was injected intravenously. 10 ml were discarded.  Sagittal and coronal reformats were performed as well as 3D (MIP) reconstructions.    FINDINGS:    LUNGS AND AIRWAYS: Motion artifact limits evaluation. 5 mm calcified granuloma in the right upper lobe.  1.2 x 1.0 cm nodular density at the left lung base posteriorly on series 2 image 73. A larger groundglass opacity measuring 2 x 1.3 cm was present in this region on the prior study from 10/13/2020.  PLEURA: No pleural effusion.  MEDIASTINUM AND MILADIS: No lymphadenopathy.  VESSELS: Motion artifact limits evaluation of the pulmonary vessels. Nonetheless, emboli is seen in the right lower lobe pulmonary artery on series 2 images 51 and 52. Additional smaller subsegmental emboli are limited in evaluation.  HEART: Heart size is normal. No pericardial effusion.  CHEST WALL AND LOWER NECK: 1.7 x 1.5 cm low-attenuation nodule in the right lobe of the thyroid gland inferiorly which was present on the prior recent study. This may have been present on 2/16/2016 although artifact limits evaluation. If this has not previously been characterized, nonemergent thyroid ultrasound may be performed..  VISUALIZED UPPER ABDOMEN: Staghorn calculus in the upper pole of the right kidney without hydronephrosis.  BONES: Degenerative changes of bone.    IMPRESSION:  Thrombus seen in the right lower lobe pulmonary artery. Motion artifact limits evaluation of more distal subsegmental branches. This critical value was discussed with Dr. Cartagena in the emergency room at approximately 10:45 AM on 11/20/2020.    1.2 x 1.0 cm nodule in the left lower lobe. This was larger in size and groundglass opacity 1 1/2 months prior. This may be due to infectious or inflammatory etiology. Short interval follow-up with low-dose noncontrast chest CT scan in 3 months is recommended.    1.7 x 1.5 cm low-attenuation nodule in the right lobe of the thyroid gland which is unchanged when compared with 10/13/2020. This may have been present on 2/16/2016 although artifact limits evaluation. If this has not previously been characterized, nonemergent thyroid ultrasound may be performed.    HEATHER ARMIJO MD; Attending Radiologist  This document has been electronically signed. Nov 28 2020 10:50AM      EXAM:  US DPLX LWR EXT VEINS COMPL BI                            PROCEDURE DATE:  11/28/2020          INTERPRETATION:  CLINICAL INFORMATION: Bilateral leg pain and swelling    COMPARISON: Venous duplex 10/14/2020    TECHNIQUE: Duplex sonography of the BILATERAL LOWER extremity veins with color and spectral Doppler, with and without compression.    FINDINGS:    There is acute, nonocclusive thrombus in the left common femoral, femoral, and popliteal vein extending through the posterior tibial vein in the calf.  Doppler examination shows partial flow.    IMPRESSION:  Acute, nonocclusive thrombus in the left common femoral, femoral, and popliteal vein extending through the posterior tibial vein in the calf    NADINE STEWART MD; Attending Radiologist  This document has been electronically signed. Nov 28 2020  8:36AM  EKG:    Ventricular Rate 91 BPM    Atrial Rate 90 BPM    QRS Duration 88 ms    Q-T Interval 358 ms    QTC Calculation(Bazett) 440 ms    R Axis -43 degrees    T Axis 41 degrees    Diagnosis Line Normal sinus rhythm  Left axis deviation  Moderate voltage criteria for LVH, may be normal variant  Nonspecific ST abnormality  Abnormal ECG  When compared with ECG of 13-OCT-2020 12:56,  No significant change was found  Confirmed by Lenin Marroquin MD ([id number]) on 11/28/2020 2:22:56 PM  Assessment/Plan:  60y Female

## 2020-11-29 LAB
ALBUMIN SERPL ELPH-MCNC: 2.5 G/DL — LOW (ref 3.3–5)
ALP SERPL-CCNC: 116 U/L — SIGNIFICANT CHANGE UP (ref 40–120)
ALT FLD-CCNC: 12 U/L — SIGNIFICANT CHANGE UP (ref 12–78)
ANION GAP SERPL CALC-SCNC: 7 MMOL/L — SIGNIFICANT CHANGE UP (ref 5–17)
APTT BLD: 74.2 SEC — HIGH (ref 27.5–35.5)
APTT BLD: 74.9 SEC — HIGH (ref 27.5–35.5)
APTT BLD: 77 SEC — HIGH (ref 27.5–35.5)
AST SERPL-CCNC: 17 U/L — SIGNIFICANT CHANGE UP (ref 15–37)
BASOPHILS # BLD AUTO: 0.05 K/UL — SIGNIFICANT CHANGE UP (ref 0–0.2)
BASOPHILS NFR BLD AUTO: 0.8 % — SIGNIFICANT CHANGE UP (ref 0–2)
BILIRUB SERPL-MCNC: 0.2 MG/DL — SIGNIFICANT CHANGE UP (ref 0.2–1.2)
BUN SERPL-MCNC: 14 MG/DL — SIGNIFICANT CHANGE UP (ref 7–23)
CALCIUM SERPL-MCNC: 9.3 MG/DL — SIGNIFICANT CHANGE UP (ref 8.5–10.1)
CHLORIDE SERPL-SCNC: 111 MMOL/L — HIGH (ref 96–108)
CO2 SERPL-SCNC: 29 MMOL/L — SIGNIFICANT CHANGE UP (ref 22–31)
CREAT SERPL-MCNC: 0.74 MG/DL — SIGNIFICANT CHANGE UP (ref 0.5–1.3)
CULTURE RESULTS: SIGNIFICANT CHANGE UP
EOSINOPHIL # BLD AUTO: 0.23 K/UL — SIGNIFICANT CHANGE UP (ref 0–0.5)
EOSINOPHIL NFR BLD AUTO: 3.8 % — SIGNIFICANT CHANGE UP (ref 0–6)
GLUCOSE SERPL-MCNC: 108 MG/DL — HIGH (ref 70–99)
HCT VFR BLD CALC: 32 % — LOW (ref 34.5–45)
HCT VFR BLD CALC: 32.4 % — LOW (ref 34.5–45)
HGB BLD-MCNC: 10.2 G/DL — LOW (ref 11.5–15.5)
HGB BLD-MCNC: 9.7 G/DL — LOW (ref 11.5–15.5)
IMM GRANULOCYTES NFR BLD AUTO: 0.3 % — SIGNIFICANT CHANGE UP (ref 0–1.5)
INR BLD: 1.15 RATIO — SIGNIFICANT CHANGE UP (ref 0.88–1.16)
LYMPHOCYTES # BLD AUTO: 1.76 K/UL — SIGNIFICANT CHANGE UP (ref 1–3.3)
LYMPHOCYTES # BLD AUTO: 29.4 % — SIGNIFICANT CHANGE UP (ref 13–44)
MAGNESIUM SERPL-MCNC: 2.4 MG/DL — SIGNIFICANT CHANGE UP (ref 1.6–2.6)
MCHC RBC-ENTMCNC: 27.2 PG — SIGNIFICANT CHANGE UP (ref 27–34)
MCHC RBC-ENTMCNC: 27.4 PG — SIGNIFICANT CHANGE UP (ref 27–34)
MCHC RBC-ENTMCNC: 30.3 GM/DL — LOW (ref 32–36)
MCHC RBC-ENTMCNC: 31.5 GM/DL — LOW (ref 32–36)
MCV RBC AUTO: 87.1 FL — SIGNIFICANT CHANGE UP (ref 80–100)
MCV RBC AUTO: 89.6 FL — SIGNIFICANT CHANGE UP (ref 80–100)
MONOCYTES # BLD AUTO: 0.37 K/UL — SIGNIFICANT CHANGE UP (ref 0–0.9)
MONOCYTES NFR BLD AUTO: 6.2 % — SIGNIFICANT CHANGE UP (ref 2–14)
NEUTROPHILS # BLD AUTO: 3.55 K/UL — SIGNIFICANT CHANGE UP (ref 1.8–7.4)
NEUTROPHILS NFR BLD AUTO: 59.5 % — SIGNIFICANT CHANGE UP (ref 43–77)
NRBC # BLD: 0 /100 WBCS — SIGNIFICANT CHANGE UP (ref 0–0)
NRBC # BLD: 0 /100 WBCS — SIGNIFICANT CHANGE UP (ref 0–0)
PHOSPHATE SERPL-MCNC: 4.8 MG/DL — HIGH (ref 2.5–4.5)
PLATELET # BLD AUTO: 455 K/UL — HIGH (ref 150–400)
PLATELET # BLD AUTO: 475 K/UL — HIGH (ref 150–400)
POTASSIUM SERPL-MCNC: 4.3 MMOL/L — SIGNIFICANT CHANGE UP (ref 3.5–5.3)
POTASSIUM SERPL-SCNC: 4.3 MMOL/L — SIGNIFICANT CHANGE UP (ref 3.5–5.3)
PROT SERPL-MCNC: 6.8 G/DL — SIGNIFICANT CHANGE UP (ref 6–8.3)
PROTHROM AB SERPL-ACNC: 13.4 SEC — SIGNIFICANT CHANGE UP (ref 10.6–13.6)
RBC # BLD: 3.57 M/UL — LOW (ref 3.8–5.2)
RBC # BLD: 3.72 M/UL — LOW (ref 3.8–5.2)
RBC # FLD: 15.3 % — HIGH (ref 10.3–14.5)
RBC # FLD: 15.3 % — HIGH (ref 10.3–14.5)
SARS-COV-2 IGG SERPL QL IA: NEGATIVE — SIGNIFICANT CHANGE UP
SARS-COV-2 IGM SERPL IA-ACNC: 0.09 INDEX — SIGNIFICANT CHANGE UP
SODIUM SERPL-SCNC: 147 MMOL/L — HIGH (ref 135–145)
SPECIMEN SOURCE: SIGNIFICANT CHANGE UP
WBC # BLD: 5.98 K/UL — SIGNIFICANT CHANGE UP (ref 3.8–10.5)
WBC # BLD: 6.71 K/UL — SIGNIFICANT CHANGE UP (ref 3.8–10.5)
WBC # FLD AUTO: 5.98 K/UL — SIGNIFICANT CHANGE UP (ref 3.8–10.5)
WBC # FLD AUTO: 6.71 K/UL — SIGNIFICANT CHANGE UP (ref 3.8–10.5)

## 2020-11-29 PROCEDURE — 70450 CT HEAD/BRAIN W/O DYE: CPT | Mod: 26

## 2020-11-29 PROCEDURE — 99233 SBSQ HOSP IP/OBS HIGH 50: CPT | Mod: GC

## 2020-11-29 PROCEDURE — 99232 SBSQ HOSP IP/OBS MODERATE 35: CPT

## 2020-11-29 RX ORDER — HEPARIN SODIUM 5000 [USP'U]/ML
9500 INJECTION INTRAVENOUS; SUBCUTANEOUS ONCE
Refills: 0 | Status: DISCONTINUED | OUTPATIENT
Start: 2020-11-29 | End: 2020-11-29

## 2020-11-29 RX ORDER — HEPARIN SODIUM 5000 [USP'U]/ML
9500 INJECTION INTRAVENOUS; SUBCUTANEOUS EVERY 6 HOURS
Refills: 0 | Status: DISCONTINUED | OUTPATIENT
Start: 2020-11-29 | End: 2020-11-29

## 2020-11-29 RX ORDER — APIXABAN 2.5 MG/1
10 TABLET, FILM COATED ORAL EVERY 12 HOURS
Refills: 0 | Status: DISCONTINUED | OUTPATIENT
Start: 2020-11-29 | End: 2020-11-30

## 2020-11-29 RX ORDER — HEPARIN SODIUM 5000 [USP'U]/ML
4500 INJECTION INTRAVENOUS; SUBCUTANEOUS EVERY 6 HOURS
Refills: 0 | Status: DISCONTINUED | OUTPATIENT
Start: 2020-11-29 | End: 2020-11-29

## 2020-11-29 RX ORDER — KETOROLAC TROMETHAMINE 30 MG/ML
15 SYRINGE (ML) INJECTION ONCE
Refills: 0 | Status: DISCONTINUED | OUTPATIENT
Start: 2020-11-29 | End: 2020-11-29

## 2020-11-29 RX ORDER — HEPARIN SODIUM 5000 [USP'U]/ML
1700 INJECTION INTRAVENOUS; SUBCUTANEOUS
Qty: 25000 | Refills: 0 | Status: DISCONTINUED | OUTPATIENT
Start: 2020-11-29 | End: 2020-11-29

## 2020-11-29 RX ORDER — IRON SUCROSE 20 MG/ML
200 INJECTION, SOLUTION INTRAVENOUS ONCE
Refills: 0 | Status: COMPLETED | OUTPATIENT
Start: 2020-11-29 | End: 2020-11-29

## 2020-11-29 RX ORDER — KETOROLAC TROMETHAMINE 30 MG/ML
15 SYRINGE (ML) INJECTION EVERY 6 HOURS
Refills: 0 | Status: DISCONTINUED | OUTPATIENT
Start: 2020-11-29 | End: 2020-11-29

## 2020-11-29 RX ORDER — IBUPROFEN 200 MG
400 TABLET ORAL ONCE
Refills: 0 | Status: DISCONTINUED | OUTPATIENT
Start: 2020-11-29 | End: 2020-11-29

## 2020-11-29 RX ORDER — ACETAMINOPHEN WITH CODEINE 300MG-30MG
1 TABLET ORAL EVERY 6 HOURS
Refills: 0 | Status: DISCONTINUED | OUTPATIENT
Start: 2020-11-29 | End: 2020-11-30

## 2020-11-29 RX ORDER — HEPARIN SODIUM 5000 [USP'U]/ML
INJECTION INTRAVENOUS; SUBCUTANEOUS
Qty: 25000 | Refills: 0 | Status: DISCONTINUED | OUTPATIENT
Start: 2020-11-29 | End: 2020-11-29

## 2020-11-29 RX ORDER — IRON SUCROSE 20 MG/ML
200 INJECTION, SOLUTION INTRAVENOUS ONCE
Refills: 0 | Status: DISCONTINUED | OUTPATIENT
Start: 2020-11-29 | End: 2020-11-29

## 2020-11-29 RX ADMIN — Medication 15 MILLIGRAM(S): at 02:14

## 2020-11-29 RX ADMIN — Medication 1 TABLET(S): at 18:53

## 2020-11-29 RX ADMIN — Medication 5000 UNIT(S): at 11:51

## 2020-11-29 RX ADMIN — HEPARIN SODIUM 1700 UNIT(S)/HR: 5000 INJECTION INTRAVENOUS; SUBCUTANEOUS at 11:38

## 2020-11-29 RX ADMIN — DULOXETINE HYDROCHLORIDE 90 MILLIGRAM(S): 30 CAPSULE, DELAYED RELEASE ORAL at 11:52

## 2020-11-29 RX ADMIN — IRON SUCROSE 110 MILLIGRAM(S): 20 INJECTION, SOLUTION INTRAVENOUS at 18:46

## 2020-11-29 RX ADMIN — LAMOTRIGINE 200 MILLIGRAM(S): 25 TABLET, ORALLY DISINTEGRATING ORAL at 21:40

## 2020-11-29 RX ADMIN — Medication 15 MILLIGRAM(S): at 11:52

## 2020-11-29 RX ADMIN — Medication 1 MILLIGRAM(S): at 11:51

## 2020-11-29 RX ADMIN — HEPARIN SODIUM 1700 UNIT(S)/HR: 5000 INJECTION INTRAVENOUS; SUBCUTANEOUS at 03:06

## 2020-11-29 RX ADMIN — Medication 200 MILLIGRAM(S): at 21:40

## 2020-11-29 RX ADMIN — Medication 15 MILLIGRAM(S): at 12:07

## 2020-11-29 RX ADMIN — Medication 15 MILLIGRAM(S): at 02:30

## 2020-11-29 RX ADMIN — Medication 325 MILLIGRAM(S): at 11:52

## 2020-11-29 RX ADMIN — APIXABAN 10 MILLIGRAM(S): 2.5 TABLET, FILM COATED ORAL at 18:14

## 2020-11-29 RX ADMIN — Medication 1 TABLET(S): at 11:51

## 2020-11-29 RX ADMIN — Medication 1 TABLET(S): at 19:50

## 2020-11-29 RX ADMIN — Medication 120 MILLIGRAM(S): at 06:09

## 2020-11-29 RX ADMIN — Medication 300 MILLIGRAM(S): at 11:51

## 2020-11-29 NOTE — PROGRESS NOTE ADULT - ASSESSMENT
61 y/o woman w 2 recent hospitalizations in Oct, first for panniculectomy w complications of hemorrhagic shock and abdomen hematoma, post pressor support and multiple PRBC transfusions, and second time later in same month for diaphoresis and dizziness required again PRBC transfusions.  Now adm w left calf pain and swelling x4 days and right shoulder pain x~2wks, Venous Dopplers sig for left leg DVT from common femoral to post tibial, and CTA chest w RLL PE  Started on Heparin    -left leg DVT and  Rt lung PE appear provoked due to decreased mobility and recent prolonged hospitalizations/inflammatory state of events assoc w Panniculectomy.  -H/H lower on repeat last night but similar this AM and no signs in incr gross bleed, stable, from Heme standpoint can change Heparin to po Eliquis  -iron studies in Oct c/w iron deficiency, but pt has not had IV iron, has known GI malabsorption and req IV formulation and also has had continued blood loss from hemovac/woundvac since then contributing to iron def, will give 200 mg IV dose Venofer today once off Heparin  -monitor serial CBC  -will follow up in office upon discharge to continue Heme management    discussed w pt

## 2020-11-29 NOTE — PROGRESS NOTE ADULT - ATTENDING COMMENTS
60F, etoh abuse, obesity/gasatric lap band/removal, bipolar d/o, anxiety/depression, HTN, Afib not on a/c (2/2 hx bleed), TAA, JESUS/CPAP, asthma, nephrolithiasis; recent complicated hosp course 9/2020 for panniculectomy/hematoma/hemorrhagic shock/anemia/transfusions, then readm 10/2020 for presyncope/anemia/transfusion; now adm w/LLE swelling, back pain - labs w/elev Ddimer, LE dopplers w/L common femoral/femoral/popliteal vein DVT extending into tibial vein, CTA chest w/R PE - mgmt for acute hypoxic resp failure, acute DVT/PE - on hep gtt, being followed by Heme/Cards/Plastics consult for ongoing mgmt abd wound vac; pending Pulm eval  -continue current mgmt  -continued therap a/c hep gtt - plan for eventual transition to po a/c - monitoring hgb for stability  -continued supppl O2 as needed - currently high O2 requirements - on HFL NC - wean as tolerated  -continued CV regimen; TTE pending  -neuro/psych regimen as aaper home meds 60F, etoh abuse, obesity/gasatric lap band/removal, bipolar d/o, anxiety/depression, HTN, Afib not on a/c (2/2 hx bleed), TAA, JESUS/CPAP, asthma, nephrolithiasis; recent complicated hosp course 9/2020 for panniculectomy/hematoma/hemorrhagic shock/anemia/transfusions, then readm 10/2020 for presyncope/anemia/transfusion; now adm w/LLE swelling, back pain - labs w/elev Ddimer, LE dopplers w/L common femoral/femoral/popliteal vein DVT extending into tibial vein, CTA chest w/R PE - mgmt for acute hypoxic resp failure, acute DVT/PE - on hep gtt, being followed by Heme/Cards/Plastics consult for ongoing mgmt abd wound vac; pending Pulm eval  -continue current mgmt  -continued therap a/c hep gtt - plan for eventual transition to po a/c - monitoring hgb for stability  -continued supppl O2 as needed - currently high O2 requirements - on HFL NC - wean as tolerated  -continued CV regimen; TTE pending  -neuro/psych regimen as per home meds

## 2020-11-29 NOTE — PROGRESS NOTE ADULT - SUBJECTIVE AND OBJECTIVE BOX
HOD # 1    SUBJECTIVE:  59 y/o F seen and examined at bedside. Pt offers no complaints at this time in NAD. PT denies any fevers, chills, chest pain, shortness of breath, abdominal pain, nausea, vomiting or diarrhea.    Vital Signs Last 24 Hrs  T(C): 36.6 (2020 04:30), Max: 37.2 (2020 19:37)  T(F): 97.9 (2020 04:30), Max: 98.9 (2020 19:37)  HR: 79 (2020 04:30) (78 - 92)  BP: 148/76 (2020 04:30) (114/73 - 153/88)  BP(mean): --  RR: 19 (:30) (16 - 19)  SpO2: 97% (:30) (96% - 98%)    PHYSICAL EXAM:  GENERAL: No acute distress, well-developed  CHEST/LUNG: CTABL, no ronchi, rales or wheezing  HEART: RRR, no MRGs  ABDOMEN: Obese, protuberant, soft, nonttp, nondistended, +bs , incisional site c/d/i without erythema or drainage, vac in place holding seal no leaks detected  NEUROLOGY: A&O x 3, no focal deficits    I&O's Summary    2020 07:  -  2020 07:00  --------------------------------------------------------  IN: 170 mL / OUT: 0 mL / NET: 170 mL      I&O's Detail    2020 07:  -  2020 07:00  --------------------------------------------------------  IN:    Heparin Infusion: 170 mL  Total IN: 170 mL    OUT:  Total OUT: 0 mL    Total NET: 170 mL        MEDICATIONS  (STANDING):  allopurinol 300 milliGRAM(s) Oral daily  cholecalciferol 5000 Unit(s) Oral daily  DULoxetine 90 milliGRAM(s) Oral daily  ferrous    sulfate 325 milliGRAM(s) Oral daily  folic acid 1 milliGRAM(s) Oral daily  heparin  Infusion. 1700 Unit(s)/Hr (17 mL/Hr) IV Continuous <Continuous>  lamoTRIgine 200 milliGRAM(s) Oral at bedtime  multivitamin 1 Tablet(s) Oral daily  traZODone 200 milliGRAM(s) Oral at bedtime  verapamil  milliGRAM(s) Oral daily    MEDICATIONS  (PRN):  acetaminophen   Tablet .. 650 milliGRAM(s) Oral every 6 hours PRN Mild Pain (1 - 3)  heparin   Injectable 9500 Unit(s) IV Push every 6 hours PRN For aPTT less than 40  heparin   Injectable 4500 Unit(s) IV Push every 6 hours PRN For aPTT between 40 - 57  ondansetron Injectable 4 milliGRAM(s) IV Push every 6 hours PRN Nausea and/or Vomiting    LABS:                        9.7    5.98  )-----------( 455      ( 2020 08:45 )             32.0         147<H>  |  111<H>  |  14  ----------------------------<  108<H>  4.3   |  29  |  0.74    Ca    9.3      2020 08:45  Phos  4.8       Mg     2.4         TPro  6.8  /  Alb  2.5<L>  /  TBili  0.2  /  DBili  x   /  AST  17  /  ALT  12  /  AlkPhos  116      PT/INR - ( 2020 01:54 )   PT: 13.4 sec;   INR: 1.15 ratio         PTT - ( 2020 01:54 )  PTT:74.2 sec  Urinalysis Basic - ( 2020 11:04 )    Color: Yellow / Appearance: Slightly Turbid / S.020 / pH: x  Gluc: x / Ketone: Negative  / Bili: Negative / Urobili: Negative   Blood: x / Protein: 15 / Nitrite: Negative   Leuk Esterase: Moderate / RBC: 3-5 /HPF / WBC 6-10   Sq Epi: x / Non Sq Epi: Moderate / Bacteria: Few      ASSESSMENT  59 y/o F HOD # 1 admitted with RLL pE, s/p panniculectomy, with wound vac in place,     PLAN  - cont care per primary team  - cont wound vac, planning for change tomorrow  - pain control, supportive care  - OOB, to chair with assistance as tolerated  - serial abdominal exams  - labs in am  - to be discussed with Dr. De La Torre     Surgical Team Contact Information  Spectralink: Ext: [phone] or [phone]  Pager: [phone]

## 2020-11-29 NOTE — PROGRESS NOTE ADULT - SUBJECTIVE AND OBJECTIVE BOX
All interim records and events noted.    no SOB or pain      MEDICATIONS  (STANDING):  allopurinol 300 milliGRAM(s) Oral daily  cholecalciferol 5000 Unit(s) Oral daily  DULoxetine 90 milliGRAM(s) Oral daily  ferrous    sulfate 325 milliGRAM(s) Oral daily  folic acid 1 milliGRAM(s) Oral daily  heparin  Infusion. 1700 Unit(s)/Hr (17 mL/Hr) IV Continuous <Continuous>  lamoTRIgine 200 milliGRAM(s) Oral at bedtime  multivitamin 1 Tablet(s) Oral daily  traZODone 200 milliGRAM(s) Oral at bedtime  verapamil  milliGRAM(s) Oral daily    MEDICATIONS  (PRN):  acetaminophen   Tablet .. 650 milliGRAM(s) Oral every 6 hours PRN Mild Pain (1 - 3)  heparin   Injectable 9500 Unit(s) IV Push every 6 hours PRN For aPTT less than 40  heparin   Injectable 4500 Unit(s) IV Push every 6 hours PRN For aPTT between 40 - 57  ondansetron Injectable 4 milliGRAM(s) IV Push every 6 hours PRN Nausea and/or Vomiting      Vital Signs Last 24 Hrs  T(C): 36.6 (29 Nov 2020 04:30), Max: 37.2 (28 Nov 2020 19:37)  T(F): 97.9 (29 Nov 2020 04:30), Max: 98.9 (28 Nov 2020 19:37)  HR: 79 (29 Nov 2020 04:30) (78 - 92)  BP: 148/76 (29 Nov 2020 04:30) (114/73 - 153/88)  BP(mean): --  RR: 19 (29 Nov 2020 04:30) (16 - 19)  SpO2: 97% (29 Nov 2020 04:30) (96% - 98%)    PHYSICAL EXAM  General: well developed  well nourished, in no acute distress  Head: atraumatic, normocephalic  ENT: sclera anicteric, buccal mucosa moist  Neck: supple, trachea midline  CV: S1 S2, regular rate and rhythm  Lungs: clear to auscultation, no wheezes/rhonchi  Abdomen: soft, nontender, bowel sounds present, no palpable masses, hemovac intact  Extrem: trace left lower leg edema and erythema to below knee  Skin: no significant increased ecchymosis/petechiae  Neuro: alert and oriented X3,  no focal deficits      LABS:             9.7    5.98  )-----------( 455      ( 11-29 @ 08:45 )             32.0                9.8    8.95  )-----------( 460      ( 11-28 @ 17:01 )             32.1                10.7   8.23  )-----------( 583      ( 11-28 @ 07:33 )             34.4       11-29    147<H>  |  111<H>  |  14  ----------------------------<  108<H>  4.3   |  29  |  0.74    Ca    9.3      29 Nov 2020 08:45    TPro  6.8  /  Alb  2.5<L>  /  TBili  0.2  /  DBili  x   /  AST  17  /  ALT  12  /  AlkPhos  116  11-29 11-29 @ 01:54  PT13.4 INR1.15  PTT74.2  11-28 @ 17:01  PT-- INR--  HLL614.3  11-28 @ 07:33  PT12.9 INR1.11  PTT33.9  Iron with Total Binding Capacity in AM (10.15.20 @ 11:21)   Iron - Total Binding Capacity.: 171 ug/dL   % Saturation, Iron: 8 %   Iron Total, Serum: 14 ug/dL   Unsaturated Iron Binding Capacity: 157 ug/dL Ferritin, Serum: 745 ng/mL (10.15.20 @ 11:25)       RADIOLOGY & ADDITIONAL STUDIES:    IMPRESSION/RECOMMENDATIONS:

## 2020-11-29 NOTE — PROGRESS NOTE ADULT - SUBJECTIVE AND OBJECTIVE BOX
Patient is a 60y old  Female who presents with a chief complaint of PE (2020 09:20)      INTERVAL HPI/OVERNIGHT EVENTS: Patient seen and examined at bedside. No overnight events occurred. Patient c/o headaches and teeth pain since starting heparin drip. She is concerned of brain bleed since she had a fall in bathroom x3 weeks ago, with head trauma (no LOC at time and no follow up at time of fall). Spoke with patient on low probability of brain bleed but will order CT head to evaluate. Patient remains on HFNC for SOB 2/2 PE.  Denies fevers, chills, lightheadedness, chest pain, abdominal pain, n/v/d.     MEDICATIONS  (STANDING):  allopurinol 300 milliGRAM(s) Oral daily  cholecalciferol 5000 Unit(s) Oral daily  DULoxetine 90 milliGRAM(s) Oral daily  ferrous    sulfate 325 milliGRAM(s) Oral daily  folic acid 1 milliGRAM(s) Oral daily  heparin  Infusion. 1700 Unit(s)/Hr (17 mL/Hr) IV Continuous <Continuous>  iron sucrose IVPB 200 milliGRAM(s) IV Intermittent once  lamoTRIgine 200 milliGRAM(s) Oral at bedtime  multivitamin 1 Tablet(s) Oral daily  traZODone 200 milliGRAM(s) Oral at bedtime  verapamil  milliGRAM(s) Oral daily    MEDICATIONS  (PRN):  acetaminophen   Tablet .. 650 milliGRAM(s) Oral every 6 hours PRN Mild Pain (1 - 3)  heparin   Injectable 9500 Unit(s) IV Push every 6 hours PRN For aPTT less than 40  heparin   Injectable 4500 Unit(s) IV Push every 6 hours PRN For aPTT between 40 - 57  ondansetron Injectable 4 milliGRAM(s) IV Push every 6 hours PRN Nausea and/or Vomiting      Allergies    penicillins (Other)  trees dogs cats cockaroaches (Rhinitis; Rhinorrhea)    Intolerances        REVIEW OF SYSTEMS:  CONSTITUTIONAL: No fever or chills  HEENT:  No headache, no sore throat  RESPIRATORY: No cough, wheezing, or shortness of breath  CARDIOVASCULAR: No chest pain, palpitations  GASTROINTESTINAL: No abd pain, nausea, vomiting, or diarrhea  GENITOURINARY: No dysuria, frequency, or hematuria  NEUROLOGICAL: no focal weakness or dizziness  MUSCULOSKELETAL: no myalgias     Vital Signs Last 24 Hrs  T(C): 36.6 (2020 04:30), Max: 37.2 (2020 19:37)  T(F): 97.9 (2020 04:30), Max: 98.9 (2020 19:37)  HR: 79 (2020 04:30) (78 - 92)  BP: 148/76 (2020 04:30) (114/73 - 153/88)  BP(mean): --  RR: 19 (2020 04:30) (16 - 19)  SpO2: 97% (2020 04:30) (96% - 98%)    PHYSICAL EXAM:  GENERAL: NAD on HFNC  HEENT:  anicteric, moist mucous membranes  CHEST/LUNG:  CTA b/l, no rales, wheezes, or rhonchi  HEART:  RRR, +S1/S2  ABDOMEN:  BS+, soft, nontender, nondistended, +wound vac base of abd, +RLQ abd dressing noted c/d/i  EXTREMITIES: no edema, cyanosis, or calf tenderness  NERVOUS SYSTEM: answers questions and follows commands appropriately    LABS:                        9.7    5.98  )-----------( 455      ( 2020 08:45 )             32.0     CBC Full  -  ( 2020 08:45 )  WBC Count : 5.98 K/uL  Hemoglobin : 9.7 g/dL  Hematocrit : 32.0 %  Platelet Count - Automated : 455 K/uL  Mean Cell Volume : 89.6 fl  Mean Cell Hemoglobin : 27.2 pg  Mean Cell Hemoglobin Concentration : 30.3 gm/dL  Auto Neutrophil # : 3.55 K/uL  Auto Lymphocyte # : 1.76 K/uL  Auto Monocyte # : 0.37 K/uL  Auto Eosinophil # : 0.23 K/uL  Auto Basophil # : 0.05 K/uL  Auto Neutrophil % : 59.5 %  Auto Lymphocyte % : 29.4 %  Auto Monocyte % : 6.2 %  Auto Eosinophil % : 3.8 %  Auto Basophil % : 0.8 %    2020 08:45    147    |  111    |  14     ----------------------------<  108    4.3     |  29     |  0.74     Ca    9.3        2020 08:45  Phos  4.8       2020 08:45  Mg     2.4       2020 08:45    TPro  6.8    /  Alb  2.5    /  TBili  0.2    /  DBili  x      /  AST  17     /  ALT  12     /  AlkPhos  116    2020 08:45    PT/INR - ( 2020 01:54 )   PT: 13.4 sec;   INR: 1.15 ratio         PTT - ( 2020 01:54 )  PTT:74.2 sec  Urinalysis Basic - ( 2020 11:04 )    Color: Yellow / Appearance: Slightly Turbid / S.020 / pH: x  Gluc: x / Ketone: Negative  / Bili: Negative / Urobili: Negative   Blood: x / Protein: 15 / Nitrite: Negative   Leuk Esterase: Moderate / RBC: 3-5 /HPF / WBC 6-10   Sq Epi: x / Non Sq Epi: Moderate / Bacteria: Few      CAPILLARY BLOOD GLUCOSE              RADIOLOGY & ADDITIONAL TESTS:    Personally reviewed.     Consultant(s) Notes Reviewed:  [x] YES  [ ] NO     Patient is a 60y old  Female who presents with a chief complaint of PE (2020 09:20)      INTERVAL HPI/OVERNIGHT EVENTS: Patient seen and examined at bedside. No overnight events occurred. Patient c/o headaches and teeth pain since starting heparin drip. She is concerned of brain bleed since she had a fall in bathroom x3 weeks ago, with head trauma (no LOC at time and no follow up at time of fall). Spoke with patient on low probability of brain bleed but will order CT head to evaluate. Patient remains on HFNC for SOB 2/2 PE.  Denies fevers, chills, lightheadedness, chest pain, abdominal pain, n/v/d.   Tele: NSR 86. Trend 70-80s. No events.       MEDICATIONS  (STANDING):  allopurinol 300 milliGRAM(s) Oral daily  cholecalciferol 5000 Unit(s) Oral daily  DULoxetine 90 milliGRAM(s) Oral daily  ferrous    sulfate 325 milliGRAM(s) Oral daily  folic acid 1 milliGRAM(s) Oral daily  heparin  Infusion. 1700 Unit(s)/Hr (17 mL/Hr) IV Continuous <Continuous>  iron sucrose IVPB 200 milliGRAM(s) IV Intermittent once  lamoTRIgine 200 milliGRAM(s) Oral at bedtime  multivitamin 1 Tablet(s) Oral daily  traZODone 200 milliGRAM(s) Oral at bedtime  verapamil  milliGRAM(s) Oral daily    MEDICATIONS  (PRN):  acetaminophen   Tablet .. 650 milliGRAM(s) Oral every 6 hours PRN Mild Pain (1 - 3)  heparin   Injectable 9500 Unit(s) IV Push every 6 hours PRN For aPTT less than 40  heparin   Injectable 4500 Unit(s) IV Push every 6 hours PRN For aPTT between 40 - 57  ondansetron Injectable 4 milliGRAM(s) IV Push every 6 hours PRN Nausea and/or Vomiting      Allergies    penicillins (Other)  trees dogs cats cockaroaches (Rhinitis; Rhinorrhea)    Intolerances        REVIEW OF SYSTEMS:  CONSTITUTIONAL: No fever or chills  HEENT:  + headache, no sore throat, + teeth pain 2/2 hep gtt  RESPIRATORY: No cough, wheezing, + shortness of breath  CARDIOVASCULAR: No chest pain, palpitations  GASTROINTESTINAL: No abd pain, nausea, vomiting, or diarrhea  GENITOURINARY: No dysuria, frequency, or hematuria  NEUROLOGICAL: no focal weakness or dizziness  MUSCULOSKELETAL: no myalgias     Vital Signs Last 24 Hrs  T(C): 36.6 (2020 04:30), Max: 37.2 (2020 19:37)  T(F): 97.9 (2020 04:30), Max: 98.9 (2020 19:37)  HR: 79 (2020 04:30) (78 - 92)  BP: 148/76 (2020 04:30) (114/73 - 153/88)  BP(mean): --  RR: 19 (2020 04:30) (16 - 19)  SpO2: 97% (2020 04:30) (96% - 98%)    PHYSICAL EXAM:  GENERAL: NAD on HFNC  HEENT:  anicteric, moist mucous membranes  CHEST/LUNG:  CTA b/l, no rales, wheezes, or rhonchi  HEART:  RRR, +S1/S2  ABDOMEN:  BS+, soft, nontender, nondistended, +wound vac base of abd, +RLQ abd dressing noted c/d/i  EXTREMITIES: no edema, cyanosis, or calf tenderness  NERVOUS SYSTEM: answers questions and follows commands appropriately    LABS:                        9.7    5.98  )-----------( 455      ( 2020 08:45 )             32.0     CBC Full  -  ( 2020 08:45 )  WBC Count : 5.98 K/uL  Hemoglobin : 9.7 g/dL  Hematocrit : 32.0 %  Platelet Count - Automated : 455 K/uL  Mean Cell Volume : 89.6 fl  Mean Cell Hemoglobin : 27.2 pg  Mean Cell Hemoglobin Concentration : 30.3 gm/dL  Auto Neutrophil # : 3.55 K/uL  Auto Lymphocyte # : 1.76 K/uL  Auto Monocyte # : 0.37 K/uL  Auto Eosinophil # : 0.23 K/uL  Auto Basophil # : 0.05 K/uL  Auto Neutrophil % : 59.5 %  Auto Lymphocyte % : 29.4 %  Auto Monocyte % : 6.2 %  Auto Eosinophil % : 3.8 %  Auto Basophil % : 0.8 %    2020 08:45    147    |  111    |  14     ----------------------------<  108    4.3     |  29     |  0.74     Ca    9.3        2020 08:45  Phos  4.8       2020 08:45  Mg     2.4       2020 08:45    TPro  6.8    /  Alb  2.5    /  TBili  0.2    /  DBili  x      /  AST  17     /  ALT  12     /  AlkPhos  116    2020 08:45    PT/INR - ( 2020 01:54 )   PT: 13.4 sec;   INR: 1.15 ratio         PTT - ( 2020 01:54 )  PTT:74.2 sec  Urinalysis Basic - ( 2020 11:04 )    Color: Yellow / Appearance: Slightly Turbid / S.020 / pH: x  Gluc: x / Ketone: Negative  / Bili: Negative / Urobili: Negative   Blood: x / Protein: 15 / Nitrite: Negative   Leuk Esterase: Moderate / RBC: 3-5 /HPF / WBC 6-10   Sq Epi: x / Non Sq Epi: Moderate / Bacteria: Few      CAPILLARY BLOOD GLUCOSE              RADIOLOGY & ADDITIONAL TESTS:    Personally reviewed.     Consultant(s) Notes Reviewed:  [x] YES  [ ] NO

## 2020-11-29 NOTE — PROGRESS NOTE ADULT - SUBJECTIVE AND OBJECTIVE BOX
[facility] -- Almas Faustin, Cara Nunez, Saurabh Johansen Savella, Goodger  Office # [phone]    Follow Up:  Acute PE    Subjective/Observations:     REVIEW OF SYSTEMS: All other review of systems is negative unless indicated above  PAST MEDICAL & SURGICAL HISTORY:  History of aortic aneurysm  descending aorta see CT    Arthritis    Degenerative disc disease, lumbar    Neuropathy    Spinal stenosis    Bipolar 1 disorder    Kidney stone    Sleep Apnea  CPAP with oxygen since June 2020    Asthma    Hypertension    Morbid Obesity    ETOH Abuse  H/O    Benign Essential Tremor    Anxiety    Depression    Renal Calculi  chronic      Colitis  H/O    Anemia    S/P panniculectomy    S/P cardiac catheterization    S/P dilation and curettage    History of tonsillectomy    History of laparoscopic adjustable gastric banding  band removed few yrs ago    S/P D&amp;C    Biliary stent placement &amp; ercp    ureteroscopy with stone removal  1-      MEDICATIONS  (STANDING):  allopurinol 300 milliGRAM(s) Oral daily  apixaban 10 milliGRAM(s) Oral every 12 hours  cholecalciferol 5000 Unit(s) Oral daily  DULoxetine 90 milliGRAM(s) Oral daily  ferrous    sulfate 325 milliGRAM(s) Oral daily  folic acid 1 milliGRAM(s) Oral daily  heparin  Infusion. 1700 Unit(s)/Hr (17 mL/Hr) IV Continuous <Continuous>  iron sucrose IVPB 200 milliGRAM(s) IV Intermittent once  lamoTRIgine 200 milliGRAM(s) Oral at bedtime  multivitamin 1 Tablet(s) Oral daily  traZODone 200 milliGRAM(s) Oral at bedtime  verapamil  milliGRAM(s) Oral daily    MEDICATIONS  (PRN):  acetaminophen   Tablet .. 650 milliGRAM(s) Oral every 6 hours PRN Mild Pain (1 - 3)  heparin   Injectable 9500 Unit(s) IV Push every 6 hours PRN For aPTT less than 40  heparin   Injectable 4500 Unit(s) IV Push every 6 hours PRN For aPTT between 40 - 57  ketorolac   Injectable 15 milliGRAM(s) IV Push every 6 hours PRN headache  ondansetron Injectable 4 milliGRAM(s) IV Push every 6 hours PRN Nausea and/or Vomiting    Allergies    penicillins (Other)  trees dogs cats cockaroaches (Rhinitis; Rhinorrhea)    Intolerances      Vital Signs Last 24 Hrs  T(C): 36.6 (29 Nov 2020 04:30), Max: 37.2 (28 Nov 2020 19:37)  T(F): 97.9 (29 Nov 2020 04:30), Max: 98.9 (28 Nov 2020 19:37)  HR: 79 (29 Nov 2020 04:30) (78 - 92)  BP: 148/76 (29 Nov 2020 04:30) (144/82 - 153/88)  BP(mean): --  RR: 19 (29 Nov 2020 04:30) (16 - 19)  SpO2: 97% (29 Nov 2020 04:30) (96% - 97%)  I&O's Summary    28 Nov 2020 07:01  -  29 Nov 2020 07:00  --------------------------------------------------------  IN: 170 mL / OUT: 0 mL / NET: 170 mL    PHYSICAL EXAM:  TELE: NSR  Constitutional: NAD, awake and alert, morbidly obese  HEENT: Moist Mucous Membranes, Anicteric  Pulmonary: Non-labored, breath sounds are clear bilaterally, No wheezing, rales or rhonchi  Cardiovascular: Regular, S1 and S2, No murmurs, rubs, gallops or clicks  Gastrointestinal: Bowel Sounds present, soft, nontender.   Lymph: No peripheral edema. No lymphadenopathy.  Skin: No visible rashes or ulcers.  Psych:  Mood & affect appropriate  LABS: All Labs Reviewed:                        9.7    5.98  )-----------( 455      ( 29 Nov 2020 08:45 )             32.0                         9.8    8.95  )-----------( 460      ( 28 Nov 2020 17:01 )             32.1                         10.7   8.23  )-----------( 583      ( 28 Nov 2020 07:33 )             34.4     29 Nov 2020 08:45    147    |  111    |  14     ----------------------------<  108    4.3     |  29     |  0.74   28 Nov 2020 07:33    144    |  111    |  18     ----------------------------<  145    3.8     |  23     |  0.97     Ca    9.3        29 Nov 2020 08:45  Ca    9.2        28 Nov 2020 07:33  Phos  4.8       29 Nov 2020 08:45  Mg     2.4       29 Nov 2020 08:45    TPro  6.8    /  Alb  2.5    /  TBili  0.2    /  DBili  x      /  AST  17     /  ALT  12     /  AlkPhos  116    29 Nov 2020 08:45  TPro  7.5    /  Alb  2.7    /  TBili  0.2    /  DBili  x      /  AST  16     /  ALT  14     /  AlkPhos  129    28 Nov 2020 07:33    PT/INR - ( 29 Nov 2020 01:54 )   PT: 13.4 sec;   INR: 1.15 ratio      PTT - ( 29 Nov 2020 10:54 )  PTT:77.0 sec  CARDIAC MARKERS ( 28 Nov 2020 07:33 )  <.015 ng/mL / x     / x     / x     / x        EXAM:  CT ANGIO CHEST (W)AW IC                          PROCEDURE DATE:  11/28/2020      INTERPRETATION:  CLINICAL INFORMATION: Shortness of breath. Assess for pulmonary embolism. High pretest probability.    COMPARISON: Noncontrast chest CT from 10/13/2020    PROCEDURE:  CT Angiography of the Chest.  90 ml of Omnipaque 350 was injected intravenously. 10 ml were discarded.  Sagittal and coronal reformats were performed as well as 3D (MIP) reconstructions.    FINDINGS:    LUNGS AND AIRWAYS: Motion artifact limits evaluation. 5 mm calcified granuloma in the right upper lobe.  1.2 x 1.0 cm nodular density at the left lung base posteriorly on series 2 image 73. A larger groundglass opacity measuring 2 x 1.3 cm was present in this region on the prior study from 10/13/2020.  PLEURA: No pleural effusion.  MEDIASTINUM AND MILADIS: No lymphadenopathy.  VESSELS: Motion artifact limits evaluation of the pulmonary vessels. Nonetheless, emboli is seen in the right lower lobe pulmonary artery on series 2 images 51 and 52. Additional smaller subsegmental emboli are limited in evaluation.  HEART: Heart size is normal. No pericardial effusion.  CHEST WALL AND LOWER NECK: 1.7 x 1.5 cm low-attenuation nodule in the right lobe of the thyroid gland inferiorly which was present on the prior recent study. This may have been present on 2/16/2016 although artifact limits evaluation. If this has not previously been characterized, nonemergent thyroid ultrasound may be performed..  VISUALIZED UPPER ABDOMEN: Staghorn calculus in the upper pole of the right kidney without hydronephrosis.  BONES: Degenerative changes of bone.    IMPRESSION:  Thrombus seen in the right lower lobe pulmonary artery. Motion artifact limits evaluation of more distal subsegmental branches. This critical value was discussed with Dr. Cartagena in the emergency room at approximately 10:45 AM on 11/20/2020.    1.2 x 1.0 cm nodule in the left lower lobe. This was larger in size and groundglass opacity 1 1/2 months prior. Thismay be due to infectious or inflammatory etiology. Short interval follow-up with low-dose noncontrast chest CT scan in 3 months is recommended.    1.7 x 1.5 cm low-attenuation nodule in the right lobe of the thyroid gland which is unchanged when compared with 10/13/2020. This may have been present on 2/16/2016 although artifact limits evaluation. If this has not previously been characterized, nonemergent thyroid ultrasound may be performed.    HEATHER ARMIJO MD; Attending Radiologist  This document has been electronically signed. Nov 28 2020 10:50AM    Ventricular Rate 91 BPM    Atrial Rate 90 BPM    QRS Duration 88 ms    Q-T Interval 358 ms    QTC Calculation(Bazett) 440 ms    R Axis -43 degrees    T Axis 41 degrees    Diagnosis Line Normal sinus rhythm  Left axis deviation  Moderate voltage criteria for LVH, may be normal variant  Nonspecific ST abnormality  Abnormal ECG  When compared with ECG of 13-OCT-2020 12:56,  No significant change was found  Confirmed by Lenin Marroquin MD ([id number]) on 11/28/2020 2:22:56 PM   [facility] -- Almas Faustin, Cara Nunez, Saurabh Johansen Savella, [place]ger  Office # [phone]    Follow Up:  Acute PE    Subjective/Observations: Remains comfortable on RA.  Less back pain this time.  No chest pain.  No tele events.  No evidence of bleeding    REVIEW OF SYSTEMS: All other review of systems is negative unless indicated above  PAST MEDICAL & SURGICAL HISTORY:  History of aortic aneurysm  descending aorta see CT    Arthritis    Degenerative disc disease, lumbar    Neuropathy    Spinal stenosis    Bipolar 1 disorder    Kidney stone    Sleep Apnea  CPAP with oxygen since June 2020    Asthma    Hypertension    Morbid Obesity    ETOH Abuse  H/O    Benign Essential Tremor    Anxiety    Depression    Renal Calculi  chronic      Colitis  H/O    Anemia    S/P panniculectomy    S/P cardiac catheterization    S/P dilation and curettage    History of tonsillectomy    History of laparoscopic adjustable gastric banding  band removed few yrs ago    S/P D&amp;C    Biliary stent placement &amp; ercp    ureteroscopy with stone removal  1-      MEDICATIONS  (STANDING):  allopurinol 300 milliGRAM(s) Oral daily  apixaban 10 milliGRAM(s) Oral every 12 hours  cholecalciferol 5000 Unit(s) Oral daily  DULoxetine 90 milliGRAM(s) Oral daily  ferrous    sulfate 325 milliGRAM(s) Oral daily  folic acid 1 milliGRAM(s) Oral daily  heparin  Infusion. 1700 Unit(s)/Hr (17 mL/Hr) IV Continuous <Continuous>  iron sucrose IVPB 200 milliGRAM(s) IV Intermittent once  lamoTRIgine 200 milliGRAM(s) Oral at bedtime  multivitamin 1 Tablet(s) Oral daily  traZODone 200 milliGRAM(s) Oral at bedtime  verapamil  milliGRAM(s) Oral daily    MEDICATIONS  (PRN):  acetaminophen   Tablet .. 650 milliGRAM(s) Oral every 6 hours PRN Mild Pain (1 - 3)  heparin   Injectable 9500 Unit(s) IV Push every 6 hours PRN For aPTT less than 40  heparin   Injectable 4500 Unit(s) IV Push every 6 hours PRN For aPTT between 40 - 57  ketorolac   Injectable 15 milliGRAM(s) IV Push every 6 hours PRN headache  ondansetron Injectable 4 milliGRAM(s) IV Push every 6 hours PRN Nausea and/or Vomiting    Allergies    penicillins (Other)  trees dogs cats cockaroaches (Rhinitis; Rhinorrhea)    Intolerances    Vital Signs Last 24 Hrs  T(C): 36.6 (29 Nov 2020 04:30), Max: 37.2 (28 Nov 2020 19:37)  T(F): 97.9 (29 Nov 2020 04:30), Max: 98.9 (28 Nov 2020 19:37)  HR: 79 (29 Nov 2020 04:30) (78 - 92)  BP: 148/76 (29 Nov 2020 04:30) (144/82 - 153/88)  BP(mean): --  RR: 19 (29 Nov 2020 04:30) (16 - 19)  SpO2: 97% (29 Nov 2020 04:30) (96% - 97%)  I&O's Summary    28 Nov 2020 07:01  -  29 Nov 2020 07:00  --------------------------------------------------------  IN: 170 mL / OUT: 0 mL / NET: 170 mL    PHYSICAL EXAM:  TELE: NSR  Constitutional: NAD, awake and alert, morbidly obese  HEENT: Moist Mucous Membranes, Anicteric  Pulmonary: Non-labored, breath sounds are clear bilaterally, No wheezing, rales or rhonchi  Cardiovascular: Regular, S1 and S2, +murmurs, rubs, gallops or clicks  Gastrointestinal: Bowel Sounds present, soft, nontender.   Lymph: No peripheral edema. No lymphadenopathy.  Skin: No visible rashes.  Lower abdomen with vac dressing   Psych:  Mood & affect appropriate  LABS: All Labs Reviewed:                        9.7    5.98  )-----------( 455      ( 29 Nov 2020 08:45 )             32.0                         9.8    8.95  )-----------( 460      ( 28 Nov 2020 17:01 )             32.1                         10.7   8.23  )-----------( 583      ( 28 Nov 2020 07:33 )             34.4     29 Nov 2020 08:45    147    |  111    |  14     ----------------------------<  108    4.3     |  29     |  0.74   28 Nov 2020 07:33    144    |  111    |  18     ----------------------------<  145    3.8     |  23     |  0.97     Ca    9.3        29 Nov 2020 08:45  Ca    9.2        28 Nov 2020 07:33  Phos  4.8       29 Nov 2020 08:45  Mg     2.4       29 Nov 2020 08:45    TPro  6.8    /  Alb  2.5    /  TBili  0.2    /  DBili  x      /  AST  17     /  ALT  12     /  AlkPhos  116    29 Nov 2020 08:45  TPro  7.5    /  Alb  2.7    /  TBili  0.2    /  DBili  x      /  AST  16     /  ALT  14     /  AlkPhos  129    28 Nov 2020 07:33    PT/INR - ( 29 Nov 2020 01:54 )   PT: 13.4 sec;   INR: 1.15 ratio      PTT - ( 29 Nov 2020 10:54 )  PTT:77.0 sec  CARDIAC MARKERS ( 28 Nov 2020 07:33 )  <.015 ng/mL / x     / x     / x     / x        EXAM:  CT ANGIO CHEST (W)AW IC                          PROCEDURE DATE:  11/28/2020      INTERPRETATION:  CLINICAL INFORMATION: Shortness of breath. Assess for pulmonary embolism. High pretest probability.    COMPARISON: Noncontrast chest CT from 10/13/2020    PROCEDURE:  CT Angiography of the Chest.  90 ml of Omnipaque 350 was injected intravenously. 10 ml were discarded.  Sagittal and coronal reformats were performed as well as 3D (MIP) reconstructions.    FINDINGS:    LUNGS AND AIRWAYS: Motion artifact limits evaluation. 5 mm calcified granuloma in the right upper lobe.  1.2 x 1.0 cm nodular density at the left lung base posteriorly on series 2 image 73. A larger groundglass opacity measuring 2 x 1.3 cm was present in this region on the prior study from 10/13/2020.  PLEURA: No pleural effusion.  MEDIASTINUM AND MILADIS: No lymphadenopathy.  VESSELS: Motion artifact limits evaluation of the pulmonary vessels. Nonetheless, emboli is seen in the right lower lobe pulmonary artery on series 2 images 51 and 52. Additional smaller subsegmental emboli are limited in evaluation.  HEART: Heart size is normal. No pericardial effusion.  CHEST WALL AND LOWER NECK: 1.7 x 1.5 cm low-attenuation nodule in the right lobe of the thyroid gland inferiorly which was present on the prior recent study. This may have been present on 2/16/2016 although artifact limits evaluation. If this has not previously been characterized, nonemergent thyroid ultrasound may be performed..  VISUALIZED UPPER ABDOMEN: Staghorn calculus in the upper pole of the right kidney without hydronephrosis.  BONES: Degenerative changes of bone.    IMPRESSION:  Thrombus seen in the right lower lobe pulmonary artery. Motion artifact limits evaluation of more distal subsegmental branches. This critical value was discussed with Dr. Cartagena in the emergency room at approximately 10:45 AM on 11/20/2020.    1.2 x 1.0 cm nodule in the left lower lobe. This was larger in size and groundglass opacity 1 1/2 months prior. Thismay be due to infectious or inflammatory etiology. Short interval follow-up with low-dose noncontrast chest CT scan in 3 months is recommended.    1.7 x 1.5 cm low-attenuation nodule in the right lobe of the thyroid gland which is unchanged when compared with 10/13/2020. This may have been present on 2/16/2016 although artifact limits evaluation. If this has not previously been characterized, nonemergent thyroid ultrasound may be performed.    HEATHER ARMIJO MD; Attending Radiologist  This document has been electronically signed. Nov 28 2020 10:50AM    Ventricular Rate 91 BPM    Atrial Rate 90 BPM    QRS Duration 88 ms    Q-T Interval 358 ms    QTC Calculation(Bazett) 440 ms    R Axis -43 degrees    T Axis 41 degrees    Diagnosis Line Normal sinus rhythm  Left axis deviation  Moderate voltage criteria for LVH, may be normal variant  Nonspecific ST abnormality  Abnormal ECG  When compared with ECG of 13-OCT-2020 12:56,  No significant change was found  Confirmed by Lenin Marroquin MD ([id number]) on 11/28/2020 2:22:56 PM

## 2020-11-29 NOTE — PROGRESS NOTE ADULT - ASSESSMENT
61 y/o female with Afib (not on AC due to hemorrhagic shock), Bipolar Disorder, Anxiety disorder, depression, anemia, arthritis, HTN, Thoracic AA 4.4cm (stable), JESUS on CPAP, asthma, Hx of gastric lap band and removal, Essential Tremor, Obesity, hx cholecystectomy, hx nephrolithiasis, h/o ETOH abuse, neuropathy, Spinal stenosis presented to ED with complaint of LE swelling for past 2-3 days.  Pt was recently admitted to the hospital from 9/25- 10/10 for and elective panniculectomy c/w hemorrhagic shock.  Today, she was found to have acute non-occlusive LLE DVT and RLL PE.  Admits to have been having pleuritic pain and back pain under right scapula x 2 weeks at least.  Has been initially inactive post discharge.  Denies chest pain, palpitations, SOB, PARDO, or orthopnea.  We recently had seen her for near syncope which was felt to be vasovagal in etiology.     Acute PE/DVT  - Likely provoked in the setting of recent surgery and inactivity  - D-dimer is elevated, though, slightly lower than previous  - CTA chest showed RLL PE.  Remains comfortable on RA  - BLE USD showed acute, nonocclusive thrombus in the left common femoral, femoral, and popliteal vein extending through the posterior tibial vein in the calf  - Continue Heparin drip for now with full AC nomogram.  Switching to DOAC today  - Monitor for s/s decompensation.  No evidence of hemodynamic instability  - Follow up TTE  - c/o HA.  follow up CT head, especially, in the setting of AC    Afib  - NSR on EKG with no ischemic changes  - Was no placed on AC on discharge from recent hospitalization in the setting of hemorrhagic shock postop  - On Heparin drip.  Monitor for evidence of bleeding  - Remains in NSR on tele.  Continue to monitor on tele  - Continue low dose CCB  - Monitor electrolytes, replete to keep K>4 and Mag>2    HTN  - BP is controlled and stable  - Continue Verapamil XR    JESUS  - Continue nocturnal CPAP  - Supplemental O2 as needed    Further cardiac w/u pending clinical course  Will continue to follow closely    Tiffany Brown DNP, NP-C  Cardiology  Spectra #[phone]

## 2020-11-29 NOTE — PROGRESS NOTE ADULT - ASSESSMENT
59 y/o F PMHx Bipolar Disorder, Anxiety disorder, depression, anemia, arthritis, HTN, Thoracic AA 4.4cm (stable), JESUS on CPAP, asthma, Hx of gastric lap band and removal, Essential Tremor, Obesity, hx cholecystectomy, hx nephrolithiasis, h/o ETOH abuse, neuropathy, Spinal stenosis presented to ED with complaint of LE swelling for past 2-3 days    PE, Acute nonocclusive DVT  -Heparin gtt with normogram in place  -Monitor H/H given recent h/o hemorrhagic shock s/p OR and h/o intra-abd hematoma  -Heme/Onc consulted, recommending change from Heparin to po Eliquis  -Negative troponin, negative proBNP- no evidence of R heart strain on lab work  -F/U TTE  -CardioRamin group, following    Headache  -patient reports headache s/p heparin gtt  - self reports fall with head trauma x3 weeks ago  - f/u CT Head  - Toradol 15mg PRN for headache    HTN  -Patient was advised to stop all BP meds on her last DC, recently started taking half a pill of verapamil  -/76 on AM vitals, continue to monitor  -Continue verapamil SR 120mg (half of previous dose as patient has been taking) with hold parameters  -CardiologyRamin group, following    Anxiety, depression, bipolar  -Continue all home meds    Hx of nephrolithiasis  - Calcium oxalate crystals in UA  -Continue allopurinol    DVT ppx  -Heparin gtt for PE -> will switch to Eliquis PO after patient gets CT Head   59 y/o F PMHx Bipolar Disorder, Anxiety disorder, depression, anemia, arthritis, HTN, Thoracic AA 4.4cm (stable), JESUS on CPAP, asthma, Hx of gastric lap band and removal, Essential Tremor, Obesity, hx cholecystectomy, hx nephrolithiasis, h/o ETOH abuse, neuropathy, Spinal stenosis presented to ED with complaint of LE swelling for past 2-3 days    PE, Acute nonocclusive DVT  -Heparin gtt with normogram in place  -Monitor H/H given recent h/o hemorrhagic shock s/p OR and h/o intra-abd hematoma  -Heme/Onc consulted, recommending change from Heparin to po Eliquis  -Negative troponin, negative proBNP- no evidence of R heart strain on lab work  -c/w HFNC for now. Will titrate as tolerated.   -F/U TTE  -CardioRamin group, following    Headache  -patient reports headache s/p heparin gtt  - self reports fall with head trauma x3 weeks ago  - f/u CT Head  - Toradol 15mg PRN for headache    HTN  -Patient was advised to stop all BP meds on her last DC, recently started taking half a pill of verapamil  -/76 on AM vitals, continue to monitor  -Continue verapamil SR 120mg (half of previous dose as patient has been taking) with hold parameters  -CardiologyRamin group, following    Anxiety, depression, bipolar  -Continue all home meds    Hx of nephrolithiasis  - Calcium oxalate crystals in UA  -Continue allopurinol    DVT ppx  -Heparin gtt for PE -> will switch to Eliquis PO after patient gets CT Head   59 y/o F PMHx Bipolar Disorder, Anxiety disorder, depression, anemia, arthritis, HTN, Thoracic AA 4.4cm (stable), JESUS on CPAP, asthma, Hx of gastric lap band and removal, Essential Tremor, Obesity, hx cholecystectomy, hx nephrolithiasis, h/o ETOH abuse, neuropathy, Spinal stenosis presented to ED with complaint of LE swelling for past 2-3 days    PE, Acute nonocclusive DVT  -Heparin gtt with normogram in place  -Monitor H/H given recent h/o hemorrhagic shock s/p OR and h/o intra-abd hematoma  -Heme/Onc consulted, recommending change from Heparin to po Eliquis (will change after CT Head performed)  -Negative troponin, negative proBNP- no evidence of R heart strain on lab work  -c/w HFNC for now. Will titrate as tolerated.   -F/U TTE  -Cardio, Ramin group, following    Headache  -patient reports headache s/p heparin gtt  - self reports fall with head trauma x3 weeks ago  - f/u CT Head  - Toradol 15mg PRN for headache    Abd Wound s/p sx with Wound Vac   -30cm of wound on the right side being treated with VAC - black granufoam and tegaderm intact throughout with good negative pressure, and minimal serous fluid present in the reservoir.   -Plastic Surgery, following. Will change vac tomorrow.     HTN  -Patient was advised to stop all BP meds on her last DC, recently started taking half a pill of verapamil  -/76 on AM vitals, continue to monitor  -Continue verapamil SR 120mg (half of previous dose as patient has been taking) with hold parameters  -CardiologyRamin group, following    Anxiety, depression, bipolar  -Continue all home meds    Hx of nephrolithiasis  - Calcium oxalate crystals in UA  -Continue allopurinol    DVT ppx  -Heparin gtt for PE -> will switch to Eliquis PO after patient gets CT Head

## 2020-11-29 NOTE — PHARMACOTHERAPY INTERVENTION NOTE - COMMENTS
Patient ordered apixaban 10mg BID x 14 doses for DVT diagnosed 11/28. Discussed with Dr. Corado that DOACs not recommended in patients with BMI > 40 kg/m2. MD aware, would like to continue for now.

## 2020-11-30 ENCOUNTER — TRANSCRIPTION ENCOUNTER (OUTPATIENT)
Age: 60
End: 2020-11-30

## 2020-11-30 VITALS
OXYGEN SATURATION: 96 % | DIASTOLIC BLOOD PRESSURE: 86 MMHG | SYSTOLIC BLOOD PRESSURE: 148 MMHG | HEART RATE: 95 BPM | TEMPERATURE: 98 F

## 2020-11-30 LAB
ALBUMIN SERPL ELPH-MCNC: 2.6 G/DL — LOW (ref 3.3–5)
ALP SERPL-CCNC: 111 U/L — SIGNIFICANT CHANGE UP (ref 40–120)
ALT FLD-CCNC: 16 U/L — SIGNIFICANT CHANGE UP (ref 12–78)
ANION GAP SERPL CALC-SCNC: 6 MMOL/L — SIGNIFICANT CHANGE UP (ref 5–17)
AST SERPL-CCNC: 18 U/L — SIGNIFICANT CHANGE UP (ref 15–37)
BASOPHILS # BLD AUTO: 0.05 K/UL — SIGNIFICANT CHANGE UP (ref 0–0.2)
BASOPHILS NFR BLD AUTO: 0.9 % — SIGNIFICANT CHANGE UP (ref 0–2)
BILIRUB SERPL-MCNC: 0.3 MG/DL — SIGNIFICANT CHANGE UP (ref 0.2–1.2)
BUN SERPL-MCNC: 9 MG/DL — SIGNIFICANT CHANGE UP (ref 7–23)
CALCIUM SERPL-MCNC: 9.1 MG/DL — SIGNIFICANT CHANGE UP (ref 8.5–10.1)
CHLORIDE SERPL-SCNC: 110 MMOL/L — HIGH (ref 96–108)
CO2 SERPL-SCNC: 29 MMOL/L — SIGNIFICANT CHANGE UP (ref 22–31)
CREAT SERPL-MCNC: 0.73 MG/DL — SIGNIFICANT CHANGE UP (ref 0.5–1.3)
EOSINOPHIL # BLD AUTO: 0.17 K/UL — SIGNIFICANT CHANGE UP (ref 0–0.5)
EOSINOPHIL NFR BLD AUTO: 3 % — SIGNIFICANT CHANGE UP (ref 0–6)
GLUCOSE SERPL-MCNC: 102 MG/DL — HIGH (ref 70–99)
HCT VFR BLD CALC: 32.6 % — LOW (ref 34.5–45)
HGB BLD-MCNC: 10.2 G/DL — LOW (ref 11.5–15.5)
IMM GRANULOCYTES NFR BLD AUTO: 0.2 % — SIGNIFICANT CHANGE UP (ref 0–1.5)
LYMPHOCYTES # BLD AUTO: 1.43 K/UL — SIGNIFICANT CHANGE UP (ref 1–3.3)
LYMPHOCYTES # BLD AUTO: 25.5 % — SIGNIFICANT CHANGE UP (ref 13–44)
MAGNESIUM SERPL-MCNC: 2.2 MG/DL — SIGNIFICANT CHANGE UP (ref 1.6–2.6)
MCHC RBC-ENTMCNC: 27.9 PG — SIGNIFICANT CHANGE UP (ref 27–34)
MCHC RBC-ENTMCNC: 31.3 GM/DL — LOW (ref 32–36)
MCV RBC AUTO: 89.3 FL — SIGNIFICANT CHANGE UP (ref 80–100)
MONOCYTES # BLD AUTO: 0.41 K/UL — SIGNIFICANT CHANGE UP (ref 0–0.9)
MONOCYTES NFR BLD AUTO: 7.3 % — SIGNIFICANT CHANGE UP (ref 2–14)
NEUTROPHILS # BLD AUTO: 3.53 K/UL — SIGNIFICANT CHANGE UP (ref 1.8–7.4)
NEUTROPHILS NFR BLD AUTO: 63.1 % — SIGNIFICANT CHANGE UP (ref 43–77)
NRBC # BLD: 0 /100 WBCS — SIGNIFICANT CHANGE UP (ref 0–0)
PHOSPHATE SERPL-MCNC: 4.7 MG/DL — HIGH (ref 2.5–4.5)
PLATELET # BLD AUTO: 466 K/UL — HIGH (ref 150–400)
POTASSIUM SERPL-MCNC: 4.2 MMOL/L — SIGNIFICANT CHANGE UP (ref 3.5–5.3)
POTASSIUM SERPL-SCNC: 4.2 MMOL/L — SIGNIFICANT CHANGE UP (ref 3.5–5.3)
PROT SERPL-MCNC: 6.9 G/DL — SIGNIFICANT CHANGE UP (ref 6–8.3)
RBC # BLD: 3.65 M/UL — LOW (ref 3.8–5.2)
RBC # FLD: 15.1 % — HIGH (ref 10.3–14.5)
SODIUM SERPL-SCNC: 145 MMOL/L — SIGNIFICANT CHANGE UP (ref 135–145)
WBC # BLD: 5.6 K/UL — SIGNIFICANT CHANGE UP (ref 3.8–10.5)
WBC # FLD AUTO: 5.6 K/UL — SIGNIFICANT CHANGE UP (ref 3.8–10.5)

## 2020-11-30 PROCEDURE — 85379 FIBRIN DEGRADATION QUANT: CPT

## 2020-11-30 PROCEDURE — 71275 CT ANGIOGRAPHY CHEST: CPT

## 2020-11-30 PROCEDURE — 93306 TTE W/DOPPLER COMPLETE: CPT

## 2020-11-30 PROCEDURE — 84484 ASSAY OF TROPONIN QUANT: CPT

## 2020-11-30 PROCEDURE — 96374 THER/PROPH/DIAG INJ IV PUSH: CPT

## 2020-11-30 PROCEDURE — 93970 EXTREMITY STUDY: CPT

## 2020-11-30 PROCEDURE — 99285 EMERGENCY DEPT VISIT HI MDM: CPT | Mod: 25

## 2020-11-30 PROCEDURE — 83605 ASSAY OF LACTIC ACID: CPT

## 2020-11-30 PROCEDURE — 71045 X-RAY EXAM CHEST 1 VIEW: CPT

## 2020-11-30 PROCEDURE — 85027 COMPLETE CBC AUTOMATED: CPT

## 2020-11-30 PROCEDURE — 84100 ASSAY OF PHOSPHORUS: CPT

## 2020-11-30 PROCEDURE — U0003: CPT

## 2020-11-30 PROCEDURE — 87040 BLOOD CULTURE FOR BACTERIA: CPT

## 2020-11-30 PROCEDURE — 96361 HYDRATE IV INFUSION ADD-ON: CPT

## 2020-11-30 PROCEDURE — 87086 URINE CULTURE/COLONY COUNT: CPT

## 2020-11-30 PROCEDURE — 94660 CPAP INITIATION&MGMT: CPT

## 2020-11-30 PROCEDURE — 81001 URINALYSIS AUTO W/SCOPE: CPT

## 2020-11-30 PROCEDURE — 83880 ASSAY OF NATRIURETIC PEPTIDE: CPT

## 2020-11-30 PROCEDURE — 86769 SARS-COV-2 COVID-19 ANTIBODY: CPT

## 2020-11-30 PROCEDURE — 83735 ASSAY OF MAGNESIUM: CPT

## 2020-11-30 PROCEDURE — 85025 COMPLETE CBC W/AUTO DIFF WBC: CPT

## 2020-11-30 PROCEDURE — 99232 SBSQ HOSP IP/OBS MODERATE 35: CPT

## 2020-11-30 PROCEDURE — 93005 ELECTROCARDIOGRAM TRACING: CPT

## 2020-11-30 PROCEDURE — 93306 TTE W/DOPPLER COMPLETE: CPT | Mod: 26

## 2020-11-30 PROCEDURE — 83690 ASSAY OF LIPASE: CPT

## 2020-11-30 PROCEDURE — 85610 PROTHROMBIN TIME: CPT

## 2020-11-30 PROCEDURE — 70450 CT HEAD/BRAIN W/O DYE: CPT

## 2020-11-30 PROCEDURE — 36415 COLL VENOUS BLD VENIPUNCTURE: CPT

## 2020-11-30 PROCEDURE — 80053 COMPREHEN METABOLIC PANEL: CPT

## 2020-11-30 PROCEDURE — 99239 HOSP IP/OBS DSCHRG MGMT >30: CPT | Mod: GC

## 2020-11-30 PROCEDURE — 85730 THROMBOPLASTIN TIME PARTIAL: CPT

## 2020-11-30 PROCEDURE — 84145 PROCALCITONIN (PCT): CPT

## 2020-11-30 RX ORDER — APIXABAN 2.5 MG/1
2 TABLET, FILM COATED ORAL
Qty: 24 | Refills: 0
Start: 2020-11-30 | End: 2020-12-05

## 2020-11-30 RX ORDER — VERAPAMIL HCL 240 MG
1 CAPSULE, EXTENDED RELEASE PELLETS 24 HR ORAL
Qty: 0 | Refills: 0 | DISCHARGE

## 2020-11-30 RX ORDER — APIXABAN 2.5 MG/1
2 TABLET, FILM COATED ORAL
Qty: 0 | Refills: 0 | DISCHARGE
Start: 2020-11-30 | End: 2020-12-06

## 2020-11-30 RX ADMIN — Medication 325 MILLIGRAM(S): at 12:31

## 2020-11-30 RX ADMIN — Medication 300 MILLIGRAM(S): at 12:31

## 2020-11-30 RX ADMIN — Medication 1 MILLIGRAM(S): at 12:31

## 2020-11-30 RX ADMIN — APIXABAN 10 MILLIGRAM(S): 2.5 TABLET, FILM COATED ORAL at 05:46

## 2020-11-30 RX ADMIN — DULOXETINE HYDROCHLORIDE 90 MILLIGRAM(S): 30 CAPSULE, DELAYED RELEASE ORAL at 12:31

## 2020-11-30 RX ADMIN — Medication 120 MILLIGRAM(S): at 05:46

## 2020-11-30 RX ADMIN — Medication 1 TABLET(S): at 12:31

## 2020-11-30 RX ADMIN — Medication 5000 UNIT(S): at 12:31

## 2020-11-30 NOTE — PROGRESS NOTE ADULT - SUBJECTIVE AND OBJECTIVE BOX
Patient seen and examined;  Chart reviewed and events noted;   feels well; headache is 50% better off heparin drip      MEDICATIONS  (STANDING):  allopurinol 300 milliGRAM(s) Oral daily  apixaban 10 milliGRAM(s) Oral every 12 hours  cholecalciferol 5000 Unit(s) Oral daily  DULoxetine 90 milliGRAM(s) Oral daily  ferrous    sulfate 325 milliGRAM(s) Oral daily  folic acid 1 milliGRAM(s) Oral daily  lamoTRIgine 200 milliGRAM(s) Oral at bedtime  multivitamin 1 Tablet(s) Oral daily  traZODone 200 milliGRAM(s) Oral at bedtime  verapamil  milliGRAM(s) Oral daily    MEDICATIONS  (PRN):  acetaminophen 300 mG/codeine 30 mG 1 Tablet(s) Oral every 6 hours PRN Severe Pain (7 - 10)  ondansetron Injectable 4 milliGRAM(s) IV Push every 6 hours PRN Nausea and/or Vomiting      Vital Signs Last 24 Hrs  T(C): 36.7 (30 Nov 2020 07:37), Max: 37.1 (29 Nov 2020 19:36)  T(F): 98 (30 Nov 2020 07:37), Max: 98.7 (29 Nov 2020 19:36)  HR: 110 (30 Nov 2020 11:24) (76 - 110)  BP: 145/80 (30 Nov 2020 07:37) (134/78 - 145/80)  RR: 19 (30 Nov 2020 07:37) (19 - 20)  SpO2: 96% (30 Nov 2020 11:24) (91% - 97%)    PHYSICAL EXAM  General: adult woman in NAD  HEENT: clear oropharynx, anicteric sclera, pink conjunctivae  Neck: supple  CV: normal S1S2 with no murmur rubs or gallops  Lungs: clear to auscultation, no wheezes, no rhales  Abdomen: obese, wound vac in place across entire lower abdomen bikini-incision; no oozing or bleeding noted  Ext: no clubbing cyanosis or edema  Skin: no rashes and no petichiae  Neuro: alert and oriented X3 no focal deficits      LABS:                        10.2   5.60  )-----------( 466      ( 30 Nov 2020 06:05 )             32.6     Hemoglobin: 10.2 g/dL (11-30 @ 06:05)  Hemoglobin: 10.2 g/dL (11-29 @ 14:20)  Hemoglobin: 9.7 g/dL (11-29 @ 08:45)  Hemoglobin: 9.8 g/dL (11-28 @ 17:01)  Hemoglobin: 10.7 g/dL (11-28 @ 07:33)    11-30    145  |  110<H>  |  9   ----------------------------<  102<H>  4.2   |  29  |  0.73    Ca    9.1      30 Nov 2020 06:05  Phos  4.7     11-30  Mg     2.2     11-30    TPro  6.9  /  Alb  2.6<L>  /  TBili  0.3  /  DBili  x   /  AST  18  /  ALT  16  /  AlkPhos  111  11-30    PT/INR - ( 29 Nov 2020 01:54 )   PT: 13.4 sec;   INR: 1.15 ratio    PTT - ( 29 Nov 2020 17:54 )  PTT:74.9 sec      RADIOLOGY STUDIES:  CT brain - no evidence of intracranial pathology

## 2020-11-30 NOTE — DISCHARGE NOTE PROVIDER - PROVIDER TOKENS
PROVIDER:[TOKEN:[[id number]],FOLLOWUP:[1 week]],PROVIDER:[TOKEN:[[id number]],FOLLOWUP:[1 week]],PROVIDER:[TOKEN:[[id number]],SCHEDULEDAPPT:[12/04/2020],ESTABLISHEDPATIENT:[T]]

## 2020-11-30 NOTE — PROGRESS NOTE ADULT - ASSESSMENT
61 y/o woman w 2 recent hospitalizations in Oct, first for panniculectomy with complications of hemorrhagic shock and abdomen hematoma, post pressor support and multiple PRBC transfusions, and second time later in same month for diaphoresis and dizziness required again PRBC transfusions.    Admitted on 11/28/20 with  left calf pain and swelling x4 days prior and right shoulder pain x~2wks;  Venous Dopplers sig for left leg DVT from common femoral to post tibial, and CTA chest w RLL PE  Started on Heparin drip initially and transitioned to Eliquis on 11/29/20    -left leg DVT and  Rt lung PE appear provoked due to decreased mobility and recent prolonged hospitalizations/inflammatory state of events assoc w Panniculectomy.  -H/H stable  -iron studies in Oct c/w iron deficiency but was unable to follow up in office to get IV iron; received dose of Venofer 200mg IV on 11/29/20  - patient with known GI malabsorption and req IV formulation and also has had continued blood loss from hemovac/woundvac since then contributing to iron def  -monitor serial CBC  -will follow up in office upon discharge   - discussed with patient and Dr. Morgan (hospitalist)

## 2020-11-30 NOTE — DISCHARGE NOTE PROVIDER - NSDCCPCAREPLAN_GEN_ALL_CORE_FT
PRINCIPAL DISCHARGE DIAGNOSIS  Diagnosis: Pulmonary embolism  Assessment and Plan of Treatment: You have been diagnosed with pulmonary embolism   - You were started on a heparin drip during this admission and switched to oral anticoagulant.  - Continue taking eliquis 10mg twice a day until morning of 12/6. On 12/6 afternoon you will take Eliquis 5mg. Starting on 12/7, you will be taking Eliquis 5mg twice a day for 6 months.   - Please follow up with your primary care doctor within one week of discharge.      SECONDARY DISCHARGE DIAGNOSES  Diagnosis: Atrial fibrillation  Assessment and Plan of Treatment: You have atrial fibrillation  - continue with oral eliquis  - continue with verapamil 120mg as prescribed   - follow up with cardiologist 1 week after discharge    Diagnosis: Bipolar disorder  Assessment and Plan of Treatment: Continue with home medications.   - Follow up with your psychiatrist as per routine.    Diagnosis: JESUS (obstructive sleep apnea)  Assessment and Plan of Treatment: You have obstructive sleep apnea  -continue using CPAP machine.    Diagnosis: History of nephrolithiasis  Assessment and Plan of Treatment: Continue taking home medication of allopurinol.    Diagnosis: Hypertension  Assessment and Plan of Treatment: You have high blood pressure.   - continue taking verapamil SR 120mg.   - follow up with primary care physician one week of discharge    Diagnosis: Open wound of abdominal wall  Assessment and Plan of Treatment: You have an abdominal wound secondary to your elective surgery, which has a wound vac in place.   - during admission, surgery changed wound vac on 11/30.   - follow up with surgery outpatient for continued wound vac care.    Diagnosis: DVT (deep venous thrombosis)  Assessment and Plan of Treatment: You were diagnosed with a left lower extremity nonocclusive deep venous thrombosis.   - Started on heparin drip, switched to oral anticoagulant.   - Continue oral Eliquis at home.   - follow up with primary care physician within one week of discharge.     PRINCIPAL DISCHARGE DIAGNOSIS  Diagnosis: Pulmonary embolism  Assessment and Plan of Treatment: You have been diagnosed with pulmonary embolism   - You were started on a heparin drip during this admission and switched to oral anticoagulant.  - Continue taking eliquis 10mg twice a day until morning of 12/6. On 12/6 afternoon you will take Eliquis 5mg. Starting on 12/7, you will be taking Eliquis 5mg twice a day for 6 months.   - Please follow up with your primary care doctor within one week of discharge.      SECONDARY DISCHARGE DIAGNOSES  Diagnosis: Atrial fibrillation  Assessment and Plan of Treatment: You have atrial fibrillation  - continue with oral eliquis  - continue with verapamil 120mg as prescribed   - follow up with cardiologist 1 week after discharge    Diagnosis: Bipolar disorder  Assessment and Plan of Treatment: Continue with home medications.   - Follow up with your psychiatrist as per routine.    Diagnosis: JESUS (obstructive sleep apnea)  Assessment and Plan of Treatment: You have obstructive sleep apnea  -continue using CPAP machine.    Diagnosis: History of nephrolithiasis  Assessment and Plan of Treatment: Continue taking home medication of allopurinol.    Diagnosis: Hypertension  Assessment and Plan of Treatment: You have high blood pressure.   - continue taking verapamil SR 120mg.   - follow up with primary care physician one week of discharge    Diagnosis: Open wound of abdominal wall  Assessment and Plan of Treatment: You have an abdominal wound secondary to your elective surgery, which has a wound vac in place.   - during admission, surgery changed wound vac on 11/30.  - continue with regular wound vac care with home services.    - follow up with surgery outpatient    Diagnosis: DVT (deep venous thrombosis)  Assessment and Plan of Treatment: You were diagnosed with a left lower extremity nonocclusive deep venous thrombosis.   - Started on heparin drip, switched to oral anticoagulant.   - Continue oral Eliquis at home.   - follow up with primary care physician within one week of discharge.     PRINCIPAL DISCHARGE DIAGNOSIS  Diagnosis: Pulmonary embolism  Assessment and Plan of Treatment: You have been diagnosed with pulmonary embolism   - You were started on a heparin drip during this admission and switched to oral anticoagulant.  - Continue taking eliquis 10mg twice a day with the last dose on the morning of 12/6. On 12/6 afternoon you will take Eliquis 5mg. Starting on 12/7, you will be taking Eliquis 5mg twice a day for 6 months.   - Please follow up with your primary care doctor within one week of discharge.      SECONDARY DISCHARGE DIAGNOSES  Diagnosis: Atrial fibrillation  Assessment and Plan of Treatment: You have atrial fibrillation  - continue with oral eliquis  - continue with verapamil 120mg as prescribed   - follow up with cardiologist 1 week after discharge    Diagnosis: Bipolar disorder  Assessment and Plan of Treatment: Continue with home medications.   - Follow up with your psychiatrist as per routine.    Diagnosis: JESUS (obstructive sleep apnea)  Assessment and Plan of Treatment: You have obstructive sleep apnea  -continue using CPAP machine.    Diagnosis: History of nephrolithiasis  Assessment and Plan of Treatment: Continue taking home medication of allopurinol.    Diagnosis: Hypertension  Assessment and Plan of Treatment: You have high blood pressure.   - continue taking verapamil SR 120mg.   - follow up with primary care physician one week of discharge    Diagnosis: Open wound of abdominal wall  Assessment and Plan of Treatment: You have an abdominal wound secondary to your elective surgery, which has a wound vac in place.   - during admission, surgery removed wound vac on 11/30. Replacement wound vac scheduled for 12/1.   - continue with regular wound vac care with home services.    - follow up with surgery outpatient    Diagnosis: DVT (deep venous thrombosis)  Assessment and Plan of Treatment: You were diagnosed with a left lower extremity nonocclusive deep venous thrombosis.   - Started on heparin drip, switched to oral anticoagulant.   - Continue oral Eliquis at home.   - follow up with primary care physician within one week of discharge.     PRINCIPAL DISCHARGE DIAGNOSIS  Diagnosis: Pulmonary embolism  Assessment and Plan of Treatment: You have been diagnosed with pulmonary embolism   - You were started on a heparin drip during this admission and switched to oral anticoagulant.  - Continue taking eliquis 10mg twice a day with the last dose on the morning of 12/6. On 12/6 evening you will take Eliquis 5mg. Starting on 12/7, you will be taking Eliquis 5mg twice a day for at least  6 months.   - Please follow up with your primary care doctor within one week of discharge.      SECONDARY DISCHARGE DIAGNOSES  Diagnosis: Atrial fibrillation  Assessment and Plan of Treatment: You have atrial fibrillation  - continue with oral eliquis  - continue with 1/2 pill of verapamil as you were previously taking at home  - follow up with cardiologist 1 week after discharge    Diagnosis: Bipolar disorder  Assessment and Plan of Treatment: Continue with home medications.   - Follow up with your psychiatrist as per routine.    Diagnosis: JESUS (obstructive sleep apnea)  Assessment and Plan of Treatment: You have obstructive sleep apnea  -continue using CPAP machine.    Diagnosis: History of nephrolithiasis  Assessment and Plan of Treatment: Continue taking home medication of allopurinol.    Diagnosis: Hypertension  Assessment and Plan of Treatment: You have high blood pressure.   - continue taking verapamil SR 120mg.   - follow up with primary care physician one week of discharge    Diagnosis: Open wound of abdominal wall  Assessment and Plan of Treatment: You have an abdominal wound secondary to your elective surgery, which has a wound vac in place.   - during admission, surgery removed wound vac on 11/30. Replacement wound vac scheduled for 12/1.   - continue with regular wound vac care with home services.    - follow up with surgery outpatient    Diagnosis: DVT (deep venous thrombosis)  Assessment and Plan of Treatment: You were diagnosed with a left lower extremity nonocclusive deep venous thrombosis.   - Started on heparin drip, switched to oral anticoagulant.   - Continue oral Eliquis at home.   - follow up with primary care physician within one week of discharge.

## 2020-11-30 NOTE — PROGRESS NOTE ADULT - ATTENDING COMMENTS
Patient seen and examined at bedside. Agree with above assessment and plan. Patient for discharge today with close follow up with Heme/Onc, Plastics and Cardio as outpatient. Continue eliquis as prescribed.

## 2020-11-30 NOTE — DISCHARGE NOTE PROVIDER - NSDCMRMEDTOKEN_GEN_ALL_CORE_FT
allopurinol 300 mg oral tablet: 1 tab(s) orally once a day  Cymbalta 30 mg oral delayed release capsule: 3 cap(s) orally once a day  ferrous sulfate 325 mg (65 mg elemental iron) oral delayed release tablet: 1 tab(s) orally once a day  folic acid: 1 tab(s) orally once a day - OTC  Lamictal 200 mg oral tablet: 1 tab(s) orally once a day  Multiple Vitamins oral tablet: 1 tab(s) orally once a day  potassium citrate 15 mEq oral tablet, extended release: 1 tab(s) orally once a day  trazodone 100 mg oral tablet: 2 tab(s) orally once a day (at bedtime)  verapamil 240 mg/24 hours oral tablet, extended release: 1 tab(s) orally once a day  Cardio Dr. Giordano told patient to hold BP meds    **as per patient she has began taking 1/2 tablet daily  Vitamin D3 5000 intl units (125 mcg) oral tablet: once daily   allopurinol 300 mg oral tablet: 1 tab(s) orally once a day  Cymbalta 30 mg oral delayed release capsule: 3 cap(s) orally once a day  Eliquis 5 mg oral tablet: 2 tab(s) orally every 12 hours (Take from 11/30 afternoon until 12/6 morning).   Eliquis 5 mg oral tablet: 1 tab(s) orally 2 times a day ( start on 12/6 PM dose, continue 1 tab 2 times a day for 6 months)  ferrous sulfate 325 mg (65 mg elemental iron) oral delayed release tablet: 1 tab(s) orally once a day  folic acid: 1 tab(s) orally once a day - OTC  Lamictal 200 mg oral tablet: 1 tab(s) orally once a day  Multiple Vitamins oral tablet: 1 tab(s) orally once a day  potassium citrate 15 mEq oral tablet, extended release: 1 tab(s) orally once a day  trazodone 100 mg oral tablet: 2 tab(s) orally once a day (at bedtime)  verapamil 240 mg/24 hours oral tablet, extended release: 120 milligram(s) orally once a day  Vitamin D3 5000 intl units (125 mcg) oral tablet: once daily

## 2020-11-30 NOTE — DISCHARGE NOTE PROVIDER - HOSPITAL COURSE
FROM ADMISSION H+P:   HPI:  61 y/o F PMHx Bipolar Disorder, Anxiety disorder, depression, anemia, arthritis, HTN, Thoracic AA 4.4cm (stable), JESUS on CPAP, asthma, Hx of gastric lap band and removal, Essential Tremor, Obesity, hx cholecystectomy, hx nephrolithiasis, h/o ETOH abuse, neuropathy, Spinal stenosis presented to ED with complaint of LE swelling for past 2-3 days.  Pt was recently admitted to the hospital from 9/25- 10/10 for and elective panniculectomy. Pt was admitted post procedure and subsequently developed hemorrhagic shock requiring pressor support and multiple blood transfusions. During her stay she also was noted to have an intra-abdominal hematoma and developed a fever. The fever was thought to be secondary to the hematoma and resolved during the hospital course. Pt was then again admitted from 10/13-10/20 after she developed diaphoresis and lightheadedness and there was concern for PE. She was again found to be anemic and received and additional 2 units of blood. Pt reports that she has since been recovering well.  On Wednesday she noted that she developed left lower leg swelling with some increased warmth. She also noted that for the last 2 weeks R sided back pain, but patient attributed it to her history of kidney stones. Denies fever, chills, N/V, CP.  Pt reports that her abdominal pain has been improving.    In ED, patient noted to have elevated D-dimer- 768. Patient had LE doppler which showed nonocclusive acute thrombus in L common femoral, femoral and popliteal vein extending into tibial vein. Patient had CTA done which showed R sided PE. Patient received 1L NS bolus in ED and was started on heparin gtt. (28 Nov 2020 13:28)      ---  HOSPITAL COURSE:   Patient admitted to telemetry for LLE nonocclusive DVT and acute Rt sided PE. D-Dimer on admission was 768, a LE doppler was performed showing a nonocclusive acute thrombus in left common femoral, common femoral, femoral, and popliteal vein extending into tibial vein. CTA performed showing right sided PE. Pt started on heparin gtt, and switched to PO eliquis 10mg BID. Pt with afib not on home AC 2/2 post operative hematoma and hemorrhagic shock on previous admissions. Cardiology, [facility], consulted recommended heparin gtt to be switched to DOAC. Echocardiogram performed during this admission showing ___________. Hospital course complicated by c/o headache during hospital stay s/p starting hep gtt, and reported fall 3 weeks ago withe head trauma. CT Head performed showing no ICH or acute territorial infarct. Pt reported improvement of headache after switch to PO eliquis. Pt also anemia 2/2 recent blood loss and continued wound vac drainage. Manan, Dr. Pelayo, consulted, and saw and evaluated patient recommending IV venofer x1 in hospital and follow up outpatient. Patient seen and evaluated by surgery, Dr. De La Torre, for wound vac maintenance and had wound vac changed on 11/30. Patient showed improvement throughout hospitalization. Patient was seen and examined on day of discharge. Patient was medically optimized for discharge home, with close outpatient follow up.  ---  CONSULTANTS:   Dr. Pelayo- Manan/ Onc  Dr. De La Torre- Surgery  [facility]- Cardiology   ---  TIME SPENT:  I, the attending physician, was physically present for the key portions of the evaluation and management (E/M) service provided. The total amount of time spent reviewing the hospital notes, laboratory values, imaging findings, assessing/counseling the patient, discussing with consultant physicians, social work, nursing staff was -- minutes    ---  Primary care provider was made aware of plan for discharge:      [  ] NO     [  ] YES   FROM ADMISSION H+P:   HPI:  59 y/o F PMHx Bipolar Disorder, Anxiety disorder, depression, anemia, arthritis, HTN, Thoracic AA 4.4cm (stable), JESUS on CPAP, asthma, Hx of gastric lap band and removal, Essential Tremor, Obesity, hx cholecystectomy, hx nephrolithiasis, h/o ETOH abuse, neuropathy, Spinal stenosis presented to ED with complaint of LE swelling for past 2-3 days.  Pt was recently admitted to the hospital from 9/25- 10/10 for and elective panniculectomy. Pt was admitted post procedure and subsequently developed hemorrhagic shock requiring pressor support and multiple blood transfusions. During her stay she also was noted to have an intra-abdominal hematoma and developed a fever. The fever was thought to be secondary to the hematoma and resolved during the hospital course. Pt was then again admitted from 10/13-10/20 after she developed diaphoresis and lightheadedness and there was concern for PE. She was again found to be anemic and received and additional 2 units of blood. Pt reports that she has since been recovering well.  On Wednesday she noted that she developed left lower leg swelling with some increased warmth. She also noted that for the last 2 weeks R sided back pain, but patient attributed it to her history of kidney stones. Denies fever, chills, N/V, CP.  Pt reports that her abdominal pain has been improving.    In ED, patient noted to have elevated D-dimer- 768. Patient had LE doppler which showed nonocclusive acute thrombus in L common femoral, femoral and popliteal vein extending into tibial vein. Patient had CTA done which showed R sided PE. Patient received 1L NS bolus in ED and was started on heparin gtt. (28 Nov 2020 13:28)      ---  HOSPITAL COURSE:   Patient admitted to telemetry for LLE nonocclusive DVT and acute Rt sided PE. D-Dimer on admission was 768, a LE doppler was performed showing a nonocclusive acute thrombus in left common femoral, common femoral, femoral, and popliteal vein extending into tibial vein. CTA performed showing right sided PE. Pt started on heparin gtt, and switched to PO eliquis 10mg BID. Pt with afib not on home AC 2/2 post operative hematoma and hemorrhagic shock on previous admissions. Cardiology, [facility], consulted recommended heparin gtt to be switched to DOAC. Echocardiogram performed during this admission showing LV Diastolic Dysfunction EF65-70%. Hospital course complicated by c/o headache during hospital stay s/p starting hep gtt, and reported fall 3 weeks ago withe head trauma. CT Head performed showing no ICH or acute territorial infarct. Pt reported improvement of headache after switch to PO eliquis. Pt also anemia 2/2 recent blood loss and continued wound vac drainage. Manan, Dr. Pelayo, consulted, and saw and evaluated patient recommending IV venofer x1 in hospital and follow up outpatient. Patient seen and evaluated by surgery, Dr. De La Torre, for wound vac maintenance and had wound vac changed on 11/30. Patient showed improvement throughout hospitalization. Patient was seen and examined on day of discharge. Patient was medically optimized for discharge home, with close outpatient follow up.  ---  CONSULTANTS:   Dr. Pelayo- [facility]/ Onc  Dr. De La Torre- Surgery  [facility]- Cardiology   ---  TIME SPENT:  I, the attending physician, was physically present for the key portions of the evaluation and management (E/M) service provided. The total amount of time spent reviewing the hospital notes, laboratory values, imaging findings, assessing/counseling the patient, discussing with consultant physicians, social work, nursing staff was -- minutes    ---  Primary care provider was made aware of plan for discharge:      [  ] NO     [  ] YES   FROM ADMISSION H+P:   HPI:  59 y/o F PMHx Bipolar Disorder, Anxiety disorder, depression, anemia, arthritis, HTN, Thoracic AA 4.4cm (stable), JESUS on CPAP, asthma, Hx of gastric lap band and removal, Essential Tremor, Obesity, hx cholecystectomy, hx nephrolithiasis, h/o ETOH abuse, neuropathy, Spinal stenosis presented to ED with complaint of LE swelling for past 2-3 days.  Pt was recently admitted to the hospital from 9/25- 10/10 for and elective panniculectomy. Pt was admitted post procedure and subsequently developed hemorrhagic shock requiring pressor support and multiple blood transfusions. During her stay she also was noted to have an intra-abdominal hematoma and developed a fever. The fever was thought to be secondary to the hematoma and resolved during the hospital course. Pt was then again admitted from 10/13-10/20 after she developed diaphoresis and lightheadedness and there was concern for PE. She was again found to be anemic and received and additional 2 units of blood. Pt reports that she has since been recovering well.  On Wednesday she noted that she developed left lower leg swelling with some increased warmth. She also noted that for the last 2 weeks R sided back pain, but patient attributed it to her history of kidney stones. Denies fever, chills, N/V, CP.  Pt reports that her abdominal pain has been improving.    In ED, patient noted to have elevated D-dimer- 768. Patient had LE doppler which showed nonocclusive acute thrombus in L common femoral, femoral and popliteal vein extending into tibial vein. Patient had CTA done which showed R sided PE. Patient received 1L NS bolus in ED and was started on heparin gtt. (28 Nov 2020 13:28)      ---  HOSPITAL COURSE:   Patient admitted to telemetry for LLE nonocclusive DVT and acute Rt sided PE. D-Dimer on admission was 768, a LE doppler was performed showing a nonocclusive acute thrombus in left common femoral, common femoral, femoral, and popliteal vein extending into tibial vein. CTA performed showing right sided PE. Pt started on heparin gtt, and switched to PO eliquis 10mg BID. Pt with afib not on home AC 2/2 post operative hematoma and hemorrhagic shock on previous admissions. Cardiology, [facility], consulted recommended heparin gtt to be switched to DOAC. Echocardiogram performed during this admission showing LV Diastolic Dysfunction EF65-70%. Hospital course complicated by c/o headache during hospital stay s/p starting hep gtt, and reported fall 3 weeks ago withe head trauma. CT Head performed showing no ICH or acute territorial infarct. Pt reported improvement of headache after switch to PO eliquis. Pt also anemia 2/2 recent blood loss and continued wound vac drainage. Manan, Dr. Pelayo, consulted, and saw and evaluated patient recommending IV venofer x1 in hospital and follow up outpatient. Patient seen and evaluated by surgery, Dr. De La Torre, for wound vac maintenance and had wound vac changed on 11/30. Patient showed improvement throughout hospitalization. Patient was seen and examined on day of discharge. Patient was medically optimized for discharge home, with close outpatient follow up.    Vital Signs Last 24 Hrs  T(C): 36.8 (30 Nov 2020 11:51), Max: 37.1 (29 Nov 2020 19:36)  T(F): 98.3 (30 Nov 2020 11:51), Max: 98.7 (29 Nov 2020 19:36)  HR: 95 (30 Nov 2020 11:51) (76 - 110)  BP: 148/86 (30 Nov 2020 11:51) (134/78 - 148/86)  BP(mean): --  RR: 19 (30 Nov 2020 07:37) (19 - 20)  SpO2: 96% (30 Nov 2020 11:51) (91% - 97%)    GENERAL: female with increased body habitus in NAD  HEENT:  anicteric, moist mucous membranes  CHEST/LUNG:  CTA b/l, no rales, wheezes, or rhonchi  HEART:  RRR, S1, S2  ABDOMEN: , soft, nontender, nondistended  EXTREMITIES: mild edema, warmth, and mild pain on palpation of LLE popliteal fossa   NERVOUS SYSTEM: answers questions and follows commands appropriately      ---  CONSULTANTS:   Dr. Pelayo- Manan/ Onc  Dr. De La Torre- Surgery  [facility]- Cardiology   ---  TIME SPENT:  I, the attending physician, was physically present for the key portions of the evaluation and management (E/M) service provided. The total amount of time spent reviewing the hospital notes, laboratory values, imaging findings, assessing/counseling the patient, discussing with consultant physicians, social work, nursing staff was -- minutes    ---  Primary care provider was made aware of plan for discharge:      [  ] NO     [  ] YES   FROM ADMISSION H+P:   HPI:  61 y/o F PMHx Bipolar Disorder, Anxiety disorder, depression, anemia, arthritis, HTN, Thoracic AA 4.4cm (stable), JESUS on CPAP, asthma, Hx of gastric lap band and removal, Essential Tremor, Obesity, hx cholecystectomy, hx nephrolithiasis, h/o ETOH abuse, neuropathy, Spinal stenosis presented to ED with complaint of LE swelling for past 2-3 days.  Pt was recently admitted to the hospital from 9/25- 10/10 for and elective panniculectomy. Pt was admitted post procedure and subsequently developed hemorrhagic shock requiring pressor support and multiple blood transfusions. During her stay she also was noted to have an intra-abdominal hematoma and developed a fever. The fever was thought to be secondary to the hematoma and resolved during the hospital course. Pt was then again admitted from 10/13-10/20 after she developed diaphoresis and lightheadedness and there was concern for PE. She was again found to be anemic and received and additional 2 units of blood. Pt reports that she has since been recovering well.  On Wednesday she noted that she developed left lower leg swelling with some increased warmth. She also noted that for the last 2 weeks R sided back pain, but patient attributed it to her history of kidney stones. Denies fever, chills, N/V, CP.  Pt reports that her abdominal pain has been improving.    In ED, patient noted to have elevated D-dimer- 768. Patient had LE doppler which showed nonocclusive acute thrombus in L common femoral, femoral and popliteal vein extending into tibial vein. Patient had CTA done which showed R sided PE. Patient received 1L NS bolus in ED and was started on heparin gtt. (28 Nov 2020 13:28)      ---  HOSPITAL COURSE:   Patient admitted to telemetry for LLE nonocclusive DVT and acute Rt sided PE. D-Dimer on admission was 768, a LE doppler was performed showing a nonocclusive acute thrombus in left common femoral, common femoral, femoral, and popliteal vein extending into tibial vein. CTA performed showing right sided PE. Pt started on heparin gtt, and switched to PO eliquis 10mg BID. Pt with afib not on home AC 2/2 post operative hematoma and hemorrhagic shock on previous admissions. Cardiology, [facility], consulted recommended heparin gtt to be switched to DOAC. Echocardiogram performed during this admission showing LV Diastolic Dysfunction EF65-70%. Hospital course complicated by c/o headache during hospital stay s/p starting hep gtt, and reported fall 3 weeks ago withe head trauma. CT Head performed showing no ICH or acute territorial infarct. Pt reported improvement of headache after switch to PO eliquis. Pt also anemia 2/2 recent blood loss and continued wound vac drainage. Manan, Dr. Pelayo, consulted, and saw and evaluated patient recommending IV venofer x1 in hospital and follow up outpatient. Patient seen and evaluated by surgery, Dr. De La Torre, for wound vac maintenance and had wound vac removed on 11/30 with replacement to be performed as outpatient s/p discharge. Patient showed improvement throughout hospitalization. Patient was seen and examined on day of discharge. Patient was medically optimized for discharge home, with close outpatient follow up.    Vital Signs Last 24 Hrs  T(C): 36.8 (30 Nov 2020 11:51), Max: 37.1 (29 Nov 2020 19:36)  T(F): 98.3 (30 Nov 2020 11:51), Max: 98.7 (29 Nov 2020 19:36)  HR: 95 (30 Nov 2020 11:51) (76 - 110)  BP: 148/86 (30 Nov 2020 11:51) (134/78 - 148/86)  BP(mean): --  RR: 19 (30 Nov 2020 07:37) (19 - 20)  SpO2: 96% (30 Nov 2020 11:51) (91% - 97%)    GENERAL: female with increased body habitus in NAD  HEENT:  anicteric, moist mucous membranes  CHEST/LUNG:  CTA b/l, no rales, wheezes, or rhonchi  HEART:  RRR, S1, S2  ABDOMEN: , soft, nontender, nondistended, wound vac visualized c/d/i.   EXTREMITIES: mild edema, warmth, and mild pain on palpation of LLE popliteal fossa   NERVOUS SYSTEM: answers questions and follows commands appropriately      ---  CONSULTANTS:   Dr. Pelayo- Manan/ Onc  Dr. De La Torre- Surgery  [facility]- Cardiology   ---  TIME SPENT:  I, the attending physician, was physically present for the key portions of the evaluation and management (E/M) service provided. The total amount of time spent reviewing the hospital notes, laboratory values, imaging findings, assessing/counseling the patient, discussing with consultant physicians, social work, nursing staff was -- minutes    ---  Primary care provider was made aware of plan for discharge:      [  ] NO     [  ] YES   FROM ADMISSION H+P:   HPI:  61 y/o F PMHx Bipolar Disorder, Anxiety disorder, depression, anemia, arthritis, HTN, Thoracic AA 4.4cm (stable), JESUS on CPAP, asthma, Hx of gastric lap band and removal, Essential Tremor, Obesity, hx cholecystectomy, hx nephrolithiasis, h/o ETOH abuse, neuropathy, Spinal stenosis presented to ED with complaint of LE swelling for past 2-3 days.  Pt was recently admitted to the hospital from 9/25- 10/10 for and elective panniculectomy. Pt was admitted post procedure and subsequently developed hemorrhagic shock requiring pressor support and multiple blood transfusions. During her stay she also was noted to have an intra-abdominal hematoma and developed a fever. The fever was thought to be secondary to the hematoma and resolved during the hospital course. Pt was then again admitted from 10/13-10/20 after she developed diaphoresis and lightheadedness and there was concern for PE. She was again found to be anemic and received and additional 2 units of blood. Pt reports that she has since been recovering well.  On Wednesday she noted that she developed left lower leg swelling with some increased warmth. She also noted that for the last 2 weeks R sided back pain, but patient attributed it to her history of kidney stones. Denies fever, chills, N/V, CP.  Pt reports that her abdominal pain has been improving.    In ED, patient noted to have elevated D-dimer- 768. Patient had LE doppler which showed nonocclusive acute thrombus in L common femoral, femoral and popliteal vein extending into tibial vein. Patient had CTA done which showed R sided PE. Patient received 1L NS bolus in ED and was started on heparin gtt. (28 Nov 2020 13:28)      ---  HOSPITAL COURSE:   Patient admitted to telemetry for LLE nonocclusive DVT and acute Rt sided PE. D-Dimer on admission was 768, a LE doppler was performed showing a nonocclusive acute thrombus in left common femoral, common femoral, femoral, and popliteal vein extending into tibial vein. CTA performed showing right sided PE. Pt started on heparin gtt, and switched to PO eliquis 10mg BID. Pt with afib not on home AC 2/2 post operative hematoma and hemorrhagic shock on previous admissions. Cardiology, [facility], consulted recommended heparin gtt to be switched to DOAC. Echocardiogram performed during this admission showing LV Diastolic Dysfunction EF65-70%. Hospital course complicated by c/o headache during hospital stay s/p starting hep gtt, and reported fall 3 weeks ago withe head trauma. CT Head performed showing no ICH or acute territorial infarct. Pt reported improvement of headache after switch to PO eliquis. Pt also anemia 2/2 recent blood loss and continued wound vac drainage. Manan, Dr. Pelayo, consulted, and saw and evaluated patient recommending IV venofer x1 in hospital and follow up outpatient. Patient seen and evaluated by surgery, Dr. De La Torre, for wound vac maintenance and had wound vac removed on 11/30 with replacement to be performed as outpatient s/p discharge. Patient showed improvement throughout hospitalization. Patient was seen and examined on day of discharge. Patient was medically optimized for discharge home, with close outpatient follow up.    Vital Signs Last 24 Hrs  T(C): 36.8 (30 Nov 2020 11:51), Max: 37.1 (29 Nov 2020 19:36)  T(F): 98.3 (30 Nov 2020 11:51), Max: 98.7 (29 Nov 2020 19:36)  HR: 95 (30 Nov 2020 11:51) (76 - 110)  BP: 148/86 (30 Nov 2020 11:51) (134/78 - 148/86)  BP(mean): --  RR: 19 (30 Nov 2020 07:37) (19 - 20)  SpO2: 96% (30 Nov 2020 11:51) (91% - 97%)    GENERAL: female with increased body habitus in NAD  HEENT:  anicteric, moist mucous membranes  CHEST/LUNG:  CTA b/l, no rales, wheezes, or rhonchi  HEART:  RRR, S1, S2  ABDOMEN: , soft, nontender, nondistended, wound vac visualized c/d/i.   EXTREMITIES: mild edema, warmth, and mild pain on palpation of LLE popliteal fossa   NERVOUS SYSTEM: answers questions and follows commands appropriately      ---  CONSULTANTS:   Dr. Pelayo- Manan/ Onc  Dr. De La Torre- Surgery  [facility]- Cardiology   ---  TIME SPENT:  I, the attending physician, was physically present for the key portions of the evaluation and management (E/M) service provided. The total amount of time spent reviewing the hospital notes, laboratory values, imaging findings, assessing/counseling the patient, discussing with consultant physicians, social work, nursing staff was 47 minutes    ---

## 2020-11-30 NOTE — PROGRESS NOTE ADULT - ASSESSMENT
61 y/o F PMHx Bipolar Disorder, Anxiety disorder, depression, anemia, arthritis, HTN, Thoracic AA 4.4cm (stable), JESUS on CPAP, asthma, Hx of gastric lap band and removal, Essential Tremor, Obesity, hx cholecystectomy, hx nephrolithiasis, h/o ETOH abuse, neuropathy, Spinal stenosis presented to ED with complaint of LE swelling for past 2-3 days    PE, Acute nonocclusive DVT  -Heparin gtt with normogram in place  -Monitor H/H given recent h/o hemorrhagic shock s/p OR and h/o intra-abd hematoma  -Heme/Onc consulted, recommending change from Heparin to po Eliquis (will change after CT Head performed)  -Negative troponin, negative proBNP- no evidence of R heart strain on lab work  -c/w HFNC for now. Will titrate as tolerated.   -F/U TTE  -Cardio, [facility], following    Headache  -patient reports headache s/p heparin gtt  - self reports fall with head trauma x3 weeks ago  - f/u CT Head  - Toradol 15mg PRN for headache    Abd Wound s/p sx with Wound Vac   -30cm of wound on the right side being treated with VAC - black granufoam and tegaderm intact throughout with good negative pressure, and minimal serous fluid present in the reservoir.   -Plastic Surgery, following. Will change vac tomorrow.     HTN  -Patient was advised to stop all BP meds on her last DC, recently started taking half a pill of verapamil  -/76 on AM vitals, continue to monitor  -Continue verapamil SR 120mg (half of previous dose as patient has been taking) with hold parameters  -[facility], following    Anxiety, depression, bipolar  -Continue all home meds    Hx of nephrolithiasis  - Calcium oxalate crystals in UA  -Continue allopurinol    DVT ppx  -Heparin gtt for PE -> will switch to Eliquis PO after patient gets CT Head   61 y/o F PMHx Bipolar Disorder, Anxiety disorder, depression, anemia, arthritis, HTN, Thoracic AA 4.4cm (stable), JESUS on CPAP, asthma, Hx of gastric lap band and removal, Essential Tremor, Obesity, hx cholecystectomy, hx nephrolithiasis, h/o ETOH abuse, neuropathy, Spinal stenosis presented to ED with complaint of LE swelling for past 2-3 days    PE, Acute nonocclusive DVT  -Heparin gtt was dc'd due to headaches. CT head was unremarkable   -Monitor H/H given recent h/o hemorrhagic shock s/p OR and h/o intra-abd hematoma  - Heparin was dc'd and CHANGED to eliquis   -Negative troponin, negative proBNP- no evidence of R heart strain on lab work  -pt has no required any supplemental oxygen since admission  -F/U TTE  -Cardio, [facility], following    Headache  -patient reports headache s/p heparin gtt  - self reports fall with head trauma x3 weeks ago  - CT head was unremarkable   - Toradol 15mg PRN for headache    Abd Wound s/p sx with Wound Vac   -30cm of wound on the right side being treated with VAC - black granufoam and tegaderm intact throughout with good negative pressure, and minimal serous fluid present in the reservoir.   - wound vac change on 11/30    HTN  -Patient was advised to stop all BP meds on her last DC, recently started taking half a pill of verapamil1  -/80 on AM vitals, continue to monitor  -Continue verapamil SR 120mg (half of previous dose as patient has been taking) with hold parameters  -[facility], following    Anxiety, depression, bipolar  -Continue all home meds    Hx of nephrolithiasis  - Calcium oxalate crystals in UA  -Continue allopurinol    DVT ppx  -Heparin gtt for PE -> will switch to Eliquis PO after patient gets CT Head   59 y/o F PMHx Bipolar Disorder, Anxiety disorder, depression, anemia, arthritis, HTN, Thoracic AA 4.4cm (stable), JESUS on CPAP, asthma, Hx of gastric lap band and removal, Essential Tremor, Obesity, hx cholecystectomy, hx nephrolithiasis, h/o ETOH abuse, neuropathy, Spinal stenosis presented to ED with complaint of LE swelling for past 2-3 days    PE, Acute nonocclusive DVT  -Heparin gtt was dc'd due to headaches. CT head was unremarkable   -Monitor H/H given recent h/o hemorrhagic shock s/p OR and h/o intra-abd hematoma  - Heparin was dc'd and CHANGED to eliquis   -Negative troponin, negative proBNP- no evidence of R heart strain on lab work  -pt has no required any supplemental oxygen since admission  -F/U TTE  -f/u with PT recs  -Cardio, [facility], following    Headache  -patient reports headache s/p heparin gtt  - self reports fall with head trauma x3 weeks ago  - CT head was unremarkable   - Toradol 15mg PRN for headache    Abd Wound s/p sx with Wound Vac   -30cm of wound on the right side being treated with VAC - black granufoam and tegaderm intact throughout with good negative pressure, and minimal serous fluid present in the reservoir.   - wound vac change on 11/30    HTN  -Patient was advised to stop all BP meds on her last DC, recently started taking half a pill of verapamil1  -/80 on AM vitals, continue to monitor  -Continue verapamil SR 120mg (half of previous dose as patient has been taking) with hold parameters  -[facility], following    Anxiety, depression, bipolar  -Continue all home meds    Hx of nephrolithiasis  - Calcium oxalate crystals in UA  -Continue allopurinol    DVT ppx  - Heparin changed to eliquis 20mg x14 days    59 y/o F PMHx Bipolar Disorder, Anxiety disorder, depression, anemia, arthritis, HTN, Thoracic AA 4.4cm (stable), JESUS on CPAP, asthma, Hx of gastric lap band and removal, Essential Tremor, Obesity, hx cholecystectomy, hx nephrolithiasis, h/o ETOH abuse, neuropathy, Spinal stenosis presented to ED with complaint of LE swelling for past 2-3 days    PE, Acute nonocclusive DVT  -Monitor H/H given recent h/o hemorrhagic shock s/p OR and h/o intra-abd hematoma  - Heparin was dc'd and CHANGED to eliquis   -Negative troponin, negative proBNP- no evidence of R heart strain on lab work  -pt has no required any supplemental oxygen since admission  - preliminary evaluation of TTE shows EF of 65-70% with small pericardial effusion.   -[facility], following    Headache  - self reports fall with head trauma x3 weeks ago  - CT head was unremarkable    Abd Wound s/p sx with Wound Vac   -30cm of wound on the right side being treated with VAC - black granufoam and tegaderm intact throughout with good negative pressure, and minimal serous fluid present in the reservoir.   - wound vac change on 11/30    HTN  -Patient was advised to stop all BP meds on her last DC, recently started taking half a pill of verapamil1  -/80 on AM vitals, continue to monitor  -Continue verapamil SR 120mg (half of previous dose as patient has been taking) with hold parameters  -[facility], following    Anxiety, depression, bipolar  -Continue all home meds    Hx of nephrolithiasis  - Calcium oxalate crystals in UA  -Continue allopurinol    DVT ppx  - Heparin changed to eliquis 20mg x7 days    61 y/o F PMHx Bipolar Disorder, Anxiety disorder, depression, anemia, arthritis, HTN, Thoracic AA 4.4cm (stable), JESUS on CPAP, asthma, Hx of gastric lap band and removal, Essential Tremor, Obesity, hx cholecystectomy, hx nephrolithiasis, h/o ETOH abuse, neuropathy, Spinal stenosis presented to ED with complaint of LE swelling for past 2-3 days    PE, Acute nonocclusive DVT  -Monitor H/H given recent h/o hemorrhagic shock s/p OR and h/o intra-abd hematoma  - Heparin was dc'd and CHANGED to PO eliquis   -Negative troponin, negative proBNP- no evidence of R heart strain on lab work  - pt has not required any supplemental oxygen since admission. Only on CPAP for JESUS at night.   - preliminary evaluation of TTE shows EF of 65-70% with small pericardial effusion, LV diastolic dysfunction.   -[facility], following    Headache  - improving since yesterday.   - self reports fall with head trauma x3 weeks ago;CT head was unremarkable    Abd Wound s/p sx with Wound Vac   -30cm of wound on the right side being treated with VAC - black granufoam and tegaderm intact throughout with good negative pressure, and minimal serous fluid present in the reservoir.   - wound vac changed on 11/30    HTN  -Patient was advised to stop all BP meds on her last DC, recently started taking half a pill of verapamil1  -/86 on AM vitals, continue to monitor  -Continue verapamil SR 120mg (half of previous dose as patient has been taking) with hold parameters  -[facility], following    Anxiety, depression, bipolar  -Continue all home meds    Hx of nephrolithiasis  - Calcium oxalate crystals in UA  -Continue allopurinol    DVT ppx  - Heparin changed to eliquis 10 mg BID, for total 7 days to be switched to eliquis 5mg BID for 6 months course.     61 y/o F PMHx Bipolar Disorder, Anxiety disorder, depression, anemia, arthritis, HTN, Thoracic AA 4.4cm (stable), JESUS on CPAP, asthma, Hx of gastric lap band and removal, Essential Tremor, Obesity, hx cholecystectomy, hx nephrolithiasis, h/o ETOH abuse, neuropathy, Spinal stenosis presented to ED with complaint of LE swelling for past 2-3 days    PE, Acute nonocclusive DVT  -Monitor H/H given recent h/o hemorrhagic shock s/p OR and h/o intra-abd hematoma  - Heparin was dc'd and CHANGED to PO eliquis   -Negative troponin, negative proBNP- no evidence of R heart strain on lab work  - pt has not required any supplemental oxygen since admission. Only on CPAP for JESUS at night.   - preliminary evaluation of TTE shows EF of 65-70% with small pericardial effusion, LV diastolic dysfunction.   -[facility], following    Headache  - improving since yesterday.   - self reports fall with head trauma x3 weeks ago;CT head was unremarkable    Abd Wound s/p sx with Wound Vac   -30cm of wound on the right side being treated with VAC - black granufoam and tegaderm intact throughout with good negative pressure, and minimal serous fluid present in the reservoir.   - wound vac removed 11/30. Will replace tomorrow as outpatient.     HTN  -Patient was advised to stop all BP meds on her last DC, recently started taking half a pill of verapamil1  -/86 on AM vitals, continue to monitor  -Continue verapamil SR 120mg (half of previous dose as patient has been taking) with hold parameters  -Cardiology, [facility], following    Anxiety, depression, bipolar  -Continue all home meds    Hx of nephrolithiasis  - Calcium oxalate crystals in UA  -Continue allopurinol    DVT ppx  - Heparin changed to eliquis 10 mg BID, for total 7 days to be switched to eliquis 5mg BID for 6 months course.

## 2020-11-30 NOTE — PROGRESS NOTE ADULT - SUBJECTIVE AND OBJECTIVE BOX
HOD # 2    SUBJECTIVE:  61 y/o F seen and examined at bedside. Pt offers no complaints at this time in NAD. Pt with no acute overnight events. Pt tolerating reg diet, admits to flatus with bowel movement. PT denies any fevers, chills, chest pain, shortness of breath, abdominal pain, nausea, vomiting or diarrhea.    Vital Signs Last 24 Hrs  T(C): 36.6 (2020 04:58), Max: 37.1 (2020 19:36)  T(F): 97.8 (2020 04:58), Max: 98.7 (2020 19:36)  HR: 91 (2020 04:58) (76 - 91)  BP: 143/84 (2020 04:58) (134/78 - 143/84)  BP(mean): --  RR: 20 (2020 04:58) (19 - 20)  SpO2: 91% (:58) (91% - 97%)    PHYSICAL EXAM:  GENERAL: No acute distress, well-developed  CHEST/LUNG: dec breath sounds  HEART: RRR, no MRgs  ABDOMEN: Soft, obese, protuberant, nonttp, nondistended, +bs, wound vac in place holding seal with no leaks detected   NEUROLOGY: A&O x 3, no focal deficits    I&O's Summary      -  2020 07:00  --------------------------------------------------------  IN: 170 mL / OUT: 0 mL / NET: 170 mL      -  2020 06:46  --------------------------------------------------------  IN: 270 mL / OUT: 1100 mL / NET: -830 mL      I&O's Detail      -  2020 07:00  --------------------------------------------------------  IN:    Heparin Infusion: 170 mL  Total IN: 170 mL    OUT:  Total OUT: 0 mL    Total NET: 170 mL      2020 07:01  -  2020 06:46  --------------------------------------------------------  IN:    Heparin Infusion: 170 mL    IV PiggyBack: 100 mL  Total IN: 270 mL    OUT:    Voided (mL): 1100 mL  Total OUT: 1100 mL    Total NET: -830 mL    MEDICATIONS  (STANDING):  allopurinol 300 milliGRAM(s) Oral daily  apixaban 10 milliGRAM(s) Oral every 12 hours  cholecalciferol 5000 Unit(s) Oral daily  DULoxetine 90 milliGRAM(s) Oral daily  ferrous    sulfate 325 milliGRAM(s) Oral daily  folic acid 1 milliGRAM(s) Oral daily  lamoTRIgine 200 milliGRAM(s) Oral at bedtime  multivitamin 1 Tablet(s) Oral daily  traZODone 200 milliGRAM(s) Oral at bedtime  verapamil  milliGRAM(s) Oral daily    MEDICATIONS  (PRN):  acetaminophen 300 mG/codeine 30 mG 1 Tablet(s) Oral every 6 hours PRN Severe Pain (7 - 10)  ondansetron Injectable 4 milliGRAM(s) IV Push every 6 hours PRN Nausea and/or Vomiting    LABS:                        10.2   5.60  )-----------( 466      ( 2020 06:05 )             32.6     11-30    145  |  110<H>  |  9   ----------------------------<  102<H>  4.2   |  29  |  0.73    Ca    9.1      2020 06:05  Phos  4.7     11-30  Mg     2.2     -30    TPro  6.9  /  Alb  2.6<L>  /  TBili  0.3  /  DBili  x   /  AST  18  /  ALT  16  /  AlkPhos  111  11-30    PT/INR - ( 2020 01:54 )   PT: 13.4 sec;   INR: 1.15 ratio         PTT - ( 2020 17:54 )  PTT:74.9 sec  Urinalysis Basic - ( 2020 11:04 )    Color: Yellow / Appearance: Slightly Turbid / S.020 / pH: x  Gluc: x / Ketone: Negative  / Bili: Negative / Urobili: Negative   Blood: x / Protein: 15 / Nitrite: Negative   Leuk Esterase: Moderate / RBC: 3-5 /HPF / WBC 6-10   Sq Epi: x / Non Sq Epi: Moderate / Bacteria: Few    ASSESSMENT  61 y/o F HOD # 2 admitted with RLE PE, s/p panniculectomy with wound vac in place, tolerating reg diet,     PLAN  - vac change today   - cont care per primary team  - pain control, supportive care  - OOB, ambulation as tolerated with assistance   - serial abdominal exams  - labs in am  - to be discussed with Dr. De La Torre     Surgical Team Contact Information  Spectralink: Ext: [phone] or [phone]  Pager: [phone]

## 2020-11-30 NOTE — PROGRESS NOTE ADULT - ASSESSMENT
59 y/o female with Afib (not on AC due to hemorrhagic shock), Bipolar Disorder, Anxiety disorder, depression, anemia, arthritis, HTN, Thoracic AA 4.4cm (stable), JESUS on CPAP, asthma, Hx of gastric lap band and removal, Essential Tremor, Obesity, hx cholecystectomy, hx nephrolithiasis, h/o ETOH abuse, neuropathy, Spinal stenosis presented to ED with complaint of LE swelling for past 2-3 days.  Pt was recently admitted to the hospital from 9/25- 10/10 for and elective panniculectomy c/w hemorrhagic shock.  Today, she was found to have acute non-occlusive LLE DVT and RLL PE.  Admits to have been having pleuritic pain and back pain under right scapula x 2 weeks at least.  Has been initially inactive post discharge.  Denies chest pain, palpitations, SOB, PARDO, or orthopnea.  We recently had seen her for near syncope which was felt to be vasovagal in etiology.     Acute PE/DVT  - Likely provoked in the setting of recent surgery and inactivity  - D-dimer is elevated, though, slightly lower than previous  - CTA chest showed RLL PE.  Remains comfortable on RA  - BLE USD showed acute, nonocclusive thrombus in the left common femoral, femoral, and popliteal vein extending through the posterior tibial vein in the calf  - Continue Eliquis load  - No evidence of hemodynamic instability  - Follow up TTE.  Called Tech to expedite today  - CTH negative (previously c/o HA while on Heparin gtt)    Afib  - NSR on EKG with no ischemic changes  - Was not placed on AC on discharge from recent hospitalization in the setting of hemorrhagic shock postop  - Continue Eliquis.  Monitor for evidence of bleeding  - Remains in NSR on tele.  Can discontinue  - Continue low dose CCB  - Monitor electrolytes, replete to keep K>4 and Mag>2    HTN  - BP remians controlled and stable  - Continue Verapamil XR    JESUS  - Continue nocturnal CPAP  - Supplemental O2 as needed    Further cardiac w/u pending clinical course  Will continue to follow closely    Tiffany Brown DNP, NP-C  Cardiology  Spectra #[phone]

## 2020-11-30 NOTE — DISCHARGE NOTE NURSING/CASE MANAGEMENT/SOCIAL WORK - PATIENT PORTAL LINK FT
You can access the FollowMyHealth Patient Portal offered by [facility] by registering at the following website: [email or web address]. By joining [employer]’s FollowMyHealth portal, you will also be able to view your health information using other applications (apps) compatible with our system.

## 2020-11-30 NOTE — PROGRESS NOTE ADULT - SUBJECTIVE AND OBJECTIVE BOX
Patient is a 60y old  Female who presents with a chief complaint of PE (30 Nov 2020 11:28)      INTERVAL HPI/OVERNIGHT EVENTS:    MEDICATIONS  (STANDING):  allopurinol 300 milliGRAM(s) Oral daily  apixaban 10 milliGRAM(s) Oral every 12 hours  cholecalciferol 5000 Unit(s) Oral daily  DULoxetine 90 milliGRAM(s) Oral daily  ferrous    sulfate 325 milliGRAM(s) Oral daily  folic acid 1 milliGRAM(s) Oral daily  lamoTRIgine 200 milliGRAM(s) Oral at bedtime  multivitamin 1 Tablet(s) Oral daily  traZODone 200 milliGRAM(s) Oral at bedtime  verapamil  milliGRAM(s) Oral daily    MEDICATIONS  (PRN):  acetaminophen 300 mG/codeine 30 mG 1 Tablet(s) Oral every 6 hours PRN Severe Pain (7 - 10)  ondansetron Injectable 4 milliGRAM(s) IV Push every 6 hours PRN Nausea and/or Vomiting      Allergies    penicillins (Other)  trees dogs cats cockaroaches (Rhinitis; Rhinorrhea)    Intolerances        REVIEW OF SYSTEMS:  CONSTITUTIONAL: No fever or chills  HEENT:  No headache, no sore throat  RESPIRATORY: No cough, wheezing, or shortness of breath  CARDIOVASCULAR: No chest pain, palpitations  GASTROINTESTINAL: No abd pain, nausea, vomiting, or diarrhea  GENITOURINARY: No dysuria, frequency, or hematuria  NEUROLOGICAL: no focal weakness or dizziness  MUSCULOSKELETAL: no myalgias     Vital Signs Last 24 Hrs  T(C): 36.7 (30 Nov 2020 07:37), Max: 37.1 (29 Nov 2020 19:36)  T(F): 98 (30 Nov 2020 07:37), Max: 98.7 (29 Nov 2020 19:36)  HR: 110 (30 Nov 2020 11:24) (76 - 110)  BP: 145/80 (30 Nov 2020 07:37) (134/78 - 145/80)  BP(mean): --  RR: 19 (30 Nov 2020 07:37) (19 - 20)  SpO2: 96% (30 Nov 2020 11:24) (91% - 97%)    PHYSICAL EXAM:  GENERAL: NAD  HEENT:  anicteric, moist mucous membranes  CHEST/LUNG:  CTA b/l, no rales, wheezes, or rhonchi  HEART:  RRR, S1, S2  ABDOMEN:  BS+, soft, nontender, nondistended  EXTREMITIES: no edema, cyanosis, or calf tenderness  NERVOUS SYSTEM: answers questions and follows commands appropriately    LABS:                        10.2   5.60  )-----------( 466      ( 30 Nov 2020 06:05 )             32.6     CBC Full  -  ( 30 Nov 2020 06:05 )  WBC Count : 5.60 K/uL  Hemoglobin : 10.2 g/dL  Hematocrit : 32.6 %  Platelet Count - Automated : 466 K/uL  Mean Cell Volume : 89.3 fl  Mean Cell Hemoglobin : 27.9 pg  Mean Cell Hemoglobin Concentration : 31.3 gm/dL  Auto Neutrophil # : 3.53 K/uL  Auto Lymphocyte # : 1.43 K/uL  Auto Monocyte # : 0.41 K/uL  Auto Eosinophil # : 0.17 K/uL  Auto Basophil # : 0.05 K/uL  Auto Neutrophil % : 63.1 %  Auto Lymphocyte % : 25.5 %  Auto Monocyte % : 7.3 %  Auto Eosinophil % : 3.0 %  Auto Basophil % : 0.9 %    30 Nov 2020 06:05    145    |  110    |  9      ----------------------------<  102    4.2     |  29     |  0.73     Ca    9.1        30 Nov 2020 06:05  Phos  4.7       30 Nov 2020 06:05  Mg     2.2       30 Nov 2020 06:05    TPro  6.9    /  Alb  2.6    /  TBili  0.3    /  DBili  x      /  AST  18     /  ALT  16     /  AlkPhos  111    30 Nov 2020 06:05    PT/INR - ( 29 Nov 2020 01:54 )   PT: 13.4 sec;   INR: 1.15 ratio         PTT - ( 29 Nov 2020 17:54 )  PTT:74.9 sec    CAPILLARY BLOOD GLUCOSE            Culture - Urine (collected 11-28-20 @ 13:47)  Source: .Urine Clean Catch (Midstream)  Final Report (11-29-20 @ 13:04):    <10,000 CFU/mL Normal Urogenital Chanel    Culture - Blood (collected 11-28-20 @ 11:27)  Source: .Blood Blood-Venous  Preliminary Report (11-29-20 @ 12:01):    No growth to date.    Culture - Blood (collected 11-28-20 @ 11:27)  Source: .Blood Blood-Venous  Preliminary Report (11-29-20 @ 12:01):    No growth to date.        RADIOLOGY & ADDITIONAL TESTS:    Personally reviewed.     Consultant(s) Notes Reviewed:  [x] YES  [ ] NO     Patient is a 60y old  Female who presents with a chief complaint of PE (30 Nov 2020 11:28)      INTERVAL HPI/OVERNIGHT EVENTS: pt was seen and examined this morning. pt was laying down resting comfortably in bed without complaints. pt says that her headache has improved after having the heparin drop stopped and says that she has mild pain in the back of her left knee. pt denies any dizziness, lightheadedness, fevers, chills, chest pain, SOB, dysuria.     MEDICATIONS  (STANDING):  allopurinol 300 milliGRAM(s) Oral daily  apixaban 10 milliGRAM(s) Oral every 12 hours  cholecalciferol 5000 Unit(s) Oral daily  DULoxetine 90 milliGRAM(s) Oral daily  ferrous    sulfate 325 milliGRAM(s) Oral daily  folic acid 1 milliGRAM(s) Oral daily  lamoTRIgine 200 milliGRAM(s) Oral at bedtime  multivitamin 1 Tablet(s) Oral daily  traZODone 200 milliGRAM(s) Oral at bedtime  verapamil  milliGRAM(s) Oral daily    MEDICATIONS  (PRN):  acetaminophen 300 mG/codeine 30 mG 1 Tablet(s) Oral every 6 hours PRN Severe Pain (7 - 10)  ondansetron Injectable 4 milliGRAM(s) IV Push every 6 hours PRN Nausea and/or Vomiting      Allergies    penicillins (Other)  trees dogs cats cockaroaches (Rhinitis; Rhinorrhea)    Intolerances        REVIEW OF SYSTEMS:  CONSTITUTIONAL: No fever or chills  HEENT:  No headache, no sore throat  RESPIRATORY: No cough, wheezing, or shortness of breath  CARDIOVASCULAR: No chest pain, palpitations  GASTROINTESTINAL: No abd pain, nausea, vomiting, or diarrhea  GENITOURINARY: No dysuria, frequency, or hematuria  NEUROLOGICAL: no focal weakness or dizziness  MUSCULOSKELETAL: no myalgias     Vital Signs Last 24 Hrs  T(C): 36.7 (30 Nov 2020 07:37), Max: 37.1 (29 Nov 2020 19:36)  T(F): 98 (30 Nov 2020 07:37), Max: 98.7 (29 Nov 2020 19:36)  HR: 110 (30 Nov 2020 11:24) (76 - 110)  BP: 145/80 (30 Nov 2020 07:37) (134/78 - 145/80)  BP(mean): --  RR: 19 (30 Nov 2020 07:37) (19 - 20)  SpO2: 96% (30 Nov 2020 11:24) (91% - 97%)    PHYSICAL EXAM:  GENERAL: obese female in NAD  HEENT:  anicteric, moist mucous membranes  CHEST/LUNG:  CTA b/l, no rales, wheezes, or rhonchi  HEART:  RRR, S1, S2  ABDOMEN: , soft, nontender, nondistended  EXTREMITIES: mild edema and warmth in LLE   NERVOUS SYSTEM: answers questions and follows commands appropriately    LABS:                        10.2   5.60  )-----------( 466      ( 30 Nov 2020 06:05 )             32.6     CBC Full  -  ( 30 Nov 2020 06:05 )  WBC Count : 5.60 K/uL  Hemoglobin : 10.2 g/dL  Hematocrit : 32.6 %  Platelet Count - Automated : 466 K/uL  Mean Cell Volume : 89.3 fl  Mean Cell Hemoglobin : 27.9 pg  Mean Cell Hemoglobin Concentration : 31.3 gm/dL  Auto Neutrophil # : 3.53 K/uL  Auto Lymphocyte # : 1.43 K/uL  Auto Monocyte # : 0.41 K/uL  Auto Eosinophil # : 0.17 K/uL  Auto Basophil # : 0.05 K/uL  Auto Neutrophil % : 63.1 %  Auto Lymphocyte % : 25.5 %  Auto Monocyte % : 7.3 %  Auto Eosinophil % : 3.0 %  Auto Basophil % : 0.9 %    30 Nov 2020 06:05    145    |  110    |  9      ----------------------------<  102    4.2     |  29     |  0.73     Ca    9.1        30 Nov 2020 06:05  Phos  4.7       30 Nov 2020 06:05  Mg     2.2       30 Nov 2020 06:05    TPro  6.9    /  Alb  2.6    /  TBili  0.3    /  DBili  x      /  AST  18     /  ALT  16     /  AlkPhos  111    30 Nov 2020 06:05    PT/INR - ( 29 Nov 2020 01:54 )   PT: 13.4 sec;   INR: 1.15 ratio         PTT - ( 29 Nov 2020 17:54 )  PTT:74.9 sec    CAPILLARY BLOOD GLUCOSE            Culture - Urine (collected 11-28-20 @ 13:47)  Source: .Urine Clean Catch (Midstream)  Final Report (11-29-20 @ 13:04):    <10,000 CFU/mL Normal Urogenital Chanel    Culture - Blood (collected 11-28-20 @ 11:27)  Source: .Blood Blood-Venous  Preliminary Report (11-29-20 @ 12:01):    No growth to date.    Culture - Blood (collected 11-28-20 @ 11:27)  Source: .Blood Blood-Venous  Preliminary Report (11-29-20 @ 12:01):    No growth to date.        RADIOLOGY & ADDITIONAL TESTS:    Personally reviewed.     Consultant(s) Notes Reviewed:  [x] YES  [ ] NO     Patient is a 60y old  Female who presents with a chief complaint of PE (30 Nov 2020 11:28)      INTERVAL HPI/OVERNIGHT EVENTS: pt was seen and examined this morning. pt was laying down resting comfortably in bed without complaints. pt says that her headache has improved after having the heparin drop stopped and says that she has mild pain in the back of her left knee. pt denies any dizziness, lightheadedness, fevers, chills, chest pain, SOB, dysuria. Pt endorses two BMs yesterday.   As per Tele: NSR, HR 82, trending in the 80s and no overnight events    MEDICATIONS  (STANDING):  allopurinol 300 milliGRAM(s) Oral daily  apixaban 10 milliGRAM(s) Oral every 12 hours  cholecalciferol 5000 Unit(s) Oral daily  DULoxetine 90 milliGRAM(s) Oral daily  ferrous    sulfate 325 milliGRAM(s) Oral daily  folic acid 1 milliGRAM(s) Oral daily  lamoTRIgine 200 milliGRAM(s) Oral at bedtime  multivitamin 1 Tablet(s) Oral daily  traZODone 200 milliGRAM(s) Oral at bedtime  verapamil  milliGRAM(s) Oral daily    MEDICATIONS  (PRN):  acetaminophen 300 mG/codeine 30 mG 1 Tablet(s) Oral every 6 hours PRN Severe Pain (7 - 10)  ondansetron Injectable 4 milliGRAM(s) IV Push every 6 hours PRN Nausea and/or Vomiting      Allergies    penicillins (Other)  trees dogs cats cockaroaches (Rhinitis; Rhinorrhea)    Intolerances        REVIEW OF SYSTEMS:  CONSTITUTIONAL: No fever or chills  HEENT: headache, no sore throat  RESPIRATORY: No cough, wheezing, or shortness of breath  CARDIOVASCULAR: No chest pain, palpitations  GASTROINTESTINAL: No abd pain, nausea, vomiting, or diarrhea  GENITOURINARY: No dysuria, frequency, or hematuria  NEUROLOGICAL: no focal weakness or dizziness  MUSCULOSKELETAL: no myalgias     Vital Signs Last 24 Hrs  T(C): 36.7 (30 Nov 2020 07:37), Max: 37.1 (29 Nov 2020 19:36)  T(F): 98 (30 Nov 2020 07:37), Max: 98.7 (29 Nov 2020 19:36)  HR: 110 (30 Nov 2020 11:24) (76 - 110)  BP: 145/80 (30 Nov 2020 07:37) (134/78 - 145/80)  BP(mean): --  RR: 19 (30 Nov 2020 07:37) (19 - 20)  SpO2: 96% (30 Nov 2020 11:24) (91% - 97%)    PHYSICAL EXAM:  GENERAL: obese female in NAD  HEENT:  anicteric, moist mucous membranes  CHEST/LUNG:  CTA b/l, no rales, wheezes, or rhonchi  HEART:  RRR, S1, S2  ABDOMEN: , soft, nontender, nondistended  EXTREMITIES: mild edema, warmth, and mild pain on palpation of popliteal fossa   NERVOUS SYSTEM: answers questions and follows commands appropriately    LABS:                        10.2   5.60  )-----------( 466      ( 30 Nov 2020 06:05 )             32.6     CBC Full  -  ( 30 Nov 2020 06:05 )  WBC Count : 5.60 K/uL  Hemoglobin : 10.2 g/dL  Hematocrit : 32.6 %  Platelet Count - Automated : 466 K/uL  Mean Cell Volume : 89.3 fl  Mean Cell Hemoglobin : 27.9 pg  Mean Cell Hemoglobin Concentration : 31.3 gm/dL  Auto Neutrophil # : 3.53 K/uL  Auto Lymphocyte # : 1.43 K/uL  Auto Monocyte # : 0.41 K/uL  Auto Eosinophil # : 0.17 K/uL  Auto Basophil # : 0.05 K/uL  Auto Neutrophil % : 63.1 %  Auto Lymphocyte % : 25.5 %  Auto Monocyte % : 7.3 %  Auto Eosinophil % : 3.0 %  Auto Basophil % : 0.9 %    30 Nov 2020 06:05    145    |  110    |  9      ----------------------------<  102    4.2     |  29     |  0.73     Ca    9.1        30 Nov 2020 06:05  Phos  4.7       30 Nov 2020 06:05  Mg     2.2       30 Nov 2020 06:05    TPro  6.9    /  Alb  2.6    /  TBili  0.3    /  DBili  x      /  AST  18     /  ALT  16     /  AlkPhos  111    30 Nov 2020 06:05    PT/INR - ( 29 Nov 2020 01:54 )   PT: 13.4 sec;   INR: 1.15 ratio         PTT - ( 29 Nov 2020 17:54 )  PTT:74.9 sec    CAPILLARY BLOOD GLUCOSE            Culture - Urine (collected 11-28-20 @ 13:47)  Source: .Urine Clean Catch (Midstream)  Final Report (11-29-20 @ 13:04):    <10,000 CFU/mL Normal Urogenital Chanel    Culture - Blood (collected 11-28-20 @ 11:27)  Source: .Blood Blood-Venous  Preliminary Report (11-29-20 @ 12:01):    No growth to date.    Culture - Blood (collected 11-28-20 @ 11:27)  Source: .Blood Blood-Venous  Preliminary Report (11-29-20 @ 12:01):    No growth to date.        RADIOLOGY & ADDITIONAL TESTS:    Personally reviewed.     Consultant(s) Notes Reviewed:  [x] YES  [ ] NO     Patient is a 60y old  Female who presents with a chief complaint of PE (30 Nov 2020 11:28)      INTERVAL HPI/OVERNIGHT EVENTS: pt was seen and examined this morning. pt was laying down resting comfortably in bed without complaints. pt says that her headache has improved after having the heparin drop stopped and says that she has mild pain in the back of her left knee. pt denies any dizziness, lightheadedness, fevers, chills, chest pain, SOB, dysuria. Pt endorses two BMs yesterday.   As per Tele: NSR, HR 82, trending in the 80s and no overnight events    MEDICATIONS  (STANDING):  allopurinol 300 milliGRAM(s) Oral daily  apixaban 10 milliGRAM(s) Oral every 12 hours  cholecalciferol 5000 Unit(s) Oral daily  DULoxetine 90 milliGRAM(s) Oral daily  ferrous    sulfate 325 milliGRAM(s) Oral daily  folic acid 1 milliGRAM(s) Oral daily  lamoTRIgine 200 milliGRAM(s) Oral at bedtime  multivitamin 1 Tablet(s) Oral daily  traZODone 200 milliGRAM(s) Oral at bedtime  verapamil  milliGRAM(s) Oral daily    MEDICATIONS  (PRN):  acetaminophen 300 mG/codeine 30 mG 1 Tablet(s) Oral every 6 hours PRN Severe Pain (7 - 10)  ondansetron Injectable 4 milliGRAM(s) IV Push every 6 hours PRN Nausea and/or Vomiting      Allergies    penicillins (Other)  trees dogs cats cockaroaches (Rhinitis; Rhinorrhea)    Intolerances        REVIEW OF SYSTEMS:  CONSTITUTIONAL: No fever or chills  HEENT: headache, no sore throat  RESPIRATORY: No cough, wheezing, or shortness of breath  CARDIOVASCULAR: No chest pain, palpitations  GASTROINTESTINAL: No abd pain, nausea, vomiting, or diarrhea  GENITOURINARY: No dysuria, frequency, or hematuria  NEUROLOGICAL: no focal weakness or dizziness  MUSCULOSKELETAL: no myalgias     Vital Signs Last 24 Hrs  T(C): 36.7 (30 Nov 2020 07:37), Max: 37.1 (29 Nov 2020 19:36)  T(F): 98 (30 Nov 2020 07:37), Max: 98.7 (29 Nov 2020 19:36)  HR: 110 (30 Nov 2020 11:24) (76 - 110)  BP: 145/80 (30 Nov 2020 07:37) (134/78 - 145/80)  BP(mean): --  RR: 19 (30 Nov 2020 07:37) (19 - 20)  SpO2: 96% (30 Nov 2020 11:24) (91% - 97%)    PHYSICAL EXAM:  GENERAL: obese female in NAD  HEENT:  anicteric, moist mucous membranes  CHEST/LUNG:  CTA b/l, no rales, wheezes, or rhonchi  HEART:  RRR, S1, S2  ABDOMEN: , soft, nontender, nondistended  EXTREMITIES: mild edema, warmth, and mild pain on palpation of LLE popliteal fossa   NERVOUS SYSTEM: answers questions and follows commands appropriately    LABS:                        10.2   5.60  )-----------( 466      ( 30 Nov 2020 06:05 )             32.6     CBC Full  -  ( 30 Nov 2020 06:05 )  WBC Count : 5.60 K/uL  Hemoglobin : 10.2 g/dL  Hematocrit : 32.6 %  Platelet Count - Automated : 466 K/uL  Mean Cell Volume : 89.3 fl  Mean Cell Hemoglobin : 27.9 pg  Mean Cell Hemoglobin Concentration : 31.3 gm/dL  Auto Neutrophil # : 3.53 K/uL  Auto Lymphocyte # : 1.43 K/uL  Auto Monocyte # : 0.41 K/uL  Auto Eosinophil # : 0.17 K/uL  Auto Basophil # : 0.05 K/uL  Auto Neutrophil % : 63.1 %  Auto Lymphocyte % : 25.5 %  Auto Monocyte % : 7.3 %  Auto Eosinophil % : 3.0 %  Auto Basophil % : 0.9 %    30 Nov 2020 06:05    145    |  110    |  9      ----------------------------<  102    4.2     |  29     |  0.73     Ca    9.1        30 Nov 2020 06:05  Phos  4.7       30 Nov 2020 06:05  Mg     2.2       30 Nov 2020 06:05    TPro  6.9    /  Alb  2.6    /  TBili  0.3    /  DBili  x      /  AST  18     /  ALT  16     /  AlkPhos  111    30 Nov 2020 06:05    PT/INR - ( 29 Nov 2020 01:54 )   PT: 13.4 sec;   INR: 1.15 ratio         PTT - ( 29 Nov 2020 17:54 )  PTT:74.9 sec    CAPILLARY BLOOD GLUCOSE            Culture - Urine (collected 11-28-20 @ 13:47)  Source: .Urine Clean Catch (Midstream)  Final Report (11-29-20 @ 13:04):    <10,000 CFU/mL Normal Urogenital Chanel    Culture - Blood (collected 11-28-20 @ 11:27)  Source: .Blood Blood-Venous  Preliminary Report (11-29-20 @ 12:01):    No growth to date.    Culture - Blood (collected 11-28-20 @ 11:27)  Source: .Blood Blood-Venous  Preliminary Report (11-29-20 @ 12:01):    No growth to date.        RADIOLOGY & ADDITIONAL TESTS:    Personally reviewed.     Consultant(s) Notes Reviewed:  [x] YES  [ ] NO     Patient is a 60y old  Female who presents with a chief complaint of PE (30 Nov 2020 11:28)      INTERVAL HPI/OVERNIGHT EVENTS: pt was seen and examined this morning. pt was laying down resting comfortably in bed without complaints. pt says that her headache has improved after having the heparin drop stopped and says that she has mild pain in the back of her left knee with palpation. pt denies any dizziness, lightheadedness, fevers, chills, chest pain, SOB, dysuria. Pt endorses two BMs yesterday.   Tele: NSR, HR 82, trending in the 80s and no overnight events    MEDICATIONS  (STANDING):  allopurinol 300 milliGRAM(s) Oral daily  apixaban 10 milliGRAM(s) Oral every 12 hours  cholecalciferol 5000 Unit(s) Oral daily  DULoxetine 90 milliGRAM(s) Oral daily  ferrous    sulfate 325 milliGRAM(s) Oral daily  folic acid 1 milliGRAM(s) Oral daily  lamoTRIgine 200 milliGRAM(s) Oral at bedtime  multivitamin 1 Tablet(s) Oral daily  traZODone 200 milliGRAM(s) Oral at bedtime  verapamil  milliGRAM(s) Oral daily    MEDICATIONS  (PRN):  acetaminophen 300 mG/codeine 30 mG 1 Tablet(s) Oral every 6 hours PRN Severe Pain (7 - 10)  ondansetron Injectable 4 milliGRAM(s) IV Push every 6 hours PRN Nausea and/or Vomiting      Allergies    penicillins (Other)  trees dogs cats cockaroaches (Rhinitis; Rhinorrhea)    Intolerances        REVIEW OF SYSTEMS:  CONSTITUTIONAL: No fever or chills  HEENT: +headache, no sore throat  RESPIRATORY: No cough, wheezing, or shortness of breath  CARDIOVASCULAR: No chest pain, palpitations  GASTROINTESTINAL: No abd pain, nausea, vomiting, or diarrhea  GENITOURINARY: No dysuria, frequency, or hematuria  NEUROLOGICAL: no focal weakness or dizziness  MUSCULOSKELETAL: no myalgias     Vital Signs Last 24 Hrs  T(C): 36.7 (30 Nov 2020 07:37), Max: 37.1 (29 Nov 2020 19:36)  T(F): 98 (30 Nov 2020 07:37), Max: 98.7 (29 Nov 2020 19:36)  HR: 110 (30 Nov 2020 11:24) (76 - 110)  BP: 145/80 (30 Nov 2020 07:37) (134/78 - 145/80)  BP(mean): --  RR: 19 (30 Nov 2020 07:37) (19 - 20)  SpO2: 96% (30 Nov 2020 11:24) (91% - 97%)    PHYSICAL EXAM:  GENERAL: obese female in NAD  HEENT:  anicteric, moist mucous membranes  CHEST/LUNG:  CTA b/l, no rales, wheezes, or rhonchi  HEART:  RRR, S1, S2  ABDOMEN: , soft, nontender, nondistended  EXTREMITIES: mild edema, warmth, and mild pain on palpation of LLE popliteal fossa   NERVOUS SYSTEM: answers questions and follows commands appropriately    LABS:                        10.2   5.60  )-----------( 466      ( 30 Nov 2020 06:05 )             32.6     CBC Full  -  ( 30 Nov 2020 06:05 )  WBC Count : 5.60 K/uL  Hemoglobin : 10.2 g/dL  Hematocrit : 32.6 %  Platelet Count - Automated : 466 K/uL  Mean Cell Volume : 89.3 fl  Mean Cell Hemoglobin : 27.9 pg  Mean Cell Hemoglobin Concentration : 31.3 gm/dL  Auto Neutrophil # : 3.53 K/uL  Auto Lymphocyte # : 1.43 K/uL  Auto Monocyte # : 0.41 K/uL  Auto Eosinophil # : 0.17 K/uL  Auto Basophil # : 0.05 K/uL  Auto Neutrophil % : 63.1 %  Auto Lymphocyte % : 25.5 %  Auto Monocyte % : 7.3 %  Auto Eosinophil % : 3.0 %  Auto Basophil % : 0.9 %    30 Nov 2020 06:05    145    |  110    |  9      ----------------------------<  102    4.2     |  29     |  0.73     Ca    9.1        30 Nov 2020 06:05  Phos  4.7       30 Nov 2020 06:05  Mg     2.2       30 Nov 2020 06:05    TPro  6.9    /  Alb  2.6    /  TBili  0.3    /  DBili  x      /  AST  18     /  ALT  16     /  AlkPhos  111    30 Nov 2020 06:05    PT/INR - ( 29 Nov 2020 01:54 )   PT: 13.4 sec;   INR: 1.15 ratio         PTT - ( 29 Nov 2020 17:54 )  PTT:74.9 sec    CAPILLARY BLOOD GLUCOSE            Culture - Urine (collected 11-28-20 @ 13:47)  Source: .Urine Clean Catch (Midstream)  Final Report (11-29-20 @ 13:04):    <10,000 CFU/mL Normal Urogenital Chanel    Culture - Blood (collected 11-28-20 @ 11:27)  Source: .Blood Blood-Venous  Preliminary Report (11-29-20 @ 12:01):    No growth to date.    Culture - Blood (collected 11-28-20 @ 11:27)  Source: .Blood Blood-Venous  Preliminary Report (11-29-20 @ 12:01):    No growth to date.        RADIOLOGY & ADDITIONAL TESTS:    Personally reviewed.     Consultant(s) Notes Reviewed:  [x] YES  [ ] NO     Patient is a 60y old  Female who presents with a chief complaint of PE (30 Nov 2020 11:28)      INTERVAL HPI/OVERNIGHT EVENTS: pt was seen and examined this morning. pt was laying down resting comfortably in bed without complaints. pt says that her headache has improved after having the heparin drop stopped and says that she has mild pain in the back of her left knee with palpation. pt denies any dizziness, lightheadedness, fevers, chills, chest pain, SOB, dysuria. Pt endorses two BMs yesterday.   Tele: NSR, HR 82, trending in the 80s and no overnight events    MEDICATIONS  (STANDING):  allopurinol 300 milliGRAM(s) Oral daily  apixaban 10 milliGRAM(s) Oral every 12 hours  cholecalciferol 5000 Unit(s) Oral daily  DULoxetine 90 milliGRAM(s) Oral daily  ferrous    sulfate 325 milliGRAM(s) Oral daily  folic acid 1 milliGRAM(s) Oral daily  lamoTRIgine 200 milliGRAM(s) Oral at bedtime  multivitamin 1 Tablet(s) Oral daily  traZODone 200 milliGRAM(s) Oral at bedtime  verapamil  milliGRAM(s) Oral daily    MEDICATIONS  (PRN):  acetaminophen 300 mG/codeine 30 mG 1 Tablet(s) Oral every 6 hours PRN Severe Pain (7 - 10)  ondansetron Injectable 4 milliGRAM(s) IV Push every 6 hours PRN Nausea and/or Vomiting      Allergies    penicillins (Other)  trees dogs cats cockaroaches (Rhinitis; Rhinorrhea)    Intolerances        REVIEW OF SYSTEMS:  CONSTITUTIONAL: No fever or chills  HEENT: +headache, no sore throat  RESPIRATORY: No cough, wheezing, or shortness of breath  CARDIOVASCULAR: No chest pain, palpitations  GASTROINTESTINAL: No abd pain, nausea, vomiting, or diarrhea  GENITOURINARY: No dysuria, frequency, or hematuria  NEUROLOGICAL: no focal weakness or dizziness  MUSCULOSKELETAL: no myalgias     Vital Signs Last 24 Hrs  T(C): 36.7 (30 Nov 2020 07:37), Max: 37.1 (29 Nov 2020 19:36)  T(F): 98 (30 Nov 2020 07:37), Max: 98.7 (29 Nov 2020 19:36)  HR: 110 (30 Nov 2020 11:24) (76 - 110)  BP: 145/80 (30 Nov 2020 07:37) (134/78 - 145/80)  BP(mean): --  RR: 19 (30 Nov 2020 07:37) (19 - 20)  SpO2: 96% (30 Nov 2020 11:24) (91% - 97%)    PHYSICAL EXAM:  GENERAL: obese female in NAD  HEENT:  anicteric, moist mucous membranes  CHEST/LUNG:  CTA b/l, no rales, wheezes, or rhonchi  HEART:  RRR, S1, S2  ABDOMEN: , soft, nontender, nondistended, wound vac visualized.   EXTREMITIES: mild edema, warmth, and mild pain on palpation of LLE popliteal fossa   NERVOUS SYSTEM: answers questions and follows commands appropriately    LABS:                        10.2   5.60  )-----------( 466      ( 30 Nov 2020 06:05 )             32.6     CBC Full  -  ( 30 Nov 2020 06:05 )  WBC Count : 5.60 K/uL  Hemoglobin : 10.2 g/dL  Hematocrit : 32.6 %  Platelet Count - Automated : 466 K/uL  Mean Cell Volume : 89.3 fl  Mean Cell Hemoglobin : 27.9 pg  Mean Cell Hemoglobin Concentration : 31.3 gm/dL  Auto Neutrophil # : 3.53 K/uL  Auto Lymphocyte # : 1.43 K/uL  Auto Monocyte # : 0.41 K/uL  Auto Eosinophil # : 0.17 K/uL  Auto Basophil # : 0.05 K/uL  Auto Neutrophil % : 63.1 %  Auto Lymphocyte % : 25.5 %  Auto Monocyte % : 7.3 %  Auto Eosinophil % : 3.0 %  Auto Basophil % : 0.9 %    30 Nov 2020 06:05    145    |  110    |  9      ----------------------------<  102    4.2     |  29     |  0.73     Ca    9.1        30 Nov 2020 06:05  Phos  4.7       30 Nov 2020 06:05  Mg     2.2       30 Nov 2020 06:05    TPro  6.9    /  Alb  2.6    /  TBili  0.3    /  DBili  x      /  AST  18     /  ALT  16     /  AlkPhos  111    30 Nov 2020 06:05    PT/INR - ( 29 Nov 2020 01:54 )   PT: 13.4 sec;   INR: 1.15 ratio         PTT - ( 29 Nov 2020 17:54 )  PTT:74.9 sec    CAPILLARY BLOOD GLUCOSE            Culture - Urine (collected 11-28-20 @ 13:47)  Source: .Urine Clean Catch (Midstream)  Final Report (11-29-20 @ 13:04):    <10,000 CFU/mL Normal Urogenital Chanel    Culture - Blood (collected 11-28-20 @ 11:27)  Source: .Blood Blood-Venous  Preliminary Report (11-29-20 @ 12:01):    No growth to date.    Culture - Blood (collected 11-28-20 @ 11:27)  Source: .Blood Blood-Venous  Preliminary Report (11-29-20 @ 12:01):    No growth to date.        RADIOLOGY & ADDITIONAL TESTS:    Personally reviewed.     Consultant(s) Notes Reviewed:  [x] YES  [ ] NO

## 2020-11-30 NOTE — DISCHARGE NOTE PROVIDER - CARE PROVIDER_API CALL
Honorio Brown  FAMILY MEDICINE  Internal Medicine, [address] [facility] [address] [place] [address]  Phone: [phone]  Fax: [phone]  Follow Up Time: 1 week    Duarte Nunez)  Cardiovascular Disease  [address] [place] [address]  Phone: [phone]  Fax: [phone]  Follow Up Time: 1 week    Cinthia Pelayo  MEDICAL ONCOLOGY  [address] [facility], [address] [place] [address]  Phone: [phone]  Fax: [phone]  Established Patient  Scheduled Appointment: 12/04/2020

## 2020-11-30 NOTE — PROGRESS NOTE ADULT - SUBJECTIVE AND OBJECTIVE BOX
[facility] -- Almas Faustin, Cara Nunez, Saurabh Johansen Savella, Goodger  Office # [phone]    Follow Up:  Acute PE/DVT    Subjective/Observations: Laying flat on RA.  Denies SOB, PARDO.  Denies further back/pleuritic pain.  Denies any chest discomfort    REVIEW OF SYSTEMS: All other review of systems is negative unless indicated above  PAST MEDICAL & SURGICAL HISTORY:  History of aortic aneurysm  descending aorta see CT    Arthritis    Degenerative disc disease, lumbar    Neuropathy    Spinal stenosis    Bipolar 1 disorder    Kidney stone    Sleep Apnea  CPAP with oxygen since June 2020    Asthma    Hypertension    Morbid Obesity    ETOH Abuse  H/O    Benign Essential Tremor    Anxiety    Depression    Renal Calculi  chronic      Colitis  H/O    Anemia    S/P panniculectomy    S/P cardiac catheterization    S/P dilation and curettage    History of tonsillectomy    History of laparoscopic adjustable gastric banding  band removed few yrs ago    S/P D&amp;C    Biliary stent placement &amp; ercp    ureteroscopy with stone removal  1-      MEDICATIONS  (STANDING):  allopurinol 300 milliGRAM(s) Oral daily  apixaban 10 milliGRAM(s) Oral every 12 hours  cholecalciferol 5000 Unit(s) Oral daily  DULoxetine 90 milliGRAM(s) Oral daily  ferrous    sulfate 325 milliGRAM(s) Oral daily  folic acid 1 milliGRAM(s) Oral daily  lamoTRIgine 200 milliGRAM(s) Oral at bedtime  multivitamin 1 Tablet(s) Oral daily  traZODone 200 milliGRAM(s) Oral at bedtime  verapamil  milliGRAM(s) Oral daily    MEDICATIONS  (PRN):  acetaminophen 300 mG/codeine 30 mG 1 Tablet(s) Oral every 6 hours PRN Severe Pain (7 - 10)  ondansetron Injectable 4 milliGRAM(s) IV Push every 6 hours PRN Nausea and/or Vomiting    Allergies    penicillins (Other)  trees dogs cats cockaroaches (Rhinitis; Rhinorrhea)    Intolerances    Vital Signs Last 24 Hrs  T(C): 36.7 (30 Nov 2020 07:37), Max: 37.1 (29 Nov 2020 19:36)  T(F): 98 (30 Nov 2020 07:37), Max: 98.7 (29 Nov 2020 19:36)  HR: 90 (30 Nov 2020 07:37) (76 - 91)  BP: 145/80 (30 Nov 2020 07:37) (134/78 - 145/80)  BP(mean): --  RR: 19 (30 Nov 2020 07:37) (19 - 20)  SpO2: 91% (30 Nov 2020 07:37) (91% - 97%)  I&O's Summary    29 Nov 2020 07:01  -  30 Nov 2020 07:00  --------------------------------------------------------  IN: 270 mL / OUT: 1100 mL / NET: -830 mL     PHYSICAL EXAM:  TELE: NSR  Constitutional: NAD, awake and alert, morbidly obese  HEENT: Moist Mucous Membranes, Anicteric  Pulmonary: Non-labored, breath sounds are clear bilaterally, No wheezing, rales or rhonchi  Cardiovascular: Regular, S1 and S2, +murmurs, rubs, gallops or clicks  Gastrointestinal: Bowel Sounds present, soft, nontender.   Lymph: No peripheral edema. No lymphadenopathy.  Skin: No visible rashes.  Lower abdomen with vac dressing   Psych:  Mood & affect appropriate    LABS: All Labs Reviewed:                        10.2   5.60  )-----------( 466      ( 30 Nov 2020 06:05 )             32.6                         10.2   6.71  )-----------( 475      ( 29 Nov 2020 14:20 )             32.4                         9.7    5.98  )-----------( 455      ( 29 Nov 2020 08:45 )             32.0     30 Nov 2020 06:05    145    |  110    |  9      ----------------------------<  102    4.2     |  29     |  0.73   29 Nov 2020 08:45    147    |  111    |  14     ----------------------------<  108    4.3     |  29     |  0.74   28 Nov 2020 07:33    144    |  111    |  18     ----------------------------<  145    3.8     |  23     |  0.97     Ca    9.1        30 Nov 2020 06:05  Ca    9.3        29 Nov 2020 08:45  Ca    9.2        28 Nov 2020 07:33  Phos  4.7       30 Nov 2020 06:05  Phos  4.8       29 Nov 2020 08:45  Mg     2.2       30 Nov 2020 06:05  Mg     2.4       29 Nov 2020 08:45    TPro  6.9    /  Alb  2.6    /  TBili  0.3    /  DBili  x      /  AST  18     /  ALT  16     /  AlkPhos  111    30 Nov 2020 06:05  TPro  6.8    /  Alb  2.5    /  TBili  0.2    /  DBili  x      /  AST  17     /  ALT  12     /  AlkPhos  116    29 Nov 2020 08:45  TPro  7.5    /  Alb  2.7    /  TBili  0.2    /  DBili  x      /  AST  16     /  ALT  14     /  AlkPhos  129    28 Nov 2020 07:33    PT/INR - ( 29 Nov 2020 01:54 )   PT: 13.4 sec;   INR: 1.15 ratio    PTT - ( 29 Nov 2020 17:54 )  PTT:74.9 sec    EXAM:  CT ANGIO CHEST (W)AW IC                          PROCEDURE DATE:  11/28/2020      INTERPRETATION:  CLINICAL INFORMATION: Shortness of breath. Assess for pulmonary embolism. High pretest probability.    COMPARISON: Noncontrast chest CT from 10/13/2020    PROCEDURE:  CT Angiography of the Chest.  90 ml of Omnipaque 350 was injected intravenously. 10 ml were discarded.  Sagittal and coronal reformats were performed as well as 3D (MIP) reconstructions.    FINDINGS:    LUNGS AND AIRWAYS: Motion artifact limits evaluation. 5 mm calcified granuloma in the right upper lobe.  1.2 x 1.0 cm nodular density at the left lung base posteriorly on series 2 image 73. A larger groundglass opacity measuring 2 x 1.3 cm was present in this region on the prior study from 10/13/2020.  PLEURA: No pleural effusion.  MEDIASTINUM AND MILADIS: No lymphadenopathy.  VESSELS: Motion artifact limits evaluation of the pulmonary vessels. Nonetheless, emboli is seen in the right lower lobe pulmonary artery on series 2 images 51 and 52. Additional smaller subsegmental emboli are limited in evaluation.  HEART: Heart size is normal. No pericardial effusion.  CHEST WALL AND LOWER NECK: 1.7 x 1.5 cm low-attenuation nodule in the right lobe of the thyroid gland inferiorly which was present on the prior recent study. This may have been present on 2/16/2016 although artifact limits evaluation. If this has not previously been characterized, nonemergent thyroid ultrasound may be performed..  VISUALIZED UPPER ABDOMEN: Staghorn calculus in the upper pole of the right kidney without hydronephrosis.  BONES: Degenerative changes of bone.    IMPRESSION:  Thrombus seen in the right lower lobe pulmonary artery. Motion artifact limits evaluation of more distal subsegmental branches. This critical value was discussed with Dr. Cartagena in the emergency room at approximately 10:45 AM on 11/20/2020.    1.2 x 1.0 cm nodule in the left lower lobe. This was larger in size and groundglass opacity 1 1/2 months prior. Thismay be due to infectious or inflammatory etiology. Short interval follow-up with low-dose noncontrast chest CT scan in 3 months is recommended.    1.7 x 1.5 cm low-attenuation nodule in the right lobe of the thyroid gland which is unchanged when compared with 10/13/2020. This may have been present on 2/16/2016 although artifact limits evaluation. If this has not previously been characterized, nonemergent thyroid ultrasound may be performed.    HEATHER ARMIJO MD; Attending Radiologist  This document has been electronically signed. Nov 28 2020 10:50AM    Ventricular Rate 91 BPM    Atrial Rate 90 BPM    QRS Duration 88 ms    Q-T Interval 358 ms    QTC Calculation(Bazett) 440 ms    R Axis -43 degrees    T Axis 41 degrees    Diagnosis Line Normal sinus rhythm  Left axis deviation  Moderate voltage criteria for LVH, may be normal variant  Nonspecific ST abnormality  Abnormal ECG  When compared with ECG of 13-OCT-2020 12:56,  No significant change was found  Confirmed by Lenin Marroquin MD ([id number]) on 11/28/2020 2:22:56 PM          [facility] -- Almas Faustin, Cara Nunez, Saurabh Johansen Savella, Goodger  Office # [phone]    Follow Up:  Acute PE/DVT    Subjective/Observations: Laying flat on RA.  Denies SOB, PARDO.  Denies further back/pleuritic pain.  Denies any chest discomfort    REVIEW OF SYSTEMS: All other review of systems is negative unless indicated above    PAST MEDICAL & SURGICAL HISTORY:  History of aortic aneurysm  descending aorta see CT    Arthritis    Degenerative disc disease, lumbar    Neuropathy    Spinal stenosis    Bipolar 1 disorder    Kidney stone    Sleep Apnea  CPAP with oxygen since June 2020    Asthma    Hypertension    Morbid Obesity    ETOH Abuse  H/O    Benign Essential Tremor    Anxiety    Depression    Renal Calculi  chronic      Colitis  H/O    Anemia    S/P panniculectomy    S/P cardiac catheterization    S/P dilation and curettage    History of tonsillectomy    History of laparoscopic adjustable gastric banding  band removed few yrs ago    S/P D&amp;C    Biliary stent placement &amp; ercp    ureteroscopy with stone removal  1-      MEDICATIONS  (STANDING):  allopurinol 300 milliGRAM(s) Oral daily  apixaban 10 milliGRAM(s) Oral every 12 hours  cholecalciferol 5000 Unit(s) Oral daily  DULoxetine 90 milliGRAM(s) Oral daily  ferrous    sulfate 325 milliGRAM(s) Oral daily  folic acid 1 milliGRAM(s) Oral daily  lamoTRIgine 200 milliGRAM(s) Oral at bedtime  multivitamin 1 Tablet(s) Oral daily  traZODone 200 milliGRAM(s) Oral at bedtime  verapamil  milliGRAM(s) Oral daily    MEDICATIONS  (PRN):  acetaminophen 300 mG/codeine 30 mG 1 Tablet(s) Oral every 6 hours PRN Severe Pain (7 - 10)  ondansetron Injectable 4 milliGRAM(s) IV Push every 6 hours PRN Nausea and/or Vomiting    Allergies    penicillins (Other)  trees dogs cats cockaroaches (Rhinitis; Rhinorrhea)    Intolerances    Vital Signs Last 24 Hrs  T(C): 36.7 (30 Nov 2020 07:37), Max: 37.1 (29 Nov 2020 19:36)  T(F): 98 (30 Nov 2020 07:37), Max: 98.7 (29 Nov 2020 19:36)  HR: 90 (30 Nov 2020 07:37) (76 - 91)  BP: 145/80 (30 Nov 2020 07:37) (134/78 - 145/80)  BP(mean): --  RR: 19 (30 Nov 2020 07:37) (19 - 20)  SpO2: 91% (30 Nov 2020 07:37) (91% - 97%)  I&O's Summary    29 Nov 2020 07:01  -  30 Nov 2020 07:00  --------------------------------------------------------  IN: 270 mL / OUT: 1100 mL / NET: -830 mL     PHYSICAL EXAM:  TELE: NSR  Constitutional: NAD, awake and alert, morbidly obese  HEENT: Moist Mucous Membranes, Anicteric  Pulmonary: Decreased breath sounds b/l. No rales, crackles or wheeze appreciated.   Cardiovascular: Regular, S1 and S2, +murmurs, rubs, gallops or clicks  Gastrointestinal: Bowel Sounds present, soft, nontender.   Lymph: + peripheral edema. No lymphadenopathy.  Skin: No visible rashes.  Lower abdomen with vac dressing   Psych:  Mood & affect appropriate    LABS: All Labs Reviewed:                        10.2   5.60  )-----------( 466      ( 30 Nov 2020 06:05 )             32.6                         10.2   6.71  )-----------( 475      ( 29 Nov 2020 14:20 )             32.4                         9.7    5.98  )-----------( 455      ( 29 Nov 2020 08:45 )             32.0     30 Nov 2020 06:05    145    |  110    |  9      ----------------------------<  102    4.2     |  29     |  0.73   29 Nov 2020 08:45    147    |  111    |  14     ----------------------------<  108    4.3     |  29     |  0.74   28 Nov 2020 07:33    144    |  111    |  18     ----------------------------<  145    3.8     |  23     |  0.97     Ca    9.1        30 Nov 2020 06:05  Ca    9.3        29 Nov 2020 08:45  Ca    9.2        28 Nov 2020 07:33  Phos  4.7       30 Nov 2020 06:05  Phos  4.8       29 Nov 2020 08:45  Mg     2.2       30 Nov 2020 06:05  Mg     2.4       29 Nov 2020 08:45    TPro  6.9    /  Alb  2.6    /  TBili  0.3    /  DBili  x      /  AST  18     /  ALT  16     /  AlkPhos  111    30 Nov 2020 06:05  TPro  6.8    /  Alb  2.5    /  TBili  0.2    /  DBili  x      /  AST  17     /  ALT  12     /  AlkPhos  116    29 Nov 2020 08:45  TPro  7.5    /  Alb  2.7    /  TBili  0.2    /  DBili  x      /  AST  16     /  ALT  14     /  AlkPhos  129    28 Nov 2020 07:33    PT/INR - ( 29 Nov 2020 01:54 )   PT: 13.4 sec;   INR: 1.15 ratio    PTT - ( 29 Nov 2020 17:54 )  PTT:74.9 sec    EXAM:  CT ANGIO CHEST (W)AW IC                          PROCEDURE DATE:  11/28/2020      INTERPRETATION:  CLINICAL INFORMATION: Shortness of breath. Assess for pulmonary embolism. High pretest probability.    COMPARISON: Noncontrast chest CT from 10/13/2020    PROCEDURE:  CT Angiography of the Chest.  90 ml of Omnipaque 350 was injected intravenously. 10 ml were discarded.  Sagittal and coronal reformats were performed as well as 3D (MIP) reconstructions.    FINDINGS:    LUNGS AND AIRWAYS: Motion artifact limits evaluation. 5 mm calcified granuloma in the right upper lobe.  1.2 x 1.0 cm nodular density at the left lung base posteriorly on series 2 image 73. A larger groundglass opacity measuring 2 x 1.3 cm was present in this region on the prior study from 10/13/2020.  PLEURA: No pleural effusion.  MEDIASTINUM AND MILADIS: No lymphadenopathy.  VESSELS: Motion artifact limits evaluation of the pulmonary vessels. Nonetheless, emboli is seen in the right lower lobe pulmonary artery on series 2 images 51 and 52. Additional smaller subsegmental emboli are limited in evaluation.  HEART: Heart size is normal. No pericardial effusion.  CHEST WALL AND LOWER NECK: 1.7 x 1.5 cm low-attenuation nodule in the right lobe of the thyroid gland inferiorly which was present on the prior recent study. This may have been present on 2/16/2016 although artifact limits evaluation. If this has not previously been characterized, nonemergent thyroid ultrasound may be performed..  VISUALIZED UPPER ABDOMEN: Staghorn calculus in the upper pole of the right kidney without hydronephrosis.  BONES: Degenerative changes of bone.    IMPRESSION:  Thrombus seen in the right lower lobe pulmonary artery. Motion artifact limits evaluation of more distal subsegmental branches. This critical value was discussed with Dr. Cartagena in the emergency room at approximately 10:45 AM on 11/20/2020.    1.2 x 1.0 cm nodule in the left lower lobe. This was larger in size and groundglass opacity 1 1/2 months prior. Thismay be due to infectious or inflammatory etiology. Short interval follow-up with low-dose noncontrast chest CT scan in 3 months is recommended.    1.7 x 1.5 cm low-attenuation nodule in the right lobe of the thyroid gland which is unchanged when compared with 10/13/2020. This may have been present on 2/16/2016 although artifact limits evaluation. If this has not previously been characterized, nonemergent thyroid ultrasound may be performed.    HEATHER ARMIJO MD; Attending Radiologist  This document has been electronically signed. Nov 28 2020 10:50AM    Ventricular Rate 91 BPM    Atrial Rate 90 BPM    QRS Duration 88 ms    Q-T Interval 358 ms    QTC Calculation(Bazett) 440 ms    R Axis -43 degrees    T Axis 41 degrees    Diagnosis Line Normal sinus rhythm  Left axis deviation  Moderate voltage criteria for LVH, may be normal variant  Nonspecific ST abnormality  Abnormal ECG  When compared with ECG of 13-OCT-2020 12:56,  No significant change was found  Confirmed by Lenin Marroquin MD ([id number]) on 11/28/2020 2:22:56 PM

## 2020-12-03 LAB
CULTURE RESULTS: SIGNIFICANT CHANGE UP
CULTURE RESULTS: SIGNIFICANT CHANGE UP
SPECIMEN SOURCE: SIGNIFICANT CHANGE UP
SPECIMEN SOURCE: SIGNIFICANT CHANGE UP

## 2020-12-06 RX ORDER — APIXABAN 2.5 MG/1
1 TABLET, FILM COATED ORAL
Qty: 360 | Refills: 0
Start: 2020-12-06 | End: 2021-06-03

## 2020-12-15 ENCOUNTER — NON-APPOINTMENT (OUTPATIENT)
Age: 60
End: 2020-12-15

## 2020-12-15 ENCOUNTER — APPOINTMENT (OUTPATIENT)
Dept: CARDIOLOGY | Facility: CLINIC | Age: 60
End: 2020-12-15
Payer: COMMERCIAL

## 2020-12-15 VITALS
DIASTOLIC BLOOD PRESSURE: 101 MMHG | SYSTOLIC BLOOD PRESSURE: 147 MMHG | OXYGEN SATURATION: 99 % | HEIGHT: 66 IN | BODY MASS INDEX: 40.66 KG/M2 | HEART RATE: 121 BPM | WEIGHT: 253 LBS

## 2020-12-15 VITALS — DIASTOLIC BLOOD PRESSURE: 80 MMHG | SYSTOLIC BLOOD PRESSURE: 132 MMHG

## 2020-12-15 PROCEDURE — 99072 ADDL SUPL MATRL&STAF TM PHE: CPT

## 2020-12-15 PROCEDURE — 99214 OFFICE O/P EST MOD 30 MIN: CPT

## 2020-12-15 PROCEDURE — 93000 ELECTROCARDIOGRAM COMPLETE: CPT

## 2020-12-15 NOTE — HISTORY OF PRESENT ILLNESS
[FreeTextEntry1] : Ewelina is a 60 year old female here for evaluation.\par \par Cardiac-wise, she has a history of obesity, obstructive sleep apnea, and hypertension. She reports an abnormal stress test about 7 or 8 years ago, for which she had an angiogram, and it showed no significant plaque. She also has a history of aortic aneurysm. Her last CT was in 12/2018. This demonstrated an aneurysmal dilation of the ascending thoracic aorta, with a maximal transverse diameter of 4.4 cm.\par \par I last saw her in 9/2020. Prior to last visit, she presented to the ER with acute shortness of breath. A CT was negative for PE, and confirmed an aneurysm of 4.3 cm. Her BNP was minimal. Her troponins were negative, though given her risk, she was transferred for cardiac catheterization, which showed nonobstructive CAD.\par \par Since last visit, she had a panniculectomy in 10/2020, and a complicated post-op course with hemorrhagic shock, intermittent fevers and ELAINE. She also had PAF in the setting of hypotension. She was later re-admitted in late 11/2020 with LLE swelling, and found to have an extensive DVT and right sided PE. She is now on eliquis; she had some hematuria last week, which has now resolved.\par Her BP has been in the 120 range when checked by a nurse who comes to her house. Her breathign is better.\par

## 2020-12-15 NOTE — PHYSICAL EXAM
[General Appearance - Well Developed] : well developed [Normal Appearance] : normal appearance [Well Groomed] : well groomed [General Appearance - Well Nourished] : well nourished [No Deformities] : no deformities [General Appearance - In No Acute Distress] : no acute distress [Normal Conjunctiva] : the conjunctiva exhibited no abnormalities [Eyelids - No Xanthelasma] : the eyelids demonstrated no xanthelasmas [Normal Oral Mucosa] : normal oral mucosa [No Oral Pallor] : no oral pallor [No Oral Cyanosis] : no oral cyanosis [Normal Jugular Venous A Waves Present] : normal jugular venous A waves present [Normal Jugular Venous V Waves Present] : normal jugular venous V waves present [No Jugular Venous Ames A Waves] : no jugular venous ames A waves [Respiration, Rhythm And Depth] : normal respiratory rhythm and effort [Exaggerated Use Of Accessory Muscles For Inspiration] : no accessory muscle use [Auscultation Breath Sounds / Voice Sounds] : lungs were clear to auscultation bilaterally [Abdomen Soft] : soft [Abdomen Tenderness] : non-tender [Abdomen Mass (___ Cm)] : no abdominal mass palpated [Abnormal Walk] : normal gait [Gait - Sufficient For Exercise Testing] : the gait was sufficient for exercise testing [Nail Clubbing] : no clubbing of the fingernails [Cyanosis, Localized] : no localized cyanosis [Petechial Hemorrhages (___cm)] : no petechial hemorrhages [Skin Color & Pigmentation] : normal skin color and pigmentation [] : no rash [No Venous Stasis] : no venous stasis [Skin Lesions] : no skin lesions [No Skin Ulcers] : no skin ulcer [No Xanthoma] : no  xanthoma was observed [Oriented To Time, Place, And Person] : oriented to person, place, and time [Affect] : the affect was normal [Mood] : the mood was normal [No Anxiety] : not feeling anxious [Normal Rate] : normal [Normal S1] : normal S1 [Normal S2] : normal S2 [No Murmur] : no murmurs heard [2+] : left 2+ [No Abnormalities] : the abdominal aorta was not enlarged and no bruit was heard [No Pitting Edema] : no pitting edema present [FreeTextEntry1] : obese [S3] : no S3 [S4] : no S4 [Right Carotid Bruit] : no bruit heard over the right carotid [Left Carotid Bruit] : no bruit heard over the left carotid [Right Femoral Bruit] : no bruit heard over the right femoral artery [Left Femoral Bruit] : no bruit heard over the left femoral artery

## 2020-12-15 NOTE — REVIEW OF SYSTEMS
[Feeling Fatigued] : feeling fatigued [see HPI] : see HPI [Shortness Of Breath] : shortness of breath [Negative] : Heme/Lymph

## 2020-12-15 NOTE — DISCUSSION/SUMMARY
[With Me] : with me [FreeTextEntry1] : Ewelina is a 60 year old female here for follow up.\par She recently had a complicated course after a panniculectomy, with hemorrhagic shock, fevers and PAF, though more recently was found to have an extensive LLE DVT and PE.\par She is feeling better today, and will remain on Eliquis. We will monitor her for additional hematuria. She is a little tachycardic, though has demonstrated acceptable vital signs at home. Her oxygen saturation is 99% on RA.\par I have increased her verapamil to 240 daily, and we will keep an eye on her BP and HR at home. She will remain off her avapro for now. Her echocardiogram demonstrated low normal LV function without significant valvular pathology.\par I stressed the importance of getting back to diet, and exercise, with an attempt to lose weight.\par I will see her again in 2 months.

## 2021-01-27 ENCOUNTER — APPOINTMENT (OUTPATIENT)
Dept: CARDIOLOGY | Facility: CLINIC | Age: 61
End: 2021-01-27
Payer: COMMERCIAL

## 2021-01-27 ENCOUNTER — NON-APPOINTMENT (OUTPATIENT)
Age: 61
End: 2021-01-27

## 2021-01-27 VITALS
DIASTOLIC BLOOD PRESSURE: 77 MMHG | HEART RATE: 88 BPM | HEIGHT: 66 IN | SYSTOLIC BLOOD PRESSURE: 108 MMHG | OXYGEN SATURATION: 98 %

## 2021-01-27 DIAGNOSIS — R07.9 CHEST PAIN, UNSPECIFIED: ICD-10-CM

## 2021-01-27 DIAGNOSIS — R06.02 SHORTNESS OF BREATH: ICD-10-CM

## 2021-01-27 PROCEDURE — 99214 OFFICE O/P EST MOD 30 MIN: CPT

## 2021-01-27 PROCEDURE — 99072 ADDL SUPL MATRL&STAF TM PHE: CPT

## 2021-01-27 PROCEDURE — 93000 ELECTROCARDIOGRAM COMPLETE: CPT

## 2021-01-27 NOTE — HISTORY OF PRESENT ILLNESS
[FreeTextEntry1] : Ewelina is a 61 year old female here for evaluation.\par \par Cardiac-wise, she has a history of obesity, obstructive sleep apnea, and hypertension. She reports an abnormal stress test about 7 or 8 years ago, for which she had an angiogram, and it showed no significant plaque. She also has a history of aortic aneurysm. Her last CT was in 12/2018. This demonstrated an aneurysmal dilation of the ascending thoracic aorta, with a maximal transverse diameter of 4.4 cm.\par \par I last saw her in 12/2020. Prior to last visit, she presented to the ER with acute shortness of breath. A CT was negative for PE, and confirmed an aneurysm of 4.3 cm. Her BNP was minimal. Her troponins were negative, though given her risk, she was transferred for cardiac catheterization, which showed nonobstructive CAD.\par \par She had a panniculectomy in 10/2020, and a complicated post-op course with hemorrhagic shock, intermittent fevers and ELAINE. She also had PAF in the setting of hypotension. She was later re-admitted in late 11/2020 with LLE swelling, and found to have an extensive DVT and right sided PE. She is now on eliquis; she had some hematuria last week, which has now resolved.\par Her BP has been in the 120 range when checked by a nurse who comes to her house. Her breathing is better.\par She had some left sided body and chest pain, that has resolved.\par

## 2021-01-27 NOTE — PHYSICAL EXAM
[General Appearance - Well Developed] : well developed [Normal Appearance] : normal appearance [Well Groomed] : well groomed [General Appearance - Well Nourished] : well nourished [No Deformities] : no deformities [General Appearance - In No Acute Distress] : no acute distress [Normal Conjunctiva] : the conjunctiva exhibited no abnormalities [Eyelids - No Xanthelasma] : the eyelids demonstrated no xanthelasmas [Normal Oral Mucosa] : normal oral mucosa [No Oral Pallor] : no oral pallor [No Oral Cyanosis] : no oral cyanosis [Normal Jugular Venous A Waves Present] : normal jugular venous A waves present [Normal Jugular Venous V Waves Present] : normal jugular venous V waves present [No Jugular Venous Ames A Waves] : no jugular venous ames A waves [Exaggerated Use Of Accessory Muscles For Inspiration] : no accessory muscle use [Respiration, Rhythm And Depth] : normal respiratory rhythm and effort [Auscultation Breath Sounds / Voice Sounds] : lungs were clear to auscultation bilaterally [Abdomen Soft] : soft [Abdomen Tenderness] : non-tender [Abdomen Mass (___ Cm)] : no abdominal mass palpated [Abnormal Walk] : normal gait [Gait - Sufficient For Exercise Testing] : the gait was sufficient for exercise testing [Nail Clubbing] : no clubbing of the fingernails [Cyanosis, Localized] : no localized cyanosis [Petechial Hemorrhages (___cm)] : no petechial hemorrhages [Skin Color & Pigmentation] : normal skin color and pigmentation [] : no rash [No Venous Stasis] : no venous stasis [Skin Lesions] : no skin lesions [No Skin Ulcers] : no skin ulcer [No Xanthoma] : no  xanthoma was observed [Oriented To Time, Place, And Person] : oriented to person, place, and time [Affect] : the affect was normal [Mood] : the mood was normal [No Anxiety] : not feeling anxious [Normal Rate] : normal [Normal S1] : normal S1 [Normal S2] : normal S2 [No Murmur] : no murmurs heard [2+] : left 2+ [No Abnormalities] : the abdominal aorta was not enlarged and no bruit was heard [No Pitting Edema] : no pitting edema present [FreeTextEntry1] : obese [S3] : no S3 [S4] : no S4 [Right Carotid Bruit] : no bruit heard over the right carotid [Left Carotid Bruit] : no bruit heard over the left carotid [Right Femoral Bruit] : no bruit heard over the right femoral artery [Left Femoral Bruit] : no bruit heard over the left femoral artery

## 2021-01-27 NOTE — DISCUSSION/SUMMARY
[With Me] : with me [FreeTextEntry1] : Ewelina is a 61 year old female here for follow up.\par She recently had a complicated course after a panniculectomy, with hemorrhagic shock, fevers and PAF, though more recently was found to have an extensive LLE DVT and PE.\par \par She is feeling better today, and will remain on Eliquis. We will monitor her for additional hematuria. Her heart rate is better. Her pain was quite atypical, and a recent cath in 2020 was unremarkable. \par \par She is on verapamil 240. We will keep an eye on her BP and HR at home. She will remain off her avapro for now. Her echocardiogram demonstrated low normal LV function without significant valvular pathology.\par I stressed the importance of getting back to diet, and exercise, with an attempt to lose weight.\par I will see her again in 2 months.

## 2021-01-29 ENCOUNTER — TRANSCRIPTION ENCOUNTER (OUTPATIENT)
Age: 61
End: 2021-01-29

## 2021-02-08 ENCOUNTER — TRANSCRIPTION ENCOUNTER (OUTPATIENT)
Age: 61
End: 2021-02-08

## 2021-02-08 RX ORDER — PROPRANOLOL HYDROCHLORIDE 10 MG/1
10 TABLET ORAL DAILY
Qty: 90 | Refills: 3 | Status: DISCONTINUED | COMMUNITY
Start: 2020-01-22 | End: 2021-02-08

## 2021-02-09 ENCOUNTER — NON-APPOINTMENT (OUTPATIENT)
Age: 61
End: 2021-02-09

## 2021-02-10 ENCOUNTER — TRANSCRIPTION ENCOUNTER (OUTPATIENT)
Age: 61
End: 2021-02-10

## 2021-02-19 ENCOUNTER — TRANSCRIPTION ENCOUNTER (OUTPATIENT)
Age: 61
End: 2021-02-19

## 2021-02-19 ENCOUNTER — NON-APPOINTMENT (OUTPATIENT)
Age: 61
End: 2021-02-19

## 2021-02-26 NOTE — PROGRESS NOTE ADULT - COVID-19 NEGATIVE LAB RESULT
COVID-19 ruled out
recovery

## 2021-03-02 ENCOUNTER — NON-APPOINTMENT (OUTPATIENT)
Age: 61
End: 2021-03-02

## 2021-03-02 ENCOUNTER — APPOINTMENT (OUTPATIENT)
Dept: CARDIOLOGY | Facility: CLINIC | Age: 61
End: 2021-03-02
Payer: COMMERCIAL

## 2021-03-02 VITALS
OXYGEN SATURATION: 99 % | DIASTOLIC BLOOD PRESSURE: 88 MMHG | SYSTOLIC BLOOD PRESSURE: 131 MMHG | HEART RATE: 97 BPM | HEIGHT: 66 IN

## 2021-03-02 PROCEDURE — 93000 ELECTROCARDIOGRAM COMPLETE: CPT

## 2021-03-02 PROCEDURE — 99072 ADDL SUPL MATRL&STAF TM PHE: CPT

## 2021-03-02 PROCEDURE — 99214 OFFICE O/P EST MOD 30 MIN: CPT

## 2021-03-02 RX ORDER — FUROSEMIDE 20 MG/1
20 TABLET ORAL
Qty: 90 | Refills: 3 | Status: DISCONTINUED | COMMUNITY
Start: 2020-07-23 | End: 2021-03-02

## 2021-03-02 NOTE — HISTORY OF PRESENT ILLNESS
[FreeTextEntry1] : Ewelina is a 61 year old female here for evaluation.\par \par Cardiac-wise, she has a history of obesity, obstructive sleep apnea, and hypertension. She reports an abnormal stress test about 7 or 8 years ago, for which she had an angiogram, and it showed no significant plaque. She also has a history of aortic aneurysm. Her last CT was in 12/2018. This demonstrated an aneurysmal dilation of the ascending thoracic aorta, with a maximal transverse diameter of 4.4 cm.\par \par I last saw her in 1/2021. \par In 2020, she presented to the ER with acute shortness of breath. A CT was negative for PE, and confirmed an aneurysm of 4.3 cm. Her BNP was minimal. Her troponins were negative, though given her risk, she was transferred for cardiac catheterization, which showed nonobstructive CAD.\par \par She had a panniculectomy in 10/2020, and a complicated post-op course with hemorrhagic shock, intermittent fevers and ELAINE. She also had PAF in the setting of hypotension. She was later re-admitted in late 11/2020 with LLE swelling, and found to have an extensive DVT and right sided PE. She is now on eliquis.\par \par \par Since last visit, her BP has been labile. Her breathing is better. Her BP at her hematologist was in 117 range.\par She denies abnormal bleeding.\par

## 2021-03-02 NOTE — DISCUSSION/SUMMARY
[With Me] : with me [FreeTextEntry1] : Ewelina is a 61 year old female here for follow up.\par She recently had a complicated course after a panniculectomy, with hemorrhagic shock, fevers and PAF, though more recently was found to have an extensive LLE DVT and PE.\par \par She is feeling better today, and will remain on Eliquis.. Her heart rate is better. A recent cath in 2020 was unremarkable. \par \par She is on verapamil 240. We will keep an eye on her BP and HR at home. She is back on avapro, and I will restart her propranolol. Her echocardiogram demonstrated low normal LV function without significant valvular pathology.\par I stressed the importance of getting back to diet, and exercise, with an attempt to lose weight. I will repeat her blood work, echo and le dopplers in 3 months (at the 6 month bhavna)\par I will see her again in 3 months.

## 2021-06-04 ENCOUNTER — NON-APPOINTMENT (OUTPATIENT)
Age: 61
End: 2021-06-04

## 2021-06-04 ENCOUNTER — APPOINTMENT (OUTPATIENT)
Dept: CARDIOLOGY | Facility: CLINIC | Age: 61
End: 2021-06-04
Payer: COMMERCIAL

## 2021-06-04 VITALS
HEIGHT: 66 IN | HEART RATE: 112 BPM | DIASTOLIC BLOOD PRESSURE: 95 MMHG | OXYGEN SATURATION: 99 % | SYSTOLIC BLOOD PRESSURE: 132 MMHG

## 2021-06-04 PROCEDURE — 99072 ADDL SUPL MATRL&STAF TM PHE: CPT

## 2021-06-04 PROCEDURE — 93000 ELECTROCARDIOGRAM COMPLETE: CPT

## 2021-06-04 PROCEDURE — 99214 OFFICE O/P EST MOD 30 MIN: CPT

## 2021-06-04 NOTE — HISTORY OF PRESENT ILLNESS
[FreeTextEntry1] : Ewelina is a 61 year old female here for evaluation.\par \par Cardiac-wise, she has a history of obesity, obstructive sleep apnea, and hypertension. She reports an abnormal stress test about 7 or 8 years ago, for which she had an angiogram, and it showed no significant plaque. She also has a history of aortic aneurysm. Her last CT was in 12/2018. This demonstrated an aneurysmal dilation of the ascending thoracic aorta, with a maximal transverse diameter of 4.4 cm.\par \par I last saw her in 3/2021. \par In 2020, she presented to the ER with acute shortness of breath. A CT was negative for PE, and confirmed an aneurysm of 4.3 cm. Her BNP was minimal. Her troponins were negative, though given her risk, she was transferred for cardiac catheterization, which showed nonobstructive CAD.\par \par She had a panniculectomy in 10/2020, and a complicated post-op course with hemorrhagic shock, intermittent fevers and ELAINE. She also had PAF in the setting of hypotension. She was later re-admitted in late 11/2020 with LLE swelling, and found to have an extensive DVT and right sided PE. She is now on eliquis.\par \par Since last visit, her BP has been better. Her breathing is better. She denies abnormal bleeding.\par She has not been drinking a lot of water. She does report intermittent palpitations.

## 2021-06-04 NOTE — PHYSICAL EXAM
[Well Developed] : well developed [Well Nourished] : well nourished [No Acute Distress] : no acute distress [Normal Conjunctiva] : normal conjunctiva [Normal Venous Pressure] : normal venous pressure [No Carotid Bruit] : no carotid bruit [Tachycardia] : tachycardic [Normal S1] : normal S1 [Normal S2] : normal S2 [S3] : no S3 [S4] : no S4 [No Murmur] : no murmurs heard [No Pitting Edema] : no pitting edema present [Right Carotid Bruit] : no bruit heard over the right carotid [Left Carotid Bruit] : no bruit heard over the left carotid [Right Femoral Bruit] : no bruit heard over the right femoral artery [Left Femoral Bruit] : no bruit heard over the left femoral artery [2+] : left 2+ [No Abnormalities] : the abdominal aorta was not enlarged and no bruit was heard [Clear Lung Fields] : clear lung fields [Good Air Entry] : good air entry [No Respiratory Distress] : no respiratory distress  [Soft] : abdomen soft [Non Tender] : non-tender [Normal Bowel Sounds] : normal bowel sounds [No Masses/organomegaly] : no masses/organomegaly [Normal Gait] : normal gait [No Edema] : no edema [No Cyanosis] : no cyanosis [No Clubbing] : no clubbing [No Varicosities] : no varicosities [No Rash] : no rash [No Skin Lesions] : no skin lesions [Moves all extremities] : moves all extremities [No Focal Deficits] : no focal deficits [Normal Speech] : normal speech [Alert and Oriented] : alert and oriented [Normal memory] : normal memory

## 2021-06-22 ENCOUNTER — LABORATORY RESULT (OUTPATIENT)
Age: 61
End: 2021-06-22

## 2021-06-22 ENCOUNTER — APPOINTMENT (OUTPATIENT)
Dept: CARDIOLOGY | Facility: CLINIC | Age: 61
End: 2021-06-22
Payer: COMMERCIAL

## 2021-06-22 ENCOUNTER — MED ADMIN CHARGE (OUTPATIENT)
Age: 61
End: 2021-06-22

## 2021-06-22 LAB
25(OH)D3 SERPL-MCNC: 47.2 NG/ML
ALBUMIN SERPL ELPH-MCNC: 4.2 G/DL
ALP BLD-CCNC: 141 U/L
ALT SERPL-CCNC: 25 U/L
ANION GAP SERPL CALC-SCNC: 15 MMOL/L
APPEARANCE: ABNORMAL
AST SERPL-CCNC: 20 U/L
BACTERIA: NEGATIVE
BASOPHILS # BLD AUTO: 0.03 K/UL
BASOPHILS NFR BLD AUTO: 0.5 %
BILIRUB SERPL-MCNC: 0.2 MG/DL
BILIRUBIN URINE: NEGATIVE
BLOOD URINE: ABNORMAL
BUN SERPL-MCNC: 17 MG/DL
CALCIUM OXALATE CRYSTALS: ABNORMAL
CALCIUM SERPL-MCNC: 9.7 MG/DL
CHLORIDE SERPL-SCNC: 102 MMOL/L
CHOLEST SERPL-MCNC: 192 MG/DL
CO2 SERPL-SCNC: 24 MMOL/L
COLOR: YELLOW
COVID-19 SPIKE DOMAIN ANTIBODY INTERPRETATION: POSITIVE
CREAT SERPL-MCNC: 0.84 MG/DL
EOSINOPHIL # BLD AUTO: 0.12 K/UL
EOSINOPHIL NFR BLD AUTO: 2 %
ESTIMATED AVERAGE GLUCOSE: 111 MG/DL
GLUCOSE QUALITATIVE U: NEGATIVE
GLUCOSE SERPL-MCNC: 125 MG/DL
HBA1C MFR BLD HPLC: 5.5 %
HCT VFR BLD CALC: 37.7 %
HDLC SERPL-MCNC: 45 MG/DL
HGB BLD-MCNC: 12 G/DL
HYALINE CASTS: 0 /LPF
IMM GRANULOCYTES NFR BLD AUTO: 0.2 %
IRON SATN MFR SERPL: 16 %
IRON SERPL-MCNC: 51 UG/DL
KETONES URINE: NEGATIVE
LDLC SERPL CALC-MCNC: 100 MG/DL
LEUKOCYTE ESTERASE URINE: ABNORMAL
LYMPHOCYTES # BLD AUTO: 1.89 K/UL
LYMPHOCYTES NFR BLD AUTO: 32.3 %
MAN DIFF?: NORMAL
MCHC RBC-ENTMCNC: 30 PG
MCHC RBC-ENTMCNC: 31.8 GM/DL
MCV RBC AUTO: 94.3 FL
MICROSCOPIC-UA: NORMAL
MONOCYTES # BLD AUTO: 0.43 K/UL
MONOCYTES NFR BLD AUTO: 7.3 %
NEUTROPHILS # BLD AUTO: 3.38 K/UL
NEUTROPHILS NFR BLD AUTO: 57.7 %
NITRITE URINE: NEGATIVE
NONHDLC SERPL-MCNC: 147 MG/DL
PH URINE: 5.5
PLATELET # BLD AUTO: 259 K/UL
POTASSIUM SERPL-SCNC: 4.4 MMOL/L
PROT SERPL-MCNC: 6.7 G/DL
PROTEIN URINE: ABNORMAL
RBC # BLD: 4 M/UL
RBC # FLD: 13.1 %
RED BLOOD CELLS URINE: 112 /HPF
SARS-COV-2 AB SERPL IA-ACNC: 17.5 U/ML
SODIUM SERPL-SCNC: 141 MMOL/L
SPECIFIC GRAVITY URINE: 1.03
SQUAMOUS EPITHELIAL CELLS: 4 /HPF
TIBC SERPL-MCNC: 325 UG/DL
TRIGL SERPL-MCNC: 234 MG/DL
TSH SERPL-ACNC: 4.28 UIU/ML
UIBC SERPL-MCNC: 274 UG/DL
UROBILINOGEN URINE: ABNORMAL
WBC # FLD AUTO: 5.86 K/UL
WHITE BLOOD CELLS URINE: 40 /HPF

## 2021-06-22 PROCEDURE — 99072 ADDL SUPL MATRL&STAF TM PHE: CPT

## 2021-06-22 RX ORDER — PERFLUTREN 6.52 MG/ML
6.52 INJECTION, SUSPENSION INTRAVENOUS
Qty: 1 | Refills: 0 | Status: COMPLETED | OUTPATIENT
Start: 2021-06-22

## 2021-06-22 RX ADMIN — PERFLUTREN MG/ML: 6.52 INJECTION, SUSPENSION INTRAVENOUS at 00:00

## 2021-06-28 ENCOUNTER — TRANSCRIPTION ENCOUNTER (OUTPATIENT)
Age: 61
End: 2021-06-28

## 2021-09-03 NOTE — H&P CARDIOLOGY - PMH
Advise patient has UTI.   Prescription for Bactrim sent in
Diabetes mellitus type II    Sleep apnea    Asthma    Hypertension    Morbid obesity    Other, mixed, or unspecified nondependent drug abuse, unspecified  H/O  Alcohol abuse, unspecified  H/O  Cardiac dysrhythmia, unspecified    Essential and other specified forms of tremor    Anxiety state, unspecified    Depressive disorder, not elsewhere classified    Calculus of kidney  chronic    Other and unspecified noninfectious gastroenteritis and colitis  H/O  Anemia, unspecified    DM w/o complication type II    Unspecified sleep apnea    Unspecified asthma

## 2021-09-08 NOTE — END OF VISIT
[Time Spent: ___ minutes] : I have spent [unfilled] minutes of face to face time with the patient [>50% of Time Spent on Counseling for ____] : Greater than 50% of the encounter time was spent on counseling for [unfilled] Detail Level: Simple Detail Level: Detailed

## 2021-10-14 ENCOUNTER — NON-APPOINTMENT (OUTPATIENT)
Age: 61
End: 2021-10-14

## 2021-10-14 ENCOUNTER — APPOINTMENT (OUTPATIENT)
Dept: CARDIOLOGY | Facility: CLINIC | Age: 61
End: 2021-10-14
Payer: COMMERCIAL

## 2021-10-14 VITALS — SYSTOLIC BLOOD PRESSURE: 137 MMHG | DIASTOLIC BLOOD PRESSURE: 68 MMHG | HEART RATE: 104 BPM | OXYGEN SATURATION: 92 %

## 2021-10-14 VITALS — DIASTOLIC BLOOD PRESSURE: 74 MMHG | SYSTOLIC BLOOD PRESSURE: 118 MMHG

## 2021-10-14 PROCEDURE — 99214 OFFICE O/P EST MOD 30 MIN: CPT

## 2021-10-14 PROCEDURE — 93000 ELECTROCARDIOGRAM COMPLETE: CPT

## 2021-10-14 NOTE — DISCUSSION/SUMMARY
[With Me] : with me [FreeTextEntry1] : Ewelina is a 61 year old female here for follow up.\par \par She recently had a complicated course after a panniculectomy, with hemorrhagic shock, fevers and PAF, though more recently was found to have an extensive LLE DVT and PE.\par \par She is feeling better today other than some mild dyspnea. Though she does not appear significantly overloaded, she has felt better with diuretics in the past, and she will start taking lasix 20 mg every other day. She will remain on Eliquis. Her heart rate is better today. A recent cath in 2020 was unremarkable. \par \par She is on verapamil 240. She is back on avapro and propranolol. Her echocardiogram demonstrated low normal LV function without significant valvular pathology.\par I stressed the importance of getting back to diet, and exercise, with an attempt to lose weight.\par \par I will see her again in 3 months.

## 2021-10-14 NOTE — PHYSICAL EXAM
[Well Developed] : well developed [Well Nourished] : well nourished [No Acute Distress] : no acute distress [Normal Conjunctiva] : normal conjunctiva [Normal Venous Pressure] : normal venous pressure [No Carotid Bruit] : no carotid bruit [Tachycardia] : tachycardic [Normal S1] : normal S1 [Normal S2] : normal S2 [No Murmur] : no murmurs heard [No Pitting Edema] : no pitting edema present [2+] : left 2+ [No Abnormalities] : the abdominal aorta was not enlarged and no bruit was heard [Clear Lung Fields] : clear lung fields [Good Air Entry] : good air entry [No Respiratory Distress] : no respiratory distress  [Soft] : abdomen soft [Non Tender] : non-tender [No Masses/organomegaly] : no masses/organomegaly [Normal Bowel Sounds] : normal bowel sounds [Normal Gait] : normal gait [No Edema] : no edema [No Cyanosis] : no cyanosis [No Clubbing] : no clubbing [No Varicosities] : no varicosities [No Rash] : no rash [No Skin Lesions] : no skin lesions [Moves all extremities] : moves all extremities [No Focal Deficits] : no focal deficits [Normal Speech] : normal speech [Alert and Oriented] : alert and oriented [Normal memory] : normal memory [S3] : no S3 [S4] : no S4 [Right Carotid Bruit] : no bruit heard over the right carotid [Left Carotid Bruit] : no bruit heard over the left carotid [Right Femoral Bruit] : no bruit heard over the right femoral artery [Left Femoral Bruit] : no bruit heard over the left femoral artery

## 2021-10-14 NOTE — HISTORY OF PRESENT ILLNESS
[FreeTextEntry1] : Ewelina is a 61 year old female here for evaluation.\par \par Cardiac-wise, she has a history of obesity, obstructive sleep apnea, and hypertension. She reports an abnormal stress test about 7 or 8 years ago, for which she had an angiogram, and it showed no significant plaque. She also has a history of aortic aneurysm. Her last CT was in 12/2018. This demonstrated an aneurysmal dilation of the ascending thoracic aorta, with a maximal transverse diameter of 4.4 cm.\par \par I last saw her in 6/2021. \par \par In 2020, she presented to the ER with acute shortness of breath. A CT was negative for PE, and confirmed an aneurysm of 4.3 cm. Her BNP was minimal. Her troponins were negative, though given her risk, she was transferred for cardiac catheterization, which showed nonobstructive CAD.\par \par She had a panniculectomy in 10/2020, and a complicated post-op course with hemorrhagic shock, intermittent fevers and ELAINE. She also had PAF in the setting of hypotension. She was later re-admitted in late 11/2020 with LLE swelling, and found to have an extensive DVT and right sided PE. She is now on eliquis.\par \par Since last visit, her BP has been better. Her breathing is better. She denies abnormal bleeding.\par She has not been drinking a lot of water. She does report intermittent palpitations.\par She reports some mild dyspnea, which seems worse at night. She reports compliant with her CPAP.\par I repeated her echo in 6/21. Her proximal ascending aorta measured 4.1 cm. There was mild RVE with normal RV function. Estimated EF was 50-55%.

## 2021-11-01 ENCOUNTER — TRANSCRIPTION ENCOUNTER (OUTPATIENT)
Age: 61
End: 2021-11-01

## 2021-11-01 RX ORDER — POTASSIUM CHLORIDE 750 MG/1
10 CAPSULE, EXTENDED RELEASE ORAL DAILY
Qty: 30 | Refills: 0 | Status: ACTIVE | COMMUNITY
Start: 2021-11-01 | End: 1900-01-01

## 2021-11-04 ENCOUNTER — TRANSCRIPTION ENCOUNTER (OUTPATIENT)
Age: 61
End: 2021-11-04

## 2021-11-06 ENCOUNTER — TRANSCRIPTION ENCOUNTER (OUTPATIENT)
Age: 61
End: 2021-11-06

## 2021-11-15 ENCOUNTER — OUTPATIENT (OUTPATIENT)
Dept: OUTPATIENT SERVICES | Facility: HOSPITAL | Age: 61
LOS: 1 days | End: 2021-11-15
Payer: COMMERCIAL

## 2021-11-15 VITALS
HEART RATE: 102 BPM | OXYGEN SATURATION: 97 % | WEIGHT: 279.11 LBS | RESPIRATION RATE: 16 BRPM | HEIGHT: 65 IN | SYSTOLIC BLOOD PRESSURE: 146 MMHG | TEMPERATURE: 98 F | DIASTOLIC BLOOD PRESSURE: 95 MMHG

## 2021-11-15 DIAGNOSIS — Z01.818 ENCOUNTER FOR OTHER PREPROCEDURAL EXAMINATION: ICD-10-CM

## 2021-11-15 DIAGNOSIS — Z98.89 OTHER SPECIFIED POSTPROCEDURAL STATES: Chronic | ICD-10-CM

## 2021-11-15 DIAGNOSIS — Z98.890 OTHER SPECIFIED POSTPROCEDURAL STATES: Chronic | ICD-10-CM

## 2021-11-15 DIAGNOSIS — I26.99 OTHER PULMONARY EMBOLISM WITHOUT ACUTE COR PULMONALE: ICD-10-CM

## 2021-11-15 DIAGNOSIS — N20.0 CALCULUS OF KIDNEY: ICD-10-CM

## 2021-11-15 LAB
ALBUMIN SERPL ELPH-MCNC: 3.6 G/DL — SIGNIFICANT CHANGE UP (ref 3.3–5)
ALP SERPL-CCNC: 144 U/L — HIGH (ref 40–120)
ALT FLD-CCNC: 29 U/L — SIGNIFICANT CHANGE UP (ref 12–78)
ANION GAP SERPL CALC-SCNC: 6 MMOL/L — SIGNIFICANT CHANGE UP (ref 5–17)
AST SERPL-CCNC: 21 U/L — SIGNIFICANT CHANGE UP (ref 15–37)
BILIRUB SERPL-MCNC: 0.3 MG/DL — SIGNIFICANT CHANGE UP (ref 0.2–1.2)
BUN SERPL-MCNC: 22 MG/DL — SIGNIFICANT CHANGE UP (ref 7–23)
CALCIUM SERPL-MCNC: 9.5 MG/DL — SIGNIFICANT CHANGE UP (ref 8.5–10.1)
CHLORIDE SERPL-SCNC: 107 MMOL/L — SIGNIFICANT CHANGE UP (ref 96–108)
CO2 SERPL-SCNC: 28 MMOL/L — SIGNIFICANT CHANGE UP (ref 22–31)
CREAT SERPL-MCNC: 0.91 MG/DL — SIGNIFICANT CHANGE UP (ref 0.5–1.3)
GLUCOSE SERPL-MCNC: 125 MG/DL — HIGH (ref 70–99)
HCT VFR BLD CALC: 36.7 % — SIGNIFICANT CHANGE UP (ref 34.5–45)
HGB BLD-MCNC: 11.6 G/DL — SIGNIFICANT CHANGE UP (ref 11.5–15.5)
MCHC RBC-ENTMCNC: 30 PG — SIGNIFICANT CHANGE UP (ref 27–34)
MCHC RBC-ENTMCNC: 31.6 GM/DL — LOW (ref 32–36)
MCV RBC AUTO: 94.8 FL — SIGNIFICANT CHANGE UP (ref 80–100)
NRBC # BLD: 0 /100 WBCS — SIGNIFICANT CHANGE UP (ref 0–0)
PLATELET # BLD AUTO: 341 K/UL — SIGNIFICANT CHANGE UP (ref 150–400)
POTASSIUM SERPL-MCNC: 4.2 MMOL/L — SIGNIFICANT CHANGE UP (ref 3.5–5.3)
POTASSIUM SERPL-SCNC: 4.2 MMOL/L — SIGNIFICANT CHANGE UP (ref 3.5–5.3)
PROT SERPL-MCNC: 7.8 G/DL — SIGNIFICANT CHANGE UP (ref 6–8.3)
RBC # BLD: 3.87 M/UL — SIGNIFICANT CHANGE UP (ref 3.8–5.2)
RBC # FLD: 13.2 % — SIGNIFICANT CHANGE UP (ref 10.3–14.5)
SODIUM SERPL-SCNC: 141 MMOL/L — SIGNIFICANT CHANGE UP (ref 135–145)
WBC # BLD: 7.58 K/UL — SIGNIFICANT CHANGE UP (ref 3.8–10.5)
WBC # FLD AUTO: 7.58 K/UL — SIGNIFICANT CHANGE UP (ref 3.8–10.5)

## 2021-11-15 PROCEDURE — 93005 ELECTROCARDIOGRAM TRACING: CPT

## 2021-11-15 PROCEDURE — 93010 ELECTROCARDIOGRAM REPORT: CPT

## 2021-11-15 PROCEDURE — 85027 COMPLETE CBC AUTOMATED: CPT

## 2021-11-15 PROCEDURE — 86900 BLOOD TYPING SEROLOGIC ABO: CPT

## 2021-11-15 PROCEDURE — 86901 BLOOD TYPING SEROLOGIC RH(D): CPT

## 2021-11-15 PROCEDURE — 36415 COLL VENOUS BLD VENIPUNCTURE: CPT

## 2021-11-15 PROCEDURE — 80053 COMPREHEN METABOLIC PANEL: CPT

## 2021-11-15 PROCEDURE — 86850 RBC ANTIBODY SCREEN: CPT

## 2021-11-15 PROCEDURE — G0463: CPT

## 2021-11-15 RX ORDER — FERROUS SULFATE 325(65) MG
1 TABLET ORAL
Qty: 0 | Refills: 0 | DISCHARGE

## 2021-11-15 RX ORDER — VERAPAMIL HCL 240 MG
120 CAPSULE, EXTENDED RELEASE PELLETS 24 HR ORAL
Qty: 0 | Refills: 0 | DISCHARGE

## 2021-11-15 RX ORDER — POTASSIUM CITRATE MONOHYDRATE 100 %
1 POWDER (GRAM) MISCELLANEOUS
Qty: 0 | Refills: 0 | DISCHARGE

## 2021-11-15 RX ORDER — DULOXETINE HYDROCHLORIDE 30 MG/1
0 CAPSULE, DELAYED RELEASE ORAL
Qty: 0 | Refills: 0 | DISCHARGE

## 2021-11-15 NOTE — H&P PST ADULT - ASSESSMENT
62 yo female with HTN Sleep Apnea Bipolar Disorder Tremors UTI Arthritis Back Pain PE DVT Sepsis Anxiety Depression scheduled for Right Ureteroscopy Holium Laser Lithotripsy on 11/24/21 with Dr Ellis.

## 2021-11-15 NOTE — H&P PST ADULT - PSYCHIATRIC COMMENTS
Patient upset that she has to be asked about information given on Anesthesia Sheet and requesting lab reports from Dr Ellis's of   office even though she verbal unhappy with PST process  aaliyah having to obtain lab results for urine culture from surgeons office and verify information that she wrote on her Anesthesia Sheet

## 2021-11-15 NOTE — H&P PST ADULT - NSICDXPASTMEDICALHX_GEN_ALL_CORE_FT
PAST MEDICAL HISTORY:  Anemia     Anxiety     Arthritis     Asthma     Back pain     Benign Essential Tremor     Bipolar 1 disorder     Calculus of kidney     Colitis H/O    Degenerative disc disease, lumbar     Depression     DVT, lower extremity 11/20    ETOH Abuse H/O    H/O bronchitis     H/O headache     History of aortic aneurysm descending aorta see CT    Hyperlipidemia     Hypertension     Kidney stone     Morbid Obesity     Neuropathy     Other specified sepsis 9/20    Pneumonia     Pulmonary embolism 11/20    Renal Calculi chronic      Sleep Apnea CPAP with oxygen since June 2020  curent 11/21    Spinal stenosis

## 2021-11-15 NOTE — H&P PST ADULT - NSICDXPASTSURGICALHX_GEN_ALL_CORE_FT
PAST SURGICAL HISTORY:  Biliary stent placement & ercp 20 years ago    History of laparoscopic adjustable gastric banding band removed few yrs ago    History of tonsillectomy     S/P cardiac catheterization 2020    S/P D&C     S/P dilation and curettage     S/P panniculectomy     ureteroscopy with stone removal 1- and  5 other episodes

## 2021-11-15 NOTE — H&P PST ADULT - PROBLEM SELECTOR PLAN 1
check labs cbc cmp and screen   ekg  medical clearance   preop instructions were given  11/17 stop Arthrotec multivitamin Melatonin   morning of surgery take Irbesartan Propranolol Verapamil with a tiny sip of water  patient is aware that she will be monitored for sleep apnea for 3-6 hours after surgery  patient is aware that she will need to be covid tested 24-72 hours before surgery  preop instructions were given  patient verbalizes understanding of instructions

## 2021-11-15 NOTE — H&P PST ADULT - MAMMOGRAM, LAST, PROFILE
Spoke to pt who c/o cough for past few days.  Saw PA recently who did lab testing and rec zyrtec D.  Labs overall unremarkable x slightly elevated CRP.  Offered tessalon vs cough syrup for sx but do not feel abx needed based on documentation of lung exam yesterday and timing of sx c lack of fever.  Pt agreeable c plan and requested cough syrup which has worked well in the past.  Will print and have ready for  at [facility] checkout.    
21

## 2021-11-15 NOTE — H&P PST ADULT - VENOUS THROMBOEMBOLISM CURRENT STATUS
(0) indicator not present (0) indicator not present/(1) other risk factor (includes escalating BMI, pack-years of smoking, diabetes requiring insulin, chemotherapy, female gender and length of surgery)

## 2021-11-15 NOTE — H&P PST ADULT - HISTORY OF PRESENT ILLNESS
60 yo female scheduled for Right Ureteroscopy Holium Laser Lithotripsy on 11/24/21 with Dr Ellis.  Patient states she has had 6-7 episodes of renal colic.  Patient states she has had a right kidney stone for a couple of years.  Recent CAT Scan showed multiple stone   Patient denies hematuria.  Current pain is 2-7/10 varies in intensity 62 yo female with HTN Sleep Apnea Bipolar Disorder Tremors  Arthritis Back Pain PE DVT Sepsis Anxiety Depression scheduled for Right Ureteroscopy Holium Laser Lithotripsy on 11/24/21 with Dr Ellis.  Patient states she has had 6-7 episodes of renal colic.  Patient states she has had a right kidney stone for a couple of years.  Recent CAT Scan showed multiple stone   Patient denies hematuria.  Current pain is 2-7/10 varies in intensity 62 yo female with HTN Sleep Apnea Bipolar Disorder Tremors UTI Arthritis Back Pain PE DVT Sepsis Anxiety Depression scheduled for Right Ureteroscopy Holium Laser Lithotripsy on 11/24/21 with Dr Ellis.  Patient states she has had 6-7 episodes of renal colic.  Patient states she has had a right kidney stone for a couple of years.  Recent CAT Scan showed multiple stone   Patient denies hematuria.  Current pain is 2-7/10 varies in intensity

## 2021-11-15 NOTE — H&P PST ADULT - NEGATIVE BREAST SYMPTOMS
no breast lump L/no breast lump R no breast tenderness L/no breast tenderness R/no breast lump L/no breast lump R

## 2021-11-18 PROBLEM — N20.0 CALCULUS OF KIDNEY: Chronic | Status: ACTIVE | Noted: 2021-11-15

## 2021-11-18 PROBLEM — Z87.898 PERSONAL HISTORY OF OTHER SPECIFIED CONDITIONS: Chronic | Status: ACTIVE | Noted: 2021-11-15

## 2021-11-18 PROBLEM — J18.9 PNEUMONIA, UNSPECIFIED ORGANISM: Chronic | Status: ACTIVE | Noted: 2021-11-15

## 2021-11-18 PROBLEM — E78.5 HYPERLIPIDEMIA, UNSPECIFIED: Chronic | Status: ACTIVE | Noted: 2021-11-15

## 2021-11-18 PROBLEM — Z87.09 PERSONAL HISTORY OF OTHER DISEASES OF THE RESPIRATORY SYSTEM: Chronic | Status: ACTIVE | Noted: 2021-11-15

## 2021-11-18 PROBLEM — I26.99 OTHER PULMONARY EMBOLISM WITHOUT ACUTE COR PULMONALE: Chronic | Status: ACTIVE | Noted: 2021-11-15

## 2021-11-18 PROBLEM — I82.409 ACUTE EMBOLISM AND THROMBOSIS OF UNSPECIFIED DEEP VEINS OF UNSPECIFIED LOWER EXTREMITY: Chronic | Status: ACTIVE | Noted: 2021-11-15

## 2021-11-18 PROBLEM — A41.89 OTHER SPECIFIED SEPSIS: Chronic | Status: ACTIVE | Noted: 2021-11-15

## 2021-11-18 PROBLEM — M54.9 DORSALGIA, UNSPECIFIED: Chronic | Status: ACTIVE | Noted: 2021-11-15

## 2021-11-22 ENCOUNTER — OUTPATIENT (OUTPATIENT)
Dept: OUTPATIENT SERVICES | Facility: HOSPITAL | Age: 61
LOS: 1 days | End: 2021-11-22
Payer: COMMERCIAL

## 2021-11-22 DIAGNOSIS — Z98.890 OTHER SPECIFIED POSTPROCEDURAL STATES: Chronic | ICD-10-CM

## 2021-11-22 DIAGNOSIS — Z98.89 OTHER SPECIFIED POSTPROCEDURAL STATES: Chronic | ICD-10-CM

## 2021-11-22 DIAGNOSIS — Z20.828 CONTACT WITH AND (SUSPECTED) EXPOSURE TO OTHER VIRAL COMMUNICABLE DISEASES: ICD-10-CM

## 2021-11-22 LAB — SARS-COV-2 RNA SPEC QL NAA+PROBE: SIGNIFICANT CHANGE UP

## 2021-11-22 PROCEDURE — U0005: CPT

## 2021-11-22 PROCEDURE — U0003: CPT

## 2021-11-23 ENCOUNTER — TRANSCRIPTION ENCOUNTER (OUTPATIENT)
Age: 61
End: 2021-11-23

## 2021-11-24 ENCOUNTER — OUTPATIENT (OUTPATIENT)
Dept: OUTPATIENT SERVICES | Facility: HOSPITAL | Age: 61
LOS: 1 days | End: 2021-11-24
Payer: COMMERCIAL

## 2021-11-24 VITALS
SYSTOLIC BLOOD PRESSURE: 120 MMHG | DIASTOLIC BLOOD PRESSURE: 63 MMHG | OXYGEN SATURATION: 96 % | RESPIRATION RATE: 18 BRPM | HEART RATE: 60 BPM

## 2021-11-24 VITALS
SYSTOLIC BLOOD PRESSURE: 132 MMHG | HEART RATE: 66 BPM | TEMPERATURE: 98 F | WEIGHT: 279.11 LBS | OXYGEN SATURATION: 98 % | HEIGHT: 65 IN | RESPIRATION RATE: 14 BRPM | DIASTOLIC BLOOD PRESSURE: 79 MMHG

## 2021-11-24 DIAGNOSIS — Z98.89 OTHER SPECIFIED POSTPROCEDURAL STATES: Chronic | ICD-10-CM

## 2021-11-24 DIAGNOSIS — N20.0 CALCULUS OF KIDNEY: ICD-10-CM

## 2021-11-24 DIAGNOSIS — Z98.890 OTHER SPECIFIED POSTPROCEDURAL STATES: Chronic | ICD-10-CM

## 2021-11-24 PROCEDURE — C2617: CPT

## 2021-11-24 PROCEDURE — C1769: CPT

## 2021-11-24 PROCEDURE — C1758: CPT

## 2021-11-24 PROCEDURE — 52356 CYSTO/URETERO W/LITHOTRIPSY: CPT | Mod: RT

## 2021-11-24 PROCEDURE — 76000 FLUOROSCOPY <1 HR PHYS/QHP: CPT

## 2021-11-24 PROCEDURE — C1726: CPT

## 2021-11-24 PROCEDURE — C1889: CPT

## 2021-11-24 RX ORDER — SODIUM CHLORIDE 9 MG/ML
1000 INJECTION, SOLUTION INTRAVENOUS
Refills: 0 | Status: DISCONTINUED | OUTPATIENT
Start: 2021-11-24 | End: 2021-11-24

## 2021-11-24 RX ORDER — FUROSEMIDE 40 MG
1 TABLET ORAL
Qty: 0 | Refills: 0 | DISCHARGE

## 2021-11-24 RX ORDER — FOLIC ACID 0.8 MG
1 TABLET ORAL
Qty: 0 | Refills: 0 | DISCHARGE

## 2021-11-24 RX ORDER — ONDANSETRON 8 MG/1
4 TABLET, FILM COATED ORAL ONCE
Refills: 0 | Status: DISCONTINUED | OUTPATIENT
Start: 2021-11-24 | End: 2021-11-24

## 2021-11-24 RX ORDER — ACETAMINOPHEN WITH CODEINE 300MG-30MG
0 TABLET ORAL
Qty: 0 | Refills: 0 | DISCHARGE

## 2021-11-24 RX ORDER — ALPRAZOLAM 0.25 MG
0 TABLET ORAL
Qty: 0 | Refills: 0 | DISCHARGE

## 2021-11-24 RX ORDER — AZTREONAM 2 G
1 VIAL (EA) INJECTION
Qty: 10 | Refills: 0
Start: 2021-11-24 | End: 2021-11-28

## 2021-11-24 RX ORDER — PHENAZOPYRIDINE HCL 100 MG
1 TABLET ORAL
Qty: 30 | Refills: 0
Start: 2021-11-24 | End: 2021-12-03

## 2021-11-24 RX ORDER — CHOLECALCIFEROL (VITAMIN D3) 125 MCG
0 CAPSULE ORAL
Qty: 0 | Refills: 0 | DISCHARGE

## 2021-11-24 RX ORDER — PHENAZOPYRIDINE HCL 100 MG
200 TABLET ORAL ONCE
Refills: 0 | Status: COMPLETED | OUTPATIENT
Start: 2021-11-24 | End: 2021-11-24

## 2021-11-24 RX ORDER — TRAZODONE HCL 50 MG
2 TABLET ORAL
Qty: 0 | Refills: 0 | DISCHARGE

## 2021-11-24 RX ORDER — DICLOFENAC SODIUM/MISOPROSTOL 50 MG-200
0 TABLET,IMMEDIATE,DELAY RELEASE,BIPHASE ORAL
Qty: 0 | Refills: 0 | DISCHARGE

## 2021-11-24 RX ORDER — CIPROFLOXACIN LACTATE 400MG/40ML
400 VIAL (ML) INTRAVENOUS ONCE
Refills: 0 | Status: COMPLETED | OUTPATIENT
Start: 2021-11-24 | End: 2021-11-24

## 2021-11-24 RX ORDER — IRBESARTAN 75 MG/1
1 TABLET ORAL
Qty: 0 | Refills: 0 | DISCHARGE

## 2021-11-24 RX ORDER — DULOXETINE HYDROCHLORIDE 30 MG/1
3 CAPSULE, DELAYED RELEASE ORAL
Qty: 0 | Refills: 0 | DISCHARGE

## 2021-11-24 RX ORDER — ACETAMINOPHEN 500 MG
1000 TABLET ORAL ONCE
Refills: 0 | Status: COMPLETED | OUTPATIENT
Start: 2021-11-24 | End: 2021-11-24

## 2021-11-24 RX ORDER — OXYCODONE HYDROCHLORIDE 5 MG/1
5 TABLET ORAL ONCE
Refills: 0 | Status: DISCONTINUED | OUTPATIENT
Start: 2021-11-24 | End: 2021-11-24

## 2021-11-24 RX ORDER — HYDROMORPHONE HYDROCHLORIDE 2 MG/ML
0.5 INJECTION INTRAMUSCULAR; INTRAVENOUS; SUBCUTANEOUS
Refills: 0 | Status: DISCONTINUED | OUTPATIENT
Start: 2021-11-24 | End: 2021-11-24

## 2021-11-24 RX ORDER — LANOLIN ALCOHOL/MO/W.PET/CERES
1 CREAM (GRAM) TOPICAL
Qty: 0 | Refills: 0 | DISCHARGE

## 2021-11-24 RX ADMIN — SODIUM CHLORIDE 75 MILLILITER(S): 9 INJECTION, SOLUTION INTRAVENOUS at 08:38

## 2021-11-24 RX ADMIN — Medication 400 MILLIGRAM(S): at 13:19

## 2021-11-24 RX ADMIN — Medication 200 MILLIGRAM(S): at 13:16

## 2021-11-24 RX ADMIN — SODIUM CHLORIDE 75 MILLILITER(S): 9 INJECTION, SOLUTION INTRAVENOUS at 13:08

## 2021-11-24 RX ADMIN — Medication 1000 MILLIGRAM(S): at 13:35

## 2021-11-24 NOTE — BRIEF OPERATIVE NOTE - NSICDXBRIEFPROCEDURE_GEN_ALL_CORE_FT
PROCEDURES:  Flexible ureteroscopy 24-Nov-2021 12:04:10 laser lithotripsy of right renla calculi, retrograde pyelogram, ureteral dilatation and ureteral stent insertion Malik Ellis

## 2021-11-24 NOTE — ASU DISCHARGE PLAN (ADULT/PEDIATRIC) - COMMENTS
Have CT scan performed in 2 weeks (approx 12/8) and Dr. Ellis will call with the result and follow up plan.

## 2021-11-24 NOTE — ASU DISCHARGE PLAN (ADULT/PEDIATRIC) - CALL YOUR DOCTOR IF YOU HAVE ANY OF THE FOLLOWING:
Fever greater than (need to indicate Fahrenheit or Celsius) Fever greater than (need to indicate Fahrenheit or Celsius)/Nausea and vomiting that does not stop/Unable to urinate

## 2021-11-24 NOTE — ASU DISCHARGE PLAN (ADULT/PEDIATRIC) - FREQUENT HAND WASHING PREVENTS THE SPREAD OF INFECTION.
Please see if pts have a history of pre skin cancer or skin cancer.  If skin cancer, basal, squamous or melenoma?    Statement Selected

## 2021-11-24 NOTE — ASU DISCHARGE PLAN (ADULT/PEDIATRIC) - CARE PROVIDER_API CALL
Malik Ellis)  Urology  [address] [facility] [facility], [address]  [place] [address]  Phone: [phone]  Fax: [phone]  Follow Up Time:

## 2021-12-03 ENCOUNTER — TRANSCRIPTION ENCOUNTER (OUTPATIENT)
Age: 61
End: 2021-12-03

## 2022-01-25 ENCOUNTER — NON-APPOINTMENT (OUTPATIENT)
Age: 62
End: 2022-01-25

## 2022-01-25 ENCOUNTER — APPOINTMENT (OUTPATIENT)
Dept: CARDIOLOGY | Facility: CLINIC | Age: 62
End: 2022-01-25
Payer: COMMERCIAL

## 2022-01-25 VITALS
HEIGHT: 66 IN | SYSTOLIC BLOOD PRESSURE: 129 MMHG | DIASTOLIC BLOOD PRESSURE: 85 MMHG | OXYGEN SATURATION: 97 % | HEART RATE: 72 BPM

## 2022-01-25 PROCEDURE — 99214 OFFICE O/P EST MOD 30 MIN: CPT

## 2022-01-25 PROCEDURE — 93000 ELECTROCARDIOGRAM COMPLETE: CPT

## 2022-01-25 NOTE — PHYSICAL EXAM
[Well Developed] : well developed [Well Nourished] : well nourished [No Acute Distress] : no acute distress [Normal Conjunctiva] : normal conjunctiva [Normal Venous Pressure] : normal venous pressure [No Carotid Bruit] : no carotid bruit [Tachycardia] : tachycardic [Normal S1] : normal S1 [Normal S2] : normal S2 [S3] : no S3 [S4] : no S4 [No Murmur] : no murmurs heard [No Pitting Edema] : no pitting edema present [Right Carotid Bruit] : no bruit heard over the right carotid [Left Carotid Bruit] : no bruit heard over the left carotid [Right Femoral Bruit] : no bruit heard over the right femoral artery [Left Femoral Bruit] : no bruit heard over the left femoral artery [2+] : left 2+ [No Abnormalities] : the abdominal aorta was not enlarged and no bruit was heard [Clear Lung Fields] : clear lung fields [Good Air Entry] : good air entry [No Respiratory Distress] : no respiratory distress  [Soft] : abdomen soft [Non Tender] : non-tender [No Masses/organomegaly] : no masses/organomegaly [Normal Bowel Sounds] : normal bowel sounds [Normal Gait] : normal gait [No Edema] : no edema [No Cyanosis] : no cyanosis [No Clubbing] : no clubbing [No Varicosities] : no varicosities [No Rash] : no rash [No Skin Lesions] : no skin lesions [Moves all extremities] : moves all extremities [No Focal Deficits] : no focal deficits [Normal Speech] : normal speech [Alert and Oriented] : alert and oriented [Normal memory] : normal memory

## 2022-01-25 NOTE — HISTORY OF PRESENT ILLNESS
[FreeTextEntry1] : Ewelina is a 62 year old female here for evaluation.\par \par Cardiac-wise, she has a history of obesity, obstructive sleep apnea, and hypertension. She reports an abnormal stress test about 7 or 8 years ago, for which she had an angiogram, and it showed no significant plaque. She also has a history of aortic aneurysm. Her last CT was in 12/2018. This demonstrated an aneurysmal dilation of the ascending thoracic aorta, with a maximal transverse diameter of 4.4 cm.\par \par I last saw her in 10/2021. \par \par In 2020, she presented to the ER with acute shortness of breath. A CT was negative for PE, and confirmed an aneurysm of 4.3 cm. Her BNP was minimal. Her troponins were negative, though given her risk, she was transferred for cardiac catheterization, which showed nonobstructive CAD.\par \par She had a panniculectomy in 10/2020, and a complicated post-op course with hemorrhagic shock, intermittent fevers and ELAINE. She also had PAF in the setting of hypotension. She was later re-admitted in late 11/2020 with LLE swelling, and found to have an extensive DVT and right sided PE. She completed a course of eliquis, and has been following with a hematologist.\par \par Since last visit, she is doing well.\par She reports mild swelling of her LE, that has resolved with diuretics.\par She reports some mild dyspnea, which seems worse at night. She reports compliant with her CPAP.\par I repeated her echo in 6/21. Her proximal ascending aorta measured 4.1 cm. There was mild RVE with normal RV function. Estimated EF was 50-55%.

## 2022-01-25 NOTE — DISCUSSION/SUMMARY
[With Me] : with me [FreeTextEntry1] : Ewelina is a 62 year old female here for follow up.\par \par In 2020, she had a complicated course after a panniculectomy, with hemorrhagic shock, fevers and PAF, though more recently was found to have an extensive LLE DVT and PE.\par \par She is feeling better today other than some mild dyspnea. She will continue to take lasix needed. Her heart rate is better today. A recent cath in 2020 was unremarkable. She is currently off eliquis, after normalization of her d-dimer level.\par \par She is on verapamil 240. She is back on avapro and propranolol. Her echocardiogram demonstrated low normal LV function without significant valvular pathology. We will repeat her echocardiogram in 6/22 to evaluate her aortic root size.\par I stressed the importance of getting back to diet, and exercise, with an attempt to lose weight.\par \par I will see her again in 4 months.

## 2022-01-28 ENCOUNTER — APPOINTMENT (OUTPATIENT)
Dept: ORTHOPEDIC SURGERY | Facility: CLINIC | Age: 62
End: 2022-01-28
Payer: COMMERCIAL

## 2022-01-28 VITALS
HEART RATE: 108 BPM | BODY MASS INDEX: 45.8 KG/M2 | SYSTOLIC BLOOD PRESSURE: 150 MMHG | HEIGHT: 66 IN | WEIGHT: 285 LBS | DIASTOLIC BLOOD PRESSURE: 87 MMHG

## 2022-01-28 DIAGNOSIS — M17.0 BILATERAL PRIMARY OSTEOARTHRITIS OF KNEE: ICD-10-CM

## 2022-01-28 DIAGNOSIS — M25.561 PAIN IN RIGHT KNEE: ICD-10-CM

## 2022-01-28 DIAGNOSIS — M25.562 PAIN IN RIGHT KNEE: ICD-10-CM

## 2022-01-28 PROCEDURE — 99204 OFFICE O/P NEW MOD 45 MIN: CPT | Mod: 25

## 2022-01-28 PROCEDURE — 20610 DRAIN/INJ JOINT/BURSA W/O US: CPT | Mod: 50

## 2022-01-28 PROCEDURE — 73564 X-RAY EXAM KNEE 4 OR MORE: CPT | Mod: 50

## 2022-01-28 RX ORDER — MELOXICAM 7.5 MG/1
7.5 TABLET ORAL
Qty: 30 | Refills: 0 | Status: ACTIVE | COMMUNITY
Start: 2022-01-28 | End: 1900-01-01

## 2022-01-28 RX ORDER — ADHESIVE TAPE 3"X 2.3 YD
4"X4" TAPE, NON-MEDICATED TOPICAL
Qty: 100 | Refills: 0 | Status: DISCONTINUED | COMMUNITY
Start: 2020-10-12 | End: 2022-01-28

## 2022-02-08 RX ORDER — HYALURONATE SODIUM 10 MG/ML
25 SYRINGE (ML) INTRAARTICULAR
Qty: 10 | Refills: 0 | Status: ACTIVE | OUTPATIENT
Start: 2022-01-28

## 2022-02-25 ENCOUNTER — APPOINTMENT (OUTPATIENT)
Dept: ORTHOPEDIC SURGERY | Facility: CLINIC | Age: 62
End: 2022-02-25
Payer: COMMERCIAL

## 2022-02-25 VITALS
HEART RATE: 111 BPM | SYSTOLIC BLOOD PRESSURE: 131 MMHG | HEIGHT: 66 IN | BODY MASS INDEX: 45.8 KG/M2 | DIASTOLIC BLOOD PRESSURE: 78 MMHG | WEIGHT: 285 LBS

## 2022-02-25 PROCEDURE — 20610 DRAIN/INJ JOINT/BURSA W/O US: CPT | Mod: 50

## 2022-02-25 NOTE — DISCUSSION/SUMMARY
[de-identified] : EMANUEL MONTEZ is a 62 year female who presents with bilateral knee bone on bone varus arthritis. Patient received the first of five Genvisc injections to the bilateral knees and tolerated well. She will ice and elevate when home. Follow up in 1 week.

## 2022-02-25 NOTE — PHYSICAL EXAM
[de-identified] : The patient appears well nourished  and in no apparent distress.  The patient is alert and oriented to person, place, and time.   Affect and mood appear normal. The head is normocephalic and atraumatic.  The eyes reveal normal sclera and extra ocular muscles are intact. The tongue is midline with no apparent lesions.  Skin shows normal turgor with no evidence of eczema or psoriasis.  No respiratory distress noted.  		  [de-identified] : Exam of the right knee shows 0 to 110 degrees of flexion measured with a goniometer. There is no effusion. \par Exam of the left knee shows -10 to 104 degrees of flexion measured with a goniometer. There is pain with flexion that limits range of motion.  \par 5/5 motor strength bilaterally distally. There is subjective numbness in both feet. [de-identified] : Prior X-ray: 4 views of the left knee demonstrate bone on bone varus arthritis.		\par Prior X-ray: 4 views of the right knee demonstrate bone on bone varus arthritis with a large calcified lose body superior to the patella.

## 2022-02-25 NOTE — HISTORY OF PRESENT ILLNESS
[de-identified] : Ms. EMANUEL MONTEZ is a 62 year old female presenting for bilateral knee pain, left worse than right, now progressively worsening.  Patient localizes the pain to the medial, anterior, and posterior aspect of the bilateral knees.  She notes her pain is worse with all weightbearing activity including walking any distance and rising from seated position.  She admits to intermittent buckling of the knees as well. She has not had physical therapy.  Patient is wary to take NSAIDs as both her parents suffered from dialysis as a result of taking too many NSAIDs. Patient received a depo medrol injection to the bilateral knees on 1/28/22 with some improvement. She presents today for to start genvisc injections. \par Past medical history includes left leg DVT in 2000, as well as right lung PE in 2000 following a panniculectomy surgery.

## 2022-02-25 NOTE — PROCEDURE
[de-identified] : Allergies: The patient denies allergies to medications and has no allergies to chicken,eggs, or feathers.\par Procedure: The patient has been identified by name and date of birth. Patient confirms that we are treating the bilateral knees today.\par The knees were prepped in the usual sterile fashion. The areas were cleansed with chlorhexadine, then sprayed with ethyl chloride. The patient was then injected with the Genvisc into the bilateral knees in the anterior position. The patient tolerated the procedure well. The medication was delivered aseptically and atraumatically.\par Diagnosis: Osteoarthritis of the bilateral knees\par Treatment: The patient was advised on the activities for today. I gave the patient instructions on postinjection ice and analgesia.\par

## 2022-03-04 ENCOUNTER — APPOINTMENT (OUTPATIENT)
Dept: ORTHOPEDIC SURGERY | Facility: CLINIC | Age: 62
End: 2022-03-04
Payer: COMMERCIAL

## 2022-03-04 VITALS — HEART RATE: 99 BPM | DIASTOLIC BLOOD PRESSURE: 97 MMHG | SYSTOLIC BLOOD PRESSURE: 147 MMHG

## 2022-03-04 VITALS — WEIGHT: 285 LBS | HEIGHT: 66 IN | BODY MASS INDEX: 45.8 KG/M2

## 2022-03-04 PROCEDURE — 20610 DRAIN/INJ JOINT/BURSA W/O US: CPT | Mod: 50

## 2022-03-04 NOTE — REASON FOR VISIT
[Follow-Up Visit] : a follow-up visit for [Other: ____] : [unfilled] [FreeTextEntry2] : lot # [id number] exp 10/31/23

## 2022-03-04 NOTE — HISTORY OF PRESENT ILLNESS
[de-identified] : The patient is here today for the second Genvisc injection for the bilateral knees. \par Allergies: The patient denies allergies to medications and has no allergies to chicken,eggs, or feathers.\par Procedure: The patient has been identified by name and date of birth. Patient confirms that we are treating the bilateral knees today.\par The knees were prepped in the usual sterile fashion. The areas were cleansed with chlorhexadine, then sprayed with ethyl chloride. The patient was then injected with the Genvisc into the bilateral knees in the anterior position. The patient tolerated the procedure well. The medication was delivered aseptically and atraumatically.\par Diagnosis: Osteoarthritis of the bilateral knees\par Treatment: The patient was advised on the activities for today. I gave the patient instructions on postinjection ice and analgesia.\par Follow up recommended in one week

## 2022-03-11 ENCOUNTER — APPOINTMENT (OUTPATIENT)
Dept: ORTHOPEDIC SURGERY | Facility: CLINIC | Age: 62
End: 2022-03-11
Payer: COMMERCIAL

## 2022-03-11 VITALS
BODY MASS INDEX: 45.8 KG/M2 | WEIGHT: 285 LBS | HEART RATE: 87 BPM | DIASTOLIC BLOOD PRESSURE: 87 MMHG | SYSTOLIC BLOOD PRESSURE: 140 MMHG | HEIGHT: 66 IN

## 2022-03-11 PROCEDURE — 20610 DRAIN/INJ JOINT/BURSA W/O US: CPT | Mod: 50

## 2022-03-11 NOTE — HISTORY OF PRESENT ILLNESS
[de-identified] : The patient is here today for Genvisc injection for the bilateral knees. \par Allergies: The patient denies allergies to medications and has no allergies to chicken,eggs, or feathers.\par Procedure: The patient has been identified by name and date of birth. Patient confirms that we are treating the bilateral knees today.\par The knees were prepped in the usual sterile fashion. The areas were cleansed with chlorhexadine, then sprayed with ethyl chloride. The patient was then injected with the Genvisc into the bilateral knees in the anterior position. The patient tolerated the procedure well. The medication was delivered aseptically and atraumatically.\par Diagnosis: Osteoarthritis of the bilateral knees\par Treatment: The patient was advised on the activities for today. I gave the patient instructions on postinjection ice and analgesia.\par Follow up recommended in one week

## 2022-03-11 NOTE — REASON FOR VISIT
[Follow-Up Visit] : a follow-up visit for [Other: ____] : [unfilled] [FreeTextEntry2] : Genvisc injection # 3 in both knees Lot # [id number]  exp date 10/31/2023

## 2022-03-18 ENCOUNTER — APPOINTMENT (OUTPATIENT)
Dept: ORTHOPEDIC SURGERY | Facility: CLINIC | Age: 62
End: 2022-03-18
Payer: COMMERCIAL

## 2022-03-18 VITALS
SYSTOLIC BLOOD PRESSURE: 127 MMHG | WEIGHT: 285 LBS | HEART RATE: 103 BPM | BODY MASS INDEX: 45.8 KG/M2 | HEIGHT: 66 IN | DIASTOLIC BLOOD PRESSURE: 91 MMHG

## 2022-03-18 PROCEDURE — 20610 DRAIN/INJ JOINT/BURSA W/O US: CPT | Mod: 50

## 2022-03-18 NOTE — HISTORY OF PRESENT ILLNESS
[de-identified] : The patient is here today for the fourth Genvisc injection for the bilateral knees. \par Allergies: The patient denies allergies to medications and has no allergies to chicken,eggs, or feathers.\par Procedure: The patient has been identified by name and date of birth. Patient confirms that we are treating the bilateral knees today.\par The knees were prepped in the usual sterile fashion. The areas were cleansed with chlorhexadine, then sprayed with ethyl chloride. The patient was then injected with the Genvisc into the bilateral knees in the anterior position. The patient tolerated the procedure well. The medication was delivered aseptically and atraumatically.\par Diagnosis: Osteoarthritis of the bilateral knees\par Treatment: The patient was advised on the activities for today. I gave the patient instructions on postinjection ice and analgesia.\par Follow up recommended in one week

## 2022-03-25 ENCOUNTER — APPOINTMENT (OUTPATIENT)
Dept: ORTHOPEDIC SURGERY | Facility: CLINIC | Age: 62
End: 2022-03-25
Payer: COMMERCIAL

## 2022-03-25 DIAGNOSIS — M17.0 BILATERAL PRIMARY OSTEOARTHRITIS OF KNEE: ICD-10-CM

## 2022-03-25 PROCEDURE — 20610 DRAIN/INJ JOINT/BURSA W/O US: CPT | Mod: 50

## 2022-03-25 NOTE — HISTORY OF PRESENT ILLNESS
[de-identified] : The patient is here today for the fifth Genvisc injection for the bilateral knees. \par Allergies: The patient denies allergies to medications and has no allergies to chicken,eggs, or feathers.\par Procedure: The patient has been identified by name and date of birth. Patient confirms that we are treating the bilateral knees today.\par The knees were prepped in the usual sterile fashion. The areas were cleansed with chlorhexadine, then sprayed with ethyl chloride. The patient was then injected with the Genvisc into the bilateral knees in the anterior position. The patient tolerated the procedure well. The medication was delivered aseptically and atraumatically.\par Diagnosis: Osteoarthritis of the bilateral knees\par Treatment: The patient was advised on the activities for today. I gave the patient instructions on postinjection ice and analgesia.\par Follow up recommended in 6-8 weeks.

## 2022-03-25 NOTE — REASON FOR VISIT
[Follow-Up Visit] : a follow-up visit for [Other: ____] : [unfilled] [FreeTextEntry2] : Genvisc injection # 5 in both knees Lot # [id number] exp date 10/31/2023.  \par

## 2022-03-31 RX ORDER — MELOXICAM 7.5 MG/1
7.5 TABLET ORAL TWICE DAILY
Qty: 30 | Refills: 0 | Status: ACTIVE | COMMUNITY
Start: 2022-03-31 | End: 1900-01-01

## 2022-06-09 ENCOUNTER — NON-APPOINTMENT (OUTPATIENT)
Age: 62
End: 2022-06-09

## 2022-06-09 ENCOUNTER — APPOINTMENT (OUTPATIENT)
Dept: CARDIOLOGY | Facility: CLINIC | Age: 62
End: 2022-06-09
Payer: COMMERCIAL

## 2022-06-09 VITALS
HEART RATE: 84 BPM | SYSTOLIC BLOOD PRESSURE: 142 MMHG | HEIGHT: 66 IN | OXYGEN SATURATION: 96 % | DIASTOLIC BLOOD PRESSURE: 94 MMHG

## 2022-06-09 VITALS — DIASTOLIC BLOOD PRESSURE: 88 MMHG | SYSTOLIC BLOOD PRESSURE: 138 MMHG

## 2022-06-09 PROCEDURE — 93000 ELECTROCARDIOGRAM COMPLETE: CPT

## 2022-06-09 PROCEDURE — 99214 OFFICE O/P EST MOD 30 MIN: CPT

## 2022-06-09 NOTE — PHYSICAL EXAM
[Well Developed] : well developed [Well Nourished] : well nourished [No Acute Distress] : no acute distress [Normal Conjunctiva] : normal conjunctiva [Normal Venous Pressure] : normal venous pressure [No Carotid Bruit] : no carotid bruit [Tachycardia] : tachycardic [Normal S1] : normal S1 [S3] : no S3 [Normal S2] : normal S2 [S4] : no S4 [No Murmur] : no murmurs heard [No Pitting Edema] : no pitting edema present [Right Carotid Bruit] : no bruit heard over the right carotid [Left Carotid Bruit] : no bruit heard over the left carotid [Left Femoral Bruit] : no bruit heard over the left femoral artery [Right Femoral Bruit] : no bruit heard over the right femoral artery [2+] : left 2+ [No Abnormalities] : the abdominal aorta was not enlarged and no bruit was heard [Clear Lung Fields] : clear lung fields [Good Air Entry] : good air entry [No Respiratory Distress] : no respiratory distress  [Soft] : abdomen soft [Non Tender] : non-tender [No Masses/organomegaly] : no masses/organomegaly [Normal Bowel Sounds] : normal bowel sounds [Normal Gait] : normal gait [No Edema] : no edema [No Clubbing] : no clubbing [No Cyanosis] : no cyanosis [No Varicosities] : no varicosities [No Rash] : no rash [No Skin Lesions] : no skin lesions [Moves all extremities] : moves all extremities [No Focal Deficits] : no focal deficits [Normal Speech] : normal speech [Alert and Oriented] : alert and oriented [Normal memory] : normal memory

## 2022-06-09 NOTE — HISTORY OF PRESENT ILLNESS
[FreeTextEntry1] : Ewelina is a 62 year old female here for evaluation.\par \par Cardiac-wise, she has a history of obesity, obstructive sleep apnea, and hypertension. She reports an abnormal stress test about 7 or 8 years ago, for which she had an angiogram, and it showed no significant plaque. She also has a history of aortic aneurysm. Her last CT was in 12/2018. This demonstrated an aneurysmal dilation of the ascending thoracic aorta, with a maximal transverse diameter of 4.4 cm.\par \par I last saw her in 1/22.\par \par In 2020, she presented to the ER with acute shortness of breath. A CT was negative for PE, and confirmed an aneurysm of 4.3 cm. Her BNP was minimal. Her troponins were negative, though given her risk, she was transferred for cardiac catheterization, which showed nonobstructive CAD.\par \par She had a panniculectomy in 10/2020, and a complicated post-op course with hemorrhagic shock, intermittent fevers and ELAINE. She also had PAF in the setting of hypotension. She was later re-admitted in late 11/2020 with LLE swelling, and found to have an extensive DVT and right sided PE. She completed a course of eliquis, and has been following with a hematologist.\par \par Since last visit, she is doing ok.\par She has chest pain here and there, which bothered her last week, and radiated to her right arm. She almost went to ER. The pain seemed to reproducible to palpation.\par She reports mild swelling of her LLE. She has some eczema now.\par She reports some mild dyspnea, which seems worse at night. She reports compliant with her CPAP.\par \par I repeated her echo in 6/21. Her proximal ascending aorta measured 4.1 cm. There was mild RVE with normal RV function. Estimated EF was 50-55%.

## 2022-06-09 NOTE — DISCUSSION/SUMMARY
[With Me] : with me [FreeTextEntry1] : Ewelina is a 62 year old female here for follow up.\par \par In 2020, she had a complicated course after a panniculectomy, with hemorrhagic shock, fevers and PAF, though more recently was found to have an extensive LLE DVT and PE.\par \par She reports some atypical chest pain and unchanged dyspnea.  A cath in 2020 was unremarkable.She will continue to take lasix as needed. Her heart rate is better today. She is currently off eliquis, after normalization of her d-dimer level.\par \par She is on verapamil 240. She is back on avapro and propranolol. Her echocardiogram demonstrated low normal LV function without significant valvular pathology. We will repeat her echocardiogram in 6/22 to evaluate her aortic root size. She will also have a pharm nuclear stress test.\par \par I stressed the importance of getting back to diet, and exercise, with an attempt to lose weight.\par \par I will see her again in 4 months.

## 2022-06-10 NOTE — ED PROVIDER NOTE - RATE
Dental Brief Operative Note      Procedure:  Comprehensive exam, Full mouth radiographs,cleaning periodontal therapy, fillings X 1 , extractions X 1    Surgeon: Timo Edwards DDS    Assistant: Zaina VALDEZ    Anesthesia: General anesthesia with nasal intubation     Estimated blood loss: 6 ml    Total IV fluids: 600 cc    Complications:  None    Condition: Patient taken to recovery in stable condition.    Circulator: Rosita Roberts RN and Anesthesiologist: Alvin Farrar MD  CRNA: Nishant Oneil APRN CRNA  No responsible provider has been recorded for the case.    Timo Edwards DDS,  Krupa 10, 2022          
78

## 2022-06-20 ENCOUNTER — APPOINTMENT (OUTPATIENT)
Dept: CARDIOLOGY | Facility: CLINIC | Age: 62
End: 2022-06-20
Payer: COMMERCIAL

## 2022-06-20 LAB
25(OH)D3 SERPL-MCNC: 52.7 NG/ML
ALBUMIN SERPL ELPH-MCNC: 4.1 G/DL
ALP BLD-CCNC: 146 U/L
ALT SERPL-CCNC: 20 U/L
ANION GAP SERPL CALC-SCNC: 14 MMOL/L
AST SERPL-CCNC: 20 U/L
BASOPHILS # BLD AUTO: 0.07 K/UL
BASOPHILS NFR BLD AUTO: 1.1 %
BILIRUB SERPL-MCNC: 0.2 MG/DL
BUN SERPL-MCNC: 18 MG/DL
CALCIUM SERPL-MCNC: 9.5 MG/DL
CHLORIDE SERPL-SCNC: 105 MMOL/L
CHOLEST SERPL-MCNC: 197 MG/DL
CO2 SERPL-SCNC: 26 MMOL/L
CREAT SERPL-MCNC: 0.88 MG/DL
EGFR: 74 ML/MIN/1.73M2
EOSINOPHIL # BLD AUTO: 0.16 K/UL
EOSINOPHIL NFR BLD AUTO: 2.4 %
ESTIMATED AVERAGE GLUCOSE: 120 MG/DL
GLUCOSE SERPL-MCNC: 154 MG/DL
HBA1C MFR BLD HPLC: 5.8 %
HCT VFR BLD CALC: 40.6 %
HDLC SERPL-MCNC: 46 MG/DL
HGB BLD-MCNC: 12.3 G/DL
IMM GRANULOCYTES NFR BLD AUTO: 0.2 %
LDLC SERPL CALC-MCNC: 99 MG/DL
LYMPHOCYTES # BLD AUTO: 1.86 K/UL
LYMPHOCYTES NFR BLD AUTO: 28.1 %
MAGNESIUM SERPL-MCNC: 2.1 MG/DL
MAN DIFF?: NORMAL
MCHC RBC-ENTMCNC: 28.9 PG
MCHC RBC-ENTMCNC: 30.3 GM/DL
MCV RBC AUTO: 95.5 FL
MONOCYTES # BLD AUTO: 0.46 K/UL
MONOCYTES NFR BLD AUTO: 6.9 %
NEUTROPHILS # BLD AUTO: 4.06 K/UL
NEUTROPHILS NFR BLD AUTO: 61.3 %
NONHDLC SERPL-MCNC: 151 MG/DL
NT-PROBNP SERPL-MCNC: 65 PG/ML
PLATELET # BLD AUTO: 308 K/UL
POTASSIUM SERPL-SCNC: 5 MMOL/L
PROT SERPL-MCNC: 6.8 G/DL
RBC # BLD: 4.25 M/UL
RBC # FLD: 14 %
SODIUM SERPL-SCNC: 144 MMOL/L
TRIGL SERPL-MCNC: 259 MG/DL
TSH SERPL-ACNC: 3.43 UIU/ML
WBC # FLD AUTO: 6.62 K/UL

## 2022-06-20 PROCEDURE — 93306 TTE W/DOPPLER COMPLETE: CPT

## 2022-06-20 RX ORDER — PERFLUTREN 6.52 MG/ML
6.52 INJECTION, SUSPENSION INTRAVENOUS
Qty: 1 | Refills: 0 | Status: COMPLETED | OUTPATIENT
Start: 2022-06-20

## 2022-06-20 RX ADMIN — PERFLUTREN MG/ML: 6.52 INJECTION, SUSPENSION INTRAVENOUS at 00:00

## 2022-06-27 ENCOUNTER — NON-APPOINTMENT (OUTPATIENT)
Age: 62
End: 2022-06-27

## 2022-06-28 ENCOUNTER — APPOINTMENT (OUTPATIENT)
Dept: CARDIOLOGY | Facility: CLINIC | Age: 62
End: 2022-06-28

## 2022-06-28 LAB
APPEARANCE: ABNORMAL
BACTERIA: NEGATIVE
BILIRUBIN URINE: NEGATIVE
BLOOD URINE: NEGATIVE
COLOR: YELLOW
GLUCOSE QUALITATIVE U: NEGATIVE
HYALINE CASTS: 0 /LPF
KETONES URINE: NEGATIVE
LEUKOCYTE ESTERASE URINE: ABNORMAL
MICROSCOPIC-UA: NORMAL
NITRITE URINE: NEGATIVE
PH URINE: 6
PROTEIN URINE: NORMAL
RED BLOOD CELLS URINE: 5 /HPF
SPECIFIC GRAVITY URINE: 1.02
SQUAMOUS EPITHELIAL CELLS: 0 /HPF
UROBILINOGEN URINE: NORMAL
WHITE BLOOD CELLS URINE: 336 /HPF

## 2022-06-28 PROCEDURE — A9500: CPT

## 2022-06-28 PROCEDURE — 93015 CV STRESS TEST SUPVJ I&R: CPT

## 2022-06-28 PROCEDURE — 78452 HT MUSCLE IMAGE SPECT MULT: CPT

## 2022-06-29 ENCOUNTER — APPOINTMENT (OUTPATIENT)
Dept: CARDIOLOGY | Facility: CLINIC | Age: 62
End: 2022-06-29

## 2022-06-29 ENCOUNTER — TRANSCRIPTION ENCOUNTER (OUTPATIENT)
Age: 62
End: 2022-06-29

## 2022-07-27 ENCOUNTER — APPOINTMENT (OUTPATIENT)
Dept: CT IMAGING | Facility: CLINIC | Age: 62
End: 2022-07-27

## 2022-07-27 PROCEDURE — 71250 CT THORAX DX C-: CPT

## 2022-08-01 ENCOUNTER — APPOINTMENT (OUTPATIENT)
Dept: RADIOLOGY | Facility: CLINIC | Age: 62
End: 2022-08-01

## 2022-08-01 ENCOUNTER — APPOINTMENT (OUTPATIENT)
Dept: ULTRASOUND IMAGING | Facility: CLINIC | Age: 62
End: 2022-08-01

## 2022-08-01 PROCEDURE — 93971 EXTREMITY STUDY: CPT | Mod: LT

## 2022-08-01 PROCEDURE — 73630 X-RAY EXAM OF FOOT: CPT | Mod: LT

## 2022-08-02 ENCOUNTER — TRANSCRIPTION ENCOUNTER (OUTPATIENT)
Age: 62
End: 2022-08-02

## 2022-08-03 ENCOUNTER — APPOINTMENT (OUTPATIENT)
Dept: ORTHOPEDIC SURGERY | Facility: CLINIC | Age: 62
End: 2022-08-03

## 2022-08-03 VITALS — BODY MASS INDEX: 45.8 KG/M2 | HEIGHT: 66 IN | WEIGHT: 285 LBS

## 2022-08-03 DIAGNOSIS — M84.375A STRESS FRACTURE, LEFT FOOT, INITIAL ENCOUNTER FOR FRACTURE: ICD-10-CM

## 2022-08-03 PROCEDURE — L4361: CPT

## 2022-08-03 PROCEDURE — 99203 OFFICE O/P NEW LOW 30 MIN: CPT

## 2022-08-03 NOTE — DATA REVIEWED
[Outside X-rays] : outside x-rays [Left] : left [Foot] : foot [I reviewed the films/CD and additionally noted] : I reviewed the films/CD and additionally noted [FreeTextEntry1] : no obvious displaced fx - [place]

## 2022-08-03 NOTE — PHYSICAL EXAM
[Left] : left foot and ankle [Mild] : mild swelling of dorsal foot [2nd] : 2nd [5___] : plantar flexion 5[unfilled]/5 [2+] : dorsalis pedis pulse: 2+ [] : patient ambulates without assistive device

## 2022-08-03 NOTE — ASSESSMENT
[FreeTextEntry1] : cam boot\par rest from activity\par ice/elevate\par nsaids prn\par f/up 2 wks w/ foot xray

## 2022-08-17 ENCOUNTER — APPOINTMENT (OUTPATIENT)
Dept: ORTHOPEDIC SURGERY | Facility: CLINIC | Age: 62
End: 2022-08-17

## 2022-08-17 VITALS — HEIGHT: 66 IN | WEIGHT: 285 LBS | BODY MASS INDEX: 45.8 KG/M2

## 2022-08-17 DIAGNOSIS — M84.375D STRESS FRACTURE, LEFT FOOT, SUBSEQUENT ENCOUNTER FOR FRACTURE WITH ROUTINE HEALING: ICD-10-CM

## 2022-08-17 PROCEDURE — 99214 OFFICE O/P EST MOD 30 MIN: CPT

## 2022-08-17 PROCEDURE — 73630 X-RAY EXAM OF FOOT: CPT | Mod: LT

## 2022-08-17 NOTE — HISTORY OF PRESENT ILLNESS
[Burning] : burning [Dull/Aching] : dull/aching [Constant] : constant [Rest] : rest [Standing] : standing [Walking] : walking [5] : 5 [0] : 0 [de-identified] : 08/03/2022: Pt states she started getting foot pain after walking excessively 2 weeks ago. Went to her pcp who sent her for Xrays. Walking in regular shoes. no prior foot probs. no dm/tob. \par \par 08/17/2022: pain improvement. WB in boot.  [] : Post Surgical Visit: no [FreeTextEntry1] : LT foot [FreeTextEntry9] : elevate  [de-identified] : boot  [de-identified] : [place]  [de-identified] : Xray

## 2022-08-17 NOTE — ASSESSMENT
[FreeTextEntry1] : cam boot\par rest from activity\par ice/elevate\par nsaids prn\par mri to eval for occult fx\par f/up after MRI

## 2022-08-17 NOTE — PHYSICAL EXAM
[Left] : left foot and ankle [Mild] : mild swelling of dorsal foot [2nd] : 2nd [5___] : plantar flexion 5[unfilled]/5 [2+] : dorsalis pedis pulse: 2+ [] : negative anterior drawer at ankle

## 2022-08-17 NOTE — DATA REVIEWED
[Outside X-rays] : outside x-rays [Left] : left [Foot] : foot [I reviewed the films/CD and additionally noted] : I reviewed the films/CD and additionally noted [FreeTextEntry1] : no obvious displaced fx - [place]

## 2022-08-18 ENCOUNTER — FORM ENCOUNTER (OUTPATIENT)
Age: 62
End: 2022-08-18

## 2022-08-19 ENCOUNTER — APPOINTMENT (OUTPATIENT)
Dept: MRI IMAGING | Facility: CLINIC | Age: 62
End: 2022-08-19

## 2022-08-19 PROCEDURE — 73718 MRI LOWER EXTREMITY W/O DYE: CPT | Mod: LT

## 2022-08-22 ENCOUNTER — TRANSCRIPTION ENCOUNTER (OUTPATIENT)
Age: 62
End: 2022-08-22

## 2022-08-24 ENCOUNTER — TRANSCRIPTION ENCOUNTER (OUTPATIENT)
Age: 62
End: 2022-08-24

## 2022-08-31 ENCOUNTER — APPOINTMENT (OUTPATIENT)
Dept: ORTHOPEDIC SURGERY | Facility: CLINIC | Age: 62
End: 2022-08-31

## 2022-08-31 VITALS — BODY MASS INDEX: 45.8 KG/M2 | HEIGHT: 66 IN | WEIGHT: 285 LBS

## 2022-08-31 DIAGNOSIS — M19.072 PRIMARY OSTEOARTHRITIS, LEFT ANKLE AND FOOT: ICD-10-CM

## 2022-08-31 PROCEDURE — 99214 OFFICE O/P EST MOD 30 MIN: CPT

## 2022-08-31 NOTE — DATA REVIEWED
[Outside X-rays] : outside x-rays [MRI] : MRI [Left] : left [Foot] : foot [I reviewed the films/CD and additionally noted] : I reviewed the films/CD and additionally noted [FreeTextEntry1] : no obvious displaced fx - [place] [FreeTextEntry2] : midfoot degen changes. no acute fx

## 2022-08-31 NOTE — HISTORY OF PRESENT ILLNESS
[5] : 5 [0] : 0 [Burning] : burning [Dull/Aching] : dull/aching [Constant] : constant [Rest] : rest [Standing] : standing [Walking] : walking [de-identified] : 08/03/2022: Pt states she started getting foot pain after walking excessively 2 weeks ago. Went to her pcp who sent her for Xrays. Walking in regular shoes. no prior foot probs. no dm/tob. \par \par 08/17/2022: pain improvement. WB in boot. \par \par 08/31/2022: MRI f/up. no sig change. walking in reg shoes [] : Post Surgical Visit: no [FreeTextEntry1] : LT foot [FreeTextEntry9] : elevate  [de-identified] : [place]  [de-identified] : Xray

## 2022-08-31 NOTE — ASSESSMENT
[FreeTextEntry1] : wbat\par walking in reg shoes\par mdp\par f/up 6 wks\par \par A Medrol dose [place] was prescribed today. Patient understands that while taking the Medrol, they shouldn't be taking any other anti-inflammatories. Pt understands and all questions were answered.\par \par

## 2022-08-31 NOTE — PHYSICAL EXAM
[Left] : left foot and ankle [Mild] : mild swelling of dorsal foot [5___] : plantar flexion 5[unfilled]/5 [2+] : dorsalis pedis pulse: 2+ [2nd] : 2nd [] : negative anterior drawer at ankle

## 2022-09-07 ENCOUNTER — RX RENEWAL (OUTPATIENT)
Age: 62
End: 2022-09-07

## 2022-09-07 RX ORDER — METHYLPREDNISOLONE 4 MG/1
4 TABLET ORAL
Qty: 1 | Refills: 0 | Status: ACTIVE | COMMUNITY
Start: 2022-08-31 | End: 1900-01-01

## 2022-09-25 ENCOUNTER — EMERGENCY (EMERGENCY)
Facility: HOSPITAL | Age: 62
LOS: 1 days | Discharge: HOME | End: 2022-09-25
Attending: EMERGENCY MEDICINE | Admitting: STUDENT IN AN ORGANIZED HEALTH CARE EDUCATION/TRAINING PROGRAM
Payer: COMMERCIAL

## 2022-09-25 VITALS
RESPIRATION RATE: 18 BRPM | HEIGHT: 65 IN | TEMPERATURE: 97 F | SYSTOLIC BLOOD PRESSURE: 147 MMHG | DIASTOLIC BLOOD PRESSURE: 99 MMHG | HEART RATE: 75 BPM | WEIGHT: 293 LBS | OXYGEN SATURATION: 98 %

## 2022-09-25 VITALS
TEMPERATURE: 98 F | DIASTOLIC BLOOD PRESSURE: 76 MMHG | HEART RATE: 80 BPM | RESPIRATION RATE: 19 BRPM | OXYGEN SATURATION: 100 % | SYSTOLIC BLOOD PRESSURE: 124 MMHG

## 2022-09-25 DIAGNOSIS — Z98.89 OTHER SPECIFIED POSTPROCEDURAL STATES: Chronic | ICD-10-CM

## 2022-09-25 DIAGNOSIS — Z98.890 OTHER SPECIFIED POSTPROCEDURAL STATES: Chronic | ICD-10-CM

## 2022-09-25 LAB
ALBUMIN SERPL ELPH-MCNC: 3.1 G/DL — LOW (ref 3.3–5)
ALP SERPL-CCNC: 116 U/L — SIGNIFICANT CHANGE UP (ref 40–120)
ALT FLD-CCNC: 35 U/L — SIGNIFICANT CHANGE UP (ref 12–78)
ANION GAP SERPL CALC-SCNC: 6 MMOL/L — SIGNIFICANT CHANGE UP (ref 5–17)
AST SERPL-CCNC: 18 U/L — SIGNIFICANT CHANGE UP (ref 15–37)
BASOPHILS # BLD AUTO: 0.03 K/UL — SIGNIFICANT CHANGE UP (ref 0–0.2)
BASOPHILS NFR BLD AUTO: 0.5 % — SIGNIFICANT CHANGE UP (ref 0–2)
BILIRUB SERPL-MCNC: 0.3 MG/DL — SIGNIFICANT CHANGE UP (ref 0.2–1.2)
BUN SERPL-MCNC: 22 MG/DL — SIGNIFICANT CHANGE UP (ref 7–23)
CALCIUM SERPL-MCNC: 9.1 MG/DL — SIGNIFICANT CHANGE UP (ref 8.5–10.1)
CHLORIDE SERPL-SCNC: 110 MMOL/L — HIGH (ref 96–108)
CK SERPL-CCNC: 68 U/L — SIGNIFICANT CHANGE UP (ref 26–192)
CK SERPL-CCNC: 76 U/L — SIGNIFICANT CHANGE UP (ref 26–192)
CO2 SERPL-SCNC: 29 MMOL/L — SIGNIFICANT CHANGE UP (ref 22–31)
CREAT SERPL-MCNC: 0.92 MG/DL — SIGNIFICANT CHANGE UP (ref 0.5–1.3)
D DIMER BLD IA.RAPID-MCNC: 189 NG/ML DDU — SIGNIFICANT CHANGE UP
EGFR: 70 ML/MIN/1.73M2 — SIGNIFICANT CHANGE UP
EOSINOPHIL # BLD AUTO: 0.16 K/UL — SIGNIFICANT CHANGE UP (ref 0–0.5)
EOSINOPHIL NFR BLD AUTO: 2.8 % — SIGNIFICANT CHANGE UP (ref 0–6)
GLUCOSE SERPL-MCNC: 160 MG/DL — HIGH (ref 70–99)
HCT VFR BLD CALC: 37.4 % — SIGNIFICANT CHANGE UP (ref 34.5–45)
HGB BLD-MCNC: 11.8 G/DL — SIGNIFICANT CHANGE UP (ref 11.5–15.5)
IMM GRANULOCYTES NFR BLD AUTO: 0.2 % — SIGNIFICANT CHANGE UP (ref 0–0.9)
LYMPHOCYTES # BLD AUTO: 2.02 K/UL — SIGNIFICANT CHANGE UP (ref 1–3.3)
LYMPHOCYTES # BLD AUTO: 35.3 % — SIGNIFICANT CHANGE UP (ref 13–44)
MCHC RBC-ENTMCNC: 30.1 PG — SIGNIFICANT CHANGE UP (ref 27–34)
MCHC RBC-ENTMCNC: 31.6 GM/DL — LOW (ref 32–36)
MCV RBC AUTO: 95.4 FL — SIGNIFICANT CHANGE UP (ref 80–100)
MONOCYTES # BLD AUTO: 0.54 K/UL — SIGNIFICANT CHANGE UP (ref 0–0.9)
MONOCYTES NFR BLD AUTO: 9.4 % — SIGNIFICANT CHANGE UP (ref 2–14)
NEUTROPHILS # BLD AUTO: 2.96 K/UL — SIGNIFICANT CHANGE UP (ref 1.8–7.4)
NEUTROPHILS NFR BLD AUTO: 51.8 % — SIGNIFICANT CHANGE UP (ref 43–77)
NRBC # BLD: 0 /100 WBCS — SIGNIFICANT CHANGE UP (ref 0–0)
NT-PROBNP SERPL-SCNC: 101 PG/ML — SIGNIFICANT CHANGE UP (ref 0–125)
PLATELET # BLD AUTO: 244 K/UL — SIGNIFICANT CHANGE UP (ref 150–400)
POTASSIUM SERPL-MCNC: 3.7 MMOL/L — SIGNIFICANT CHANGE UP (ref 3.5–5.3)
POTASSIUM SERPL-SCNC: 3.7 MMOL/L — SIGNIFICANT CHANGE UP (ref 3.5–5.3)
PROT SERPL-MCNC: 6.7 G/DL — SIGNIFICANT CHANGE UP (ref 6–8.3)
RBC # BLD: 3.92 M/UL — SIGNIFICANT CHANGE UP (ref 3.8–5.2)
RBC # FLD: 14 % — SIGNIFICANT CHANGE UP (ref 10.3–14.5)
SODIUM SERPL-SCNC: 145 MMOL/L — SIGNIFICANT CHANGE UP (ref 135–145)
TROPONIN I, HIGH SENSITIVITY RESULT: 16 NG/L — SIGNIFICANT CHANGE UP
TROPONIN I, HIGH SENSITIVITY RESULT: 16 NG/L — SIGNIFICANT CHANGE UP
WBC # BLD: 5.72 K/UL — SIGNIFICANT CHANGE UP (ref 3.8–10.5)
WBC # FLD AUTO: 5.72 K/UL — SIGNIFICANT CHANGE UP (ref 3.8–10.5)

## 2022-09-25 PROCEDURE — 99285 EMERGENCY DEPT VISIT HI MDM: CPT

## 2022-09-25 PROCEDURE — 85025 COMPLETE CBC W/AUTO DIFF WBC: CPT

## 2022-09-25 PROCEDURE — 36415 COLL VENOUS BLD VENIPUNCTURE: CPT

## 2022-09-25 PROCEDURE — 85379 FIBRIN DEGRADATION QUANT: CPT

## 2022-09-25 PROCEDURE — 84484 ASSAY OF TROPONIN QUANT: CPT

## 2022-09-25 PROCEDURE — 99284 EMERGENCY DEPT VISIT MOD MDM: CPT

## 2022-09-25 PROCEDURE — 80053 COMPREHEN METABOLIC PANEL: CPT

## 2022-09-25 PROCEDURE — 83880 ASSAY OF NATRIURETIC PEPTIDE: CPT

## 2022-09-25 PROCEDURE — 82550 ASSAY OF CK (CPK): CPT

## 2022-09-25 PROCEDURE — 99285 EMERGENCY DEPT VISIT HI MDM: CPT | Mod: 25

## 2022-09-25 PROCEDURE — 93010 ELECTROCARDIOGRAM REPORT: CPT

## 2022-09-25 PROCEDURE — 93005 ELECTROCARDIOGRAM TRACING: CPT

## 2022-09-25 PROCEDURE — 71045 X-RAY EXAM CHEST 1 VIEW: CPT

## 2022-09-25 PROCEDURE — 71045 X-RAY EXAM CHEST 1 VIEW: CPT | Mod: 26

## 2022-09-25 NOTE — ED PROVIDER NOTE - CHPI ED SYMPTOMS NEG
no back pain/no chest pain/no cough/no dizziness/no shortness of breath/no syncope/no vomiting/no chills/no diaphoresis

## 2022-09-25 NOTE — ED PROVIDER NOTE - CONSTITUTIONAL, MLM
Well appearing, awake, alert, oriented to person, place, time/situation and in no apparent distress. morbid obesity normal...

## 2022-09-25 NOTE — ED PROVIDER NOTE - PATIENT PORTAL LINK FT
You can access the FollowMyHealth Patient Portal offered by [facility] by registering at the following website: [email or web address]. By joining [employer]s FollowMyHealth portal, you will also be able to view your health information using other applications (apps) compatible with our system.

## 2022-09-25 NOTE — ED PROVIDER NOTE - PROGRESS NOTE DETAILS
Patient doing well, no acute complaints at this time.  Discussed with patient regarding all results, patient pending cardiology consultation in the morning, second set of cardiac enzymes. seen by cards clear from cardiac perspective for dc

## 2022-09-25 NOTE — CONSULT NOTE ADULT - ASSESSMENT
This is a 62 year old woman with a history of morbid obesity, anemia, colitis, depression, anxiety who presents to the ED with CP.  Her chest pain is non anginal.  She had a recent negative stress test in the office.  She has negative enzymes, no significant EKG changes, and a negative d dimer.  She can be discharged from the ED and follow up with Dr. Giordano per routine. She will continue her current medication regimen.

## 2022-09-25 NOTE — CONSULT NOTE ADULT - SUBJECTIVE AND OBJECTIVE BOX
[facility] - Cara Coburn, Saurabh Johansen [place], [place]  Office Number: [phone]    Initial Consult Note    CHIEF COMPLAINT: Patient is a 62y old  Female who presents with a chief complaint of chest pain    HPI:  This is a 62 year old woman with a history of morbid obesity, anemia, colitis, depression, anxiety who presents to the ED with CP.  She reports that the pain was sternal, non radiating, associated with nausea and dyspnea, and resolved on its own after 30 minutes.  She denies PND, orthpnea, LE swelling, dizziness, syncope.  No c hanges in her ET recently.  NO fevers, chills or cough.  She denies all other symptoms.        PAST MEDICAL & SURGICAL HISTORY:  Anemia      Colitis  H/O      Renal Calculi  chronic        Depression      Anxiety      Benign Essential Tremor      ETOH Abuse  H/O      Morbid Obesity      Hypertension      Asthma      Sleep Apnea  CPAP with oxygen since June 2020  curent 11/21      Kidney stone      Bipolar 1 disorder      Spinal stenosis      Neuropathy      Degenerative disc disease, lumbar      Arthritis      History of aortic aneurysm  descending aorta see CT      Calculus of kidney      Hyperlipidemia      H/O headache      Back pain      Pneumonia      H/O bronchitis      Other specified sepsis  9/20      Pulmonary embolism  11/20      DVT, lower extremity  11/20      ureteroscopy with stone removal  1- and  5 other episodes      Biliary stent placement &amp; ercp  20 years ago      S/P D&amp;C      History of laparoscopic adjustable gastric banding  band removed few yrs ago      History of tonsillectomy      S/P dilation and curettage      S/P cardiac catheterization  2020      S/P panniculectomy          SOCIAL HISTORY:  No tobacco, ethanol, or drug abuse.    FAMILY HISTORY:  Family history of arthritis    Family history of bacterial pneumonia    Family history of renal failure  both parents were on dialysis    Hyperlipidemia (Sibling)    Hypertension (Sibling)      No family history of acute MI or sudden cardiac death.    MEDICATIONS  (STANDING):    MEDICATIONS  (PRN):      Allergies    penicillins (Other)  trees dogs cats cockaroaches (Rhinitis; Rhinorrhea)    Intolerances        REVIEW OF SYSTEMS:       All other review of systems is negative unless indicated above    VITAL SIGNS:   Vital Signs Last 24 Hrs  T(C): 36.3 (25 Sep 2022 00:40), Max: 36.3 (25 Sep 2022 00:40)  T(F): 97.3 (25 Sep 2022 00:40), Max: 97.3 (25 Sep 2022 00:40)  HR: 75 (25 Sep 2022 05:15) (75 - 76)  BP: 145/70 (25 Sep 2022 05:15) (133/86 - 147/99)  BP(mean): --  RR: 16 (25 Sep 2022 05:15) (16 - 18)  SpO2: 97% (25 Sep 2022 05:15) (97% - 99%)    Parameters below as of 25 Sep 2022 05:15  Patient On (Oxygen Delivery Method): room air        I&O's Summary      On Exam:    Constitutional: NAD, alert and oriented x 3  Lungs:  Non-labored, breath sounds are clear bilaterally, No wheezing, rales or rhonchi.  Decreased breath sounds b/l  Cardiovascular: RRR.  S1 and S2 positive.  No murmurs, rubs, gallops or clicks.  distant  Gastrointestinal: Bowel Sounds present, soft, nontender. obese  Lymph: No peripheral edema. No cervical lymphadenopathy.  Neurological: Alert, no focal deficits  Skin: No rashes or ulcers   Psych:  Mood & affect appropriate.    LABS: All Labs Reviewed:                        11.8   5.72  )-----------( 244      ( 25 Sep 2022 01:11 )             37.4     25 Sep 2022 01:11    145    |  110    |  22     ----------------------------<  160    3.7     |  29     |  0.92     Ca    9.1        25 Sep 2022 01:11    TPro  6.7    /  Alb  3.1    /  TBili  0.3    /  DBili  x      /  AST  18     /  ALT  35     /  AlkPhos  116    25 Sep 2022 01:11      CARDIAC MARKERS ( 25 Sep 2022 05:20 )  x     / x     / 76 U/L / x     / x      CARDIAC MARKERS ( 25 Sep 2022 01:11 )  x     / x     / 68 U/L / x     / x          Blood Culture:   09-25 @ 01:11  Pro Bnp 101        RADIOLOGY:    EKG:  NSR.  NO acute ST or T wave changes.

## 2022-09-25 NOTE — ED ADULT NURSE NOTE - CHPI ED NUR SYMPTOMS POS
Dr. Koch on-call service paged regarding OK to discharge from observation unit after receiving 2 units of blood - last hgb 7.0, receiving 2nd unit now.    Verbal order - OK to discharge home after blood is done transfusing.  
CHEST PAIN/NAUSEA

## 2022-09-25 NOTE — ED ADULT NURSE NOTE - OBJECTIVE STATEMENT
62yr female arrived to ED from home c/o chest pain with no radiation, patient denies nausea, shortness of breath. patient able to move all extremities.

## 2022-09-25 NOTE — ED PROVIDER NOTE - NSFOLLOWUPINSTRUCTIONS_ED_ALL_ED_FT
1. TAKE ALL MEDICATIONS AS DIRECTED.    2. FOR PAIN OR FEVER YOU CAN TAKE IBUPROFEN (MOTRIN, ADVIL) OR ACETAMINOPHEN (TYLENOL) AS NEEDED, AS DIRECTED ON PACKAGING.  3. FOLLOW UP WITH YOUR PRIMARY DOCTOR WITHIN 5 DAYS AS DIRECTED.  4. IF YOU HAD LABS OR IMAGING DONE, YOU WERE GIVEN COPIES OF ALL LABS AND/OR IMAGING RESULTS FROM YOUR ER VISIT--PLEASE TAKE THEM WITH YOU TO YOUR FOLLOW UP APPOINTMENTS.  5. IF NEEDED, CALL PATIENT ACCESS SERVICES AT [phone] TO FIND A PRIMARY CARE PHYSICIAN.  OR CALL [phone] TO MAKE AN APPOINTMENT WITH THE CLINIC.  6. RETURN TO THE ER FOR ANY WORSENING SYMPTOMS OR CONCERNS.    FOllow up with your cardiologist as scheduled    Chest Pain    Chest pain can be caused by many different conditions which may or may not be dangerous. Causes include heartburn, lung infections, heart attack, blood clot in lungs, skin infections, strain or damage to muscle, cartilage, or bones, etc. In addition to a history and physical examination, an electrocardiogram (ECG) or other lab tests may have been performed to determine the cause of your chest pain. Follow up with your primary care provider or with a cardiologist as instructed.     SEEK IMMEDIATE MEDICAL CARE IF YOU HAVE ANY OF THE FOLLOWING SYMPTOMS: worsening chest pain, coughing up blood, unexplained back/neck/jaw pain, severe abdominal pain, dizziness or lightheadedness, fainting, shortness of breath, sweaty or clammy skin, vomiting, or racing heart beat. These symptoms may represent a serious problem that is an emergency. Do not wait to see if the symptoms will go away. Get medical help right away. Call [phone] and do not drive yourself to the hospital.

## 2022-09-25 NOTE — ED ADULT TRIAGE NOTE - CHIEF COMPLAINT QUOTE
Patient complains of sharp pains just to the right of the sternum.  Patient also complains of nausea.

## 2022-09-25 NOTE — ED PROVIDER NOTE - OBJECTIVE STATEMENT
62-year-old female with history of obesity, hypertension, kidney stones, depression presents with chest discomfort today.  Patient states pain across right and middle chest.  Associate with mild shortness of breath.  Patient states lasted for about 30 minutes and then resolved.  Onset around 1.5 hours ago.  No cough/URI.  No recent dyspnea on exertion/easy fatigue.  No other recent chest pain.  Patient has had chronic upper back pain for several months with no acute changes.  Patient with mild chronic dyspnea, with no acute changes in recent weeks.  No fevers or chills.  No recent COVID exposures.  Patient fully vaccinated for COVID.  No aggravating or alleviating factors.  No other acute complaints or changes at this time.

## 2022-09-30 ENCOUNTER — TRANSCRIPTION ENCOUNTER (OUTPATIENT)
Age: 62
End: 2022-09-30

## 2022-10-19 NOTE — PATIENT PROFILE ADULT - BRADEN SCORE
Patient arrived to the unit AOX4 able to walk from the cart to the bed no c/o pain will cont to monitor    20

## 2022-12-13 ENCOUNTER — APPOINTMENT (OUTPATIENT)
Dept: CARDIOLOGY | Facility: CLINIC | Age: 62
End: 2022-12-13

## 2022-12-13 ENCOUNTER — NON-APPOINTMENT (OUTPATIENT)
Age: 62
End: 2022-12-13

## 2022-12-13 VITALS — OXYGEN SATURATION: 98 % | HEART RATE: 111 BPM | HEIGHT: 66 IN

## 2022-12-13 VITALS — SYSTOLIC BLOOD PRESSURE: 130 MMHG | DIASTOLIC BLOOD PRESSURE: 88 MMHG

## 2022-12-13 DIAGNOSIS — R07.89 OTHER CHEST PAIN: ICD-10-CM

## 2022-12-13 PROCEDURE — 99214 OFFICE O/P EST MOD 30 MIN: CPT | Mod: 25

## 2022-12-13 PROCEDURE — 93000 ELECTROCARDIOGRAM COMPLETE: CPT

## 2022-12-13 RX ORDER — DICLOFENAC SODIUM AND MISOPROSTOL 75; 200 MG/1; UG/1
75-0.2 TABLET, DELAYED RELEASE ORAL
Qty: 60 | Refills: 0 | Status: ACTIVE | COMMUNITY
Start: 2022-10-28

## 2022-12-13 RX ORDER — HYDROXYZINE HYDROCHLORIDE 10 MG/1
10 TABLET ORAL
Qty: 90 | Refills: 0 | Status: ACTIVE | COMMUNITY
Start: 2022-09-26

## 2022-12-13 RX ORDER — ALPRAZOLAM 1 MG/1
1 TABLET ORAL
Qty: 60 | Refills: 0 | Status: ACTIVE | COMMUNITY
Start: 2022-09-07

## 2022-12-13 RX ORDER — CLINDAMYCIN HYDROCHLORIDE 300 MG/1
300 CAPSULE ORAL
Qty: 21 | Refills: 0 | Status: ACTIVE | COMMUNITY
Start: 2022-10-24

## 2022-12-13 RX ORDER — VERAPAMIL HYDROCHLORIDE 240 MG/1
240 TABLET ORAL
Qty: 90 | Refills: 0 | Status: ACTIVE | COMMUNITY
Start: 2022-10-28

## 2022-12-13 RX ORDER — LAMOTRIGINE 200 MG/1
200 TABLET ORAL
Qty: 90 | Refills: 0 | Status: ACTIVE | COMMUNITY
Start: 2022-11-10

## 2022-12-13 NOTE — DISCUSSION/SUMMARY
[With Me] : with me [FreeTextEntry1] : Ewelina is a 62 year old female here for follow up.\par \par In 2020, she had a complicated course after a panniculectomy, with hemorrhagic shock, fevers and PAF, followed by an extensive LLE DVT and PE.\par \par She reports unchanged dyspnea.  A cath in 2020 was unremarkable. She will continue to take lasix as needed. Her heart rate is borderline elevated, like on previous visits. She is currently off eliquis, after normalization of her d-dimer level.\par \par She is on verapamil 240. She is back on avapro and propranolol. Her echocardiogram demonstrated low normal LV function without significant valvular pathology.  Her CT showed a stable aortic aneurysm at 4.6 cm. A pharm stress suggested inferior infarct, which we will medically manage for now. She will take ASA, and I have added pravastatin (her most recent LDL was 99).\par \par I stressed the importance of getting back to diet, and exercise, with an attempt to lose weight. We will send a full set of BW.\par \par I will see her again in 4 months. [EKG obtained to assist in diagnosis and management of assessed problem(s)] : EKG obtained to assist in diagnosis and management of assessed problem(s)

## 2022-12-13 NOTE — HISTORY OF PRESENT ILLNESS
[FreeTextEntry1] : Ewelina is a 62 year old female here for evaluation.\par \par Cardiac-wise, she has a history of obesity, obstructive sleep apnea, and hypertension. She reports an abnormal stress test about 7 or 8 years ago, for which she had an angiogram, and it showed no significant plaque. She also has a history of aortic aneurysm. Her last CT was in 12/2018. This demonstrated an aneurysmal dilation of the ascending thoracic aorta, with a maximal transverse diameter of 4.4 cm.\par \par I last saw her in 6/22.\par \par In 2020, she presented to the ER with acute shortness of breath. A CT was negative for PE, and confirmed an aneurysm of 4.3 cm. Her BNP was minimal. Her troponins were negative, though given her risk, she was transferred for cardiac catheterization, which showed nonobstructive CAD.\par \par She had a panniculectomy in 10/2020, and a complicated post-op course with hemorrhagic shock, intermittent fevers and ELAINE. She also had PAF in the setting of hypotension. She was later re-admitted in late 11/2020 with LLE swelling, and found to have an extensive DVT and right sided PE. She completed a course of eliquis, and has been following with a hematologist.\par \par Since last visit, she is doing ok.\par She reports some mild dyspnea, which seems worse at night. She reports compliant with her CPAP.\par She had a mechanical fall at a [employer] party last week, and fell and her back and head. She went to the ER and had a normal evaluation.\par \par In 6/21. Her proximal ascending aorta measured 4.1 cm. There was mild RVE with normal RV function. Estimated EF was 50-55%.\par Her echo was repeated in 6/22, which showed her aorta to be 4.6 cm, EF was unchanged.\par CT confirmed an aortic measurement of 4.6 cm. She is seeing a pulmonologist for repeat imaging of her lung nodules.\par Pharm stress showed inferior infarct, with decreased thickening of her inferior and inferolateral walls.

## 2022-12-13 NOTE — PHYSICAL EXAM
[Well Developed] : well developed [Well Nourished] : well nourished [No Acute Distress] : no acute distress [Normal Conjunctiva] : normal conjunctiva [Normal Venous Pressure] : normal venous pressure [No Carotid Bruit] : no carotid bruit [Tachycardia] : tachycardic [Normal S1] : normal S1 [Normal S2] : normal S2 [No Murmur] : no murmurs heard [No Pitting Edema] : no pitting edema present [2+] : left 2+ [No Abnormalities] : the abdominal aorta was not enlarged and no bruit was heard [Clear Lung Fields] : clear lung fields [Good Air Entry] : good air entry [No Respiratory Distress] : no respiratory distress  [Non Tender] : non-tender [Soft] : abdomen soft [No Masses/organomegaly] : no masses/organomegaly [Normal Bowel Sounds] : normal bowel sounds [Normal Gait] : normal gait [No Cyanosis] : no cyanosis [No Edema] : no edema [No Clubbing] : no clubbing [No Varicosities] : no varicosities [No Rash] : no rash [No Skin Lesions] : no skin lesions [Moves all extremities] : moves all extremities [No Focal Deficits] : no focal deficits [Normal Speech] : normal speech [Alert and Oriented] : alert and oriented [Normal memory] : normal memory [S3] : no S3 [S4] : no S4 [Right Carotid Bruit] : no bruit heard over the right carotid [Left Carotid Bruit] : no bruit heard over the left carotid [Right Femoral Bruit] : no bruit heard over the right femoral artery [Left Femoral Bruit] : no bruit heard over the left femoral artery

## 2022-12-27 ENCOUNTER — RX RENEWAL (OUTPATIENT)
Age: 62
End: 2022-12-27

## 2023-01-03 ENCOUNTER — TRANSCRIPTION ENCOUNTER (OUTPATIENT)
Age: 63
End: 2023-01-03

## 2023-01-13 ENCOUNTER — OFFICE (OUTPATIENT)
Dept: URBAN - METROPOLITAN AREA CLINIC 109 | Facility: CLINIC | Age: 63
Setting detail: OPHTHALMOLOGY
End: 2023-01-13
Payer: COMMERCIAL

## 2023-01-13 DIAGNOSIS — H43.393: ICD-10-CM

## 2023-01-13 PROCEDURE — 92014 COMPRE OPH EXAM EST PT 1/>: CPT | Performed by: OPHTHALMOLOGY

## 2023-01-13 PROCEDURE — 92250 FUNDUS PHOTOGRAPHY W/I&R: CPT | Performed by: OPHTHALMOLOGY

## 2023-01-13 ASSESSMENT — TONOMETRY
OS_IOP_MMHG: 16
OD_IOP_MMHG: 16

## 2023-01-13 ASSESSMENT — SPHEQUIV_DERIVED
OD_SPHEQUIV: -2.375
OS_SPHEQUIV: -2.5

## 2023-01-13 ASSESSMENT — REFRACTION_AUTOREFRACTION
OS_AXIS: 25
OD_SPHERE: -1.25
OD_AXIS: 178
OD_CYLINDER: -2.25
OS_SPHERE: -2.25
OS_CYLINDER: -0.50

## 2023-01-13 ASSESSMENT — REFRACTION_CURRENTRX
OD_AXIS: 178
OD_OVR_VA: 20/
OS_OVR_VA: 20/
OS_AXIS: 3
OD_CYLINDER: -1.00
OS_SPHERE: -2.25
OS_CYLINDER: -1.25
OD_SPHERE: -0.75

## 2023-01-13 ASSESSMENT — SUPERFICIAL PUNCTATE KERATITIS (SPK)
OS_SPK: T
OD_SPK: T 1+

## 2023-01-13 ASSESSMENT — CONFRONTATIONAL VISUAL FIELD TEST (CVF)
OS_FINDINGS: FULL
OD_FINDINGS: FULL

## 2023-01-13 ASSESSMENT — VISUAL ACUITY
OS_BCVA: 20/25
OD_BCVA: 20/25

## 2023-01-13 ASSESSMENT — CORNEAL TRAUMA - ABRASION: OD_ABRASION: ABSENT

## 2023-01-24 ENCOUNTER — TRANSCRIPTION ENCOUNTER (OUTPATIENT)
Age: 63
End: 2023-01-24

## 2023-01-25 ENCOUNTER — OUTPATIENT (OUTPATIENT)
Dept: OUTPATIENT SERVICES | Facility: HOSPITAL | Age: 63
LOS: 1 days | End: 2023-01-25
Payer: COMMERCIAL

## 2023-01-25 DIAGNOSIS — K62.5 HEMORRHAGE OF ANUS AND RECTUM: ICD-10-CM

## 2023-01-25 DIAGNOSIS — Z98.890 OTHER SPECIFIED POSTPROCEDURAL STATES: Chronic | ICD-10-CM

## 2023-01-25 DIAGNOSIS — R13.10 DYSPHAGIA, UNSPECIFIED: ICD-10-CM

## 2023-01-25 DIAGNOSIS — Z98.89 OTHER SPECIFIED POSTPROCEDURAL STATES: Chronic | ICD-10-CM

## 2023-01-25 DIAGNOSIS — R10.13 EPIGASTRIC PAIN: ICD-10-CM

## 2023-01-25 PROCEDURE — 43239 EGD BIOPSY SINGLE/MULTIPLE: CPT

## 2023-01-25 PROCEDURE — 88313 SPECIAL STAINS GROUP 2: CPT | Mod: 26

## 2023-01-25 PROCEDURE — 88313 SPECIAL STAINS GROUP 2: CPT

## 2023-01-25 PROCEDURE — 88312 SPECIAL STAINS GROUP 1: CPT

## 2023-01-25 PROCEDURE — 88312 SPECIAL STAINS GROUP 1: CPT | Mod: 26

## 2023-01-25 PROCEDURE — 88305 TISSUE EXAM BY PATHOLOGIST: CPT | Mod: 26

## 2023-01-25 PROCEDURE — 88305 TISSUE EXAM BY PATHOLOGIST: CPT

## 2023-01-25 PROCEDURE — 45380 COLONOSCOPY AND BIOPSY: CPT

## 2023-01-26 LAB — SURGICAL PATHOLOGY STUDY: SIGNIFICANT CHANGE UP

## 2023-03-03 NOTE — DISCUSSION/SUMMARY
NOTIFICATION TO RETURN TO WORK / SCHOOL           Ms. Yanez [phone]        To Whom It May Concern:      Please excuse Terry Cristina for an appointment in our office on 3/3/2023. Excuse from school 3/2/23 & 3/3/23.     If you have any questions, or if we may be of further assistance, do not hesitate to contact us at [phone]       Sincerely,    Sylwia Steward MD  [facility] [With Me] : with me [FreeTextEntry1] : Ewelina is a 61 year old female here for follow up.\par \par She recently had a complicated course after a panniculectomy, with hemorrhagic shock, fevers and PAF, though more recently was found to have an extensive LLE DVT and PE.\par \par She is feeling better today, and will remain on Eliquis.. Her heart rate is a little elevated today, though she has not been drinking much water. A recent cath in 2020 was unremarkable. \par \par She is on verapamil 240. We will keep an eye on her BP and HR at home. She is back on avapro and propranolol. Her echocardiogram demonstrated low normal LV function without significant valvular pathology.\par I stressed the importance of getting back to diet, and exercise, with an attempt to lose weight. I will repeat her echo and le dopplers, now that she is 6 months post PE.\par \par I will see her again in 3 months.

## 2023-03-14 NOTE — PHYSICAL THERAPY INITIAL EVALUATION ADULT - ONSET DATE, REHAB EVAL
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 
25-Sep-2020

## 2023-03-19 LAB
ALBUMIN SERPL ELPH-MCNC: 4.1 G/DL
ALP BLD-CCNC: 141 U/L
ALT SERPL-CCNC: 28 U/L
ANION GAP SERPL CALC-SCNC: 12 MMOL/L
AST SERPL-CCNC: 19 U/L
BASOPHILS # BLD AUTO: 0.05 K/UL
BASOPHILS NFR BLD AUTO: 0.6 %
BILIRUB SERPL-MCNC: 0.2 MG/DL
BUN SERPL-MCNC: 19 MG/DL
CALCIUM SERPL-MCNC: 9.8 MG/DL
CHLORIDE SERPL-SCNC: 105 MMOL/L
CHOLEST SERPL-MCNC: 167 MG/DL
CO2 SERPL-SCNC: 28 MMOL/L
CREAT SERPL-MCNC: 0.92 MG/DL
EGFR: 70 ML/MIN/1.73M2
EOSINOPHIL # BLD AUTO: 0.14 K/UL
EOSINOPHIL NFR BLD AUTO: 1.6 %
ESTIMATED AVERAGE GLUCOSE: 126 MG/DL
GLUCOSE SERPL-MCNC: 129 MG/DL
HBA1C MFR BLD HPLC: 6 %
HCT VFR BLD CALC: 39.7 %
HDLC SERPL-MCNC: 55 MG/DL
HGB BLD-MCNC: 12.4 G/DL
IMM GRANULOCYTES NFR BLD AUTO: 0.3 %
IRON SATN MFR SERPL: 19 %
IRON SERPL-MCNC: 61 UG/DL
LDLC SERPL CALC-MCNC: 77 MG/DL
LYMPHOCYTES # BLD AUTO: 2.07 K/UL
LYMPHOCYTES NFR BLD AUTO: 24.1 %
MAN DIFF?: NORMAL
MCHC RBC-ENTMCNC: 29.7 PG
MCHC RBC-ENTMCNC: 31.2 GM/DL
MCV RBC AUTO: 95.2 FL
MONOCYTES # BLD AUTO: 0.47 K/UL
MONOCYTES NFR BLD AUTO: 5.5 %
NEUTROPHILS # BLD AUTO: 5.83 K/UL
NEUTROPHILS NFR BLD AUTO: 67.9 %
NONHDLC SERPL-MCNC: 113 MG/DL
NT-PROBNP SERPL-MCNC: 112 PG/ML
PLATELET # BLD AUTO: 313 K/UL
POTASSIUM SERPL-SCNC: 5 MMOL/L
PROT SERPL-MCNC: 7.1 G/DL
RBC # BLD: 4.17 M/UL
RBC # FLD: 13.9 %
SODIUM SERPL-SCNC: 145 MMOL/L
TIBC SERPL-MCNC: 329 UG/DL
TRIGL SERPL-MCNC: 180 MG/DL
TSH SERPL-ACNC: 1.83 UIU/ML
UIBC SERPL-MCNC: 268 UG/DL
WBC # FLD AUTO: 8.59 K/UL

## 2023-03-22 ENCOUNTER — APPOINTMENT (OUTPATIENT)
Dept: CARDIOLOGY | Facility: CLINIC | Age: 63
End: 2023-03-22
Payer: COMMERCIAL

## 2023-03-22 ENCOUNTER — NON-APPOINTMENT (OUTPATIENT)
Age: 63
End: 2023-03-22

## 2023-03-22 VITALS — DIASTOLIC BLOOD PRESSURE: 80 MMHG | SYSTOLIC BLOOD PRESSURE: 120 MMHG

## 2023-03-22 VITALS
DIASTOLIC BLOOD PRESSURE: 83 MMHG | HEIGHT: 66 IN | OXYGEN SATURATION: 99 % | SYSTOLIC BLOOD PRESSURE: 158 MMHG | HEART RATE: 121 BPM

## 2023-03-22 DIAGNOSIS — R00.0 TACHYCARDIA, UNSPECIFIED: ICD-10-CM

## 2023-03-22 PROCEDURE — 93000 ELECTROCARDIOGRAM COMPLETE: CPT

## 2023-03-22 PROCEDURE — 99214 OFFICE O/P EST MOD 30 MIN: CPT | Mod: 25

## 2023-03-22 NOTE — HISTORY OF PRESENT ILLNESS
[FreeTextEntry1] : Ewelina is a 63 year old female here for evaluation.\par \par Cardiac-wise, she has a history of obesity, obstructive sleep apnea, and hypertension. She reports an abnormal stress test about 7 or 8 years ago, for which she had an angiogram, and it showed no significant plaque. She also has a history of aortic aneurysm. Her last CT was in 12/2018. This demonstrated an aneurysmal dilation of the ascending thoracic aorta, with a maximal transverse diameter of 4.4 cm.\par \par I last saw her in 9/22.\par \par In 2020, she presented to the ER with acute shortness of breath. A CT was negative for PE, and confirmed an aneurysm of 4.3 cm. Her BNP was minimal. Her troponins were negative, though given her risk, she was transferred for cardiac catheterization, which showed nonobstructive CAD.\par \par She had a panniculectomy in 10/2020, and a complicated post-op course with hemorrhagic shock, intermittent fevers and ELAINE. She also had PAF in the setting of hypotension. She was later re-admitted in late 11/2020 with LLE swelling, and found to have an extensive DVT and right sided PE. She completed a course of eliquis, and has been following with a hematologist.\par \par Since last visit, she is doing ok.\par She reports some mild dyspnea, which is unchanged. She reports compliant with her CPAP.\par She had a mechanical fall at a [employer] party in 12/22, and fell and her back and head. She went to the ER and had a normal evaluation.\par She had fast heart rates noted at her PMD office. She feels occasional fast beats in the mornings. \par \par In 6/21. Her proximal ascending aorta measured 4.1 cm. There was mild RVE with normal RV function. Estimated EF was 50-55%. Her echo was repeated in 6/22, which showed her aorta to be 4.6 cm, EF was unchanged.\par CT confirmed an aortic measurement of 4.6 cm. She is seeing a pulmonologist for repeat imaging of her lung nodules.\par Pharm stress showed inferior infarct, with decreased thickening of her inferior and inferolateral walls.

## 2023-03-22 NOTE — DISCUSSION/SUMMARY
[With Me] : with me [FreeTextEntry1] : Ewelina is a 63 year old female here for follow up.\par \par In 2020, she had a complicated course after a panniculectomy, with hemorrhagic shock, fevers and PAF, followed by an extensive LLE DVT and PE.\par \par She reports unchanged dyspnea.  A cath in 2020 was unremarkable. She will continue to take lasix as needed. Her heart rate is borderline elevated, like on previous visits. She is currently off eliquis, after normalization of her d-dimer level.\par \par She is on verapamil 240. She is back on avapro. I am increasing her propranolol to 20 bid. Her echocardiogram demonstrated low normal LV function without significant valvular pathology.  Her CT showed a stable aortic aneurysm at 4.6 cm. A pharm stress suggested inferior infarct, which we will medically manage for now. She will take ASA. Her LDL has improved notably to the 70's on pravastatin. We will repeat an echocardiogram in 3 months.\par \par I stressed the importance of getting back to diet, and exercise, with an attempt to lose weight.\par \par I will see her again in 3-6 months. [EKG obtained to assist in diagnosis and management of assessed problem(s)] : EKG obtained to assist in diagnosis and management of assessed problem(s)

## 2023-03-31 ENCOUNTER — RX RENEWAL (OUTPATIENT)
Age: 63
End: 2023-03-31

## 2023-05-01 NOTE — DIETITIAN INITIAL EVALUATION ADULT. - NUTRITION DIAGNOSITC TERMINOLOGY #1
Increased Nutrient Needs... Valtrex Counseling: I discussed with the patient the risks of valacyclovir including but not limited to kidney damage, nausea, vomiting and severe allergy.  The patient understands that if the infection seems to be worsening or is not improving, they are to call.

## 2023-06-12 ENCOUNTER — APPOINTMENT (OUTPATIENT)
Dept: CARDIOLOGY | Facility: CLINIC | Age: 63
End: 2023-06-12
Payer: COMMERCIAL

## 2023-06-12 ENCOUNTER — MED ADMIN CHARGE (OUTPATIENT)
Age: 63
End: 2023-06-12

## 2023-06-12 PROCEDURE — 93306 TTE W/DOPPLER COMPLETE: CPT

## 2023-06-12 RX ORDER — PERFLUTREN 6.52 MG/ML
6.52 INJECTION, SUSPENSION INTRAVENOUS
Qty: 1 | Refills: 0 | Status: COMPLETED | OUTPATIENT
Start: 2023-06-12

## 2023-06-12 RX ADMIN — PERFLUTREN MG/ML: 6.52 INJECTION, SUSPENSION INTRAVENOUS at 00:00

## 2023-07-01 NOTE — ED ADULT TRIAGE NOTE - SPO2 (%)
Regardin M pain in left hip  ----- Message from Kurt Vu sent at 2023  9:11 AM CDT -----  Patient Name: Сергей Schmitt  Caller: Сергей  Call Back # :     Is this for an Active episode for Home Health, Hospice or Palliative Care? No   Specialist or PCP Name: Mavis Pabon  Symptoms: 73 M Pain in left hip  Pregnant (females aged 13-60. If Yes, how long?) : n/a  Name of Clinic Site / Acct# : [id number]      Are you currently at (or near) your place of residence? Yes [facility] [place] [facility] [place] [address]     Use following scripting for patients waiting for a callback:   \"Nurse callback times vary based on call volumes; please be aware the return phone call may come from an unidentified or out of state phone number. If your symptoms worsen or become life threatening while waiting, you should seek immediate assistance by calling 911 or going to the ER for evaluation.\"     97

## 2023-07-19 ENCOUNTER — APPOINTMENT (OUTPATIENT)
Dept: CARDIOLOGY | Facility: CLINIC | Age: 63
End: 2023-07-19
Payer: COMMERCIAL

## 2023-07-19 ENCOUNTER — NON-APPOINTMENT (OUTPATIENT)
Age: 63
End: 2023-07-19

## 2023-07-19 VITALS
OXYGEN SATURATION: 96 % | SYSTOLIC BLOOD PRESSURE: 113 MMHG | HEART RATE: 117 BPM | HEIGHT: 66 IN | DIASTOLIC BLOOD PRESSURE: 76 MMHG

## 2023-07-19 PROCEDURE — 99214 OFFICE O/P EST MOD 30 MIN: CPT | Mod: 25

## 2023-07-19 PROCEDURE — 93000 ELECTROCARDIOGRAM COMPLETE: CPT

## 2023-07-19 NOTE — HISTORY OF PRESENT ILLNESS
[FreeTextEntry1] : Ewelina is a 63 year old female here for evaluation.\par \par Cardiac-wise, she has a history of obesity, obstructive sleep apnea, and hypertension. She reports an abnormal stress test about 7 or 8 years ago, for which she had an angiogram, and it showed no significant plaque. She also has a history of aortic aneurysm. Her last CT was in 12/2018. This demonstrated an aneurysmal dilation of the ascending thoracic aorta, with a maximal transverse diameter of 4.4 cm.\par \par I last saw her in 3/223.\par \par In 2020, she presented to the ER with acute shortness of breath. A CT was negative for PE, and confirmed an aneurysm of 4.3 cm. Her BNP was minimal. Her troponins were negative, though given her risk, she was transferred for cardiac catheterization, which showed nonobstructive CAD.\par \par She had a panniculectomy in 10/2020, and a complicated post-op course with hemorrhagic shock, intermittent fevers and ELAINE. She also had PAF in the setting of hypotension. She was later re-admitted in late 11/2020 with LLE swelling, and found to have an extensive DVT and right sided PE. She completed a course of eliquis, and has been following with a hematologist.\par \par In 6/21, her proximal ascending aorta measured 4.1 cm. There was mild RVE with normal RV function. Estimated EF was 50-55%. Her echo was repeated in 6/22, which showed her aorta to be 4.6 cm, EF was unchanged.\par CT confirmed an aortic measurement of 4.6 cm. She is seeing a pulmonologist for repeat imaging of her lung nodules.\par Pharm stress showed inferior infarct, with decreased thickening of her inferior and inferolateral walls.\par \par Since last visit, she is doing ok.\par She was sick with COVID for a month. She was put on antibiotics and steroids. She was short of breath during this, though is back to her baseline.\par She reports compliant with her CPAP.\par Echo in 6/23 with normal LV function, moderate dilated proximal aorta at 4.7 cm. The RV was mildly enlarged, with normal LV function, without significant change.\par She saw an orthpedist, and there was suggestion of ankylosing spondylitis.\par

## 2023-07-19 NOTE — DISCUSSION/SUMMARY
[With Me] : with me [FreeTextEntry1] : Ewelina is a 63 year old female here for follow up.\par \par In 2020, she had a complicated course after a panniculectomy, with hemorrhagic shock, fevers and PAF, followed by an extensive LLE DVT and PE.\par \par She reports unchanged dyspnea.  A cath in 2020 was unremarkable. She will continue to take lasix as needed. Her heart rate is better today. She is currently off eliquis, after normalization of her d-dimer level. She does have some discoloration of her left LE, and I am going to repeat a LE doppler.\par \par She is on verapamil 240. She is back on avapro and propranolol to 20 bid. Her echocardiogram demonstrated low normal LV function without significant valvular pathology.  Her CT showed a stable aortic aneurysm at 4.6 cm, noted to be 4.7 cm on echocardiogram in 2023. A pharm stress suggested inferior infarct, which we will medically manage for now. She will take ASA. Her LDL has improved notably to the 70's on pravastatin.\par \par I stressed the importance of getting back to diet, and exercise, with an attempt to lose weight.\par \par I will see her again in 3-6 months. [EKG obtained to assist in diagnosis and management of assessed problem(s)] : EKG obtained to assist in diagnosis and management of assessed problem(s)

## 2023-07-26 ENCOUNTER — APPOINTMENT (OUTPATIENT)
Dept: CARDIOLOGY | Facility: CLINIC | Age: 63
End: 2023-07-26
Payer: COMMERCIAL

## 2023-07-26 PROCEDURE — 93970 EXTREMITY STUDY: CPT

## 2023-08-01 ENCOUNTER — RX RENEWAL (OUTPATIENT)
Age: 63
End: 2023-08-01

## 2023-09-11 ENCOUNTER — OFFICE (OUTPATIENT)
Dept: URBAN - METROPOLITAN AREA CLINIC 109 | Facility: CLINIC | Age: 63
Setting detail: OPHTHALMOLOGY
End: 2023-09-11
Payer: COMMERCIAL

## 2023-09-11 DIAGNOSIS — H25.13: ICD-10-CM

## 2023-09-11 DIAGNOSIS — H40.013: ICD-10-CM

## 2023-09-11 PROCEDURE — 92133 CPTRZD OPH DX IMG PST SGM ON: CPT | Performed by: OPHTHALMOLOGY

## 2023-09-11 PROCEDURE — 99213 OFFICE O/P EST LOW 20 MIN: CPT | Performed by: OPHTHALMOLOGY

## 2023-09-11 PROCEDURE — 92083 EXTENDED VISUAL FIELD XM: CPT | Performed by: OPHTHALMOLOGY

## 2023-09-11 ASSESSMENT — REFRACTION_AUTOREFRACTION
OD_AXIS: 14
OS_CYLINDER: -1.75
OS_SPHERE: -2.25
OS_AXIS: 173
OD_CYLINDER: -2.75
OD_SPHERE: -0.75

## 2023-09-11 ASSESSMENT — REFRACTION_CURRENTRX
OS_CYLINDER: -1.25
OS_AXIS: 3
OS_OVR_VA: 20/
OD_SPHERE: -0.75
OD_AXIS: 178
OS_SPHERE: -2.25
OD_CYLINDER: -1.00
OD_OVR_VA: 20/

## 2023-09-11 ASSESSMENT — VISUAL ACUITY
OD_BCVA: 20/20-2
OS_BCVA: 20/20

## 2023-09-11 ASSESSMENT — SUPERFICIAL PUNCTATE KERATITIS (SPK)
OS_SPK: T
OD_SPK: T 1+

## 2023-09-11 ASSESSMENT — SPHEQUIV_DERIVED
OD_SPHEQUIV: -2.125
OS_SPHEQUIV: -3.125

## 2023-09-11 ASSESSMENT — TONOMETRY
OD_IOP_MMHG: 13
OS_IOP_MMHG: 13
OS_IOP_MMHG: 11
OD_IOP_MMHG: 11

## 2023-09-11 ASSESSMENT — CONFRONTATIONAL VISUAL FIELD TEST (CVF)
OD_FINDINGS: FULL
OS_FINDINGS: FULL

## 2023-09-11 ASSESSMENT — CORNEAL TRAUMA - ABRASION: OD_ABRASION: ABSENT

## 2023-09-27 ENCOUNTER — OFFICE (OUTPATIENT)
Dept: URBAN - METROPOLITAN AREA CLINIC 63 | Facility: CLINIC | Age: 63
Setting detail: OPHTHALMOLOGY
End: 2023-09-27
Payer: COMMERCIAL

## 2023-09-27 DIAGNOSIS — H53.2: ICD-10-CM

## 2023-09-27 PROBLEM — H40.013: Status: ACTIVE | Noted: 2023-09-11

## 2023-09-27 PROCEDURE — 92012 INTRM OPH EXAM EST PATIENT: CPT | Performed by: OPHTHALMOLOGY

## 2023-09-27 ASSESSMENT — REFRACTION_AUTOREFRACTION
OD_SPHERE: -1.00
OS_SPHERE: -2.25
OD_AXIS: 175
OS_AXIS: 170
OS_CYLINDER: -1.75
OD_CYLINDER: -2.50

## 2023-09-27 ASSESSMENT — REFRACTION_CURRENTRX
OS_SPHERE: -2.25
OD_CYLINDER: -1.00
OS_AXIS: 3
OD_SPHERE: -0.75
OS_OVR_VA: 20/
OD_AXIS: 178
OS_CYLINDER: -1.25
OD_OVR_VA: 20/

## 2023-09-27 ASSESSMENT — KERATOMETRY
OS_K2POWER_DIOPTERS: 46.75
OS_K1POWER_DIOPTERS: 45.00
OD_K1POWER_DIOPTERS: 44.25
OD_K2POWER_DIOPTERS: 47.25
OS_AXISANGLE_DEGREES: 088
OD_AXISANGLE_DEGREES: 088

## 2023-09-27 ASSESSMENT — SPHEQUIV_DERIVED
OS_SPHEQUIV: -3.125
OD_SPHEQUIV: -2.25

## 2023-09-27 ASSESSMENT — CONFRONTATIONAL VISUAL FIELD TEST (CVF)
OD_FINDINGS: FULL
OS_FINDINGS: FULL

## 2023-09-27 ASSESSMENT — SUPERFICIAL PUNCTATE KERATITIS (SPK)
OD_SPK: T 1+
OS_SPK: T

## 2023-09-27 ASSESSMENT — AXIALLENGTH_DERIVED
OD_AL: 23.6422
OS_AL: 23.9422

## 2023-09-27 ASSESSMENT — CORNEAL TRAUMA - ABRASION: OD_ABRASION: ABSENT

## 2023-09-27 ASSESSMENT — VISUAL ACUITY
OD_BCVA: 20/25
OS_BCVA: 20/25

## 2023-10-13 ENCOUNTER — APPOINTMENT (OUTPATIENT)
Dept: CARDIOLOGY | Facility: CLINIC | Age: 63
End: 2023-10-13
Payer: COMMERCIAL

## 2023-10-13 ENCOUNTER — NON-APPOINTMENT (OUTPATIENT)
Age: 63
End: 2023-10-13

## 2023-10-13 VITALS
DIASTOLIC BLOOD PRESSURE: 80 MMHG | OXYGEN SATURATION: 96 % | HEIGHT: 66 IN | HEART RATE: 100 BPM | SYSTOLIC BLOOD PRESSURE: 123 MMHG

## 2023-10-13 DIAGNOSIS — R94.31 ABNORMAL ELECTROCARDIOGRAM [ECG] [EKG]: ICD-10-CM

## 2023-10-13 PROCEDURE — 93000 ELECTROCARDIOGRAM COMPLETE: CPT

## 2023-10-13 PROCEDURE — 99214 OFFICE O/P EST MOD 30 MIN: CPT | Mod: 25

## 2023-10-30 ENCOUNTER — RX RENEWAL (OUTPATIENT)
Age: 63
End: 2023-10-30

## 2023-10-30 RX ORDER — PRAVASTATIN SODIUM 20 MG/1
20 TABLET ORAL
Qty: 90 | Refills: 3 | Status: ACTIVE | COMMUNITY
Start: 2022-12-13 | End: 1900-01-01

## 2023-10-30 NOTE — DIETITIAN INITIAL EVALUATION ADULT. - EDUCATION DIETARY MODIFICATIONS
Please call and let them know the xray was negative.  We will see what sports medicine thinks of her pain not applicable

## 2023-11-08 NOTE — PROGRESS NOTE ADULT - PROBLEM SELECTOR PLAN 8
Patient's son, Joes, is calling about 12/7 appointment with Nena Noriega. He states that Ryland will be starting chemo treatments 12/6 and is concerned the 12/7 appt should be moved as to not interfere. Would like Nena Noriega made aware and to advise. Please call Jose with a response.    -Continue home Tylenol prn

## 2023-12-04 PROBLEM — I82.409 DVT (DEEP VENOUS THROMBOSIS): Status: ACTIVE | Noted: 2023-07-19

## 2023-12-04 PROBLEM — I25.10 CAD (CORONARY ARTERY DISEASE): Status: ACTIVE | Noted: 2022-12-13

## 2023-12-04 PROBLEM — I26.99 PULMONARY EMBOLUS: Status: ACTIVE | Noted: 2021-06-04

## 2023-12-04 PROBLEM — I71.2 ANEURYSM OF THORACIC AORTA: Status: ACTIVE | Noted: 2020-01-22

## 2023-12-04 PROBLEM — I71.9 AORTIC ANEURYSM: Status: ACTIVE | Noted: 2020-01-22

## 2023-12-07 ENCOUNTER — APPOINTMENT (OUTPATIENT)
Dept: GASTROENTEROLOGY | Facility: CLINIC | Age: 63
End: 2023-12-07
Payer: COMMERCIAL

## 2023-12-07 VITALS
HEART RATE: 90 BPM | BODY MASS INDEX: 48.82 KG/M2 | WEIGHT: 293 LBS | HEIGHT: 65 IN | OXYGEN SATURATION: 96 % | RESPIRATION RATE: 18 BRPM

## 2023-12-07 DIAGNOSIS — Z98.84 BARIATRIC SURGERY STATUS: ICD-10-CM

## 2023-12-07 DIAGNOSIS — I71.9 AORTIC ANEURYSM OF UNSPECIFIED SITE, W/OUT RUPTURE: ICD-10-CM

## 2023-12-07 DIAGNOSIS — R10.84 GENERALIZED ABDOMINAL PAIN: ICD-10-CM

## 2023-12-07 DIAGNOSIS — K52.9 NONINFECTIVE GASTROENTERITIS AND COLITIS, UNSPECIFIED: ICD-10-CM

## 2023-12-07 DIAGNOSIS — I25.10 ATHEROSCLEROTIC HEART DISEASE OF NATIVE CORONARY ARTERY W/OUT ANGINA PECTORIS: ICD-10-CM

## 2023-12-07 DIAGNOSIS — I26.99 OTHER PULMONARY EMBOLISM W/OUT ACUTE COR PULMONALE: ICD-10-CM

## 2023-12-07 DIAGNOSIS — I82.409 ACUTE EMBOLISM AND THROMBOSIS OF UNSPECIFIED DEEP VEINS OF UNSPECIFIED LOWER EXTREMITY: ICD-10-CM

## 2023-12-07 DIAGNOSIS — I71.2 THORACIC AORTIC ANEURYSM, W/OUT RUPTURE: ICD-10-CM

## 2023-12-07 PROCEDURE — 99203 OFFICE O/P NEW LOW 30 MIN: CPT

## 2023-12-28 ENCOUNTER — RX RENEWAL (OUTPATIENT)
Age: 63
End: 2023-12-28

## 2023-12-28 RX ORDER — APIXABAN 5 MG/1
5 TABLET, FILM COATED ORAL
Qty: 60 | Refills: 5 | Status: ACTIVE | COMMUNITY
Start: 2023-07-28 | End: 1900-01-01

## 2023-12-28 RX ORDER — IRBESARTAN 150 MG/1
150 TABLET ORAL
Qty: 90 | Refills: 3 | Status: ACTIVE | COMMUNITY
Start: 2021-12-03 | End: 1900-01-01

## 2024-01-05 ENCOUNTER — EMERGENCY (EMERGENCY)
Facility: HOSPITAL | Age: 64
LOS: 1 days | Discharge: HOME | End: 2024-01-05
Attending: STUDENT IN AN ORGANIZED HEALTH CARE EDUCATION/TRAINING PROGRAM | Admitting: STUDENT IN AN ORGANIZED HEALTH CARE EDUCATION/TRAINING PROGRAM
Payer: MEDICARE

## 2024-01-05 VITALS
WEIGHT: 293 LBS | HEART RATE: 86 BPM | OXYGEN SATURATION: 96 % | RESPIRATION RATE: 18 BRPM | DIASTOLIC BLOOD PRESSURE: 75 MMHG | TEMPERATURE: 97 F | SYSTOLIC BLOOD PRESSURE: 136 MMHG

## 2024-01-05 DIAGNOSIS — Z98.890 OTHER SPECIFIED POSTPROCEDURAL STATES: Chronic | ICD-10-CM

## 2024-01-05 DIAGNOSIS — Z98.89 OTHER SPECIFIED POSTPROCEDURAL STATES: Chronic | ICD-10-CM

## 2024-01-05 LAB
ALBUMIN SERPL ELPH-MCNC: 3.6 G/DL — SIGNIFICANT CHANGE UP (ref 3.3–5)
ALBUMIN SERPL ELPH-MCNC: 3.6 G/DL — SIGNIFICANT CHANGE UP (ref 3.3–5)
ALP SERPL-CCNC: 145 U/L — HIGH (ref 40–120)
ALP SERPL-CCNC: 145 U/L — HIGH (ref 40–120)
ALT FLD-CCNC: 42 U/L — SIGNIFICANT CHANGE UP (ref 12–78)
ALT FLD-CCNC: 42 U/L — SIGNIFICANT CHANGE UP (ref 12–78)
ANION GAP SERPL CALC-SCNC: 2 MMOL/L — LOW (ref 5–17)
ANION GAP SERPL CALC-SCNC: 2 MMOL/L — LOW (ref 5–17)
APTT BLD: 37.9 SEC — HIGH (ref 24.5–35.6)
APTT BLD: 37.9 SEC — HIGH (ref 24.5–35.6)
AST SERPL-CCNC: 24 U/L — SIGNIFICANT CHANGE UP (ref 15–37)
AST SERPL-CCNC: 24 U/L — SIGNIFICANT CHANGE UP (ref 15–37)
BASOPHILS # BLD AUTO: 0.06 K/UL — SIGNIFICANT CHANGE UP (ref 0–0.2)
BASOPHILS # BLD AUTO: 0.06 K/UL — SIGNIFICANT CHANGE UP (ref 0–0.2)
BASOPHILS NFR BLD AUTO: 0.6 % — SIGNIFICANT CHANGE UP (ref 0–2)
BASOPHILS NFR BLD AUTO: 0.6 % — SIGNIFICANT CHANGE UP (ref 0–2)
BILIRUB SERPL-MCNC: 0.3 MG/DL — SIGNIFICANT CHANGE UP (ref 0.2–1.2)
BILIRUB SERPL-MCNC: 0.3 MG/DL — SIGNIFICANT CHANGE UP (ref 0.2–1.2)
BUN SERPL-MCNC: 27 MG/DL — HIGH (ref 7–23)
BUN SERPL-MCNC: 27 MG/DL — HIGH (ref 7–23)
CALCIUM SERPL-MCNC: 9.3 MG/DL — SIGNIFICANT CHANGE UP (ref 8.5–10.1)
CALCIUM SERPL-MCNC: 9.3 MG/DL — SIGNIFICANT CHANGE UP (ref 8.5–10.1)
CHLORIDE SERPL-SCNC: 107 MMOL/L — SIGNIFICANT CHANGE UP (ref 96–108)
CHLORIDE SERPL-SCNC: 107 MMOL/L — SIGNIFICANT CHANGE UP (ref 96–108)
CO2 SERPL-SCNC: 29 MMOL/L — SIGNIFICANT CHANGE UP (ref 22–31)
CO2 SERPL-SCNC: 29 MMOL/L — SIGNIFICANT CHANGE UP (ref 22–31)
CREAT SERPL-MCNC: 1.1 MG/DL — SIGNIFICANT CHANGE UP (ref 0.5–1.3)
CREAT SERPL-MCNC: 1.1 MG/DL — SIGNIFICANT CHANGE UP (ref 0.5–1.3)
EGFR: 56 ML/MIN/1.73M2 — LOW
EGFR: 56 ML/MIN/1.73M2 — LOW
EOSINOPHIL # BLD AUTO: 0.11 K/UL — SIGNIFICANT CHANGE UP (ref 0–0.5)
EOSINOPHIL # BLD AUTO: 0.11 K/UL — SIGNIFICANT CHANGE UP (ref 0–0.5)
EOSINOPHIL NFR BLD AUTO: 1.1 % — SIGNIFICANT CHANGE UP (ref 0–6)
EOSINOPHIL NFR BLD AUTO: 1.1 % — SIGNIFICANT CHANGE UP (ref 0–6)
GLUCOSE SERPL-MCNC: 111 MG/DL — HIGH (ref 70–99)
GLUCOSE SERPL-MCNC: 111 MG/DL — HIGH (ref 70–99)
HCT VFR BLD CALC: 39.4 % — SIGNIFICANT CHANGE UP (ref 34.5–45)
HCT VFR BLD CALC: 39.4 % — SIGNIFICANT CHANGE UP (ref 34.5–45)
HGB BLD-MCNC: 12.6 G/DL — SIGNIFICANT CHANGE UP (ref 11.5–15.5)
HGB BLD-MCNC: 12.6 G/DL — SIGNIFICANT CHANGE UP (ref 11.5–15.5)
IMM GRANULOCYTES NFR BLD AUTO: 0.2 % — SIGNIFICANT CHANGE UP (ref 0–0.9)
IMM GRANULOCYTES NFR BLD AUTO: 0.2 % — SIGNIFICANT CHANGE UP (ref 0–0.9)
INR BLD: 1.14 RATIO — SIGNIFICANT CHANGE UP (ref 0.85–1.18)
INR BLD: 1.14 RATIO — SIGNIFICANT CHANGE UP (ref 0.85–1.18)
LYMPHOCYTES # BLD AUTO: 2.39 K/UL — SIGNIFICANT CHANGE UP (ref 1–3.3)
LYMPHOCYTES # BLD AUTO: 2.39 K/UL — SIGNIFICANT CHANGE UP (ref 1–3.3)
LYMPHOCYTES # BLD AUTO: 24.6 % — SIGNIFICANT CHANGE UP (ref 13–44)
LYMPHOCYTES # BLD AUTO: 24.6 % — SIGNIFICANT CHANGE UP (ref 13–44)
MCHC RBC-ENTMCNC: 29.9 PG — SIGNIFICANT CHANGE UP (ref 27–34)
MCHC RBC-ENTMCNC: 29.9 PG — SIGNIFICANT CHANGE UP (ref 27–34)
MCHC RBC-ENTMCNC: 32 GM/DL — SIGNIFICANT CHANGE UP (ref 32–36)
MCHC RBC-ENTMCNC: 32 GM/DL — SIGNIFICANT CHANGE UP (ref 32–36)
MCV RBC AUTO: 93.4 FL — SIGNIFICANT CHANGE UP (ref 80–100)
MCV RBC AUTO: 93.4 FL — SIGNIFICANT CHANGE UP (ref 80–100)
MONOCYTES # BLD AUTO: 0.65 K/UL — SIGNIFICANT CHANGE UP (ref 0–0.9)
MONOCYTES # BLD AUTO: 0.65 K/UL — SIGNIFICANT CHANGE UP (ref 0–0.9)
MONOCYTES NFR BLD AUTO: 6.7 % — SIGNIFICANT CHANGE UP (ref 2–14)
MONOCYTES NFR BLD AUTO: 6.7 % — SIGNIFICANT CHANGE UP (ref 2–14)
NEUTROPHILS # BLD AUTO: 6.49 K/UL — SIGNIFICANT CHANGE UP (ref 1.8–7.4)
NEUTROPHILS # BLD AUTO: 6.49 K/UL — SIGNIFICANT CHANGE UP (ref 1.8–7.4)
NEUTROPHILS NFR BLD AUTO: 66.8 % — SIGNIFICANT CHANGE UP (ref 43–77)
NEUTROPHILS NFR BLD AUTO: 66.8 % — SIGNIFICANT CHANGE UP (ref 43–77)
NRBC # BLD: 0 /100 WBCS — SIGNIFICANT CHANGE UP (ref 0–0)
NRBC # BLD: 0 /100 WBCS — SIGNIFICANT CHANGE UP (ref 0–0)
PLATELET # BLD AUTO: 328 K/UL — SIGNIFICANT CHANGE UP (ref 150–400)
PLATELET # BLD AUTO: 328 K/UL — SIGNIFICANT CHANGE UP (ref 150–400)
POTASSIUM SERPL-MCNC: 3.9 MMOL/L — SIGNIFICANT CHANGE UP (ref 3.5–5.3)
POTASSIUM SERPL-MCNC: 3.9 MMOL/L — SIGNIFICANT CHANGE UP (ref 3.5–5.3)
POTASSIUM SERPL-SCNC: 3.9 MMOL/L — SIGNIFICANT CHANGE UP (ref 3.5–5.3)
POTASSIUM SERPL-SCNC: 3.9 MMOL/L — SIGNIFICANT CHANGE UP (ref 3.5–5.3)
PROT SERPL-MCNC: 7.7 G/DL — SIGNIFICANT CHANGE UP (ref 6–8.3)
PROT SERPL-MCNC: 7.7 G/DL — SIGNIFICANT CHANGE UP (ref 6–8.3)
PROTHROM AB SERPL-ACNC: 13.3 SEC — HIGH (ref 9.5–13)
PROTHROM AB SERPL-ACNC: 13.3 SEC — HIGH (ref 9.5–13)
RBC # BLD: 4.22 M/UL — SIGNIFICANT CHANGE UP (ref 3.8–5.2)
RBC # BLD: 4.22 M/UL — SIGNIFICANT CHANGE UP (ref 3.8–5.2)
RBC # FLD: 14.1 % — SIGNIFICANT CHANGE UP (ref 10.3–14.5)
RBC # FLD: 14.1 % — SIGNIFICANT CHANGE UP (ref 10.3–14.5)
SODIUM SERPL-SCNC: 138 MMOL/L — SIGNIFICANT CHANGE UP (ref 135–145)
SODIUM SERPL-SCNC: 138 MMOL/L — SIGNIFICANT CHANGE UP (ref 135–145)
WBC # BLD: 9.72 K/UL — SIGNIFICANT CHANGE UP (ref 3.8–10.5)
WBC # BLD: 9.72 K/UL — SIGNIFICANT CHANGE UP (ref 3.8–10.5)
WBC # FLD AUTO: 9.72 K/UL — SIGNIFICANT CHANGE UP (ref 3.8–10.5)
WBC # FLD AUTO: 9.72 K/UL — SIGNIFICANT CHANGE UP (ref 3.8–10.5)

## 2024-01-05 PROCEDURE — 72125 CT NECK SPINE W/O DYE: CPT | Mod: 26,MA

## 2024-01-05 PROCEDURE — 73110 X-RAY EXAM OF WRIST: CPT | Mod: 26,50

## 2024-01-05 PROCEDURE — 85730 THROMBOPLASTIN TIME PARTIAL: CPT

## 2024-01-05 PROCEDURE — 99285 EMERGENCY DEPT VISIT HI MDM: CPT | Mod: FS

## 2024-01-05 PROCEDURE — 70486 CT MAXILLOFACIAL W/O DYE: CPT | Mod: 26,MA

## 2024-01-05 PROCEDURE — 36415 COLL VENOUS BLD VENIPUNCTURE: CPT

## 2024-01-05 PROCEDURE — 72125 CT NECK SPINE W/O DYE: CPT | Mod: MA

## 2024-01-05 PROCEDURE — 80053 COMPREHEN METABOLIC PANEL: CPT

## 2024-01-05 PROCEDURE — 70450 CT HEAD/BRAIN W/O DYE: CPT | Mod: 26,MA

## 2024-01-05 PROCEDURE — 73110 X-RAY EXAM OF WRIST: CPT

## 2024-01-05 PROCEDURE — 70450 CT HEAD/BRAIN W/O DYE: CPT | Mod: MA

## 2024-01-05 PROCEDURE — 99284 EMERGENCY DEPT VISIT MOD MDM: CPT | Mod: 25

## 2024-01-05 PROCEDURE — 70486 CT MAXILLOFACIAL W/O DYE: CPT | Mod: MA

## 2024-01-05 PROCEDURE — 85025 COMPLETE CBC W/AUTO DIFF WBC: CPT

## 2024-01-05 PROCEDURE — 85610 PROTHROMBIN TIME: CPT

## 2024-01-05 RX ORDER — ACETAMINOPHEN 500 MG
975 TABLET ORAL ONCE
Refills: 0 | Status: COMPLETED | OUTPATIENT
Start: 2024-01-05 | End: 2024-01-05

## 2024-01-05 RX ADMIN — Medication 975 MILLIGRAM(S): at 21:06

## 2024-01-05 NOTE — ED PROVIDER NOTE - PATIENT PORTAL LINK FT
You can access the FollowMyHealth Patient Portal offered by [facility] by registering at the following website: [email or web address]. By joining [employer]’s FollowMyHealth portal, you will also be able to view your health information using other applications (apps) compatible with our system. You can access the FollowMyHealth Patient Portal offered by [facility] by registering at the following website: [email or web address]. By joining [employer]’s FollowMyHealth portal, you will also be able to view your health information using other applications (apps) compatible with our system.

## 2024-01-05 NOTE — ED PROVIDER NOTE - EYES, MLM
Clear bilaterally, pupils equal, round and reactive to light. eomi Clear bilaterally, pupils equal, round and reactive to light. eomi. No conjunctival hematoma. Preserved visual acuity.

## 2024-01-05 NOTE — ED ADULT NURSE NOTE - NSFALLUNIVINTERV_ED_ALL_ED
Bed/Stretcher in lowest position, wheels locked, appropriate side rails in place/Call bell, personal items and telephone in reach/Instruct patient to call for assistance before getting out of bed/chair/stretcher/Non-slip footwear applied when patient is off stretcher/[place] to call system/Physically safe environment - no spills, clutter or unnecessary equipment/Purposeful proactive rounding/Room/bathroom lighting operational, light cord in reach Bed/Stretcher in lowest position, wheels locked, appropriate side rails in place/Call bell, personal items and telephone in reach/Instruct patient to call for assistance before getting out of bed/chair/stretcher/Non-slip footwear applied when patient is off stretcher/[place] to call system/Physically safe environment - no spills, clutter or unnecessary equipment/Purposeful proactive rounding/Room/bathroom lighting operational, light cord in reach

## 2024-01-05 NOTE — ED PROVIDER NOTE - ENMT, MLM
right upper eyebrow/eyelid area with large swelling/bruising otherwise no evidence of facial injury noted right upper eyebrow/eyelid area with large swelling/ecchymosis  otherwise no evidence of facial injury noted

## 2024-01-05 NOTE — ED PROVIDER NOTE - PROGRESS NOTE DETAILS
pt with no acute vision change, able to move eye well, no bony or globe findings on ct - case reviewed with opthalmologist dr castaneda - recommends ice, elevation, f/u in clinic or own opthalmologist Monday (clinic phone number provided to pt and pt's cell phone provided to dr castaneda for contact) - strict return precautions discussed - pt verbalized understanding, agreeable with plan pt with no acute vision change, able to move eye well, no bony or globe findings on ct - case reviewed with ophthalmologist dr castaneda - recommends ice, elevation, f/u in clinic or own ophthalmologist Monday (clinic phone number provided to pt and pt's cell phone provided to dr castaneda for contact) - strict return precautions discussed - pt verbalized understanding, agreeable with plan

## 2024-01-05 NOTE — ED PROVIDER NOTE - NSFOLLOWUPINSTRUCTIONS_ED_ALL_ED_FT
Apply ice to area as discussed.  Elevate head.  Tylenol for pain.  Follow up with your ophthalmologist or with [facility] on Monday.  Return for worsening or concerning symptoms.          Hematoma  A hematoma is a collection of blood. A hematoma can happen:  Under the skin.  In an organ.  In a body space.  In a joint space.  In other tissues.  The blood can thicken (clot) to form a lump that you can see and feel. The lump is often hard and may become sore and tender. The lump can be very small or very big. Most hematomas get better in a few days to weeks. However, some of these may be serious and need medical care.    What are the causes?  This condition is caused by:  An injury.  Blood that leaks under the skin.  Problems from surgeries.  Medical conditions that cause bleeding or bruising.  What increases the risk?  You are more likely to develop this condition if:  You are an older adult.  You use medicines that thin your blood.  You use NSAIDs, such as ibuprofen, often for pain.  You play contact sports.  What are the signs or symptoms?  Comparison of a normal ankle and an ankle that is swollen and bruised.  Symptoms depend on where the hematoma is in your body.  If the hematoma is under the skin, there is:  A firm lump on the body.  Pain and tenderness in the area.  Bruising. The skin above the lump may be blue, dark blue, purple-red, or yellowish.  If the hematoma is deep in the tissues or body spaces, there may be:  Blood in the stomach. This may cause pain in the belly (abdomen), weakness, passing out (fainting), and shortness of breath.  Blood in the head. This may cause a headache, weakness, trouble speaking or understanding speech, or passing out.  How is this treated?  Treatment depends on the cause, size, and location of the hematoma. Treatment may include:  Doing nothing. Many hematomas go away on their own without treatment.  Surgery or close monitoring. This may be needed for large hematomas or hematomas that affect the body's organs.  Medicines. These may be given if a medical condition caused the hematoma.  Follow these instructions at home:  Managing pain, stiffness, and swelling    Bag of ice on a towel on the skin.  If told, put ice on the injured area. To do this:  Put ice in a plastic bag.  Place a towel between your skin and the bag.  Leave the ice on for 20 minutes, 2–3 times a day for the first two days.  If your skin turns bright red, take off the ice right away to prevent skin damage. The risk of skin damage is higher if you cannot feel pain, heat, or cold.  If told, put heat on the injured area. Do this as often as told by your doctor. Use the heat source that your doctor recommends, such as a moist heat pack or a heating pad.  Place a towel between your skin and the heat source.  Leave the heat on for 20–30 minutes.  If your skin turns bright red, take off the heat right away to prevent burns. The risk of burns is higher if you cannot feel pain, heat, or cold.  Raise the injured area above the level of your heart while you are sitting or lying down.  Wrap the affected area with an elastic bandage, if told by your doctor. Do not wrap the bandage too tight.  If your hematoma is on a leg or foot and is painful, your doctor may give you crutches. Use them as told by your doctor.  General instructions    Take over-the-counter and prescription medicines only as told by your doctor.  Keep all follow-up visits. Your doctor may want to see how your hematoma is healing with treatment.  Contact a doctor if:  You have a fever.  The swelling or bruising gets worse.  You start to get more hematomas.  Your pain gets worse.  Your pain is not getting better with medicine.  The skin over the hematoma breaks or starts to bleed.  Get help right away if:  Your hematoma is in your chest or belly and you:  Pass out.  Feel weak.  Become short of breath.  You have a hematoma on your scalp that is caused by a fall or injury, and you:  Have a headache that gets worse.  Have trouble speaking or understanding speech.  Become less alert or you pass out.  These symptoms may be an emergency. Get help right away. Call 911.  Do not wait to see if the symptoms will go away.  Do not drive yourself to the hospital  This information is not intended to replace advice given to you by your health care provider. Make sure you discuss any questions you have with your health care provider. Apply ice to area as discussed.  Elevate head.  Tylenol for pain.  Follow up with your ophthalmologist or with [facility] on Monday.  Return for worsening or concerning symptoms.          Hematoma  A hematoma is a collection of blood. A hematoma can happen:  Under the skin.  In an organ.  In a body space.  In a joint space.  In other tissues.  The blood can thicken (clot) to form a lump that you can see and feel. The lump is often hard and may become sore and tender. The lump can be very small or very big. Most hematomas get better in a few days to weeks. However, some of these may be serious and need medical care.    What are the causes?  This condition is caused by:  An injury.  Blood that leaks under the skin.  Problems from surgeries.  Medical conditions that cause bleeding or bruising.  What increases the risk?  You are more likely to develop this condition if:  You are an older adult.  You use medicines that thin your blood.  You use NSAIDs, such as ibuprofen, often for pain.  You play contact sports.  What are the signs or symptoms?  Comparison of a normal ankle and an ankle that is swollen and bruised.  Symptoms depend on where the hematoma is in your body.  If the hematoma is under the skin, there is:  A firm lump on the body.  Pain and tenderness in the area.  Bruising. The skin above the lump may be blue, dark blue, purple-red, or yellowish.  If the hematoma is deep in the tissues or body spaces, there may be:  Blood in the stomach. This may cause pain in the belly (abdomen), weakness, passing out (fainting), and shortness of breath.  Blood in the head. This may cause a headache, weakness, trouble speaking or understanding speech, or passing out.  How is this treated?  Treatment depends on the cause, size, and location of the hematoma. Treatment may include:  Doing nothing. Many hematomas go away on their own without treatment.  Surgery or close monitoring. This may be needed for large hematomas or hematomas that affect the body's organs.  Medicines. These may be given if a medical condition caused the hematoma.  Follow these instructions at home:  Managing pain, stiffness, and swelling    Bag of ice on a towel on the skin.  If told, put ice on the injured area. To do this:  Put ice in a plastic bag.  Place a towel between your skin and the bag.  Leave the ice on for 20 minutes, 2–3 times a day for the first two days.  If your skin turns bright red, take off the ice right away to prevent skin damage. The risk of skin damage is higher if you cannot feel pain, heat, or cold.  If told, put heat on the injured area. Do this as often as told by your doctor. Use the heat source that your doctor recommends, such as a moist heat pack or a heating pad.  Place a towel between your skin and the heat source.  Leave the heat on for 20–30 minutes.  If your skin turns bright red, take off the heat right away to prevent burns. The risk of burns is higher if you cannot feel pain, heat, or cold.  Raise the injured area above the level of your heart while you are sitting or lying down.  Wrap the affected area with an elastic bandage, if told by your doctor. Do not wrap the bandage too tight.  If your hematoma is on a leg or foot and is painful, your doctor may give you crutches. Use them as told by your doctor.  General instructions    Take over-the-counter and prescription medicines only as told by your doctor.  Keep all follow-up visits. Your doctor may want to see how your hematoma is healing with treatment.  Contact a doctor if:  You have a fever.  The swelling or bruising gets worse.  You start to get more hematomas.  Your pain gets worse.  Your pain is not getting better with medicine.  The skin over the hematoma breaks or starts to bleed.  Get help right away if:  Your hematoma is in your chest or belly and you:  Pass out.  Feel weak.  Become short of breath.  You have a hematoma on your scalp that is caused by a fall or injury, and you:  Have a headache that gets worse.  Have trouble speaking or understanding speech.  Become less alert or you pass out.  These symptoms may be an emergency. Get help right away. Call 911.  Do not wait to see if the symptoms will go away.  Do not drive yourself to the hospital  This information is not intended to replace advice given to you by your health care provider. Make sure you discuss any questions you have with your health care provider. Apply ice to area as discussed.  Elevate head.  Tylenol for pain.  Follow up with your ophthalmologist or with [facility] on Monday.  Return for worsening or concerning symptoms.          Hematoma  A hematoma is a collection of blood. A hematoma can happen:  Under the skin.  In an organ.  In a body space.  In a joint space.  In other tissues.  The blood can thicken (clot) to form a lump that you can see and feel. The lump is often hard and may become sore and tender. The lump can be very small or very big. Most hematomas get better in a few days to weeks. However, some of these may be serious and need medical care.    What are the causes?  This condition is caused by:  An injury.  Blood that leaks under the skin.  Problems from surgeries.  Medical conditions that cause bleeding or bruising.  What increases the risk?  You are more likely to develop this condition if:  You are an older adult.  You use medicines that thin your blood.  You use NSAIDs, such as ibuprofen, often for pain.  You play contact sports.  What are the signs or symptoms?  Comparison of a normal ankle and an ankle that is swollen and bruised.  Symptoms depend on where the hematoma is in your body.  If the hematoma is under the skin, there is:  A firm lump on the body.  Pain and tenderness in the area.  Bruising. The skin above the lump may be blue, dark blue, purple-red, or yellowish.  If the hematoma is deep in the tissues or body spaces, there may be:  Blood in the stomach. This may cause pain in the belly (abdomen), weakness, passing out (fainting), and shortness of breath.  Blood in the head. This may cause a headache, weakness, trouble speaking or understanding speech, or passing out.  How is this treated?  Treatment depends on the cause, size, and location of the hematoma. Treatment may include:  Doing nothing. Many hematomas go away on their own without treatment.  Surgery or close monitoring. This may be needed for large hematomas or hematomas that affect the body's organs.  Medicines. These may be given if a medical condition caused the hematoma.  Follow these instructions at home:  Managing pain, stiffness, and swelling    Bag of ice on a towel on the skin.  If told, put ice on the injured area. To do this:  Put ice in a plastic bag.  Place a towel between your skin and the bag.  Leave the ice on for 20 minutes, 2–3 times a day for the first two days.  If your skin turns bright red, take off the ice right away to prevent skin damage. The risk of skin damage is higher if you cannot feel pain, heat, or cold.  If told, put heat on the injured area. Do this as often as told by your doctor. Use the heat source that your doctor recommends, such as a moist heat pack or a heating pad.  Place a towel between your skin and the heat source.  Leave the heat on for 20–30 minutes.  If your skin turns bright red, take off the heat right away to prevent burns. The risk of burns is higher if you cannot feel pain, heat, or cold.  Raise the injured area above the level of your heart while you are sitting or lying down.  Wrap the affected area with an elastic bandage, if told by your doctor. Do not wrap the bandage too tight.  If your hematoma is on a leg or foot and is painful, your doctor may give you crutches. Use them as told by your doctor.  General instructions    Take over-the-counter and prescription medicines only as told by your doctor.  Keep all follow-up visits. Your doctor may want to see how your hematoma is healing with treatment.  Contact a doctor if:  You have a fever.  The swelling or bruising gets worse.  You start to get more hematomas.  Your pain gets worse.  Your pain is not getting better with medicine.  The skin over the hematoma breaks or starts to bleed.  Get help right away if:  Your hematoma is in your chest or belly and you:  Pass out.  Feel weak.  Become short of breath.  You have a hematoma on your scalp that is caused by a fall or injury, and you:  Have a headache that gets worse.  Have trouble speaking or understanding speech.  Become less alert or you pass out.  These symptoms may be an emergency. Get help right away. Call 911.  Do not wait to see if the symptoms will go away.  Do not drive yourself to the hospital  This information is not intended to replace advice given to you by your health care provider. Make sure you discuss any questions you have with your health care provider. Apply ice to area as discussed.  Elevate head.  Tylenol for pain.  Follow up with your ophthalmologist or with [facility] on Monday.  Return for worsening or concerning symptoms.          Hematoma  A hematoma is a collection of blood. A hematoma can happen:  Under the skin.  In an organ.  In a body space.  In a joint space.  In other tissues.  The blood can thicken (clot) to form a lump that you can see and feel. The lump is often hard and may become sore and tender. The lump can be very small or very big. Most hematomas get better in a few days to weeks. However, some of these may be serious and need medical care.    What are the causes?  This condition is caused by:  An injury.  Blood that leaks under the skin.  Problems from surgeries.  Medical conditions that cause bleeding or bruising.  What increases the risk?  You are more likely to develop this condition if:  You are an older adult.  You use medicines that thin your blood.  You use NSAIDs, such as ibuprofen, often for pain.  You play contact sports.  What are the signs or symptoms?  Comparison of a normal ankle and an ankle that is swollen and bruised.  Symptoms depend on where the hematoma is in your body.  If the hematoma is under the skin, there is:  A firm lump on the body.  Pain and tenderness in the area.  Bruising. The skin above the lump may be blue, dark blue, purple-red, or yellowish.  If the hematoma is deep in the tissues or body spaces, there may be:  Blood in the stomach. This may cause pain in the belly (abdomen), weakness, passing out (fainting), and shortness of breath.  Blood in the head. This may cause a headache, weakness, trouble speaking or understanding speech, or passing out.  How is this treated?  Treatment depends on the cause, size, and location of the hematoma. Treatment may include:  Doing nothing. Many hematomas go away on their own without treatment.  Surgery or close monitoring. This may be needed for large hematomas or hematomas that affect the body's organs.  Medicines. These may be given if a medical condition caused the hematoma.  Follow these instructions at home:  Managing pain, stiffness, and swelling    Bag of ice on a towel on the skin.  If told, put ice on the injured area. To do this:  Put ice in a plastic bag.  Place a towel between your skin and the bag.  Leave the ice on for 20 minutes, 2–3 times a day for the first two days.  If your skin turns bright red, take off the ice right away to prevent skin damage. The risk of skin damage is higher if you cannot feel pain, heat, or cold.  If told, put heat on the injured area. Do this as often as told by your doctor. Use the heat source that your doctor recommends, such as a moist heat pack or a heating pad.  Place a towel between your skin and the heat source.  Leave the heat on for 20–30 minutes.  If your skin turns bright red, take off the heat right away to prevent burns. The risk of burns is higher if you cannot feel pain, heat, or cold.  Raise the injured area above the level of your heart while you are sitting or lying down.  Wrap the affected area with an elastic bandage, if told by your doctor. Do not wrap the bandage too tight.  If your hematoma is on a leg or foot and is painful, your doctor may give you crutches. Use them as told by your doctor.  General instructions    Take over-the-counter and prescription medicines only as told by your doctor.  Keep all follow-up visits. Your doctor may want to see how your hematoma is healing with treatment.  Contact a doctor if:  You have a fever.  The swelling or bruising gets worse.  You start to get more hematomas.  Your pain gets worse.  Your pain is not getting better with medicine.  The skin over the hematoma breaks or starts to bleed.  Get help right away if:  Your hematoma is in your chest or belly and you:  Pass out.  Feel weak.  Become short of breath.  You have a hematoma on your scalp that is caused by a fall or injury, and you:  Have a headache that gets worse.  Have trouble speaking or understanding speech.  Become less alert or you pass out.  These symptoms may be an emergency. Get help right away. Call 911.  Do not wait to see if the symptoms will go away.  Do not drive yourself to the hospital  This information is not intended to replace advice given to you by your health care provider. Make sure you discuss any questions you have with your health care provider.

## 2024-01-05 NOTE — ED PROVIDER NOTE - CLINICAL SUMMARY MEDICAL DECISION MAKING FREE TEXT BOX
63 y/o F PMH of morbid obesity, anemia, colitis, depression, anxiety, DVT on Eliquis presenting with R supraorbital hematoma after mechanical fall  Able to examine eye with preserved visual acuity and no clinical signs of entrapment.   r/o max fac fracture  Check CTH/Max Fac  Plain films of wrist   Likely will need transfer for thorough opthalmology evaluation +/- OMFS pending CT imaging   Analgesia PRN, Screening labs 61 y/o F PMH of morbid obesity, anemia, colitis, depression, anxiety, DVT on Eliquis presenting with R supraorbital hematoma after mechanical fall  Able to examine eye with preserved visual acuity and no clinical signs of entrapment.   r/o max fac fracture  Check CTH/Max Fac  Plain films of wrist   Likely will need transfer for thorough opthalmology evaluation +/- OMFS pending CT imaging   Analgesia PRN, Screening labs 63 y/o F PMH of morbid obesity, anemia, colitis, depression, anxiety, DVT on Eliquis presenting with R supraorbital hematoma after mechanical fall  Able to examine eye with preserved visual acuity and no clinical signs of entrapment. Preserved visual acuity   r/o max fac fracture  Check CTH/Max Fac  Plain films of wrist   Likely will need transfer for thorough opthalmology evaluation +/- OMFS pending CT imaging   Analgesia PRN, Screening labs    PA Goldberger d/w Opthalmology and sent photos/videos recommending outpatient follow up 61 y/o F PMH of morbid obesity, anemia, colitis, depression, anxiety, DVT on Eliquis presenting with R supraorbital hematoma after mechanical fall  Able to examine eye with preserved visual acuity and no clinical signs of entrapment. Preserved visual acuity   r/o max fac fracture  Check CTH/Max Fac  Plain films of wrist   Likely will need transfer for thorough opthalmology evaluation +/- OMFS pending CT imaging   Analgesia PRN, Screening labs    PA Goldberger d/w Opthalmology and sent photos/videos recommending outpatient follow up

## 2024-01-05 NOTE — ED PROVIDER NOTE - NSFOLLOWUPCLINICS_GEN_ALL_ED_FT
[facility]  Ophthalmology  [address] [place] [address], [facility] [address]  [place] [address]  Phone: [phone]  Fax:      [facility]  Ophthalmology  [address] [place] [address], [facility] [address]  [place] [address]  Phone: [phone]  Fax:

## 2024-01-05 NOTE — ED PROVIDER NOTE - OBJECTIVE STATEMENT
63 F hx anemia, anxiety, arthritis, chronic back pain, tremor, bipolar, kidney stones, depression, htn, hld, on eliquis due to dvt/pe presents s/p trip and fall. Fell forward onto hands, hit right eye on concrete ground. Denies LOC, HA, dizziness, new neck/back pain. Ambulatory at baseline after fall. No prior eye surgery. Wears glasses for distance. No visual change after fall but now with difficulty opening eyelid due to swelling. Has been applying ice.

## 2024-01-05 NOTE — ED ADULT NURSE NOTE - MODE OF DISCHARGE
Continue using the sling.    Pain medications as needed.    Follow-up with [facility] clinic this week for recheck.     These are usually nonsurgical.    Follow-up in the clinic.  
Ambulatory

## 2024-01-05 NOTE — ED ADULT TRIAGE NOTE - CHIEF COMPLAINT QUOTE
tripped and fell today hit the right side of her head right eye swelling right knee pain left wrist pain pt takes Eliquis denies loc

## 2024-01-06 VITALS
RESPIRATION RATE: 18 BRPM | DIASTOLIC BLOOD PRESSURE: 98 MMHG | SYSTOLIC BLOOD PRESSURE: 163 MMHG | HEART RATE: 98 BPM | TEMPERATURE: 98 F | OXYGEN SATURATION: 98 %

## 2024-01-09 ENCOUNTER — APPOINTMENT (OUTPATIENT)
Dept: OPHTHALMOLOGY | Facility: CLINIC | Age: 64
End: 2024-01-09

## 2024-01-09 ENCOUNTER — OFFICE (OUTPATIENT)
Dept: URBAN - METROPOLITAN AREA CLINIC 109 | Facility: CLINIC | Age: 64
Setting detail: OPHTHALMOLOGY
End: 2024-01-09
Payer: MEDICARE

## 2024-01-09 DIAGNOSIS — S05.90XA: ICD-10-CM

## 2024-01-09 PROCEDURE — 99213 OFFICE O/P EST LOW 20 MIN: CPT | Performed by: OPHTHALMOLOGY

## 2024-01-09 ASSESSMENT — REFRACTION_CURRENTRX
OD_OVR_VA: 20/
OS_SPHERE: -2.25
OD_SPHERE: -0.75
OD_AXIS: 178
OS_OVR_VA: 20/
OS_AXIS: 3
OS_CYLINDER: -1.25
OD_CYLINDER: -1.00

## 2024-01-09 ASSESSMENT — REFRACTION_AUTOREFRACTION
OD_SPHERE: -1.00
OS_CYLINDER: -1.75
OS_AXIS: 170
OS_SPHERE: -2.25
OD_AXIS: 175
OD_CYLINDER: -2.50

## 2024-01-09 ASSESSMENT — SPHEQUIV_DERIVED
OD_SPHEQUIV: -2.25
OS_SPHEQUIV: -3.125

## 2024-01-09 ASSESSMENT — CONFRONTATIONAL VISUAL FIELD TEST (CVF)
OS_FINDINGS: FULL
OD_FINDINGS: FULL

## 2024-01-09 ASSESSMENT — SUPERFICIAL PUNCTATE KERATITIS (SPK)
OS_SPK: T
OD_SPK: T 1+

## 2024-01-09 ASSESSMENT — CORNEAL TRAUMA - ABRASION: OD_ABRASION: ABSENT

## 2024-01-10 ENCOUNTER — NON-APPOINTMENT (OUTPATIENT)
Age: 64
End: 2024-01-10

## 2024-01-25 ENCOUNTER — NON-APPOINTMENT (OUTPATIENT)
Age: 64
End: 2024-01-25

## 2024-01-31 ENCOUNTER — NON-APPOINTMENT (OUTPATIENT)
Age: 64
End: 2024-01-31

## 2024-02-07 ENCOUNTER — APPOINTMENT (OUTPATIENT)
Dept: NEUROLOGY | Facility: CLINIC | Age: 64
End: 2024-02-07
Payer: MEDICARE

## 2024-02-07 VITALS
DIASTOLIC BLOOD PRESSURE: 90 MMHG | BODY MASS INDEX: 48.15 KG/M2 | HEART RATE: 87 BPM | WEIGHT: 289 LBS | SYSTOLIC BLOOD PRESSURE: 146 MMHG | HEIGHT: 65 IN

## 2024-02-07 DIAGNOSIS — G62.9 POLYNEUROPATHY, UNSPECIFIED: ICD-10-CM

## 2024-02-07 DIAGNOSIS — R51.9 HEADACHE, UNSPECIFIED: ICD-10-CM

## 2024-02-07 DIAGNOSIS — R26.81 UNSTEADINESS ON FEET: ICD-10-CM

## 2024-02-07 PROCEDURE — 99205 OFFICE O/P NEW HI 60 MIN: CPT

## 2024-02-07 RX ORDER — CARBAMAZEPINE 200 MG/1
200 CAPSULE, EXTENDED RELEASE ORAL
Qty: 60 | Refills: 2 | Status: ACTIVE | COMMUNITY
Start: 2024-02-07 | End: 1900-01-01

## 2024-02-07 NOTE — PHYSICAL EXAM
[Person] : oriented to person [Place] : oriented to place [Time] : oriented to time [Fluency] : fluency intact [Cranial Nerves Optic (II)] : visual acuity intact bilaterally,  visual fields full to confrontation, pupils equal round and reactive to light [Cranial Nerves Oculomotor (III)] : extraocular motion intact [Cranial Nerves Trigeminal (V)] : facial sensation intact symmetrically [Cranial Nerves Facial (VII)] : face symmetrical [Cranial Nerves Vestibulocochlear (VIII)] : hearing was intact bilaterally [Cranial Nerves Accessory (XI - Cranial And Spinal)] : head turning and shoulder shrug symmetric [Cranial Nerves Glossopharyngeal (IX)] : tongue and palate midline [Cranial Nerves Hypoglossal (XII)] : there was no tongue deviation with protrusion [Motor Tone] : muscle tone was normal in all four extremities [Motor Strength] : muscle strength was normal in all four extremities [No Muscle Atrophy] : normal bulk in all four extremities [Sensation Tactile Decrease] : light touch was intact [Romberg's Sign] : a positive Romberg's sign was present [2+] : Patella left 2+ [0] : Ankle jerk left 0 [FreeTextEntry4] : Mumbles, occasional stutter, rarely is slow to find her words.  [FreeTextEntry6] : Tremor of the head, trunk, and hands; worsened with activity. No cogwheeling or rigidity [FreeTextEntry8] : Slightly wide based, looks down when walking. Good arm swing and stride length.

## 2024-02-07 NOTE — ASSESSMENT
[FreeTextEntry1] : This is a 64F who presents with right facial pain after fall. Suspect her increase in falls over the last couple of years are multifactorial, including worsening neuropathy and polypharmacy. No evidence of PD on exam. Tolerating Tegretol well, not interested in raising it further.  - Refill carbamazepine 200mg BID - Counseled about being careful with gait issues and good foot hygiene.  - Asked to inquire with her PCP about limiting polypharmacy, if possible - PT referral sent  F/u 3 months

## 2024-02-09 ENCOUNTER — OFFICE (OUTPATIENT)
Dept: URBAN - METROPOLITAN AREA CLINIC 109 | Facility: CLINIC | Age: 64
Setting detail: OPHTHALMOLOGY
End: 2024-02-09
Payer: MEDICARE

## 2024-02-09 VITALS — HEIGHT: 55 IN

## 2024-02-09 DIAGNOSIS — S05.90XA: ICD-10-CM

## 2024-02-09 PROCEDURE — 99213 OFFICE O/P EST LOW 20 MIN: CPT | Performed by: OPHTHALMOLOGY

## 2024-02-09 ASSESSMENT — SPHEQUIV_DERIVED
OS_SPHEQUIV: -2.75
OD_SPHEQUIV: -1.625

## 2024-02-09 ASSESSMENT — CORNEAL TRAUMA - ABRASION: OD_ABRASION: ABSENT

## 2024-02-09 ASSESSMENT — REFRACTION_AUTOREFRACTION
OD_AXIS: 2
OD_CYLINDER: -2.25
OS_AXIS: 8
OD_SPHERE: -0.50
OS_CYLINDER: -1.00
OS_SPHERE: -2.25

## 2024-02-09 ASSESSMENT — REFRACTION_CURRENTRX
OD_AXIS: 178
OD_CYLINDER: -1.00
OS_SPHERE: -2.25
OS_CYLINDER: -1.25
OS_OVR_VA: 20/
OD_OVR_VA: 20/
OS_AXIS: 3
OD_SPHERE: -0.75

## 2024-02-09 ASSESSMENT — SUPERFICIAL PUNCTATE KERATITIS (SPK)
OD_SPK: T 1+
OS_SPK: T

## 2024-02-09 ASSESSMENT — CONFRONTATIONAL VISUAL FIELD TEST (CVF)
OS_FINDINGS: FULL
OD_FINDINGS: FULL

## 2024-02-21 NOTE — ED ADULT NURSE NOTE - NSFALLRSKPASTHIST_ED_ALL_ED
I have this pt scheduled for robotic prostatectomy with Dr Brayan Corrigan 3-19. I will be mailing his paperwork to him so he has everything in writing. I asked him if he needed anything else? He said he was just nervous about all of this. So Cory I am not sure if you need to call him?     I told him I would leave a message   Ankita  
no

## 2024-02-28 ENCOUNTER — APPOINTMENT (OUTPATIENT)
Dept: CARDIOLOGY | Facility: CLINIC | Age: 64
End: 2024-02-28
Payer: MEDICARE

## 2024-02-28 ENCOUNTER — NON-APPOINTMENT (OUTPATIENT)
Age: 64
End: 2024-02-28

## 2024-02-28 VITALS — OXYGEN SATURATION: 96 % | HEART RATE: 100 BPM | HEIGHT: 65 IN

## 2024-02-28 VITALS — SYSTOLIC BLOOD PRESSURE: 138 MMHG | DIASTOLIC BLOOD PRESSURE: 85 MMHG

## 2024-02-28 DIAGNOSIS — R06.00 DYSPNEA, UNSPECIFIED: ICD-10-CM

## 2024-02-28 DIAGNOSIS — I10 ESSENTIAL (PRIMARY) HYPERTENSION: ICD-10-CM

## 2024-02-28 PROCEDURE — 93000 ELECTROCARDIOGRAM COMPLETE: CPT

## 2024-02-28 PROCEDURE — 99214 OFFICE O/P EST MOD 30 MIN: CPT | Mod: 25

## 2024-02-28 NOTE — HISTORY OF PRESENT ILLNESS
[FreeTextEntry1] : Ewelina is a 63 year old female here for evaluation.  Cardiac-wise, she has a history of obesity, obstructive sleep apnea, and hypertension. She reports an abnormal stress test about 7 or 8 years ago, for which she had an angiogram, and it showed no significant plaque. She also has a history of aortic aneurysm. Her last CT was in 12/2018. This demonstrated an aneurysmal dilation of the ascending thoracic aorta, with a maximal transverse diameter of 4.4 cm.  I last saw her in 10/2023.  In 2020, she presented to the ER with acute shortness of breath. A CT was negative for PE, and confirmed an aneurysm of 4.3 cm. Her BNP was minimal. Her troponins were negative, though given her risk, she was transferred for cardiac catheterization, which showed nonobstructive CAD.  She had a panniculectomy in 10/2020, and a complicated post-op course with hemorrhagic shock, intermittent fevers and ELAINE. She also had PAF in the setting of hypotension. She was later re-admitted in late 11/2020 with LLE swelling, and found to have an extensive DVT and right sided PE. She completed a course of eliquis, and has been following with a hematologist.  In 6/21, her proximal ascending aorta measured 4.1 cm. There was mild RVE with normal RV function. Estimated EF was 50-55%. Her echo was repeated in 6/22, which showed her aorta to be 4.6 cm, EF was unchanged. CT confirmed an aortic measurement of 4.6 cm. She is seeing a pulmonologist for repeat imaging of her lung nodules. Pharm stress showed inferior infarct, with decreased thickening of her inferior and inferolateral walls.  Since last visit, she is doing ok. She had a mechanical fall on 1/5/24, and blasted her eye into the ground. She went to the ER and had normal testing. She is on tegretol for neuropathic pain.  She reports compliant with her CPAP. her breathing is unchanged. Echo in 6/23 with normal LV function, moderate dilated proximal aorta at 4.7 cm. The RV was mildly enlarged, with normal LV function, without significant change. She saw an orthopedist, and there was suggestion of ankylosing spondylitis. She had a lower extremity Doppler and a left lower extremity DVT could not be ruled out because only partial compressibility of the common femoral vein.

## 2024-02-28 NOTE — PHYSICAL EXAM
[Well Developed] : well developed [Well Nourished] : well nourished [No Acute Distress] : no acute distress [Normal Venous Pressure] : normal venous pressure [Normal Conjunctiva] : normal conjunctiva [No Carotid Bruit] : no carotid bruit [Tachycardia] : tachycardic [Normal S1] : normal S1 [Normal S2] : normal S2 [No Murmur] : no murmurs heard [No Pitting Edema] : no pitting edema present [2+] : left 2+ [No Abnormalities] : the abdominal aorta was not enlarged and no bruit was heard [Clear Lung Fields] : clear lung fields [Good Air Entry] : good air entry [No Respiratory Distress] : no respiratory distress  [Non Tender] : non-tender [Soft] : abdomen soft [No Masses/organomegaly] : no masses/organomegaly [Normal Bowel Sounds] : normal bowel sounds [Normal Gait] : normal gait [No Cyanosis] : no cyanosis [No Edema] : no edema [No Clubbing] : no clubbing [No Varicosities] : no varicosities [No Rash] : no rash [No Skin Lesions] : no skin lesions [Moves all extremities] : moves all extremities [Normal Speech] : normal speech [No Focal Deficits] : no focal deficits [Alert and Oriented] : alert and oriented [Normal memory] : normal memory [S3] : no S3 [S4] : no S4 [Right Carotid Bruit] : no bruit heard over the right carotid [Right Femoral Bruit] : no bruit heard over the right femoral artery [Left Carotid Bruit] : no bruit heard over the left carotid [Left Femoral Bruit] : no bruit heard over the left femoral artery

## 2024-02-28 NOTE — DISCUSSION/SUMMARY
[With Me] : with me [FreeTextEntry1] : Ewelina is a 64 year old female here for follow up.  In 2020, she had a complicated course after a panniculectomy, with hemorrhagic shock, fevers and PAF, followed by an extensive LLE DVT and PE.  She reports unchanged dyspnea.  A cath in 2020 was unremarkable. She will continue to take lasix as needed. Her heart rate is better today. She is currently back on eliquis, as a DVT could not be ruled out of the LLE.  She is on verapamil 240. She is back on avapro and propranolol to 20 bid. Her echocardiogram demonstrated low normal LV function without significant valvular pathology.  Her CT showed a stable aortic aneurysm at 4.6 cm, noted to be 4.7 cm on echocardiogram in 2023. We will repeat an echocardiogram in 6/24. A pharm stress suggested inferior infarct, which we will medically manage for now. She will take ASA. Her LDL has improved notably to the 70's on pravastatin.  I stressed the importance of getting back to diet, and exercise, with an attempt to lose weight.  I will see her again in 3-6 months. [EKG obtained to assist in diagnosis and management of assessed problem(s)] : EKG obtained to assist in diagnosis and management of assessed problem(s)

## 2024-03-29 NOTE — H&P ADULT - CARDIOVASCULAR DETAILS
Lumbar Transforaminal Epidural Steroid Injection Bilateral L4-5 Levels  [facility]    PREOPERATIVE DIAGNOSIS:  Lumbar radicular pain, Lumbar Disc Displacement and Lumbar Radiculopathy    POSTOPERATIVE DIAGNOSIS:  Same as preop diagnosis    PROCEDURE:    1. CPT 98828 --  Diagnostic & Therapeutic Transforaminal Epidural Steroid Injection at the L4 level, on the bilateral     PRE-PROCEDURE DISCUSSION WITH PATIENT:    Risks and complications were discussed with the patient prior to starting the procedure and informed consent was obtained.  We discussed various topics including but not limited to bleeding, infection, injury, nerve injury, paralysis, coma, death, postprocedural painful flare-up, postprocedural site soreness, and a lack of pain relief.  We discussed the diagnostic aspect of transforaminal epidural / selective nerve root blockade.    SURGEON:  Gabrielle Dupont MD    SEDATION:  No sedation was used for this procedure  ANESTHETIC:  0.25% bupivacaine  STEROID:  10mg dexamethasone    DESCRIPTON OF PROCEDURE:  After obtaining informed consent, an I.V. was not started in the preoperative area. The patient taken to the operating room and was placed in the prone position with a pillow under the abdomen.  All pressure points were well padded.  Heart rate, blood pressure, and pulse oximeter were monitored.  The lumbar area was prepped with Chloraprep and draped in a sterile fashion.     The AP fluoroscopic image was used to visualize the L4 vertebral body.  The superior endplate was squared off radiographically.  The image was then obliqued towards the patient's right side to maximize visualization of the pedicle. The skin and subcutaneous tissue at the 6 o'clock position of the pedicle was anesthetized with 1% lidocaine. A 22-gauge spinal needle was then advanced percutaneously through the anesthetized skin tract under fluoroscopic guidance in AP and lateral views until the needle tip lie in the  graciela-lateral aspect of the epidural space.  After negative aspiration of the needle for blood or CSF, a volume of 1 mL of Isovue was injected producing good epidural spread with no evidence of loculation, vascular run-off, or intrathecal spread. Subsequently, a volume of 3 mL of injectate was administered without resistance.  The needle was removed intact.  Vital signs were stable throughout.      The same procedure was then performed on the contralateral side in the exact same fashion.      The total volume injected consisted of 10 mg of dexamethasone with 1 mL of 0.25% bupivacaine and preservative-free normal saline.       ESTIMATED BLOOD LOSS:  <5 mL  SPECIMENS:  none    COMPLICATIONS:   No complications were noted., There was no indication of vascular uptake on live injection of contrast dye., and There was no indication of intrathecal uptake on live injection of contrast dye.    TOLERANCE & DISCHARGE CONDITION:    The patient tolerated the procedure well.  The patient was transported to the recovery area without difficulties.  The patient was discharged to home under the care of family in stable and satisfactory condition.    PLAN OF CARE:  The patient was given our standard instruction sheet.  The patient will Return to clinic 1-2 wks.  The patient will resume all medications as per the medication reconciliation sheet.        positive S2/positive S1

## 2024-04-05 ENCOUNTER — RX RENEWAL (OUTPATIENT)
Age: 64
End: 2024-04-05

## 2024-04-05 RX ORDER — PROPRANOLOL HYDROCHLORIDE 20 MG/1
20 TABLET ORAL
Qty: 180 | Refills: 0 | Status: ACTIVE | COMMUNITY
Start: 2021-03-02 | End: 1900-01-01

## 2024-05-01 ENCOUNTER — NON-APPOINTMENT (OUTPATIENT)
Age: 64
End: 2024-05-01

## 2024-05-01 ENCOUNTER — APPOINTMENT (OUTPATIENT)
Dept: GYNECOLOGIC ONCOLOGY | Facility: CLINIC | Age: 64
End: 2024-05-01
Payer: MEDICARE

## 2024-05-01 VITALS
DIASTOLIC BLOOD PRESSURE: 82 MMHG | WEIGHT: 293 LBS | HEART RATE: 86 BPM | SYSTOLIC BLOOD PRESSURE: 164 MMHG | BODY MASS INDEX: 48.82 KG/M2 | HEIGHT: 65 IN | RESPIRATION RATE: 16 BRPM | OXYGEN SATURATION: 95 %

## 2024-05-01 DIAGNOSIS — Z80.0 FAMILY HISTORY OF MALIGNANT NEOPLASM OF DIGESTIVE ORGANS: ICD-10-CM

## 2024-05-01 DIAGNOSIS — K62.9 DISEASE OF ANUS AND RECTUM, UNSPECIFIED: ICD-10-CM

## 2024-05-01 DIAGNOSIS — N83.209 UNSPECIFIED OVARIAN CYST, UNSPECIFIED SIDE: ICD-10-CM

## 2024-05-01 DIAGNOSIS — N85.00 ENDOMETRIAL HYPERPLASIA, UNSPECIFIED: ICD-10-CM

## 2024-05-01 DIAGNOSIS — N90.4 LEUKOPLAKIA OF VULVA: ICD-10-CM

## 2024-05-01 DIAGNOSIS — G47.30 SLEEP APNEA, UNSPECIFIED: ICD-10-CM

## 2024-05-01 DIAGNOSIS — F31.9 BIPOLAR DISORDER, UNSPECIFIED: ICD-10-CM

## 2024-05-01 PROCEDURE — 11104 PUNCH BX SKIN SINGLE LESION: CPT | Mod: 59

## 2024-05-01 PROCEDURE — 58100 BIOPSY OF UTERUS LINING: CPT | Mod: 59

## 2024-05-01 PROCEDURE — 99204 OFFICE O/P NEW MOD 45 MIN: CPT | Mod: 25

## 2024-05-01 RX ORDER — FUROSEMIDE 20 MG/1
20 TABLET ORAL
Qty: 30 | Refills: 5 | Status: DISCONTINUED | COMMUNITY
Start: 2023-03-22 | End: 2024-05-01

## 2024-05-02 PROBLEM — N90.4 LICHEN SCLEROSUS OF FEMALE GENITALIA: Status: ACTIVE | Noted: 2024-05-02

## 2024-05-02 PROBLEM — K62.9 PERIANAL LESION: Status: ACTIVE | Noted: 2024-05-02

## 2024-05-12 NOTE — HISTORY OF PRESENT ILLNESS
[FreeTextEntry1] : 63yo G0 LMP 53 self referred for evaluation of hyperplasia. Pt reports being diagnosed with hyperplasia approx 10 years ago. As per records D&C, polypectomy was performed in 2012 revealing endometrium with a foci of simple hyperplasia without atypia treated with oral Provera x 6 months. She has been following with Gyn Onc, Dr. Alfonso. Mirena IUD was placed in 2013. She was followed with EMB's every 3-4 months. EMB in 2017 showed endometrial hyperplasia under progestin therapy. EMB in 2019 was benign. Another EMB in June 2023 was benign.   Denies recent VB/VD, pelvic pain or weight loss. She has chronic diarrhea managed by GI, also has ROBERTO. Admits to intermittent vulvar itching with known history of lichens. Pt is seeking another opinion on her management. States she was offered a vaginal hysterectomy in the past which she refused. She is tired of getting biopsies.     EMB/IUD removal-6/21/23-few small superficial fragments of decidualized endometrium, fragments of squamous epithelium, mucus and blood. IUD replaced.   TVUS (ZP)-7/28/23-RV uterus 6.3 x 4.1 x 3.2cm, EMS=4mm, IUD in good position, right ovarian persistent small cyst, left ovarian tiny echogenic focus.    PMHx-CAD, HTN, HLD, thorasic aneurysm, anemia s/p blood and iron transfusions, DVT/PE, morbid obesity s/p lap band surgery with subsequent removal due to erosion, JESUS-on C-PAP, lichen sclerosis on clobetasol.      Health maintenance:  Pap smear-unsure  Mammo- 2023-BR 1  Colonoscopy- 2023-reports wnl, h/o polyps  DEXA-never

## 2024-05-12 NOTE — ASSESSMENT
[FreeTextEntry1] : 65yo with approx 15 year history of simple hyperplasia without atypia managed with Mirena IUD (pt has had 3 IUD's so far).    Discussed with pt my recommendation to continue management with Mirena IUD. Pt has multiple comorbidities which would increase her surgical risks. We discussed possible robotic surgery, however, at this time, I feel the risks of surgery outweigh the benefits. It appears she has had a good treatment response thus far. Recommendation is to repeat the pelvic US to evaluate her lining and follow up of ovarian cyst. EMB was performed today.   On exam, there are chronic vulvar skin changes c/w lichens. Left perianal area with a thickened, erythematous lesion which was biopsied today. For now, recommend she use Clobetasol cream qhs x 6 weeks then maintenance 2-3 x weekly thereafter. Biopsy of left perianal lesion taken.       Pt inquired about Gyn Onc clearance for her rheumatologist to start Cimzia. Discussed with patient that as long as her biopsies are benign, she is able to start this medication. Await biopsies then I can write her a note if needed.

## 2024-05-12 NOTE — PROCEDURE
[Endometrial Biopsy] : an endometrial biopsy [Other ___] : [unfilled] [Other: ___] : [unfilled] [Patient] : the patient [Verbal Consent] : verbal consent was obtained prior to the procedure and is detailed in the patient's record [1% Lidocaine ___(ml)] :  [unfilled]Uml of 1% Lidocaine [Silver Nitrate] : silver nitrate [Direct Pressure] : direct pressure [Yes] : the specimen was sent to pathology [No Complications] : none [Tolerated Well] : the patient tolerated the procedure well [Post procedure instructions and information given] : post procedure instructions and information given and reviewed with patient. [0] : 0 [FreeTextEntry1] : Cervix was prepped with Betadine swabs x 3 and 1% lidocaine was injected into cervix at 11 and 2 o'clock. Cervix was stabilized with tenaculum and pipelle was advanced into uterus which sounds to 6cm. Pipelle x 2 passes performed. Hemostasis obtained. Pt tolerated this well.  Left perianal biopsy performed. 1% lidocaine was administered and 5mm punch biopsy performed. One 3-0 suture placed and made hemostatic with sliver nitrate.

## 2024-05-12 NOTE — PLAN
[TextEntry] : Pelvic US ordered  Await EMB and left perianal biopsy TEB to review all results and to discuss treatment plan.

## 2024-05-12 NOTE — PHYSICAL EXAM
[Chaperone Present] : A chaperone was present in the examining room during all aspects of the physical examination [57329] : A chaperone was present during the pelvic exam. [Obese] : obese [Restricted in physically strenuous activity but ambulatory and able to carry out work of a light or sedentary nature] : Status 1- Restricted in physically strenuous activity but ambulatory and able to carry out work of a light or sedentary nature, e.g., light house work, office work [Normal] : Bimanual Exam: Normal [Abnormal] : Anus, perineum, and rectum: Abnormal [FreeTextEntry2] : Patient was interviewed and examined with chaperone present. Name of chaperone: Ria Julien PA-C  [de-identified] : scattered hypopigmented and erythematous patches on bilateral vulva extending to   b/l perianal area, L> R thickened erythematous patch [de-identified] : RUQ healed scars, lorin umbilical scars [de-identified] : see external exam description

## 2024-05-12 NOTE — END OF VISIT
[FreeTextEntry3] : I, Dr. Araceli Coulter, personally performed the evaluation and management (E/M) services for this new patient. That E/M includes conducting the initial examination, assessing all conditions, and establishing the plan of care. Today, Ria Julien PA-C, was here to observe my evaluation and management services for this patient to be followed going forward.

## 2024-05-12 NOTE — CHIEF COMPLAINT
[FreeTextEntry1] : [facility]  [address] [place], [place] [id number]

## 2024-05-17 ENCOUNTER — APPOINTMENT (OUTPATIENT)
Dept: GYNECOLOGIC ONCOLOGY | Facility: CLINIC | Age: 64
End: 2024-05-17
Payer: MEDICARE

## 2024-05-17 PROCEDURE — 99213 OFFICE O/P EST LOW 20 MIN: CPT

## 2024-05-21 ENCOUNTER — TRANSCRIPTION ENCOUNTER (OUTPATIENT)
Age: 64
End: 2024-05-21

## 2024-05-22 LAB — CORE LAB BIOPSY: NORMAL

## 2024-05-24 ENCOUNTER — OFFICE (OUTPATIENT)
Dept: URBAN - METROPOLITAN AREA CLINIC 109 | Facility: CLINIC | Age: 64
Setting detail: OPHTHALMOLOGY
End: 2024-05-24
Payer: MEDICARE

## 2024-05-24 DIAGNOSIS — S05.90XA: ICD-10-CM

## 2024-05-24 PROCEDURE — 99213 OFFICE O/P EST LOW 20 MIN: CPT | Performed by: OPHTHALMOLOGY

## 2024-05-24 ASSESSMENT — CONFRONTATIONAL VISUAL FIELD TEST (CVF)
OS_FINDINGS: FULL
OD_FINDINGS: FULL

## 2024-06-05 ENCOUNTER — TRANSCRIPTION ENCOUNTER (OUTPATIENT)
Age: 64
End: 2024-06-05

## 2024-06-19 ENCOUNTER — OFFICE (OUTPATIENT)
Dept: URBAN - METROPOLITAN AREA CLINIC 104 | Facility: CLINIC | Age: 64
Setting detail: OPHTHALMOLOGY
End: 2024-06-19
Payer: MEDICARE

## 2024-06-19 DIAGNOSIS — S05.90XA: ICD-10-CM

## 2024-06-19 PROCEDURE — 99213 OFFICE O/P EST LOW 20 MIN: CPT | Performed by: OPHTHALMOLOGY

## 2024-06-19 ASSESSMENT — CONFRONTATIONAL VISUAL FIELD TEST (CVF)
OS_FINDINGS: FULL
OD_FINDINGS: FULL

## 2024-06-24 NOTE — HISTORY OF PRESENT ILLNESS
[FreeTextEntry1] : 63 y/o patient with h/o endometrial hyperplasia initially dx 10 years ago.   As per records D&C, polypectomy was performed in 2012 revealing endometrium with a foci of simple hyperplasia without atypia treated with oral Provera x 6 months. She has been following with Gyn Onc, Dr. Alfonso. Mirena IUD was placed in 2013. She was followed with EMB's every 3-4 months. EMB in 2017 showed endometrial hyperplasia under progestin therapy. EMB in 2019 was benign. Another EMB in June 2023 was benign. Pt states she was offered a vaginal hysterectomy in the past which she refused. She is tired of getting biopsies, though presented to my office 5/1/24 for recommendations. EMB was performed at her appointment for which she presents today to review. She also endorsed chronic diarrhea managed by GI, also has ROBERTO. Admits to intermittent vulvar itching with known history of lichens. Vulva biopsy was also obtained - result noted below. Since her biopsies pt states she has felt great.  5/1/24 biopsies - 1. Left perianal biopsy: Lichen sclerosus 2. Endometrial biopsy: Inactive endometrium with evidences of exogeneous hormonal effects  Pelvic/TV US (5/13/24): * 0.5 cm thick endometrial lining which is borderline prominent. * 1.8 cm cyst in the right ovary.  EMB/IUD removal-6/21/23-few small superficial fragments of decidualized endometrium, fragments of squamous epithelium, mucus and blood. IUD replaced.  TVUS (ZP)-7/28/23-RV uterus 6.3 x 4.1 x 3.2cm, EMS=4mm, IUD in good position, right ovarian persistent small cyst, left ovarian tiny echogenic focus.

## 2024-06-24 NOTE — REASON FOR VISIT
[Home] : at home, [unfilled] , at the time of the visit. [Medical Office: ([facility])___] : at the medical office located in  [Patient] : the patient [Self] : self [Other Location: e.g. Home (Enter Location, City,State)___] : at [unfilled] [FreeTextEntry1] : Review EMB, vulva biopsy, & US result

## 2024-06-24 NOTE — ASSESSMENT
[FreeTextEntry1] : Based on the recent skin biopsy findings indicating lichen sclerosus, which is a new diagnosis for the patient, I recommended to treat with using Clobetasol once the biopsy site has fully healed. Additionally, ongoing endometrial biopsies are advised with the intrauterine device (IUD) in place as, while the current biopsy is overall benign, there is evidence of exogenous hormone effect which should not be present in a post-menopausal woman.  Recommend biopsy in one year. Overall, the uterine biopsy results are reassuring. Regarding the pelvic ultrasound, the left ovary appears normal and has not shown significant growth. However, there is a borderline prominence noted in the uterine lining, with only a 1 mm difference compared to prior images. A follow-up appointment in six months with US done beforehand is recommended to confirm the stability of the cyst and to repeat biopsies if necessary.

## 2024-06-24 NOTE — END OF VISIT
[Time Spent: ___ minutes] : I have spent [unfilled] minutes of time on the encounter. [FreeTextEntry3] : Written by Marlyn Castano, acting as a scribe for Dr. Araceli Coulter. This note accurately reflects the work and decisions made by me.

## 2024-06-25 ENCOUNTER — APPOINTMENT (OUTPATIENT)
Dept: CARDIOLOGY | Facility: CLINIC | Age: 64
End: 2024-06-25
Payer: MEDICARE

## 2024-06-25 ENCOUNTER — MED ADMIN CHARGE (OUTPATIENT)
Age: 64
End: 2024-06-25

## 2024-06-25 PROCEDURE — 93306 TTE W/DOPPLER COMPLETE: CPT

## 2024-06-25 RX ORDER — PERFLUTREN 6.52 MG/ML
6.52 INJECTION, SUSPENSION INTRAVENOUS
Qty: 1 | Refills: 0 | Status: COMPLETED | OUTPATIENT
Start: 2024-06-25

## 2024-07-10 ENCOUNTER — APPOINTMENT (OUTPATIENT)
Dept: CARDIOLOGY | Facility: CLINIC | Age: 64
End: 2024-07-10
Payer: COMMERCIAL

## 2024-07-10 ENCOUNTER — NON-APPOINTMENT (OUTPATIENT)
Age: 64
End: 2024-07-10

## 2024-07-10 VITALS
SYSTOLIC BLOOD PRESSURE: 143 MMHG | OXYGEN SATURATION: 95 % | HEIGHT: 65 IN | DIASTOLIC BLOOD PRESSURE: 90 MMHG | HEART RATE: 87 BPM

## 2024-07-10 DIAGNOSIS — I71.20 THORACIC AORTIC ANEURYSM, WITHOUT RUPTURE, UNSPECIFIED: ICD-10-CM

## 2024-07-10 DIAGNOSIS — R06.00 DYSPNEA, UNSPECIFIED: ICD-10-CM

## 2024-07-10 DIAGNOSIS — R94.31 ABNORMAL ELECTROCARDIOGRAM [ECG] [EKG]: ICD-10-CM

## 2024-07-10 PROCEDURE — 93000 ELECTROCARDIOGRAM COMPLETE: CPT

## 2024-07-10 PROCEDURE — 99214 OFFICE O/P EST MOD 30 MIN: CPT | Mod: 25

## 2024-07-17 ENCOUNTER — OFFICE (OUTPATIENT)
Dept: URBAN - METROPOLITAN AREA CLINIC 104 | Facility: CLINIC | Age: 64
Setting detail: OPHTHALMOLOGY
End: 2024-07-17
Payer: MEDICARE

## 2024-07-17 DIAGNOSIS — S05.90XA: ICD-10-CM

## 2024-07-17 PROCEDURE — 99213 OFFICE O/P EST LOW 20 MIN: CPT | Performed by: OPHTHALMOLOGY

## 2024-07-28 NOTE — HISTORY OF PRESENT ILLNESS
[FreeTextEntry1] : This is a 64F who comes in with facial pain.  On 1/5/24 she tripped over a rolled up rug in her basement onto a cement floor, faceplanting onto the right side of her face. Developed sharp pain above the right eye over the eyebrow where her eye swelled up. Also has some numbness over the area. Went to Urgent Care on 1/26/24 and was prescribed Tegretol 200mg BID. She feels it has helped improve her pain, but numbness persists. Happy with current dose, doesn't want to increase further. Additionally, after the fall she had headaches which stopped around 2 weeks later.  In general, she feels that she has had increased amount of falls over the last two years, having fallen 6 times in that time span. Falls have occurred with uneven surfaces, like tripping on loose stones or losing her balance quickly when getting up from a chair. Finds that going up and down stairs is challenging, with going down steps significantly worse than going up steps.   Additionally, she reports numbness/tingling in her feet. Had an EMG over 15 years and was diagnosed with neuropathy. Doesn't have much pain from it, mostly numbness.  Also, she occasionally loses her words but overall doesn't think it's changing over the last couple years. Describes having mumbled throughout her life, doesn't think it's changed much recently.  Lives with brother and his wife, nobody has mentioned personality changes. Patient doesn't feel like she's having cognitive changes either.  Was diagnosed in 1981 with essential tremor. Improved over time and has plateaued. Doesn't bother her.  0

## 2024-09-16 ENCOUNTER — OFFICE (OUTPATIENT)
Dept: URBAN - METROPOLITAN AREA CLINIC 109 | Facility: CLINIC | Age: 64
Setting detail: OPHTHALMOLOGY
End: 2024-09-16
Payer: MEDICARE

## 2024-09-16 DIAGNOSIS — H25.13: ICD-10-CM

## 2024-09-16 DIAGNOSIS — H40.013: ICD-10-CM

## 2024-09-16 DIAGNOSIS — H02.89: ICD-10-CM

## 2024-09-16 PROBLEM — H16.223 DRY EYE SYNDROME K SICCA; BOTH EYES: Status: ACTIVE | Noted: 2024-09-16

## 2024-09-16 PROCEDURE — 99213 OFFICE O/P EST LOW 20 MIN: CPT | Performed by: OPHTHALMOLOGY

## 2024-09-16 PROCEDURE — 92133 CPTRZD OPH DX IMG PST SGM ON: CPT | Performed by: OPHTHALMOLOGY

## 2024-09-16 PROCEDURE — 76514 ECHO EXAM OF EYE THICKNESS: CPT | Performed by: OPHTHALMOLOGY

## 2024-09-16 PROCEDURE — 92083 EXTENDED VISUAL FIELD XM: CPT | Performed by: OPHTHALMOLOGY

## 2024-09-16 PROCEDURE — 92020 GONIOSCOPY: CPT | Performed by: OPHTHALMOLOGY

## 2024-09-16 ASSESSMENT — LID EXAM ASSESSMENTS
OS_MEIBOMITIS: LUL 1+
OD_MEIBOMITIS: RUL 1+

## 2024-10-01 ENCOUNTER — RX RENEWAL (OUTPATIENT)
Age: 64
End: 2024-10-01

## 2024-11-11 ENCOUNTER — OUTPATIENT (OUTPATIENT)
Dept: OUTPATIENT SERVICES | Facility: HOSPITAL | Age: 64
LOS: 1 days | End: 2024-11-11
Payer: MEDICARE

## 2024-11-11 VITALS
SYSTOLIC BLOOD PRESSURE: 136 MMHG | WEIGHT: 291.89 LBS | RESPIRATION RATE: 16 BRPM | TEMPERATURE: 97 F | HEART RATE: 79 BPM | OXYGEN SATURATION: 97 % | DIASTOLIC BLOOD PRESSURE: 76 MMHG | HEIGHT: 65 IN

## 2024-11-11 DIAGNOSIS — Z98.89 OTHER SPECIFIED POSTPROCEDURAL STATES: Chronic | ICD-10-CM

## 2024-11-11 DIAGNOSIS — N20.0 CALCULUS OF KIDNEY: ICD-10-CM

## 2024-11-11 DIAGNOSIS — Z01.818 ENCOUNTER FOR OTHER PREPROCEDURAL EXAMINATION: ICD-10-CM

## 2024-11-11 DIAGNOSIS — Z98.890 OTHER SPECIFIED POSTPROCEDURAL STATES: Chronic | ICD-10-CM

## 2024-11-11 DIAGNOSIS — Z98.84 BARIATRIC SURGERY STATUS: Chronic | ICD-10-CM

## 2024-11-11 PROBLEM — J18.9 PNEUMONIA, UNSPECIFIED ORGANISM: Chronic | Status: INACTIVE | Noted: 2021-11-15 | Resolved: 2024-11-11

## 2024-11-11 PROBLEM — Z87.898 PERSONAL HISTORY OF OTHER SPECIFIED CONDITIONS: Chronic | Status: INACTIVE | Noted: 2021-11-15 | Resolved: 2024-11-11

## 2024-11-11 PROBLEM — Z86.79 PERSONAL HISTORY OF OTHER DISEASES OF THE CIRCULATORY SYSTEM: Chronic | Status: INACTIVE | Noted: 2020-09-14 | Resolved: 2024-11-11

## 2024-11-11 PROBLEM — A41.89 OTHER SPECIFIED SEPSIS: Chronic | Status: INACTIVE | Noted: 2021-11-15 | Resolved: 2024-11-11

## 2024-11-11 LAB
ALBUMIN SERPL ELPH-MCNC: 3.4 G/DL — SIGNIFICANT CHANGE UP (ref 3.3–5)
ALP SERPL-CCNC: 136 U/L — HIGH (ref 40–120)
ALT FLD-CCNC: 36 U/L — SIGNIFICANT CHANGE UP (ref 12–78)
ANION GAP SERPL CALC-SCNC: 9 MMOL/L — SIGNIFICANT CHANGE UP (ref 5–17)
APPEARANCE UR: ABNORMAL
AST SERPL-CCNC: 20 U/L — SIGNIFICANT CHANGE UP (ref 15–37)
BILIRUB SERPL-MCNC: 0.6 MG/DL — SIGNIFICANT CHANGE UP (ref 0.2–1.2)
BILIRUB UR-MCNC: NEGATIVE — SIGNIFICANT CHANGE UP
BUN SERPL-MCNC: 24 MG/DL — HIGH (ref 7–23)
CALCIUM SERPL-MCNC: 8.9 MG/DL — SIGNIFICANT CHANGE UP (ref 8.5–10.1)
CHLORIDE SERPL-SCNC: 104 MMOL/L — SIGNIFICANT CHANGE UP (ref 96–108)
CO2 SERPL-SCNC: 28 MMOL/L — SIGNIFICANT CHANGE UP (ref 22–31)
COLOR SPEC: YELLOW — SIGNIFICANT CHANGE UP
CREAT SERPL-MCNC: 0.97 MG/DL — SIGNIFICANT CHANGE UP (ref 0.5–1.3)
DIFF PNL FLD: ABNORMAL
EGFR: 65 ML/MIN/1.73M2 — SIGNIFICANT CHANGE UP
GLUCOSE SERPL-MCNC: 149 MG/DL — HIGH (ref 70–99)
GLUCOSE UR QL: NEGATIVE MG/DL — SIGNIFICANT CHANGE UP
KETONES UR-MCNC: NEGATIVE MG/DL — SIGNIFICANT CHANGE UP
LEUKOCYTE ESTERASE UR-ACNC: ABNORMAL
NITRITE UR-MCNC: NEGATIVE — SIGNIFICANT CHANGE UP
PH UR: 5 — SIGNIFICANT CHANGE UP (ref 5–8)
POTASSIUM SERPL-MCNC: 4.2 MMOL/L — SIGNIFICANT CHANGE UP (ref 3.5–5.3)
POTASSIUM SERPL-SCNC: 4.2 MMOL/L — SIGNIFICANT CHANGE UP (ref 3.5–5.3)
PROT SERPL-MCNC: 7.4 G/DL — SIGNIFICANT CHANGE UP (ref 6–8.3)
PROT UR-MCNC: SIGNIFICANT CHANGE UP MG/DL
SODIUM SERPL-SCNC: 141 MMOL/L — SIGNIFICANT CHANGE UP (ref 135–145)
SP GR SPEC: 1.02 — SIGNIFICANT CHANGE UP (ref 1–1.03)
UROBILINOGEN FLD QL: 1 MG/DL — SIGNIFICANT CHANGE UP (ref 0.2–1)

## 2024-11-11 NOTE — H&P PST ADULT - PROBLEM SELECTOR PLAN 2
Labs CBC and EKG from 8/14/2024 & 7/10/2024 on chart  Labs CMP, T&S and UA/Cx  Last Cardiology note on chart   Medical clearance necessary  Pre op instructions reviewed and given.   Take routine am meds with sip of water.   Instructed to hold and/or avoid other NSAIDs and OTC supplements. Tylenol is ok. Verbalized understanding

## 2024-11-11 NOTE — H&P PST ADULT - PAIN SITE
Subjective:  Pamela Prado is seen today for follow up of hypertension and other problems. He is accompanied by his wife, Ascencion Osorio. 1. Hypertension, stable on current regimen. 2. Diabetes, hyperlipidemia. Due for routine labs. 3. CHF with CAD. He follows up with cardiology. He had been having some fatigue and shortness of breath. Ranexa was added and he does feel better with this. No interventional studies are planned for now. 4. Preventive medicine. Generally taken care of at the [place]. Right Flank Area, Intermittent

## 2024-11-11 NOTE — H&P PST ADULT - NSICDXFAMILYHX_GEN_ALL_CORE_FT
FAMILY HISTORY:  Father  Still living? Unknown  Family history of arthritis, Age at diagnosis: Age Unknown  Family history of renal failure, Age at diagnosis: Age Unknown  Hypertension, Age at diagnosis: Age Unknown    Mother  Still living? Unknown  Family history of arthritis, Age at diagnosis: Age Unknown  Family history of bacterial pneumonia, Age at diagnosis: Age Unknown  Family history of renal failure, Age at diagnosis: Age Unknown  Hyperlipidemia, Age at diagnosis: Age Unknown  Hypertension, Age at diagnosis: Age Unknown    Sibling  Still living? No  Hyperlipidemia, Age at diagnosis: Age Unknown  Hypertension, Age at diagnosis: Age Unknown    Grandparent  Still living? Unknown  FH: colon cancer, Age at diagnosis: Age Unknown  FH: esophageal cancer, Age at diagnosis: Age Unknown    Aunt  Still living? Unknown  Family history of arthritis, Age at diagnosis: Age Unknown  Hypertension, Age at diagnosis: Age Unknown    Uncle  Still living? Unknown  Family history of arthritis, Age at diagnosis: Age Unknown  FH: type 1 diabetes, Age at diagnosis: Age Unknown  Hypertension, Age at diagnosis: Age Unknown

## 2024-11-11 NOTE — H&P PST ADULT - NSICDXPASTMEDICALHX_GEN_ALL_CORE_FT
PAST MEDICAL HISTORY:  Aneurysm of ascending aorta     Anxiety and depression     Arthritis     Asthma     Benign Essential Tremor     Bipolar 1 disorder     Colitis H/O    Degenerative disc disease, lumbar     DVT, lower extremity 11/20    ETOH Abuse H/O    Hyperlipidemia     Hypertension     Iron deficiency anemia     Morbid Obesity     Neuropathy     Pulmonary embolism 11/20    Renal Calculi chronic      Sleep Apnea CPAP with oxygen since June 2020  curent 11/21    Spinal stenosis

## 2024-11-11 NOTE — H&P PST ADULT - HISTORY OF PRESENT ILLNESS
63 y/o female with PMH of HTN, HLD, CAD, Ascending Aortic Aneurysm (4.7cm, stable), DVT Left LE with PE (2020), JESUS on CPAP, Asthma, Iron Deficiency Anemia, Benign Essential Tremor, Anxiety with Depression, Bipolar 2, Kidney Stones, Neuropathy, Spinal Stenosis, Lumbar Disc Disease, and BMI > 40 & SHx Colonoscopy with Endoscopy, Biliary Stent with ERCP, Laparoscopic Gastric Band Placement and Removal, Tonsillectomy, D&C x2, Panniculectomy (2020), and Ureter Stent with Lithotripsy for PST having Right Ureteroscopy /Laser Lithotripsy of Renal Calculi - CVAC by Dr. Ellis on 11/20/2024.  She reports large right kidney stone with intermittent pain.  Seen by provider and recommended for the elective procedure.

## 2024-11-11 NOTE — H&P PST ADULT - NSICDXPROCEDURE_GEN_ALL_CORE_FT
PROCEDURES:  Cystoscopic treatment of stricture of ureter 11-Nov-2024 10:22:35  Aurora Castellanos  Cystourethroscopy with irrigation and multiple obstructing clots evacuation 11-Nov-2024 10:22:51  Aurora Castellanos  Cystoureteroscopy, with lithotripsy using laser and ureteral stent insertion 11-Nov-2024 10:23:12 RIGHT Aurora Castellanos

## 2024-11-11 NOTE — H&P PST ADULT - NSICDXPASTSURGICALHX_GEN_ALL_CORE_FT
PAST SURGICAL HISTORY:  Biliary stent placement & ercp 20 years ago    H/O colonoscopy     H/O endoscopy     History of laparoscopic adjustable gastric banding band removed few yrs ago    History of removal of laparoscopic gastric banding device     History of tonsillectomy     S/P cardiac catheterization 2020    S/P D&C     S/P dilation and curettage     S/P panniculectomy     ureteroscopy with stone removal 1- and  5 other episodes     Brow Lift Text: A midfrontal incision was made medially to the defect to allow access to the tissues just superior to the left eyebrow. Following careful dissection inferiorly in a supraperiosteal plane to the level of the left eyebrow, several 3-0 monocryl sutures were used to resuspend the eyebrow orbicularis oculi muscular unit to the superior frontal bone periosteum. This resulted in an appropriate reapproximation of static eyebrow symmetry and correction of the left brow ptosis.

## 2024-11-12 LAB
CULTURE RESULTS: SIGNIFICANT CHANGE UP
SPECIMEN SOURCE: SIGNIFICANT CHANGE UP

## 2024-11-19 ENCOUNTER — NON-APPOINTMENT (OUTPATIENT)
Age: 64
End: 2024-11-19

## 2024-11-19 NOTE — ASU PATIENT PROFILE, ADULT - NS PREOP MEDICATION GIVEN
Referred to the instructions in the pre-procedure instructions sheet that have been provided to patient/yes

## 2024-11-19 NOTE — ASU PATIENT PROFILE, ADULT - NSICDXPASTSURGICALHX_GEN_ALL_CORE_FT
PAST SURGICAL HISTORY:  Biliary stent placement & ercp 20 years ago    H/O colonoscopy     H/O endoscopy     History of laparoscopic adjustable gastric banding band removed few yrs ago    History of removal of laparoscopic gastric banding device     History of tonsillectomy     S/P cardiac catheterization 2020    S/P D&C     S/P dilation and curettage     S/P panniculectomy     ureteroscopy with stone removal 1- and  5 other episodes

## 2024-11-19 NOTE — ASU PATIENT PROFILE, ADULT - MEDICATIONS TO TAKE
Referred to the instructions in the pre-procedure instructions sheet that have been provided to patient

## 2024-11-20 ENCOUNTER — TRANSCRIPTION ENCOUNTER (OUTPATIENT)
Age: 64
End: 2024-11-20

## 2024-11-20 ENCOUNTER — OUTPATIENT (OUTPATIENT)
Dept: OUTPATIENT SERVICES | Facility: HOSPITAL | Age: 64
LOS: 1 days | End: 2024-11-20
Payer: MEDICARE

## 2024-11-20 VITALS
OXYGEN SATURATION: 98 % | HEART RATE: 88 BPM | DIASTOLIC BLOOD PRESSURE: 97 MMHG | TEMPERATURE: 100 F | WEIGHT: 291.89 LBS | HEIGHT: 65 IN | SYSTOLIC BLOOD PRESSURE: 150 MMHG | RESPIRATION RATE: 16 BRPM

## 2024-11-20 VITALS — TEMPERATURE: 98 F

## 2024-11-20 DIAGNOSIS — Z98.890 OTHER SPECIFIED POSTPROCEDURAL STATES: Chronic | ICD-10-CM

## 2024-11-20 DIAGNOSIS — Z98.89 OTHER SPECIFIED POSTPROCEDURAL STATES: Chronic | ICD-10-CM

## 2024-11-20 DIAGNOSIS — Z98.84 BARIATRIC SURGERY STATUS: Chronic | ICD-10-CM

## 2024-11-20 DIAGNOSIS — N20.0 CALCULUS OF KIDNEY: ICD-10-CM

## 2024-11-20 PROCEDURE — 87086 URINE CULTURE/COLONY COUNT: CPT

## 2024-11-20 PROCEDURE — 81001 URINALYSIS AUTO W/SCOPE: CPT

## 2024-11-20 PROCEDURE — 86900 BLOOD TYPING SEROLOGIC ABO: CPT

## 2024-11-20 PROCEDURE — 86850 RBC ANTIBODY SCREEN: CPT

## 2024-11-20 PROCEDURE — C1726: CPT

## 2024-11-20 PROCEDURE — C1769: CPT

## 2024-11-20 PROCEDURE — 76000 FLUOROSCOPY <1 HR PHYS/QHP: CPT

## 2024-11-20 PROCEDURE — C9399: CPT

## 2024-11-20 PROCEDURE — 36415 COLL VENOUS BLD VENIPUNCTURE: CPT

## 2024-11-20 PROCEDURE — 82365 CALCULUS SPECTROSCOPY: CPT

## 2024-11-20 PROCEDURE — 86901 BLOOD TYPING SEROLOGIC RH(D): CPT

## 2024-11-20 PROCEDURE — 88300 SURGICAL PATH GROSS: CPT | Mod: 26

## 2024-11-20 PROCEDURE — 80053 COMPREHEN METABOLIC PANEL: CPT

## 2024-11-20 PROCEDURE — C1889: CPT

## 2024-11-20 PROCEDURE — G0463: CPT

## 2024-11-20 PROCEDURE — C2617: CPT

## 2024-11-20 PROCEDURE — C9761: CPT

## 2024-11-20 PROCEDURE — 88300 SURGICAL PATH GROSS: CPT

## 2024-11-20 PROCEDURE — C1758: CPT

## 2024-11-20 DEVICE — GUIDEWIRE AMPLATZ SUPER-STIFF STRAIGHT .035" X 180CM: Type: IMPLANTABLE DEVICE | Site: RIGHT | Status: FUNCTIONAL

## 2024-11-20 DEVICE — URETERAL CATH OPEN END FLEXI-TIP 5FR .038" X 70CM: Type: IMPLANTABLE DEVICE | Site: RIGHT | Status: FUNCTIONAL

## 2024-11-20 DEVICE — URETERAL STENT CONTOUR VL 4.8FR 22-30CM: Type: IMPLANTABLE DEVICE | Site: RIGHT | Status: FUNCTIONAL

## 2024-11-20 DEVICE — BALLOON CATH UROMAX ULTRA 18FR X 4CM: Type: IMPLANTABLE DEVICE | Site: RIGHT | Status: FUNCTIONAL

## 2024-11-20 DEVICE — LASER FIBER MOSES 200 D/F/L: Type: IMPLANTABLE DEVICE | Site: RIGHT | Status: FUNCTIONAL

## 2024-11-20 DEVICE — GUIDEWIRE SENSOR DUAL-FLEX NITINOL STRAIGHT .035" X 150CM: Type: IMPLANTABLE DEVICE | Site: RIGHT | Status: FUNCTIONAL

## 2024-11-20 DEVICE — URETERAL SHEATH NAVIGATOR HD 12/14FR X 36CM: Type: IMPLANTABLE DEVICE | Site: RIGHT | Status: FUNCTIONAL

## 2024-11-20 RX ORDER — 0.9 % SODIUM CHLORIDE 0.9 %
1000 INTRAVENOUS SOLUTION INTRAVENOUS
Refills: 0 | Status: DISCONTINUED | OUTPATIENT
Start: 2024-11-20 | End: 2024-11-20

## 2024-11-20 RX ORDER — CIPROFLOXACIN HCL 750 MG
1 TABLET ORAL
Qty: 10 | Refills: 0
Start: 2024-11-20

## 2024-11-20 RX ORDER — MELATONIN 5 MG
1 TABLET ORAL
Refills: 0 | DISCHARGE

## 2024-11-20 RX ORDER — APIXABAN 5 MG/1
1 TABLET, FILM COATED ORAL
Refills: 0 | DISCHARGE

## 2024-11-20 RX ORDER — ACETAMINOPHEN AND CODEINE PHOSPHATE 300; 30 MG/1; MG/1
1 TABLET ORAL
Refills: 0 | DISCHARGE

## 2024-11-20 RX ORDER — VERAPAMIL HCL 240 MG
1 TABLET, EXTENDED RELEASE ORAL
Refills: 0 | DISCHARGE

## 2024-11-20 RX ORDER — LAMOTRIGINE 100 MG/1
2 TABLET ORAL
Refills: 0 | DISCHARGE

## 2024-11-20 RX ORDER — PHENAZOPYRIDINE HCL 200 MG
1 TABLET ORAL
Qty: 20 | Refills: 0
Start: 2024-11-20

## 2024-11-20 RX ORDER — CHOLECALCIFEROL (VITAMIN D3) 625 MCG
1 CAPSULE ORAL
Refills: 0 | DISCHARGE

## 2024-11-20 RX ORDER — POTASSIUM CITRATE 10 MEQ/1
1 TABLET, EXTENDED RELEASE ORAL
Refills: 0 | DISCHARGE

## 2024-11-20 RX ORDER — METOCLOPRAMIDE HYDROCHLORIDE 10 MG/1
5 TABLET ORAL ONCE
Refills: 0 | Status: DISCONTINUED | OUTPATIENT
Start: 2024-11-20 | End: 2024-11-20

## 2024-11-20 RX ORDER — CERTOLIZUMAB PEGOL 400 MG
200 KIT SUBCUTANEOUS
Refills: 0 | DISCHARGE

## 2024-11-20 RX ORDER — CIPROFLOXACIN HCL 750 MG
400 TABLET ORAL ONCE
Refills: 0 | Status: COMPLETED | OUTPATIENT
Start: 2024-11-20 | End: 2024-11-20

## 2024-11-20 RX ORDER — PRAVASTATIN SODIUM 10 MG/1
1 TABLET ORAL
Refills: 0 | DISCHARGE

## 2024-11-20 RX ORDER — DICYCLOMINE HYDROCHLORIDE 20 MG/1
2 TABLET ORAL
Refills: 0 | DISCHARGE

## 2024-11-20 RX ORDER — IRBESARTAN 300 MG/300MG
1 TABLET ORAL
Refills: 0 | DISCHARGE

## 2024-11-20 RX ORDER — CYANOCOBALAMIN (VITAMIN B-12) 1000MCG/15
1 LIQUID (ML) ORAL
Refills: 0 | DISCHARGE

## 2024-11-20 RX ORDER — PROPRANOLOL HCL 60 MG
1 TABLET ORAL
Refills: 0 | DISCHARGE

## 2024-11-20 RX ORDER — LOPERAMIDE HCL 2 MG
1 TABLET ORAL
Refills: 0 | DISCHARGE

## 2024-11-20 RX ORDER — PHENAZOPYRIDINE HCL 200 MG
200 TABLET ORAL ONCE
Refills: 0 | Status: COMPLETED | OUTPATIENT
Start: 2024-11-20 | End: 2024-11-20

## 2024-11-20 RX ORDER — DULOXETINE HYDROCHLORIDE 30 MG/1
3 CAPSULE, DELAYED RELEASE ORAL
Refills: 0 | DISCHARGE

## 2024-11-20 RX ORDER — ALPRAZOLAM 0.25 MG
1 TABLET ORAL
Refills: 0 | DISCHARGE

## 2024-11-20 RX ORDER — HYDROMORPHONE HYDROCHLORIDE 2 MG/1
0.5 TABLET ORAL
Refills: 0 | Status: DISCONTINUED | OUTPATIENT
Start: 2024-11-20 | End: 2024-11-20

## 2024-11-20 RX ORDER — OXYCODONE HYDROCHLORIDE 30 MG/1
5 TABLET ORAL ONCE
Refills: 0 | Status: DISCONTINUED | OUTPATIENT
Start: 2024-11-20 | End: 2024-11-20

## 2024-11-20 RX ORDER — SODIUM CHLORIDE 9 MG/ML
1000 INJECTION, SOLUTION INTRAMUSCULAR; INTRAVENOUS; SUBCUTANEOUS
Refills: 0 | Status: DISCONTINUED | OUTPATIENT
Start: 2024-11-20 | End: 2024-11-20

## 2024-11-20 RX ORDER — FOLIC ACID 1 MG/1
1 TABLET ORAL
Refills: 0 | DISCHARGE

## 2024-11-20 RX ORDER — SIMETHICONE 80 MG/1
1 TABLET, CHEWABLE ORAL
Refills: 0 | DISCHARGE

## 2024-11-20 RX ORDER — HYDROXYZINE HCL 25 MG
2 TABLET ORAL
Refills: 0 | DISCHARGE

## 2024-11-20 RX ORDER — TIZANIDINE 2 MG/1
2 TABLET ORAL
Refills: 0 | DISCHARGE

## 2024-11-20 RX ORDER — FERROUS SULFATE 325(65) MG
1 TABLET ORAL
Refills: 0 | DISCHARGE

## 2024-11-20 RX ADMIN — Medication 125 MILLILITER(S): at 10:46

## 2024-11-20 RX ADMIN — Medication 200 MILLIGRAM(S): at 10:46

## 2024-11-20 NOTE — ASU DISCHARGE PLAN (ADULT/PEDIATRIC) - ASU DC SPECIAL INSTRUCTIONSFT
Have CT scan performed at [facility] in 2 weeks. The requisition will be mailed to you. Arrangements for stent removal to be made after CT scan.

## 2024-11-20 NOTE — ASU DISCHARGE PLAN (ADULT/PEDIATRIC) - CARE PROVIDER_API CALL
Malik Ellis  Urology  [address], [address]  [place] [address]  Phone: [phone]  Fax: [phone]  Follow Up Time:

## 2024-11-20 NOTE — ASU DISCHARGE PLAN (ADULT/PEDIATRIC) - NS MD DC FALL RISK RISK
For information on Fall & Injury Prevention, visit: [email or web address] OR  [email or web address] OR  https://www.cdc.gov/steadi/patient.html

## 2024-11-20 NOTE — ASU DISCHARGE PLAN (ADULT/PEDIATRIC) - FINANCIAL ASSISTANCE
[facility] provides services at a reduced cost to those who are determined to be eligible through [facility]’s financial assistance program. Information regarding [facility]’s financial assistance program can be found by going to [email or web address] or by calling [phone].

## 2024-11-20 NOTE — BRIEF OPERATIVE NOTE - NSICDXBRIEFPROCEDURE_GEN_ALL_CORE_FT
PROCEDURES:  Ureteroscopy with lithotripsy 20-Nov-2024 10:24:41 right with laser lithotripsy using CVAC, retrograde pyelogram and stent insertions. Malik Ellis

## 2024-11-21 LAB — SURGICAL PATHOLOGY STUDY: SIGNIFICANT CHANGE UP

## 2024-12-02 LAB
CELL MATERIAL STONE EST-MCNT: SIGNIFICANT CHANGE UP
LABORATORY COMMENT REPORT: SIGNIFICANT CHANGE UP
NIDUS STONE QN: SIGNIFICANT CHANGE UP

## 2024-12-16 ENCOUNTER — NON-APPOINTMENT (OUTPATIENT)
Age: 64
End: 2024-12-16

## 2024-12-16 ENCOUNTER — APPOINTMENT (OUTPATIENT)
Dept: CARDIOLOGY | Facility: CLINIC | Age: 64
End: 2024-12-16
Payer: MEDICARE

## 2024-12-16 VITALS — DIASTOLIC BLOOD PRESSURE: 70 MMHG | SYSTOLIC BLOOD PRESSURE: 118 MMHG

## 2024-12-16 VITALS — HEART RATE: 81 BPM | HEIGHT: 65 IN | OXYGEN SATURATION: 96 %

## 2024-12-16 DIAGNOSIS — R06.00 DYSPNEA, UNSPECIFIED: ICD-10-CM

## 2024-12-16 DIAGNOSIS — R94.31 ABNORMAL ELECTROCARDIOGRAM [ECG] [EKG]: ICD-10-CM

## 2024-12-16 PROCEDURE — 99204 OFFICE O/P NEW MOD 45 MIN: CPT

## 2024-12-16 PROCEDURE — 99214 OFFICE O/P EST MOD 30 MIN: CPT

## 2024-12-16 PROCEDURE — 93000 ELECTROCARDIOGRAM COMPLETE: CPT

## 2024-12-18 ENCOUNTER — APPOINTMENT (OUTPATIENT)
Dept: GYNECOLOGIC ONCOLOGY | Facility: CLINIC | Age: 64
End: 2024-12-18

## 2025-01-02 ENCOUNTER — NON-APPOINTMENT (OUTPATIENT)
Age: 65
End: 2025-01-02

## 2025-01-03 ENCOUNTER — RX RENEWAL (OUTPATIENT)
Age: 65
End: 2025-01-03

## 2025-01-09 NOTE — CARDIOLOGY SUMMARY
Patient to clarify which specific medication he is requesting refill on and preferred pharmacy. Writer unable to get a hold of patient, left voicemail with call back number.   
[de-identified] : 6/4/21: st, lad, ant infarct
[id number] Detailed

## 2025-02-12 ENCOUNTER — RX RENEWAL (OUTPATIENT)
Age: 65
End: 2025-02-12

## 2025-02-26 ENCOUNTER — APPOINTMENT (OUTPATIENT)
Dept: GYNECOLOGIC ONCOLOGY | Facility: CLINIC | Age: 65
End: 2025-02-26

## 2025-02-26 VITALS
RESPIRATION RATE: 16 BRPM | HEART RATE: 61 BPM | DIASTOLIC BLOOD PRESSURE: 82 MMHG | BODY MASS INDEX: 48.82 KG/M2 | HEIGHT: 65 IN | WEIGHT: 293 LBS | OXYGEN SATURATION: 97 % | SYSTOLIC BLOOD PRESSURE: 150 MMHG

## 2025-02-26 PROCEDURE — 58100 BIOPSY OF UTERUS LINING: CPT | Mod: 59

## 2025-02-26 PROCEDURE — 99214 OFFICE O/P EST MOD 30 MIN: CPT | Mod: 25

## 2025-02-26 RX ORDER — CLOBETASOL PROPIONATE CREAM USP, 0.05% 0.5 MG/G
0.05 CREAM TOPICAL
Qty: 1 | Refills: 0 | Status: ACTIVE | COMMUNITY
Start: 2025-02-26 | End: 1900-01-01

## 2025-02-27 ENCOUNTER — NON-APPOINTMENT (OUTPATIENT)
Age: 65
End: 2025-02-27

## 2025-02-27 LAB
HPV 16 E6+E7 MRNA CVX QL NAA+PROBE: NOT DETECTED
HPV18+45 E6+E7 MRNA CVX QL NAA+PROBE: NOT DETECTED

## 2025-03-03 LAB — CORE LAB BIOPSY: NORMAL

## 2025-03-06 ENCOUNTER — APPOINTMENT (OUTPATIENT)
Dept: NEUROLOGY | Facility: CLINIC | Age: 65
End: 2025-03-06

## 2025-03-07 LAB — CYTOLOGY CVX/VAG DOC THIN PREP: ABNORMAL

## 2025-03-17 ENCOUNTER — OFFICE (OUTPATIENT)
Dept: URBAN - METROPOLITAN AREA CLINIC 109 | Facility: CLINIC | Age: 65
Setting detail: OPHTHALMOLOGY
End: 2025-03-17
Payer: MEDICARE

## 2025-03-17 DIAGNOSIS — H40.013: ICD-10-CM

## 2025-03-17 DIAGNOSIS — H16.223: ICD-10-CM

## 2025-03-17 DIAGNOSIS — H43.393: ICD-10-CM

## 2025-03-17 DIAGNOSIS — H25.13: ICD-10-CM

## 2025-03-17 DIAGNOSIS — H02.89: ICD-10-CM

## 2025-03-17 PROCEDURE — 92014 COMPRE OPH EXAM EST PT 1/>: CPT | Mod: 25 | Performed by: OPHTHALMOLOGY

## 2025-03-17 PROCEDURE — 68761 CLOSE TEAR DUCT OPENING: CPT | Mod: 50 | Performed by: OPHTHALMOLOGY

## 2025-03-17 PROCEDURE — 92250 FUNDUS PHOTOGRAPHY W/I&R: CPT | Performed by: OPHTHALMOLOGY

## 2025-03-17 ASSESSMENT — REFRACTION_CURRENTRX
OS_SPHERE: -2.25
OD_OVR_VA: 20/
OS_CYLINDER: -1.00
OS_OVR_VA: 20/
OD_SPHERE: -0.75
OS_VPRISM_DIRECTION: SV
OD_AXIS: 168
OS_AXIS: 015
OD_CYLINDER: -1.00
OD_VPRISM_DIRECTION: SV

## 2025-03-17 ASSESSMENT — TONOMETRY
OS_IOP_MMHG: 17
OD_IOP_MMHG: 16

## 2025-03-17 ASSESSMENT — LACRIMAL DUCT - ASSESSMENT
OD_LACRIMAL_DUCT: OASYS .3
OS_LACRIMAL_DUCT: OASYS .3

## 2025-03-17 ASSESSMENT — PUNCTA - ASSESSMENT
OD_PUNCTA: 3MON PLUG
OS_PUNCTA: 3MON PLUG

## 2025-03-17 ASSESSMENT — KERATOMETRY
OS_AXISANGLE_DEGREES: 088
OS_K1POWER_DIOPTERS: 45.00
OD_K1POWER_DIOPTERS: 44.25
OD_K2POWER_DIOPTERS: 47.25
OD_AXISANGLE_DEGREES: 088
OS_K2POWER_DIOPTERS: 46.75

## 2025-03-17 ASSESSMENT — CONFRONTATIONAL VISUAL FIELD TEST (CVF)
OS_FINDINGS: FULL
OD_FINDINGS: FULL

## 2025-03-17 ASSESSMENT — REFRACTION_AUTOREFRACTION
OD_AXIS: 175
OS_AXIS: 011
OD_SPHERE: -0.50
OS_SPHERE: -2.25
OD_CYLINDER: -2.00
OS_CYLINDER: -1.00

## 2025-03-17 ASSESSMENT — SUPERFICIAL PUNCTATE KERATITIS (SPK)
OD_SPK: T 2+
OS_SPK: T 2+

## 2025-03-17 ASSESSMENT — LID EXAM ASSESSMENTS
OS_MEIBOMITIS: LUL 1+
OD_MEIBOMITIS: RUL 1+

## 2025-03-17 ASSESSMENT — VISUAL ACUITY
OD_BCVA: 20/20-
OS_BCVA: 20/20-

## 2025-03-17 ASSESSMENT — CORNEAL TRAUMA - ABRASION: OD_ABRASION: ABSENT

## 2025-04-08 ENCOUNTER — APPOINTMENT (OUTPATIENT)
Dept: NEUROLOGY | Facility: CLINIC | Age: 65
End: 2025-04-08

## 2025-04-09 ENCOUNTER — RX RENEWAL (OUTPATIENT)
Age: 65
End: 2025-04-09

## 2025-04-22 ENCOUNTER — RX RENEWAL (OUTPATIENT)
Age: 65
End: 2025-04-22

## 2025-04-29 ENCOUNTER — APPOINTMENT (OUTPATIENT)
Dept: NEUROLOGY | Facility: CLINIC | Age: 65
End: 2025-04-29

## 2025-06-03 NOTE — ED ADULT NURSE NOTE - NSFALLRSKINDICATORS_ED_ALL_ED
It is advisable to follow up with your primary care provider within the next 1 week. You / your daughter Lenore have stated you will make your own follow up appointments and she will assist as necessary. 
no

## 2025-06-12 ENCOUNTER — APPOINTMENT (OUTPATIENT)
Dept: NEUROLOGY | Facility: CLINIC | Age: 65
End: 2025-06-12
Payer: MEDICARE

## 2025-06-12 VITALS
WEIGHT: 284 LBS | RESPIRATION RATE: 22 BRPM | DIASTOLIC BLOOD PRESSURE: 64 MMHG | BODY MASS INDEX: 47.32 KG/M2 | HEIGHT: 65 IN | OXYGEN SATURATION: 96 % | HEART RATE: 94 BPM | SYSTOLIC BLOOD PRESSURE: 100 MMHG

## 2025-06-12 PROCEDURE — 99213 OFFICE O/P EST LOW 20 MIN: CPT

## 2025-06-25 ENCOUNTER — APPOINTMENT (OUTPATIENT)
Dept: CARDIOLOGY | Facility: CLINIC | Age: 65
End: 2025-06-25
Payer: MEDICARE

## 2025-06-25 VITALS — HEART RATE: 80 BPM | OXYGEN SATURATION: 96 % | HEIGHT: 65 IN

## 2025-06-25 VITALS — SYSTOLIC BLOOD PRESSURE: 124 MMHG | DIASTOLIC BLOOD PRESSURE: 78 MMHG

## 2025-06-25 PROCEDURE — 93000 ELECTROCARDIOGRAM COMPLETE: CPT

## 2025-06-25 PROCEDURE — 99214 OFFICE O/P EST MOD 30 MIN: CPT

## 2025-07-08 ENCOUNTER — RX RENEWAL (OUTPATIENT)
Age: 65
End: 2025-07-08

## 2025-08-06 ENCOUNTER — MED ADMIN CHARGE (OUTPATIENT)
Age: 65
End: 2025-08-06

## 2025-08-06 ENCOUNTER — APPOINTMENT (OUTPATIENT)
Dept: CARDIOLOGY | Facility: CLINIC | Age: 65
End: 2025-08-06
Payer: MEDICARE

## 2025-08-06 PROCEDURE — 93306 TTE W/DOPPLER COMPLETE: CPT

## 2025-08-06 RX ADMIN — PERFLUTREN MG/ML: 6.52 INJECTION, SUSPENSION INTRAVENOUS at 00:00

## 2025-08-08 ENCOUNTER — TRANSCRIPTION ENCOUNTER (OUTPATIENT)
Age: 65
End: 2025-08-08

## 2025-08-10 RX ORDER — PERFLUTREN 6.52 MG/ML
6.52 INJECTION, SUSPENSION INTRAVENOUS
Qty: 2 | Refills: 0 | Status: COMPLETED | OUTPATIENT
Start: 2025-08-06

## 2025-08-13 ENCOUNTER — APPOINTMENT (OUTPATIENT)
Dept: GYNECOLOGIC ONCOLOGY | Facility: CLINIC | Age: 65
End: 2025-08-13
Payer: MEDICARE

## 2025-08-13 VITALS
HEIGHT: 65 IN | RESPIRATION RATE: 16 BRPM | OXYGEN SATURATION: 97 % | WEIGHT: 290 LBS | HEART RATE: 86 BPM | BODY MASS INDEX: 48.32 KG/M2

## 2025-08-13 VITALS — SYSTOLIC BLOOD PRESSURE: 140 MMHG | DIASTOLIC BLOOD PRESSURE: 82 MMHG

## 2025-08-13 PROCEDURE — 99214 OFFICE O/P EST MOD 30 MIN: CPT

## 2025-08-13 PROCEDURE — 99459 PELVIC EXAMINATION: CPT

## 2025-08-15 LAB — HPV HIGH+LOW RISK DNA PNL CVX: NOT DETECTED

## 2025-08-18 LAB — CYTOLOGY CVX/VAG DOC THIN PREP: ABNORMAL

## 2025-08-25 ENCOUNTER — TRANSCRIPTION ENCOUNTER (OUTPATIENT)
Age: 65
End: 2025-08-25

## 2025-08-26 ENCOUNTER — RX ONLY (RX ONLY)
Age: 65
End: 2025-08-26

## 2025-08-26 ENCOUNTER — OFFICE (OUTPATIENT)
Dept: URBAN - METROPOLITAN AREA CLINIC 109 | Facility: CLINIC | Age: 65
Setting detail: OPHTHALMOLOGY
End: 2025-08-26
Payer: MEDICARE

## 2025-08-26 DIAGNOSIS — H25.13: ICD-10-CM

## 2025-08-26 DIAGNOSIS — H16.223: ICD-10-CM

## 2025-08-26 DIAGNOSIS — H40.013: ICD-10-CM

## 2025-08-26 PROCEDURE — 99213 OFFICE O/P EST LOW 20 MIN: CPT | Performed by: OPHTHALMOLOGY

## 2025-08-26 ASSESSMENT — CORNEAL TRAUMA - ABRASION: OD_ABRASION: ABSENT

## 2025-08-26 ASSESSMENT — TONOMETRY
OD_IOP_MMHG: 16
OD_IOP_MMHG: 11
OS_IOP_MMHG: 11
OS_IOP_MMHG: 15

## 2025-08-26 ASSESSMENT — REFRACTION_AUTOREFRACTION
OS_SPHERE: -2.25
OS_AXIS: 006
OD_CYLINDER: -2.25
OD_SPHERE: -1.00
OS_CYLINDER: -2.00
OD_AXIS: 020

## 2025-08-26 ASSESSMENT — PUNCTA - ASSESSMENT
OD_PUNCTA: 3MON PLUG
OS_PUNCTA: 3MON PLUG

## 2025-08-26 ASSESSMENT — REFRACTION_CURRENTRX
OS_CYLINDER: -1.00
OD_OVR_VA: 20/
OS_VPRISM_DIRECTION: SV
OS_SPHERE: -2.25
OD_SPHERE: -0.75
OD_CYLINDER: -1.00
OD_AXIS: 168
OD_VPRISM_DIRECTION: SV
OS_AXIS: 015
OS_OVR_VA: 20/

## 2025-08-26 ASSESSMENT — KERATOMETRY
OS_K2POWER_DIOPTERS: 47.50
OD_K2POWER_DIOPTERS: 47.75
OS_AXISANGLE_DEGREES: 101
OD_AXISANGLE_DEGREES: 084
OS_K1POWER_DIOPTERS: 45.25
OD_K1POWER_DIOPTERS: 44.75

## 2025-08-26 ASSESSMENT — VISUAL ACUITY
OS_BCVA: 20/20
OD_BCVA: 20/20

## 2025-08-26 ASSESSMENT — SUPERFICIAL PUNCTATE KERATITIS (SPK)
OS_SPK: T 2+
OD_SPK: T 2+

## 2025-08-26 ASSESSMENT — CONFRONTATIONAL VISUAL FIELD TEST (CVF)
OD_FINDINGS: FULL
OS_FINDINGS: FULL

## 2025-08-26 ASSESSMENT — LID EXAM ASSESSMENTS
OS_MEIBOMITIS: LUL 1+
OD_MEIBOMITIS: RUL 1+

## (undated) DEVICE — RETRIEVER ROTH NET PLATINUM-UNIVERSAL

## (undated) DEVICE — MASK O2 NON REBREATH 3IN1 ADULT

## (undated) DEVICE — SEAL BIOPSY PORT ADJUSTABLE UP TO 9F

## (undated) DEVICE — DRAPE TOWEL BLUE 17" X 24"

## (undated) DEVICE — MARKER ENDO SPOT EX

## (undated) DEVICE — PLV-LASER - LUMENIS PULSE MOSES 2.0 1468: Type: DURABLE MEDICAL EQUIPMENT

## (undated) DEVICE — SUT HEWSON RETRIEVER

## (undated) DEVICE — TUBING IV SET SECONDARY 34"

## (undated) DEVICE — UROVAC

## (undated) DEVICE — GOWN LG

## (undated) DEVICE — CLAMP BX HOT RAD JAW 3

## (undated) DEVICE — ENDOCUFF VISION SZ 2 LG GRN

## (undated) DEVICE — SYR ALLIANCE II INFLATION 60ML

## (undated) DEVICE — SYR LUER SLIP TIP 50CC

## (undated) DEVICE — BRUSH CYTO ENDO

## (undated) DEVICE — SOL IRR BAG H2O 3000ML

## (undated) DEVICE — ENDOCUFF VISION SZ 3 SM PRPL

## (undated) DEVICE — SNARE POLYP SENS 27MM 240CM

## (undated) DEVICE — SENSOR O2 FINGER XL ADULT 24/BX 6BX/CA

## (undated) DEVICE — INFLATOR ENCORE 26

## (undated) DEVICE — VENODYNE/SCD SLEEVE CALF LARGE

## (undated) DEVICE — CLAMP BX URETERSP FLEX 1MM 15CM

## (undated) DEVICE — VENODYNE/SCD SLEEVE CALF MEDIUM

## (undated) DEVICE — SOL IRR NS 0.9% 250ML

## (undated) DEVICE — TRAP SPECIMEN SPUTUM 40CC

## (undated) DEVICE — TUBING IV SET SECOND 34" W/O LOK-BLUNT

## (undated) DEVICE — FORCEP RADIAL JAW 4 W NDL 2.2MM 2.8MM 160CM YELLOW DISP

## (undated) DEVICE — FORCEP RADIAL JAW 4 W NDL 2.4MM 2.8MM 240CM ORANGE DISP

## (undated) DEVICE — TUBE RECTAL 24FR

## (undated) DEVICE — TUBING IV SET GRAVITY 3Y 100" MACRO

## (undated) DEVICE — ONCORE 26 INSUFLATION DEVICE

## (undated) DEVICE — POLY TRAP ETRAP

## (undated) DEVICE — TUBE O2 SUPL CRUSH RESIS CONN SOUTHSIDE ONLY

## (undated) DEVICE — CATH ELCTR GLIDE PRB 7FR

## (undated) DEVICE — BITE BLOCK MAXI RUBBER STAMP

## (undated) DEVICE — CATH IV SAFE BC 20G X 1.16" (PINK)

## (undated) DEVICE — TUBING SUCTION CONN 6FT STERILE

## (undated) DEVICE — MASK OXYGEN PANORAMIC

## (undated) DEVICE — DRAPE DRAINAGE BAG URO CATCH II

## (undated) DEVICE — CATH IV SAFE BC 22G X 1" (BLUE)

## (undated) DEVICE — CATH ELECHMSTAT  INJ 7FR 210CM

## (undated) DEVICE — DRSG CURITY GAUZE SPONGE 4 X 4" 12-PLY

## (undated) DEVICE — SOL IRR BAG NS 0.9% 3000ML

## (undated) DEVICE — GLV 7.5 PROTEXIS (WHITE)

## (undated) DEVICE — PRESSURE INFUSOR BAG 500ML

## (undated) DEVICE — PACK CYSTO

## (undated) DEVICE — SNARE LRG

## (undated) DEVICE — NDL INJ SCLERO INTERJECT 23G

## (undated) DEVICE — SUCTION YANKAUER TAPERED BULBOUS NO VENT

## (undated) DEVICE — SOL IRR POUR NS 0.9% 1000ML

## (undated) DEVICE — STERIS DEFENDO 3-PIECE KIT (AIR/WATER, SUCTION & BIOPSY VALVES)

## (undated) DEVICE — SYR LUER LOK 10CC

## (undated) DEVICE — BRUSH COLONOSCOPY CYTOLOGY

## (undated) DEVICE — CANISTER SUCTION 1200CC 10/SL

## (undated) DEVICE — SYR IV POSIFLUSH NS 3ML 30/TY

## (undated) DEVICE — FORMALIN CUPS 10% BUFFERED

## (undated) DEVICE — SOL IRR POUR H2O 1000ML

## (undated) DEVICE — SYR LUER LOK 30CC

## (undated) DEVICE — TUBING ENDO EXT OLYMPUS 160 24HR USE

## (undated) DEVICE — ELCTR GROUNDING PAD ADULT COVIDIEN

## (undated) DEVICE — RADIAL JAW 4 LG CAPACITY WITH NDL

## (undated) DEVICE — FOLEY TRAY 14FR 5CC LTX UMETER CLOSED

## (undated) DEVICE — FORCEP RADIAL JAW 4 JUMBO 2.8MM 3.2MM 240CM ORANGE DISP

## (undated) DEVICE — WARMING BLANKET UPPER ADULT

## (undated) DEVICE — SOL IRR POUR H2O 500ML

## (undated) DEVICE — TRAP QUICK CATCH  SINGL CHAMBER

## (undated) DEVICE — PLV-SCD MACHINE: Type: DURABLE MEDICAL EQUIPMENT

## (undated) DEVICE — PACK IV START WITH CHG

## (undated) DEVICE — Device

## (undated) DEVICE — SYR LUER SLIP TIP 30CC

## (undated) DEVICE — VALVE BIOPSY

## (undated) DEVICE — DRAPE C ARM UNIVERSAL

## (undated) DEVICE — SET IV PUMP BLOOD 1VALVE 180FILTER NON-DEHP

## (undated) DEVICE — TUBING CANNULA SALTER LABS NASAL ADULT 7FT